# Patient Record
Sex: FEMALE | Race: BLACK OR AFRICAN AMERICAN | NOT HISPANIC OR LATINO | Employment: OTHER | URBAN - METROPOLITAN AREA
[De-identification: names, ages, dates, MRNs, and addresses within clinical notes are randomized per-mention and may not be internally consistent; named-entity substitution may affect disease eponyms.]

---

## 2017-01-17 ENCOUNTER — LAB VISIT (OUTPATIENT)
Dept: LAB | Facility: HOSPITAL | Age: 62
End: 2017-01-17
Payer: MEDICARE

## 2017-01-17 DIAGNOSIS — Z76.82 ORGAN TRANSPLANT CANDIDATE: ICD-10-CM

## 2017-01-17 PROCEDURE — 86833 HLA CLASS II HIGH DEFIN QUAL: CPT | Mod: PO

## 2017-01-23 NOTE — PROGRESS NOTES
Late entry from 1/22/17 at 0800:  Notification received from Joseluis Cintron, , that pt is ranking as recipient from [facility] ID [id number]. Chart reviewed and telephone assessment completed. There are no contraindications noted to prevent pt from proceeding to transplant. This is a [facility] increased risk kidney. Patient was informed of donor status and read the [facility] increased risk script. Pt wishes to proceed to transplant.  1700: Notification from Joseluis Wise that we are primary for kidney alone transplant. Donor OR is set for 2200. Ms. Schulte is patient number two behind our primary recipient.  Pt requested to begin NPO past midnight tonight and I will update her on case situation in am.  1/23/17 at 0800::  Patient notified that pt number 1 will receive transplant and that she can resume normal activities.

## 2017-02-03 LAB
CLASS II ANTIBODY COMMENTS - LUMINEX: NORMAL
CPRA %: 0
HPRA INTERPRETATION: NORMAL
SERUM COLLECTION DT - LUMINEX CLASS II: NORMAL
SPCL2 TESTING DATE: NORMAL

## 2017-02-07 ENCOUNTER — LAB VISIT (OUTPATIENT)
Dept: LAB | Facility: HOSPITAL | Age: 62
End: 2017-02-07
Payer: MEDICARE

## 2017-02-07 DIAGNOSIS — Z76.82 ORGAN TRANSPLANT CANDIDATE: ICD-10-CM

## 2017-02-07 PROCEDURE — 86832 HLA CLASS I HIGH DEFIN QUAL: CPT | Mod: PO

## 2017-02-17 LAB — HPRA INTERPRETATION: NORMAL

## 2017-03-03 LAB
CLASS I ANTIBODIES - LUMINEX: NORMAL
CLASS I ANTIBODY COMMENTS - LUMINEX: NORMAL
CPRA %: 82
SERUM COLLECTION DT - LUMINEX CLASS I: NORMAL
SPCL1 TESTING DATE: NORMAL

## 2017-03-06 DIAGNOSIS — Z12.11 COLON CANCER SCREENING: ICD-10-CM

## 2017-03-06 DIAGNOSIS — Z76.82 AWAITING ORGAN TRANSPLANT STATUS: Primary | ICD-10-CM

## 2017-03-06 DIAGNOSIS — Z76.82 ORGAN TRANSPLANT CANDIDATE: Primary | ICD-10-CM

## 2017-04-10 ENCOUNTER — CLINICAL SUPPORT (OUTPATIENT)
Dept: CARDIOLOGY | Facility: CLINIC | Age: 62
End: 2017-04-10
Payer: MEDICARE

## 2017-04-10 ENCOUNTER — OFFICE VISIT (OUTPATIENT)
Dept: OBSTETRICS AND GYNECOLOGY | Facility: CLINIC | Age: 62
End: 2017-04-10
Payer: MEDICARE

## 2017-04-10 ENCOUNTER — HOSPITAL ENCOUNTER (OUTPATIENT)
Dept: RADIOLOGY | Facility: HOSPITAL | Age: 62
Discharge: HOME | End: 2017-04-10
Attending: NURSE PRACTITIONER
Payer: MEDICARE

## 2017-04-10 VITALS
DIASTOLIC BLOOD PRESSURE: 78 MMHG | WEIGHT: 213.88 LBS | SYSTOLIC BLOOD PRESSURE: 140 MMHG | BODY MASS INDEX: 37.89 KG/M2 | HEIGHT: 63 IN

## 2017-04-10 DIAGNOSIS — Z76.82 ORGAN TRANSPLANT CANDIDATE: ICD-10-CM

## 2017-04-10 DIAGNOSIS — Z01.419 ENCOUNTER FOR GYNECOLOGICAL EXAMINATION: Primary | ICD-10-CM

## 2017-04-10 LAB
DIASTOLIC DYSFUNCTION: YES
ESTIMATED PA SYSTOLIC PRESSURE: 32.81
RETIRED EF AND QEF - SEE NOTES: 58 (ref 55–65)
TRICUSPID VALVE REGURGITATION: ABNORMAL

## 2017-04-10 PROCEDURE — 99999 PR PBB SHADOW E&M-EST. PATIENT-LVL III: CPT | Mod: PBBFAC,,, | Performed by: OBSTETRICS & GYNECOLOGY

## 2017-04-10 PROCEDURE — 93325 DOPPLER ECHO COLOR FLOW MAPG: CPT | Mod: PBBFAC,PO,TXP | Performed by: NUCLEAR MEDICINE

## 2017-04-10 PROCEDURE — 93351 STRESS TTE COMPLETE: CPT | Mod: 26,S$PBB,TXP, | Performed by: NUCLEAR MEDICINE

## 2017-04-10 PROCEDURE — G0101 CA SCREEN;PELVIC/BREAST EXAM: HCPCS | Mod: S$PBB,,, | Performed by: OBSTETRICS & GYNECOLOGY

## 2017-04-10 PROCEDURE — 93320 DOPPLER ECHO COMPLETE: CPT | Mod: 26,S$PBB,TXP, | Performed by: NUCLEAR MEDICINE

## 2017-04-10 PROCEDURE — 71020 XR CHEST PA AND LATERAL: CPT | Mod: 26,TXP,, | Performed by: RADIOLOGY

## 2017-04-10 NOTE — PROGRESS NOTES
Satinder Schulte is a 61 y.o.  who presents for   Chief Complaint   Patient presents with    Gynecologic Exam     Annual   - s/p ARABELLA BSO  - Renal failure still on dialysis    Currently not sexually active     Last mammogram 10/6/16  Last colonoscopy 3/6/17      Past Medical History:   Diagnosis Date    Abnormal Pap smear     repeat paps were normal    Anemia associated with chronic renal failure     Awaiting organ transplant status  - 2013    Blood type A+ 2014    CKD (chronic kidney disease) stage 5, GFR less than 15 ml/min     secondary hypertension    Hyperlipidemia     Hypertension, renal 1992    Stroke     Residual speech deficit       Past Surgical History:   Procedure Laterality Date    AV FISTULA PLACEMENT      CHOLECYSTECTOMY      HYSTERECTOMY      TAHBSO for benign reasons    OOPHORECTOMY         Current Outpatient Prescriptions   Medication Sig Dispense Refill    atorvastatin (LIPITOR) 40 MG tablet Take 40 mg by mouth once daily.       AURYXIA 210 mg iron Tab       calcitRIOL (ROCALTROL) 0.5 MCG capsule Take 0.5 mcg by mouth once daily.        clonidine (CATAPRES) 0.1 MG tablet Take 0.1 mg by mouth once.       cyclobenzaprine (FLEXERIL) 10 MG tablet       ergocalciferol (VITAMIN D2) 50,000 unit capsule Take 50,000 Units by mouth every 30 days.        ferrous sulfate 325 mg (65 mg iron) Tab tablet Take 325 mg by mouth daily with breakfast.      furosemide (LASIX) 40 MG tablet Take 40 mg by mouth once daily.        labetalol (NORMODYNE) 300 MG tablet Take 300 mg by mouth 2 (two) times daily.        lactulose (CHRONULAC) 10 gram/15 mL solution Take 10 g by mouth 2 (two) times daily as needed.       levofloxacin (LEVAQUIN) 250 MG tablet       losartan (COZAAR) 50 MG tablet Take 50 mg by mouth once daily.        nifedipine (ADALAT CC) 90 MG TbSR       RENVELA 800 mg Tab 800 mg 2 (two) times daily.       SENSIPAR 60 mg Tab        No current  "facility-administered medications for this visit.           Review of patient's allergies indicates:  No Known Allergies    .  Family History   Problem Relation Age of Onset    Stroke Mother     Kidney disease Mother      on dialysis    Hypertension Mother     Heart disease Mother     Heart disease Father     Stroke Sister     Kidney disease Brother     Breast cancer Daughter     Heart disease Sister     Ovarian cancer Cousin     Colon cancer Neg Hx     Thrombophilia Neg Hx        Social History   Substance Use Topics    Smoking status: Never Smoker    Smokeless tobacco: Never Used    Alcohol use No       BP (!) 140/78  Ht 5' 3" (1.6 m)  Wt 97 kg (213 lb 13.5 oz)  BMI 37.88 kg/m2    ROS:  GENERAL: No acute complaints.   BREASTS: No lumps, tenderness, discharge.  GI: No nausea, vomiting, melena, hematochezia.  : No c/o.  GYN: No unusual discharge, pain, bleeding    GEN:  Alert and oriented lady in no acute distress.  HEAD and NECK: Normocephalic. Normal thyroid to inspection and palpation  SKIN: Mild hirsutism   BREASTS: Bilaterally normal to inspection without discharge or suspicious mass. No tenderness or axillary adenopathy.                  ABDOMEN: Overweight, soft and non tender. No mass, hepatomegaly, RUQT or CVAT.   PELVIC:      Vulva: normal genitalia. No suspicious lesions.       Urethra: normal size and location. No lesion, prolapse, or discharge. Non tender.      Vagina: Atrophic, no unusual discharge, no significant cystocele or rectocele      Cuff: intact and well supported      Adnexa: No masses, tenderness, or CDS nodularity.      Anus: normal appearing.    IMPRESSION: renal failure, no high risk SA, nl gyn exam, obesity      PLAN: f/u prn          "

## 2017-05-16 ENCOUNTER — LAB VISIT (OUTPATIENT)
Dept: LAB | Facility: HOSPITAL | Age: 62
End: 2017-05-16
Payer: MEDICARE

## 2017-05-16 DIAGNOSIS — Z76.82 ORGAN TRANSPLANT CANDIDATE: ICD-10-CM

## 2017-05-18 ENCOUNTER — TELEPHONE (OUTPATIENT)
Dept: TRANSPLANT | Facility: CLINIC | Age: 62
End: 2017-05-18

## 2017-05-19 ENCOUNTER — LAB VISIT (OUTPATIENT)
Dept: LAB | Facility: HOSPITAL | Age: 62
End: 2017-05-19
Payer: MEDICARE

## 2017-05-19 ENCOUNTER — OFFICE VISIT (OUTPATIENT)
Dept: TRANSPLANT | Facility: CLINIC | Age: 62
End: 2017-05-19
Payer: MEDICARE

## 2017-05-19 VITALS
OXYGEN SATURATION: 98 % | DIASTOLIC BLOOD PRESSURE: 78 MMHG | HEIGHT: 63 IN | WEIGHT: 209.19 LBS | BODY MASS INDEX: 37.07 KG/M2 | HEART RATE: 61 BPM | RESPIRATION RATE: 18 BRPM | SYSTOLIC BLOOD PRESSURE: 170 MMHG | TEMPERATURE: 98 F

## 2017-05-19 DIAGNOSIS — E78.2 MIXED HYPERLIPIDEMIA: Chronic | ICD-10-CM

## 2017-05-19 DIAGNOSIS — Z76.82 ORGAN TRANSPLANT CANDIDATE: ICD-10-CM

## 2017-05-19 DIAGNOSIS — D70.2 OTHER DRUG-INDUCED NEUTROPENIA: ICD-10-CM

## 2017-05-19 DIAGNOSIS — I10 ESSENTIAL HYPERTENSION: ICD-10-CM

## 2017-05-19 DIAGNOSIS — N18.6 ESRD (END STAGE RENAL DISEASE): Primary | Chronic | ICD-10-CM

## 2017-05-19 DIAGNOSIS — I12.0 HYPERTENSION, RENAL, STAGE 5 CHRONIC KIDNEY DISEASE OR END STAGE RENAL DISEASE: Chronic | ICD-10-CM

## 2017-05-19 PROCEDURE — 99213 OFFICE O/P EST LOW 20 MIN: CPT | Mod: PBBFAC,TXP

## 2017-05-19 PROCEDURE — 99999 PR PBB SHADOW E&M-EST. PATIENT-LVL III: CPT | Mod: PBBFAC,TXP,,

## 2017-05-19 PROCEDURE — 99215 OFFICE O/P EST HI 40 MIN: CPT | Mod: S$PBB,TXP,, | Performed by: INTERNAL MEDICINE

## 2017-05-19 RX ORDER — TEMAZEPAM 15 MG/1
CAPSULE ORAL
COMMUNITY
Start: 2017-03-09 | End: 2018-06-15

## 2017-05-19 RX ORDER — LOSARTAN POTASSIUM 100 MG/1
100 TABLET ORAL DAILY
Status: ON HOLD | COMMUNITY
Start: 2017-03-14 | End: 2019-09-25 | Stop reason: HOSPADM

## 2017-05-19 RX ORDER — HYDRALAZINE HYDROCHLORIDE 25 MG/1
100 TABLET, FILM COATED ORAL 3 TIMES DAILY
Status: ON HOLD | COMMUNITY
End: 2019-09-25 | Stop reason: HOSPADM

## 2017-05-19 RX ORDER — OMEPRAZOLE 40 MG/1
CAPSULE, DELAYED RELEASE ORAL
COMMUNITY
Start: 2017-04-04 | End: 2019-08-02

## 2017-05-19 RX ORDER — CARVEDILOL 25 MG/1
12.5 TABLET ORAL 2 TIMES DAILY WITH MEALS
Status: ON HOLD | COMMUNITY
Start: 2017-03-28 | End: 2019-09-25 | Stop reason: SDUPTHER

## 2017-05-19 NOTE — PROGRESS NOTES
Spoke w/VIKY Anguiano regarding placing pt on internal hold until caregiver plan is confirmed. Pt attended RR clinic w/no caregiver.

## 2017-05-19 NOTE — MR AVS SNAPSHOT
[facility]  [address]  [facility] [address]  Phone: [phone]                  Satinder Schulte   2017 12:30 PM   Office Visit    Description:  Female : 1955   Provider:  [facility], TRANSPLANT   Department:  [facility]           Reason for Visit     Waitlist Status Update           Diagnoses this Visit        Comments    ESRD (end stage renal disease)    -  Primary     Hypertension, renal, stage 5 chronic kidney disease or end stage renal disease         Mixed hyperlipidemia         Essential hypertension                To Do List           Goals (5 Years of Data)     Continue low sodium diet.     Weight loss of 1-2 pounds per week.       [facility]     [facility] On Call Nurse Care Line -  Assistance  Unless otherwise directed by your provider, please contact [facility], our nurse care line that is available for  assistance.     Registered nurses in the [facility] provide: appointment scheduling, clinical advisement, health education, and other advisory services.  Call: [phone] (toll free)               Medications           Message regarding Medications     Verify the changes and/or additions to your medication regime listed below are the same as discussed with your clinician today.  If any of these changes or additions are incorrect, please notify your healthcare provider.        STOP taking these medications     AURYXIA 210 mg iron Tab     furosemide (LASIX) 40 MG tablet Take 40 mg by mouth once daily.      ferrous sulfate 325 mg (65 mg iron) Tab tablet Take 325 mg by mouth daily with breakfast.           Verify that the below list of medications is an accurate representation of the medications you are currently taking.  If none reported, the list may be blank. If incorrect, please contact your healthcare provider. Carry this list with you in case of emergency.           Current Medications     atorvastatin (LIPITOR) 40  "MG tablet Take 40 mg by mouth once daily.     B complex-vitamin C-folic acid (NEPHRO-KATHY) 0.8 mg Tab Take by mouth.    calcitRIOL (ROCALTROL) 0.5 MCG capsule Take 0.5 mcg by mouth once daily.      carvedilol (COREG) 25 MG tablet     clonidine (CATAPRES) 0.1 MG tablet Take 0.1 mg by mouth once.     cyclobenzaprine (FLEXERIL) 10 MG tablet     ergocalciferol (VITAMIN D2) 50,000 unit capsule Take 50,000 Units by mouth every 30 days.      hydrALAZINE (APRESOLINE) 25 MG tablet Take 25 mg by mouth 3 (three) times daily.    labetalol (NORMODYNE) 300 MG tablet Take 300 mg by mouth 2 (two) times daily.      lactulose (CHRONULAC) 10 gram/15 mL solution Take 10 g by mouth 2 (two) times daily as needed.     levofloxacin (LEVAQUIN) 250 MG tablet     losartan (COZAAR) 100 MG tablet     nifedipine (ADALAT CC) 90 MG TbSR     omeprazole (PRILOSEC) 40 MG capsule     RENVELA 800 mg Tab 800 mg 2 (two) times daily.     SENSIPAR 60 mg Tab     temazepam (RESTORIL) 15 mg Cap            Clinical Reference Information           Your Vitals Were     BP Pulse Temp Resp Height Weight    170/78 (BP Location: Right arm, Patient Position: Sitting, BP Method: Automatic) 61 98 °F (36.7 °C) (Oral) 18 5' 3" (1.6 m) 94.9 kg (209 lb 3.5 oz)    SpO2 BMI             98% 37.06 kg/m2         Blood Pressure          Most Recent Value    BP  (!)  170/78      Allergies as of 5/19/2017     No Known Allergies      Immunizations Administered on Date of Encounter - 5/19/2017     None      Maintenance Dialysis History     Start End Type Comments Center    8/18/2014  [facility]            Current Dialysis Center Information     [facility] [address] Phone #:  [phone]    Contact:  [address]  [address] Fax #:  [phone]            Transplant Information        Txp Date Organ Coordinator Care Team     Kidney Chintan Singleton RN Referring Physician:  Bruce Khan MD    "      [facility] Sign-Up     Activating your [facility] account is as easy as 1-2-3!     1) Visit [email or web address], select Sign Up Now, enter this activation code and your date of birth, then select Next.  [email or web address]  Expires: 7/3/2017  4:00 PM      2) Create a username and password to use when you visit [facility] in the future and select a security question in case you lose your password and select Next.    3) Enter your e-mail address and click Sign Up!    Additional Information  If you have questions, please e-mail [email or web address] or call [phone] to talk to our [facility] staff. Remember, [facility] is NOT to be used for urgent needs. For medical emergencies, dial 911.         Language Assistance Services     ATTENTION: Language assistance services are available, free of charge. Please call [phone].      ATENCIÓN: Si habla garcía, tiene a chaudhary disposición servicios gratuitos de asistencia lingüística. Llame al [phone].     JESSICA Ý: N?u b?n nói Ti?ng Vi?t, có các d?ch v? h? tr? ngôn ng? mi?n phí dành cho b?n. G?i s? [phone].         [facility] complies with applicable Federal civil rights laws and does not discriminate on the basis of race, color, national origin, age, disability, or sex.

## 2017-05-19 NOTE — PROGRESS NOTES
Kidney Transplant Recipient Reevalulation    Referring Physician: Bruce Khan  Current Nephrologist:   Waitlist Status: internal hold  Dialysis Start Date: 8/18/2014    Subjective:     CC:  Annual reassessment of kidney transplant candidacy.    HPI:  Ms. Schulte is a 61 y.o. year old Black or  female with ESRD secondary to HTN.  She has been on the wait list for a kidney transplant at [facility] since 2/18/2013. Patient is currently on hemodialysis started on 2014. Patient is dialyzing on TTS schedule.  Patient reports that she is tolerating dialysis well.. She has a LUE AV fistula. Patient denies any recent hospitalizations or ED visits.    Patient had a cystoscopy in April 2017 due to left hydronephrosis and hematuriua. according with the patient the study was normal. Please see below note from Dr Nisha grande (urology)    Satinder Schulte is a 61 y.o. female with a history of gross hematuria and left-sided abdominal pain with mild hydronephrosis seen on CT scan, here today for f/u after cystoscopy with bilateral retrograde pyelograms and bladder biopsy. Today, she reports that she did well following the biopsy. She is no longer having some abdominal pain. No gross hematuria. Pathology showed reactive urothelial with mild chronic cystitis only. Retrograde pyelogram showed no sign of obstruction or filling defect. (paken from care everywhere)      Patient is feeling well, no complications with dilaysis, walking trying to lose wt, NO donors for kidney transplant. No hematuria, Urinates one or twice a day    No chest pain or claudication    there is CBC with leucopenia mild anemia and thrombocytopenia    Will request records  from the HD unit (CBCc in the last 2 to three months)       She also a CT of the abdomen due to abdominal pain: see results:    Abdomen: Multiple hepatic and renal cysts are again seen. The hepatic cysts measure up to 4.6 cm. The right renal cyst measures up to 2.3 cm.  Cholecystectomy clips. Noncontrasted appearances of the liver, pancreas, and spleen are otherwise normal. The   biliary tree is normal. The adrenal glands are normal.      The kidneys are again atrophic. There is some high density material that appears to be within the right renal collecting system, which could be stones. Negative for right hydronephrosis. There is left hydronephrosis and hydroureter, without an obvious   source of the hydronephrosis, new from the comparison study.    Negative for adenopathy, ascites or inflammatory changes demonstrated.  Vascular calcifications are present without aneurysmal change.    The bowel is normal. Normal appendix. Mildly increased stool.    Pelvis:  The urinary bladder is contracted.     The female pelvic organs are absent.  Numerous pelvic phleboliths noted.    There is laxity of the linea alba.  Abdominal wall is otherwise intact.    Negative for osseous abnormalities.  Negative for significant spinal canal stenosis. Similar degree degenerative changes involve L5/S1 with minor marginal spondylosis noted. Mild degenerative facet arthropathy.    Negative for groin adenopathy.     Thyroid US: The right thyroid lobe measures 5.0 x 2.8 x 2.4 cm, with no nodules demonstrated.  The left lobe measures 4.6 x 2.2 x 1.8 cm, with no nodules demonstrated.  The isthmus measures 9 mm thickness.        IMPRESSION:   No thyroid nodules demonstrated.      Review of Systems     Skin: no skin rash  CNS; no headaches, blurred vision, seizure, or syncope  ENT: No JVD,  Adenopathies,  nasal congestion. No oral lesions  Cardiac: No chest pain, dyspnea, claudication, edema or palpitations  Respiratory: No SOB, cough, hemoptysis   Gastro-intestinal: No diarrhea, constipation, abdominal pain, nausea, vomit. No ascitis  Genitourinary: no hematuria, dysuria, frequency, frequency  Musculoskeletal: joint pain, arthritis or vasculitic changes  Psych: alert awake, oriented, No cranial nerves  "deficit.      Objective:   body mass index is 37.06 kg/(m^2).    Physical Exam     BP (!) 170/78 (BP Location: Right arm, Patient Position: Sitting, BP Method: Automatic)  Pulse 61  Temp 98 °F (36.7 °C) (Oral)   Resp 18  Ht 5' 3" (1.6 m)  Wt 94.9 kg (209 lb 3.5 oz)  SpO2 98%  BMI 37.06 kg/m2    Head: normocephalic  Neck: No JVD, cervical axillary, or femoral adenopathies  Heart: no murmurs, Normal s1 and s2, No gallops, no rubs, No murmurs  Lungs; CTA, good respiratory effort, no crackles  Abdomen: soft, non tender, no splenomegaly or hepatomegaly, no massess, no bruits  Extremities: RUE AVF No edema, skin rash, joint pain  SNC: awake, alert oriented. Cranial nerves are intact, no focalized, sensitivity and strength preserved      Labs:  Lab Results   Component Value Date    WBC 4.13 (L) 07/03/2012    HGB 11.4 (L) 07/03/2012    HCT 33.8 (L) 07/03/2012     07/03/2012    K 4.4 07/03/2012     07/03/2012    CO2 23 07/03/2012    BUN 58 (H) 07/03/2012    CREATININE 3.8 (H) 07/03/2012    EGFRNONAA 12 (L) 07/03/2012    CALCIUM 10.2 07/03/2012    ALBUMIN 4.1 07/03/2012    AST 16 07/03/2012    ALT 29 07/03/2012       No results found for: PREALBUMIN, BILIRUBINUA, GGT, AMYLASE, LIPASE, PROTEINUA, NITRITE, RBCUA, WBCUA    Lab Results   Component Value Date    HLAABCTYPE A2,A74 07/03/2012    HLAABCTYPE B37(Bw4),B42(Bw6) 07/03/2012    HLAABCTYPE Cw6,Cw17 07/03/2012       Lab Results   Component Value Date    CPRA 82 02/02/2017    NW6IAUY  02/02/2017     B13,B49,B45,B44,B50,B41,B60,B61,B47,B76,B82,A1,B51,B57,B58,B52,B75,B77,B71,B62,B53,B63,B72,B46    CIABCLM WEAK A*24:02, B78, A23, B35 02/02/2017    CIIAB Negative 12/01/2015    ABCMT DQB1*05:01 01/10/2017       Labs were reviewed with the patient.    Pre-transplant Workup:   Reviewed with the patient.    Assessment:     1. ESRD from HTN, on dialysis since  Feb 2013    2. Hypertension, renal, stage 5 chronic kidney disease or end stage renal disease    3. Mixed " "hyperlipidemia    4. Essential hypertension        Plan:     Transplant Candidacy:   Ms. Schulte is a suitable kidney transplant candidate.  She will be refered to see hematology. Patient has leucopenia documented since 2015 as low as 2.1K. Once work up for leucopenia is completed patient can be called for kidney transplantation    Will request CBC's from the HD but she needs to see hematology    Her hematuria is resolved, she saw Urology and underwent a cystoscopy and a CT of the abdomen which did not show any acute change. Please see urology note in "care everywhere"    Ct of the abdomen showed liver cysts, will ask the transplant surgery for their opinion    We spoke about living donation, wt loss    Education was provided    All questions were answered    The rest of the evaluation is unremarkable        Pavel Padilla MD       Follow-up:   In addition to the tests noted in the plan, Ms. Schulte will continue to have reevaluation as per the standing pre-kidney transplant protocol:  1. Monthly blood for PRA  2. Annual return to clinic, except HIV positive, > 65 years of age, or pancreas transplant candidates who will be scheduled to see transplant every 6 months while in pre-transplant phase  3. Annual re-testing: CXR, EKG, yearly mammograms for women over 40 and PSA for males over 40, cardiology follow-up as recommended by initial cardiology pre-transplant evaluation  4. Renal ultrasound every 2 years  5. Baseline colonoscopy after age 50 and repeated as recommended    [facility] Patient Status  Functional Status: 60% - Requires occasional assistance but is able to care for needs  Physical Capacity: No Limitations  "

## 2017-05-19 NOTE — PROGRESS NOTES
Transplant Recipient Adult Psychosocial Assessment-Update    *Pt was alone during the assessment. Kidney SW's notified and pt placed on hold until caregiver is able to come to clinic with pt.     Satinder Schulte  [address]  [facility] [address]    Telephone Information:   Mobile [phone]   Mobile [phone]   Home  [phone] (home)  Work  There is no work phone number on file.  E-mail  No e-mail address on record    Sex: female  YOB: 1955  Age: 61 y.o.    Encounter Date: 2017  U[place] Citizen: yes  Primary Language: English   Needed: no    Emergency Contact:  Name: Ruth Ann Schulte  Relationship: daughter  Address: Same As Pt  Phone Numbers:  [phone] (mobile)    Family/Social Support:   Number of dependents/: none  Marital history: Pt has never been   Other family dynamics: Pt's parents are ; Pt has five sisters and three brothers that are currently living. Pt reports that she is close with her siblings. Pt has four children; Mark Schulte (36) is from one relationship, and Ruth Ann Schulte (32), Dany Schulte (27) and Niya Schulte (24) are from a second relationship. Pt is close to her children and currently lives with her daughter Ruth Ann who is being treated for cancer.     Household Composition:  Name: Satinder Schulte  Age: 61  Relationship: patient  Does person drive? no    Name: Satinder Schulte  Age: 34  Relationship: daughter  Does person drive? yes    Do you and your caregivers have access to reliable transportation? yes  PRIMARY CAREGIVER: *Pt reports that her daughter Niya Schulte will be primary caregiver, phone number [phone]. VIKY unable to get in touch with Niya and contacted pt's daughter Ruth Ann ([phone]) to clarify what the caregiver plan would be. Ruth Ann reports that Niya works at a bank until 4pm and that she will speak with her about pt's caregiver plan. Ruth Ann reports that she was never told by the pt that a caregiver had to be  present with pt during her appointments this afternoon. Ruth Ann aware that pt has trouble reading but pt never notified Ruth Ann of the appointment reminder sent to pt in the mail. Ruth Ann will not be able to provide care to pt during and after transplant at this time as Ruth Ann is receiving chemo for cancer and will be immunosuppressed. Ruth Ann reports that she will speak with Niya this afternoon to discuss who can provide care to pt during and after transplant. Ruth Ann aware that pt will need to remain local for 2-5 weeks after transplant. Ruth Ann reports that pt has many siblings who she will contact and is aware that pt needs both a primary and a backup caregiver. VIKY notified Ruth Ann that SW will need to meet with pt and primary caregiver in transplant clinic in order to confirm caregiver plan and that until a caregiver plan is confirmed, pt will remain on hold. Ruth Ann verbalized her understanding and was very willing to work with team to schedule a caregiver appt for next week. Niya will contact SW team on Monday of next week to follow up.      provided in-depth information to patient and caregiver regarding pre- and post-transplant caregiver role.   strongly encourages patient and caregiver to have concrete plan regarding post-transplant care giving, including back-up caregiver(s) to ensure care giving needs are met as needed.    Patient states understanding all aspects of caregiver role/commitment and is able/willing/committed to being caregiver to the fullest extent necessary.    Patient verbalizes understanding of the education provided today and caregiver responsibilities.         remains available. Patient agree to contact  in a timely manner if concerns arise.      Able to take time off work without financial concerns: unclear as pt does not have a concrete caregiver plan at this time.     Additional Significant Others who will Assist with Transplant: Unknown at this  time      Living Will: no  Healthcare Power of : no  Advance Directives on file: <<no information> per medical record.  Verbally reviewed LW/HCPA information.   provided patient with copy of LW/HCPA documents and provided education on completion of forms.    Living Donors: No.    Highest Education Level: Grade School (0-8)  Reading Ability: 2nd grade  Reports difficulty with: reading, writing, seeing, comprehension and learning; Pt reports that she has always had difficulty reading and believes she has some type of learning disability. In addition, pt does not have teeth and has difficulty pronouncing things easily. Pt states that she plans on setting up a dentist appoint with [facility] and reports that she was given dentures by the [facility] but that they were too large. SW recommended that [facility] contact [facility] to notify them of this issue.   Learns Best By:  Verbal information; Pt advised to ask her daughters to assist with reading documents sent to pt or given to pt by a doctor or medical provider     Status: no  VA Benefits: no     Working for Income: No  If no, reason not working: Disability  Patient is disabled.  Pt states that she never worked. It is unclear if pt did not work due to another disability, but pt does state that she had medical issues as a child. Pt diagnosed with a stroke per medical record.     Spouse/Significant Other Employment: Pt's daughter Ruth Ann is currently on disability due to cancer diagnosis. Pt's daughter Niya currently works at a bank.      Disabled: yes: date disability began: 1/1/1968, due to: unknown.    Monthly Income:   Disability: $740.00  Able to afford all costs now and if transplanted, including medications: yes  Patient verbalizes understanding of personal responsibilities related to transplant costs and the importance of having a financial plan to ensure that patients transplant costs are fully covered.      provided fundraising  information/education.  Patient verbalizes understanding.   remains available.    Insurance:   Payor/Plan Subscr  Sex Relation Sub. Ins. ID Effective Group Num   1. MEDICARE - [place]* NICKO VALIENTE 1955 Female  [id number] 10/1/1978 NGN                                   PO    2. [place] VALIENTE 1955 Female  [id number]* 1999                                    [address] [address]     Primary Insurance (for UNOS reporting): Public Insurance - Medicare FFS (Fee For Service)  Secondary Insurance (for UNOS reporting): Public Insurance - Medicaid *Pt receives Medicaid Transportation from [employer] (ph#[phone])  Patient verbalizes clear understanding that patient may experience difficulty obtaining and/or be denied insurance coverage post-surgery. This includes and is not limited to disability insurance, life insurance, health insurance, burial insurance, long term care insurance, and other insurances.    Patient also reports understanding that future health concerns related to or unrelated to transplantation may not be covered by patient's insurance.  Resources and information provided and reviewed.      Patient provides verbal permission to release any necessary information to outside resources for patient care and discharge planning.  Resources and information provided are reviewed.      Dialysis Adherence:  Patient reports that she is adherent to her dialysis and utilizes Medicaid transportation to get to and from appts. Pt reports that her schedule is T, , Sat at 5AM to 9:30AM. It is unclear whether pt goes to [facility] or [facility] in [place], [place]. Pt claimed that she goes to [facility]. SW unable to reach anyone at either center. SW will f/u with both dialysis centers on Monday regarding dialysis adherence.  [facility]: [address] [place] (ph# [phone]); [facility]: [address] [place]  [phone] (# [phone])    Infusion Service: patient utilizing? no  Home Health: patient utilizing? no  DME: no  Pulmonary/Cardiac Rehab: none;   ADLS:  Pt reports that she is fully independent but does not drive.    Adherence: Pt reports that she goes to all doctors appointments and takes medications as prescribed. However, pt did not have a caregiver with her at this appt, likely due to the fact that she is not able to read most of what she is sent in the mail regarding [facility] appointments. In addition, pt's daughter Ruth Ann reports that pt did not say anything to her about needing to have a caregiver.  Adherence education and counseling provided.     Per History Section:  Past Medical History:   Diagnosis Date    Abnormal Pap smear     repeat paps were normal    Anemia associated with chronic renal failure     Awaiting organ transplant status  - 02/18/2013 6/6/2014    Blood type A+ 6/6/2014    CKD (chronic kidney disease) stage 5, GFR less than 15 ml/min     secondary hypertension    Essential hypertension 5/19/2017    Hyperlipidemia     Hypertension, renal 1992    Stroke 2007    Residual speech deficit     Social History   Substance Use Topics    Smoking status: Never Smoker    Smokeless tobacco: Never Used    Alcohol use No     History   Drug Use No     History   Sexual Activity    Sexual activity: No     Comment: single, Lives with daughter, on Disability, Lives in [place]       Per Today's Psychosocial:  Tobacco: none, patient denies any use.  Alcohol: none, patient denies any use.  Illicit Drugs/Non-prescribed Medications: none, patient denies any use.    Patient states clear understanding of the potential impact of substance use as it relates to transplant candidacy and is aware of possible random substance screening.  Substance abstinence/cessation counseling, education and resources provided and reviewed.     Arrests/DWI/Treatment/Rehab: patient denies    Psychiatric History:    Mental  Health: pt denies  Psychiatrist/Counselor: none  Medications:  none  Suicide/Homicide Issues: Pt denies current or past SI/HI.   Safety at home: Pt denies current or past safety issues in the home.     Knowledge: Patient states having clear understanding and realistic expectations regarding the potential risks and potential benefits of organ transplantation and organ donation, agrees to discuss with health care team members and support system members and to utilize available resources and express questions and/or concerns in order to further facilitate the pt informed decision-making.  Resources and information provided and reviewed.     Patient is aware of [facility]'s affiliation and/or partnership with agencies in home health care, LTAC, SNF, [facility], and other hospitals and clinics.    Understanding: Patient reports having a clear understanding of the many lifetime commitments involved with being a transplant recipient, including costs, compliance, medications, lab work, procedures, appointments, concrete and financial planning, preparedness, timely and appropriate communication of concerns, abstinence (ETOH, tobacco, illicit non-prescribed drugs), adherence to all health care team recommendations, support system and caregiver involvement, appropriate and timely resource utilization and follow-through, mental health counseling as needed/recommended, and patient and caregiver responsibilities.  Social Service Handbook, resources and detailed educational information provided and reviewed.  Educational information provided.    Patient also reports current and expected compliance with health care regime and states having a clear understanding of the importance of compliance.  Pt aware that she will need to return to clinic with a caregiver. Pt's daughter Ruth Ann also notified via telephone that pt will need to come back to clinic with primary caregiver before pt can be taken off of hold.     Patient reports a clear  "understanding that risks and benefits may be involved with organ transplantation and with organ donation.      Patient also reports clear understanding that psychosocial risk factors may affect patient, and include but are not limited to feelings of depression, generalized anxiety, anxiety regarding dependence on others, post traumatic stress disorder, feelings of guilt and other emotional and/or mental concerns, and/or exacerbation of existing mental health concerns.  Detailed resources provided and discussed.     Patient agrees to access appropriate resources in a timely manner as needed and/or as recommended, and to communicate concerns appropriately.  Patient also reports a clear understanding of treatment options available.      reviewed education, provided additional information, and answered questions.    Feelings or Concerns: Pt reports that she is eager to get a transplant soon as she has been waiting a long time.     Coping: Pt reports that she and her daughter Ruth Ann spend time taking care of each other and enjoy each other's company. Pt reports that she is coping adequately well at this time.     Goals: Pt states that she looks forward to "living my life."  Patient referred to Vocational Rehabilitation.    Interview Behavior: Caregiver presents as alert and oriented x 4, pleasant, good eye contact, well groomed, recall good, concentration/judgement good, calm, communicative, cooperative and asking and answering questions appropriately. Pt did appear slowed at times, and this may be related to diagnosis of stroke that occurred in the past.          Transplant Social Work - Candidacy  Assessment/Plan:     Psychosocial Suitability: Patient presents as an unacceptable candidate for kidney transplant at this time. Based on psychosocial risk factors, patient presents as high risk, due to no confirmed caregiver plan at this time and no primary caregiver present. Once pt can return to clinic with a " caregiver(s) who can confirm their committment, pt will be considered suitable at low risk. Protective factors include adequate insurance, financial stability despite limited income, and no reported mental health or substance abuse issues.     Recommendations/Additional Comments: SW discussed fundraising, local housing, and caregiver instructions. Pt's daughter Ruth Ann will help to coordinate an appointment for pt and a primary caregiver to return to the transplant clinic in the next few weeks so that pt can be taken off of hold. In addition, SW will need to follow up on Monday with pt's dialysis unit to confirm adherence.     Chen Diaz, SEBASTIANW

## 2017-05-21 LAB
HEP. B SURF AB, QUAL: POSITIVE
HEP. B SURF AB, QUANT.: NORMAL MIU/ML

## 2017-05-22 ENCOUNTER — TELEPHONE (OUTPATIENT)
Dept: TRANSPLANT | Facility: HOSPITAL | Age: 62
End: 2017-05-22

## 2017-05-22 NOTE — TELEPHONE ENCOUNTER
SW contacted pt's dialysis center, [facility] ([phone]), located at [address] [place] [address] and spoke with Mildred who states that pt has been fully compliant over the last three months with 0 no-shows and 0 AMA's. SW expressed understanding and remains available.

## 2017-05-23 NOTE — PROGRESS NOTES
LISTED PATIENT EDUCATION NOTE    Ms. Satinder Schulte was seen in listed pre-kidney transplant clinic for evaluation for kidney, kidney/pancreas or pancreas only transplant.  The patient attended a group education session that discussed/reviewed the following aspects of transplantation: evaluation and selection committee process, UNOS waitlist management/multiple listings, types of organs offered (KDPI < 85%, KDPI > 85%, PHS increased risk, DCD), financial aspects, surgical procedures, dietary instruction pre- and post-transplant, health maintenance pre- and post-transplant, post-transplant hospitalization and outpatient follow-up, potential to participate in a research protocol, and medication management and side effects.  A question and answer session was provided after the presentation.    The patient was seen by all members of the multi-disciplinary team to include: Nephrologist/PA, Surgeon, , Transplant Coordinator, , Pharmacist and Dietician (if applicable).    The patient reviewed and signed all consents for evaluation which were witnessed and sent to scanning into the EPIC chart.    The patient was given an education book and plan for further evaluation based on her individual assessment.      The patient was encouraged to call with any questions or concerns.

## 2017-05-30 ENCOUNTER — TELEPHONE (OUTPATIENT)
Dept: TRANSPLANT | Facility: CLINIC | Age: 62
End: 2017-05-30

## 2017-05-30 NOTE — TELEPHONE ENCOUNTER
VIKY spoke with pt's daughter Niya Schulte ([phone]) who reports that she is very interested in attending the education and a social work appointment with pt so that she can understand the caregiver role. VIKY asked about others who can assist and she reports that her sister Ruth Ann could help. VIKY explained that there were some concerns about Ruth Ann helping because she is experiencing her own medical condition. Niya reports that she is usually the only one who helps with her sister and her mother, but does report another sibling Rufino Schulte ([phone]) whom Niya reports could possibly assist with pt or sister Ruth Ann. Niya reports that she would like to set up an appointment for pt and all of her siblings to attend the education and meet with the .    VIKY notified pt's coordinator, BESSY Singleton, RN    VIKY remains available to pt, pt's family, and transplant team at [phone].      ----- Message from Manda Ren sent at 5/26/2017 12:19 PM CDT -----  Patient returning call . Please call

## 2017-05-31 NOTE — LETTER
June 1, 2017               Dear Dr. Joseluis Khan:     Patient: Satinder Schulte   MR Number: [id number]   YOB: 1955     Dr. Pavel Padilla with transplant nephrology has recently seen Ms. Schulte on 5/19/17 in transplant clinic to update her listing status. He would like to request the last few months of Ms. Schulte's dialysis lab work, specfiically her CBC results for review. He has some concern that if she is frankly leukopenic or thrombocytopenic she would need a consultation with hematology. We will be more than happy to assist the patient with scheduling that appointment if warranted.        If you have any questions or concerns, please don't hesitate to call.    Sincerely,   Pamela Allen RN  Listed Coordinator      [facility]  [address] [place] [id number]  [phone]

## 2017-06-01 ENCOUNTER — TELEPHONE (OUTPATIENT)
Dept: TRANSPLANT | Facility: CLINIC | Age: 62
End: 2017-06-01

## 2017-06-01 DIAGNOSIS — Z76.82 ORGAN TRANSPLANT CANDIDATE: Primary | ICD-10-CM

## 2017-06-01 NOTE — TELEPHONE ENCOUNTER
Inquiry sent to transplant surgery via in basket message.     ----- Message from Yisel Gonzalez sent at 5/22/2017 10:46 AM CDT -----      ----- Message -----  From: Pavel Padilla MD  Sent: 5/19/2017   3:57 PM  To: [facility] Pre-Kidney Transplant Clinical    Please discuss with transplant surgery liver cysts, look stable to me    Get other CBCs from the HD unit    If she is frankly leukopenic or thrombocytopenic needs consultation with hematology    Thanks    Pavel

## 2017-06-16 ENCOUNTER — LAB VISIT (OUTPATIENT)
Dept: LAB | Facility: HOSPITAL | Age: 62
End: 2017-06-16
Payer: MEDICARE

## 2017-06-16 DIAGNOSIS — Z76.82 ORGAN TRANSPLANT CANDIDATE: ICD-10-CM

## 2017-07-08 LAB — HPRA INTERPRETATION: NORMAL

## 2017-07-08 PROCEDURE — 86829 HLA CLASS I/II ANTIBODY QUAL: CPT | Mod: PO,TXP

## 2017-07-08 PROCEDURE — 86829 HLA CLASS I/II ANTIBODY QUAL: CPT | Mod: 91,PO,TXP

## 2017-07-13 ENCOUNTER — LAB VISIT (OUTPATIENT)
Dept: LAB | Facility: HOSPITAL | Age: 62
End: 2017-07-13
Payer: MEDICARE

## 2017-07-13 DIAGNOSIS — Z76.82 ORGAN TRANSPLANT CANDIDATE: ICD-10-CM

## 2017-07-31 DIAGNOSIS — Z76.82 ORGAN TRANSPLANT CANDIDATE: ICD-10-CM

## 2017-07-31 LAB — HPRA INTERPRETATION: NORMAL

## 2017-07-31 PROCEDURE — 86829 HLA CLASS I/II ANTIBODY QUAL: CPT | Mod: 91,PO,TXP

## 2017-07-31 PROCEDURE — 86829 HLA CLASS I/II ANTIBODY QUAL: CPT | Mod: PO,TXP

## 2017-08-17 PROCEDURE — 86833 HLA CLASS II HIGH DEFIN QUAL: CPT | Mod: PO,TXP

## 2017-08-17 PROCEDURE — 86832 HLA CLASS I HIGH DEFIN QUAL: CPT | Mod: PO,TXP

## 2017-08-18 ENCOUNTER — INITIAL CONSULT (OUTPATIENT)
Dept: HEMATOLOGY/ONCOLOGY | Facility: CLINIC | Age: 62
End: 2017-08-18
Payer: MEDICARE

## 2017-08-18 ENCOUNTER — LAB VISIT (OUTPATIENT)
Dept: LAB | Facility: HOSPITAL | Age: 62
End: 2017-08-18
Payer: MEDICARE

## 2017-08-18 ENCOUNTER — OFFICE VISIT (OUTPATIENT)
Dept: TRANSPLANT | Facility: CLINIC | Age: 62
End: 2017-08-18
Payer: MEDICARE

## 2017-08-18 VITALS
DIASTOLIC BLOOD PRESSURE: 83 MMHG | SYSTOLIC BLOOD PRESSURE: 203 MMHG | BODY MASS INDEX: 36.41 KG/M2 | HEART RATE: 70 BPM | WEIGHT: 205.5 LBS | HEIGHT: 63 IN | RESPIRATION RATE: 16 BRPM | OXYGEN SATURATION: 100 %

## 2017-08-18 VITALS
HEART RATE: 46 BPM | WEIGHT: 205.69 LBS | OXYGEN SATURATION: 97 % | DIASTOLIC BLOOD PRESSURE: 57 MMHG | TEMPERATURE: 98 F | HEIGHT: 63 IN | RESPIRATION RATE: 17 BRPM | BODY MASS INDEX: 36.45 KG/M2 | SYSTOLIC BLOOD PRESSURE: 130 MMHG

## 2017-08-18 DIAGNOSIS — D61.818 PANCYTOPENIA: ICD-10-CM

## 2017-08-18 DIAGNOSIS — D61.818 PANCYTOPENIA: Primary | ICD-10-CM

## 2017-08-18 DIAGNOSIS — Z76.82 PATIENT ON WAITING LIST FOR KIDNEY TRANSPLANT: ICD-10-CM

## 2017-08-18 LAB
BASOPHILS # BLD AUTO: 0.01 K/UL
BASOPHILS NFR BLD: 0.3 %
DIFFERENTIAL METHOD: ABNORMAL
EOSINOPHIL # BLD AUTO: 0.1 K/UL
EOSINOPHIL NFR BLD: 3.8 %
ERYTHROCYTE [DISTWIDTH] IN BLOOD BY AUTOMATED COUNT: 15.7 %
HCT VFR BLD AUTO: 29.6 %
HGB BLD-MCNC: 9.5 G/DL
LYMPHOCYTES # BLD AUTO: 1.4 K/UL
LYMPHOCYTES NFR BLD: 47.6 %
MCH RBC QN AUTO: 30.7 PG
MCHC RBC AUTO-ENTMCNC: 32.1 G/DL
MCV RBC AUTO: 96 FL
MONOCYTES # BLD AUTO: 0.3 K/UL
MONOCYTES NFR BLD: 11 %
NEUTROPHILS # BLD AUTO: 1.1 K/UL
NEUTROPHILS NFR BLD: 37.3 %
PLATELET # BLD AUTO: 115 K/UL
PMV BLD AUTO: 10.7 FL
RBC # BLD AUTO: 3.09 M/UL
WBC # BLD AUTO: 2.92 K/UL

## 2017-08-18 PROCEDURE — 99499 UNLISTED E&M SERVICE: CPT | Mod: S$PBB,TXP,, | Performed by: NURSE PRACTITIONER

## 2017-08-18 PROCEDURE — 85025 COMPLETE CBC W/AUTO DIFF WBC: CPT | Mod: TXP

## 2017-08-18 PROCEDURE — 36415 COLL VENOUS BLD VENIPUNCTURE: CPT | Mod: TXP

## 2017-08-18 PROCEDURE — 99205 OFFICE O/P NEW HI 60 MIN: CPT | Mod: S$PBB,,, | Performed by: INTERNAL MEDICINE

## 2017-08-18 PROCEDURE — 99214 OFFICE O/P EST MOD 30 MIN: CPT | Mod: PBBFAC,27,TXP | Performed by: NURSE PRACTITIONER

## 2017-08-18 PROCEDURE — 99999 PR PBB SHADOW E&M-EST. PATIENT-LVL IV: CPT | Mod: PBBFAC,,, | Performed by: INTERNAL MEDICINE

## 2017-08-18 PROCEDURE — 99999 PR PBB SHADOW E&M-EST. PATIENT-LVL IV: CPT | Mod: PBBFAC,TXP,, | Performed by: NURSE PRACTITIONER

## 2017-08-18 NOTE — LETTER
August 18, 2017      Emilia Amaya, DENNIS  [address] [place] [address]           [facility]  [address]  [place] [address]  Phone: [phone]          Patient: Satinder Schulte   MR Number: [id number]   YOB: 1955   Date of Visit: 8/18/2017       Dear Emilia Amaya:    Thank you for referring Satinder Schulte to me for evaluation. Attached you will find relevant portions of my assessment and plan of care.    If you have questions, please do not hesitate to call me. I look forward to following Satinder Schulte along with you.    Sincerely,    Anna Lin MD    Enclosure  CC:  No Recipients    If you would like to receive this communication electronically, please contact [email or web address] or [phone] to request more information on [employer] Link access.    For providers and/or their staff who would like to refer a patient to [facility], please contact us through our one-stop-shop provider referral line, Christy Gregory, at [phone].    If you feel you have received this communication in error or would no longer like to receive these types of communications, please e-mail [email or web address]

## 2017-08-18 NOTE — PROGRESS NOTES
Subjective:       Patient ID: Satinder Schulte is a 61 y.o. female.    Chief Complaint: transplant- clearance    Patient referred for cytopenias from renal transplant service. Only CBC available at time of consultation is 5 years old. Patient reports a history of anemia due to menorrhagia that was treated with hysterectomy. During conversation, she also mentions a history of decreased white blood cell count. She sees a hematologist in [place] routinely, Dr. Iyer Shows, and says a bone marrow biopsy is scheduled for 8/23/17. She is asymptomatic today from any cytopenias.    HPI  Review of Systems   Constitutional: Negative.    HENT: Negative.    Eyes: Negative.    Respiratory: Negative.    Cardiovascular: Negative.    Gastrointestinal: Negative.    Endocrine: Negative.    Genitourinary: Negative.    Musculoskeletal: Negative.    Skin: Negative.    Allergic/Immunologic: Negative for environmental allergies, food allergies and immunocompromised state.   Neurological: Negative.    Hematological: Negative for adenopathy. Does not bruise/bleed easily.   Psychiatric/Behavioral: Negative.        Objective:      Physical Exam   Constitutional: She is oriented to person, place, and time. She appears well-developed and well-nourished.   HENT:   Head: Normocephalic and atraumatic.   Eyes: Conjunctivae are normal. No scleral icterus.   Neck: Normal range of motion. Neck supple.   Cardiovascular: Normal rate and intact distal pulses.    Pulmonary/Chest: Effort normal. No respiratory distress.   Abdominal: Soft. She exhibits no distension. There is no tenderness.   Musculoskeletal: Normal range of motion. She exhibits no edema.   Neurological: She is alert and oriented to person, place, and time. No cranial nerve deficit.   Skin: Skin is warm and dry.   Psychiatric: She has a normal mood and affect. Her behavior is normal.   Nursing note and vitals reviewed.      Assessment:       1. Pancytopenia        Plan:       Update CBC  today  Information release completed to obtain records from Dr. Iyer Shows office and obtain bone marrow biopsy results once completed 8/23/17.

## 2017-08-18 NOTE — LETTER
August 21, 2017                     [facility] [facility]  [address]  [facility] [address]  Phone: [phone]   Patient: Satinder Schulte   MR Number: [id number]   YOB: 1955   Date of Visit: 8/18/2017       Dear      Thank you for referring Satinder Schulte to me for evaluation. Attached you will find relevant portions of my assessment and plan of care.    If you have questions, please do not hesitate to call me. I look forward to following Satinder Schulte along with you.    Sincerely,    Emilia Amaya, NP    Enclosure    If you would like to receive this communication electronically, please contact [email or web address] or [phone] to request "[employer] Link access.    "[employer] Link is a tool which provides read-only access to select patient information with whom you have a relationship. Its easy to use and provides real time access to review your patients record including encounter summaries, notes, results, and demographic information.    If you feel you have received this communication in error or would no longer like to receive these types of communications, please e-mail [email or web address]

## 2017-08-21 PROBLEM — Z76.82 PATIENT ON WAITING LIST FOR KIDNEY TRANSPLANT: Status: ACTIVE | Noted: 2017-08-21

## 2017-08-22 LAB
CLASS I ANTIBODIES - LUMINEX: NORMAL
CLASS I ANTIBODY COMMENTS - LUMINEX: NORMAL
CLASS II ANTIBODY COMMENTS - LUMINEX: NORMAL
CPRA %: 73
HPRA INTERPRETATION: NORMAL
SERUM COLLECTION DT - LUMINEX CLASS I: NORMAL
SERUM COLLECTION DT - LUMINEX CLASS II: NORMAL
SPCL1 TESTING DATE: NORMAL
SPCL2 TESTING DATE: NORMAL
SPCLU TESTING DATE: NORMAL

## 2017-10-06 ENCOUNTER — HOSPITAL ENCOUNTER (OUTPATIENT)
Dept: RADIOLOGY | Facility: HOSPITAL | Age: 62
Discharge: HOME | End: 2017-10-06
Attending: NURSE PRACTITIONER
Payer: MEDICARE

## 2017-10-06 VITALS — WEIGHT: 205 LBS | BODY MASS INDEX: 36.32 KG/M2 | HEIGHT: 63 IN

## 2017-10-06 DIAGNOSIS — Z76.82 ORGAN TRANSPLANT CANDIDATE: ICD-10-CM

## 2017-10-06 PROCEDURE — 77067 SCR MAMMO BI INCL CAD: CPT | Mod: 26,TXP,, | Performed by: RADIOLOGY

## 2017-10-06 PROCEDURE — 77067 SCR MAMMO BI INCL CAD: CPT | Mod: TC,TXP

## 2017-10-09 NOTE — PROGRESS NOTES
Patient's upcoming appts noted in care everywhere    Upcoming Encounters  Upcoming Encounters   Date Type Specialty Care Team Description   10/18/2017 Office Visit Urology Nisha Burks MD    [address] [facility]    [address]    [place] [id number] [phone] (Fax)       11/10/2017 Office Visit Family Medicine Brennon Chavez MD    [address] [facility]    [address]    [place] [id number] [phone] (Fax)       12/07/2017 Office Visit Hematology and Oncology Juvenal Diggs MD    [address] [facility]    [address]    [place] [address] [phone] (Fax)

## 2017-10-16 ENCOUNTER — LAB VISIT (OUTPATIENT)
Dept: LAB | Facility: HOSPITAL | Age: 62
End: 2017-10-16
Payer: MEDICARE

## 2017-10-16 DIAGNOSIS — Z76.82 ORGAN TRANSPLANT CANDIDATE: ICD-10-CM

## 2017-10-16 PROCEDURE — 86832 HLA CLASS I HIGH DEFIN QUAL: CPT | Mod: PO,TXP

## 2017-10-16 PROCEDURE — 86833 HLA CLASS II HIGH DEFIN QUAL: CPT | Mod: PO,TXP

## 2017-10-19 NOTE — PROGRESS NOTES
Transplant Recipient Adult Psychosocial Assessment-Update (Last Assessment completed on 17)      Satinder Schulte  [address]  [place] [address]    Telephone Information:   Mobile [phone]   Mobile [phone]   Home  [phone] (home)  Work  There is no work phone number on file.  E-mail  No e-mail address on record    Sex: female  YOB: 1955  Age: 62 y.o.    Encounter Date: 2017  [place] Citizen: yes  Primary Language: English   Needed: no    Emergency Contact:  Name: Ruth Ann Schulte  Relationship: daughter  Address: Same As Pt  Phone Numbers:  [phone] (mobile)    Family/Social Support:   Number of dependents/: Pt reports no dependents.  Marital history: Pt has never been . Pt reports no current significant other.  Other family dynamics: Pt's parents are ; Pt has five sisters and three brothers that are currently living. Pt reports that she is close with her siblings. Pt has four children; Mark Schulte (36) is from one relationship, and Ruth Ann Schulte (32), Dany Sanya Eris (27) and Niya Schulte (24) are from a second relationship. Pt is close to her children and currently lives with her daughter Ruth Ann who is being treated for cancer.     Household Composition:  Name: Satinder Schulte  Age: 61  Relationship: patient  Does person drive? no    Name: Ruth Ann Schulte  Age: 32  Relationship: daughter  Does person drive? yes    Do you and your caregivers have access to reliable transportation? yes  PRIMARY CAREGIVER: Ruth Ann Schulte (daughter) will be primary caregiver, phone number [phone].     SW expressed concern about Ruth Ann being a caregiver because per pt's report at last assessment, Ruth Ann was in treatment for cancer. Ruth Ann reports that she is not undergoing chemo treatment and that her treatments are only once a month. Ruth Ann also reports her other siblings can assist as well. This SW previously spoke with pt's other daughter: Niya on 17  "(see EMR) and confirmed that she can assist as well.     provided in-depth information to patient and caregiver regarding pre- and post-transplant caregiver role.   strongly encourages patient and caregiver to have concrete plan regarding post-transplant care giving, including back-up caregiver(s) to ensure care giving needs are met as needed.    Patient and Caregiver states understanding all aspects of caregiver role/commitment and is able/willing/committed to being caregiver to the fullest extent necessary.    Patient and Caregiver verbalizes understanding of the education provided today and caregiver responsibilities.         remains available. Patient and Caregiver agree to contact  in a timely manner if concerns arise.      Able to take time off work without financial concerns: yes.      Additional Significant Others who will Assist with Transplant:  Name: Niya Schulte  Age: 25  Phone: [phone]  City: [place] State: [place]  Relationship: daughter  Does person drive? yes    Name: Dany Schulte  Age: 28  Phone: [phone]  City: [place] State: [place]  Relationship: son  Does person drive? yes    Living Will: no Education and information provided  Healthcare Power of : no Education and information provided  Advance Directives on file: <<no information> per medical record.  Verbally reviewed LW/HCPA information.   provided patient with copy of LW/HCPA documents and provided education on completion of forms.    Living Donors: No. Education and resource information given to patient.    Highest Education Level: Grade School (0-8); pt reports completing 9th grade  Reading Ability: 2nd grade  Reports difficulty with: reading, writing, seeing, comprehension and learning; Pt reports that she has always had difficulty reading and believes she has some type of learning disability and states, "I was a slow learner". In addition, pt does not have teeth " "and has difficulty pronouncing things easily. Pt states that she plans on setting up a dentist appoint with [facility] and reports that she was given dentures by the [facility] but that they were too large. SW recommended that [facility] contact [facility] to notify [facility] of this issue.   Learns Best By:  Verbal or Hands-On information; Pt reports that her daughters read most papers.     Status: no  VA Benefits: no     Working for Income: No  If no, reason not working: Disability    Patient is disabled.  Prior to disability, patient  reports never working. Pt does state that she had medical issues as a child. Pt diagnosed with a stroke per medical record.     Spouse/Significant Other Employment: Pt's daughter Ruth Ann is currently on disability due to cancer diagnosis. Pt's daughter Niya currently works at a bank.      Disabled: yes: date disability began: 1968, due to: Pt reports that it is based on being a "slow learner in education".    Monthly Income:   Disability: $740.00  Able to afford all costs now and if transplanted, including medications: yes Pt reports son: ayad would assist if needed     Patient and Caregiver verbalizes understanding of personal responsibilities related to transplant costs and the importance of having a financial plan to ensure that patients transplant costs are fully covered.      provided fundraising information/education.  Patient and Caregiver verbalizes understanding.   remains available.    Insurance:   Payor/Plan Subscr  Sex Relation Sub. Ins. ID Effective Group Num   1. [place]ARE - [place]* NICKO VALIENTE LAINEY 1955 Female  [id number] 10/1/1978 [facility]                                   [address]    2. [place] VALIENTE LAINEY 1955 Female  [id number]* 1999                                    [address] [address]     Primary Insurance (for UNOS reporting): Public Insurance - Medicare FFS (Fee For Service)  Secondary Insurance (for UNOS reporting): Public " "Insurance - Medicaid *Pt receives Medicaid Transportation from [employer] (#[phone])  Patient and Caregiver verbalizes clear understanding that patient may experience difficulty obtaining and/or be denied insurance coverage post-surgery. This includes and is not limited to disability insurance, life insurance, health insurance, burial insurance, long term care insurance, and other insurances.    Patient and Caregiver also reports understanding that future health concerns related to or unrelated to transplantation may not be covered by patient's insurance.  Resources and information provided and reviewed.      Patient and Caregiver provides verbal permission to release any necessary information to outside resources for patient care and discharge planning.  Resources and information provided are reviewed.      Dialysis Adherence:  Patient reports being on hemodialysis in center attending all dialysis appointments and staying for the entire course of treatment. SW received a faxed form from pt's dialysis center which indicates over last three months that the patient has had no AMAs, not had any no-shows, and her Last intact PTH was 922 on 8/10/17 and "MD following iPTH Sensipar has been adjusted".    Infusion Service: patient utilizing? no  Home Health: patient utilizing? no  DME: yes BP Cuff  Pulmonary/Cardiac Rehab: none;   ADLS:  Pt reports that she is fully independent but does not drive.    Adherence: Pt reports that she goes to all doctors appointments and takes medications as prescribed. Pt reports that daughter: Ruth Ann assists with setting up pt's medications. Adherence education and counseling provided.     Per History Section:  Past Medical History:   Diagnosis Date    Abnormal Pap smear     repeat paps were normal    Anemia associated with chronic renal failure     Awaiting organ transplant status  - 02/18/2013 6/6/2014    Blood type A+ 6/6/2014    CKD (chronic kidney disease) stage 5, GFR " less than 15 ml/min     secondary hypertension    Essential hypertension 5/19/2017    Hyperlipidemia     Hypertension, renal 1992    Stroke 2007    Residual speech deficit     Social History   Substance Use Topics    Smoking status: Never Smoker    Smokeless tobacco: Never Used    Alcohol use No     History   Drug Use No     History   Sexual Activity    Sexual activity: No     Comment: single, Lives with daughter, on Disability, Lives in [place]       Per Today's Psychosocial:  Tobacco: none, patient denies any use.  Alcohol: none, patient denies any use.  Illicit Drugs/Non-prescribed Medications: none, patient denies any use.    Patient and Caregiver states clear understanding of the potential impact of substance use as it relates to transplant candidacy and is aware of possible random substance screening.  Substance abstinence/cessation counseling, education and resources provided and reviewed.     Arrests/DWI/Treatment/Rehab: patient denies    Psychiatric History:    Mental Health: Pt reports no history of or current mental health issues or concerns.   Psychiatrist/Counselor: Pt denies seeing a mental health professional and reports being open to seeing the psych department for talk therapy if necessary.  Medications: Pt denies taking medications for mental health reasons.  Suicide/Homicide Issues: Pt denies current or past SI/HI.   Safety at home: Pt reports no current or history of safety concerns in household; including mental, physical, verbal, or sexual abuse.    Knowledge: Patient and Caregiver states having clear understanding and realistic expectations regarding the potential risks and potential benefits of organ transplantation and organ donation, agrees to discuss with health care team members and support system members and to utilize available resources and express questions and/or concerns in order to further facilitate the pt informed decision-making.  Resources and information provided and  reviewed.     Patient and Caregiver is aware of [facility]'s affiliation and/or partnership with agencies in home health care, LTAC, SNF, [facility], and other hospitals and clinics.    Understanding: Patient and Caregiver reports having a clear understanding of the many lifetime commitments involved with being a transplant recipient, including costs, compliance, medications, lab work, procedures, appointments, concrete and financial planning, preparedness, timely and appropriate communication of concerns, abstinence (ETOH, tobacco, illicit non-prescribed drugs), adherence to all health care team recommendations, support system and caregiver involvement, appropriate and timely resource utilization and follow-through, mental health counseling as needed/recommended, and patient and caregiver responsibilities.  Social Service Handbook, resources and detailed educational information provided and reviewed.  Educational information provided.    Patient and Caregiver also reports current and expected compliance with health care regime and states having a clear understanding of the importance of compliance.     Patient and Caregiver reports a clear understanding that risks and benefits may be involved with organ transplantation and with organ donation.      Patient and Caregiver also reports clear understanding that psychosocial risk factors may affect patient, and include but are not limited to feelings of depression, generalized anxiety, anxiety regarding dependence on others, post traumatic stress disorder, feelings of guilt and other emotional and/or mental concerns, and/or exacerbation of existing mental health concerns.  Detailed resources provided and discussed.     Patient and Caregiver agrees to access appropriate resources in a timely manner as needed and/or as recommended, and to communicate concerns appropriately.  Patient also reports a clear understanding of treatment options available.      reviewed  "education, provided additional information, and answered questions.    Feelings or Concerns: Pt reports that she is eager to get a transplant soon as she has been waiting a long time.     Coping: Pt reports that she and her daughter Ruth Ann spend time taking care of each other and enjoy each other's company. Pt also reports watch tv: Westerns and action movies. Pt reports that she is coping adequately well at this time. Pt [facility] in [place], [place] with Kai Brooks presiding.     Goals: Pt reports living a normal life and getting off dialysis as goals for after transplant. Patient referred to Vocational Rehabilitation.    Interview Behavior: Caregiver presents as alert and oriented x 4, pleasant, good eye contact, well groomed, recall good, concentration/judgement good, calm, communicative, cooperative and asking and answering questions appropriately. Pt did appear slowed at times, and this may be related to diagnosis of stroke that occurred in the past. Pt presents with daughter: Ruth Ann Schulte at pt's request.         Transplant Social Work - Candidacy  Assessment/Plan:     Psychosocial Suitability: Patient presents as a fair candidate for kidney transplant at this time. Based on psychosocial risk factors, patient presents as low risk, due to due to confirmed primary caregiver and back-up caregivers. Pt will also need to present to appointments with caregivers due to pt's limited reading ability and pt's report of being a "slow learner". pt also has suitable dialysis adherence and financial plan in place.    Recommendations/Additional Comments: SW recommends that caregivers attend all appointments pre- and post-transplant to ensure successful transplant course. SW recommends that pt conduct fundraising to assist pt with pay for cost of medications, food, gas, and other transplant related needs. SW recommends that pt remain aware of potential mental health concerns " and contact the team if any concerns arise. SW recommends that pt remain abstinent from tobacco, ETOH, and drug use. SW supports pt's continued dialysis adherence. SW remains available to answer any questions or concerns that arise as the pt moves through the transplant process.       Nathan Lopez, SEBASTIANW

## 2017-11-08 LAB — HPRA INTERPRETATION: NORMAL

## 2017-12-05 LAB
CLASS I ANTIBODIES - LUMINEX: NORMAL
CLASS I ANTIBODY COMMENTS - LUMINEX: NORMAL
CLASS II ANTIBODY COMMENTS - LUMINEX: NORMAL
CPRA %: 83
SERUM COLLECTION DT - LUMINEX CLASS I: NORMAL
SERUM COLLECTION DT - LUMINEX CLASS II: NORMAL
SPCL1 TESTING DATE: NORMAL
SPCL2 TESTING DATE: NORMAL
SPCLU TESTING DATE: NORMAL

## 2018-01-09 ENCOUNTER — DOCUMENTATION ONLY (OUTPATIENT)
Dept: ENDOSCOPY | Facility: HOSPITAL | Age: 63
End: 2018-01-09

## 2018-01-24 ENCOUNTER — LAB VISIT (OUTPATIENT)
Dept: LAB | Facility: HOSPITAL | Age: 63
End: 2018-01-24
Payer: MEDICARE

## 2018-01-24 DIAGNOSIS — Z76.82 ORGAN TRANSPLANT CANDIDATE: ICD-10-CM

## 2018-01-24 PROCEDURE — 86833 HLA CLASS II HIGH DEFIN QUAL: CPT | Mod: PO,TXP

## 2018-01-24 PROCEDURE — 86832 HLA CLASS I HIGH DEFIN QUAL: CPT | Mod: PO,TXP

## 2018-02-01 LAB — HPRA INTERPRETATION: NORMAL

## 2018-02-02 LAB
CLASS I ANTIBODIES - LUMINEX: NORMAL
CLASS I ANTIBODY COMMENTS - LUMINEX: NORMAL
CLASS II ANTIBODIES - LUMINEX: NEGATIVE
CPRA %: 61
SERUM COLLECTION DT - LUMINEX CLASS I: NORMAL
SERUM COLLECTION DT - LUMINEX CLASS II: NORMAL
SPCL1 TESTING DATE: NORMAL
SPCL2 TESTING DATE: NORMAL
SPCLU TESTING DATE: NORMAL

## 2018-03-06 DIAGNOSIS — Z76.82 ORGAN TRANSPLANT CANDIDATE: Primary | ICD-10-CM

## 2018-04-05 ENCOUNTER — LAB VISIT (OUTPATIENT)
Dept: LAB | Facility: HOSPITAL | Age: 63
End: 2018-04-05
Payer: MEDICARE

## 2018-04-05 DIAGNOSIS — Z76.82 ORGAN TRANSPLANT CANDIDATE: ICD-10-CM

## 2018-04-05 PROCEDURE — 86833 HLA CLASS II HIGH DEFIN QUAL: CPT | Mod: PO,TXP

## 2018-04-05 PROCEDURE — 86832 HLA CLASS I HIGH DEFIN QUAL: CPT | Mod: PO,TXP

## 2018-04-10 LAB — HPRA INTERPRETATION: NORMAL

## 2018-04-27 LAB
CLASS I ANTIBODIES - LUMINEX: NORMAL
CLASS I ANTIBODY COMMENTS - LUMINEX: NORMAL
CLASS II ANTIBODY COMMENTS - LUMINEX: NORMAL
CPRA %: 46
SERUM COLLECTION DT - LUMINEX CLASS I: NORMAL
SERUM COLLECTION DT - LUMINEX CLASS II: NORMAL
SPCL1 TESTING DATE: NORMAL
SPCL2 TESTING DATE: NORMAL
SPCLU TESTING DATE: NORMAL

## 2018-06-15 ENCOUNTER — HOSPITAL ENCOUNTER (OUTPATIENT)
Dept: RADIOLOGY | Facility: HOSPITAL | Age: 63
Discharge: HOME | End: 2018-06-15
Attending: NURSE PRACTITIONER
Payer: MEDICARE

## 2018-06-15 ENCOUNTER — OFFICE VISIT (OUTPATIENT)
Dept: TRANSPLANT | Facility: CLINIC | Age: 63
End: 2018-06-15
Payer: MEDICARE

## 2018-06-15 ENCOUNTER — HOSPITAL ENCOUNTER (OUTPATIENT)
Dept: CARDIOLOGY | Facility: CLINIC | Age: 63
Discharge: HOME | End: 2018-06-15
Attending: NURSE PRACTITIONER
Payer: MEDICARE

## 2018-06-15 VITALS
TEMPERATURE: 98 F | BODY MASS INDEX: 37.29 KG/M2 | DIASTOLIC BLOOD PRESSURE: 73 MMHG | WEIGHT: 202.63 LBS | SYSTOLIC BLOOD PRESSURE: 158 MMHG | HEART RATE: 55 BPM | HEIGHT: 62 IN | RESPIRATION RATE: 18 BRPM | OXYGEN SATURATION: 97 %

## 2018-06-15 DIAGNOSIS — Z76.82 ORGAN TRANSPLANT CANDIDATE: ICD-10-CM

## 2018-06-15 DIAGNOSIS — N18.6 ESRD (END STAGE RENAL DISEASE): Primary | Chronic | ICD-10-CM

## 2018-06-15 DIAGNOSIS — I10 ESSENTIAL HYPERTENSION: ICD-10-CM

## 2018-06-15 DIAGNOSIS — Z67.10 BLOOD TYPE A+: ICD-10-CM

## 2018-06-15 DIAGNOSIS — N18.9 ANEMIA ASSOCIATED WITH CHRONIC RENAL FAILURE: Chronic | ICD-10-CM

## 2018-06-15 DIAGNOSIS — I12.9 HYPERTENSION, RENAL: Chronic | ICD-10-CM

## 2018-06-15 DIAGNOSIS — Z76.82 AWAITING ORGAN TRANSPLANT STATUS: ICD-10-CM

## 2018-06-15 DIAGNOSIS — D63.1 ANEMIA ASSOCIATED WITH CHRONIC RENAL FAILURE: Chronic | ICD-10-CM

## 2018-06-15 DIAGNOSIS — Z99.2 DEPENDENCE ON RENAL DIALYSIS: ICD-10-CM

## 2018-06-15 DIAGNOSIS — Z76.82 PATIENT ON WAITING LIST FOR KIDNEY TRANSPLANT: ICD-10-CM

## 2018-06-15 DIAGNOSIS — E78.5 HYPERLIPIDEMIA, UNSPECIFIED HYPERLIPIDEMIA TYPE: Chronic | ICD-10-CM

## 2018-06-15 LAB
ESTIMATED PA SYSTOLIC PRESSURE: 20.81
MITRAL VALVE REGURGITATION: NORMAL
RETIRED EF AND QEF - SEE NOTES: 70 (ref 55–65)

## 2018-06-15 PROCEDURE — 93306 TTE W/DOPPLER COMPLETE: CPT | Mod: PBBFAC,TXP | Performed by: INTERNAL MEDICINE

## 2018-06-15 PROCEDURE — 71046 X-RAY EXAM CHEST 2 VIEWS: CPT | Mod: TC,TXP

## 2018-06-15 PROCEDURE — 76770 US EXAM ABDO BACK WALL COMP: CPT | Mod: TC,TXP

## 2018-06-15 PROCEDURE — 99215 OFFICE O/P EST HI 40 MIN: CPT | Mod: S$PBB,TXP,, | Performed by: INTERNAL MEDICINE

## 2018-06-15 PROCEDURE — 76770 US EXAM ABDO BACK WALL COMP: CPT | Mod: 26,TXP,, | Performed by: RADIOLOGY

## 2018-06-15 PROCEDURE — 71046 X-RAY EXAM CHEST 2 VIEWS: CPT | Mod: 26,TXP,, | Performed by: RADIOLOGY

## 2018-06-15 PROCEDURE — 99213 OFFICE O/P EST LOW 20 MIN: CPT | Mod: PBBFAC,25,TXP | Performed by: INTERNAL MEDICINE

## 2018-06-15 PROCEDURE — 99999 PR PBB SHADOW E&M-EST. PATIENT-LVL III: CPT | Mod: PBBFAC,TXP,, | Performed by: INTERNAL MEDICINE

## 2018-06-15 RX ORDER — LORATADINE 10 MG
10 TABLET,DISINTEGRATING ORAL DAILY
Status: ON HOLD | COMMUNITY
End: 2019-09-25 | Stop reason: HOSPADM

## 2018-06-15 NOTE — PATIENT INSTRUCTIONS
1. Stay active   2. Have any potential living donors contact the living donor coordinator   3. We will send a message to Dr. Lin (the blood specialist you saw here at [facility]) to make sure they are ok with your blood counts being low overall

## 2018-06-15 NOTE — PROGRESS NOTES
"   Kidney Transplant Recipient Reevalulation    Referring Physician: Bruce Khan  Current Nephrologist:   Waitlist Status: active  Dialysis Start Date: 8/18/2014    Subjective:     CC:  Annual reassessment of kidney transplant candidacy.    HPI:  Ms. Schulte is a 62 y.o. year old Black or  female with HTN diagnosed in her 30s, ESRD secondary to HTN.  She has been on the wait list for a kidney transplant at [facility] since 2/18/2013. Patient is currently on hemodialysis started on 8/18/14. Patient is dialyzing on TTS schedule.  Patient reports that she is tolerating dialysis well.. She has a LUE AV graft. Patient was last seen in listed [facility] clinic on 5/19/17 (had SW only visit to clarify caregiver plan on 8/18/17). She denies any recent hospitalizations but states she went to the ED for kidney stones in Nov 2017 and nasal congestion in Dec 2017.    Dialysis unit labs reviewed:   5/10/18 --> WBC 2.1; Hb 11.4; platelet count 120; albumin 4.2; potassium 5.1;   6/7/18 --> WBC 2.5; Hb 11.0; platelet count 200; albumin 4.3; potassium 4.9;     She was seen by Dr. Anna Lin (Hem-onc) on 8/17/17 for pancytopenia. She does follow with local hematologist (Dr. Juvenal Diggs) who last saw her on 12/7/17 and his A/P from that visit was:   "Assessment/Plan:  1. Pancytopenia - asymptomatic. No significant change. Bone marrow negative. Cleared for transplant. Follow up 6 months with CBC."      She had bone marrow biopsy in [facility] on 8/23/17:   Left posterior iliac crest bone marrow aspirate smears and core biopsies:                                                               Limited bone marrow study.                                                Comment; The peripheral blood is remarkable for a pancytopenia. The bone marrow aspirate smears are aparticulate and hemodilute limiting assessment for myelodysplasia. The bone marrow core biopsy is also limited with minimal hematopoietic elements " present for evaluation.  Within the areas of hematopoiesis, the bone marrow is variably cellular without an obvious infiltrate identified. The following stains are performed on the core biopsy: CD3, CD20, CD34 and . CD20 and CD3 highlight an admixture of B- and T-cells with no evidence of involvement by a lymphoproliferative disorder.  stains scattered plasma cells. CD34 highlights scattered blasts comprising  approximately 1% of cells. While there is no overt evidence of malignancy, lack of a bone marrow aspirate and limited biopsy limits assessment. Re-biopsy is recommended to further define this process.      MICROSCOPIC DESCRIPTION                                                 CBC Data:                                                               WBC 2,300/ L, RBC 2.99 mill/ L, hemoglobin 9.6 g/dL, hematocrit 28.1%, MCV 94 fL, MCHC 33.9 g/dL, RDW 15.5%, platelets 108,000/ L, MPV 8.5 fL.                                                                     neutrophils 48%, bands 1%, lymphocytes 40%, monocytes 8%, eosinophils 2%, basophils 1%                                                          The white blood cells are moderately decreased with no blasts seen.     The red blood cells are moderately decreased, normochromic normocytic with minimal anisopoikilocytosis and polychromasia, including occasional teardrop cells and ovalocytes.                               The platelets are mildly decreased but normal in appearance.              Bone Marrow Aspirate Smears:                                            Bone marrow aspirate smears are aparticulate and hemodilute precluding further assessment.                                                       Bone Marrow Core Biopsy:                                                The bone marrow core biopsy is limited consisting primarily of blood  and skeletal muscle. A 3 mm segment of bone and marrow elements are available for review. The bone marrow is  variably cellular (10-30%). Megakarocytes are present and normal in morphology. The bony trabeculae are unremarkable. An infiltrate is not identified.     She lives with son and his girlfriend on first floor apartment after her daughter passed away in Dec 2017 from metastatic breast cancer. She used to live with her daughter. She does household chores - cooking, cleaning, laundry, grocery shopping. With the activity that she does she denies any chest pain or BARBA. Denies any falls. Does not use cane/walker.     She is accompanied to visit by her younger daughter.     Review of Systems   Constitutional: +cold easily; +fatigue; Denies fever/chills, night sweats  EENT: Denies vision problems, hearing problems, trouble swallowing.   Respiratory: Denies shortness of breath, dyspnea on exertion, orthopnea, wheezing, hemoptysis, denies known TB exposure or history of positive TB skin test  Cardiovascular: +history of stroke in 2007; Denies chest pain, palpitations, history of MI, history of DVT  Gastrointestinal: +occasional abdominal pain - krissy after dialysis - relieved with BM; Denies nausea/vomiting/diarrhea/constipation. Denies history of GI bleeding or ulcers.   Genitourinary: +estimates residual UOP <1 cup/day; +history of kidney stones; +history of 3 lifetime UTIs; Denies history of urinary obstruction, hematuria, dysuria, urinary frequency, incontinence  Musculoskeletal: +knee pain; Denies trouble moving extremities  Skin: Denies history of skin cancer, denies rash, ulcerations  Heme/onc: +bruises easily; +pancytopenia; Denies any history of cancer  Endocrine: Denies thyroid disease, unintentional weight loss/weight gain  Neurological: +occasional headaches which are relieved with Tylenol; Denies tremors, seizures, dizziness, peripheral neuropathy  Psychiatric: Denies anxiety, depression. Denies hallucinations or delusions.    Potential Donors: no    Medications:  Current Outpatient Prescriptions   Medication Sig  "Dispense Refill    atorvastatin (LIPITOR) 40 MG tablet Take 40 mg by mouth once daily.       B complex-vitamin C-folic acid (NEPHRO-KATHY) 0.8 mg Tab Take by mouth.      carvedilol (COREG) 25 MG tablet       ergocalciferol (VITAMIN D2) 50,000 unit capsule Take 50,000 Units by mouth every 30 days.        loratadine (CLARITIN REDITABS) 10 mg dissolvable tablet Take 10 mg by mouth once daily.      nifedipine (ADALAT CC) 90 MG TbSR       omeprazole (PRILOSEC) 40 MG capsule       RENVELA 800 mg Tab 800 mg 2 (two) times daily.       calcitRIOL (ROCALTROL) 0.5 MCG capsule Take 0.5 mcg by mouth once daily.        clonidine (CATAPRES) 0.1 MG tablet Take 0.1 mg by mouth once.       cyclobenzaprine (FLEXERIL) 10 MG tablet       hydrALAZINE (APRESOLINE) 25 MG tablet Take 25 mg by mouth 3 (three) times daily.      labetalol (NORMODYNE) 300 MG tablet Take 300 mg by mouth 2 (two) times daily.        lactulose (CHRONULAC) 10 gram/15 mL solution Take 10 g by mouth 2 (two) times daily as needed.       levofloxacin (LEVAQUIN) 250 MG tablet       losartan (COZAAR) 100 MG tablet       SENSIPAR 60 mg Tab       temazepam (RESTORIL) 15 mg Cap        No current facility-administered medications for this visit.      *not taking levofloxacin, restoril   *takes sensipar 90 mg daily     Objective:   body mass index is 36.91 kg/m².   BP (!) 158/73 (BP Location: Right arm, Patient Position: Sitting, BP Method: Medium (Automatic))   Pulse (!) 55   Temp 98.3 °F (36.8 °C) (Oral)   Resp 18   Ht 5' 2.13" (1.578 m)   Wt 91.9 kg (202 lb 9.6 oz)   SpO2 97%   BMI 36.91 kg/m²       Physical Exam  General: No acute distress, well groomed, alert and oriented x 3  HEENT: Normocephalic, atraumatic, PERRLA, sclera anicteric, conjunctiva/corneas clear, EOM's intact bilaterally, external inspection of ears and nose normal, moist mucous membranes, no oral ulcerations/lesions, edentuolus  Neck: Supple, symmetrical, trachea midline, no " masses, no carotid bruits, no JVD, thyroid is normal without nodules or enlargement   Respiratory: Clear to auscultation bilaterally, respirations unlabored, no rales/rhonchi/wheezing   Cardiovacular: Regular rate and rhythm, S1, S2 normal, no murmurs, no rubs or gallops   Gastrointestinal: Soft, non-tender, bowel sounds normal, no masses, no palpable organomegaly  Extremities: No clubbing or cyanosis of upper extremities bilaterally, no pedal edema bilaterally; +2 bilateral radial pulses  Skin: warm and dry; no rash on exposed skin  Lymph nodes: Cervical and supraclavicular nodes normal   Neurologic: No focal neurologic deficits.   Musculoskeletal: moves all extremities without difficulty, FROM, 4+/5 strength on RLE but 4/5 strength on LLE, ambulates without an assistive device  Psychiatric: Normal mood and affect. Responds appropriately to questions.  Access: LUE AVG +thrill/bruit      Labs:  Lab Results   Component Value Date    WBC 2.92 (L) 08/18/2017    HGB 9.5 (L) 08/18/2017    HCT 29.6 (L) 08/18/2017     07/03/2012    K 4.4 07/03/2012     07/03/2012    CO2 23 07/03/2012    BUN 58 (H) 07/03/2012    CREATININE 3.8 (H) 07/03/2012    EGFRNONAA 12 (L) 07/03/2012    CALCIUM 10.2 07/03/2012    ALBUMIN 4.1 07/03/2012    AST 16 07/03/2012    ALT 29 07/03/2012       No results found for: PREALBUMIN, BILIRUBINUA, GGT, AMYLASE, LIPASE, PROTEINUA, NITRITE, RBCUA, WBCUA    Lab Results   Component Value Date    HLAABCTYPE A2,A74 07/03/2012    HLAABCTYPE B37(Bw4),B42(Bw6) 07/03/2012    HLAABCTYPE Cw6,Cw17 07/03/2012       Lab Results   Component Value Date    CPRA 46 04/03/2018    UG8HLHU B13,B49,B45,B44,B41,B50,B60,B61,B47,B76,B82 04/03/2018    CIABCLM WEAK A1, B51, B57 04/03/2018    CIIAB Negative 01/20/2018    ABCMT DQB1*05:01 04/03/2018       Labs were reviewed with the patient.    Pre-transplant Workup:   Reviewed with the patient.    Assessment:     1. ESRD from HTN, on dialysis since  Feb 2013    2.  Patient on waiting list for kidney transplant    3. Dependence on renal dialysis    4. Anemia associated with chronic renal failure    5. Awaiting organ transplant status  - 02/18/2013    6. Blood type A+    7. Essential hypertension    8. Hyperlipidemia, unspecified hyperlipidemia type    9. Hypertension, renal        Plan:     Transplant Candidacy:   Ms. Schulte is a suitable but higher risk kidney transplant candidate secondary to pancytopenia, history of CVA, obesity.  She remains in overall stable health, and will remain active on the transplant list.    Sent a message to Dr. Lin requesting her to review [facility] labs and bone marrow biopsy.     Exercise: reminded Satinder of the importance of regular exercise for weight management, blood sugar and blood pressure management.  I also explained exercise has been shown to improve cardiovascular health, energy level, and sleep hygiene.  Lastly, I advised her that cardiovascular complications are leading cause of death and regular exercise can help lower this risk.    Encouraged her to have any potential living donors contact the living donor coordinator.     Ashley Veronica MD       Follow-up:   In addition to the tests noted in the plan, Ms. Schulte will continue to have reevaluation as per the standing pre-kidney transplant protocol:  1. Monthly blood for PRA  2. Annual return to clinic, except HIV positive, > 65 years of age, or pancreas transplant candidates who will be scheduled to see transplant every 6 months while in pre-transplant phase  3. Annual re-testing: CXR, EKG, yearly mammograms for women over 40 and PSA for males over 40, cardiology follow-up as recommended by initial cardiology pre-transplant evaluation  4. Renal ultrasound every 2 years  5. Baseline colonoscopy after age 50 and repeated as recommended    UNOS Patient Status  Functional Status: 60% - Requires occasional assistance but is able to care for needs  Physical Capacity: No Limitations

## 2018-06-15 NOTE — PROGRESS NOTES
Transplant Recipient Adult Psychosocial Assessment-Update (Last Assessment completed on 17)      Satinder Schulte  [address]  [place] [address]    Telephone Information:   Mobile [phone]   Mobile Not on file.   Home  [phone] (home)  Work  There is no work phone number on file.  E-mail  No e-mail address on record    Sex: female  YOB: 1955  Age: 62 y.o.    Encounter Date: 6/15/2018  [place] Citizen: yes  Primary Language: English   Needed: no    Emergency Contact:  Name: Niya Schulte   Relationship: daughter  Address: [address]  Phone Numbers:  [phone] (mobile)    Family/Social Support:   Number of dependents/: Pt reports no dependents.  Marital history: Pt has never been . Pt reports no current significant other.  Other family dynamics: Pt's parents are ; Pt has 4 sisters and 3 brothers that are currently living. Pt reports that she is close with her siblings. Pt has 3 surviving children; Mark Schulte (38) is from one relationship, Dany Sanya Eris (28) and Niya Schulte (25) are from a second relationship. Pt's daughter Ruth Ann  Dec 2017 due to complication with cancer. Pt is close to her children and resides with son Dany and his fiance Carmita.     Household Composition:  Name: Satinder Schulte  Age: 61  Relationship: patient  Does person drive? no    Name: Dany Schulte  Age: 28  Relationship: son  Does person drive? yes     Name: Carmita Duque  Age: 31  Relationship: dtr  Does person drive? yes     Do you and your caregivers have access to reliable transportation? yes  PRIMARY CAREGIVER: Niya (daughter) will be primary caregiver, phone [phone] .      provided in-depth information to patient and caregiver regarding pre- and post-transplant caregiver role.   strongly encourages patient and caregiver to have concrete plan regarding post-transplant care giving, including back-up caregiver(s) to ensure  "care giving needs are met as needed.    Patient and Caregiver states understanding all aspects of caregiver role/commitment and is able/willing/committed to being caregiver to the fullest extent necessary.    Patient and Caregiver verbalizes understanding of the education provided today and caregiver responsibilities.         remains available. Patient and Caregiver agree to contact  in a timely manner if concerns arise.      Able to take time off work without financial concerns: yes.  Pt's dtr reports having FMLA    Additional Significant Others who will Assist with Transplant:  Name: Niya Schulte  Age: 25  Phone: [phone]  City: [place] State: [place]  Relationship: daughter  Does person drive? yes    Name: Dany Schulte  Age: 28  Phone: [phone]  City: [place] State: [place]  Relationship: son  Does person drive? yes    Living Will: no Education and information provided  Healthcare Power of : no Education and information provided  Advance Directives on file: <<no information> per medical record.  Verbally reviewed LW/HCPA information.   provided patient with copy of LW/HCPA documents and provided education on completion of forms.    Living Donors: No. Education and resource information given to patient.    Highest Education Level: Grade School (0-8); pt reports completing 9th grade  Reading Ability: 2nd grade  Reports difficulty with: reading, writing, seeing, comprehension and learning; Pt reports all children read information and help pt precess information.     Previous Assessment:  Pt reports that she has always had difficulty reading and believes she has some type of learning disability and states, "I was a slow learner". In addition, pt does not have teeth and has difficulty pronouncing things easily.    Learns Best By:  Verbal or Hands-On information; Pt reports that her daughters read most papers.     Status: no  VA Benefits: no     Working for Income: " "No  If no, reason not working: Disability    Patient is disabled.  Prior to disability, patient  reports never working. Pt previously reported medical issues as a child. Pt diagnosed with a stroke per medical record.     Spouse/Significant Other Employment: Pt's daughter Niya currently works at a bank.      Disabled: yes: date disability began: 1968, due to: Pt reports that it is based on being a "slow learner in education".    Monthly Income:  SS Disability: $740.00  Able to afford all costs now and if transplanted, including medications: yes Pt reports son: ayad would assist if needed     Patient and Caregiver verbalizes understanding of personal responsibilities related to transplant costs and the importance of having a financial plan to ensure that patients transplant costs are fully covered.      provided fundraising information/education.  Patient and Caregiver verbalizes understanding.   remains available.    Insurance:   Payor/Plan Subscr  Sex Relation Sub. Ins. ID Effective Group Num   1. MEDICARE - [place]* NICKO VALIENTE 1955 Female  [id number] 10/1/1978 [facility]                                   [address]   2. [place] VALIENTE 1955 Female  [id number]* 1999                                    [address] [address]     Primary Insurance (for UNOS reporting): Public Insurance - Medicare FFS (Fee For Service)  Secondary Insurance (for UNOS reporting): Public Insurance - Medicaid *Pt receives Medicaid Transportation from joiz (#[phone])  Patient and Caregiver verbalizes clear understanding that patient may experience difficulty obtaining and/or be denied insurance coverage post-surgery. This includes and is not limited to disability insurance, life insurance, health insurance, burial insurance, long term care insurance, and other insurances.    Patient and Caregiver also reports understanding that future health concerns related to or " unrelated to transplantation may not be covered by patient's insurance.  Resources and information provided and reviewed.      Patient and Caregiver provides verbal permission to release any necessary information to outside resources for patient care and discharge planning.  Resources and information provided are reviewed.      Dialysis Adherence:  Patient reports being on hemodialysis in center attending all dialysis appointments and staying for the entire course of treatment. Pt reports she walks to dialysis.     Infusion Service: patient utilizing? no  Home Health: patient utilizing? no  DME: yes BP Cuff  Pulmonary/Cardiac Rehab: none;   ADLS:  Pt reports that she is fully independent but does not drive.    Adherence: Pt reports that she goes to all doctors appointments and takes medications as prescribed. Pt reports that daughter in law Carmita assists with setting up pt's medications. Adherence education and counseling provided.     Per History Section:  Past Medical History:   Diagnosis Date    Abnormal Pap smear     repeat paps were normal    Anemia associated with chronic renal failure     Awaiting organ transplant status  - 02/18/2013 6/6/2014    Blood type A+ 6/6/2014    CKD (chronic kidney disease) stage 5, GFR less than 15 ml/min     secondary hypertension    Essential hypertension 5/19/2017    Hyperlipidemia     Hypertension, renal 1992    Stroke 2007    Residual speech deficit     Social History   Substance Use Topics    Smoking status: Never Smoker    Smokeless tobacco: Never Used    Alcohol use No     History   Drug Use No     History   Sexual Activity    Sexual activity: No     Comment: single, Lives with daughter, on Disability, Lives in [place]       Per Today's Psychosocial:  Tobacco: none, patient denies any use.  Alcohol: none, patient denies any use.  Illicit Drugs/Non-prescribed Medications: none, patient denies any use.    Patient and Caregiver states clear understanding of the  "potential impact of substance use as it relates to transplant candidacy and is aware of possible random substance screening.  Substance abstinence/cessation counseling, education and resources provided and reviewed.     Arrests/DWI/Treatment/Rehab: patient denies    Psychiatric History:    Mental Health: Pt reports sadness associated with the recent death of her daughter Ruth Ann. Pt reports "taking is day by day". Pt reports talks to dtr's pictures and prays. Pt reports strong naz and highly supportive family.   Psychiatrist/Counselor: Pt denies seeing a mental health professional and reports being open to seeing the psych department for talk therapy if necessary.  Medications: Pt denies taking medications for mental health reasons.  Suicide/Homicide Issues: Pt denies current or past SI/HI.   Safety at home: Pt reports no current or history of safety concerns in household; including mental, physical, verbal, or sexual abuse.    Knowledge: Patient and Caregiver states having clear understanding and realistic expectations regarding the potential risks and potential benefits of organ transplantation and organ donation, agrees to discuss with health care team members and support system members and to utilize available resources and express questions and/or concerns in order to further facilitate the pt informed decision-making.  Resources and information provided and reviewed.     Patient and Caregiver is aware of [facility]'s affiliation and/or partnership with agencies in home health care, LTAC, SNF, [facility], and other hospitals and clinics.    Understanding: Patient and Caregiver reports having a clear understanding of the many lifetime commitments involved with being a transplant recipient, including costs, compliance, medications, lab work, procedures, appointments, concrete and financial planning, preparedness, timely and appropriate communication of concerns, abstinence (ETOH, tobacco, illicit non-prescribed drugs), " adherence to all health care team recommendations, support system and caregiver involvement, appropriate and timely resource utilization and follow-through, mental health counseling as needed/recommended, and patient and caregiver responsibilities.  Social Service Handbook, resources and detailed educational information provided and reviewed.  Educational information provided.    Patient and Caregiver also reports current and expected compliance with health care regime and states having a clear understanding of the importance of compliance.     Patient and Caregiver reports a clear understanding that risks and benefits may be involved with organ transplantation and with organ donation.      Patient and Caregiver also reports clear understanding that psychosocial risk factors may affect patient, and include but are not limited to feelings of depression, generalized anxiety, anxiety regarding dependence on others, post traumatic stress disorder, feelings of guilt and other emotional and/or mental concerns, and/or exacerbation of existing mental health concerns.  Detailed resources provided and discussed.     Patient and Caregiver agrees to access appropriate resources in a timely manner as needed and/or as recommended, and to communicate concerns appropriately.  Patient also reports a clear understanding of treatment options available.      reviewed education, provided additional information, and answered questions.    Feelings or Concerns: Pt reports that she is eager to get a transplant soon as she has been waiting a long time.     Coping: Pt reports that she and her daughter Ruth Ann spend time taking care of each other and enjoy each other's company. Pt also reports watch tv: Westerns and action movies. Pt reports that she is coping adequately well at this time. Pt reports [facility].     Goals: Pt reports living a normal life and getting off dialysis as goals for after  "transplant. Patient referred to Vocational Rehabilitation.    Interview Behavior: Patient and Caregiver presents as alert and oriented x 4, pleasant, good eye contact, well groomed, recall good, concentration/judgement good, calm, communicative, cooperative and asking and answering questions appropriately. Pt did appear slowed at times, and this may be related to diagnosis of stroke that occurred in the past. Pt presents with daughter: Niya Schulte at pt's request.         Transplant Social Work - Candidacy  Assessment/Plan:     Psychosocial Suitability: Patient presents as a fair candidate for kidney transplant at this time. Based on psychosocial risk factors, patient presents as low risk, due to due to confirmed primary caregiver and back-up caregivers. Pt will also need to present to appointments with caregivers due to pt's limited reading ability and pt's report of being a "slow learner". pt also has suitable dialysis adherence and financial plan in place.    Recommendations/Additional Comments: SW recommends that caregivers attend all appointments pre- and post-transplant to ensure successful transplant course. SW recommends that pt conduct fundraising to assist pt with pay for cost of medications, food, gas, and other transplant related needs. SW recommends that pt remain aware of potential mental health concerns and contact the team if any concerns arise. SW recommends that pt remain abstinent from tobacco, ETOH, and drug use. SW supports pt's continued dialysis adherence. SW remains available to answer any questions or concerns that arise as the pt moves through the transplant process.       Kaylee Gonzalez, TK, LMSW       "

## 2018-06-15 NOTE — PROGRESS NOTES
PRE-TRANSPLANT NOTE:    This patient's medication therapy was evaluated as part of her pre-transplant evaluation.    The following pharmacologic concerns were noted: Levofloxacin (antibiotic),     Current Outpatient Prescriptions   Medication Sig Dispense Refill    atorvastatin (LIPITOR) 40 MG tablet Take 40 mg by mouth once daily.       B complex-vitamin C-folic acid (NEPHRO-KATHY) 0.8 mg Tab Take by mouth.      calcitRIOL (ROCALTROL) 0.5 MCG capsule Take 0.5 mcg by mouth once daily.        carvedilol (COREG) 25 MG tablet       clonidine (CATAPRES) 0.1 MG tablet Take 0.1 mg by mouth once.       cyclobenzaprine (FLEXERIL) 10 MG tablet       ergocalciferol (VITAMIN D2) 50,000 unit capsule Take 50,000 Units by mouth every 30 days.        hydrALAZINE (APRESOLINE) 25 MG tablet Take 25 mg by mouth 3 (three) times daily.      labetalol (NORMODYNE) 300 MG tablet Take 300 mg by mouth 2 (two) times daily.        lactulose (CHRONULAC) 10 gram/15 mL solution Take 10 g by mouth 2 (two) times daily as needed.       levofloxacin (LEVAQUIN) 250 MG tablet       losartan (COZAAR) 100 MG tablet       nifedipine (ADALAT CC) 90 MG TbSR       omeprazole (PRILOSEC) 40 MG capsule       RENVELA 800 mg Tab 800 mg 2 (two) times daily.       SENSIPAR 60 mg Tab       temazepam (RESTORIL) 15 mg Cap        No current facility-administered medications for this visit.        I am available for consultation and can be contacted, as needed by the other members of the Kidney Transplant team.

## 2018-06-15 NOTE — LETTER
Carine 15, 2018                     [address]  [address]  [facility] [address]  Phone: [phone]   Patient: Satinder Schulte   MR Number: [id number]   YOB: 1955   Date of Visit: 6/15/2018       Dear      Thank you for referring Satinder Schulte to me for evaluation. Attached you will find relevant portions of my assessment and plan of care.    If you have questions, please do not hesitate to call me. I look forward to following Satinder Schulte along with you.    Sincerely,    Ashley Veronica MD    Enclosure    If you would like to receive this communication electronically, please contact [email or web address] or [phone] to request EDITD Link access.    EDITD Link is a tool which provides read-only access to select patient information with whom you have a relationship. Its easy to use and provides real time access to review your patients record including encounter summaries, notes, results, and demographic information.    If you feel you have received this communication in error or would no longer like to receive these types of communications, please e-mail [email or web address]

## 2018-06-19 DIAGNOSIS — Z76.82 AWAITING ORGAN TRANSPLANT STATUS: Primary | ICD-10-CM

## 2018-06-19 NOTE — PROGRESS NOTES
YEARLY LIST MANAGEMENT NOTE    Satinder Schulte's kidney transplant listing status reviewed.  Patient is due for follow-up appointments on 6/2019.  Appointments will be scheduled per protocol.

## 2018-06-22 DIAGNOSIS — D61.818 PANCYTOPENIA: Primary | ICD-10-CM

## 2018-06-25 ENCOUNTER — TELEPHONE (OUTPATIENT)
Dept: HEMATOLOGY/ONCOLOGY | Facility: CLINIC | Age: 63
End: 2018-06-25

## 2018-06-25 DIAGNOSIS — N18.9 ANEMIA ASSOCIATED WITH CHRONIC RENAL FAILURE: Primary | ICD-10-CM

## 2018-06-25 DIAGNOSIS — D63.1 ANEMIA ASSOCIATED WITH CHRONIC RENAL FAILURE: Primary | ICD-10-CM

## 2018-06-25 NOTE — TELEPHONE ENCOUNTER
Appointments scheduled informed Wilman.          ----- Message from Manda Colby sent at 6/25/2018  8:44 AM CDT -----  Contact: Wilman with kidney transplant  Wilman calling stating that pt needs to be scheduled for a bone marrow biopsy      Wilman call back number [phone]

## 2018-07-23 PROBLEM — D61.818 PANCYTOPENIA: Status: ACTIVE | Noted: 2018-07-23

## 2018-07-24 ENCOUNTER — OFFICE VISIT (OUTPATIENT)
Dept: HEMATOLOGY/ONCOLOGY | Facility: CLINIC | Age: 63
End: 2018-07-24
Payer: MEDICARE

## 2018-07-24 VITALS
HEART RATE: 52 BPM | WEIGHT: 202.38 LBS | DIASTOLIC BLOOD PRESSURE: 82 MMHG | TEMPERATURE: 98 F | SYSTOLIC BLOOD PRESSURE: 190 MMHG | HEIGHT: 63 IN | BODY MASS INDEX: 35.86 KG/M2

## 2018-07-24 DIAGNOSIS — D61.818 PANCYTOPENIA: ICD-10-CM

## 2018-07-24 PROCEDURE — 88365 INSITU HYBRIDIZATION (FISH): CPT | Mod: 26,,, | Performed by: PATHOLOGY

## 2018-07-24 PROCEDURE — 88341 IMHCHEM/IMCYTCHM EA ADD ANTB: CPT | Mod: 26,59,, | Performed by: PATHOLOGY

## 2018-07-24 PROCEDURE — 85097 BONE MARROW INTERPRETATION: CPT | Mod: ,,, | Performed by: PATHOLOGY

## 2018-07-24 PROCEDURE — 88275 CYTOGENETICS 100-300: CPT | Mod: 59

## 2018-07-24 PROCEDURE — 88305 TISSUE EXAM BY PATHOLOGIST: CPT | Mod: 26,,, | Performed by: PATHOLOGY

## 2018-07-24 PROCEDURE — 88275 CYTOGENETICS 100-300: CPT

## 2018-07-24 PROCEDURE — 88313 SPECIAL STAINS GROUP 2: CPT | Mod: 26,,, | Performed by: PATHOLOGY

## 2018-07-24 PROCEDURE — 99999 PR PBB SHADOW E&M-EST. PATIENT-LVL IV: CPT | Mod: PBBFAC,,, | Performed by: NURSE PRACTITIONER

## 2018-07-24 PROCEDURE — 88271 CYTOGENETICS DNA PROBE: CPT | Mod: 59

## 2018-07-24 PROCEDURE — 38220 DX BONE MARROW ASPIRATIONS: CPT | Mod: PBBFAC | Performed by: NURSE PRACTITIONER

## 2018-07-24 PROCEDURE — 88364 INSITU HYBRIDIZATION (FISH): CPT | Mod: 26,,, | Performed by: PATHOLOGY

## 2018-07-24 PROCEDURE — 88313 SPECIAL STAINS GROUP 2: CPT

## 2018-07-24 PROCEDURE — 88313 SPECIAL STAINS GROUP 2: CPT | Performed by: PATHOLOGY

## 2018-07-24 PROCEDURE — 99499 UNLISTED E&M SERVICE: CPT | Mod: S$PBB,,, | Performed by: NURSE PRACTITIONER

## 2018-07-24 PROCEDURE — 88189 FLOWCYTOMETRY/READ 16 & >: CPT | Mod: ,,, | Performed by: PATHOLOGY

## 2018-07-24 PROCEDURE — 88185 FLOWCYTOMETRY/TC ADD-ON: CPT | Performed by: PATHOLOGY

## 2018-07-24 PROCEDURE — 38222 DX BONE MARROW BX & ASPIR: CPT | Mod: PBBFAC | Performed by: NURSE PRACTITIONER

## 2018-07-24 PROCEDURE — 88264 CHROMOSOME ANALYSIS 20-25: CPT

## 2018-07-24 PROCEDURE — 88342 IMHCHEM/IMCYTCHM 1ST ANTB: CPT | Mod: 26,59,, | Performed by: PATHOLOGY

## 2018-07-24 PROCEDURE — 38220 DX BONE MARROW ASPIRATIONS: CPT | Mod: S$PBB,LT,, | Performed by: NURSE PRACTITIONER

## 2018-07-24 PROCEDURE — 99214 OFFICE O/P EST MOD 30 MIN: CPT | Mod: PBBFAC | Performed by: NURSE PRACTITIONER

## 2018-07-24 PROCEDURE — 88184 FLOWCYTOMETRY/ TC 1 MARKER: CPT | Performed by: PATHOLOGY

## 2018-07-24 NOTE — PROGRESS NOTES
PROCEDURE NOTE:  Bone Marrow Biopsy  Date: 7/23/18   Indication: Pancytopenia  Consent: Informed consent was obtained from patient.  Timeout: Done and documented.  Site: Left posterior illiac crest.  Position: Prone  Prep: Betadine.  Needle used: 11 gauge long Jamshidi needle.  Anesthetic: 2% lidocaine 20 cc.  Biopsy: The long biopsy needle was introduced into the marrow cavity and an aspirate was obtained after several attempts and sent for flow cytometry, MDS FISH, DNA/RNA hold, and cytogenetics. Core biopsy was unable to be obtained after numerous attempts by myself, Dr. Blood, and Dr. Lin. We will reschedule the patient in OR for repeat BMBX.   Complications: None.  Disposition: The patient was discharged home after lying flat on back x20 minutes. RN to assess BMBX site prior to leaving clinic.  Minimal blood loss    Procedure performed by Dr. Blood, myself, and Dr. Lin.   F/U next week with Dr. Lin  Reschedule BMBX in OR. Message sent to Morenita Egan. Will call fluoro to see if we can get spot marked prior to procedure.     Juani Garcia, ANTWAN, FNP  Hematology/Bone Marrow Transplant

## 2018-07-25 ENCOUNTER — TELEPHONE (OUTPATIENT)
Dept: HEMATOLOGY/ONCOLOGY | Facility: CLINIC | Age: 63
End: 2018-07-25

## 2018-07-25 DIAGNOSIS — D61.818 PANCYTOPENIA: Primary | ICD-10-CM

## 2018-07-25 LAB
BODY SITE - BONE MARROW: NORMAL
BONE MARROW IRON STAIN COMMENT: NORMAL
BONE MARROW WRIGHT STAIN COMMENT: NORMAL
CLINICAL DIAGNOSIS - BONE MARROW: NORMAL
FLOW CYTOMETRY ANTIBODIES ANALYZED - BONE MARROW: NORMAL
FLOW CYTOMETRY COMMENT - BONE MARROW: NORMAL
FLOW CYTOMETRY INTERPRETATION - BONE MARROW: NORMAL

## 2018-07-30 LAB
DNA/RNA EXTRACT AND HOLD RESULT: NORMAL
DNA/RNA EXTRACTION: NORMAL
EXHR SPECIMEN TYPE: NORMAL

## 2018-07-31 LAB
CLINICAL CYTOGENETICIST REVIEW: NORMAL
FMDS SPECIMEN: NORMAL
MDS FISH ADDITIONAL INFORMATION: NORMAL
MDS FISH DISCLAIMER: NORMAL
MDS FISH REASON FOR REFERRAL (BM): NORMAL
MDS FISH RELEASED BY: NORMAL
MDS FISH RESULT (BM): NORMAL
MDS FISH RESULT SUMMARY: NORMAL
MDS FISH RESULT TABLE: NORMAL
REF LAB TEST METHOD: NORMAL
SPECIMEN SOURCE: NORMAL

## 2018-08-01 ENCOUNTER — TELEPHONE (OUTPATIENT)
Dept: HEMATOLOGY/ONCOLOGY | Facility: CLINIC | Age: 63
End: 2018-08-01

## 2018-08-01 LAB
CHROM BANDING METHOD: NORMAL
CHROMOSOME ANALYSIS BM ADDITIONAL INFORMATION: NORMAL
CHROMOSOME ANALYSIS BM RELEASED BY: NORMAL
CHROMOSOME ANALYSIS BM RESULT SUMMARY: NORMAL
CLINICAL CYTOGENETICIST REVIEW: NORMAL
KARYOTYP MAR: NORMAL
REASON FOR REFERRAL (NARRATIVE): NORMAL
REF LAB TEST METHOD: NORMAL
SPECIMEN SOURCE: NORMAL
SPECIMEN: NORMAL

## 2018-08-22 ENCOUNTER — TELEPHONE (OUTPATIENT)
Dept: HEMATOLOGY/ONCOLOGY | Facility: CLINIC | Age: 63
End: 2018-08-22

## 2018-08-23 ENCOUNTER — HOSPITAL ENCOUNTER (OUTPATIENT)
Facility: HOSPITAL | Age: 63
Discharge: HOME | End: 2018-08-23
Attending: INTERNAL MEDICINE | Admitting: INTERNAL MEDICINE
Payer: MEDICARE

## 2018-08-23 ENCOUNTER — ANESTHESIA (OUTPATIENT)
Dept: SURGERY | Facility: HOSPITAL | Age: 63
End: 2018-08-23
Payer: MEDICARE

## 2018-08-23 ENCOUNTER — ANESTHESIA EVENT (OUTPATIENT)
Dept: SURGERY | Facility: HOSPITAL | Age: 63
End: 2018-08-23
Payer: MEDICARE

## 2018-08-23 VITALS
SYSTOLIC BLOOD PRESSURE: 129 MMHG | WEIGHT: 202.38 LBS | DIASTOLIC BLOOD PRESSURE: 60 MMHG | RESPIRATION RATE: 18 BRPM | HEART RATE: 49 BPM | BODY MASS INDEX: 35.86 KG/M2 | TEMPERATURE: 98 F | HEIGHT: 63 IN | OXYGEN SATURATION: 100 %

## 2018-08-23 DIAGNOSIS — D61.818 PANCYTOPENIA: Primary | ICD-10-CM

## 2018-08-23 DIAGNOSIS — D75.9 CYTOPENIA: ICD-10-CM

## 2018-08-23 DIAGNOSIS — Z76.82 PATIENT ON WAITING LIST FOR KIDNEY TRANSPLANT: ICD-10-CM

## 2018-08-23 LAB — BONE MARROW WRIGHT STAIN COMMENT: NORMAL

## 2018-08-23 PROCEDURE — 88313 SPECIAL STAINS GROUP 2: CPT | Mod: 26,TXP,, | Performed by: PATHOLOGY

## 2018-08-23 PROCEDURE — 88305 TISSUE EXAM BY PATHOLOGIST: CPT | Mod: 26,TXP,, | Performed by: PATHOLOGY

## 2018-08-23 PROCEDURE — 36000704 HC OR TIME LEV I 1ST 15 MIN: Mod: TXP | Performed by: INTERNAL MEDICINE

## 2018-08-23 PROCEDURE — 38222 DX BONE MARROW BX & ASPIR: CPT | Mod: TXP,,, | Performed by: NURSE PRACTITIONER

## 2018-08-23 PROCEDURE — 37000008 HC ANESTHESIA 1ST 15 MINUTES: Mod: TXP | Performed by: INTERNAL MEDICINE

## 2018-08-23 PROCEDURE — 71000044 HC DOSC ROUTINE RECOVERY FIRST HOUR: Mod: TXP | Performed by: INTERNAL MEDICINE

## 2018-08-23 PROCEDURE — 88311 DECALCIFY TISSUE: CPT | Mod: 26,TXP,, | Performed by: PATHOLOGY

## 2018-08-23 PROCEDURE — 25000003 PHARM REV CODE 250: Mod: TXP | Performed by: INTERNAL MEDICINE

## 2018-08-23 PROCEDURE — 88311 DECALCIFY TISSUE: CPT | Mod: TXP | Performed by: PATHOLOGY

## 2018-08-23 PROCEDURE — D9220A PRA ANESTHESIA: Mod: TXP,,, | Performed by: ANESTHESIOLOGY

## 2018-08-23 PROCEDURE — 88313 SPECIAL STAINS GROUP 2: CPT | Mod: TXP

## 2018-08-23 PROCEDURE — 88342 IMHCHEM/IMCYTCHM 1ST ANTB: CPT | Mod: TXP | Performed by: PATHOLOGY

## 2018-08-23 PROCEDURE — 88305 TISSUE EXAM BY PATHOLOGIST: CPT | Mod: TXP | Performed by: PATHOLOGY

## 2018-08-23 PROCEDURE — 88341 IMHCHEM/IMCYTCHM EA ADD ANTB: CPT | Mod: 26,TXP,, | Performed by: PATHOLOGY

## 2018-08-23 PROCEDURE — 63600175 PHARM REV CODE 636 W HCPCS: Mod: TXP | Performed by: NURSE ANESTHETIST, CERTIFIED REGISTERED

## 2018-08-23 PROCEDURE — 88342 IMHCHEM/IMCYTCHM 1ST ANTB: CPT | Mod: 26,TXP,, | Performed by: PATHOLOGY

## 2018-08-23 PROCEDURE — 88341 IMHCHEM/IMCYTCHM EA ADD ANTB: CPT | Mod: TXP | Performed by: PATHOLOGY

## 2018-08-23 PROCEDURE — 85097 BONE MARROW INTERPRETATION: CPT | Mod: 26,TXP,, | Performed by: PATHOLOGY

## 2018-08-23 PROCEDURE — 71000015 HC POSTOP RECOV 1ST HR: Mod: TXP | Performed by: INTERNAL MEDICINE

## 2018-08-23 RX ORDER — LIDOCAINE HYDROCHLORIDE 10 MG/ML
INJECTION, SOLUTION EPIDURAL; INFILTRATION; INTRACAUDAL; PERINEURAL
Status: DISCONTINUED | OUTPATIENT
Start: 2018-08-23 | End: 2018-08-23 | Stop reason: HOSPADM

## 2018-08-23 RX ORDER — PROPOFOL 10 MG/ML
VIAL (ML) INTRAVENOUS CONTINUOUS PRN
Status: DISCONTINUED | OUTPATIENT
Start: 2018-08-23 | End: 2018-08-23

## 2018-08-23 RX ORDER — LIDOCAINE HCL/PF 100 MG/5ML
SYRINGE (ML) INTRAVENOUS
Status: DISCONTINUED | OUTPATIENT
Start: 2018-08-23 | End: 2018-08-23

## 2018-08-23 RX ORDER — PROPOFOL 10 MG/ML
VIAL (ML) INTRAVENOUS
Status: DISCONTINUED | OUTPATIENT
Start: 2018-08-23 | End: 2018-08-23

## 2018-08-23 RX ORDER — LIDOCAINE HYDROCHLORIDE 10 MG/ML
1 INJECTION, SOLUTION EPIDURAL; INFILTRATION; INTRACAUDAL; PERINEURAL ONCE
Status: COMPLETED | OUTPATIENT
Start: 2018-08-23 | End: 2018-08-23

## 2018-08-23 RX ORDER — SODIUM CHLORIDE 9 MG/ML
INJECTION, SOLUTION INTRAVENOUS CONTINUOUS
Status: DISCONTINUED | OUTPATIENT
Start: 2018-08-23 | End: 2018-08-23 | Stop reason: HOSPADM

## 2018-08-23 RX ADMIN — PROPOFOL 50 MG: 10 INJECTION, EMULSION INTRAVENOUS at 12:08

## 2018-08-23 RX ADMIN — PROPOFOL 150 MCG/KG/MIN: 10 INJECTION, EMULSION INTRAVENOUS at 12:08

## 2018-08-23 RX ADMIN — SODIUM CHLORIDE: 0.9 INJECTION, SOLUTION INTRAVENOUS at 11:08

## 2018-08-23 RX ADMIN — LIDOCAINE HYDROCHLORIDE 40 MG: 20 INJECTION, SOLUTION INTRAVENOUS at 12:08

## 2018-08-23 RX ADMIN — LIDOCAINE HYDROCHLORIDE 0.1 MG: 10 INJECTION, SOLUTION EPIDURAL; INFILTRATION; INTRACAUDAL; PERINEURAL at 11:08

## 2018-08-23 NOTE — H&P
[facility]  Hematology/Oncology  H&P    Patient Name: Satinder Schulte  MRN: [id number]  Admission Date: 8/23/2018  Code Status: No Order   Attending Provider: Anna Lin MD  Primary Care Physician: Quirino Burnett MD  Principal Problem:<principal problem not specified>    Subjective:     HPI: 62 y.o. female with cytopenias and on waitlist being worked up for kidney transplant. Here for bone marrow bx and aspiration.      Medications:  Continuous Infusions:   sodium chloride 0.9%       Scheduled Meds:   lidocaine (PF) 10 mg/ml (1%)  1 mL Intradermal Once     PRN Meds:     Review of patient's allergies indicates:  No Known Allergies     Past Medical History:   Diagnosis Date    Abnormal Pap smear     repeat paps were normal    Anemia associated with chronic renal failure     Awaiting organ transplant status  - 02/18/2013 6/6/2014    Blood type A+ 6/6/2014    CKD (chronic kidney disease) stage 5, GFR less than 15 ml/min     secondary hypertension    Essential hypertension 5/19/2017    Hyperlipidemia     Hypertension, renal 1992    Stroke 2007    Residual speech deficit     Past Surgical History:   Procedure Laterality Date    AV FISTULA PLACEMENT  2014    CHOLECYSTECTOMY  2008    HYSTERECTOMY  2011    TAHBSO for benign reasons    OOPHORECTOMY       Family History     Problem Relation (Age of Onset)    Breast cancer Daughter    Heart disease Mother, Father, Sister    Hypertension Mother    Kidney disease Mother, Brother    Ovarian cancer Cousin    Stroke Mother, Sister        Tobacco Use    Smoking status: Never Smoker    Smokeless tobacco: Never Used   Substance and Sexual Activity    Alcohol use: No    Drug use: No    Sexual activity: No     Comment: single, Lives with daughter, on Disability, Lives in [place]       Review of Systems   Constitutional: +fatigue  EENT: Denies vision problems, hearing problems, trouble swallowing.   Respiratory: Denies shortness of breath,  dyspnea on exertion  Cardiovascular: Denies chest pain, palpitations  Gastrointestinal: Denies nausea/vomiting/diarrhea/constipation.  Genitourinary: nothing out of pt's normal, on dialysis  Musculoskeletal: Denies trouble moving extremities  Skin: Denies rash, ulcerations  Neurological: Denies tremors, seizures, dizziness, peripheral neuropathy  Psychiatric: Denies anxiety, depression.       Objective:     Vital Signs (Most Recent):  Pulse: (!) 54 (08/23/18 1128)  Resp: 18 (08/23/18 1128)  BP: (!) 157/72 (08/23/18 1128)  SpO2: 100 % (08/23/18 1128) Vital Signs (24h Range):  Pulse:  [54] 54  Resp:  [18] 18  SpO2:  [100 %] 100 %  BP: (157)/(72) 157/72     Weight: 91.8 kg (202 lb 6.1 oz)  Body mass index is 35.85 kg/m².  Body surface area is 2.02 meters squared.    No intake or output data in the 24 hours ending 08/23/18 1145    Physical Exam  General: No acute distress, alert and oriented x 3  HEENT: Normocephalic, atraumatic, no oral ulcerations/lesions  Neck: Supple, trachea midline  Respiratory: Clear to auscultation bilaterally, respirations unlabored   Cardiovacular: Regular rate and rhythm  Gastrointestinal: Soft, non-tender  Extremities: No edema  Skin: warm and dry; no rashes  Neurologic: A+Ox3.   Musculoskeletal: moves all extremities without difficulty  Psychiatric: Normal mood and affect. Responds appropriately to questions.    Assessment/Plan:     Active Diagnoses:    Diagnosis Date Noted POA    Cytopenia [D75.9] 08/23/2018 Yes      Problems Resolved During this Admission:     Cytopenias/pre kidney transplant  Okay to proceed with bm bx and aspiration    Nkechi Arriola NP  Hematology/Oncology  [facility]

## 2018-08-23 NOTE — INTERVAL H&P NOTE
The patient has been examined and the H&P has been reviewed:    I concur with the findings and no changes have occurred since H&P was written. ok to proceed with bm bx and aspiration    Anesthesia/Surgery risks, benefits and alternative options discussed and understood by patient/family.          There are no hospital problems to display for this patient.

## 2018-08-23 NOTE — DISCHARGE SUMMARY
[facility]  Hematology/Oncology  Discharge Summary      Patient Name: Satinder Schulte  MRN: [id number]  Admission Date: 8/23/2018  Hospital Length of Stay: 0 days  Discharge Date and Time: 8/23/2018  1:12 PM  Attending Physician: No att. providers found   Discharging Provider: Nkechi Arriola NP  Primary Care Provider: Quirino Burnett MD        Procedure(s) (LRB):  Biopsy-bone marrow (Right)     Hospital Course: Patient admitted to pre op today for a bone marrow aspiration and biopsy. Pt was consented for a bone marrow biopsy. Patient was sedated per anesthesia and a bone marrow biopsy and aspiration was performed in the OR (see procedure note). Patient was then transferred to post op and discharged home when appropriate per anesthesia.       Pending Diagnostic Studies:     Procedure Component Value Units Date/Time    Bone Marrow Prep and Stain [[id number]] Collected:  08/23/18 1136    Order Status:  Sent Lab Status:  In process Updated:  08/23/18 1254    Specimen:  Bone Marrow     Iron Stain, Bone Marrow [[id number]] Collected:  08/23/18 1136    Order Status:  Sent Lab Status:  In process Updated:  08/23/18 1254    Specimen:  Bone Marrow     Tissue Specimen to Pathology, Bone Marrow Aspiration/Biopsy Procedure [[id number]] Collected:  08/23/18 1137    Order Status:  Sent Lab Status:  No result     Specimen:  Bone Marrow Aspirate, Left Iliac Crest         Final Active Diagnoses:    Diagnosis Date Noted POA    Cytopenia [D75.9] 08/23/2018 Yes      Problems Resolved During this Admission:      Discharged Condition: stable    Disposition: Home or Self Care    Follow Up:   Future Appointments   Date Time Provider Department Center   9/12/2018  1:00 PM LAB, HEMONC CANCER [facility] LAB [facility] [place]   9/12/2018  2:00 PM Anna Lin MD [facility] [place]       Patient Instructions:      Notify your health care provider if you experience any of the following:  temperature >100.4     Notify your  health care provider if you experience any of the following:  severe uncontrolled pain     Notify your health care provider if you experience any of the following:  redness, tenderness, or signs of infection (pain, swelling, redness, odor or green/yellow discharge around incision site)     Remove dressing in 24 hours     Activity as tolerated     Medications:  Reconciled Home Medications:      Medication List      ASK your doctor about these medications    atorvastatin 40 MG tablet  Commonly known as:  LIPITOR  Take 40 mg by mouth once daily.     calcitRIOL 0.5 MCG Cap  Commonly known as:  ROCALTROL  Take 0.5 mcg by mouth once daily.     carvedilol 25 MG tablet  Commonly known as:  COREG     cloNIDine 0.1 MG tablet  Commonly known as:  CATAPRES  Take 0.1 mg by mouth once.     cyclobenzaprine 10 MG tablet  Commonly known as:  FLEXERIL     hydrALAZINE 25 MG tablet  Commonly known as:  APRESOLINE  Take 25 mg by mouth 3 (three) times daily.     labetalol 300 MG tablet  Commonly known as:  NORMODYNE  Take 300 mg by mouth 2 (two) times daily.     lactulose 10 gram/15 mL solution  Commonly known as:  CHRONULAC  Take 10 g by mouth 2 (two) times daily as needed.     loratadine 10 mg dissolvable tablet  Commonly known as:  CLARITIN REDITABS  Take 10 mg by mouth once daily.     losartan 100 MG tablet  Commonly known as:  COZAAR     NEPHRO-KATHY 0.8 mg Tab  Generic drug:  B complex-vitamin C-folic acid  Take by mouth.     NIFEdipine 90 MG Tbsr  Commonly known as:  ADALAT CC     omeprazole 40 MG capsule  Commonly known as:  PRILOSEC     RENVELA 800 mg Tab  Generic drug:  sevelamer carbonate  800 mg 2 (two) times daily.     SENSIPAR 60 MG Tab  Generic drug:  cinacalcet  Take 90 mg by mouth once daily.     VITAMIN D2 50,000 unit Cap  Generic drug:  ergocalciferol  Take 50,000 Units by mouth every 30 days.            Nkechi Arriola NP  Hematology/Oncology  [facility]

## 2018-08-23 NOTE — ANESTHESIA POSTPROCEDURE EVALUATION
"Anesthesia Post Evaluation    Patient: Satinder Schulte    Procedure(s) Performed: Procedure(s) (LRB):  Biopsy-bone marrow (Right)    Final Anesthesia Type: general  Patient location during evaluation: [facility]  Patient participation: Yes- Able to Participate  Level of consciousness: awake and alert  Post-procedure vital signs: reviewed and stable  Pain management: adequate  Airway patency: patent  PONV status at discharge: No PONV  Anesthetic complications: no      Cardiovascular status: blood pressure returned to baseline and hemodynamically stable  Respiratory status: unassisted, spontaneous ventilation and room air  Hydration status: euvolemic  Follow-up not needed.        Visit Vitals  /65   Pulse (!) 46   Temp 36.8 °C (98.3 °F) (Oral)   Resp 18   Ht 5' 3" (1.6 m)   Wt 91.8 kg (202 lb 6.1 oz)   SpO2 98%   Breastfeeding? No   BMI 35.85 kg/m²       Pain/Benjamín Score: Pain Assessment Performed: Yes (8/23/2018 12:16 PM)  Presence of Pain: denies (8/23/2018 12:16 PM)  Benjamín Score: 10 (8/23/2018 12:16 PM)        "

## 2018-08-23 NOTE — ANESTHESIA RELEASE NOTE
"Anesthesia Release from PACU Note    Patient: Satinder Schulte    Procedure(s) Performed: Procedure(s) (LRB):  Biopsy-bone marrow (Right)    Anesthesia type: general    Post pain: Adequate analgesia    Post assessment: no apparent anesthetic complications, tolerated procedure well and no evidence of recall    Last Vitals:   Visit Vitals  /65   Pulse (!) 46   Temp 36.8 °C (98.3 °F) (Oral)   Resp 18   Ht 5' 3" (1.6 m)   Wt 91.8 kg (202 lb 6.1 oz)   SpO2 98%   Breastfeeding? No   BMI 35.85 kg/m²       Post vital signs: stable    Level of consciousness: awake, alert  and oriented    Nausea/Vomiting: no nausea/no vomiting    Complications: none    Airway Patency: patent    Respiratory: unassisted    Cardiovascular: stable and blood pressure at baseline    Hydration: euvolemic  "

## 2018-08-23 NOTE — DISCHARGE INSTRUCTIONS
Discharge instructions for having a Bone Marrow Aspiration / Biopsy:    Keep Bandage in place for 24 hours.  - Do not shower or take a tube bath during this time (you may sponge bathe).  - Call the nurse or physician for excessive bleeding or pain at biopsy site.  - You may take Tylenol as needed for pain.    You have received medication to sedate you.  - Do not drive a car or operate heavy machinery for the rest of the day.  - You may resume other activities as tolerated.    You can call [phone] for any problems during the hours of 8:00 AM-5:00PM.    For an emergency after 5:00 PM you can call [phone] and have the  page the Hematologist / Oncologist on call.

## 2018-08-23 NOTE — PROCEDURES
PROCEDURE NOTE:  Date of Procedure: 08/23/2018  Bone Marrow Biopsy and Aspiration  Indication: cytopenias, pre kidney transplant (previously bm bx in July was only successful for aspiration, no core was able to be obtained)  Consent: Informed consent was obtained from patient.  Timeout: Done and documented.  Position: Left lateral  Site: Right posterior illiac crest.  Prep: Betadine.  Needle used: 11 gauge Jamshidi needle, longer needle used  Anesthetic: 1% lidocaine 5 cc.  Biopsy: The biopsy needle was introduced into the marrow cavity and an aspirate was obtained without complications and sent for flow cytometry and cytogenetics in case additional sample was needed in addition to July aspiration. Long core biopsy obtained without difficulty and sent for routine histologic examination.  Complications: None.  Disposition: The patient was discharged home per anesthesia protocol.  Blood loss: Minimal.     Nkechi Arriola DNP, NP  Hematology/Oncology

## 2018-08-23 NOTE — ANESTHESIA PREPROCEDURE EVALUATION
08/23/2018  Satinder Schulte is a 62 y.o., female.    Anesthesia Evaluation         Review of Systems      Physical Exam  General:  Obesity    Airway/Jaw/Neck:  Airway Findings: Mouth Opening: Normal Tongue: Normal  General Airway Assessment: Adult  Mallampati: II  Improves to II with phonation.  TM Distance: Normal, at least 6 cm      Dental:  Dental Findings: Edentulous   Chest/Lungs:  Chest/Lungs Findings: Clear to auscultation     Heart/Vascular:  Heart Findings: Rate: Normal  Rhythm: Regular Rhythm        Mental Status:  Mental Status Findings:  Flat Affect         Anesthesia Plan  Type of Anesthesia, risks & benefits discussed:  Anesthesia Type:  general  Patient's Preference:   Intra-op Monitoring Plan: standard ASA monitors  Intra-op Monitoring Plan Comments:   Post Op Pain Control Plan: multimodal analgesia  Post Op Pain Control Plan Comments:   Induction:   IV  Beta Blocker:  Patient is not currently on a Beta-Blocker (No further documentation required).       Informed Consent: Patient understands risks and agrees with Anesthesia plan.  Questions answered. Anesthesia consent signed with patient.  ASA Score: 3     Day of Surgery Review of History & Physical:    H&P update referred to the surgeon.         Ready For Surgery From Anesthesia Perspective.

## 2018-08-23 NOTE — PLAN OF CARE
Problem: Patient Care Overview  Goal: Plan of Care Review  Outcome: Outcome(s) achieved Date Met: 08/23/18    Discharge instructions  given to patient and verbalized understanding. Patient stable, tolerating fluids. No complaints at this time. Patient adequate for discharge.

## 2018-08-23 NOTE — TRANSFER OF CARE
"Anesthesia Transfer of Care Note    Patient: Satinder Schulte    Procedure(s) Performed: Procedure(s) (LRB):  Biopsy-bone marrow (Right)    Patient location: PACU    Anesthesia Type: general    Transport from OR: Transported from OR on 6-10 L/min O2 by face mask with adequate spontaneous ventilation    Post pain: adequate analgesia    Post assessment: no apparent anesthetic complications and tolerated procedure well    Post vital signs: stable    Level of consciousness: awake and oriented    Nausea/Vomiting: no vomiting    Complications: none    Transfer of care protocol was followed      Last vitals:   Visit Vitals  BP (!) 157/72 (BP Location: Right arm)   Pulse (!) 54   Resp 18   Ht 5' 3" (1.6 m)   Wt 91.8 kg (202 lb 6.1 oz)   SpO2 100%   Breastfeeding? No   BMI 35.85 kg/m²     "

## 2018-08-24 LAB — BONE MARROW IRON STAIN COMMENT: NORMAL

## 2018-09-04 NOTE — PROGRESS NOTES
Hi,    The marrow has no evidence of malignancy.  The rare dysplastic changes may be related to chronic illness.  The mild leukoepenia may also be cultural.    Let me know if we can do anything else to help.    Thank you  Anna

## 2018-09-12 ENCOUNTER — LAB VISIT (OUTPATIENT)
Dept: LAB | Facility: HOSPITAL | Age: 63
End: 2018-09-12
Payer: MEDICARE

## 2018-09-12 ENCOUNTER — OFFICE VISIT (OUTPATIENT)
Dept: HEMATOLOGY/ONCOLOGY | Facility: CLINIC | Age: 63
End: 2018-09-12
Payer: MEDICARE

## 2018-09-12 VITALS
BODY MASS INDEX: 36.45 KG/M2 | HEIGHT: 63 IN | HEART RATE: 59 BPM | TEMPERATURE: 99 F | WEIGHT: 205.69 LBS | OXYGEN SATURATION: 99 % | RESPIRATION RATE: 18 BRPM | SYSTOLIC BLOOD PRESSURE: 225 MMHG | DIASTOLIC BLOOD PRESSURE: 89 MMHG

## 2018-09-12 DIAGNOSIS — D63.1 ANEMIA ASSOCIATED WITH CHRONIC RENAL FAILURE: ICD-10-CM

## 2018-09-12 DIAGNOSIS — D61.818 PANCYTOPENIA: Primary | ICD-10-CM

## 2018-09-12 DIAGNOSIS — N18.9 ANEMIA ASSOCIATED WITH CHRONIC RENAL FAILURE: ICD-10-CM

## 2018-09-12 LAB
ALBUMIN SERPL BCP-MCNC: 3.9 G/DL
ALP SERPL-CCNC: 135 U/L
ALT SERPL W/O P-5'-P-CCNC: 9 U/L
ANION GAP SERPL CALC-SCNC: 10 MMOL/L
AST SERPL-CCNC: 11 U/L
BASOPHILS # BLD AUTO: 0.01 K/UL
BASOPHILS NFR BLD: 0.4 %
BILIRUB SERPL-MCNC: 0.5 MG/DL
BUN SERPL-MCNC: 33 MG/DL
CALCIUM SERPL-MCNC: 9.7 MG/DL
CHLORIDE SERPL-SCNC: 105 MMOL/L
CO2 SERPL-SCNC: 25 MMOL/L
CREAT SERPL-MCNC: 8.9 MG/DL
DIFFERENTIAL METHOD: ABNORMAL
EOSINOPHIL # BLD AUTO: 0.1 K/UL
EOSINOPHIL NFR BLD: 5.3 %
ERYTHROCYTE [DISTWIDTH] IN BLOOD BY AUTOMATED COUNT: 14.5 %
EST. GFR  (AFRICAN AMERICAN): 5 ML/MIN/1.73 M^2
EST. GFR  (NON AFRICAN AMERICAN): 4.4 ML/MIN/1.73 M^2
GLUCOSE SERPL-MCNC: 59 MG/DL
HCT VFR BLD AUTO: 31.8 %
HGB BLD-MCNC: 9.5 G/DL
IMM GRANULOCYTES # BLD AUTO: 0.01 K/UL
IMM GRANULOCYTES NFR BLD AUTO: 0.4 %
LYMPHOCYTES # BLD AUTO: 1 K/UL
LYMPHOCYTES NFR BLD: 38 %
MCH RBC QN AUTO: 30.7 PG
MCHC RBC AUTO-ENTMCNC: 29.9 G/DL
MCV RBC AUTO: 103 FL
MONOCYTES # BLD AUTO: 0.2 K/UL
MONOCYTES NFR BLD: 9.1 %
NEUTROPHILS # BLD AUTO: 1.2 K/UL
NEUTROPHILS NFR BLD: 46.8 %
NRBC BLD-RTO: 0 /100 WBC
PLATELET # BLD AUTO: 116 K/UL
PMV BLD AUTO: 11.8 FL
POTASSIUM SERPL-SCNC: 5.8 MMOL/L
PROT SERPL-MCNC: 7.8 G/DL
RBC # BLD AUTO: 3.09 M/UL
SODIUM SERPL-SCNC: 140 MMOL/L
WBC # BLD AUTO: 2.63 K/UL

## 2018-09-12 PROCEDURE — 99999 PR PBB SHADOW E&M-EST. PATIENT-LVL III: CPT | Mod: PBBFAC,,, | Performed by: INTERNAL MEDICINE

## 2018-09-12 PROCEDURE — 99215 OFFICE O/P EST HI 40 MIN: CPT | Mod: S$PBB,,, | Performed by: INTERNAL MEDICINE

## 2018-09-12 PROCEDURE — 36415 COLL VENOUS BLD VENIPUNCTURE: CPT | Mod: TXP

## 2018-09-12 PROCEDURE — 99213 OFFICE O/P EST LOW 20 MIN: CPT | Mod: PBBFAC | Performed by: INTERNAL MEDICINE

## 2018-09-12 PROCEDURE — 80053 COMPREHEN METABOLIC PANEL: CPT | Mod: TXP

## 2018-09-12 PROCEDURE — 85025 COMPLETE CBC W/AUTO DIFF WBC: CPT | Mod: TXP

## 2018-09-12 NOTE — PROGRESS NOTES
Subjective:       Patient ID: Satinder Schulte is a 62 y.o. female.    Chief Complaint: Pancytopenia and Results (BMBX and labs)    Patient referred for cytopenias from renal transplant service. Only CBC available at time of consultation is 5 years old. Patient reports a history of anemia due to menorrhagia that was treated with hysterectomy. During conversation, she also mentions a history of decreased white blood cell count. She sees a hematologist in [place] routinely, Dr. Jaylon Diggs, and says a bone marrow biopsy is scheduled for 8/23/17. She is asymptomatic today from any cytopenias.    Follow-up 9/12/18  Return visit 1 year after initial consultation. Patient remains under consideration for renal transplant and transplant services have sent patient back for pancytopenia. She had a normal marrow biopsy with Jaylon Diggs MD in [place] last year. Repeat marrow year later with us remains normal. CBC has not changed at all in 1 year. Patient continues dialysis T/R/Sat and brings labs from her last 2 dialysis sessions that remain stable. ROS is negative. She has no complaints today. She is very hopeful that she will have a transplant soon.    HPI  Review of Systems   Constitutional: Negative.    HENT: Negative.    Eyes: Negative.    Respiratory: Negative.    Cardiovascular: Negative.    Gastrointestinal: Negative.    Endocrine: Negative.    Genitourinary: Negative.    Musculoskeletal: Negative.    Skin: Negative.    Allergic/Immunologic: Negative for environmental allergies, food allergies and immunocompromised state.   Neurological: Negative.    Hematological: Negative for adenopathy. Does not bruise/bleed easily.   Psychiatric/Behavioral: Negative.        Objective:      Physical Exam   Constitutional: She is oriented to person, place, and time. She appears well-developed and well-nourished.   HENT:   Head: Normocephalic and atraumatic.   Eyes: Conjunctivae are normal. No scleral icterus.   Neck: Normal range  of motion. Neck supple.   Cardiovascular: Normal rate and intact distal pulses.   Pulmonary/Chest: Effort normal. No respiratory distress.   Abdominal: Soft. She exhibits no distension. There is no tenderness.   Musculoskeletal: Normal range of motion. She exhibits no edema.   Neurological: She is alert and oriented to person, place, and time. No cranial nerve deficit.   Skin: Skin is warm and dry.   Psychiatric: She has a normal mood and affect. Her behavior is normal.   Nursing note and vitals reviewed.      Assessment:       1. Pancytopenia        Plan:       The marrow has no evidence of malignancy.  The rare dysplastic changes may be related to chronic illness.  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    CBC is stable for more than 1 year.  Continue to follow as needed with Jaylon Diggs MD as needed    Patient is currently clear for organ transplantation per hematology evaluation.

## 2018-10-09 DIAGNOSIS — Z76.82 ORGAN TRANSPLANT CANDIDATE: Primary | ICD-10-CM

## 2018-12-07 ENCOUNTER — OFFICE VISIT (OUTPATIENT)
Dept: OBSTETRICS AND GYNECOLOGY | Facility: CLINIC | Age: 63
End: 2018-12-07
Payer: MEDICARE

## 2018-12-07 ENCOUNTER — HOSPITAL ENCOUNTER (OUTPATIENT)
Dept: RADIOLOGY | Facility: HOSPITAL | Age: 63
Discharge: HOME | End: 2018-12-07
Attending: NURSE PRACTITIONER
Payer: MEDICARE

## 2018-12-07 VITALS
BODY MASS INDEX: 36.6 KG/M2 | SYSTOLIC BLOOD PRESSURE: 164 MMHG | WEIGHT: 206.56 LBS | HEIGHT: 63 IN | DIASTOLIC BLOOD PRESSURE: 72 MMHG

## 2018-12-07 DIAGNOSIS — B37.31 YEAST VAGINITIS: ICD-10-CM

## 2018-12-07 DIAGNOSIS — Z91.89 GYN EXAM FOR HIGH-RISK MEDICARE PATIENT: Primary | ICD-10-CM

## 2018-12-07 DIAGNOSIS — Z12.39 BREAST CANCER SCREENING: ICD-10-CM

## 2018-12-07 DIAGNOSIS — Z76.82 ORGAN TRANSPLANT CANDIDATE: ICD-10-CM

## 2018-12-07 PROCEDURE — G0101 CA SCREEN;PELVIC/BREAST EXAM: HCPCS | Mod: PBBFAC,PO | Performed by: NURSE PRACTITIONER

## 2018-12-07 PROCEDURE — 99214 OFFICE O/P EST MOD 30 MIN: CPT | Mod: PBBFAC,PO | Performed by: NURSE PRACTITIONER

## 2018-12-07 PROCEDURE — 77063 BREAST TOMOSYNTHESIS BI: CPT | Mod: TC,PO,TXP

## 2018-12-07 PROCEDURE — G0101 CA SCREEN;PELVIC/BREAST EXAM: HCPCS | Mod: S$PBB,,, | Performed by: NURSE PRACTITIONER

## 2018-12-07 PROCEDURE — 99999 PR PBB SHADOW E&M-EST. PATIENT-LVL IV: CPT | Mod: PBBFAC,,, | Performed by: NURSE PRACTITIONER

## 2018-12-07 PROCEDURE — 77063 BREAST TOMOSYNTHESIS BI: CPT | Mod: 26,TXP,, | Performed by: RADIOLOGY

## 2018-12-07 PROCEDURE — 77067 SCR MAMMO BI INCL CAD: CPT | Mod: 26,TXP,, | Performed by: RADIOLOGY

## 2018-12-07 RX ORDER — FLUCONAZOLE 150 MG/1
TABLET ORAL
Qty: 2 TABLET | Refills: 1 | Status: SHIPPED | OUTPATIENT
Start: 2018-12-07 | End: 2019-08-02

## 2018-12-07 RX ORDER — NYSTATIN 100000 U/G
OINTMENT TOPICAL 2 TIMES DAILY
Qty: 15 G | Refills: 1 | Status: ON HOLD | OUTPATIENT
Start: 2018-12-07 | End: 2019-09-25 | Stop reason: HOSPADM

## 2018-12-07 NOTE — PROGRESS NOTES
"CC: Well woman exam    Satinder Schulte is a 63 y.o. female  presents for a well woman exam.  No issues, problems, or complaints.    MMG today     Past Medical History:   Diagnosis Date    Abnormal Pap smear     repeat paps were normal    Anemia associated with chronic renal failure     Awaiting organ transplant status  - 2013    Blood type A+ 2014    CKD (chronic kidney disease) stage 5, GFR less than 15 ml/min     secondary hypertension    Essential hypertension 2017    Hyperlipidemia     Hypertension, renal 1992    Stroke     Residual speech deficit     Past Surgical History:   Procedure Laterality Date    AV FISTULA PLACEMENT      Biopsy-bone marrow Right 2018    Performed by Anna Lin MD at [facility] OR [address] [place] [address]    BONE MARROW BIOPSY Right 2018    Procedure: Biopsy-bone marrow;  Surgeon: Anna Lin MD;  Location: [facility] OR [address] [place] [address];  Service: Oncology;  Laterality: Right;    CHOLECYSTECTOMY      HYSTERECTOMY      TAHBSO for benign reasons    OOPHORECTOMY       Family History   Problem Relation Age of Onset    Stroke Mother     Kidney disease Mother         on dialysis    Hypertension Mother     Heart disease Mother     Heart disease Father     Stroke Sister     Kidney disease Brother     Breast cancer Daughter     Heart disease Sister     Ovarian cancer Cousin     Colon cancer Neg Hx     Thrombophilia Neg Hx      Social History     Tobacco Use    Smoking status: Never Smoker    Smokeless tobacco: Never Used   Substance Use Topics    Alcohol use: No    Drug use: No     OB History      Para Term  AB Living    5 5 4     4    SAB TAB Ectopic Multiple Live Births            4          BP (!) 164/72   Ht 5' 3" (1.6 m)   Wt 93.7 kg (206 lb 9.1 oz)   BMI 36.59 kg/m²     ROS:  GENERAL: Denies weight gain or weight loss. Feeling well overall.   SKIN: Denies rash or lesions.   HEAD: Denies head injury or headache.   NODES: " Denies enlarged lymph nodes.   CHEST: Denies chest pain or shortness of breath.   CARDIOVASCULAR: Denies palpitations or left sided chest pain.   ABDOMEN: No abdominal pain, constipation, diarrhea, nausea, vomiting or rectal bleeding.   URINARY: No frequency, dysuria, hematuria, or burning on urination.  REPRODUCTIVE: See HPI.   BREASTS: The patient performs breast self-examination and denies pain, lumps, or nipple discharge.   HEMATOLOGIC: No easy bruisability or excessive bleeding.   MUSCULOSKELETAL: Denies joint pain or swelling.   NEUROLOGIC: Denies syncope or weakness.   PSYCHIATRIC: Denies depression, anxiety or mood swings.    PE:   APPEARANCE: Well nourished, well developed, in no acute distress.  AFFECT: WNL, alert and oriented x 3.  SKIN: No acne or hirsutism.  NECK: Neck symmetric without masses or thyromegaly.  NODES: No inguinal, cervical, axillary or femoral lymph node enlargement.  CHEST: Good respiratory effort.   ABDOMEN: Soft. No tenderness or masses. No hepatosplenomegaly. No hernias.  BREASTS: pt declined just came from mmg   PELVIC: Normal external female genitalia without lesions - red and irritated appear tinea.  Normal hair distribution. Adequate perineal body, normal urethral meatus. Vagina atrophic without lesions or discharge. No significant cystocele or rectocele. Bimanual exam shows uterus and cervix to be surgically absent. Adnexa absent    1. GYN exam for high-risk Medicare patient     2. Yeast vaginitis  fluconazole (DIFLUCAN) 150 MG Tab    nystatin (MYCOSTATIN) ointment    and PLAN:    Patient was counseled today on A.C.S. Pap guidelines and recommendations for yearly pelvic exams, mammograms and monthly self breast exams; to see her PCP for other health maintenance.

## 2018-12-07 NOTE — LETTER
December 7, 2018      Quirino Burnett MD  [address]  [place] [address]           [facility] - OB/ GYN  [address] [facility] [place] [address]  Phone: [phone]  Fax: [phone]          Patient: Satinder Schulte   MR Number: [id number]   YOB: 1955   Date of Visit: 12/7/2018       Dear Dr. Quirino Burnett:    Thank you for referring Satinder Schulte to me for evaluation. Attached you will find relevant portions of my assessment and plan of care.    If you have questions, please do not hesitate to call me. I look forward to following Satinder Schulte along with you.    Sincerely,    Kristin Ballard, NP    Enclosure  CC:  No Recipients    If you would like to receive this communication electronically, please contact [email or web address] or [phone] to request more information on [employer] access.    For providers and/or their staff who would like to refer a patient to [facility], please contact us through our one-stop-shop provider referral line, [facility], at [phone].    If you feel you have received this communication in error or would no longer like to receive these types of communications, please e-mail [email or web address]

## 2019-02-15 NOTE — PROGRESS NOTES
ED Provider Notes - Alexia Tan PA - 01/07/2019 10:21 PM CST  Formatting of this note might be different from the original.  History  Chief Complaint   Patient presents with    Ankle Pain   to left ankle after tripping and falling today. pedal pulse intact     63 y.o female to the ER with c/o tripping and falling. States that she injured her left ankle. States that she tried to walk but it hurts to bad.     History provided by: Patient   used: No   Ankle Pain   Location: Ankle  Injury: yes   Mechanism of injury: fall   Fall:   Fall occurred: Standing  Impact surface: Hard floor  Point of impact: left leg.  Entrapped after fall: no   Ankle location: L ankle  Pain details:   Quality: Throbbing  Radiates to: Does not radiate  Severity: Moderate  Onset quality: Sudden  Timing: Constant  Progression: Unchanged  Chronicity: New  Prior injury to area: No  Relieved by: None tried  Worsened by: Bearing weight  Ineffective treatments: None tried  Associated symptoms: no fever     Past Medical History:   Diagnosis Date    Arthritis    CVA (cerebral vascular accident) (HCC)    ESRD (end stage renal disease) (HCC)    Hyperlipidemia    Hypertension    Thrombocytopenia (HCC) 7/13/2017     Past Surgical History:   Procedure Laterality Date    CHOLECYSTECTOMY    CYSTOSCOPY RETROGRADE PYELOGRAM; (C-Arm) , bladder biopsy and fulguration Bilateral 4/3/2017   Performed by Nisha Burks MD at  [facility] OR    HYSTERECTOMY    INSERT / REPLACE / VENOUS ACCESS CATHETER     Social History     Tobacco Use    Smoking status: Never Smoker    Smokeless tobacco: Never Used   Substance Use Topics    Alcohol use: No    Drug use: No     No Known Allergies    Review of Systems   Constitutional: Negative. Negative for fever.   HENT: Negative. Negative for congestion and sore throat.   Eyes: Negative.   Respiratory: Negative. Negative for cough, chest tightness and shortness of breath.   Cardiovascular:  "Negative. Negative for chest pain.   Gastrointestinal: Negative. Negative for abdominal pain, diarrhea, nausea and vomiting.   Genitourinary: Negative. Negative for difficulty urinating, dysuria, frequency, urgency, vaginal bleeding and vaginal discharge.   Musculoskeletal: Negative.   Skin: Negative.   Neurological: Negative. Negative for headaches.   Psychiatric/Behavioral: Negative.   All other systems reviewed and are negative.    Physical Exam  ED Triage Vitals [01/07/19 2011]   Temp Pulse Resp BP SpO2   98.5 °F (36.9 °C) 66 16 (!) 186/99 99 %   Physical Exam   Constitutional: She is oriented to person, place, and time. She appears well-developed and well-nourished.   HENT:   Head: Normocephalic and atraumatic.   Nose: Nose normal.   Mouth/Throat: Oropharynx is clear and moist.   Eyes: Conjunctivae and EOM are normal. Pupils are equal, round, and reactive to light.   Neck: Normal range of motion. Neck supple.   Cardiovascular: Normal rate and intact distal pulses.   Pulmonary/Chest: Effort normal.   Musculoskeletal:   Left ankle: She exhibits decreased range of motion and swelling. Tenderness.   Feet:    Neurological: She is alert and oriented to person, place, and time.   Skin: Skin is warm and dry.   Psychiatric: She has a normal mood and affect. Her behavior is normal. Judgment and thought content normal.   Nursing note and vitals reviewed.    ED Course and Medical Decision MakingProvider First Evaluation Time: 01/07/19 2156    Vitals:   01/07/19 2011 01/07/19 2157   BP: (!) 186/99 (!) 194/84   Pulse: 66 68   Resp: 16 16   Temp: 98.5 °F (36.9 °C)   TempSrc: Oral   SpO2: 99% 100%   Weight: 92.5 kg (204 lb)   Height: 160 cm (63")     Procedures    Orders Placed This Encounter   Procedures    Elevate extremity   Standing Status: Standing   Number of Occurrences: 1    Apply ice to affected area   Standing Status: Standing   Number of Occurrences: 1    Apply short leg splint   Apply posterior short leg splint " to left leg. Ankle frature   Standing Status: Standing   Number of Occurrences: 1     Labs Reviewed - No data to display    Medications   HYDROcodone-acetaminophen (NORCO) 7.5-325 mg per tablet 1 tablet (1 tablet Oral Given 1/7/19 2202)     XR Ankle 3+ View Left   Final Result   There is oblique fracture of the distal neck of the left fibula.       Xr Ankle 3+ View Left    Result Date: 1/7/2019  There is oblique fracture of the distal neck of the left fibula.    ED Course as of Jan 07 2229 Mon Jan 07, 2019 2228 Discussed all test results and discharge plans with patient and family. Verbalized understanding. [BG]     ED Course User Index  [BG] Alexia Tan PA     [facility]        Diagnosis  1. Closed fracture of distal end of left fibula, unspecified fracture morphology, initial encounter Active     Disposition and Plan  Discharge    New Prescriptions   HYDROCODONE-ACETAMINOPHEN (NORCO) 7.5-325 MG PER TABLET Take 1 tablet by mouth every 8 (eight) hours as needed for up to 5 days.     Things you need to do   Follow up with Mychal Valadez MD   no weight bearing   Phone: [phone]   [facility]   Monday Feb 04, 2019   Established Patient with Brennon Chavez MD at 11:10 AM   Where: [facility] [place]  [facility] ([facility], [facility])   [facility]  [address] [facility]  [address]  [place] [address]  [phone]         Alexia Tan PA  01/07/19 2229      Electronically Signed by Alexia Tan PA on 01/07/2019 10:21 PM CST

## 2019-04-24 NOTE — PROGRESS NOTES
Mychal Peace MD - 03/26/2019 12:32 PM CDT  Formatting of this note might be different from the original.  Admit Date 3/20/2019   Discharge Date 3/26/2019   Location [facility]-3237/1   Treatment Team Primary Care Physician: Brennon Chavez MD  Discharging Physician: Mychal Peace MD  Treatment Team:   Attending Provider: Rufino Calvert MD  Consulting Physician: [facility]  Hospitalist: [id number] [facility]  Hospitalist: [facility]  Consulting Physician: Alber Renal  Consulting Physician: Shalonda Dunlap MD  Admitting Provider: Rufino Calvert MD       Reason for Hospitalization   Liver abscess status post CT-guided drainage. Culture negative. Discharged with ZULEIKA drain in place with plan for outpatient follow-up with general surgery for further evaluation and drain removal.    * Liver abscess  Patient is a pleasant 63-year-old female with ESRD on HD T/T/S, history of stroke with residual dysarthria who came to outside emergency department with acute pain to the right lower chest and right upper quadrant. Imaging of the abdomen revealed large fluid collection over 8 cm in the peripheral right hepatic lobe per radiology had characteristics suspicious for hepatic abscess. Patient was not initially septic and therefore antibiotics were held until aspiration procedure with CT-guided drainage could be placed. Drain was placed in 220 cc of purulent appearing pus like fluid was removed and sent for culture and Gram stain. Gram stain only showed moderate white blood cell count with many red blood cells and cultures were negative during this admission which would indicate that this may simply represent hematoma. Nonetheless, on conversation with radiology, still suspicion for infectious etiology based on apparent pus removed. She was started on empiric antibiotics with ceftriaxone and Flagyl. She had no fever throughout this admission. She has chronic pancytopenia which was  essentially stable during this admission. She was evaluated by surgery this admission who recommended CT-guided drainage, but otherwise no overt surgical needs. Upon discussion with general surgery, we will leave drain in place for now as it is still draining about 30 cc per day of brown/red colored fluid. Plan for outpatient follow-up with general surgery in about 1 week for likely repeat hepatic imaging to determine if drain can be removed at that time. Infectious disease has evaluated the patient given her lack of helpful culture data and apparent liver abscess. We will opt to treat with antibiotics for 10 more days after discharge for a total of 2 weeks. We will give renally dosed levofloxacin and Flagyl. Abdominal pain is improved at the time of discharge, mostly helped by drain placement as well as treatment of constipation. She is hemodynamically stable and will be discharged home at this time.    Pancytopenia (HCC)  Chronic leukopenia and thrombus cytopenia that appear to be stable since 2017. Follow-up as outpatient. Discharge white blood cell count 3.1.    Constipation  Responded well with 2 large bowel movements that improved her overall abdominal discomfort when given lactulose. Will discharge with Colace and as needed lactulose.    End stage renal disease (HCC)  Dialysis schedule was maintained by nephrology which is much appreciated. Continue T/T/S dialysis as an outpatient [facility] in [place].    Essential hypertension  Blood pressure meds were initially held. She remained hypertensive with a fairly wide pulse pressure during this admission. Therefore we will return back to her higher dose of carvedilol, lisinopril and 100 mg 3 times daily of hydralazine for discharge. Outpatient follow-up with nephrology for dialysis and PCP.    History of CVA (cerebrovascular accident)  Baseline dysarthria that was overall unchanged this admission and at baseline.    Anemia due to chronic kidney disease, on chronic  dialysis (HCC)  Hemoglobin remained stable with no evidence of overt bleeding. Outpatient follow-up.      Final Medication List     ACTIVE   atorvastatin 40 mg tablet  Commonly known as: LIPITOR  40 mg, Daily    b complex-vitamin c-folic acid 0.8 mg Tab  Generic drug: B complex-vitamin C-folic acid  1 tablet, Oral, Daily    carvedilol 25 mg tablet  Commonly known as: COREG  12.5 mg, Oral, 2 times daily with meals    cholecalciferol (vitamin D3) 50,000 unit Tab  Every 30 days    docusate sodium 100 mg capsule  Commonly known as: COLACE  100 mg, Oral, 2 times daily, For constipation. Stop taking if having diarrhea    hydrALAZINE 100 mg tablet  Commonly known as: APRESOLINE  100 mg, Oral, Daily    lactulose 10 gram/15 mL solution  Commonly known as: CHRONULAC  10 g, Oral, 3 times daily PRN    levoFLOXacin 750 mg tablet  Commonly known as: LEVAQUIN  750 mg, Oral, Every 48 hours    lisinopril 20 mg tablet  Commonly known as: PRINIVIL,ZESTRIL  20 mg, Oral, Daily, Not taking    loratadine 10 mg tablet  Commonly known as: CLARITIN  10 mg, Oral, Daily    metroNIDAZOLE 500 mg tablet  Commonly known as: FLAGYL  500 mg, Oral, 3 times daily    omeprazole 40 MG capsule  Commonly known as: PRILOSEC  40 mg, Oral, Daily    ondansetron ODT 4 mg disintegrating tablet  Commonly known as: ZOFRAN-ODT  4 mg, Oral, Every 8 hours PRN    SENSIPAR 90 MG tablet  Generic drug: cinacalcet  90 mg, Oral, Daily    sevelamer 800 mg tablet  Commonly known as: RENVELA  2,400 mg, Oral, Daily with dinner    temazepam 7.5 mg capsule  Commonly known as: RESTORIL  15 mg, Oral, Nightly, Not taking        Significant Medication Changes:   Metronidazole and renally dosed levofloxacin added. Lactulose as needed increased. Colace added.      Condition: Stable, per my exam    Disposition: Home    Time Spent on Discharge: Greater than 30 minutes.    Quality: Influenza vaccine is up to date.         Scheduled Appts   Follow up with Brennon Chavez MD   Phone:  "[phone]   Where: [address], [address], [place] [address]   Discharge Follow-Up   Follow up with PCP in 1 week for post-discharge followup. Follow up with General surgery (Felisa Hanna MD) in 1 week for drain evaluation and removal. Follow up with dialysis as previously scheduled (T,T,S; [facility] in [place]).     Diet   Discharge Diet   Patient Type: Adult - Diet   Diet-Adult Home Diet Type: Return to previous diet       Activity   Discharge Activity   Instructions: As tolerated     Other   Call MD   Call MD for: Worsening symptoms   Discharge Condition   Condition: Stable   Other restrictions (specify):   Keep right flank ZULEIKA drain tube in place and teach patient how to empty and manage drain to continue drainage at home.     Mychal Peace MD   3/26/2019 12:32 PM  Portions of this note may have been created with voice recognition software. Occasional "wrong-word" or "sound-a-like" substitutions may have occurred due to the inherent limitations of voice recognition software. Please, read the note carefully and recognize, using context, where substitutions have occurred.     Electronically signed by Mychal Peace MD at 03/26/2019 12:34 PM CDT        Constipation 03/25/2019   Last Assessment & Plan:     History & Physical     Discharge Summary Responded well with 2 large bowel movements that improved her overall abdominal discomfort when given lactulose. Will discharge with Colace and as needed lactulose.    Follow-up Contributing to abdominal discomfort. Abdominal exam is benign. Improved bowel sounds today. I will give lactulose in addition to her bowel regimen.     Bacterial liver abscess 03/21/2019   Last Assessment & Plan:     History & Physical Numerous hepatic cysts have been documented since 2017. A cyst to lateral right hepatic lobe has grown from 6 cm to 9 cm since 2017 and is showing decreased fluid attenuation suspicious for abscess. Patient underwent percutaneous drainage of this " fluid collection producing 220 mL of [place] pus. Antibiotics were withheld until after fluid sampling but cultures are no growth to date. Patient's infectious parameters are within normal limits. A ZULEIKA drain remains in place producing thick brown liquid. Follow-up culture results tomorrow. Continue empiric Rocephin plus Flagyl. Amoeba serology is pending although pretest probability is low.    Discharge Summary Patient is a pleasant 63-year-old female with ESRD on HD T/T/S, history of stroke with residual dysarthria who came to outside emergency department with acute pain to the right lower chest and right upper quadrant. Imaging of the abdomen revealed large fluid collection over 8 cm in the peripheral right hepatic lobe per radiology had characteristics suspicious for hepatic abscess. Patient was not initially septic and therefore antibiotics were held until aspiration procedure with CT-guided drainage could be placed. Drain was placed in 220 cc of purulent appearing pus like fluid was removed and sent for culture and Gram stain. Gram stain only showed moderate white blood cell count with many red blood cells and cultures were negative during this admission which would indicate that this may simply represent hematoma. Nonetheless, on conversation with radiology, still suspicion for infectious etiology based on apparent pus removed. She was started on empiric antibiotics with ceftriaxone and Flagyl. She had no fever throughout this admission. She has chronic pancytopenia which was essentially stable during this admission. She was evaluated by surgery this admission who recommended CT-guided drainage, but otherwise no overt surgical needs. Upon discussion with general surgery, we will leave drain in place for now as it is still draining about 30 cc per day of brown/red colored fluid. Plan for outpatient follow-up with general surgery in about 1 week for likely repeat hepatic imaging to determine if drain can be  removed at that time. Infectious disease has evaluated the patient given her lack of helpful culture data and apparent liver abscess. We will opt to treat with antibiotics for 10 more days after discharge for a total of 2 weeks. We will give renally dosed levofloxacin and Flagyl. Abdominal pain is improved at the time of discharge, mostly helped by drain placement as well as treatment of constipation. She is hemodynamically stable and will be discharged home at this time.    Follow-up Discussed case with infectious disease. Infectious disease consult much appreciated. Continue antibiotics for now and likely give for quinolone and Flagyl upon discharge for 10-day course as her culture data has been unrevealing. Infectious disease has contacted interventional radiology who did confirm that fluid appeared purulent upon drain placement. Continue ZULEIKA drain and hopefully remove within the next 1 or 2 days if output remains low.     Pancytopenia 08/03/2017   Overview:     Normal B12, folate, LDH, SPEP, SIEP       Last Assessment & Plan:     History & Physical Chronic abnormality noted since 2017 of doubtful clinical significance. Counts are overall stable.    Discharge Summary Chronic leukopenia and thrombus cytopenia that appear to be stable since 2017. Follow-up as outpatient. Discharge white blood cell count 3.1.    Follow-up Chronic thrombocytopenia and leukopenia has been present since 2017. Stable. Continue outpatient follow-up. Monitor CBC.     Thrombocytopenia 07/13/2017   End stage renal disease 03/24/2017   Last Assessment & Plan:     History & Physical Consulting nephrology for inpatient HD which is due today. Patient has mild hyperkalemia without EKG changes. She appears euvolemic.    Discharge Summary Dialysis schedule was maintained by nephrology which is much appreciated. Continue T/T/S dialysis as an outpatient [facility].    Follow-up Appreciate nephrology input for renal placement therapy.      Anemia due to chronic kidney disease, on chronic dialysis 07/11/2016   Last Assessment & Plan:     History & Physical Monitor. Currently stable.    Discharge Summary Hemoglobin remained stable with no evidence of overt bleeding. Outpatient follow-up.    Follow-up Hemoglobin remaining stable. No evidence of bleeding.     History of CVA (cerebrovascular accident) 07/11/2016   Overview:     Overview:   Residual speech deficit       Last Assessment & Plan:     History & Physical She has baseline dysarthria. No new neurologic symptoms.    Discharge Summary Baseline dysarthria that was overall unchanged this admission and at baseline.    Follow-up Baseline dysarthria that is overall unchanged.      Hypertensive chronic kidney disease with stage 5 chronic kidney disease or end stage renal disease 07/11/2016   Dialysis patient 07/11/2016   Essential hypertension 07/11/2016   Last Assessment & Plan:     History & Physical Blood pressure is currently elevated. I will continue majority of her home antihypertensive medication with exception of hydralazine.    Discharge Summary Blood pressure meds were initially held. She remained hypertensive with a fairly wide pulse pressure during this admission. Therefore we will return back to her higher dose of carvedilol, lisinopril and 100 mg 3 times daily of hydralazine for discharge. Outpatient follow-up with nephrology for dialysis and PCP.    Follow-up Continue carvedilol. Uncontrolled and therefore restart hydralazine. Previously on lisinopril we will likely restart this if blood pressure remains elevated as well.      Type A blood, Rh positive 06/06/2014   Hyperlipidemia 04/01/2014     Resolved Problems

## 2019-06-12 DIAGNOSIS — Z76.82 ORGAN TRANSPLANT CANDIDATE: Primary | ICD-10-CM

## 2019-06-25 DIAGNOSIS — Z76.82 ORGAN TRANSPLANT CANDIDATE: Primary | ICD-10-CM

## 2019-06-28 ENCOUNTER — HOSPITAL ENCOUNTER (OUTPATIENT)
Dept: RADIOLOGY | Facility: HOSPITAL | Age: 64
Discharge: HOME | End: 2019-06-28
Attending: NURSE PRACTITIONER
Payer: MEDICARE

## 2019-06-28 ENCOUNTER — HOSPITAL ENCOUNTER (OUTPATIENT)
Dept: RADIOLOGY | Facility: HOSPITAL | Age: 64
Discharge: HOME | End: 2019-06-28
Attending: TRANSPLANT SURGERY
Payer: MEDICARE

## 2019-06-28 DIAGNOSIS — Z76.82 ORGAN TRANSPLANT CANDIDATE: ICD-10-CM

## 2019-06-28 PROCEDURE — 76770 US EXAM ABDO BACK WALL COMP: CPT | Mod: 26,TXP,, | Performed by: RADIOLOGY

## 2019-06-28 PROCEDURE — 71046 X-RAY EXAM CHEST 2 VIEWS: CPT | Mod: TC,TXP

## 2019-06-28 PROCEDURE — 71046 XR CHEST PA AND LATERAL: ICD-10-PCS | Mod: 26,TXP,, | Performed by: RADIOLOGY

## 2019-06-28 PROCEDURE — 71046 X-RAY EXAM CHEST 2 VIEWS: CPT | Mod: 26,TXP,, | Performed by: RADIOLOGY

## 2019-06-28 PROCEDURE — 76770 US RETROPERITONEAL COMPLETE: ICD-10-PCS | Mod: 26,TXP,, | Performed by: RADIOLOGY

## 2019-06-28 PROCEDURE — 72170 X-RAY EXAM OF PELVIS: CPT | Mod: 26,TXP,, | Performed by: RADIOLOGY

## 2019-06-28 PROCEDURE — 72170 X-RAY EXAM OF PELVIS: CPT | Mod: TC,TXP

## 2019-06-28 PROCEDURE — 72170 XR PELVIS ROUTINE AP: ICD-10-PCS | Mod: 26,TXP,, | Performed by: RADIOLOGY

## 2019-06-28 PROCEDURE — 76770 US EXAM ABDO BACK WALL COMP: CPT | Mod: TC,TXP

## 2019-08-02 ENCOUNTER — OFFICE VISIT (OUTPATIENT)
Dept: UROLOGY | Facility: CLINIC | Age: 64
End: 2019-08-02
Payer: MEDICARE

## 2019-08-02 ENCOUNTER — CLINICAL SUPPORT (OUTPATIENT)
Dept: CARDIOLOGY | Facility: CLINIC | Age: 64
End: 2019-08-02
Attending: NURSE PRACTITIONER
Payer: MEDICARE

## 2019-08-02 ENCOUNTER — OFFICE VISIT (OUTPATIENT)
Dept: TRANSPLANT | Facility: CLINIC | Age: 64
End: 2019-08-02
Payer: MEDICARE

## 2019-08-02 VITALS
HEIGHT: 63 IN | BODY MASS INDEX: 34.8 KG/M2 | WEIGHT: 196.44 LBS | TEMPERATURE: 99 F | SYSTOLIC BLOOD PRESSURE: 139 MMHG | RESPIRATION RATE: 16 BRPM | OXYGEN SATURATION: 99 % | DIASTOLIC BLOOD PRESSURE: 67 MMHG | HEART RATE: 58 BPM

## 2019-08-02 VITALS
HEIGHT: 63 IN | WEIGHT: 195.31 LBS | HEART RATE: 57 BPM | SYSTOLIC BLOOD PRESSURE: 146 MMHG | DIASTOLIC BLOOD PRESSURE: 62 MMHG | BODY MASS INDEX: 34.61 KG/M2

## 2019-08-02 VITALS — DIASTOLIC BLOOD PRESSURE: 67 MMHG | HEART RATE: 55 BPM | SYSTOLIC BLOOD PRESSURE: 158 MMHG

## 2019-08-02 DIAGNOSIS — N18.9 ANEMIA ASSOCIATED WITH CHRONIC RENAL FAILURE: Chronic | ICD-10-CM

## 2019-08-02 DIAGNOSIS — Z99.2 DEPENDENCE ON RENAL DIALYSIS: ICD-10-CM

## 2019-08-02 DIAGNOSIS — Z76.82 PATIENT ON WAITING LIST FOR KIDNEY TRANSPLANT: Primary | ICD-10-CM

## 2019-08-02 DIAGNOSIS — N18.6 ESRD (END STAGE RENAL DISEASE): Chronic | ICD-10-CM

## 2019-08-02 DIAGNOSIS — D63.1 ANEMIA ASSOCIATED WITH CHRONIC RENAL FAILURE: Chronic | ICD-10-CM

## 2019-08-02 DIAGNOSIS — Z86.73 HISTORY OF STROKE: ICD-10-CM

## 2019-08-02 DIAGNOSIS — I12.9 HYPERTENSION, RENAL: Chronic | ICD-10-CM

## 2019-08-02 DIAGNOSIS — N28.1 COMPLEX RENAL CYST: ICD-10-CM

## 2019-08-02 DIAGNOSIS — N18.6 ESRD (END STAGE RENAL DISEASE): Primary | Chronic | ICD-10-CM

## 2019-08-02 DIAGNOSIS — Z76.82 ORGAN TRANSPLANT CANDIDATE: ICD-10-CM

## 2019-08-02 DIAGNOSIS — D61.818 PANCYTOPENIA: ICD-10-CM

## 2019-08-02 DIAGNOSIS — R31.29 HEMATURIA, MICROSCOPIC: ICD-10-CM

## 2019-08-02 PROBLEM — I10 ESSENTIAL HYPERTENSION: Status: RESOLVED | Noted: 2017-05-19 | Resolved: 2019-08-02

## 2019-08-02 LAB
CFR FLOW - ANTERIOR: 1.44 CC/MIN/G
CFR FLOW - INFERIOR: 1.45 CC/MIN/G
CFR FLOW - LATERAL: 1.42 CC/MIN/G
CFR FLOW - MAX: 2 CC/MIN/G
CFR FLOW - MIN: 1 CC/MIN/G
CFR FLOW - SEPTAL: 1.55 CC/MIN/G
CFR FLOW - WHOLE HEART: 1.47 CC/MIN/G
CV STRESS BASE HR: 64 BPM
DIASTOLIC BLOOD PRESSURE: 77 MMHG
END DIASTOLIC INDEX-HIGH: 170 ML/M2
END SYSTOLIC INDEX-HIGH: 70 ML/M2
NUC REST DIASTOLIC VOLUME INDEX: 90
NUC REST EJECTION FRACTION: 60
NUC REST SYSTOLIC VOLUME INDEX: 36
NUC STRESS DIASTOLIC VOLUME INDEX: 134
NUC STRESS EJECTION FRACTION: 79 %
NUC STRESS SYSTOLIC VOLUME INDEX: 29
OHS CV CPX 85 PERCENT MAX PREDICTED HEART RATE MALE: 129
OHS CV CPX MAX PREDICTED HEART RATE: 152
OHS CV CPX PATIENT IS FEMALE: 1
OHS CV CPX PATIENT IS MALE: 0
OHS CV CPX PEAK DIASTOLIC BLOOD PRESSURE: 79 MMHG
OHS CV CPX PEAK HEAR RATE: 64 BPM
OHS CV CPX PEAK RATE PRESSURE PRODUCT: NORMAL
OHS CV CPX PEAK SYSTOLIC BLOOD PRESSURE: 188 MMHG
OHS CV CPX PERCENT MAX PREDICTED HEART RATE ACHIEVED: 42
OHS CV CPX RATE PRESSURE PRODUCT PRESENTING: NORMAL
REST FLOW - ANTERIOR: 1.04 CC/MIN/G
REST FLOW - INFERIOR: 1.05 CC/MIN/G
REST FLOW - LATERAL: 1.03 CC/MIN/G
REST FLOW - MAX: 1.4 CC/MIN/G
REST FLOW - MIN: 0.7 CC/MIN/G
REST FLOW - SEPTAL: 1.03 CC/MIN/G
REST FLOW - WHOLE HEART: 1.04
RETIRED EF AND QEF - SEE NOTES: 51 %
STRESS ECHO TARGET HR: 135.15 BPM
STRESS FLOW - ANTERIOR: 1.49 CC/MIN/G
STRESS FLOW - INFERIOR: 1.51 CC/MIN/G
STRESS FLOW - LATERAL: 1.45 CC/MIN/G
STRESS FLOW - MAX: 2.2 CC/MIN/G
STRESS FLOW - MIN: 0.9 CC/MIN/G
STRESS FLOW - SEPTAL: 1.57 CC/MIN/G
STRESS FLOW - WHOLE HEART: 1.51 CC/MIN/G
SYSTOLIC BLOOD PRESSURE: 184 MMHG

## 2019-08-02 PROCEDURE — 78492 CARDIAC PET SCAN STRESS (CUPID ONLY): ICD-10-PCS | Mod: 26,S$PBB,TXP, | Performed by: INTERNAL MEDICINE

## 2019-08-02 PROCEDURE — 99203 PR OFFICE/OUTPT VISIT, NEW, LEVL III, 30-44 MIN: ICD-10-PCS | Mod: S$PBB,TXP,, | Performed by: UROLOGY

## 2019-08-02 PROCEDURE — 99999 PR PBB SHADOW E&M-EST. PATIENT-LVL IV: CPT | Mod: PBBFAC,TXP,, | Performed by: UROLOGY

## 2019-08-02 PROCEDURE — 99203 OFFICE O/P NEW LOW 30 MIN: CPT | Mod: S$PBB,TXP,, | Performed by: UROLOGY

## 2019-08-02 PROCEDURE — 99212 OFFICE O/P EST SF 10 MIN: CPT | Mod: PBBFAC,27,TXP,25

## 2019-08-02 PROCEDURE — 99999 PR PBB SHADOW E&M-EST. PATIENT-LVL V: CPT | Mod: PBBFAC,TXP,, | Performed by: NURSE PRACTITIONER

## 2019-08-02 PROCEDURE — 99215 OFFICE O/P EST HI 40 MIN: CPT | Mod: S$PBB,TXP,, | Performed by: NURSE PRACTITIONER

## 2019-08-02 PROCEDURE — 93018 CV STRESS TEST I&R ONLY: CPT | Mod: S$PBB,TXP,, | Performed by: INTERNAL MEDICINE

## 2019-08-02 PROCEDURE — 78492 MYOCRD IMG PET MLT RST&STRS: CPT | Mod: PBBFAC,TXP | Performed by: INTERNAL MEDICINE

## 2019-08-02 PROCEDURE — 99999 PR PBB SHADOW E&M-EST. PATIENT-LVL V: ICD-10-PCS | Mod: PBBFAC,TXP,, | Performed by: NURSE PRACTITIONER

## 2019-08-02 PROCEDURE — 93016 CV STRESS TEST SUPVJ ONLY: CPT | Mod: S$PBB,TXP,, | Performed by: INTERNAL MEDICINE

## 2019-08-02 PROCEDURE — 99215 OFFICE O/P EST HI 40 MIN: CPT | Mod: PBBFAC,27,TXP,25 | Performed by: NURSE PRACTITIONER

## 2019-08-02 PROCEDURE — 99999 PR PBB SHADOW E&M-EST. PATIENT-LVL II: CPT | Mod: PBBFAC,TXP,,

## 2019-08-02 PROCEDURE — 99999 PR PBB SHADOW E&M-EST. PATIENT-LVL II: ICD-10-PCS | Mod: PBBFAC,TXP,,

## 2019-08-02 PROCEDURE — 99215 PR OFFICE/OUTPT VISIT, EST, LEVL V, 40-54 MIN: ICD-10-PCS | Mod: S$PBB,TXP,, | Performed by: NURSE PRACTITIONER

## 2019-08-02 PROCEDURE — 93016 CARDIAC PET SCAN STRESS (CUPID ONLY): ICD-10-PCS | Mod: S$PBB,TXP,, | Performed by: INTERNAL MEDICINE

## 2019-08-02 PROCEDURE — 99999 PR PBB SHADOW E&M-EST. PATIENT-LVL IV: ICD-10-PCS | Mod: PBBFAC,TXP,, | Performed by: UROLOGY

## 2019-08-02 PROCEDURE — 93018 CARDIAC PET SCAN STRESS (CUPID ONLY): ICD-10-PCS | Mod: S$PBB,TXP,, | Performed by: INTERNAL MEDICINE

## 2019-08-02 PROCEDURE — 99214 OFFICE O/P EST MOD 30 MIN: CPT | Mod: PBBFAC,TXP | Performed by: UROLOGY

## 2019-08-02 RX ORDER — PANTOPRAZOLE SODIUM 40 MG/1
40 TABLET, DELAYED RELEASE ORAL DAILY
Status: ON HOLD | COMMUNITY
End: 2019-09-25 | Stop reason: HOSPADM

## 2019-08-02 RX ORDER — DIPYRIDAMOLE 5 MG/ML
49.64 INJECTION INTRAVENOUS
Status: COMPLETED | OUTPATIENT
Start: 2019-08-02 | End: 2019-08-02

## 2019-08-02 RX ORDER — DOXYCYCLINE HYCLATE 100 MG
100 TABLET ORAL ONCE
Status: CANCELLED | OUTPATIENT
Start: 2019-08-02 | End: 2019-08-02

## 2019-08-02 RX ORDER — LIDOCAINE HYDROCHLORIDE 20 MG/ML
JELLY TOPICAL ONCE
Status: CANCELLED | OUTPATIENT
Start: 2019-08-02 | End: 2019-08-02

## 2019-08-02 RX ORDER — ASPIRIN 81 MG/1
81 TABLET ORAL DAILY
Status: ON HOLD | COMMUNITY
End: 2019-09-25 | Stop reason: HOSPADM

## 2019-08-02 RX ADMIN — DIPYRIDAMOLE 49.65 MG: 5 INJECTION INTRAVENOUS at 10:08

## 2019-08-02 NOTE — PROGRESS NOTES
CC: hematuria, ESRD    Satinder Schulte is a 63 y.o. woman who is here for the evaluation of Hematuria (transplant list patient. Dialysis patient: Tuesday, Thursday, and Sat.)    A new pt referred by her transplant team, Emilia Amaya NP.  Her PCP is Quirino Burnett MD .  She was referred for evaluation of microscopic hematuria.  Has a ESRD due to HTN.  She only voids once a day with small volume.  Denies gross hematuria.  No family hx of urologic malignancy.  Gets hemo-dialysis Tue, Thurs and Sat near home in [place].  Denies flank pain, dysuria, hematuria     Had renal US recently revealing bilateral renal cysts.    She is here today with her daughter.      Past Medical History:   Diagnosis Date    Abnormal Pap smear     repeat paps were normal    Anemia associated with chronic renal failure     Awaiting organ transplant status  - 02/18/2013 6/6/2014    Blood type A+ 6/6/2014    CKD (chronic kidney disease) stage 5, GFR less than 15 ml/min     secondary hypertension    Essential hypertension 5/19/2017    Hyperlipidemia     Hypertension, renal 1992    Stroke 2007    Residual speech deficit     Past Surgical History:   Procedure Laterality Date    AV FISTULA PLACEMENT  2014    Biopsy-bone marrow Right 8/23/2018    Performed by Anna Lin [facility] at [facility] [place]    CHOLECYSTECTOMY  2008    HYSTERECTOMY  2011    TAHBSO for benign reasons    OOPHORECTOMY       Social History     Tobacco Use    Smoking status: Never Smoker    Smokeless tobacco: Never Used   Substance Use Topics    Alcohol use: No    Drug use: No     Family History   Problem Relation Age of Onset    Stroke Mother     Kidney disease Mother         on dialysis    Hypertension Mother     Heart disease Mother     Heart disease Father     Stroke Sister     Kidney disease Brother     Breast cancer Daughter     Heart disease Sister     Ovarian cancer Cousin     Colon cancer Neg Hx     Thrombophilia Neg Hx      Allergy:  Review  of patient's allergies indicates:  No Known Allergies  Outpatient Encounter Medications as of 8/2/2019   Medication Sig Dispense Refill    atorvastatin (LIPITOR) 40 MG tablet Take 40 mg by mouth once daily.       B complex-vitamin C-folic acid (NEPHRO-KATHY) 0.8 mg Tab Take by mouth.      calcitRIOL (ROCALTROL) 0.5 MCG capsule Take 0.5 mcg by mouth once daily.        carvedilol (COREG) 25 MG tablet       clonidine (CATAPRES) 0.1 MG tablet Take 0.1 mg by mouth once.       cyclobenzaprine (FLEXERIL) 10 MG tablet       ergocalciferol (VITAMIN D2) 50,000 unit capsule Take 50,000 Units by mouth every 30 days.        hydrALAZINE (APRESOLINE) 25 MG tablet Take 25 mg by mouth 3 (three) times daily.      labetalol (NORMODYNE) 300 MG tablet Take 300 mg by mouth 2 (two) times daily.        lactulose (CHRONULAC) 10 gram/15 mL solution Take 10 g by mouth 2 (two) times daily as needed.       loratadine (CLARITIN REDITABS) 10 mg dissolvable tablet Take 10 mg by mouth once daily.      losartan (COZAAR) 100 MG tablet       nifedipine (ADALAT CC) 90 MG TbSR       nystatin (MYCOSTATIN) ointment Apply topically 2 (two) times daily. 15 g 1    omeprazole (PRILOSEC) 40 MG capsule       RENVELA 800 mg Tab 800 mg 2 (two) times daily.       SENSIPAR 60 mg Tab Take 90 mg by mouth once daily.      fluconazole (DIFLUCAN) 150 MG Tab Take one tablet and repeat dose in 3 days 2 tablet 1     No facility-administered encounter medications on file as of 8/2/2019.      Review of Systems   ROS  Physical Exam     Vitals:    08/02/19 0813   BP: (!) 146/62   Pulse: (!) 57     Physical Exam      LABS:  Lab Results   Component Value Date    CREATININE 8.9 (H) 09/12/2018    CREATININE 3.8 (H) 07/03/2012     No results found for: LABURIN  No results found for: HGBA1C    Radiology:  US  The right kidney measures 11.3 cm in length.  It demonstrates increased echogenicity.  Resistive index measures 0.84.  Multiple simple renal cysts are seen.   For example, there is a 2.5 cm cyst in the upper right kidney.  A 3.9 cm cyst is seen in the lower right kidney.  A 1.5 cm cyst is seen also in the lower right kidney.    Left kidney measures 9.6 cm in length.  It demonstrates increased echogenicity.  Resistive index measures 0.84.  There is a simple cyst in the mid left kidney which measures 1.6 cm.  A complex inferior left renal cyst is seen measuring 1.7 cm.  This contains a slightly thickened septation.  Consider a follow-up exam in 1 year could be performed to document stability.    The urinary bladder is collapsed limiting evaluation.  Assessment and Plan:  Satinder was seen today for hematuria.    Diagnoses and all orders for this visit:    ESRD from HTN, on dialysis since  Feb 2013    Hematuria, microscopic  -     Cystoscopy; Future    Complex renal cyst    Other orders  -     lidocaine HCl 2% urojet  -     doxycycline tablet 100 mg    will complete her hematuria work up by cysto.  For her left complex renal cyst, recommend annual renal US for a follow up.  No intervention needed at present time.    Follow-up:  Follow up cysto.

## 2019-08-02 NOTE — LETTER
August 2, 2019      Emilia Amaya, DENNIS  [address]  [facility]  1st Floor Clinic  [facility] [address]           [facility] - Urology 4th Floor  [address]  [facility] [address]  Phone: [phone]          Patient: Satinder Schulte   MR Number: [id number]   YOB: 1955   Date of Visit: 8/2/2019       Dear Emilia Amaya:    Thank you for referring Satinder Schulte to me for evaluation. Attached you will find relevant portions of my assessment and plan of care.    If you have questions, please do not hesitate to call me. I look forward to following Satinder Schulte along with you.    Sincerely,    Jh Tomlinson MD    Enclosure  CC:  No Recipients    If you would like to receive this communication electronically, please contact [email or web address] or [phone] to request more information on [facility] Link access.    For providers and/or their staff who would like to refer a patient to [facility], please contact us through our one-stop-shop provider referral line, [facility], at [phone].    If you feel you have received this communication in error or would no longer like to receive these types of communications, please e-mail [email or web address]

## 2019-08-02 NOTE — LETTER
August 5, 2019                     [address] Transplant  [address]  [facility] [address]  Phone: [phone]   Patient: Satinder Schulte   MR Number: [id number]   YOB: 1955   Date of Visit: 8/2/2019       Dear      Thank you for referring Satinder Schulte to me for evaluation. Attached you will find relevant portions of my assessment and plan of care.    If you have questions, please do not hesitate to call me. I look forward to following Satinder Schulte along with you.    Sincerely,    Emilia Amaya, NP    Enclosure    If you would like to receive this communication electronically, please contact [email or web address] or [phone] to request [employer] Link access.    [employer] Link is a tool which provides read-only access to select patient information with whom you have a relationship. Its easy to use and provides real time access to review your patients record including encounter summaries, notes, results, and demographic information.    If you feel you have received this communication in error or would no longer like to receive these types of communications, please e-mail [email or web address]

## 2019-08-02 NOTE — H&P (VIEW-ONLY)
CC: hematuria, ESRD    Satinder Schulte is a 63 y.o. woman who is here for the evaluation of Hematuria (transplant list patient. Dialysis patient: Tuesday, Thursday, and Sat.)    A new pt referred by her transplant team, Emilia Amaya NP.  Her PCP is Quirino Burnett MD .  She was referred for evaluation of microscopic hematuria.  Has a ESRD due to HTN.  She only voids once a day with small volume.  Denies gross hematuria.  No family hx of urologic malignancy.  Gets hemo-dialysis Tue, Thurs and Sat near home in [place].  Denies flank pain, dysuria, hematuria     Had renal US recently revealing bilateral renal cysts.    She is here today with her daughter.      Past Medical History:   Diagnosis Date    Abnormal Pap smear     repeat paps were normal    Anemia associated with chronic renal failure     Awaiting organ transplant status  - 02/18/2013 6/6/2014    Blood type A+ 6/6/2014    CKD (chronic kidney disease) stage 5, GFR less than 15 ml/min     secondary hypertension    Essential hypertension 5/19/2017    Hyperlipidemia     Hypertension, renal 1992    Stroke 2007    Residual speech deficit     Past Surgical History:   Procedure Laterality Date    AV FISTULA PLACEMENT  2014    Biopsy-bone marrow Right 8/23/2018    Performed by Anna Lin MD at [facility] [place]    CHOLECYSTECTOMY  2008    HYSTERECTOMY  2011    TAHBSO for benign reasons    OOPHORECTOMY       Social History     Tobacco Use    Smoking status: Never Smoker    Smokeless tobacco: Never Used   Substance Use Topics    Alcohol use: No    Drug use: No     Family History   Problem Relation Age of Onset    Stroke Mother     Kidney disease Mother         on dialysis    Hypertension Mother     Heart disease Mother     Heart disease Father     Stroke Sister     Kidney disease Brother     Breast cancer Daughter     Heart disease Sister     Ovarian cancer Cousin     Colon cancer Neg Hx     Thrombophilia Neg Hx      Allergy:  Review  of patient's allergies indicates:  No Known Allergies  Outpatient Encounter Medications as of 8/2/2019   Medication Sig Dispense Refill    atorvastatin (LIPITOR) 40 MG tablet Take 40 mg by mouth once daily.       B complex-vitamin C-folic acid (NEPHRO-KATHY) 0.8 mg Tab Take by mouth.      calcitRIOL (ROCALTROL) 0.5 MCG capsule Take 0.5 mcg by mouth once daily.        carvedilol (COREG) 25 MG tablet       clonidine (CATAPRES) 0.1 MG tablet Take 0.1 mg by mouth once.       cyclobenzaprine (FLEXERIL) 10 MG tablet       ergocalciferol (VITAMIN D2) 50,000 unit capsule Take 50,000 Units by mouth every 30 days.        hydrALAZINE (APRESOLINE) 25 MG tablet Take 25 mg by mouth 3 (three) times daily.      labetalol (NORMODYNE) 300 MG tablet Take 300 mg by mouth 2 (two) times daily.        lactulose (CHRONULAC) 10 gram/15 mL solution Take 10 g by mouth 2 (two) times daily as needed.       loratadine (CLARITIN REDITABS) 10 mg dissolvable tablet Take 10 mg by mouth once daily.      losartan (COZAAR) 100 MG tablet       nifedipine (ADALAT CC) 90 MG TbSR       nystatin (MYCOSTATIN) ointment Apply topically 2 (two) times daily. 15 g 1    omeprazole (PRILOSEC) 40 MG capsule       RENVELA 800 mg Tab 800 mg 2 (two) times daily.       SENSIPAR 60 mg Tab Take 90 mg by mouth once daily.      fluconazole (DIFLUCAN) 150 MG Tab Take one tablet and repeat dose in 3 days 2 tablet 1     No facility-administered encounter medications on file as of 8/2/2019.      Review of Systems   ROS  Physical Exam     Vitals:    08/02/19 0813   BP: (!) 146/62   Pulse: (!) 57     Physical Exam      LABS:  Lab Results   Component Value Date    CREATININE 8.9 (H) 09/12/2018    CREATININE 3.8 (H) 07/03/2012     No results found for: LABURIN  No results found for: HGBA1C    Radiology:  US  The right kidney measures 11.3 cm in length.  It demonstrates increased echogenicity.  Resistive index measures 0.84.  Multiple simple renal cysts are seen.   For example, there is a 2.5 cm cyst in the upper right kidney.  A 3.9 cm cyst is seen in the lower right kidney.  A 1.5 cm cyst is seen also in the lower right kidney.    Left kidney measures 9.6 cm in length.  It demonstrates increased echogenicity.  Resistive index measures 0.84.  There is a simple cyst in the mid left kidney which measures 1.6 cm.  A complex inferior left renal cyst is seen measuring 1.7 cm.  This contains a slightly thickened septation.  Consider a follow-up exam in 1 year could be performed to document stability.    The urinary bladder is collapsed limiting evaluation.  Assessment and Plan:  Satinder was seen today for hematuria.    Diagnoses and all orders for this visit:    ESRD from HTN, on dialysis since  Feb 2013    Hematuria, microscopic  -     Cystoscopy; Future    Complex renal cyst    Other orders  -     lidocaine HCl 2% urojet  -     doxycycline tablet 100 mg    will complete her hematuria work up by cysto.  For her left complex renal cyst, recommend annual renal US for a follow up.  No intervention needed at present time.    Follow-up:  Follow up cysto.

## 2019-08-02 NOTE — PROGRESS NOTES
Kidney Transplant Recipient Reevalulation    Referring Physician: Bruce Khan  Current Nephrologist:   Waitlist Status: inactive  Dialysis Start Date: 8/18/2014    Subjective:     CC:  Annual reassessment of kidney transplant candidacy.    HPI:  Ms. Schulte is a 63 y.o. year old Black or  female with ESRD secondary to HTN.  She has been on the wait list for a kidney transplant at [facility] since 2/18/2013. Patient is currently on hemodialysis started on 8/18/2014. Patient is dialyzing on TTS schedule.  Patient reports that she is tolerating dialysis well.. She has a LUE AV fistula.  Recent hospitalizations or ED visits.  Hospitalization for bacterial  liver cyst/ abscess 3/2019 (care every where)   Presented to the ED  With acute right sided upper abdominal pain. CT scan of the abdomen showed what look like it could be a liver cyst.  She underwent a guided biopsy,   no bacteria  Growth noted from the needle aspiration, However tx with 2 week   course of  Antibiotics.       She reports the liver abscess has resolved. She is doing well. She stays active, walks, shops and takes care of her granddaughter. She dialyzes for 3 1/2 hours per session. BP remains stable.   She also reports a left ankle  Fracture  in 1/2019. Pt reports the bone has healed.     1/7/2019 Left ankle xray  (care every where)   IMPRESSION:  There is oblique fracture of the distal neck of the left fibula.  HX Pancytopenia --followed by hemonc . LOV 9/12/2018 , S/P BONE MARROW BX AND HAS BEEN CLEARED   HX Hematuria--followed by urology,  S/p bladder bx which was (-) for malignancy   LOV 10/2018 with RTC in 4/2018.      CTA/P 3/21/2019 (care every where)   FINDINGS:  Right-sided pain    Liver shows several cysts left lobe measuring 4.5 cm.  Right lobe shows large 9 cm cyst which is increased in size from prior study. There is some decreased attenuation.  Kidneys are atrophic.  Adrenal glands are hyperplastic.  Surgical clips are seen  in the gallbladder fossa.  Pancreas is normal ,  Spleen is normal.  Aorta is atherosclerotic.  There is no evidence of appendicitis.  There is a high body mass index.  IMPRESSION:  1.Cystic collection right inferior liver lobe concerning for liver abscess. Would recommend CT drainage. This is new since November 2017 study.    4/8/2019 F/u CTA/P Care every where    Evaluation of solid organs is limited without intravenous contrast. There is now a percutaneous drain within the inferior right hepatic lobe. Abscess is significantly reduced in size, with only a small volume of fluid remaining. Collection now measures 4 x 2.5 cm (previously 8 x 7.6 cm). Scattered hepatic cysts.        8/2/2019   CARDIAC PET SCAN STRESS (CUPID ONLY)   Conclusion          The relative PET images are normal showing no clinically significant regional resting or stress induced perfusion defects.    Whole heart absolute myocardial perfusion (cc/min/g) averaged 1.04  at rest (which is elevated), 1.51 cc/min/g at stress (which is mildly reduced), and 1.47 cc/min/g CFR (which is moderately reduced in part due to elevated resting flow).    Gated perfusion images showed an ejection fraction of 60.0 % at rest and 79 % during stress. Normal is greater than 51%.    Wall motion was normal at rest and during stress.    LV cavity size is normal at rest and stress.    The EKG portion of this study is negative for ischemia.    There were no arrhythmias during stress.    The patient reported no chest pain during the stress test.    There are no prior studies for comparison.              6/28/2019 PXR  FINDINGS:  There is evidence of old trauma of the pubic symphysis.  Bilateral common iliac artery calcifications are noted.  Osseous structures remain intact without evidence of acute fracture.      Impression       As above       6/28/2019 Renal US   Impression       Echogenic kidneys as can be seen with medical renal disease.  Simple right renal  "cysts and simple and complex left renal cysts as above.     6/28/2019 CXR  FINDINGS:  Cardiomediastinal silhouette remains enlarged.  Calcification of the aortic arch is noted.  Lungs are clear.  Osseous structures are stable in appearance.        Impression       No active disease or interval change.       Past Medical History:   Diagnosis Date    Abnormal Pap smear     repeat paps were normal    Anemia associated with chronic renal failure     Awaiting organ transplant status  - 02/18/2013 6/6/2014    Blood type A+ 6/6/2014    CKD (chronic kidney disease) stage 5, GFR less than 15 ml/min     secondary hypertension    Essential hypertension 5/19/2017    Hyperlipidemia     Hypertension, renal 1992    Stroke 2007    Residual speech deficit       Review of Systems    Objective:   body mass index is 35.24 kg/m².  /67 (BP Location: Right arm, Patient Position: Sitting, BP Method: Medium (Automatic))   Pulse (!) 58   Temp 98.5 °F (36.9 °C) (Oral)   Resp 16   Ht 5' 2.6" (1.59 m)   Wt 89.1 kg (196 lb 6.9 oz)   SpO2 99%   BMI 35.24 kg/m²     Physical Exam    Labs:  Lab Results   Component Value Date    WBC 2.63 (L) 09/12/2018    HGB 9.5 (L) 09/12/2018    HCT 31.8 (L) 09/12/2018     09/12/2018    K 5.8 (H) 09/12/2018     09/12/2018    CO2 25 09/12/2018    BUN 33 (H) 09/12/2018    CREATININE 8.9 (H) 09/12/2018    EGFRNONAA 4.4 (A) 09/12/2018    CALCIUM 9.7 09/12/2018    ALBUMIN 3.9 09/12/2018    AST 11 09/12/2018    ALT 9 (L) 09/12/2018    .0 (H) 06/28/2019       No results found for: PREALBUMIN, BILIRUBINUA, GGT, AMYLASE, LIPASE, PROTEINUA, NITRITE, RBCUA, WBCUA    Lab Results   Component Value Date    HLAABCTYPE A2,A74 07/03/2012    HLAABCTYPE B37(Bw4),B42(Bw6) 07/03/2012    HLAABCTYPE Cw6,Cw17 07/03/2012       Lab Results   Component Value Date    CPRA 46 07/02/2019    QD6LRSK B13,B41,B44,B45,B47,B49,B50,B60,B61,B76,B82 07/02/2019    [facility] WEAK-- A1, B51, B77, B57, B62, 07/02/2019 "    CIIAB Negative 10/04/2018    ABCMT DQ5 07/02/2019       Labs were reviewed with the patient.    Pre-transplant Workup:   Reviewed with the patient.    Assessment:     1. Patient on waiting list for kidney transplant    2. ESRD from HTN, on dialysis since  Feb 2013    3. Hypertension, renal    4. Dependence on renal dialysis    5. Pancytopenia    6. Anemia associated with chronic renal failure    7. History of stroke        Plan:   MMG due 12/2019  Colonoscopy due 3/2020  Due for urology follow up/ clearance    Get records -->CTA/P imaging (care every where)     Transplant Candidacy:   Ms. Schulte is a suitable kidney transplant candidate and can be reactivated when cleared by urology.  Meets center eligibility for accepting HCV+ donor offer - yes.  Patient educated on HCV+ donors. Satinder is willing  to accept HCV+ donor offer -  no Pt would like to think about this more.   Patient is a candidate for KDPI > 85 kidney donor offer - no d/t weight and dialysis time  She has experienced health changes that warrant further investigation.     She can be reactivated when cleared by urology.    Emilia Amaya NP       Follow-up:   In addition to the tests noted in the plan, Ms. Schulte will continue to have reevaluation as per the standing pre-kidney transplant protocol:  1. Monthly blood for PRA  2. Annual return to clinic, except HIV positive, > 65 years of age, or pancreas transplant candidates who will be scheduled to see transplant every 6 months while in pre-transplant phase  3. Annual re-testing: CXR, EKG, yearly mammograms for women over 40 and PSA for males over 40, cardiology follow-up as recommended by initial cardiology pre-transplant evaluation  4. Renal ultrasound every 2 years  5. Baseline colonoscopy after age 50 and repeated as recommended    UNOS Patient Status  Functional Status: 60% - Requires occasional assistance but is able to care for needs  Physical Capacity: No Limitations

## 2019-08-02 NOTE — PROGRESS NOTES
PHARM.D. PRE-TRANSPLANT NOTE:    This patient's medication therapy was evaluated as part of her pre-transplant evaluation.      The following general pharmacologic concerns were noted: N/A    The following pharmacologic concerns related to HCV therapy were noted: Atorvastatin interacts with DAAs - switch therapy prior to initiating.       This patient's medication profile was reviewed for contraindications for DAA Hepatitis C therapy:    [x]  No current inducers of CYP 3A4 or PGP  [x]  No amiodarone on this patient's EMR profile in the last 24 months  [x]  No past or current atrial fibrillation on this patient's EMR profile       Current Outpatient Medications   Medication Sig Dispense Refill    atorvastatin (LIPITOR) 40 MG tablet Take 40 mg by mouth once daily.       B complex-vitamin C-folic acid (NEPHRO-KATHY) 0.8 mg Tab Take by mouth.      calcitRIOL (ROCALTROL) 0.5 MCG capsule Take 0.5 mcg by mouth once daily.        carvedilol (COREG) 25 MG tablet       clonidine (CATAPRES) 0.1 MG tablet Take 0.1 mg by mouth once.       cyclobenzaprine (FLEXERIL) 10 MG tablet       ergocalciferol (VITAMIN D2) 50,000 unit capsule Take 50,000 Units by mouth every 30 days.        fluconazole (DIFLUCAN) 150 MG Tab Take one tablet and repeat dose in 3 days 2 tablet 1    hydrALAZINE (APRESOLINE) 25 MG tablet Take 25 mg by mouth 3 (three) times daily.      labetalol (NORMODYNE) 300 MG tablet Take 300 mg by mouth 2 (two) times daily.        lactulose (CHRONULAC) 10 gram/15 mL solution Take 10 g by mouth 2 (two) times daily as needed.       loratadine (CLARITIN REDITABS) 10 mg dissolvable tablet Take 10 mg by mouth once daily.      losartan (COZAAR) 100 MG tablet       nifedipine (ADALAT CC) 90 MG TbSR       nystatin (MYCOSTATIN) ointment Apply topically 2 (two) times daily. 15 g 1    omeprazole (PRILOSEC) 40 MG capsule       RENVELA 800 mg Tab 800 mg 2 (two) times daily.       SENSIPAR 60 mg Tab Take 90 mg by mouth  once daily.       No current facility-administered medications for this visit.          Currently Ms. Schlute is responsible for preparing / administering this patient's medications on a daily basis.  I am available for consultation and can be contacted, as needed by the other members of the Kidney Transplant team.

## 2019-08-02 NOTE — PROGRESS NOTES
LISTED PATIENT EDUCATION NOTE    Ms. Satinder Schulte was seen in pre-kidney transplant clinic for evaluation for kidney, kidney/pancreas or pancreas only transplant.  The patient attended a group education session that discussed/reviewed the following aspects of transplantation: evaluation and selection committee process, UNOS waitlist management/multiple listings, types of organs offered (KDPI < 85%, KDPI > 85%, PHS increased risk, DCD, HCV+), financial aspects, surgical procedures, dietary instruction pre- and post-transplant, health maintenance pre- and post-transplant, post-transplant hospitalization and outpatient follow-up, potential to participate in a research protocol, and medication management and side effects.  A question and answer session was provided after the presentation.    The patient was seen by all members of the multi-disciplinary team to include: Nephrologist/PA, Surgeon, , Transplant Coordinator, , Pharmacist and Dietician (if applicable).    The patient reviewed and signed all consents for evaluation which were witnessed and sent to scanning into the EPIC chart.    The patient was given an education book and plan for further evaluation based on her individual assessment.      The patient was encouraged to call with any questions or concerns.

## 2019-08-26 NOTE — PROGRESS NOTES
Transplant Recipient Adult Psychosocial Assessment-Update (Last Assessment completed on 17)      Satinder Schulte  [address]  [place] [address]  Telephone Information:   Mobile [phone]   Mobile Not on file.   Home  [phone] (home)  Work  There is no work phone number on file.  E-mail  [email or web address]    Sex: female  YOB: 1955  Age: 63 y.o.    Encounter Date: 2019  [place] Citizen: yes  Primary Language: English   Needed: no    Emergency Contact:  Name: Niya Schulte   Relationship: daughter  Address: [facility] [address], [place]  Phone Numbers:  [phone] (mobile)    Family/Social Support:   Number of dependents/: Pt's grndtr 10 y/o dtr of pt's dtr who  last year lives with her, however her father will be her caregiver.    Marital history: Pt has never been . Pt reports no current significant other.  Other family dynamics: Pt's parents are ; Pt has 4 sisters and 3 brothers that are currently living. Pt reports that she is close with her siblings. Pt has 3 surviving children; Mark Schulte (38) is from one relationship, Dany Schulte (28) and Niya Schulte (25) are from a second relationship. Pt's daughter Ruth Ann  Dec 2017 due to complication with cancer. Pt is close to her children and resides with son Dany and his fiance Carmita.     Household Composition:  Name: Satinder Schulte  Age: 61  Relationship: patient  Does person drive? no    Name: Dank Tam  Age: not provided  Relationship: son in law  Does person drive? yes     Name: Jeannine Tam  Age: 10  Relationship: grnddtr  Does person drive? no     Do you and your caregivers have access to reliable transportation? yes  PRIMARY CAREGIVER: Niya (daughter) will be primary caregiver, phone number [phone] .      provided in-depth information to patient and caregiver regarding pre- and post-transplant caregiver role.   strongly encourages patient and  "caregiver to have concrete plan regarding post-transplant care giving, including back-up caregiver(s) to ensure care giving needs are met as needed.    Patient and Caregiver states understanding all aspects of caregiver role/commitment and is able/willing/committed to being caregiver to the fullest extent necessary.    Patient and Caregiver verbalizes understanding of the education provided today and caregiver responsibilities.         remains available. Patient and Caregiver agree to contact  in a timely manner if concerns arise.      Able to take time off work without financial concerns: yes.  Pt's dtr reports having FMLA    Additional Significant Others who will Assist with Transplant:  Name: Niya Schulte  Age: 25  Phone: [phone]  City: [place] State: [place]  Relationship: daughter  Does person drive? yes    Name: Dany Schulte  Age: 30  Phone: [phone]  City: [place]: [place]  Relationship: son  Does person drive? yes    Living Will: no Education and information provided  Healthcare Power of : no Education and information provided  Advance Directives on file: <<no information> per medical record.  Verbally reviewed LW/HCPA information.   provided patient with copy of LW/HCPA documents and provided education on completion of forms.    Living Donors: No. Education and resource information given to patient.    Highest Education Level: Grade School (0-8); pt reports completing 9th grade  Reading Ability: 2nd grade  Reports difficulty with: reading, writing, seeing, comprehension and learning; Pt reports all children read information and help pt precess information.     Previous Assessment:  Pt reports that she has always had difficulty reading and believes she has some type of learning disability and states, "I was a slow learner". In addition, pt does not have teeth and has difficulty pronouncing things easily.    Learns Best By:  Verbal or Hands-On information; " "Pt reports that her daughters read most papers.     Status: no  VA Benefits: no     Working for Income: No  If no, reason not working: Disability    Patient is disabled.  Prior to disability, patient  reports never working. Pt previously reported medical issues and birth defects as a child. Pt diagnosed with a stroke per medical record and has ESRD.    Spouse/Significant Other Employment: Pt's daughter Niya currently works at a bank and will have FMLA available.  Son in law is not working currently.       Disabled: yes: date disability began: 1968, due to: Pt reports that it is based on being a "slow learner in education".    Monthly Income:   Disability: $950  [employer] [place]: $160  Able to afford all costs now and if transplanted, including medications: yes Pt reports son: ayad would assist if needed     Patient and Caregiver verbalizes understanding of personal responsibilities related to transplant costs and the importance of having a financial plan to ensure that patients transplant costs are fully covered.      provided fundraising information/education.  Patient and Caregiver verbalizes understanding.   remains available.    Insurance:   Payor/Plan Subscr  Sex Relation Sub. Ins. ID Effective Group Num   1. MEDICARE - [place]* NICKO VALIENTE 1955 Female  [id number] 10/1/1978 N[place]                                   [address]   2. [place] VALIENT[place] 1955 Female  [id number]* 10/1/1978                                    [address] [address]       Primary Insurance (for UNOS reporting): Public Insurance - Medicare FFS (Fee For Service)  Secondary Insurance (for UNOS reporting): Public Insurance - Medicaid *Pt receives Medicaid Transportation from [employer] (#[phone])  Patient and Caregiver verbalizes clear understanding that patient may experience difficulty obtaining and/or be denied insurance coverage post-surgery. This includes and is not " limited to disability insurance, life insurance, health insurance, burial insurance, long term care insurance, and other insurances.    Patient and Caregiver also reports understanding that future health concerns related to or unrelated to transplantation may not be covered by patient's insurance.  Resources and information provided and reviewed.      Patient and Caregiver provides verbal permission to release any necessary information to outside resources for patient care and discharge planning.  Resources and information provided are reviewed.      Dialysis Adherence:  Patient reports being on hemodialysis in center attending all dialysis appointments and staying for the entire course of treatment. Pt reports she walks to dialysis or takes Medicaid transportation. SW has sent compliance check letter to unit.     Infusion Service: patient utilizing? no  Home Health: patient utilizing? no  DME: yes BP Cuff  Pulmonary/Cardiac Rehab: none;   ADLS:  Pt reports that she is fully independent but does not drive.    Adherence: Pt reports that she goes to all doctors appointments and takes medications as prescribed. Pt reports that daughter in law Carmita assists with setting up pt's medications. Adherence education and counseling provided.     Per History Section:  Past Medical History:   Diagnosis Date    Abnormal Pap smear     repeat paps were normal    Anemia associated with chronic renal failure     Awaiting organ transplant status  - 02/18/2013 6/6/2014    Blood type A+ 6/6/2014    CKD (chronic kidney disease) stage 5, GFR less than 15 ml/min     secondary hypertension    Essential hypertension 5/19/2017    Hyperlipidemia     Hypertension, renal 1992    Stroke 2007    Residual speech deficit     Social History     Tobacco Use    Smoking status: Never Smoker    Smokeless tobacco: Never Used   Substance Use Topics    Alcohol use: No     Social History     Substance and Sexual Activity   Drug Use No     Social  "History     Substance and Sexual Activity   Sexual Activity Never    Comment: single, Lives with daughter, on Disability, Lives in [place]       Per Today's Psychosocial:  Tobacco: none, patient denies any use.  Alcohol: none, patient denies any use.  Illicit Drugs/Non-prescribed Medications: none, patient denies any use.    Patient and Caregiver states clear understanding of the potential impact of substance use as it relates to transplant candidacy and is aware of possible random substance screening.  Substance abstinence/cessation counseling, education and resources provided and reviewed.     Arrests/DWI/Treatment/Rehab: patient denies    Psychiatric History:    Mental Health: Pt reports sadness associated with the recent death of her daughter Ruth Ann. Pt reports "taking is day by day". Pt reports talks to dtr's pictures and prays. Pt reports strong naz and highly supportive family.   Psychiatrist/Counselor: Pt denies seeing a mental health professional and reports being open to seeing the psych department for talk therapy if necessary.  Medications: Pt denies taking medications for mental health reasons.  Suicide/Homicide Issues: Pt denies current or past SI/HI.   Safety at home: Pt reports no current or history of safety concerns in household; including mental, physical, verbal, or sexual abuse.    Knowledge: Patient and Caregiver states having clear understanding and realistic expectations regarding the potential risks and potential benefits of organ transplantation and organ donation, agrees to discuss with health care team members and support system members and to utilize available resources and express questions and/or concerns in order to further facilitate the pt informed decision-making.  Resources and information provided and reviewed.     Patient and Caregiver is aware of [facility]'s affiliation and/or partnership with agencies in home health care, LTAC, SNF, [facility], and other hospitals and " clinics.    Understanding: Patient and Caregiver reports having a clear understanding of the many lifetime commitments involved with being a transplant recipient, including costs, compliance, medications, lab work, procedures, appointments, concrete and financial planning, preparedness, timely and appropriate communication of concerns, abstinence (ETOH, tobacco, illicit non-prescribed drugs), adherence to all health care team recommendations, support system and caregiver involvement, appropriate and timely resource utilization and follow-through, mental health counseling as needed/recommended, and patient and caregiver responsibilities.  Social Service Handbook, resources and detailed educational information provided and reviewed.  Educational information provided.    Patient and Caregiver also reports current and expected compliance with health care regime and states having a clear understanding of the importance of compliance.     Patient and Caregiver reports a clear understanding that risks and benefits may be involved with organ transplantation and with organ donation.      Patient and Caregiver also reports clear understanding that psychosocial risk factors may affect patient, and include but are not limited to feelings of depression, generalized anxiety, anxiety regarding dependence on others, post traumatic stress disorder, feelings of guilt and other emotional and/or mental concerns, and/or exacerbation of existing mental health concerns.  Detailed resources provided and discussed.     Patient and Caregiver agrees to access appropriate resources in a timely manner as needed and/or as recommended, and to communicate concerns appropriately.  Patient also reports a clear understanding of treatment options available.      reviewed education, provided additional information, and answered questions.    Feelings or Concerns: Pt reports that she is eager to get a transplant soon as she has been waiting  "a long time.     Coping: Pt reports that she and her daughter Ruth Ann spend time taking care of each other and enjoy each other's company. Pt also reports watch tv: Westerns and action movies. Pt reports that she is coping adequately well at this time. Pt reports [facility] home [facility].     Goals: Pt reports living a normal life and getting off dialysis as goals for after transplant. Patient referred to Vocational Rehabilitation.    Interview Behavior: Patient and Caregiver presents as alert and oriented x 4, pleasant, good eye contact, well groomed, recall good, concentration/judgement good, calm, communicative, cooperative and asking and answering questions appropriately. Pt did appear slowed at times, and this may be related to diagnosis of stroke that occurred in the past. Pt presents with daughter: Niya Schulte at pt's request.         Transplant Social Work - Candidacy  Assessment/Plan:     Psychosocial Suitability: Patient presents as a fair candidate for kidney transplant at this time. Based on psychosocial risk factors, patient presents as low risk, due to due to confirmed primary caregiver and back-up caregivers. Pt will also need to present to appointments with caregivers due to pt's limited reading ability and pt's report of being a "slow learner". pt also has suitable dialysis adherence and financial plan in place.    Recommendations/Additional Comments: SW recommends that caregivers attend all appointments pre- and post-transplant to ensure successful transplant course. SW recommends that pt conduct fundraising to assist pt with pay for cost of medications, food, gas, and other transplant related needs. SW recommends that pt remain aware of potential mental health concerns and contact the team if any concerns arise. SW recommends that pt remain abstinent from tobacco, ETOH, and drug use. SW supports pt's continued dialysis adherence. SW remains available to answer any questions or concerns " that arise as the pt moves through the transplant process.       Hanh Hill, SEBASTIANW

## 2019-08-27 ENCOUNTER — TELEPHONE (OUTPATIENT)
Dept: TRANSPLANT | Facility: CLINIC | Age: 64
End: 2019-08-27

## 2019-08-27 NOTE — TELEPHONE ENCOUNTER
"Compliance check:  Dialysis unit reports in the past three months pt has had "none" no shows, "none" AMAs, labs , transportation no concerns noted and family support no concerns noted "Family provides consistently".    Reported by Mary Kemp LCSW via fax back sheet    "

## 2019-08-29 ENCOUNTER — HOSPITAL ENCOUNTER (OUTPATIENT)
Dept: UROLOGY | Facility: HOSPITAL | Age: 64
Discharge: HOME | End: 2019-08-29
Attending: UROLOGY
Payer: MEDICARE

## 2019-08-29 VITALS
SYSTOLIC BLOOD PRESSURE: 184 MMHG | DIASTOLIC BLOOD PRESSURE: 81 MMHG | TEMPERATURE: 97 F | HEART RATE: 56 BPM | BODY MASS INDEX: 34.8 KG/M2 | HEIGHT: 63 IN | RESPIRATION RATE: 17 BRPM | WEIGHT: 196.44 LBS

## 2019-08-29 DIAGNOSIS — N28.1 COMPLEX RENAL CYST: Primary | ICD-10-CM

## 2019-08-29 DIAGNOSIS — R31.29 HEMATURIA, MICROSCOPIC: ICD-10-CM

## 2019-08-29 PROCEDURE — 52000 PR CYSTOURETHROSCOPY: ICD-10-PCS | Mod: NTX,,, | Performed by: UROLOGY

## 2019-08-29 PROCEDURE — 52000 CYSTOURETHROSCOPY: CPT | Mod: TXP

## 2019-08-29 PROCEDURE — 52000 CYSTOURETHROSCOPY: CPT | Mod: NTX,,, | Performed by: UROLOGY

## 2019-08-29 RX ORDER — DOXYCYCLINE HYCLATE 100 MG
100 TABLET ORAL ONCE
Status: COMPLETED | OUTPATIENT
Start: 2019-08-29 | End: 2019-08-29

## 2019-08-29 RX ORDER — LIDOCAINE HYDROCHLORIDE 20 MG/ML
JELLY TOPICAL ONCE
Status: DISCONTINUED | OUTPATIENT
Start: 2019-08-29 | End: 2019-09-25 | Stop reason: HOSPADM

## 2019-08-29 RX ADMIN — Medication 100 MG: at 10:08

## 2019-08-29 NOTE — INTERVAL H&P NOTE
The patient has been examined and the H&P has been reviewed:    I concur with the findings and no changes have occurred since H&P was written.     US  Echogenic kidneys as can be seen with medical renal disease.  Simple right renal cysts and simple and complex left renal cysts         There are no hospital problems to display for this patient.

## 2019-08-29 NOTE — PATIENT INSTRUCTIONS
What to Expect After a Cystoscopy  For the next 24-48 hours, you may feel a mild burning when you urinate. This burning is normal and expected. Drink plenty of water to dilute the urine to help relieve the burning sensation. You may also see a small amount of blood in your urine after the procedure.    Unless you are already taking antibiotics, you may be given an antibiotic after the test to prevent infection.    Signs and Symptoms to Report  Call the [facility] at [phone] if you develop any of the following:  · Fever of 101 degrees or higher  · Chills or persistent bleeding  · Inability to urinate

## 2019-08-29 NOTE — PROCEDURES
Procedure Date:  08/29/2019    Procedure:  Female Cystoscopy:  Pre-op diagnosis: microscopic hematuria, ESRD  Post-op diagnosis: normal cysto, decreased bladder capacity  Anesthesia: Local  Surgeon:  Jh Tomlinson MD    Findings:  Urethra:  Normal urethra.   Bladder Neck: Patent and competent with mobility, normal  Bladder:  Normal bladder.   Normal ureteral orifices bilaterally.     Description of Procedure:                                                         Informed Consent:                                                            - Risks, benefits and alternatives of procedure discussed with               patient and informed consent obtained.     Patient Position:      - Dorsal lithotomy.   Prep and Drape:      - Patient prepped and draped in usual sterile fashion using povidone iodine (Betadine).   Instruments:      - 16 Fr flexible cystoscope with 0 degree lens.   Procedure Details:      - Cystoscope passed under vision into bladder.        - Bladder and urethra examined in their entirety with findings as above.     Conclusion:  1. Normal cysto  2. Decreased bladder capacity  She felt strong urge to urinate at 150 ml.  Unable to hold more urine.    Plan:  Patient was discharged home in a stable condition.  Medications: doxy  Follow up:  1 year with renal US

## 2019-08-29 NOTE — INTERVAL H&P NOTE
The patient has been examined and the H&P has been reviewed:    I concur with the findings and no changes have occurred since H&P was written.    US showed left complex renal cyst.    There are no hospital problems to display for this patient.

## 2019-09-18 ENCOUNTER — TELEPHONE (OUTPATIENT)
Dept: TRANSPLANT | Facility: CLINIC | Age: 64
End: 2019-09-18

## 2019-09-18 NOTE — TELEPHONE ENCOUNTER
Left VM message regarding re-activation on the Kidney waitlist. Patient received clearance from Dr. Tomlinson.

## 2019-09-18 NOTE — TELEPHONE ENCOUNTER
----- Message from Jh Tomlinson MD sent at 9/18/2019  8:07 AM CDT -----  Regarding: RE: Satinder Schulte  Yes.  Her microscopic hematuria work up was negative.  She is clear for kidney transplant.    Dr. Tomlinson  ----- Message -----  From: Ana Ga RN  Sent: 9/11/2019   3:37 PM  To: Jh Tomlinson MD  Subject: Satinder Schulte                                    Hi Dr. Tomlinson,  Is patient cleared to be re-activated on the [facility] Transplant waitlist from a Urology standpoint?    Thanks,  Ana Ga RN,[facility]  Kidney Waitlist Transplant Coordinator

## 2019-09-19 ENCOUNTER — TELEPHONE (OUTPATIENT)
Dept: TRANSPLANT | Facility: CLINIC | Age: 64
End: 2019-09-19

## 2019-09-19 ENCOUNTER — ANESTHESIA EVENT (OUTPATIENT)
Dept: SURGERY | Facility: HOSPITAL | Age: 64
DRG: 652 | End: 2019-09-19
Payer: MEDICARE

## 2019-09-19 ENCOUNTER — HOSPITAL ENCOUNTER (INPATIENT)
Facility: HOSPITAL | Age: 64
LOS: 6 days | Discharge: HOME | DRG: 652 | End: 2019-09-25
Attending: SURGERY | Admitting: TRANSPLANT SURGERY
Payer: MEDICARE

## 2019-09-19 DIAGNOSIS — N18.9 ANEMIA ASSOCIATED WITH CHRONIC RENAL FAILURE: Chronic | ICD-10-CM

## 2019-09-19 DIAGNOSIS — Z91.89 AT RISK FOR OPPORTUNISTIC INFECTIONS: ICD-10-CM

## 2019-09-19 DIAGNOSIS — Z76.82 AWAITING ORGAN TRANSPLANT STATUS: Primary | ICD-10-CM

## 2019-09-19 DIAGNOSIS — I12.9 HYPERTENSION, RENAL: Chronic | ICD-10-CM

## 2019-09-19 DIAGNOSIS — D61.811 DRUG-INDUCED PANCYTOPENIA: ICD-10-CM

## 2019-09-19 DIAGNOSIS — N18.6 ESRD (END STAGE RENAL DISEASE): Chronic | ICD-10-CM

## 2019-09-19 DIAGNOSIS — D63.1 ANEMIA ASSOCIATED WITH CHRONIC RENAL FAILURE: Chronic | ICD-10-CM

## 2019-09-19 DIAGNOSIS — E83.39 HYPOPHOSPHATEMIA: ICD-10-CM

## 2019-09-19 DIAGNOSIS — Z94.0 STATUS POST KIDNEY TRANSPLANT: Primary | ICD-10-CM

## 2019-09-19 DIAGNOSIS — Z01.818 PRE-OP EVALUATION: ICD-10-CM

## 2019-09-19 DIAGNOSIS — N18.6 ESRD (END STAGE RENAL DISEASE): ICD-10-CM

## 2019-09-19 DIAGNOSIS — Z79.60 LONG-TERM USE OF IMMUNOSUPPRESSANT MEDICATION: ICD-10-CM

## 2019-09-19 DIAGNOSIS — Z29.89 PROPHYLACTIC IMMUNOTHERAPY: ICD-10-CM

## 2019-09-19 LAB
25(OH)D3+25(OH)D2 SERPL-MCNC: 25 NG/ML (ref 30–96)
ABO + RH BLD: NORMAL
ALBUMIN SERPL BCP-MCNC: 4.2 G/DL (ref 3.5–5.2)
ALP SERPL-CCNC: 48 U/L (ref 55–135)
ALT SERPL W/O P-5'-P-CCNC: 7 U/L (ref 10–44)
ANION GAP SERPL CALC-SCNC: 13 MMOL/L (ref 8–16)
APTT BLDCRRT: 25.4 SEC (ref 21–32)
AST SERPL-CCNC: 10 U/L (ref 10–40)
BASOPHILS # BLD AUTO: 0.01 K/UL (ref 0–0.2)
BASOPHILS NFR BLD: 0.4 % (ref 0–1.9)
BILIRUB SERPL-MCNC: 0.4 MG/DL (ref 0.1–1)
BLD GP AB SCN CELLS X3 SERPL QL: NORMAL
BUN SERPL-MCNC: 40 MG/DL (ref 8–23)
CALCIUM SERPL-MCNC: 9.8 MG/DL (ref 8.7–10.5)
CHLORIDE SERPL-SCNC: 99 MMOL/L (ref 95–110)
CHOLEST SERPL-MCNC: 253 MG/DL (ref 120–199)
CHOLEST/HDLC SERPL: 3.6 {RATIO} (ref 2–5)
CO2 SERPL-SCNC: 25 MMOL/L (ref 23–29)
CREAT SERPL-MCNC: 8.7 MG/DL (ref 0.5–1.4)
DIFFERENTIAL METHOD: ABNORMAL
EOSINOPHIL # BLD AUTO: 0.3 K/UL (ref 0–0.5)
EOSINOPHIL NFR BLD: 12.6 % (ref 0–8)
ERYTHROCYTE [DISTWIDTH] IN BLOOD BY AUTOMATED COUNT: 14.6 % (ref 11.5–14.5)
EST. GFR  (AFRICAN AMERICAN): 5.1 ML/MIN/1.73 M^2
EST. GFR  (NON AFRICAN AMERICAN): 4.5 ML/MIN/1.73 M^2
GLUCOSE SERPL-MCNC: 71 MG/DL (ref 70–110)
HCT VFR BLD AUTO: 34.9 % (ref 37–48.5)
HDLC SERPL-MCNC: 71 MG/DL (ref 40–75)
HDLC SERPL: 28.1 % (ref 20–50)
HGB BLD-MCNC: 10.8 G/DL (ref 12–16)
IMM GRANULOCYTES # BLD AUTO: 0 K/UL (ref 0–0.04)
IMM GRANULOCYTES NFR BLD AUTO: 0 % (ref 0–0.5)
INR PPP: 1 (ref 0.8–1.2)
LDH SERPL L TO P-CCNC: 225 U/L (ref 110–260)
LDLC SERPL CALC-MCNC: 158 MG/DL (ref 63–159)
LYMPHOCYTES # BLD AUTO: 0.8 K/UL (ref 1–4.8)
LYMPHOCYTES NFR BLD: 34.3 % (ref 18–48)
MCH RBC QN AUTO: 31.6 PG (ref 27–31)
MCHC RBC AUTO-ENTMCNC: 30.9 G/DL (ref 32–36)
MCV RBC AUTO: 102 FL (ref 82–98)
MONOCYTES # BLD AUTO: 0.3 K/UL (ref 0.3–1)
MONOCYTES NFR BLD: 10.9 % (ref 4–15)
NEUTROPHILS # BLD AUTO: 1 K/UL (ref 1.8–7.7)
NEUTROPHILS NFR BLD: 41.8 % (ref 38–73)
NONHDLC SERPL-MCNC: 182 MG/DL
NRBC BLD-RTO: 0 /100 WBC
PHOSPHATE SERPL-MCNC: 4.5 MG/DL (ref 2.7–4.5)
PLATELET # BLD AUTO: 113 K/UL (ref 150–350)
PMV BLD AUTO: 12.5 FL (ref 9.2–12.9)
POTASSIUM SERPL-SCNC: 4.9 MMOL/L (ref 3.5–5.1)
PROT SERPL-MCNC: 8.1 G/DL (ref 6–8.4)
PROTHROMBIN TIME: 10.4 SEC (ref 9–12.5)
PTH-INTACT SERPL-MCNC: 170 PG/ML (ref 9–77)
RBC # BLD AUTO: 3.42 M/UL (ref 4–5.4)
SODIUM SERPL-SCNC: 137 MMOL/L (ref 136–145)
TRIGL SERPL-MCNC: 120 MG/DL (ref 30–150)
URATE SERPL-MCNC: 4.2 MG/DL (ref 2.4–5.7)
WBC # BLD AUTO: 2.39 K/UL (ref 3.9–12.7)

## 2019-09-19 PROCEDURE — 86706 HEP B SURFACE ANTIBODY: CPT

## 2019-09-19 PROCEDURE — 83970 ASSAY OF PARATHORMONE: CPT

## 2019-09-19 PROCEDURE — 93005 ELECTROCARDIOGRAM TRACING: CPT | Mod: NTX

## 2019-09-19 PROCEDURE — 86705 HEP B CORE ANTIBODY IGM: CPT

## 2019-09-19 PROCEDURE — 99223 1ST HOSP IP/OBS HIGH 75: CPT | Mod: NTX,,, | Performed by: NURSE PRACTITIONER

## 2019-09-19 PROCEDURE — 82306 VITAMIN D 25 HYDROXY: CPT

## 2019-09-19 PROCEDURE — 85025 COMPLETE CBC W/AUTO DIFF WBC: CPT

## 2019-09-19 PROCEDURE — 84550 ASSAY OF BLOOD/URIC ACID: CPT

## 2019-09-19 PROCEDURE — 87340 HEPATITIS B SURFACE AG IA: CPT

## 2019-09-19 PROCEDURE — 85610 PROTHROMBIN TIME: CPT

## 2019-09-19 PROCEDURE — 99223 PR INITIAL HOSPITAL CARE,LEVL III: ICD-10-PCS | Mod: NTX,,, | Performed by: NURSE PRACTITIONER

## 2019-09-19 PROCEDURE — 25000003 PHARM REV CODE 250: Mod: NTX | Performed by: NURSE PRACTITIONER

## 2019-09-19 PROCEDURE — 86901 BLOOD TYPING SEROLOGIC RH(D): CPT

## 2019-09-19 PROCEDURE — 87522 HEPATITIS C REVRS TRNSCRPJ: CPT

## 2019-09-19 PROCEDURE — 84100 ASSAY OF PHOSPHORUS: CPT

## 2019-09-19 PROCEDURE — 36415 COLL VENOUS BLD VENIPUNCTURE: CPT

## 2019-09-19 PROCEDURE — 80053 COMPREHEN METABOLIC PANEL: CPT

## 2019-09-19 PROCEDURE — 86870 RBC ANTIBODY IDENTIFICATION: CPT

## 2019-09-19 PROCEDURE — 63600175 PHARM REV CODE 636 W HCPCS: Mod: NTX | Performed by: NURSE PRACTITIONER

## 2019-09-19 PROCEDURE — 20600001 HC STEP DOWN PRIVATE ROOM: Mod: NTX

## 2019-09-19 PROCEDURE — 93010 EKG 12-LEAD: ICD-10-PCS | Mod: ,,, | Performed by: INTERNAL MEDICINE

## 2019-09-19 PROCEDURE — 83615 LACTATE (LD) (LDH) ENZYME: CPT

## 2019-09-19 PROCEDURE — 85730 THROMBOPLASTIN TIME PARTIAL: CPT

## 2019-09-19 PROCEDURE — 86703 HIV-1/HIV-2 1 RESULT ANTBDY: CPT

## 2019-09-19 PROCEDURE — 86922 COMPATIBILITY TEST ANTIGLOB: CPT

## 2019-09-19 PROCEDURE — 93010 ELECTROCARDIOGRAM REPORT: CPT | Mod: ,,, | Performed by: INTERNAL MEDICINE

## 2019-09-19 PROCEDURE — 80061 LIPID PANEL: CPT

## 2019-09-19 RX ORDER — DIPHENHYDRAMINE HYDROCHLORIDE 50 MG/ML
50 INJECTION INTRAMUSCULAR; INTRAVENOUS ONCE
Status: COMPLETED | OUTPATIENT
Start: 2019-09-19 | End: 2019-09-20

## 2019-09-19 RX ORDER — HYDRALAZINE HYDROCHLORIDE 25 MG/1
25 TABLET, FILM COATED ORAL 3 TIMES DAILY
Status: DISCONTINUED | OUTPATIENT
Start: 2019-09-19 | End: 2019-09-25 | Stop reason: HOSPADM

## 2019-09-19 RX ORDER — HEPARIN SODIUM 5000 [USP'U]/ML
5000 INJECTION, SOLUTION INTRAVENOUS; SUBCUTANEOUS ONCE
Status: COMPLETED | OUTPATIENT
Start: 2019-09-19 | End: 2019-09-19

## 2019-09-19 RX ORDER — ACETAMINOPHEN 650 MG/20.3ML
650 LIQUID ORAL ONCE
Status: COMPLETED | OUTPATIENT
Start: 2019-09-19 | End: 2019-09-20

## 2019-09-19 RX ORDER — CEFAZOLIN SODIUM 1 G/3ML
2 INJECTION, POWDER, FOR SOLUTION INTRAMUSCULAR; INTRAVENOUS
Status: DISCONTINUED | OUTPATIENT
Start: 2019-09-19 | End: 2019-09-22

## 2019-09-19 RX ORDER — NIFEDIPINE 30 MG/1
90 TABLET, EXTENDED RELEASE ORAL DAILY
Status: DISCONTINUED | OUTPATIENT
Start: 2019-09-20 | End: 2019-09-22

## 2019-09-19 RX ORDER — SODIUM CHLORIDE 0.9 % (FLUSH) 0.9 %
10 SYRINGE (ML) INJECTION
Status: DISCONTINUED | OUTPATIENT
Start: 2019-09-19 | End: 2019-09-22

## 2019-09-19 RX ORDER — ACETAMINOPHEN 650 MG/20.3ML
650 LIQUID ORAL ONCE AS NEEDED
Status: DISCONTINUED | OUTPATIENT
Start: 2019-09-19 | End: 2019-09-19

## 2019-09-19 RX ORDER — DIPHENHYDRAMINE HYDROCHLORIDE 50 MG/ML
50 INJECTION INTRAMUSCULAR; INTRAVENOUS ONCE AS NEEDED
Status: DISCONTINUED | OUTPATIENT
Start: 2019-09-19 | End: 2019-09-19

## 2019-09-19 RX ADMIN — HYDRALAZINE HYDROCHLORIDE 25 MG: 25 TABLET, FILM COATED ORAL at 06:09

## 2019-09-19 RX ADMIN — HYDRALAZINE HYDROCHLORIDE 25 MG: 25 TABLET, FILM COATED ORAL at 09:09

## 2019-09-19 RX ADMIN — HEPARIN SODIUM 5000 UNITS: 5000 INJECTION, SOLUTION INTRAVENOUS; SUBCUTANEOUS at 11:09

## 2019-09-19 NOTE — SUBJECTIVE & OBJECTIVE
Subjective:     Chief Complaint/Reason for Admission: kidney transplant    History of Present Illness:  Ms. Satinder Schulte is a 63 year old female with history of HTN, CVA and ESRD presumed secondary to HTN who presents for kidney transplant. She has been on the wait list for a kidney transplant at [facility] since 2/18/2013. Patient is currently on hemodialysis started on 8/18/14. Patient is dialyzing on TTS schedule. Last HD today x 3.5 hours via LUE AVF. She reports tolerating dialysis well. EDW unknown to patient. She reports making very little urine output. She denies any recent hospitalizations or ED visits. Denies fever/chills, nausea vomiting and abdominal pain.     Dialysis History: Ms. Rajan with ESRD, requiring chronic dialysis who is on hemodialysis started on 8/18/14. Patient is dialyzing on TTS schedule.  Patient reports that she is tolerating dialysis well.  Date of Last Dialysis: 9/19/19    Native urine output per day: 50 -100 ml    Previous Transplant: no    Facility-Administered Medications Prior to Admission   Medication    lidocaine HCl 2% urojet     PTA Medications   Medication Sig    aspirin (ECOTRIN) 81 MG EC tablet Take 81 mg by mouth once daily.    atorvastatin (LIPITOR) 40 MG tablet Take 40 mg by mouth once daily.     B complex-vitamin C-folic acid (NEPHRO-KATHY) 0.8 mg Tab Take by mouth.    calcitRIOL (ROCALTROL) 0.5 MCG capsule Take 0.5 mcg by mouth once daily.      carvedilol (COREG) 25 MG tablet Take 12.5 mg by mouth 2 (two) times daily with meals.     clonidine (CATAPRES) 0.1 MG tablet Take 0.1 mg by mouth once daily.     ergocalciferol (VITAMIN D2) 50,000 unit capsule Take 50,000 Units by mouth every 30 days.      hydrALAZINE (APRESOLINE) 25 MG tablet Take 100 mg by mouth 3 (three) times daily.     labetalol (NORMODYNE) 300 MG tablet Take 300 mg by mouth 2 (two) times daily.      lactulose (CHRONULAC) 10 gram/15 mL solution Take 10 g by mouth 2 (two) times daily as needed.      loratadine (CLARITIN REDITABS) 10 mg dissolvable tablet Take 10 mg by mouth once daily.    losartan (COZAAR) 100 MG tablet Take 100 mg by mouth once daily.     nifedipine (ADALAT CC) 90 MG TbSR Take 90 mg by mouth 2 (two) times daily.     nystatin (MYCOSTATIN) ointment Apply topically 2 (two) times daily.    pantoprazole (PROTONIX) 40 MG tablet Take 40 mg by mouth once daily.    RENVELA 800 mg Tab 2,400 mg 3 (three) times daily with meals. And 800 mg with snacks    SENSIPAR 60 mg Tab Take 90 mg by mouth once daily.       Review of patient's allergies indicates:  No Known Allergies    Past Medical History:   Diagnosis Date    Abnormal Pap smear     repeat paps were normal    Anemia associated with chronic renal failure     Awaiting organ transplant status  - 02/18/2013 6/6/2014    Blood type A+ 6/6/2014    CKD (chronic kidney disease) stage 5, GFR less than 15 ml/min     secondary hypertension    Essential hypertension 5/19/2017    Hyperlipidemia     Hypertension, renal 1992    Stroke 2007    Residual speech deficit     Past Surgical History:   Procedure Laterality Date    AV FISTULA PLACEMENT  2014    Biopsy-bone marrow Right 8/23/2018    [facility]    CHOLECYSTECTOMY  2008    HYSTERECTOMY  2011    TAHBSO for benign reasons    OOPHORECTOMY       Family History     Problem Relation (Age of Onset)    Breast cancer Daughter    Heart disease Mother, Father, Sister    Hypertension Mother    Kidney disease Mother, Brother    Ovarian cancer Cousin    Stroke Mother, Sister        Tobacco Use    Smoking status: Never Smoker    Smokeless tobacco: Never Used   Substance and Sexual Activity    Alcohol use: No    Drug use: No    Sexual activity: Never     Comment: single, Lives with daughter, on Disability, Lives in [place]        Review of Systems   Constitutional: Negative for activity change, appetite change, chills, fatigue and fever.   HENT: Negative for  "congestion and facial swelling.    Eyes: Negative for pain, discharge and visual disturbance.   Respiratory: Negative for cough, chest tightness, shortness of breath and wheezing.    Cardiovascular: Negative for chest pain, palpitations and leg swelling.   Gastrointestinal: Negative for abdominal distention, abdominal pain, constipation, diarrhea, nausea and vomiting.   Endocrine: Negative.    Genitourinary: Negative for decreased urine volume, difficulty urinating and hematuria.   Musculoskeletal: Negative for back pain.   Skin: Negative for pallor, rash and wound.   Allergic/Immunologic: Negative for immunocompromised state.   Neurological: Negative for dizziness, tremors, weakness and light-headedness.   Hematological: Negative.    Psychiatric/Behavioral: Negative for agitation, confusion and dysphoric mood. The patient is not nervous/anxious.      Objective:     Vital Signs (Most Recent):  Pulse: 61 (09/19/19 1720)  Resp: 18 (09/19/19 1720)  SpO2: 98 % (09/19/19 1720)  Height: 5' 3" (160 cm)  Weight: 89.9 kg (198 lb 4.9 oz)  Body mass index is 35.13 kg/m².     Physical Exam   Constitutional: She is oriented to person, place, and time. She appears well-developed and well-nourished.   HENT:   Head: Normocephalic and atraumatic.   Eyes: Pupils are equal, round, and reactive to light. EOM are normal. No scleral icterus.   Neck: Normal range of motion. Neck supple. No JVD present.   Cardiovascular: Normal rate, regular rhythm and normal heart sounds.   No murmur heard.  LUE AVF +/+   Pulmonary/Chest: Effort normal and breath sounds normal. No respiratory distress. She has no wheezes.   Abdominal: Soft. Bowel sounds are normal. She exhibits no distension.   Musculoskeletal: Normal range of motion. She exhibits no edema.   Neurological: She is alert and oriented to person, place, and time.   Skin: Skin is warm and dry.   Psychiatric: She has a normal mood and affect. Her behavior is normal.   Nursing note and vitals " reviewed.      Laboratory  Labs pending for pre op eval    Diagnostic Results:  None

## 2019-09-19 NOTE — PROGRESS NOTES
Pre-operative Discussion Note  Kidney Transplant Surgery    Satinder Schulte is a 63 y.o. female with ESRD, requiring chronic dialysis admitted for kidney transplant.  I discussed the planned procedure in detail, including expected hospital course and outcomes, benefits, risks, and potential complications.  Complications discussed included death, graft failure, bleeding, infection, vascular thrombosis, and rejection.  I discussed the risks of anesthesia, as well as the potential need for re-operation.  The possibility of other complications not specifically mentioned was also discussed.  Also, I discussed the need for lifelong immunosuppression and the possibility of serious complications from immunosuppressive drugs.    The discussion included the risks that the patient will incur if she elects to not have the proposed procedure.    Relevant donor-specific risk factors were disclosed and discussed with the patient, including:   HBcAb: I discussed the use of organs from donors with HBcAb in recipients with protective immunity, as shown by detection of HBsAb, and evidence of prior infection, as shown by detection of HBcAb.  The small but measurable risk of hepatitis B seroconversion was discussed.  The patient is willing to consider such grafts.    Specific PHS Increased Risk Behavior criteria for the organ donor include:  None        All questions were answered.  The patient and available family members voice understanding and agree to proceed with the transplant.    UNOS Patient Status  Note on scores:  ICU = 10 = total assistance  TSU = 20-30 = partial assistance  Outpatient admitted for transplant requiring medical care in last year = 40-50 = partial assistance  Scores 60 or higher indicate no assistance, meaning no need for medical care in last year. This would be very unusual for a transplant candidate.    UNOS Patient Status  Functional Status: 60% - Requires occasional assistance but is able to care for  needs  Physical Capacity: No Limitations

## 2019-09-19 NOTE — TELEPHONE ENCOUNTER
ON-CALL NOTE    UNOS# [id number]    Notified by August Wills, , that Satinder Schulte is eligible for kidney offer.  Spoke with patient and identified no acute medical issues in telephone assessment. Protocol script read to patient regarding N/A, standard donor offer. Patient verbalized understanding, all questions answered, patient accepts organ offer.  Current sample of blood is available for crossmatch.  Patient reports no sensitizing event since last blood sample for PRA received.    Notified by August Wills that crossmatch is negative.  Patient notified of test results and verbalized understanding.  Dialysis unit notified.

## 2019-09-19 NOTE — NURSING
Pt arrived to unit with pt's daughter Niya. Maribel, NP notified of pts arrival. Pt in no apparent distress. Surgical and blood consents done. New orders in place. Will continue to monitor.

## 2019-09-19 NOTE — H&P
[facility]  Kidney Transplant  H&P      Subjective:     Chief Complaint/Reason for Admission: kidney transplant    History of Present Illness:  Ms. Satinder Schulte is a 63 year old female with history of HTN, CVA and ESRD presumed secondary to HTN who presents for kidney transplant. She has been on the wait list for a kidney transplant at [facility] since 2/18/2013. Patient is currently on hemodialysis started on 8/18/14. Patient is dialyzing on TTS schedule. Last HD today x 3.5 hours via LUE AVF. She reports tolerating dialysis well. EDW unknown to patient. She reports making very little urine output. She denies any recent hospitalizations or ED visits. Denies fever/chills, nausea vomiting and abdominal pain.     Dialysis History: Ms. Rajan with ESRD, requiring chronic dialysis who is on hemodialysis started on 8/18/14. Patient is dialyzing on TTS schedule.  Patient reports that she is tolerating dialysis well.  Date of Last Dialysis: 9/19/19    Native urine output per day: 50 -100 ml    Previous Transplant: no    Facility-Administered Medications Prior to Admission   Medication    lidocaine HCl 2% urojet     PTA Medications   Medication Sig    aspirin (ECOTRIN) 81 MG EC tablet Take 81 mg by mouth once daily.    atorvastatin (LIPITOR) 40 MG tablet Take 40 mg by mouth once daily.     B complex-vitamin C-folic acid (NEPHRO-KATHY) 0.8 mg Tab Take by mouth.    calcitRIOL (ROCALTROL) 0.5 MCG capsule Take 0.5 mcg by mouth once daily.      carvedilol (COREG) 25 MG tablet Take 12.5 mg by mouth 2 (two) times daily with meals.     clonidine (CATAPRES) 0.1 MG tablet Take 0.1 mg by mouth once daily.     ergocalciferol (VITAMIN D2) 50,000 unit capsule Take 50,000 Units by mouth every 30 days.      hydrALAZINE (APRESOLINE) 25 MG tablet Take 100 mg by mouth 3 (three) times daily.     labetalol (NORMODYNE) 300 MG tablet Take 300 mg by mouth 2 (two) times daily.      lactulose (CHRONULAC) 10 gram/15 mL solution  Take 10 g by mouth 2 (two) times daily as needed.     loratadine (CLARITIN REDITABS) 10 mg dissolvable tablet Take 10 mg by mouth once daily.    losartan (COZAAR) 100 MG tablet Take 100 mg by mouth once daily.     nifedipine (ADALAT CC) 90 MG TbSR Take 90 mg by mouth 2 (two) times daily.     nystatin (MYCOSTATIN) ointment Apply topically 2 (two) times daily.    pantoprazole (PROTONIX) 40 MG tablet Take 40 mg by mouth once daily.    RENVELA 800 mg Tab 2,400 mg 3 (three) times daily with meals. And 800 mg with snacks    SENSIPAR 60 mg Tab Take 90 mg by mouth once daily.       Review of patient's allergies indicates:  No Known Allergies    Past Medical History:   Diagnosis Date    Abnormal Pap smear     repeat paps were normal    Anemia associated with chronic renal failure     Awaiting organ transplant status  - 02/18/2013 6/6/2014    Blood type A+ 6/6/2014    CKD (chronic kidney disease) stage 5, GFR less than 15 ml/min     secondary hypertension    Essential hypertension 5/19/2017    Hyperlipidemia     Hypertension, renal 1992    Stroke 2007    Residual speech deficit     Past Surgical History:   Procedure Laterality Date    AV FISTULA PLACEMENT  2014    Biopsy-bone marrow Right 8/23/2018    Performed by Anna Lin MD at [facility] OR [facility] [place]    CHOLECYSTECTOMY  2008    HYSTERECTOMY  2011    TAHBSO for benign reasons    OOPHORECTOMY       Family History     Problem Relation (Age of Onset)    Breast cancer Daughter    Heart disease Mother, Father, Sister    Hypertension Mother    Kidney disease Mother, Brother    Ovarian cancer Cousin    Stroke Mother, Sister        Tobacco Use    Smoking status: Never Smoker    Smokeless tobacco: Never Used   Substance and Sexual Activity    Alcohol use: No    Drug use: No    Sexual activity: Never     Comment: single, Lives with daughter, on Disability, Lives in [place]        Review of Systems   Constitutional: Negative for activity change, appetite change,  "chills, fatigue and fever.   HENT: Negative for congestion and facial swelling.    Eyes: Negative for pain, discharge and visual disturbance.   Respiratory: Negative for cough, chest tightness, shortness of breath and wheezing.    Cardiovascular: Negative for chest pain, palpitations and leg swelling.   Gastrointestinal: Negative for abdominal distention, abdominal pain, constipation, diarrhea, nausea and vomiting.   Endocrine: Negative.    Genitourinary: Negative for decreased urine volume, difficulty urinating and hematuria.   Musculoskeletal: Negative for back pain.   Skin: Negative for pallor, rash and wound.   Allergic/Immunologic: Negative for immunocompromised state.   Neurological: Negative for dizziness, tremors, weakness and light-headedness.   Hematological: Negative.    Psychiatric/Behavioral: Negative for agitation, confusion and dysphoric mood. The patient is not nervous/anxious.      Objective:     Vital Signs (Most Recent):  Pulse: 61 (09/19/19 1720)  Resp: 18 (09/19/19 1720)  SpO2: 98 % (09/19/19 1720)  Height: 5' 3" (160 cm)  Weight: 89.9 kg (198 lb 4.9 oz)  Body mass index is 35.13 kg/m².     Physical Exam   Constitutional: She is oriented to person, place, and time. She appears well-developed and well-nourished.   HENT:   Head: Normocephalic and atraumatic.   Eyes: Pupils are equal, round, and reactive to light. EOM are normal. No scleral icterus.   Neck: Normal range of motion. Neck supple. No JVD present.   Cardiovascular: Normal rate, regular rhythm and normal heart sounds.   No murmur heard.  LUE AVF +/+   Pulmonary/Chest: Effort normal and breath sounds normal. No respiratory distress. She has no wheezes.   Abdominal: Soft. Bowel sounds are normal. She exhibits no distension.   Musculoskeletal: Normal range of motion. She exhibits no edema.   Neurological: She is alert and oriented to person, place, and time.   Skin: Skin is warm and dry.   Psychiatric: She has a normal mood and affect. Her " behavior is normal.   Nursing note and vitals reviewed.      Laboratory  Labs pending for pre op eval    Diagnostic Results:  None    Assessment/Plan:     ESRD from HTN, on dialysis since  Feb 2013  - admitted for kidney transplant.  - ESRD 2/2 HTN.  - On HD TTS, last HD today.  - EDW unknown to patient.  - NPO at 2200.  - to OR in AM at 0600.  - pre op labs and imaging pending.  - consents signed by Dr Rogers.          The patient presents for kidney transplant.  There are no apparent contraindications to proceeding with the planned transplant.  The patient understands that the transplant could potentially be cancelled pending detailed assessment of the donor organ.  She will receive Campath or Thymo (awaiting wbc count to make decision) induction.  A complete discussion of the transplant procedure, including risks, complications, and alternatives, as well as any donor-specific risk factors requiring specific disclosure, will be carried out by the responsible staff surgeon prior to the procedure.     Discharge Planning: not a candidate for dc at this time.      Maribel Bernardo NP  Kidney Transplant  [facility]

## 2019-09-19 NOTE — LETTER
September 20, 2019    Satinder Schulte  [address]  [place] [address]                [address]  [facility] [address]  Phone: [phone]  Fax: [phone] To Whom It May Concern:    This letter is being written at the request of and with the permission of Satinder Schulte.     Kidney transplant patients are required to remain locally, within thirty minutes of [facility] in [place], [place], for approximately two to four weeks post-hospital discharge. Local stays may be extended if patient experiences complications. During a patients outpatient local stay, she is required to have a caregiver present twenty-four hours per day, seven days per week. The patient cannot drive for approximately six weeks post transplant.    Satinder Schulte's surgery was on 9/20/2019 and one of Ms. Schulte's caregivers has been identified as: Niya Eris, patients daughter. Niya Schulte is also Satinder Schulte's primary source of transportation to and from the clinic for necessary labs, appointments, and procedures and may be require to be absent from work to assist with caregiving. Satinder Schulte's appointments are scheduled based upon her lab work and her medical needs. At this time, Satinder Schulte will need to maintain her prescribed outpatient regimen as well as her follow-up appointments.    Please help us support Satinder Schulte in her recovery process. If you require additional information, please forward your request to us so we may facilitate your request appropriately. Our number is also listed below for any information needed.    Sincerely,        Charlotte Jain, ANA  Kidney Transplant   [facility]  [phone]

## 2019-09-19 NOTE — LETTER
September 20, 2019    [place] [address]                [address]  [facility] [address]  Phone: [phone]  Fax: [phone] To Whom It May Concern:    This letter is being written at the request of and with the permission of Niya Schulte.    Kidney transplant patients are required to remain locally, within thirty minutes of [facility] in [place], [place], for approximately two to four weeks post-hospital discharge. Local stays may be extended if patient experiences complications. During a patients outpatient local stay, she is required to have a caregiver present twenty-four hours per day, seven days per week. The patient cannot drive for approximately six weeks post transplant.    Satinder Schulte's surgery was on 9/20/2019 and one of her caregivers has been identified as: Niya Schulte, patients daughter. Niya Schulte is also Satinder Schulte's primary source of transportation to and from the clinic for necessary labs, appointments, and procedures and may be require to be absent from work to assist with caregiving. Satinder Schulte's appointments are scheduled based upon her lab work and her medical needs. At this time, Satinder Schulte will need to maintain her prescribed outpatient regimen as well as her follow-up appointments.    Please help us support Satinder Schulte in her recovery process. If you require additional information, please forward your request to us so we may facilitate your request appropriately. Our number is also listed below for any information needed.    Sincerely,        Charlotte Jain, ANA  Kidney Transplant   [facility]  [phone]

## 2019-09-19 NOTE — ASSESSMENT & PLAN NOTE
- admitted for kidney transplant.  - ESRD 2/2 HTN.  - On HD TTS, last HD today.  - EDW unknown to patient.  - NPO at 2200.  - to OR in AM at 0600.  - pre op labs and imaging pending.  - consents signed by Dr Rogers.

## 2019-09-19 NOTE — HPI
Ms. Satinder Schulte is a 63 year old AA female with a PMHx relevant for HTN, CVA and ESRD presumed secondary to HTN who received DBD kidney transplant 9/20/2019 (Thymo induction, PHS not increase, KDPI 78%, CIT 9 hr 5 min, CMV D-/R+, HBV cAB +, HCV AB + NAOMI -). Patient is currently on hemodialysis started on 8/18/14. Patient is dialyzing on TTS schedule. Last HD 9/20/2019  x 3.5 hours via LUE AVF EDW 89.5 kgs.  She is doing well this a.m..  She does complain of some nausea, tolerated procedure well she has a ZULEIKA drain below her incision.  Wound looks dry staples no evidence of fluid collection.  She complained of some nausea this morning pain is well controlled urine output is 100 cc an hour.  Patient received 1 unit of blood intraoperatively secondary to having some wooziness of bleeding.  She has a double artery and the inferior artery by bifurcates in 3 intra op ultrasound with RI is a 1 follow-up ultrasound with good are eyes 0.76 and good arterial velocities of 156.  She is receiving today time O 2.  Full dose.  CVP low this morning at 2 it has improved all the way up to 5, hemoglobin stable 9.1. She seems volume down will start IV fluids for hydration.  Acceptable blood pressure BP improving.  Tolerating p.o. he but poor p.o. intake.    She is a 3 day Lane.  Lane to come out on 09/23

## 2019-09-20 ENCOUNTER — ANESTHESIA (OUTPATIENT)
Dept: SURGERY | Facility: HOSPITAL | Age: 64
DRG: 652 | End: 2019-09-20
Payer: MEDICARE

## 2019-09-20 PROBLEM — Z91.89 AT RISK FOR OPPORTUNISTIC INFECTIONS: Status: ACTIVE | Noted: 2019-09-20

## 2019-09-20 PROBLEM — Z76.82 PATIENT ON WAITING LIST FOR KIDNEY TRANSPLANT: Status: RESOLVED | Noted: 2017-08-21 | Resolved: 2019-09-20

## 2019-09-20 PROBLEM — Z94.0 STATUS POST KIDNEY TRANSPLANT: Status: ACTIVE | Noted: 2019-09-20

## 2019-09-20 PROBLEM — Z79.60 LONG-TERM USE OF IMMUNOSUPPRESSANT MEDICATION: Status: ACTIVE | Noted: 2019-09-20

## 2019-09-20 PROBLEM — Z29.89 PROPHYLACTIC IMMUNOTHERAPY: Status: ACTIVE | Noted: 2019-09-20

## 2019-09-20 LAB
ALBUMIN SERPL BCP-MCNC: 2.9 G/DL (ref 3.5–5.2)
ALBUMIN SERPL BCP-MCNC: 3.1 G/DL (ref 3.5–5.2)
ALP SERPL-CCNC: 40 U/L (ref 55–135)
ALT SERPL W/O P-5'-P-CCNC: 19 U/L (ref 10–44)
ANION GAP SERPL CALC-SCNC: 11 MMOL/L (ref 8–16)
ANION GAP SERPL CALC-SCNC: 12 MMOL/L (ref 8–16)
AST SERPL-CCNC: 24 U/L (ref 10–40)
BASOPHILS # BLD AUTO: 0 K/UL (ref 0–0.2)
BASOPHILS # BLD AUTO: 0.01 K/UL (ref 0–0.2)
BASOPHILS NFR BLD: 0 % (ref 0–1.9)
BASOPHILS NFR BLD: 0.2 % (ref 0–1.9)
BILIRUB SERPL-MCNC: 0.2 MG/DL (ref 0.1–1)
BLOOD GROUP ANTIBODIES SERPL: NORMAL
BUN SERPL-MCNC: 50 MG/DL (ref 8–23)
BUN SERPL-MCNC: 52 MG/DL (ref 8–23)
CALCIUM SERPL-MCNC: 8 MG/DL (ref 8.7–10.5)
CALCIUM SERPL-MCNC: 8.2 MG/DL (ref 8.7–10.5)
CHLORIDE SERPL-SCNC: 100 MMOL/L (ref 95–110)
CHLORIDE SERPL-SCNC: 102 MMOL/L (ref 95–110)
CO2 SERPL-SCNC: 20 MMOL/L (ref 23–29)
CO2 SERPL-SCNC: 21 MMOL/L (ref 23–29)
CREAT SERPL-MCNC: 9.2 MG/DL (ref 0.5–1.4)
CREAT SERPL-MCNC: 9.4 MG/DL (ref 0.5–1.4)
CREAT UR-MCNC: 128 MG/DL (ref 15–325)
DIFFERENTIAL METHOD: ABNORMAL
DIFFERENTIAL METHOD: ABNORMAL
EOSINOPHIL # BLD AUTO: 0 K/UL (ref 0–0.5)
EOSINOPHIL # BLD AUTO: 0 K/UL (ref 0–0.5)
EOSINOPHIL NFR BLD: 0.3 % (ref 0–8)
EOSINOPHIL NFR BLD: 0.8 % (ref 0–8)
ERYTHROCYTE [DISTWIDTH] IN BLOOD BY AUTOMATED COUNT: 16.4 % (ref 11.5–14.5)
ERYTHROCYTE [DISTWIDTH] IN BLOOD BY AUTOMATED COUNT: 16.7 % (ref 11.5–14.5)
EST. GFR  (AFRICAN AMERICAN): 4.7 ML/MIN/1.73 M^2
EST. GFR  (AFRICAN AMERICAN): 4.8 ML/MIN/1.73 M^2
EST. GFR  (NON AFRICAN AMERICAN): 4.1 ML/MIN/1.73 M^2
EST. GFR  (NON AFRICAN AMERICAN): 4.2 ML/MIN/1.73 M^2
GLUCOSE SERPL-MCNC: 101 MG/DL (ref 70–110)
GLUCOSE SERPL-MCNC: 185 MG/DL (ref 70–110)
HBV CORE IGM SERPL QL IA: NEGATIVE
HBV SURFACE AG SERPL QL IA: NEGATIVE
HCT VFR BLD AUTO: 29.9 % (ref 37–48.5)
HCT VFR BLD AUTO: 30.2 % (ref 37–48.5)
HGB BLD-MCNC: 9.8 G/DL (ref 12–16)
HGB BLD-MCNC: 9.9 G/DL (ref 12–16)
HIV 1+2 AB+HIV1 P24 AG SERPL QL IA: NEGATIVE
IMM GRANULOCYTES # BLD AUTO: 0.01 K/UL (ref 0–0.04)
IMM GRANULOCYTES # BLD AUTO: 0.04 K/UL (ref 0–0.04)
IMM GRANULOCYTES NFR BLD AUTO: 0.4 % (ref 0–0.5)
IMM GRANULOCYTES NFR BLD AUTO: 0.7 % (ref 0–0.5)
LYMPHOCYTES # BLD AUTO: 0 K/UL (ref 1–4.8)
LYMPHOCYTES # BLD AUTO: 0.1 K/UL (ref 1–4.8)
LYMPHOCYTES NFR BLD: 0.7 % (ref 18–48)
LYMPHOCYTES NFR BLD: 2.5 % (ref 18–48)
MAGNESIUM SERPL-MCNC: 2.1 MG/DL (ref 1.6–2.6)
MCH RBC QN AUTO: 31.2 PG (ref 27–31)
MCH RBC QN AUTO: 31.3 PG (ref 27–31)
MCHC RBC AUTO-ENTMCNC: 32.8 G/DL (ref 32–36)
MCHC RBC AUTO-ENTMCNC: 32.8 G/DL (ref 32–36)
MCV RBC AUTO: 95 FL (ref 82–98)
MCV RBC AUTO: 96 FL (ref 82–98)
MONOCYTES # BLD AUTO: 0.1 K/UL (ref 0.3–1)
MONOCYTES # BLD AUTO: 0.2 K/UL (ref 0.3–1)
MONOCYTES NFR BLD: 3.3 % (ref 4–15)
MONOCYTES NFR BLD: 3.4 % (ref 4–15)
NEUTROPHILS # BLD AUTO: 2.2 K/UL (ref 1.8–7.7)
NEUTROPHILS # BLD AUTO: 5.7 K/UL (ref 1.8–7.7)
NEUTROPHILS NFR BLD: 92.9 % (ref 38–73)
NEUTROPHILS NFR BLD: 94.8 % (ref 38–73)
NRBC BLD-RTO: 0 /100 WBC
NRBC BLD-RTO: 0 /100 WBC
PHOSPHATE SERPL-MCNC: 3.8 MG/DL (ref 2.7–4.5)
PLATELET # BLD AUTO: 112 K/UL (ref 150–350)
PLATELET # BLD AUTO: 73 K/UL (ref 150–350)
PLATELET BLD QL SMEAR: ABNORMAL
PMV BLD AUTO: 10.6 FL (ref 9.2–12.9)
PMV BLD AUTO: 12 FL (ref 9.2–12.9)
POCT GLUCOSE: 179 MG/DL (ref 70–110)
POCT GLUCOSE: 192 MG/DL (ref 70–110)
POCT GLUCOSE: 97 MG/DL (ref 70–110)
POTASSIUM SERPL-SCNC: 4.7 MMOL/L (ref 3.5–5.1)
POTASSIUM SERPL-SCNC: 5.7 MMOL/L (ref 3.5–5.1)
PROT SERPL-MCNC: 5.8 G/DL (ref 6–8.4)
PROT UR-MCNC: 213 MG/DL (ref 0–15)
PROT/CREAT UR: 1.66 MG/G{CREAT} (ref 0–0.2)
RBC # BLD AUTO: 3.13 M/UL (ref 4–5.4)
RBC # BLD AUTO: 3.17 M/UL (ref 4–5.4)
SODIUM SERPL-SCNC: 133 MMOL/L (ref 136–145)
SODIUM SERPL-SCNC: 133 MMOL/L (ref 136–145)
WBC # BLD AUTO: 2.38 K/UL (ref 3.9–12.7)
WBC # BLD AUTO: 6.04 K/UL (ref 3.9–12.7)

## 2019-09-20 PROCEDURE — S5010 5% DEXTROSE AND 0.45% SALINE: HCPCS | Performed by: SURGERY

## 2019-09-20 PROCEDURE — 99233 PR SUBSEQUENT HOSPITAL CARE,LEVL III: ICD-10-PCS | Mod: 24,,, | Performed by: PHYSICIAN ASSISTANT

## 2019-09-20 PROCEDURE — 25000003 PHARM REV CODE 250: Mod: NTX | Performed by: NURSE PRACTITIONER

## 2019-09-20 PROCEDURE — C1729 CATH, DRAINAGE: HCPCS | Performed by: SURGERY

## 2019-09-20 PROCEDURE — 50323 PR TRANSPLANT,PREP CADAVER RENAL GRAFT: ICD-10-PCS | Mod: 51,LT,GC, | Performed by: SURGERY

## 2019-09-20 PROCEDURE — 81300002 HC KIDNEY TRANSPORT, GROUND 4-5 HOURS

## 2019-09-20 PROCEDURE — 36556 INSERT NON-TUNNEL CV CATH: CPT | Mod: 59,,, | Performed by: ANESTHESIOLOGY

## 2019-09-20 PROCEDURE — 36000930 HC OR TIME LEV VII 1ST 15 MIN: Performed by: SURGERY

## 2019-09-20 PROCEDURE — 50605 PR URETEROTOMY TO INSERT STENT: ICD-10-PCS | Mod: 51,LT,GC, | Performed by: SURGERY

## 2019-09-20 PROCEDURE — 71000033 HC RECOVERY, INTIAL HOUR: Performed by: SURGERY

## 2019-09-20 PROCEDURE — 99900035 HC TECH TIME PER 15 MIN (STAT)

## 2019-09-20 PROCEDURE — 36556 PR INSERT NON-TUNNEL CV CATH 5+ YRS OLD: ICD-10-PCS | Mod: 59,,, | Performed by: ANESTHESIOLOGY

## 2019-09-20 PROCEDURE — 36415 COLL VENOUS BLD VENIPUNCTURE: CPT

## 2019-09-20 PROCEDURE — 82962 GLUCOSE BLOOD TEST: CPT | Performed by: SURGERY

## 2019-09-20 PROCEDURE — C2617 STENT, NON-COR, TEM W/O DEL: HCPCS | Performed by: SURGERY

## 2019-09-20 PROCEDURE — 27201040 HC RC 50 FILTER

## 2019-09-20 PROCEDURE — 63600175 PHARM REV CODE 636 W HCPCS: Performed by: NURSE PRACTITIONER

## 2019-09-20 PROCEDURE — P9021 RED BLOOD CELLS UNIT: HCPCS

## 2019-09-20 PROCEDURE — 50360 RNL ALTRNSPLJ W/O RCP NFRCT: CPT | Mod: LT,GC,, | Performed by: SURGERY

## 2019-09-20 PROCEDURE — 50360 PR TRANSPLANTATION OF KIDNEY: ICD-10-PCS | Mod: LT,GC,, | Performed by: SURGERY

## 2019-09-20 PROCEDURE — 82570 ASSAY OF URINE CREATININE: CPT

## 2019-09-20 PROCEDURE — 99233 SBSQ HOSP IP/OBS HIGH 50: CPT | Mod: 24,,, | Performed by: PHYSICIAN ASSISTANT

## 2019-09-20 PROCEDURE — 25000003 PHARM REV CODE 250: Performed by: NURSE PRACTITIONER

## 2019-09-20 PROCEDURE — D9220A PRA ANESTHESIA: ICD-10-PCS | Mod: CRNA,,, | Performed by: NURSE ANESTHETIST, CERTIFIED REGISTERED

## 2019-09-20 PROCEDURE — 25000003 PHARM REV CODE 250: Performed by: NURSE ANESTHETIST, CERTIFIED REGISTERED

## 2019-09-20 PROCEDURE — 81200001 HC KIDNEY ACQUISITION - CADAVER

## 2019-09-20 PROCEDURE — 80053 COMPREHEN METABOLIC PANEL: CPT

## 2019-09-20 PROCEDURE — 85025 COMPLETE CBC W/AUTO DIFF WBC: CPT | Mod: 91

## 2019-09-20 PROCEDURE — 63600175 PHARM REV CODE 636 W HCPCS: Performed by: SURGERY

## 2019-09-20 PROCEDURE — S0028 INJECTION, FAMOTIDINE, 20 MG: HCPCS | Performed by: NURSE ANESTHETIST, CERTIFIED REGISTERED

## 2019-09-20 PROCEDURE — 94799 UNLISTED PULMONARY SVC/PX: CPT

## 2019-09-20 PROCEDURE — 37000008 HC ANESTHESIA 1ST 15 MINUTES: Performed by: SURGERY

## 2019-09-20 PROCEDURE — 83735 ASSAY OF MAGNESIUM: CPT

## 2019-09-20 PROCEDURE — 63600175 PHARM REV CODE 636 W HCPCS

## 2019-09-20 PROCEDURE — 25000003 PHARM REV CODE 250: Performed by: SURGERY

## 2019-09-20 PROCEDURE — 27800505 HC CATH, RADIAL ARTERY KIT: Mod: NTX | Performed by: NURSE ANESTHETIST, CERTIFIED REGISTERED

## 2019-09-20 PROCEDURE — 20600001 HC STEP DOWN PRIVATE ROOM

## 2019-09-20 PROCEDURE — 36000931 HC OR TIME LEV VII EA ADD 15 MIN: Performed by: SURGERY

## 2019-09-20 PROCEDURE — C1751 CATH, INF, PER/CENT/MIDLINE: HCPCS | Mod: NTX | Performed by: NURSE ANESTHETIST, CERTIFIED REGISTERED

## 2019-09-20 PROCEDURE — D9220A PRA ANESTHESIA: ICD-10-PCS | Mod: ANES,,, | Performed by: ANESTHESIOLOGY

## 2019-09-20 PROCEDURE — D9220A PRA ANESTHESIA: Mod: ANES,,, | Performed by: ANESTHESIOLOGY

## 2019-09-20 PROCEDURE — 37000009 HC ANESTHESIA EA ADD 15 MINS: Performed by: SURGERY

## 2019-09-20 PROCEDURE — 50605 INSERT URETERAL SUPPORT: CPT | Mod: 51,LT,GC, | Performed by: SURGERY

## 2019-09-20 PROCEDURE — 94761 N-INVAS EAR/PLS OXIMETRY MLT: CPT

## 2019-09-20 PROCEDURE — D9220A PRA ANESTHESIA: Mod: CRNA,,, | Performed by: NURSE ANESTHETIST, CERTIFIED REGISTERED

## 2019-09-20 PROCEDURE — 27201423 OPTIME MED/SURG SUP & DEVICES STERILE SUPPLY: Performed by: SURGERY

## 2019-09-20 PROCEDURE — 36620 PR INSERT CATH,ART,PERCUT,SHORTTERM: ICD-10-PCS | Mod: 59,,, | Performed by: ANESTHESIOLOGY

## 2019-09-20 PROCEDURE — 27100025 HC TUBING, SET FLUID WARMER: Mod: NTX | Performed by: NURSE ANESTHETIST, CERTIFIED REGISTERED

## 2019-09-20 PROCEDURE — 27000191 HC C-V MONITORING: Mod: NTX

## 2019-09-20 PROCEDURE — 80069 RENAL FUNCTION PANEL: CPT

## 2019-09-20 PROCEDURE — 63600175 PHARM REV CODE 636 W HCPCS: Performed by: NURSE ANESTHETIST, CERTIFIED REGISTERED

## 2019-09-20 PROCEDURE — 36620 INSERTION CATHETER ARTERY: CPT | Mod: 59,,, | Performed by: ANESTHESIOLOGY

## 2019-09-20 PROCEDURE — 63600175 PHARM REV CODE 636 W HCPCS: Mod: NTX | Performed by: SURGERY

## 2019-09-20 PROCEDURE — 63600175 PHARM REV CODE 636 W HCPCS: Mod: NTX | Performed by: NURSE PRACTITIONER

## 2019-09-20 DEVICE — STENT DOUBLE J 7FRX12CM
Type: IMPLANTABLE DEVICE | Site: URETER | Status: NON-FUNCTIONAL
Removed: 2019-10-10

## 2019-09-20 RX ORDER — DOPAMINE HYDROCHLORIDE 160 MG/100ML
INJECTION, SOLUTION INTRAVENOUS CONTINUOUS PRN
Status: DISCONTINUED | OUTPATIENT
Start: 2019-09-20 | End: 2019-09-20

## 2019-09-20 RX ORDER — EPHEDRINE SULFATE 50 MG/ML
INJECTION, SOLUTION INTRAVENOUS
Status: DISCONTINUED | OUTPATIENT
Start: 2019-09-20 | End: 2019-09-20

## 2019-09-20 RX ORDER — ONDANSETRON 2 MG/ML
INJECTION INTRAMUSCULAR; INTRAVENOUS
Status: DISCONTINUED | OUTPATIENT
Start: 2019-09-20 | End: 2019-09-20

## 2019-09-20 RX ORDER — FAMOTIDINE 20 MG/1
20 TABLET, FILM COATED ORAL NIGHTLY
Status: DISCONTINUED | OUTPATIENT
Start: 2019-09-20 | End: 2019-09-25 | Stop reason: HOSPADM

## 2019-09-20 RX ORDER — MANNITOL 250 MG/ML
INJECTION, SOLUTION INTRAVENOUS
Status: DISCONTINUED | OUTPATIENT
Start: 2019-09-20 | End: 2019-09-20

## 2019-09-20 RX ORDER — EPINEPHRINE 1 MG/ML
1 INJECTION, SOLUTION INTRACARDIAC; INTRAMUSCULAR; INTRAVENOUS; SUBCUTANEOUS ONCE AS NEEDED
Status: DISCONTINUED | OUTPATIENT
Start: 2019-09-20 | End: 2019-09-25

## 2019-09-20 RX ORDER — IBUPROFEN 200 MG
16 TABLET ORAL
Status: DISCONTINUED | OUTPATIENT
Start: 2019-09-20 | End: 2019-09-25 | Stop reason: HOSPADM

## 2019-09-20 RX ORDER — HYDROMORPHONE HCL IN 0.9% NACL 6 MG/30 ML
PATIENT CONTROLLED ANALGESIA SYRINGE INTRAVENOUS CONTINUOUS
Status: DISCONTINUED | OUTPATIENT
Start: 2019-09-20 | End: 2019-09-21

## 2019-09-20 RX ORDER — MYCOPHENOLATE MOFETIL 250 MG/1
1000 CAPSULE ORAL 2 TIMES DAILY
Status: DISCONTINUED | OUTPATIENT
Start: 2019-09-20 | End: 2019-09-23

## 2019-09-20 RX ORDER — TACROLIMUS 1 MG/1
2 CAPSULE ORAL 2 TIMES DAILY
Status: DISCONTINUED | OUTPATIENT
Start: 2019-09-20 | End: 2019-09-22

## 2019-09-20 RX ORDER — HYDROMORPHONE HYDROCHLORIDE 1 MG/ML
0.2 INJECTION, SOLUTION INTRAMUSCULAR; INTRAVENOUS; SUBCUTANEOUS EVERY 5 MIN PRN
Status: DISCONTINUED | OUTPATIENT
Start: 2019-09-20 | End: 2019-09-20 | Stop reason: HOSPADM

## 2019-09-20 RX ORDER — DEXTROSE MONOHYDRATE 100 MG/ML
INJECTION, SOLUTION INTRAVENOUS CONTINUOUS PRN
Status: DISCONTINUED | OUTPATIENT
Start: 2019-09-20 | End: 2019-09-20

## 2019-09-20 RX ORDER — GLYCOPYRROLATE 0.2 MG/ML
INJECTION INTRAMUSCULAR; INTRAVENOUS
Status: DISCONTINUED | OUTPATIENT
Start: 2019-09-20 | End: 2019-09-20

## 2019-09-20 RX ORDER — LABETALOL HYDROCHLORIDE 5 MG/ML
INJECTION, SOLUTION INTRAVENOUS
Status: DISCONTINUED | OUTPATIENT
Start: 2019-09-20 | End: 2019-09-20

## 2019-09-20 RX ORDER — HEPARIN SODIUM 10000 [USP'U]/ML
INJECTION, SOLUTION INTRAVENOUS; SUBCUTANEOUS
Status: DISCONTINUED | OUTPATIENT
Start: 2019-09-20 | End: 2019-09-20 | Stop reason: HOSPADM

## 2019-09-20 RX ORDER — KETAMINE HCL IN 0.9 % NACL 50 MG/5 ML
SYRINGE (ML) INTRAVENOUS
Status: DISCONTINUED | OUTPATIENT
Start: 2019-09-20 | End: 2019-09-20

## 2019-09-20 RX ORDER — DIPHENHYDRAMINE HCL 25 MG
25 CAPSULE ORAL
Status: DISPENSED | OUTPATIENT
Start: 2019-09-21 | End: 2019-09-23

## 2019-09-20 RX ORDER — FUROSEMIDE 10 MG/ML
INJECTION INTRAMUSCULAR; INTRAVENOUS
Status: DISCONTINUED | OUTPATIENT
Start: 2019-09-20 | End: 2019-09-20

## 2019-09-20 RX ORDER — CEFAZOLIN SODIUM 1 G/3ML
2 INJECTION, POWDER, FOR SOLUTION INTRAMUSCULAR; INTRAVENOUS
Status: COMPLETED | OUTPATIENT
Start: 2019-09-20 | End: 2019-09-21

## 2019-09-20 RX ORDER — SODIUM CHLORIDE 0.9 % (FLUSH) 0.9 %
3 SYRINGE (ML) INJECTION
Status: DISCONTINUED | OUTPATIENT
Start: 2019-09-20 | End: 2019-09-22

## 2019-09-20 RX ORDER — SODIUM CHLORIDE 9 MG/ML
INJECTION, SOLUTION INTRAVENOUS CONTINUOUS
Status: DISCONTINUED | OUTPATIENT
Start: 2019-09-20 | End: 2019-09-21

## 2019-09-20 RX ORDER — GLUCAGON 1 MG
1 KIT INJECTION CONTINUOUS PRN
Status: DISCONTINUED | OUTPATIENT
Start: 2019-09-20 | End: 2019-09-25 | Stop reason: HOSPADM

## 2019-09-20 RX ORDER — INSULIN ASPART 100 [IU]/ML
0-5 INJECTION, SOLUTION INTRAVENOUS; SUBCUTANEOUS
Status: DISCONTINUED | OUTPATIENT
Start: 2019-09-20 | End: 2019-09-25 | Stop reason: HOSPADM

## 2019-09-20 RX ORDER — FAMOTIDINE 10 MG/ML
INJECTION INTRAVENOUS
Status: DISCONTINUED | OUTPATIENT
Start: 2019-09-20 | End: 2019-09-20

## 2019-09-20 RX ORDER — SULFAMETHOXAZOLE AND TRIMETHOPRIM 400; 80 MG/1; MG/1
1 TABLET ORAL EVERY MORNING
Status: DISCONTINUED | OUTPATIENT
Start: 2019-09-20 | End: 2019-09-23

## 2019-09-20 RX ORDER — PHENYLEPHRINE HYDROCHLORIDE 10 MG/ML
INJECTION INTRAVENOUS
Status: DISCONTINUED | OUTPATIENT
Start: 2019-09-20 | End: 2019-09-20

## 2019-09-20 RX ORDER — HYDROMORPHONE HCL IN 0.9% NACL 6 MG/30 ML
PATIENT CONTROLLED ANALGESIA SYRINGE INTRAVENOUS
Status: COMPLETED
Start: 2019-09-20 | End: 2019-09-20

## 2019-09-20 RX ORDER — PREDNISONE 10 MG/1
20 TABLET ORAL DAILY
Status: DISCONTINUED | OUTPATIENT
Start: 2019-09-23 | End: 2019-09-25 | Stop reason: HOSPADM

## 2019-09-20 RX ORDER — VALGANCICLOVIR 450 MG/1
450 TABLET, FILM COATED ORAL EVERY MORNING
Status: DISCONTINUED | OUTPATIENT
Start: 2019-09-30 | End: 2019-09-25 | Stop reason: HOSPADM

## 2019-09-20 RX ORDER — HEPARIN SODIUM 5000 [USP'U]/ML
5000 INJECTION, SOLUTION INTRAVENOUS; SUBCUTANEOUS EVERY 8 HOURS
Status: DISCONTINUED | OUTPATIENT
Start: 2019-09-20 | End: 2019-09-25 | Stop reason: HOSPADM

## 2019-09-20 RX ORDER — PROPOFOL 10 MG/ML
VIAL (ML) INTRAVENOUS
Status: DISCONTINUED | OUTPATIENT
Start: 2019-09-20 | End: 2019-09-20

## 2019-09-20 RX ORDER — DOCUSATE SODIUM 100 MG/1
100 CAPSULE, LIQUID FILLED ORAL 3 TIMES DAILY
Status: DISCONTINUED | OUTPATIENT
Start: 2019-09-20 | End: 2019-09-25 | Stop reason: HOSPADM

## 2019-09-20 RX ORDER — NYSTATIN 100000 [USP'U]/ML
500000 SUSPENSION ORAL
Status: DISCONTINUED | OUTPATIENT
Start: 2019-09-20 | End: 2019-09-25 | Stop reason: HOSPADM

## 2019-09-20 RX ORDER — MEPERIDINE HYDROCHLORIDE 50 MG/ML
12.5 INJECTION INTRAMUSCULAR; INTRAVENOUS; SUBCUTANEOUS EVERY 10 MIN PRN
Status: DISCONTINUED | OUTPATIENT
Start: 2019-09-20 | End: 2019-09-20 | Stop reason: HOSPADM

## 2019-09-20 RX ORDER — LIDOCAINE HCL/PF 100 MG/5ML
SYRINGE (ML) INTRAVENOUS
Status: DISCONTINUED | OUTPATIENT
Start: 2019-09-20 | End: 2019-09-20

## 2019-09-20 RX ORDER — METHYLPREDNISOLONE SOD SUCC 125 MG
125 VIAL (EA) INJECTION ONCE
Status: COMPLETED | OUTPATIENT
Start: 2019-09-22 | End: 2019-09-22

## 2019-09-20 RX ORDER — SODIUM CHLORIDE 9 MG/ML
INJECTION, SOLUTION INTRAVENOUS CONTINUOUS
Status: DISCONTINUED | OUTPATIENT
Start: 2019-09-20 | End: 2019-09-20

## 2019-09-20 RX ORDER — MIDAZOLAM HYDROCHLORIDE 1 MG/ML
INJECTION, SOLUTION INTRAMUSCULAR; INTRAVENOUS
Status: DISCONTINUED | OUTPATIENT
Start: 2019-09-20 | End: 2019-09-20

## 2019-09-20 RX ORDER — CISATRACURIUM BESYLATE 10 MG/ML
INJECTION, SOLUTION INTRAVENOUS
Status: DISCONTINUED | OUTPATIENT
Start: 2019-09-20 | End: 2019-09-20

## 2019-09-20 RX ORDER — FENTANYL CITRATE 50 UG/ML
INJECTION, SOLUTION INTRAMUSCULAR; INTRAVENOUS
Status: DISCONTINUED | OUTPATIENT
Start: 2019-09-20 | End: 2019-09-20

## 2019-09-20 RX ORDER — ACETAMINOPHEN 325 MG/1
650 TABLET ORAL
Status: DISPENSED | OUTPATIENT
Start: 2019-09-21 | End: 2019-09-23

## 2019-09-20 RX ORDER — NALOXONE HCL 0.4 MG/ML
0.02 VIAL (ML) INJECTION
Status: DISCONTINUED | OUTPATIENT
Start: 2019-09-20 | End: 2019-09-25 | Stop reason: HOSPADM

## 2019-09-20 RX ORDER — BISACODYL 5 MG
10 TABLET, DELAYED RELEASE (ENTERIC COATED) ORAL NIGHTLY
Status: DISCONTINUED | OUTPATIENT
Start: 2019-09-20 | End: 2019-09-25 | Stop reason: HOSPADM

## 2019-09-20 RX ORDER — DIPHENHYDRAMINE HCL 50 MG
50 CAPSULE ORAL ONCE AS NEEDED
Status: DISCONTINUED | OUTPATIENT
Start: 2019-09-20 | End: 2019-09-25

## 2019-09-20 RX ORDER — CEFAZOLIN SODIUM 1 G/3ML
INJECTION, POWDER, FOR SOLUTION INTRAMUSCULAR; INTRAVENOUS
Status: DISCONTINUED | OUTPATIENT
Start: 2019-09-20 | End: 2019-09-20 | Stop reason: HOSPADM

## 2019-09-20 RX ORDER — DEXTROSE MONOHYDRATE AND SODIUM CHLORIDE 5; .45 G/100ML; G/100ML
INJECTION, SOLUTION INTRAVENOUS CONTINUOUS
Status: DISCONTINUED | OUTPATIENT
Start: 2019-09-20 | End: 2019-09-21

## 2019-09-20 RX ORDER — IBUPROFEN 200 MG
24 TABLET ORAL
Status: DISCONTINUED | OUTPATIENT
Start: 2019-09-20 | End: 2019-09-25 | Stop reason: HOSPADM

## 2019-09-20 RX ORDER — ACETAMINOPHEN 325 MG/1
650 TABLET ORAL ONCE AS NEEDED
Status: DISCONTINUED | OUTPATIENT
Start: 2019-09-20 | End: 2019-09-25

## 2019-09-20 RX ADMIN — DIPHENHYDRAMINE HYDROCHLORIDE 50 MG: 50 INJECTION, SOLUTION INTRAMUSCULAR; INTRAVENOUS at 07:09

## 2019-09-20 RX ADMIN — PHENYLEPHRINE HYDROCHLORIDE 150 MCG: 10 INJECTION INTRAVENOUS at 10:09

## 2019-09-20 RX ADMIN — DOCUSATE SODIUM 100 MG: 100 CAPSULE, LIQUID FILLED ORAL at 08:09

## 2019-09-20 RX ADMIN — DEXTROSE AND SODIUM CHLORIDE: 5; .45 INJECTION, SOLUTION INTRAVENOUS at 11:09

## 2019-09-20 RX ADMIN — THERA TABS 1 TABLET: TAB at 01:09

## 2019-09-20 RX ADMIN — CALCIUM CHLORIDE 500 MG: 100 INJECTION, SOLUTION INTRAVENOUS at 08:09

## 2019-09-20 RX ADMIN — ACETAMINOPHEN 650 MG: 160 SOLUTION ORAL at 07:09

## 2019-09-20 RX ADMIN — INSULIN HUMAN 7 UNITS: 100 INJECTION, SOLUTION PARENTERAL at 09:09

## 2019-09-20 RX ADMIN — Medication 20 MG: at 08:09

## 2019-09-20 RX ADMIN — Medication: at 11:09

## 2019-09-20 RX ADMIN — ANTI-THYMOCYTE GLOBULIN (RABBIT) 125 MG: 5 INJECTION, POWDER, LYOPHILIZED, FOR SOLUTION INTRAVENOUS at 08:09

## 2019-09-20 RX ADMIN — NYSTATIN 500000 UNITS: 100000 SUSPENSION ORAL at 08:09

## 2019-09-20 RX ADMIN — DOPAMINE HYDROCHLORIDE 5 MCG/KG/MIN: 160 INJECTION, SOLUTION INTRAVENOUS at 08:09

## 2019-09-20 RX ADMIN — HYDRALAZINE HYDROCHLORIDE 25 MG: 25 TABLET, FILM COATED ORAL at 02:09

## 2019-09-20 RX ADMIN — FENTANYL CITRATE 50 MCG: 50 INJECTION, SOLUTION INTRAMUSCULAR; INTRAVENOUS at 08:09

## 2019-09-20 RX ADMIN — SODIUM CHLORIDE 50 ML/HR: 0.9 INJECTION, SOLUTION INTRAVENOUS at 12:09

## 2019-09-20 RX ADMIN — TACROLIMUS 2 MG: 1 CAPSULE ORAL at 06:09

## 2019-09-20 RX ADMIN — SUGAMMADEX 360 MG: 100 INJECTION, SOLUTION INTRAVENOUS at 10:09

## 2019-09-20 RX ADMIN — HEPARIN SODIUM 5000 UNITS: 5000 INJECTION, SOLUTION INTRAVENOUS; SUBCUTANEOUS at 01:09

## 2019-09-20 RX ADMIN — DOCUSATE SODIUM 100 MG: 100 CAPSULE, LIQUID FILLED ORAL at 03:09

## 2019-09-20 RX ADMIN — BISACODYL 10 MG: 5 TABLET, COATED ORAL at 08:09

## 2019-09-20 RX ADMIN — CISATRACURIUM BESYLATE 20 MG: 10 INJECTION INTRAVENOUS at 07:09

## 2019-09-20 RX ADMIN — SODIUM CHLORIDE: 0.9 INJECTION, SOLUTION INTRAVENOUS at 06:09

## 2019-09-20 RX ADMIN — PHENYLEPHRINE HYDROCHLORIDE 150 MCG: 10 INJECTION INTRAVENOUS at 09:09

## 2019-09-20 RX ADMIN — CISATRACURIUM BESYLATE 4 MG: 10 INJECTION INTRAVENOUS at 10:09

## 2019-09-20 RX ADMIN — HEPARIN SODIUM 5000 UNITS: 5000 INJECTION, SOLUTION INTRAVENOUS; SUBCUTANEOUS at 10:09

## 2019-09-20 RX ADMIN — HYDRALAZINE HYDROCHLORIDE 25 MG: 25 TABLET, FILM COATED ORAL at 08:09

## 2019-09-20 RX ADMIN — DEXTROSE MONOHYDRATE: 10 INJECTION, SOLUTION INTRAVENOUS at 09:09

## 2019-09-20 RX ADMIN — CISATRACURIUM BESYLATE 20 MG: 10 INJECTION INTRAVENOUS at 08:09

## 2019-09-20 RX ADMIN — CEFAZOLIN 2 G: 330 INJECTION, POWDER, FOR SOLUTION INTRAMUSCULAR; INTRAVENOUS at 08:09

## 2019-09-20 RX ADMIN — SODIUM CHLORIDE 500 ML: 0.9 INJECTION, SOLUTION INTRAVENOUS at 10:09

## 2019-09-20 RX ADMIN — FAMOTIDINE 20 MG: 20 TABLET ORAL at 08:09

## 2019-09-20 RX ADMIN — SULFAMETHOXAZOLE AND TRIMETHOPRIM 1 TABLET: 400; 80 TABLET ORAL at 01:09

## 2019-09-20 RX ADMIN — LIDOCAINE HYDROCHLORIDE 100 MG: 20 INJECTION, SOLUTION INTRAVENOUS at 07:09

## 2019-09-20 RX ADMIN — EPHEDRINE SULFATE 15 MG: 50 INJECTION, SOLUTION INTRAMUSCULAR; INTRAVENOUS; SUBCUTANEOUS at 08:09

## 2019-09-20 RX ADMIN — LABETALOL HYDROCHLORIDE 15 MG: 5 INJECTION INTRAVENOUS at 11:09

## 2019-09-20 RX ADMIN — MANNITOL 100 ML: 250 INJECTION, SOLUTION INTRAVENOUS at 09:09

## 2019-09-20 RX ADMIN — PROPOFOL 140 MG: 10 INJECTION, EMULSION INTRAVENOUS at 07:09

## 2019-09-20 RX ADMIN — CEFAZOLIN 2 G: 1 INJECTION, POWDER, FOR SOLUTION INTRAMUSCULAR; INTRAVENOUS at 04:09

## 2019-09-20 RX ADMIN — FAMOTIDINE 20 MG: 10 INJECTION, SOLUTION INTRAVENOUS at 08:09

## 2019-09-20 RX ADMIN — MIDAZOLAM HYDROCHLORIDE 2 MG: 1 INJECTION, SOLUTION INTRAMUSCULAR; INTRAVENOUS at 07:09

## 2019-09-20 RX ADMIN — TACROLIMUS 2 MG: 1 CAPSULE ORAL at 01:09

## 2019-09-20 RX ADMIN — SODIUM CHLORIDE, SODIUM GLUCONATE, SODIUM ACETATE, POTASSIUM CHLORIDE, MAGNESIUM CHLORIDE, SODIUM PHOSPHATE, DIBASIC, AND POTASSIUM PHOSPHATE: .53; .5; .37; .037; .03; .012; .00082 INJECTION, SOLUTION INTRAVENOUS at 08:09

## 2019-09-20 RX ADMIN — GLYCOPYRROLATE 0.2 MG: 0.2 INJECTION, SOLUTION INTRAMUSCULAR; INTRAVENOUS at 07:09

## 2019-09-20 RX ADMIN — FUROSEMIDE 100 MG: 10 INJECTION, SOLUTION INTRAMUSCULAR; INTRAVENOUS at 09:09

## 2019-09-20 RX ADMIN — SODIUM CHLORIDE, SODIUM GLUCONATE, SODIUM ACETATE, POTASSIUM CHLORIDE, MAGNESIUM CHLORIDE, SODIUM PHOSPHATE, DIBASIC, AND POTASSIUM PHOSPHATE: .53; .5; .37; .037; .03; .012; .00082 INJECTION, SOLUTION INTRAVENOUS at 09:09

## 2019-09-20 RX ADMIN — MYCOPHENOLATE MOFETIL 1000 MG: 250 CAPSULE ORAL at 08:09

## 2019-09-20 RX ADMIN — Medication 30 MG: at 09:09

## 2019-09-20 RX ADMIN — DEXTROSE 500 ML: 50 INJECTION, SOLUTION INTRAVENOUS at 07:09

## 2019-09-20 RX ADMIN — FENTANYL CITRATE 50 MCG: 50 INJECTION, SOLUTION INTRAMUSCULAR; INTRAVENOUS at 07:09

## 2019-09-20 RX ADMIN — HYDRALAZINE HYDROCHLORIDE 25 MG: 25 TABLET, FILM COATED ORAL at 03:09

## 2019-09-20 RX ADMIN — EPHEDRINE SULFATE 10 MG: 50 INJECTION, SOLUTION INTRAMUSCULAR; INTRAVENOUS; SUBCUTANEOUS at 08:09

## 2019-09-20 RX ADMIN — ONDANSETRON 4 MG: 2 INJECTION INTRAMUSCULAR; INTRAVENOUS at 10:09

## 2019-09-20 RX ADMIN — MYCOPHENOLATE MOFETIL 1000 MG: 250 CAPSULE ORAL at 01:09

## 2019-09-20 RX ADMIN — CISATRACURIUM BESYLATE 6 MG: 10 INJECTION INTRAVENOUS at 09:09

## 2019-09-20 NOTE — PROGRESS NOTES
09/20/19 0352   Vital Signs   BP (!) 192/81   MAP (mmHg) 117   Notified Marylin ANDERSON of above vitals.  Ordered to give 9AM 25mg PO hydralazine now.  Will continue to monitor.

## 2019-09-20 NOTE — NURSING
Pt /77 30 minutes after administration of 25mg PO Hydralazine.  Marylin NP notified.  No new orders at this time.  Will continue to monitor.

## 2019-09-20 NOTE — ANESTHESIA POSTPROCEDURE EVALUATION
Anesthesia Post Evaluation    Patient: Satinder Schulte    Procedure(s) Performed: Procedure(s) (LRB):  TRANSPLANT, KIDNEY (N/A)    Final Anesthesia Type: general  Patient location during evaluation: PACU  Patient participation: Yes- Able to Participate  Level of consciousness: awake and alert and oriented  Post-procedure vital signs: reviewed and stable  Pain management: adequate (reports pain but is too sleepy for aggressive pain meds)  Airway patency: patent  PONV status at discharge: No PONV  Anesthetic complications: no      Cardiovascular status: stable  Respiratory status: unassisted, spontaneous ventilation and room air  Hydration status: euvolemic  Follow-up not needed.          Vitals Value Taken Time   /60 9/20/2019  1:03 PM   Temp 37.2 °C (98.9 °F) 9/20/2019 11:20 AM   Pulse 73 9/20/2019  1:11 PM   Resp 13 9/20/2019  1:11 PM   SpO2 94 % 9/20/2019  1:11 PM   Vitals shown include unvalidated device data.      Event Time     Out of Recovery 09/20/2019 12:00:00          Pain/Benjamín Score: Pain Rating Prior to Med Admin: 4 (9/20/2019 11:49 AM)  Benjamín Score: 9 (9/20/2019 12:30 PM)

## 2019-09-20 NOTE — ANESTHESIA PROCEDURE NOTES
Arterial    Diagnosis: esld    Patient location during procedure: done in OR  Procedure start time: 9/20/2019 7:37 AM  Procedure end time: 9/20/2019 7:45 AM    Staffing  Authorizing Provider: Gennaro Mckeon Jr., MD  Performing Provider: Gennaro Mckeon Jr., MD    Anesthesiologist was present at the time of the procedure.    Preanesthetic Checklist  Completed: patient identified, site marked, surgical consent, pre-op evaluation, timeout performed, IV checked, risks and benefits discussed, monitors and equipment checked and anesthesia consent givenArterial  Skin Prep: chlorhexidine gluconate  Local Infiltration: none  Orientation: right  Location: radial  Catheter Size: 20 G  Catheter placement by Ultrasound guidance. Heme positive aspiration all ports.  Vessel Caliber: medium, patent, compressibility normal  Needle advanced into vessel with real time Ultrasound guidance.  Guidewire confirmed in vessel.  Sterile sheath used.Insertion Attempts: 1  Assessment  Dressing: secured with tape and tegaderm  Patient: Tolerated well

## 2019-09-20 NOTE — PROGRESS NOTES
Pt s/p DBD KTx 9/20 2/2 HTN (Thymo induction 2/2 leukopenia, CIT ~10h, CMV D-/R+, donor EBV IgG+ & HBcAB+, recipient HBsAB+ HBcAB-). Intra-op course notable for double artery anatomy of donor with 2 anastomosis. 1u PRBC transfused intra-op for low CVP and oozing. The kidney was unclamped and reperfused at 9/20/2019 9:23 AM. Intra-op urine production observed. 3 day rodriguez, per op note. US ordered due to double artery. US results reviewed by transplant fellow - satisfactory doppler. Pt transferred to TSU ~1600. She has 1 R ZULEIKA drain with a moderate amount of sanguineous output. Incisional dressing with minimal ss drainage. Pt lethargic, but follows commands. UOP fluctuating with the past 3 hours 110 ml (1500), 45 ml (1600) , and 150 ml (1700). SBP 160s, HR 70s, RR 14, 97% on RA. Continue to monitor.

## 2019-09-20 NOTE — TRANSFER OF CARE
"Anesthesia Transfer of Care Note    Patient: Satinder Schulte    Procedure(s) Performed: Procedure(s) (LRB):  TRANSPLANT, KIDNEY (N/A)    Patient location: PACU    Anesthesia Type: general    Transport from OR: Transported from OR on 6-10 L/min O2 by face mask with adequate spontaneous ventilation    Post pain: adequate analgesia    Post assessment: no apparent anesthetic complications    Post vital signs: stable    Level of consciousness: sedated    Nausea/Vomiting: no nausea/vomiting    Complications: none    Transfer of care protocol was followed      Last vitals:   Visit Vitals  BP (!) 161/70 (BP Location: Right arm, Patient Position: Lying)   Pulse (!) 59   Temp 37.2 °C (99 °F) (Oral)   Resp 16   Ht 5' 3" (1.6 m)   Wt 89.9 kg (198 lb 4.9 oz)   SpO2 100%   Breastfeeding? No   BMI 35.13 kg/m²     " per parents "he woke with fever and difficultly breathing". + barking cough and stridor.

## 2019-09-20 NOTE — PLAN OF CARE
Problem: Adult Inpatient Plan of Care  Goal: Plan of Care Review  Outcome: Ongoing (interventions implemented as appropriate)  Pt. AAO x 4, tmax 99.1, daughter at the bedside- interacting with the patient and participating in care.  D51/2 NS @ 50, NS @ 50  RLQ ZULEIKA with sanguineous drainage  RLQ incision with island dressing and minimal drainage marked  Pain controlled with Dilaudid PCA pump 0.2mg/12min/1mg--PCA button within reach  Lane with pink tinged urine   Safety precautions maintained throughout the shift, call light within reach, and nonslip socks when out of bed.

## 2019-09-20 NOTE — ASSESSMENT & PLAN NOTE
- Continue Bactrim for PCP prophylaxis.  - Continue Nystatin for thrush prophylaxis.  - Start Valcyte for CMV prophylaxis on 9/30.

## 2019-09-20 NOTE — ASSESSMENT & PLAN NOTE
- Thymo induction due to leukopenia.  Full dose 2.  - Continue Prograf. Monitor trough daily and adjust dose as needed to achieve therapeutic level.  - Continue Cellcept  - Continue steroids, per protocol.

## 2019-09-20 NOTE — HOSPITAL COURSE
"Now s/p DBD KTx 9/20 2/2 HTN (Thymo induction 2/2 leukopenia, CIT ~10h, CMV D-/R+, donor EBV IgG+ & HBcAB+, recipient HBsAB+ HBcAB-). Intra-op course notable for double artery anatomy of donor with 2 anastomosis. 1u PRBC transfused intra-op for low CVP and oozing. The kidney was unclamped and reperfused at 9/20/2019 9:23 AM. Intra-op urine production observed. 3 day rodriguez, per op note. Rodriguez removed 9/23 & pt voided. ZULEIKA drain removed 9/23.     Interval History: No acute events overnight. Cr continues to trend down & UOP adequate. Leukopenia persists, but slightly better than yesterday. Will give remaining (half) dose of Thymo. Pt may need Neupogen tomorrow, pending ANC. Continue to hold Bactrim, Valcyte, & Cellcept. Will start Atovaquone for PCP prophylaxis. Biggest issue at this time is incontinence. Pt had a pure-wick catheter in place this AM (rodriguez removed yesterday) & c/o not being able to eat or drink due to urinary urgency and frequency. She had a cytoscopy 8/29/19 showing decreased bladder capacity ("strong urge to urinate with 150 cc urine"). Pt also c/o burning and discomfort with urination, likely related to bladder spasms. Hematuria persists, but unclear if blood is from ss drainage at inferior portion of incision seeping into purewick or blood in urinary tract. Started ditropan, replaced rodriguez catheter, and sent UA/cx. UA with >100 RBCs. Due to unlikely resolution of incontinence any time soon, plan to dc pt with rodriguez catheter and schedule urology follow up outpatient. Pt hypertensive this morning. She took her home supply of Nifedipine 90 mg and Coreg 12.5 mg this AM due to seeing her BP reading (/92) and HA stating "I don't want to have another stroke." Educated patient on danger of taking home meds while inpatient, especially while prescribed additional antihypertensives. Pt expressed understanding. Added Coreg 12.5 mg BID to current regimen of Nifedipine 60 mg BID and Hydralazine 25 mg TID. " Checked orthostatic BP (pt not orthostatic). Of note, donor cultures positive with Staphlococcus lugdunensis. Per ID, will treat with IV Ancef while inpatient and transition to oral cefadroxil at discharge to complete 2 week course. Blood cx drawn 9/24 to r/o transmission to recipient. Continue to monitor.

## 2019-09-20 NOTE — PROGRESS NOTES
Admit Note     Met with daughter to assess patients needs due to pt being in surgery.  Patient is a 63 y.o. single female, admitted for kidney transplant.     Patient admitted from home on 2019 .  At this time, patient presents as in surgery.  At this time, patients caregiver presents as alert and oriented x 4, pleasant, good eye contact, well groomed, recall good, concentration/judgement good, average intelligence, calm, communicative, cooperative and asking and answering questions appropriately.      Household/Family Systems (as reported by patients caregiver)     Patient resides with patient's son-in-law ( of recently  daughter) and granddaughter., at [address]  [place] [facility] [place] [address].  Support system includes pt's family.  Patient does not have dependents that are need of being cared for.     Patients primary caregiver is Niya Schulte, patients daughter, phone number [phone]. Pt's daughter reports alternating care with her siblings so she can care for her daughter when he  has to work. Pt's daughter listed additional caregiver:    Name: Dany Schulte  Age: 30  Phone: [phone] (cell), [phone] (home)  City: [place] State: [place]  Relationship: son  Does person drive? Yes    Pt's eldest son  can also assist as necessary.     Confirmed patients contact information is [phone] (home); Pt's cell phone is [phone].  Telephone Information:   Mobile [phone]   Mobile Not on file.   .    During admission, patient's caregiver plans to stay in patient's room.  Confirmed patient and patients caregivers do have access to reliable transportation.    Cognitive Status/Learning     Patients caregiver reports patients reading ability as 9th grade and states patient does have difficulty with reading, seeing, comprehension and learning.  Daughter reports pt has difficulty learning things and reading due to highest education completed as 9th grade. Daughter  "reports pt isn't comfortable expressing confusion. Daughter reports feeling comfortable explaining information to pt, looking for signs to show pt is confused and feeling comfortable reaching out to team to ask them to explain information better to pt. Daughter also reports pt needs glasses. Patients caregiver reports patient learns best by verbal instruction and repitition.   Needed: No.   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    Adherence     Patients caregiver reports patient has a medium level of adherence to patients health care regimen. Pt's daughter reports this is due to pt being uncomfortable speaking up. Pt's daughter gave an example that pt stopped taking her BP medicine because it made pt "feel bad". SW discussed importance of pt's adherence post transplant.  Adherence counseling and education provided.  Patient's caregiver verbalizes understanding.    Substance Use    Patients caregiver reports patients substance usage as the following:    Tobacco: none, patient denies any use.  Alcohol: none, patient denies any use.  Illicit Drugs/Non-prescribed Medications: none, patient denies any use.  Patients caregiver states clear understanding of the potential impact of substance use.  Substance abstinence/cessation counseling, education and resources provided and reviewed.     Services Utilizing/ADLS (as reported by patients caregiver)    Infusion Service: Prior to admission, patient utilizing? no  Home Health: Prior to admission, patient utilizing? no  DME: Prior to admission, no  Pulmonary/Cardiac Rehab: Prior to admission, no  Dialysis:  Prior to admission, yes Pt last dialyzed Thursday  Transplant Specialty Pharmacy:  Prior to admission, no.    Prior to admission, patients " caregiver reports patient was independent with ADLS and was not driving.  Patients caregiver reports patient is not able to care for self at this time due to compromised medical condition (as documented in medical record) and physical weakness..  Patients caregiver reports patient indicates a willingness to care for self once medically cleared to do so.    Insurance/Medications    Insured by   Payor/Plan Subscr  Sex Relation Sub. Ins. ID Effective Group Num   1. [place]ARE - [place]* NICKO VALIENTE 1955 Female  [id number] 10/1/1978 NGN                                   [address]   2. [place] NICK[place] VALIENTE 1955 Female  [id number]* 10/1/1976                                    [address] [address]      Primary Insurance (for UNOS reporting): Public Insurance - Medicare FFS (Fee For Service)  Secondary Insurance (for UNOS reporting): Public Insurance - Medicaid    Patients caregiver reports patient is able to obtain and afford medications at this time and at time of discharge.    Living Will/Healthcare Power of     Patients caregiver reports patient does not have a LW and/or HCPA.   provided education regarding LW and HCPA and the completion of forms. Pt's daughter reports discussing advance directives with pt.     Coping/Mental Health (as reported by patients caregiver)    Patient is coping adequately with the aid of  son-in-law and granddaughter living with pt now. Pt's daughter reports pt has been feeling better now that she helps care for granddaughter. Patients caregiver is coping adequately with the aid of  family members.      Discharge Planning (as reported by patients caregiver)    At time of discharge, patient plans to return to pt's home or LR, depending on what team decides, under the care of daughter and son.  Patients son and daughter will transport patient.  Per rounds today, expected discharge date has not been medically determined at this time. Patients  caretaker verbalizes understanding and is involved in treatment planning and discharge process.    Additional Concerns    Patient's caretaker denies additional needs and/or concerns at this time.  providing ongoing psychosocial support, education, resources and d/c planning as needed.  SW remains available.  provided resource list, patient choice, psychosocial and supportive counseling, resources, education, assistance and discharge planning with patient and caregiver involvement, ongoing SW availability and services as appropriate.  remains available. Patient's caregiver verbalizes understanding and agreement with information reviewed,  availability and how to access available resources as needed.

## 2019-09-20 NOTE — ANESTHESIA PROCEDURE NOTES
Central Line    Diagnosis: esld  Patient location during procedure: done in OR  Procedure start time: 9/20/2019 7:46 AM  Procedure end time: 9/20/2019 7:57 AM    Staffing  Authorizing Provider: Gennaro Mckeon Jr., MD  Performing Provider: Gennaro Mckeon Jr., MD    Staffing  Anesthesiologist: Gennaro Mckeon Jr., MD  Performed: anesthesiologist   Anesthesiologist was present at the time of the procedure.  Preanesthetic Checklist  Completed: patient identified, site marked, surgical consent, pre-op evaluation, timeout performed, IV checked, risks and benefits discussed, monitors and equipment checked and anesthesia consent given  Indication   Indication: hemodynamic monitoring, vascular access, med administration     Anesthesia   general anesthesia    Central Line   Skin Prep: skin prepped with ChloraPrep, skin prep agent completely dried prior to procedure  maximum sterile barriers used during central venous catheter insertion  hand hygiene performed prior to central venous catheter insertion  Location: right, internal jugular.   Catheter type: triple lumen  Catheter Size: 7 Fr  Inserted Catheter Length: 13 cm  Ultrasound: vascular probe with ultrasound  Vessel Caliber: medium, patent, compressibility normal  Needle advanced into vessel with real time Ultrasound guidance.  Guidewire confirmed in vessel.   Manometry: Venous cannualation confirmed by visual estimation of blood vessel pressure using manometry.  Insertion Attempts: 1   Securement:line sutured, chlorhexidine patch, sterile dressing applied and blood return through all ports    Post-Procedure   Adverse Events:none    Guidewire Guidewire removed intact.

## 2019-09-20 NOTE — NURSING TRANSFER
Nursing Transfer Note      9/20/2019     Transfer To: [id number]    Transfer via stretcher    Transfer with IV fluids and PCA    Transported by PCT    Medicines sent: PCA, IV fluids    Chart send with patient: Yes    Notified: family

## 2019-09-20 NOTE — OP NOTE
Operative Report    Date of Procedure: 9/20/2019    Surgeon: Guero Rogers MD  First Assistant:     Pre-operative Diagnosis: Allograft kidney for transplantation  Post-operative Diagnosis: Same    Procedure(s) Performed: Back Table Preparation of Kidney, Simple    Anesthesia: Not applicable  Estimated Blood Loss: Not applicable  Fluids Administered: Not applicable    Findings: as described below   Drains: not applicable    Preamble  Indications: This report describes only the backbench preparation of the kidney prior to transplantation.  The transplant operation itself is described in a separate report.    ABO Confirmation: Immediately following arrival of the donor organ and prior to implantation, a formal ABO confirmation was done according to hospital and UNOS policies.  I confirmed the UNOS ID number of the donor organ and the donor and recipient ABO types, directly verifying these data by comparison with the UNOS Match Run report.  This confirmation was personally done by an attending surgeon and circulating nurse, and is officially documented elsewhere.    Time-Out: A complete time out was carried out prior to the procedure, with confirmation of patient identity, correct procedure, correct operative site, appropriate antibiotic prophylaxis, review of any known allergies, and presence of all needed equipment.    Procedure in Detail  Prior to starting the operation, the left kidney  was prepared on the back table. Arterial anatomy was double. Venous anatomy was single. Ureteral anatomy was single. Back table vascular reconstruction was not required .  Unneeded fat was removed from the kidney, the vessels were cleaned of adherent tissue and tested for leaks, and the kidney was maintained at ice temperature in organ preservation solution until it was brought to the operative field.

## 2019-09-20 NOTE — ANESTHESIA RELEASE NOTE
"Anesthesia Release from PACU Note    Patient: Satinder Schulte    Procedure(s) Performed: Procedure(s) (LRB):  TRANSPLANT, KIDNEY (N/A)    Anesthesia type: GEN    Post pain: reports pain but is too sleepy for aggressive pain meds    Post assessment: no apparent anesthetic complications, tolerated procedure well and no evidence of recall    Post vital signs: BP (!) 149/60   Pulse 72   Temp 37.2 °C (98.9 °F) (Temporal)   Resp 14   Ht 5' 3" (1.6 m)   Wt 89.9 kg (198 lb 4.9 oz)   SpO2 95%   Breastfeeding? No   BMI 35.13 kg/m²     Level of consciousness:sleepy    Nausea/Vomiting: no nausea/no vomiting    Complications: none    Airway Patency: patent    Respiratory: unassisted, spontaneous ventilation, room air    Cardiovascular: stable and blood pressure at baseline    Hydration: euvolemic    "

## 2019-09-20 NOTE — SUBJECTIVE & OBJECTIVE
Subjective:     Chief Complaint/Reason for Admission: kidney transplant    History of Present Illness:  Ms. Satinder Schulte is a 63 year old AA female with a PMHx relevant for HTN, CVA and ESRD presumed secondary to HTN who received DBD kidney transplant 9/20/2019 (Thymo induction, PHS not increase, KDPI 78%, CIT 9 hr 5 min, CMV D-/R+, HBV cAB +, HCV AB + NAOMI -). Patient is currently on hemodialysis started on 8/18/14. Patient is dialyzing on TTS schedule. Last HD 9/20/2019  x 3.5 hours via LUE AVF EDW 89.5 kgs.  She is doing well this a.m..  She does complain of some nausea, tolerated procedure well she has a ZULEIKA drain below her incision.  Wound looks dry staples no evidence of fluid collection.  She complained of some nausea this morning pain is well controlled urine output is 100 cc an hour.  Patient received 1 unit of blood intraoperatively secondary to having some wooziness of bleeding.  She has a double artery and the inferior artery by bifurcates in 3 intra op ultrasound with RI is a 1 follow-up ultrasound with good are eyes 0.76 and good arterial velocities of 156.  She is receiving today time O 2.  Full dose.  CVP low this morning at 2 it has improved all the way up to 5, hemoglobin stable 9.1. She seems volume down will start IV fluids for hydration.  Acceptable blood pressure BP improving.  Tolerating p.o. he but poor p.o. intake.    She is a 3 day Lane.  Lane to come out on 09/23        Facility-Administered Medications Prior to Admission   Medication    lidocaine HCl 2% urojet     PTA Medications   Medication Sig    aspirin (ECOTRIN) 81 MG EC tablet Take 81 mg by mouth once daily.    atorvastatin (LIPITOR) 40 MG tablet Take 40 mg by mouth once daily.     B complex-vitamin C-folic acid (NEPHRO-KATHY) 0.8 mg Tab Take by mouth.    calcitRIOL (ROCALTROL) 0.5 MCG capsule Take 0.5 mcg by mouth once daily.      ergocalciferol (VITAMIN D2) 50,000 unit capsule Take 50,000 Units by mouth every 30 days.       hydrALAZINE (APRESOLINE) 25 MG tablet Take 100 mg by mouth 3 (three) times daily.     lactulose (CHRONULAC) 10 gram/15 mL solution Take 10 g by mouth 2 (two) times daily as needed.     loratadine (CLARITIN REDITABS) 10 mg dissolvable tablet Take 10 mg by mouth once daily.    nystatin (MYCOSTATIN) ointment Apply topically 2 (two) times daily.    RENVELA 800 mg Tab 2,400 mg 3 (three) times daily with meals. And 800 mg with snacks    SENSIPAR 60 mg Tab Take 90 mg by mouth once daily.    carvedilol (COREG) 25 MG tablet Take 12.5 mg by mouth 2 (two) times daily with meals.     clonidine (CATAPRES) 0.1 MG tablet Take 0.1 mg by mouth once daily.     labetalol (NORMODYNE) 300 MG tablet Take 300 mg by mouth 2 (two) times daily.      losartan (COZAAR) 100 MG tablet Take 100 mg by mouth once daily.     nifedipine (ADALAT CC) 90 MG TbSR Take 90 mg by mouth 2 (two) times daily.     pantoprazole (PROTONIX) 40 MG tablet Take 40 mg by mouth once daily.       Review of patient's allergies indicates:  No Known Allergies    Past Medical History:   Diagnosis Date    Abnormal Pap smear     repeat paps were normal    Anemia associated with chronic renal failure     Awaiting organ transplant status  - 02/18/2013 6/6/2014    Blood type A+ 6/6/2014    CKD (chronic kidney disease) stage 5, GFR less than 15 ml/min     secondary hypertension    Essential hypertension 5/19/2017    Hyperlipidemia     Hypertension, renal 1992    Stroke 2007    Residual speech deficit     Past Surgical History:   Procedure Laterality Date    AV FISTULA PLACEMENT  2014    Biopsy-bone marrow Right 8/23/2018    [facility]    CHOLECYSTECTOMY  2008    HYSTERECTOMY  2011    TAHBSO for benign reasons    OOPHORECTOMY       Family History     Problem Relation (Age of Onset)    Breast cancer Daughter    Heart disease Mother, Father, Sister    Hypertension Mother    Kidney disease Mother, Brother    Ovarian cancer  "Cousin    Stroke Mother, Sister        Tobacco Use    Smoking status: Never Smoker    Smokeless tobacco: Never Used   Substance and Sexual Activity    Alcohol use: No    Drug use: No    Sexual activity: Never     Comment: single, Lives with daughter, on Disability, Lives in [place]        Review of Systems   Constitutional: Positive for activity change. Negative for appetite change, chills, fatigue and fever.   HENT: Negative for congestion and facial swelling.    Eyes: Negative for pain, discharge and visual disturbance.   Respiratory: Negative for cough, chest tightness, shortness of breath and wheezing.    Cardiovascular: Negative for chest pain, palpitations and leg swelling.   Gastrointestinal: Positive for abdominal pain (incisional). Negative for abdominal distention, nausea and vomiting.   Endocrine: Negative.    Genitourinary: Negative for dysuria and flank pain.   Musculoskeletal: Negative for back pain.   Skin: Positive for wound (RLQ incision c/d/i with surgical dressing). Negative for pallor and rash.   Allergic/Immunologic: Positive for immunocompromised state.   Neurological: Negative for dizziness, tremors, weakness and light-headedness.   Hematological: Negative.    Psychiatric/Behavioral: Negative for agitation, confusion and dysphoric mood. The patient is not nervous/anxious.      Objective:     Vital Signs (Most Recent):  Temp: 98.5 °F (36.9 °C) (09/21/19 1628)  Pulse: 85 (09/21/19 1628)  Resp: 20 (09/21/19 1628)  BP: (!) 131/59 (09/21/19 1628)  SpO2: 97 % (09/21/19 1628)  Height: 5' 3" (160 cm)  Weight: 93 kg (205 lb 0.4 oz)  Body mass index is 36.32 kg/m².     Physical Exam   Constitutional: She is oriented to person, place, and time. She appears well-developed and well-nourished.   HENT:   Head: Normocephalic and atraumatic.   Eyes: Pupils are equal, round, and reactive to light. EOM are normal. No scleral icterus.   Neck: Normal range of motion. Neck supple. No JVD present. "   Cardiovascular: Normal rate, regular rhythm and normal heart sounds.   No murmur heard.  LUE AVF +/+   Pulmonary/Chest: Effort normal and breath sounds normal. No respiratory distress. She has no wheezes.   Abdominal: Soft. Bowel sounds are normal. She exhibits no distension. There is tenderness (As suspected ports transplant).       Musculoskeletal: Normal range of motion. She exhibits no edema.   Neurological: She is alert and oriented to person, place, and time.   Skin: Skin is warm and dry.   Psychiatric: She has a normal mood and affect. Her behavior is normal.   Nursing note and vitals reviewed.      Laboratory  Labs pending for pre op eval    Diagnostic Results:  None

## 2019-09-20 NOTE — OP NOTE
Operative Report    Date of Procedure: 2019    Surgeons:  Surgeon(s) and Role:     * Guero Rogers MD - Primary     * Nikky Sierra MD - Fellow    First Assistant Attestation:  The indicated resident served as first assistant for this procedure.    Pre-operative Diagnosis: ESRD, requiring chronic dialysis secondary to Hypertensive Nephrosclerosis  Post-operative Diagnosis: Same    Procedure(s) Performed:   1. Back Table Preparation of Left Kidney    2.  - Brain Death Kidney transplant    Anesthesia: General endotracheal    Preamble  Indications and Patient Counseling: The patient is a 63 y.o. year-old female with end-stage kidney disease secondary to Hypertensive Nephrosclerosis who has been evaluated for a kidney transplant.  The procedure was thoroughly discussed with the patient, including potential risks, complications, and alternatives.  Specific complications mentioned included death, graft non-function, bleeding, infection, and rejection, as well as the possibility of other complications not specifically mentioned.    Donor Risk Factors: Prior to the operation, the patient was advised of any donor-specific risk factors requiring specific disclosure.  Factors in this case included donor HBcAb+.      Specific PHS Increased Risk Behavior criteria for the organ donor include:  None    All questions were answered, the patient voiced appropriate understanding, and she agreed to proceed with the planned procedure.    ABO Confirmation: Immediately following arrival of the donor organ and prior to implantation, a formal ABO confirmation was done according to hospital and UNOS policies.  I confirmed the UNOS ID number ([id number]) of the donor organ and the donor and recipient ABO types, directly verifying these data by comparison with the UNOS Match Run report ([id number]).  This confirmation was personally done by an attending surgeon and circulating nurse, and is officially documented  elsewhere.    Time-Out: A complete time out was carried out prior to incision, with confirmation of patient identity, correct procedure, correct operative site, appropriate antibiotic prophylaxis, review of any known allergies, and presence of all needed equipment.    Procedure in Detail  Prior to starting the operation, the left kidney  was prepared on the back table. Arterial anatomy was double. Venous anatomy was single. Ureteral anatomy was single. Back table vascular reconstruction was not required .  Unneeded fat was removed from the kidney, the vessels were cleaned of adherent tissue and tested for leaks, and the kidney was maintained at ice temperature in organ preservation solution until it was brought to the operative field.     The patient was brought into the operating room and placed in a supine position on the OR table.  After the induction of general endotracheal anesthesia, lines were placed by the anesthesiologist.  The urinary bladder was catheterized and irrigated with antibiotic solution.  There was no tension on the axillae and all pressure points were padded.  Sequential compression boots were used as were Ty Huggers.  The abdomen was prepped and draped in the usual sterile fashion.  Skin was incised over the right with a knife and deepened with electrocautery.  The peritoneum and its contents were swept medially, exposing the right external iliac artery and the right external iliac vein.  The Bookwalter retractor was used to provide exposure.  Overlying lymphatics were ligated or cauterized and the vessels were dissected free for a length compatible with anastomosis.  The kidney was brought to the OR table at 9/20/2019  9:00 AM.  Venous control was obtained with a vascular clamp.  A venotomy was made, the vein irrigated, and an end renal to right external iliac vein anastomosis was created with 5-0 polypropylene.  Arterial control was obtained with a vascular clamp.  Arteriotomy was made,  the artery irrigated, and an end renal to right external iliac artery anastomosis was created with 6-0 polypropylene.  The kidney was unclamped and reperfused at 2019  9:23 AM.  Reperfusion quality was good. The second artery was impanted into the iliac artery using 6-0 prolene.  Intraoperative urine production was observed.  After hemostasis was obtained, a Lich uretero-neocystostomy was created.  The bladder was filled and identified, opened, and the anastomosis created using 6-0 PDS.  The bladder muscle was closed over the distal ureter to create an antireflux tunnel.  A ureteral stent was used.  With the kidney well perfused and sitting appropriately without tension on the anastomoses, viscera were replaced in their usual position.  The wound was closed after a final check for hemostasis.  Overall, the graft quality was assessed to be good. At the end of the case the needle, sponge and instrument counts were all correct.  Sterile dressings were applied and the patient was brought to the recovery room/ICU in good condition.    Estimated Blood Loss: 300 mL  Fluids Administered:    Drains: 15fBlake drains x 1  Specimens: none    Findings:    Organ Transplanted: Left Kidney    Arterial Anatomy: double  Number of Arteries: 2  Configuration of Multiple Arteries: each artery has an anastomosis  Venous Anatomy: single  Number of Veins: 1  Ureteral Anatomy: single  Number of Ureters: 1  Reperfusion Quality: good  Overall Graft Quality: good  Intraoperative Urine Production: yes  Lane: not to be removed before 3 days.  Ureteral Stent: Yes    Ischemic Times:   Anastomosis (warm ischemia) time: 23 minutes   Cold ischemia time: 545 minutes  Total ischemia time: 568 minutes    Donor Data:  UNOS ID: [id number]   UNOS Match Run: [id number]   Donor Type:  - Brain Death   Donor CMV Status: Negative   Donor HBcAB: Positive   Donor HCV Status: Negative

## 2019-09-20 NOTE — PROGRESS NOTES
TRANSPLANT NOTE:      ORGAN: LEFT KIDNEY    Disease Etiology: Hypertensive Nephrosclerosis  Donor CMV Status: -  Donor HCV Status: -  Donor HBcAb: +  Donor HBV NAOMI: -  Donor HCV NAOMI: -    Satinder Schulte is a 63 y.o. female s/p   - Brain Death   kidney transplant on 2019 (Kidney) for Hypertensive Nephrosclerosis.   This patient will receive 3 doses of Thymoglobulin for induction.  This patients maintenance immunosuppression will include a steroid taper per protocol to 5mg daily, Prograf, and Cellcept maintenance.  Opportunistic infection prophylaxis will include Valcyte for 3 months (CMV D - , R + ), Bactrim for 1 year, and nystatin for 4 weeks.  Patient is to begin self medications upon transfer to the [facility], and I plan to meet with this patient and his/her support person prior to discharge to review the medication section of the Kidney Transplant Education Manual.  I have reviewed the pre-op medications and have restarted those, as appropriate.

## 2019-09-21 LAB
ALBUMIN SERPL BCP-MCNC: 2.8 G/DL (ref 3.5–5.2)
ALBUMIN SERPL BCP-MCNC: 3 G/DL (ref 3.5–5.2)
ALP SERPL-CCNC: 38 U/L (ref 55–135)
ALT SERPL W/O P-5'-P-CCNC: 8 U/L (ref 10–44)
ANION GAP SERPL CALC-SCNC: 11 MMOL/L (ref 8–16)
ANION GAP SERPL CALC-SCNC: 13 MMOL/L (ref 8–16)
AST SERPL-CCNC: 17 U/L (ref 10–40)
BASOPHILS # BLD AUTO: 0 K/UL (ref 0–0.2)
BASOPHILS # BLD AUTO: 0.01 K/UL (ref 0–0.2)
BASOPHILS NFR BLD: 0 % (ref 0–1.9)
BASOPHILS NFR BLD: 0.3 % (ref 0–1.9)
BILIRUB SERPL-MCNC: 0.2 MG/DL (ref 0.1–1)
BUN SERPL-MCNC: 48 MG/DL (ref 8–23)
BUN SERPL-MCNC: 49 MG/DL (ref 8–23)
CALCIUM SERPL-MCNC: 7.5 MG/DL (ref 8.7–10.5)
CALCIUM SERPL-MCNC: 7.7 MG/DL (ref 8.7–10.5)
CHLORIDE SERPL-SCNC: 104 MMOL/L (ref 95–110)
CHLORIDE SERPL-SCNC: 105 MMOL/L (ref 95–110)
CO2 SERPL-SCNC: 17 MMOL/L (ref 23–29)
CO2 SERPL-SCNC: 20 MMOL/L (ref 23–29)
CREAT SERPL-MCNC: 6.8 MG/DL (ref 0.5–1.4)
CREAT SERPL-MCNC: 8 MG/DL (ref 0.5–1.4)
DIFFERENTIAL METHOD: ABNORMAL
DIFFERENTIAL METHOD: ABNORMAL
EOSINOPHIL # BLD AUTO: 0 K/UL (ref 0–0.5)
EOSINOPHIL # BLD AUTO: 0 K/UL (ref 0–0.5)
EOSINOPHIL NFR BLD: 0.3 % (ref 0–8)
EOSINOPHIL NFR BLD: 0.5 % (ref 0–8)
ERYTHROCYTE [DISTWIDTH] IN BLOOD BY AUTOMATED COUNT: 16.7 % (ref 11.5–14.5)
ERYTHROCYTE [DISTWIDTH] IN BLOOD BY AUTOMATED COUNT: 16.8 % (ref 11.5–14.5)
EST. GFR  (AFRICAN AMERICAN): 5.7 ML/MIN/1.73 M^2
EST. GFR  (AFRICAN AMERICAN): 6.9 ML/MIN/1.73 M^2
EST. GFR  (NON AFRICAN AMERICAN): 4.9 ML/MIN/1.73 M^2
EST. GFR  (NON AFRICAN AMERICAN): 6 ML/MIN/1.73 M^2
FERRITIN SERPL-MCNC: 1255 NG/ML (ref 20–300)
GLUCOSE SERPL-MCNC: 109 MG/DL (ref 70–110)
GLUCOSE SERPL-MCNC: 251 MG/DL (ref 70–110)
GLUCOSE SERPL-MCNC: 271 MG/DL (ref 70–110)
GLUCOSE SERPL-MCNC: 401 MG/DL (ref 70–110)
GLUCOSE SERPL-MCNC: 65 MG/DL (ref 70–110)
HBV SURFACE AB SER QL IA: POSITIVE
HBV SURFACE AB SERPL IA-ACNC: NORMAL MIU/ML
HCO3 UR-SCNC: 19.7 MMOL/L (ref 24–28)
HCO3 UR-SCNC: 19.9 MMOL/L (ref 24–28)
HCO3 UR-SCNC: 22.7 MMOL/L (ref 24–28)
HCT VFR BLD AUTO: 28.4 % (ref 37–48.5)
HCT VFR BLD AUTO: 29.3 % (ref 37–48.5)
HCT VFR BLD CALC: 23 %PCV (ref 36–54)
HCT VFR BLD CALC: 24 %PCV (ref 36–54)
HCT VFR BLD CALC: 25 %PCV (ref 36–54)
HGB BLD-MCNC: 8.8 G/DL (ref 12–16)
HGB BLD-MCNC: 9.1 G/DL (ref 12–16)
IMM GRANULOCYTES # BLD AUTO: 0.01 K/UL (ref 0–0.04)
IMM GRANULOCYTES # BLD AUTO: 0.01 K/UL (ref 0–0.04)
IMM GRANULOCYTES NFR BLD AUTO: 0.3 % (ref 0–0.5)
IMM GRANULOCYTES NFR BLD AUTO: 0.3 % (ref 0–0.5)
IRON SERPL-MCNC: 29 UG/DL (ref 30–160)
LDH SERPL L TO P-CCNC: 207 U/L (ref 110–260)
LYMPHOCYTES # BLD AUTO: 0 K/UL (ref 1–4.8)
LYMPHOCYTES # BLD AUTO: 0 K/UL (ref 1–4.8)
LYMPHOCYTES NFR BLD: 0.7 % (ref 18–48)
LYMPHOCYTES NFR BLD: 0.8 % (ref 18–48)
MAGNESIUM SERPL-MCNC: 2 MG/DL (ref 1.6–2.6)
MCH RBC QN AUTO: 31.1 PG (ref 27–31)
MCH RBC QN AUTO: 31.4 PG (ref 27–31)
MCHC RBC AUTO-ENTMCNC: 31 G/DL (ref 32–36)
MCHC RBC AUTO-ENTMCNC: 31.1 G/DL (ref 32–36)
MCV RBC AUTO: 100 FL (ref 82–98)
MCV RBC AUTO: 101 FL (ref 82–98)
MONOCYTES # BLD AUTO: 0 K/UL (ref 0.3–1)
MONOCYTES # BLD AUTO: 0.2 K/UL (ref 0.3–1)
MONOCYTES NFR BLD: 1.3 % (ref 4–15)
MONOCYTES NFR BLD: 4.6 % (ref 4–15)
NEUTROPHILS # BLD AUTO: 2.9 K/UL (ref 1.8–7.7)
NEUTROPHILS # BLD AUTO: 3.7 K/UL (ref 1.8–7.7)
NEUTROPHILS NFR BLD: 93.5 % (ref 38–73)
NEUTROPHILS NFR BLD: 97.4 % (ref 38–73)
NRBC BLD-RTO: 0 /100 WBC
NRBC BLD-RTO: 0 /100 WBC
PCO2 BLDA: 30.2 MMHG (ref 35–45)
PCO2 BLDA: 34.6 MMHG (ref 35–45)
PCO2 BLDA: 35.7 MMHG (ref 35–45)
PH SMN: 7.35 [PH] (ref 7.35–7.45)
PH SMN: 7.42 [PH] (ref 7.35–7.45)
PH SMN: 7.43 [PH] (ref 7.35–7.45)
PHOSPHATE SERPL-MCNC: 5.3 MG/DL (ref 2.7–4.5)
PHOSPHATE SERPL-MCNC: 5.6 MG/DL (ref 2.7–4.5)
PLATELET # BLD AUTO: 104 K/UL (ref 150–350)
PLATELET # BLD AUTO: 99 K/UL (ref 150–350)
PMV BLD AUTO: 11.7 FL (ref 9.2–12.9)
PMV BLD AUTO: 12.1 FL (ref 9.2–12.9)
PO2 BLDA: 127 MMHG (ref 80–100)
PO2 BLDA: 141 MMHG (ref 80–100)
PO2 BLDA: 347 MMHG (ref 80–100)
POC BE: -2 MMOL/L
POC BE: -5 MMOL/L
POC BE: -6 MMOL/L
POC IONIZED CALCIUM: 0.98 MMOL/L (ref 1.06–1.42)
POC IONIZED CALCIUM: 1.06 MMOL/L (ref 1.06–1.42)
POC IONIZED CALCIUM: 1.07 MMOL/L (ref 1.06–1.42)
POC SATURATED O2: 100 % (ref 95–100)
POC SATURATED O2: 99 % (ref 95–100)
POC SATURATED O2: 99 % (ref 95–100)
POC TCO2: 21 MMOL/L (ref 23–27)
POC TCO2: 21 MMOL/L (ref 23–27)
POC TCO2: 24 MMOL/L (ref 23–27)
POCT GLUCOSE: 106 MG/DL (ref 70–110)
POCT GLUCOSE: 134 MG/DL (ref 70–110)
POCT GLUCOSE: 215 MG/DL (ref 70–110)
POCT GLUCOSE: 239 MG/DL (ref 70–110)
POTASSIUM BLD-SCNC: 4 MMOL/L (ref 3.5–5.1)
POTASSIUM BLD-SCNC: 4.1 MMOL/L (ref 3.5–5.1)
POTASSIUM BLD-SCNC: 4.6 MMOL/L (ref 3.5–5.1)
POTASSIUM SERPL-SCNC: 5 MMOL/L (ref 3.5–5.1)
POTASSIUM SERPL-SCNC: 5.2 MMOL/L (ref 3.5–5.1)
PROT SERPL-MCNC: 5.4 G/DL (ref 6–8.4)
RBC # BLD AUTO: 2.83 M/UL (ref 4–5.4)
RBC # BLD AUTO: 2.9 M/UL (ref 4–5.4)
SAMPLE: ABNORMAL
SATURATED IRON: 14 % (ref 20–50)
SODIUM BLD-SCNC: 128 MMOL/L (ref 136–145)
SODIUM BLD-SCNC: 131 MMOL/L (ref 136–145)
SODIUM BLD-SCNC: 138 MMOL/L (ref 136–145)
SODIUM SERPL-SCNC: 135 MMOL/L (ref 136–145)
SODIUM SERPL-SCNC: 135 MMOL/L (ref 136–145)
TACROLIMUS BLD-MCNC: 3.1 NG/ML (ref 5–15)
TOTAL IRON BINDING CAPACITY: 210 UG/DL (ref 250–450)
TRANSFERRIN SERPL-MCNC: 142 MG/DL (ref 200–375)
WBC # BLD AUTO: 3.01 K/UL (ref 3.9–12.7)
WBC # BLD AUTO: 3.93 K/UL (ref 3.9–12.7)

## 2019-09-21 PROCEDURE — 25000003 PHARM REV CODE 250: Performed by: NURSE PRACTITIONER

## 2019-09-21 PROCEDURE — 80053 COMPREHEN METABOLIC PANEL: CPT

## 2019-09-21 PROCEDURE — 97802 MEDICAL NUTRITION INDIV IN: CPT

## 2019-09-21 PROCEDURE — 83735 ASSAY OF MAGNESIUM: CPT

## 2019-09-21 PROCEDURE — 63600175 PHARM REV CODE 636 W HCPCS: Performed by: PHYSICIAN ASSISTANT

## 2019-09-21 PROCEDURE — 83615 LACTATE (LD) (LDH) ENZYME: CPT

## 2019-09-21 PROCEDURE — 25000003 PHARM REV CODE 250: Performed by: SURGERY

## 2019-09-21 PROCEDURE — 25000003 PHARM REV CODE 250: Performed by: HOSPITALIST

## 2019-09-21 PROCEDURE — 82728 ASSAY OF FERRITIN: CPT

## 2019-09-21 PROCEDURE — 99233 PR SUBSEQUENT HOSPITAL CARE,LEVL III: ICD-10-PCS | Mod: GC,,, | Performed by: INTERNAL MEDICINE

## 2019-09-21 PROCEDURE — 80197 ASSAY OF TACROLIMUS: CPT

## 2019-09-21 PROCEDURE — 99233 SBSQ HOSP IP/OBS HIGH 50: CPT | Mod: GC,,, | Performed by: INTERNAL MEDICINE

## 2019-09-21 PROCEDURE — 20600001 HC STEP DOWN PRIVATE ROOM

## 2019-09-21 PROCEDURE — 83540 ASSAY OF IRON: CPT

## 2019-09-21 PROCEDURE — 94761 N-INVAS EAR/PLS OXIMETRY MLT: CPT

## 2019-09-21 PROCEDURE — 84100 ASSAY OF PHOSPHORUS: CPT

## 2019-09-21 PROCEDURE — 80069 RENAL FUNCTION PANEL: CPT

## 2019-09-21 PROCEDURE — 63600175 PHARM REV CODE 636 W HCPCS: Performed by: NURSE PRACTITIONER

## 2019-09-21 PROCEDURE — 85025 COMPLETE CBC W/AUTO DIFF WBC: CPT | Mod: 91

## 2019-09-21 PROCEDURE — 63600175 PHARM REV CODE 636 W HCPCS: Performed by: HOSPITALIST

## 2019-09-21 PROCEDURE — 25000003 PHARM REV CODE 250: Performed by: PHYSICIAN ASSISTANT

## 2019-09-21 PROCEDURE — 63600175 PHARM REV CODE 636 W HCPCS: Performed by: SURGERY

## 2019-09-21 RX ORDER — SODIUM BICARBONATE 650 MG/1
650 TABLET ORAL 2 TIMES DAILY
Status: DISCONTINUED | OUTPATIENT
Start: 2019-09-21 | End: 2019-09-21

## 2019-09-21 RX ORDER — ONDANSETRON 2 MG/ML
4 INJECTION INTRAMUSCULAR; INTRAVENOUS EVERY 6 HOURS PRN
Status: DISCONTINUED | OUTPATIENT
Start: 2019-09-21 | End: 2019-09-25 | Stop reason: HOSPADM

## 2019-09-21 RX ORDER — SODIUM CHLORIDE 9 MG/ML
INJECTION, SOLUTION INTRAVENOUS CONTINUOUS
Status: DISCONTINUED | OUTPATIENT
Start: 2019-09-21 | End: 2019-09-21

## 2019-09-21 RX ORDER — ERGOCALCIFEROL 1.25 MG/1
50000 CAPSULE ORAL
Status: DISCONTINUED | OUTPATIENT
Start: 2019-09-22 | End: 2019-09-25 | Stop reason: HOSPADM

## 2019-09-21 RX ORDER — CALCITRIOL 0.5 UG/1
0.5 CAPSULE ORAL DAILY
Status: DISCONTINUED | OUTPATIENT
Start: 2019-09-21 | End: 2019-09-25 | Stop reason: HOSPADM

## 2019-09-21 RX ORDER — OXYCODONE AND ACETAMINOPHEN 10; 325 MG/1; MG/1
1 TABLET ORAL EVERY 4 HOURS PRN
Status: DISCONTINUED | OUTPATIENT
Start: 2019-09-21 | End: 2019-09-25 | Stop reason: HOSPADM

## 2019-09-21 RX ORDER — OXYCODONE AND ACETAMINOPHEN 5; 325 MG/1; MG/1
1 TABLET ORAL EVERY 4 HOURS PRN
Status: DISCONTINUED | OUTPATIENT
Start: 2019-09-21 | End: 2019-09-25 | Stop reason: HOSPADM

## 2019-09-21 RX ADMIN — ONDANSETRON 4 MG: 2 INJECTION INTRAMUSCULAR; INTRAVENOUS at 12:09

## 2019-09-21 RX ADMIN — HEPARIN SODIUM 5000 UNITS: 5000 INJECTION, SOLUTION INTRAVENOUS; SUBCUTANEOUS at 06:09

## 2019-09-21 RX ADMIN — THERA TABS 1 TABLET: TAB at 09:09

## 2019-09-21 RX ADMIN — HYDRALAZINE HYDROCHLORIDE 25 MG: 25 TABLET, FILM COATED ORAL at 02:09

## 2019-09-21 RX ADMIN — ANTI-THYMOCYTE GLOBULIN (RABBIT) 125 MG: 5 INJECTION, POWDER, LYOPHILIZED, FOR SOLUTION INTRAVENOUS at 10:09

## 2019-09-21 RX ADMIN — NYSTATIN 500000 UNITS: 100000 SUSPENSION ORAL at 06:09

## 2019-09-21 RX ADMIN — SODIUM BICARBONATE: 84 INJECTION, SOLUTION INTRAVENOUS at 06:09

## 2019-09-21 RX ADMIN — OXYCODONE HYDROCHLORIDE AND ACETAMINOPHEN 1 TABLET: 5; 325 TABLET ORAL at 09:09

## 2019-09-21 RX ADMIN — TACROLIMUS 2 MG: 1 CAPSULE ORAL at 06:09

## 2019-09-21 RX ADMIN — SODIUM CHLORIDE: 0.9 INJECTION, SOLUTION INTRAVENOUS at 02:09

## 2019-09-21 RX ADMIN — BISACODYL 10 MG: 5 TABLET, COATED ORAL at 09:09

## 2019-09-21 RX ADMIN — HYDRALAZINE HYDROCHLORIDE 25 MG: 25 TABLET, FILM COATED ORAL at 09:09

## 2019-09-21 RX ADMIN — SULFAMETHOXAZOLE AND TRIMETHOPRIM 1 TABLET: 400; 80 TABLET ORAL at 09:09

## 2019-09-21 RX ADMIN — NYSTATIN 500000 UNITS: 100000 SUSPENSION ORAL at 09:09

## 2019-09-21 RX ADMIN — SODIUM BICARBONATE 650 MG TABLET 650 MG: at 12:09

## 2019-09-21 RX ADMIN — HEPARIN SODIUM 5000 UNITS: 5000 INJECTION, SOLUTION INTRAVENOUS; SUBCUTANEOUS at 09:09

## 2019-09-21 RX ADMIN — HEPARIN SODIUM 5000 UNITS: 5000 INJECTION, SOLUTION INTRAVENOUS; SUBCUTANEOUS at 02:09

## 2019-09-21 RX ADMIN — NYSTATIN 500000 UNITS: 100000 SUSPENSION ORAL at 02:09

## 2019-09-21 RX ADMIN — CEFAZOLIN 2 G: 1 INJECTION, POWDER, FOR SOLUTION INTRAMUSCULAR; INTRAVENOUS at 12:09

## 2019-09-21 RX ADMIN — DOCUSATE SODIUM 100 MG: 100 CAPSULE, LIQUID FILLED ORAL at 02:09

## 2019-09-21 RX ADMIN — SODIUM CHLORIDE 500 ML: 0.9 INJECTION, SOLUTION INTRAVENOUS at 03:09

## 2019-09-21 RX ADMIN — ACETAMINOPHEN 650 MG: 325 TABLET ORAL at 10:09

## 2019-09-21 RX ADMIN — DOCUSATE SODIUM 100 MG: 100 CAPSULE, LIQUID FILLED ORAL at 09:09

## 2019-09-21 RX ADMIN — DIPHENHYDRAMINE HYDROCHLORIDE 25 MG: 25 CAPSULE ORAL at 10:09

## 2019-09-21 RX ADMIN — FAMOTIDINE 20 MG: 20 TABLET ORAL at 09:09

## 2019-09-21 RX ADMIN — DEXTROSE 250 MG: 50 INJECTION, SOLUTION INTRAVENOUS at 09:09

## 2019-09-21 RX ADMIN — MYCOPHENOLATE MOFETIL 1000 MG: 250 CAPSULE ORAL at 09:09

## 2019-09-21 RX ADMIN — NIFEDIPINE 90 MG: 30 TABLET, FILM COATED, EXTENDED RELEASE ORAL at 09:09

## 2019-09-21 RX ADMIN — TACROLIMUS 2 MG: 1 CAPSULE ORAL at 09:09

## 2019-09-21 RX ADMIN — INSULIN ASPART 2 UNITS: 100 INJECTION, SOLUTION INTRAVENOUS; SUBCUTANEOUS at 05:09

## 2019-09-21 RX ADMIN — INSULIN ASPART 1 UNITS: 100 INJECTION, SOLUTION INTRAVENOUS; SUBCUTANEOUS at 09:09

## 2019-09-21 RX ADMIN — CALCITRIOL 0.5 MCG: 0.5 CAPSULE, LIQUID FILLED ORAL at 12:09

## 2019-09-21 NOTE — CONSULTS
[facility]  Kidney Transplant  Consult Note    Inpatient consult to Transplant Nephrology (KTM)  Consult performed by: Wes Nixon MD  Consult ordered by: Nikky Sierra MD  Reason for consult: Status post kidney transplant          Subjective:     Chief Complaint/Reason for Admission: kidney transplant    History of Present Illness:  Ms. Satinder Schulte is a 63 year old AA female with a PMHx relevant for HTN, CVA and ESRD presumed secondary to HTN who received DBD kidney transplant 9/20/2019 (Thymo induction, PHS not increase, KDPI 78%, CIT 9 hr 5 min, CMV D-/R+, HBV cAB +, HCV AB + NAOMI -). Patient is currently on hemodialysis started on 8/18/14. Patient is dialyzing on TTS schedule. Last HD 9/20/2019  x 3.5 hours via LUE AVF EDW 89.5 kgs.  She is doing well this a.m..  She does complain of some nausea, tolerated procedure well she has a ZULEIKA drain below her incision.  Wound looks dry staples no evidence of fluid collection.  She complained of some nausea this morning pain is well controlled urine output is 100 cc an hour.  Patient received 1 unit of blood intraoperatively secondary to having some wooziness of bleeding.  She has a double artery and the inferior artery by bifurcates in 3 intra op ultrasound with RI is a 1 follow-up ultrasound with good are eyes 0.76 and good arterial velocities of 156.  She is receiving today time O 2.  Full dose.  CVP low this morning at 2 it has improved all the way up to 5, hemoglobin stable 9.1. She seems volume down will start IV fluids for hydration.  Acceptable blood pressure BP improving.  Tolerating p.o. he but poor p.o. intake.    She is a 3 day Lane.  Lane to come out on 09/23        Facility-Administered Medications Prior to Admission   Medication    lidocaine HCl 2% urojet     PTA Medications   Medication Sig    aspirin (ECOTRIN) 81 MG EC tablet Take 81 mg by mouth once daily.    atorvastatin (LIPITOR) 40 MG tablet Take 40 mg by mouth  once daily.     B complex-vitamin C-folic acid (NEPHRO-KATHY) 0.8 mg Tab Take by mouth.    calcitRIOL (ROCALTROL) 0.5 MCG capsule Take 0.5 mcg by mouth once daily.      ergocalciferol (VITAMIN D2) 50,000 unit capsule Take 50,000 Units by mouth every 30 days.      hydrALAZINE (APRESOLINE) 25 MG tablet Take 100 mg by mouth 3 (three) times daily.     lactulose (CHRONULAC) 10 gram/15 mL solution Take 10 g by mouth 2 (two) times daily as needed.     loratadine (CLARITIN REDITABS) 10 mg dissolvable tablet Take 10 mg by mouth once daily.    nystatin (MYCOSTATIN) ointment Apply topically 2 (two) times daily.    RENVELA 800 mg Tab 2,400 mg 3 (three) times daily with meals. And 800 mg with snacks    SENSIPAR 60 mg Tab Take 90 mg by mouth once daily.    carvedilol (COREG) 25 MG tablet Take 12.5 mg by mouth 2 (two) times daily with meals.     clonidine (CATAPRES) 0.1 MG tablet Take 0.1 mg by mouth once daily.     labetalol (NORMODYNE) 300 MG tablet Take 300 mg by mouth 2 (two) times daily.      losartan (COZAAR) 100 MG tablet Take 100 mg by mouth once daily.     nifedipine (ADALAT CC) 90 MG TbSR Take 90 mg by mouth 2 (two) times daily.     pantoprazole (PROTONIX) 40 MG tablet Take 40 mg by mouth once daily.       Review of patient's allergies indicates:  No Known Allergies    Past Medical History:   Diagnosis Date    Abnormal Pap smear     repeat paps were normal    Anemia associated with chronic renal failure     Awaiting organ transplant status  - 02/18/2013 6/6/2014    Blood type A+ 6/6/2014    CKD (chronic kidney disease) stage 5, GFR less than 15 ml/min     secondary hypertension    Essential hypertension 5/19/2017    Hyperlipidemia     Hypertension, renal 1992    Stroke 2007    Residual speech deficit     Past Surgical History:   Procedure Laterality Date    AV FISTULA PLACEMENT  2014    Biopsy-bone marrow Right 8/23/2018    Performed [facility]    CHOLECYSTECTOMY  2008  "   HYSTERECTOMY  2011    TAHBSO for benign reasons    OOPHORECTOMY       Family History     Problem Relation (Age of Onset)    Breast cancer Daughter    Heart disease Mother, Father, Sister    Hypertension Mother    Kidney disease Mother, Brother    Ovarian cancer Cousin    Stroke Mother, Sister        Tobacco Use    Smoking status: Never Smoker    Smokeless tobacco: Never Used   Substance and Sexual Activity    Alcohol use: No    Drug use: No    Sexual activity: Never     Comment: single, Lives with daughter, on Disability, Lives in [place]        Review of Systems   Constitutional: Positive for activity change. Negative for appetite change, chills, fatigue and fever.   HENT: Negative for congestion and facial swelling.    Eyes: Negative for pain, discharge and visual disturbance.   Respiratory: Negative for cough, chest tightness, shortness of breath and wheezing.    Cardiovascular: Negative for chest pain, palpitations and leg swelling.   Gastrointestinal: Positive for abdominal pain (incisional). Negative for abdominal distention, nausea and vomiting.   Endocrine: Negative.    Genitourinary: Negative for dysuria and flank pain.   Musculoskeletal: Negative for back pain.   Skin: Positive for wound (RLQ incision c/d/i with surgical dressing). Negative for pallor and rash.   Allergic/Immunologic: Positive for immunocompromised state.   Neurological: Negative for dizziness, tremors, weakness and light-headedness.   Hematological: Negative.    Psychiatric/Behavioral: Negative for agitation, confusion and dysphoric mood. The patient is not nervous/anxious.      Objective:     Vital Signs (Most Recent):  Temp: 98.5 °F (36.9 °C) (09/21/19 1628)  Pulse: 85 (09/21/19 1628)  Resp: 20 (09/21/19 1628)  BP: (!) 131/59 (09/21/19 1628)  SpO2: 97 % (09/21/19 1628)  Height: 5' 3" (160 cm)  Weight: 93 kg (205 lb 0.4 oz)  Body mass index is 36.32 kg/m².     Physical Exam   Constitutional: She is oriented to person, place, " "and time. She appears well-developed and well-nourished.   HENT:   Head: Normocephalic and atraumatic.   Eyes: Pupils are equal, round, and reactive to light. EOM are normal. No scleral icterus.   Neck: Normal range of motion. Neck supple. No JVD present.   Cardiovascular: Normal rate, regular rhythm and normal heart sounds.   No murmur heard.  LUE AVF +/+   Pulmonary/Chest: Effort normal and breath sounds normal. No respiratory distress. She has no wheezes.   Abdominal: Soft. Bowel sounds are normal. She exhibits no distension. There is tenderness (As suspected ports transplant).       Musculoskeletal: Normal range of motion. She exhibits no edema.   Neurological: She is alert and oriented to person, place, and time.   Skin: Skin is warm and dry.   Psychiatric: She has a normal mood and affect. Her behavior is normal.   Nursing note and vitals reviewed.      Laboratory  Labs pending for pre op eval    Diagnostic Results:  None    Assessment/Plan:     * Status post kidney transplant  · Very good urine output with improving clearance  · Serum creatinine trending down  · Will pathway today  · IV fluids lactated Ringer at 75 mL an hour  · Metabolic acidosis noted, attempted p.o. bicarb this morning but labs this afternoon reflect a worsening acidosis will start sodium bicarb drip.  · Continue IV fluids for hydration  · Trend urine output      Long-term use of immunosuppressant medication  - see "prophylactic immunotherapy."      Prophylactic immunotherapy  - Thymo induction due to leukopenia.  Full dose 2.  - Continue Prograf. Monitor trough daily and adjust dose as needed to achieve therapeutic level.  - Continue Cellcept  - Continue steroids, per protocol.        Anemia associated with chronic renal failure  - Hg  9.1 Target status post 1 unit of PRBCs transfused intraop, hemoglobin not on target will continue to monitor  - Will hold SHILO  at this time  -Will request iron studies to assess further needs of " supplementation       At risk for opportunistic infections  - Continue Bactrim for PCP prophylaxis.  - Continue Nystatin for thrush prophylaxis.  - Start Valcyte for CMV prophylaxis on 9/30.          Discharge Planning:  Not a candidate for discharge      Wes Nixon MD  Kidney Transplant  [facility]

## 2019-09-21 NOTE — ASSESSMENT & PLAN NOTE
· Very good urine output with improving clearance  · Serum creatinine trending down  · Will pathway today  · IV fluids lactated Ringer at 75 mL an hour  · Metabolic acidosis noted, attempted p.o. bicarb this morning but labs this afternoon reflect a worsening acidosis will start sodium bicarb drip.  · Continue IV fluids for hydration  · Trend urine output

## 2019-09-21 NOTE — ASSESSMENT & PLAN NOTE
- Hg  9.1 Target status post 1 unit of PRBCs transfused intraop, hemoglobin not on target will continue to monitor  - Will hold SHILO  at this time  -Will request iron studies to assess further needs of supplementation

## 2019-09-21 NOTE — PLAN OF CARE
Problem: Adult Inpatient Plan of Care  Goal: Plan of Care Review  Outcome: Ongoing (interventions implemented as appropriate)  Pt AAOx4.  Pain well controlled with PCA pump.  Urine output 60-175cc/hr.  CVP 1-4 overnight.  500cc NS bolus administered x2 overnight.  Fall risk precautions initiated.  Pt in lowest bed position setting, lighting adjusted, pt to wear nonskid socks when ambulating, side rails up x2.  Pt remain free from falls during shift.  Pt verbalize understanding to call when needed assistance. Call light within reach.  Will continue to monitor.

## 2019-09-21 NOTE — PLAN OF CARE
Problem: Adult Inpatient Plan of Care  Goal: Plan of Care Review  Outcome: Ongoing (interventions implemented as appropriate)  Recommendations    Recommendation:   1. Continue regular diet  2. If renal fxn does not improve in 48 hours suggest renal diet  3. If PO <50% of meals, suggest boost plus to increase intake  4. Daily weights  RD to monitor and follow up     Goals: 1. pt to consume/tolerate >85% of EEN/EPN while admitted  Nutrition Goal Status: new  Communication of RD Recs: other (comment)(POC)

## 2019-09-21 NOTE — CONSULTS
"  [facility]  Adult Nutrition  Consult Note    SUMMARY     Recommendations    Recommendation:   1. Continue regular diet  2. If renal fxn does not improve in 48 hours suggest renal diet  3. If PO <50% of meals, suggest boost plus to increase intake  4. Daily weights  RD to monitor and follow up     Goals: 1. pt to consume/tolerate >85% of EEN/EPN while admitted  Nutrition Goal Status: new  Communication of RD Recs: other (comment)(POC)    Reason for Assessment    Reason For Assessment: consult  Diagnosis: (kidney tx)  Relevant Medical History: s/p kidney tx; HTN; CVA; ESRD  Interdisciplinary Rounds: did not attend  General Information Comments: Pt endorses good appetite, seen eating breakfast. States intake good PTA following regular diet at home. Discussed with pt and family s/p kidney tx diet, nutrition precautions and low Na diet. Provided handouts to family. No futher questions. Pt with no c/o N/V/C/D reported. No recent wt loss reported, states UBW ~196 lbs. Wt gain on admisison. NFPE complete, no s/s of malnutrition at this time.   Nutrition Discharge Planning: d/c on low Na diet     Nutrition Risk Screen    Nutrition Risk Screen: no indicators present    Nutrition/Diet History    Spiritual, Cultural Beliefs, Zoroastrian Practices, Values that Affect Care: no  Food Allergies: NKFA  Factors Affecting Nutritional Intake: None identified at this time    Anthropometrics    Temp: 98.5 °F (36.9 °C)  Height Method: Stated  Height: 5' 3" (160 cm)  Height (inches): 63 in  Weight Method: Bed Scale  Weight: 93 kg (205 lb 0.4 oz)  Weight (lb): 205.03 lb  Ideal Body Weight (IBW), Female: 115 lb  % Ideal Body Weight, Female (lb): 172.44 lb  BMI (Calculated): 35.2  BMI Grade: 35 - 39.9 - obesity - grade II  Usual Body Weight (UBW), k.09 kg  % Usual Body Weight: 104.61    Lab/Procedures/Meds    Pertinent Labs Reviewed: reviewed  Pertinent Labs Comments: Na 135; K 5.3; BUN 48; Cr 8.0; Ca 7.5; Phos " 5.6  Pertinent Medications Reviewed: reviewed  Pertinent Medications Comments: docusate sodium; ergocalciferol; heparin; methylprednisolone; MVI; prednisone; statin; tacrolimus     Estimated/Assessed Needs    Weight Used For Calorie Calculations: 93 kg (205 lb 0.4 oz)  Energy Calorie Requirements (kcal): 1830 kcal/d  Energy Need Method: [place]-St Jeor(x1.25)  Protein Requirements: 74-93 g/day (0.8-1.0 g/kg)   Weight Used For Protein Calculations: 93 kg (205 lb 0.4 oz)  Fluid Requirements (mL): 1 mL/kcal or per MD  RDA Method (mL): 1830    Nutrition Prescription Ordered    Current Diet Order: Regular diet     Evaluation of Received Nutrient/Fluid Intake    Energy Calories Required: meeting needs  Protein Required: meeting needs  Fluid Required: meeting needs  Comments: LBM 9/19   Tolerance: tolerating  % Intake of Estimated Energy Needs: 75 - 100 %  % Meal Intake: 75 - 100 %    Nutrition Risk    Level of Risk/Frequency of Follow-up: high     Assessment and Plan  Nutrition Problem  Increased nutrient needs    Related to (etiology):   physicological causes related to healing     Signs and Symptoms (as evidenced by):   S/p kidney tx 9/20/19     Interventions (treatment strategy):  Collaboration of care with other providers    Nutrition Diagnosis Status:   New    Monitor and Evaluation    Food and Nutrient Intake: energy intake, food and beverage intake  Food and Nutrient Adminstration: diet order  Knowledge/Beliefs/Attitudes: food and nutrition knowledge/skill  Physical Activity and Function: nutrition-related ADLs and IADLs  Anthropometric Measurements: weight, weight change  Biochemical Data, Medical Tests and Procedures: electrolyte and renal panel, inflammatory profile, gastrointestinal profile, lipid profile, glucose/endocrine profile  Nutrition-Focused Physical Findings: overall appearance     Nutrition Follow-Up    RD Follow-up?: Yes

## 2019-09-21 NOTE — PLAN OF CARE
Problem: Adult Inpatient Plan of Care  Goal: Plan of Care Review  Outcome: Ongoing (interventions implemented as appropriate)  Pt. AAO x 4, WBC 3.93 afebrile, daughter at the bedside- interacting with the patient and participating in care.  Patient transitioned to the pathway today- NS @ 75 mL/hr, D51/2NS and PCA pump dc'd--sodium bicarb gtt @ 75 mL/hr.  2nd dose of Thymo given-pt. Tolerated it well  RLQ ZULEIKA with 120 cc sanguineous drainage  RLQ incision open to air with staples intact-- cleansed with betadine x 1 this shift by nurse-family educated to clean 3 x day  Pain controlled with PCA pump and pt. Declined any PO pain medications once dc'd  Lane with pink tinged urine--- UOP 1.1L   , 134, 239--insulin given per orders  C/o nausea after meals--zofran given x 1 and provided relief  Pt. Ambulated to to the chair with assistance and was up in chair for a few hours  Safety precautions maintained throughout the shift, call light within reach, and nonslip socks when out of bed.

## 2019-09-22 LAB
ALBUMIN SERPL BCP-MCNC: 2.7 G/DL (ref 3.5–5.2)
ALBUMIN SERPL BCP-MCNC: 2.8 G/DL (ref 3.5–5.2)
ALBUMIN SERPL BCP-MCNC: 2.9 G/DL (ref 3.5–5.2)
ANION GAP SERPL CALC-SCNC: 10 MMOL/L (ref 8–16)
ANION GAP SERPL CALC-SCNC: 9 MMOL/L (ref 8–16)
ANION GAP SERPL CALC-SCNC: 9 MMOL/L (ref 8–16)
ANISOCYTOSIS BLD QL SMEAR: SLIGHT
ANISOCYTOSIS BLD QL SMEAR: SLIGHT
BASOPHILS # BLD AUTO: 0 K/UL (ref 0–0.2)
BASOPHILS # BLD AUTO: ABNORMAL K/UL (ref 0–0.2)
BASOPHILS NFR BLD: 0 % (ref 0–1.9)
BASOPHILS NFR BLD: 0 % (ref 0–1.9)
BLD PROD TYP BPU: NORMAL
BLD PROD TYP BPU: NORMAL
BLOOD UNIT EXPIRATION DATE: NORMAL
BLOOD UNIT EXPIRATION DATE: NORMAL
BLOOD UNIT TYPE CODE: 6200
BLOOD UNIT TYPE CODE: 6200
BLOOD UNIT TYPE: NORMAL
BLOOD UNIT TYPE: NORMAL
BUN SERPL-MCNC: 48 MG/DL (ref 8–23)
BUN SERPL-MCNC: 52 MG/DL (ref 8–23)
BUN SERPL-MCNC: 54 MG/DL (ref 8–23)
BURR CELLS BLD QL SMEAR: ABNORMAL
CALCIUM SERPL-MCNC: 8 MG/DL (ref 8.7–10.5)
CALCIUM SERPL-MCNC: 8.2 MG/DL (ref 8.7–10.5)
CALCIUM SERPL-MCNC: 8.4 MG/DL (ref 8.7–10.5)
CHLORIDE SERPL-SCNC: 107 MMOL/L (ref 95–110)
CO2 SERPL-SCNC: 23 MMOL/L (ref 23–29)
CO2 SERPL-SCNC: 24 MMOL/L (ref 23–29)
CO2 SERPL-SCNC: 24 MMOL/L (ref 23–29)
CODING SYSTEM: NORMAL
CODING SYSTEM: NORMAL
CREAT SERPL-MCNC: 4.5 MG/DL (ref 0.5–1.4)
CREAT SERPL-MCNC: 5.1 MG/DL (ref 0.5–1.4)
CREAT SERPL-MCNC: 5.3 MG/DL (ref 0.5–1.4)
DACRYOCYTES BLD QL SMEAR: ABNORMAL
DACRYOCYTES BLD QL SMEAR: ABNORMAL
DIFFERENTIAL METHOD: ABNORMAL
DIFFERENTIAL METHOD: ABNORMAL
DISPENSE STATUS: NORMAL
DISPENSE STATUS: NORMAL
EOSINOPHIL # BLD AUTO: 0 K/UL (ref 0–0.5)
EOSINOPHIL # BLD AUTO: ABNORMAL K/UL (ref 0–0.5)
EOSINOPHIL NFR BLD: 2 % (ref 0–8)
EOSINOPHIL NFR BLD: 2 % (ref 0–8)
ERYTHROCYTE [DISTWIDTH] IN BLOOD BY AUTOMATED COUNT: 16.8 % (ref 11.5–14.5)
ERYTHROCYTE [DISTWIDTH] IN BLOOD BY AUTOMATED COUNT: 17.3 % (ref 11.5–14.5)
EST. GFR  (AFRICAN AMERICAN): 11.4 ML/MIN/1.73 M^2
EST. GFR  (AFRICAN AMERICAN): 9.4 ML/MIN/1.73 M^2
EST. GFR  (AFRICAN AMERICAN): 9.8 ML/MIN/1.73 M^2
EST. GFR  (NON AFRICAN AMERICAN): 8.1 ML/MIN/1.73 M^2
EST. GFR  (NON AFRICAN AMERICAN): 8.5 ML/MIN/1.73 M^2
EST. GFR  (NON AFRICAN AMERICAN): 9.9 ML/MIN/1.73 M^2
GIANT PLATELETS BLD QL SMEAR: PRESENT
GLUCOSE SERPL-MCNC: 216 MG/DL (ref 70–110)
GLUCOSE SERPL-MCNC: 83 MG/DL (ref 70–110)
GLUCOSE SERPL-MCNC: 92 MG/DL (ref 70–110)
HCT VFR BLD AUTO: 23.9 % (ref 37–48.5)
HCT VFR BLD AUTO: 23.9 % (ref 37–48.5)
HCT VFR BLD AUTO: 27.7 % (ref 37–48.5)
HGB BLD-MCNC: 7.3 G/DL (ref 12–16)
HGB BLD-MCNC: 7.6 G/DL (ref 12–16)
HGB BLD-MCNC: 9.1 G/DL (ref 12–16)
HYPOCHROMIA BLD QL SMEAR: ABNORMAL
IMM GRANULOCYTES # BLD AUTO: 0.01 K/UL (ref 0–0.04)
IMM GRANULOCYTES # BLD AUTO: ABNORMAL K/UL (ref 0–0.04)
IMM GRANULOCYTES NFR BLD AUTO: 0.5 % (ref 0–0.5)
IMM GRANULOCYTES NFR BLD AUTO: ABNORMAL % (ref 0–0.5)
LYMPHOCYTES # BLD AUTO: 0.1 K/UL (ref 1–4.8)
LYMPHOCYTES # BLD AUTO: ABNORMAL K/UL (ref 1–4.8)
LYMPHOCYTES NFR BLD: 2 % (ref 18–48)
LYMPHOCYTES NFR BLD: 3.4 % (ref 18–48)
MAGNESIUM SERPL-MCNC: 2 MG/DL (ref 1.6–2.6)
MCH RBC QN AUTO: 30.6 PG (ref 27–31)
MCH RBC QN AUTO: 30.7 PG (ref 27–31)
MCHC RBC AUTO-ENTMCNC: 31.8 G/DL (ref 32–36)
MCHC RBC AUTO-ENTMCNC: 32.9 G/DL (ref 32–36)
MCV RBC AUTO: 94 FL (ref 82–98)
MCV RBC AUTO: 96 FL (ref 82–98)
MONOCYTES # BLD AUTO: 0.1 K/UL (ref 0.3–1)
MONOCYTES # BLD AUTO: ABNORMAL K/UL (ref 0.3–1)
MONOCYTES NFR BLD: 3 % (ref 4–15)
MONOCYTES NFR BLD: 6.9 % (ref 4–15)
NEUTROPHILS # BLD AUTO: 1.8 K/UL (ref 1.8–7.7)
NEUTROPHILS NFR BLD: 87.2 % (ref 38–73)
NEUTROPHILS NFR BLD: 89 % (ref 38–73)
NEUTS BAND NFR BLD MANUAL: 4 %
NRBC BLD-RTO: 0 /100 WBC
NRBC BLD-RTO: 0 /100 WBC
OVALOCYTES BLD QL SMEAR: ABNORMAL
OVALOCYTES BLD QL SMEAR: ABNORMAL
PHOSPHATE SERPL-MCNC: 3.2 MG/DL (ref 2.7–4.5)
PHOSPHATE SERPL-MCNC: 4.5 MG/DL (ref 2.7–4.5)
PHOSPHATE SERPL-MCNC: 5.2 MG/DL (ref 2.7–4.5)
PLATELET # BLD AUTO: 79 K/UL (ref 150–350)
PLATELET # BLD AUTO: 79 K/UL (ref 150–350)
PLATELET BLD QL SMEAR: ABNORMAL
PLATELET BLD QL SMEAR: ABNORMAL
PMV BLD AUTO: 12.4 FL (ref 9.2–12.9)
PMV BLD AUTO: 12.6 FL (ref 9.2–12.9)
POCT GLUCOSE: 137 MG/DL (ref 70–110)
POCT GLUCOSE: 138 MG/DL (ref 70–110)
POCT GLUCOSE: 214 MG/DL (ref 70–110)
POCT GLUCOSE: 79 MG/DL (ref 70–110)
POIKILOCYTOSIS BLD QL SMEAR: SLIGHT
POIKILOCYTOSIS BLD QL SMEAR: SLIGHT
POLYCHROMASIA BLD QL SMEAR: ABNORMAL
POLYCHROMASIA BLD QL SMEAR: ABNORMAL
POTASSIUM SERPL-SCNC: 4.2 MMOL/L (ref 3.5–5.1)
POTASSIUM SERPL-SCNC: 4.3 MMOL/L (ref 3.5–5.1)
POTASSIUM SERPL-SCNC: 4.7 MMOL/L (ref 3.5–5.1)
RBC # BLD AUTO: 2.48 M/UL (ref 4–5.4)
RBC # BLD AUTO: 2.96 M/UL (ref 4–5.4)
SCHISTOCYTES BLD QL SMEAR: ABNORMAL
SODIUM SERPL-SCNC: 139 MMOL/L (ref 136–145)
SODIUM SERPL-SCNC: 140 MMOL/L (ref 136–145)
SODIUM SERPL-SCNC: 141 MMOL/L (ref 136–145)
TACROLIMUS BLD-MCNC: <1.5 NG/ML (ref 5–15)
TRANS ERYTHROCYTES VOL PATIENT: NORMAL ML
TRANS ERYTHROCYTES VOL PATIENT: NORMAL ML
WBC # BLD AUTO: 2.04 K/UL (ref 3.9–12.7)
WBC # BLD AUTO: 2.28 K/UL (ref 3.9–12.7)

## 2019-09-22 PROCEDURE — 25000003 PHARM REV CODE 250: Performed by: SURGERY

## 2019-09-22 PROCEDURE — 85027 COMPLETE CBC AUTOMATED: CPT

## 2019-09-22 PROCEDURE — 83735 ASSAY OF MAGNESIUM: CPT

## 2019-09-22 PROCEDURE — 63600175 PHARM REV CODE 636 W HCPCS: Performed by: SURGERY

## 2019-09-22 PROCEDURE — 25000003 PHARM REV CODE 250: Performed by: NURSE PRACTITIONER

## 2019-09-22 PROCEDURE — 25000003 PHARM REV CODE 250: Performed by: PHYSICIAN ASSISTANT

## 2019-09-22 PROCEDURE — 80069 RENAL FUNCTION PANEL: CPT | Mod: 91

## 2019-09-22 PROCEDURE — 85018 HEMOGLOBIN: CPT

## 2019-09-22 PROCEDURE — 99233 SBSQ HOSP IP/OBS HIGH 50: CPT | Mod: GC,,, | Performed by: INTERNAL MEDICINE

## 2019-09-22 PROCEDURE — 63600175 PHARM REV CODE 636 W HCPCS: Performed by: PHYSICIAN ASSISTANT

## 2019-09-22 PROCEDURE — P9021 RED BLOOD CELLS UNIT: HCPCS

## 2019-09-22 PROCEDURE — 25000003 PHARM REV CODE 250: Performed by: HOSPITALIST

## 2019-09-22 PROCEDURE — 63600175 PHARM REV CODE 636 W HCPCS: Performed by: HOSPITALIST

## 2019-09-22 PROCEDURE — 99233 PR SUBSEQUENT HOSPITAL CARE,LEVL III: ICD-10-PCS | Mod: GC,,, | Performed by: INTERNAL MEDICINE

## 2019-09-22 PROCEDURE — 85007 BL SMEAR W/DIFF WBC COUNT: CPT

## 2019-09-22 PROCEDURE — 25000003 PHARM REV CODE 250: Performed by: INTERNAL MEDICINE

## 2019-09-22 PROCEDURE — 80069 RENAL FUNCTION PANEL: CPT

## 2019-09-22 PROCEDURE — 20600001 HC STEP DOWN PRIVATE ROOM

## 2019-09-22 PROCEDURE — 85014 HEMATOCRIT: CPT

## 2019-09-22 PROCEDURE — 80197 ASSAY OF TACROLIMUS: CPT

## 2019-09-22 PROCEDURE — 85025 COMPLETE CBC W/AUTO DIFF WBC: CPT

## 2019-09-22 RX ORDER — HYDROCODONE BITARTRATE AND ACETAMINOPHEN 500; 5 MG/1; MG/1
TABLET ORAL
Status: DISCONTINUED | OUTPATIENT
Start: 2019-09-22 | End: 2019-09-23

## 2019-09-22 RX ORDER — TACROLIMUS 1 MG/1
2 CAPSULE ORAL ONCE
Status: COMPLETED | OUTPATIENT
Start: 2019-09-22 | End: 2019-09-22

## 2019-09-22 RX ORDER — ACETAMINOPHEN 325 MG/1
650 TABLET ORAL ONCE
Status: COMPLETED | OUTPATIENT
Start: 2019-09-22 | End: 2019-09-22

## 2019-09-22 RX ORDER — NIFEDIPINE 30 MG/1
60 TABLET, EXTENDED RELEASE ORAL 2 TIMES DAILY
Status: DISCONTINUED | OUTPATIENT
Start: 2019-09-22 | End: 2019-09-24

## 2019-09-22 RX ORDER — TACROLIMUS 1 MG/1
4 CAPSULE ORAL 2 TIMES DAILY
Status: DISCONTINUED | OUTPATIENT
Start: 2019-09-22 | End: 2019-09-23

## 2019-09-22 RX ORDER — DIPHENHYDRAMINE HCL 25 MG
25 CAPSULE ORAL ONCE
Status: COMPLETED | OUTPATIENT
Start: 2019-09-22 | End: 2019-09-22

## 2019-09-22 RX ADMIN — HEPARIN SODIUM 5000 UNITS: 5000 INJECTION, SOLUTION INTRAVENOUS; SUBCUTANEOUS at 02:09

## 2019-09-22 RX ADMIN — HEPARIN SODIUM 5000 UNITS: 5000 INJECTION, SOLUTION INTRAVENOUS; SUBCUTANEOUS at 05:09

## 2019-09-22 RX ADMIN — MYCOPHENOLATE MOFETIL 1000 MG: 250 CAPSULE ORAL at 08:09

## 2019-09-22 RX ADMIN — DOCUSATE SODIUM 100 MG: 100 CAPSULE, LIQUID FILLED ORAL at 10:09

## 2019-09-22 RX ADMIN — SODIUM BICARBONATE: 84 INJECTION, SOLUTION INTRAVENOUS at 05:09

## 2019-09-22 RX ADMIN — OXYCODONE HYDROCHLORIDE AND ACETAMINOPHEN 1 TABLET: 5; 325 TABLET ORAL at 08:09

## 2019-09-22 RX ADMIN — HYDRALAZINE HYDROCHLORIDE 25 MG: 25 TABLET, FILM COATED ORAL at 08:09

## 2019-09-22 RX ADMIN — NYSTATIN 500000 UNITS: 100000 SUSPENSION ORAL at 05:09

## 2019-09-22 RX ADMIN — NYSTATIN 500000 UNITS: 100000 SUSPENSION ORAL at 10:09

## 2019-09-22 RX ADMIN — TACROLIMUS 2 MG: 1 CAPSULE ORAL at 08:09

## 2019-09-22 RX ADMIN — INSULIN ASPART 1 UNITS: 100 INJECTION, SOLUTION INTRAVENOUS; SUBCUTANEOUS at 08:09

## 2019-09-22 RX ADMIN — NYSTATIN 500000 UNITS: 100000 SUSPENSION ORAL at 08:09

## 2019-09-22 RX ADMIN — TACROLIMUS 4 MG: 1 CAPSULE ORAL at 05:09

## 2019-09-22 RX ADMIN — ERGOCALCIFEROL 50000 UNITS: 1.25 CAPSULE ORAL at 10:09

## 2019-09-22 RX ADMIN — NIFEDIPINE 90 MG: 30 TABLET, FILM COATED, EXTENDED RELEASE ORAL at 08:09

## 2019-09-22 RX ADMIN — ANTI-THYMOCYTE GLOBULIN (RABBIT) 75 MG: 5 INJECTION, POWDER, LYOPHILIZED, FOR SOLUTION INTRAVENOUS at 03:09

## 2019-09-22 RX ADMIN — THERA TABS 1 TABLET: TAB at 10:09

## 2019-09-22 RX ADMIN — CALCITRIOL 0.5 MCG: 0.5 CAPSULE, LIQUID FILLED ORAL at 10:09

## 2019-09-22 RX ADMIN — NYSTATIN 500000 UNITS: 100000 SUSPENSION ORAL at 02:09

## 2019-09-22 RX ADMIN — TACROLIMUS 2 MG: 1 CAPSULE ORAL at 12:09

## 2019-09-22 RX ADMIN — DIPHENHYDRAMINE HYDROCHLORIDE 25 MG: 25 CAPSULE ORAL at 03:09

## 2019-09-22 RX ADMIN — NIFEDIPINE 60 MG: 30 TABLET, FILM COATED, EXTENDED RELEASE ORAL at 11:09

## 2019-09-22 RX ADMIN — HYDRALAZINE HYDROCHLORIDE 25 MG: 25 TABLET, FILM COATED ORAL at 02:09

## 2019-09-22 RX ADMIN — DOCUSATE SODIUM 100 MG: 100 CAPSULE, LIQUID FILLED ORAL at 02:09

## 2019-09-22 RX ADMIN — SULFAMETHOXAZOLE AND TRIMETHOPRIM 1 TABLET: 400; 80 TABLET ORAL at 10:09

## 2019-09-22 RX ADMIN — OXYCODONE HYDROCHLORIDE AND ACETAMINOPHEN 1 TABLET: 5; 325 TABLET ORAL at 10:09

## 2019-09-22 RX ADMIN — HEPARIN SODIUM 5000 UNITS: 5000 INJECTION, SOLUTION INTRAVENOUS; SUBCUTANEOUS at 08:09

## 2019-09-22 RX ADMIN — ACETAMINOPHEN 650 MG: 325 TABLET ORAL at 03:09

## 2019-09-22 RX ADMIN — METHYLPREDNISOLONE SODIUM SUCCINATE 125 MG: 125 INJECTION, POWDER, FOR SOLUTION INTRAMUSCULAR; INTRAVENOUS at 02:09

## 2019-09-22 RX ADMIN — MYCOPHENOLATE MOFETIL 1000 MG: 250 CAPSULE ORAL at 10:09

## 2019-09-22 RX ADMIN — FAMOTIDINE 20 MG: 20 TABLET ORAL at 08:09

## 2019-09-22 NOTE — PROGRESS NOTES
Pt. Transported off the unit via stretcher by transporter for [facility]. VSS with hypertension (),  PA notified and morning PO medications given prior to leaving. Sodium bicarb gtt @ 75 infusing.

## 2019-09-22 NOTE — PLAN OF CARE
Pt AAOx4. Passing flatus 0BM.  Lane output 1050cc pink urine.  ZULEIKA output 80cc sanguinous fluid.  Self meds reinforced with daughter.  Fall risk precautions initiated.  Pt in lowest bed position setting, lighting adjusted, pt to wear nonskid socks when ambulating, side rails up x2.  Pt remain free from falls during shift.  Pt verbalize understanding to call when needed assistance. Call light within reach.  Will continue to monitor.

## 2019-09-22 NOTE — PROGRESS NOTES
Pt. Returned from [facility] via stretcher-pt. Ambulated to bed.   Sodium bicarb gtt @ 75, c/o RLQ incisional pain--Percocet 5 mg given VSS with hypertension and Urine appears more pink than previously noted-- Notified BESSY Nixon MD. Will continue to monitor the patient

## 2019-09-22 NOTE — PLAN OF CARE
Pt. AAO x 4, WBC 2.04 afebrile, daughter and son at the bedside- interacting with the patient and participating in care.  Sodium bicarb gtt @ 75 mL/hr.  H/H 7.3/ 23.9 on AM labs- 1 U PRBC given and pt. Tolerated it well-- PM H/H 9.1/27.7  3rd dose of Thymo given-pt. Tolerated it well  RLQ ZULEIKA with 60 cc sanguineous drainage  RLQ incision open to air with staples intact-- cleansed with betadine x 2 this shift by nurse-family educated to clean 3 x day  C/o RLQ incisional pain-- PRN Percocet 5 mg given and provided relief  Lane with pink tinged urine--- UOP 1.1L   CBG 79, 138, 137  Passing gas and 1 BM today  Pt. Ambulated to to the chair with assistance and was up in chair for a few hours  Safety precautions maintained throughout the shift, call light within reach, and nonslip socks when out of bed.

## 2019-09-23 PROBLEM — D61.811 DRUG-INDUCED PANCYTOPENIA: Status: ACTIVE | Noted: 2018-07-23

## 2019-09-23 LAB
ALBUMIN SERPL BCP-MCNC: 2.7 G/DL (ref 3.5–5.2)
ANION GAP SERPL CALC-SCNC: 8 MMOL/L (ref 8–16)
BASOPHILS # BLD AUTO: ABNORMAL K/UL (ref 0–0.2)
BASOPHILS NFR BLD: 0 % (ref 0–1.9)
BUN SERPL-MCNC: 41 MG/DL (ref 8–23)
CALCIUM SERPL-MCNC: 8.1 MG/DL (ref 8.7–10.5)
CHLORIDE SERPL-SCNC: 108 MMOL/L (ref 95–110)
CO2 SERPL-SCNC: 27 MMOL/L (ref 23–29)
CREAT SERPL-MCNC: 3.5 MG/DL (ref 0.5–1.4)
DACRYOCYTES BLD QL SMEAR: ABNORMAL
DIFFERENTIAL METHOD: ABNORMAL
EOSINOPHIL # BLD AUTO: ABNORMAL K/UL (ref 0–0.5)
EOSINOPHIL NFR BLD: 2 % (ref 0–8)
ERYTHROCYTE [DISTWIDTH] IN BLOOD BY AUTOMATED COUNT: 17.3 % (ref 11.5–14.5)
EST. GFR  (AFRICAN AMERICAN): 15.5 ML/MIN/1.73 M^2
EST. GFR  (NON AFRICAN AMERICAN): 13.4 ML/MIN/1.73 M^2
GLUCOSE SERPL-MCNC: 68 MG/DL (ref 70–110)
HCT VFR BLD AUTO: 25.8 % (ref 37–48.5)
HCV RNA SERPL NAA+PROBE-LOG IU: <1.08 LOG (10) IU/ML
HCV RNA SERPL QL NAA+PROBE: NOT DETECTED IU/ML
HCV RNA SPEC NAA+PROBE-ACNC: <12 IU/ML
HGB BLD-MCNC: 8 G/DL (ref 12–16)
HYPOCHROMIA BLD QL SMEAR: ABNORMAL
IMM GRANULOCYTES # BLD AUTO: ABNORMAL K/UL (ref 0–0.04)
IMM GRANULOCYTES NFR BLD AUTO: ABNORMAL % (ref 0–0.5)
LYMPHOCYTES # BLD AUTO: ABNORMAL K/UL (ref 1–4.8)
LYMPHOCYTES NFR BLD: 3 % (ref 18–48)
MAGNESIUM SERPL-MCNC: 1.8 MG/DL (ref 1.6–2.6)
MCH RBC QN AUTO: 30.3 PG (ref 27–31)
MCHC RBC AUTO-ENTMCNC: 31 G/DL (ref 32–36)
MCV RBC AUTO: 98 FL (ref 82–98)
METAMYELOCYTES NFR BLD MANUAL: 1 %
MONOCYTES # BLD AUTO: ABNORMAL K/UL (ref 0.3–1)
MONOCYTES NFR BLD: 8 % (ref 4–15)
NEUTROPHILS NFR BLD: 86 % (ref 38–73)
NRBC BLD-RTO: 0 /100 WBC
PHOSPHATE SERPL-MCNC: 3.1 MG/DL (ref 2.7–4.5)
PLATELET # BLD AUTO: 75 K/UL (ref 150–350)
PLATELET BLD QL SMEAR: ABNORMAL
PMV BLD AUTO: 11.9 FL (ref 9.2–12.9)
POCT GLUCOSE: 145 MG/DL (ref 70–110)
POCT GLUCOSE: 72 MG/DL (ref 70–110)
POCT GLUCOSE: 98 MG/DL (ref 70–110)
POLYCHROMASIA BLD QL SMEAR: ABNORMAL
POTASSIUM SERPL-SCNC: 4.1 MMOL/L (ref 3.5–5.1)
RBC # BLD AUTO: 2.64 M/UL (ref 4–5.4)
SCHISTOCYTES BLD QL SMEAR: PRESENT
SODIUM SERPL-SCNC: 143 MMOL/L (ref 136–145)
TACROLIMUS BLD-MCNC: <1.5 NG/ML (ref 5–15)
WBC # BLD AUTO: 1.6 K/UL (ref 3.9–12.7)

## 2019-09-23 PROCEDURE — 83735 ASSAY OF MAGNESIUM: CPT

## 2019-09-23 PROCEDURE — 85007 BL SMEAR W/DIFF WBC COUNT: CPT

## 2019-09-23 PROCEDURE — 25000003 PHARM REV CODE 250: Performed by: INTERNAL MEDICINE

## 2019-09-23 PROCEDURE — 80069 RENAL FUNCTION PANEL: CPT

## 2019-09-23 PROCEDURE — 25000003 PHARM REV CODE 250: Performed by: SURGERY

## 2019-09-23 PROCEDURE — 63600175 PHARM REV CODE 636 W HCPCS: Performed by: NURSE PRACTITIONER

## 2019-09-23 PROCEDURE — 97803 MED NUTRITION INDIV SUBSEQ: CPT

## 2019-09-23 PROCEDURE — 25000003 PHARM REV CODE 250: Performed by: PHYSICIAN ASSISTANT

## 2019-09-23 PROCEDURE — 25000003 PHARM REV CODE 250: Performed by: HOSPITALIST

## 2019-09-23 PROCEDURE — 20600001 HC STEP DOWN PRIVATE ROOM

## 2019-09-23 PROCEDURE — 63600175 PHARM REV CODE 636 W HCPCS: Performed by: HOSPITALIST

## 2019-09-23 PROCEDURE — 25000003 PHARM REV CODE 250: Performed by: NURSE PRACTITIONER

## 2019-09-23 PROCEDURE — 85027 COMPLETE CBC AUTOMATED: CPT

## 2019-09-23 PROCEDURE — 99233 SBSQ HOSP IP/OBS HIGH 50: CPT | Mod: ,,, | Performed by: NURSE PRACTITIONER

## 2019-09-23 PROCEDURE — 99233 PR SUBSEQUENT HOSPITAL CARE,LEVL III: ICD-10-PCS | Mod: ,,, | Performed by: NURSE PRACTITIONER

## 2019-09-23 PROCEDURE — 63600175 PHARM REV CODE 636 W HCPCS: Performed by: SURGERY

## 2019-09-23 PROCEDURE — 80197 ASSAY OF TACROLIMUS: CPT

## 2019-09-23 RX ORDER — TACROLIMUS 1 MG/1
6 CAPSULE ORAL EVERY 12 HOURS
Qty: 360 CAPSULE | Refills: 11 | Status: SHIPPED | OUTPATIENT
Start: 2019-09-23 | End: 2019-09-25

## 2019-09-23 RX ORDER — SODIUM BICARBONATE 650 MG/1
650 TABLET ORAL 2 TIMES DAILY
Status: DISCONTINUED | OUTPATIENT
Start: 2019-09-23 | End: 2019-09-25 | Stop reason: HOSPADM

## 2019-09-23 RX ORDER — FAMOTIDINE 20 MG/1
20 TABLET, FILM COATED ORAL NIGHTLY
Qty: 30 TABLET | Refills: 0 | Status: ON HOLD | OUTPATIENT
Start: 2019-09-23 | End: 2021-04-09 | Stop reason: HOSPADM

## 2019-09-23 RX ORDER — DOCUSATE SODIUM 100 MG/1
100 CAPSULE, LIQUID FILLED ORAL 3 TIMES DAILY PRN
Refills: 0 | Status: ON HOLD | COMMUNITY
Start: 2019-09-23 | End: 2019-12-19 | Stop reason: HOSPADM

## 2019-09-23 RX ORDER — BISACODYL 5 MG
10 TABLET, DELAYED RELEASE (ENTERIC COATED) ORAL DAILY PRN
Refills: 0 | Status: ON HOLD | COMMUNITY
Start: 2019-09-23 | End: 2019-12-19 | Stop reason: HOSPADM

## 2019-09-23 RX ORDER — ERGOCALCIFEROL 1.25 MG/1
50000 CAPSULE ORAL
Qty: 4 CAPSULE | Refills: 4 | Status: SHIPPED | OUTPATIENT
Start: 2019-09-29 | End: 2019-10-02 | Stop reason: ALTCHOICE

## 2019-09-23 RX ORDER — LIDOCAINE HYDROCHLORIDE 10 MG/ML
1 INJECTION INFILTRATION; PERINEURAL ONCE
Status: DISCONTINUED | OUTPATIENT
Start: 2019-09-23 | End: 2019-09-24

## 2019-09-23 RX ORDER — PREDNISONE 5 MG/1
TABLET ORAL
Qty: 120 TABLET | Refills: 11 | Status: SHIPPED | OUTPATIENT
Start: 2019-09-23 | End: 2020-11-11

## 2019-09-23 RX ORDER — SULFAMETHOXAZOLE AND TRIMETHOPRIM 400; 80 MG/1; MG/1
1 TABLET ORAL DAILY
Qty: 30 TABLET | Refills: 11 | Status: SHIPPED | OUTPATIENT
Start: 2019-09-20 | End: 2019-09-25 | Stop reason: HOSPADM

## 2019-09-23 RX ORDER — CALCITRIOL 0.5 UG/1
0.5 CAPSULE ORAL DAILY
Qty: 30 CAPSULE | Refills: 2 | Status: SHIPPED | OUTPATIENT
Start: 2019-09-24 | End: 2019-10-02

## 2019-09-23 RX ORDER — TACROLIMUS 1 MG/1
6 CAPSULE ORAL 2 TIMES DAILY
Status: DISCONTINUED | OUTPATIENT
Start: 2019-09-23 | End: 2019-09-25 | Stop reason: HOSPADM

## 2019-09-23 RX ORDER — TACROLIMUS 1 MG/1
3 CAPSULE ORAL ONCE
Status: COMPLETED | OUTPATIENT
Start: 2019-09-23 | End: 2019-09-23

## 2019-09-23 RX ORDER — VALGANCICLOVIR 450 MG/1
900 TABLET, FILM COATED ORAL DAILY
Qty: 60 TABLET | Refills: 2 | Status: SHIPPED | OUTPATIENT
Start: 2019-09-20 | End: 2019-09-25 | Stop reason: HOSPADM

## 2019-09-23 RX ADMIN — HEPARIN SODIUM 5000 UNITS: 5000 INJECTION, SOLUTION INTRAVENOUS; SUBCUTANEOUS at 01:09

## 2019-09-23 RX ADMIN — NYSTATIN 500000 UNITS: 100000 SUSPENSION ORAL at 01:09

## 2019-09-23 RX ADMIN — SULFAMETHOXAZOLE AND TRIMETHOPRIM 1 TABLET: 400; 80 TABLET ORAL at 08:09

## 2019-09-23 RX ADMIN — HYDRALAZINE HYDROCHLORIDE 25 MG: 25 TABLET, FILM COATED ORAL at 01:09

## 2019-09-23 RX ADMIN — OXYCODONE HYDROCHLORIDE AND ACETAMINOPHEN 1 TABLET: 5; 325 TABLET ORAL at 11:09

## 2019-09-23 RX ADMIN — HEPARIN SODIUM 5000 UNITS: 5000 INJECTION, SOLUTION INTRAVENOUS; SUBCUTANEOUS at 05:09

## 2019-09-23 RX ADMIN — NIFEDIPINE 60 MG: 30 TABLET, FILM COATED, EXTENDED RELEASE ORAL at 08:09

## 2019-09-23 RX ADMIN — DOCUSATE SODIUM 100 MG: 100 CAPSULE, LIQUID FILLED ORAL at 08:09

## 2019-09-23 RX ADMIN — PREDNISONE 20 MG: 10 TABLET ORAL at 08:09

## 2019-09-23 RX ADMIN — CALCITRIOL 0.5 MCG: 0.5 CAPSULE, LIQUID FILLED ORAL at 08:09

## 2019-09-23 RX ADMIN — MYCOPHENOLATE MOFETIL 1000 MG: 250 CAPSULE ORAL at 08:09

## 2019-09-23 RX ADMIN — HYDRALAZINE HYDROCHLORIDE 25 MG: 25 TABLET, FILM COATED ORAL at 08:09

## 2019-09-23 RX ADMIN — HEPARIN SODIUM 5000 UNITS: 5000 INJECTION, SOLUTION INTRAVENOUS; SUBCUTANEOUS at 08:09

## 2019-09-23 RX ADMIN — TACROLIMUS 3 MG: 1 CAPSULE ORAL at 11:09

## 2019-09-23 RX ADMIN — TACROLIMUS 4 MG: 1 CAPSULE ORAL at 08:09

## 2019-09-23 RX ADMIN — SODIUM BICARBONATE 650 MG TABLET 650 MG: at 11:09

## 2019-09-23 RX ADMIN — OXYCODONE HYDROCHLORIDE AND ACETAMINOPHEN 1 TABLET: 5; 325 TABLET ORAL at 10:09

## 2019-09-23 RX ADMIN — TACROLIMUS 6 MG: 1 CAPSULE ORAL at 05:09

## 2019-09-23 RX ADMIN — NYSTATIN 500000 UNITS: 100000 SUSPENSION ORAL at 08:09

## 2019-09-23 RX ADMIN — SODIUM BICARBONATE 650 MG TABLET 650 MG: at 08:09

## 2019-09-23 RX ADMIN — FAMOTIDINE 20 MG: 20 TABLET ORAL at 08:09

## 2019-09-23 RX ADMIN — SODIUM BICARBONATE: 84 INJECTION, SOLUTION INTRAVENOUS at 04:09

## 2019-09-23 RX ADMIN — THERA TABS 1 TABLET: TAB at 08:09

## 2019-09-23 RX ADMIN — NYSTATIN 500000 UNITS: 100000 SUSPENSION ORAL at 05:09

## 2019-09-23 RX ADMIN — OXYCODONE HYDROCHLORIDE AND ACETAMINOPHEN 1 TABLET: 5; 325 TABLET ORAL at 04:09

## 2019-09-23 NOTE — PROGRESS NOTES
Patient admitted for Kidney transplant.Transplant coordinator met with the patient on rounds to introduce self and explain the coordinator role. The post-transplant teaching book was given. Transplant Coordinator explained that she will follow the patient while in the hospital and assist with discharge.     ESRD 2/2 HTN  Thymo induction  CMV D-,R+  DBD, SCD  Donor HCVAb+/NAOMI-

## 2019-09-23 NOTE — SUBJECTIVE & OBJECTIVE
Subjective:   History of Present Illness:  Ms. Satinder Schulte is a 63 year old AA female with a PMHx relevant for HTN, CVA and ESRD presumed secondary to HTN who received DBD kidney transplant 9/20/2019 (Thymo induction, PHS not increase, KDPI 78%, CIT 9 hr 5 min, CMV D-/R+, HBV cAB +, HCV AB + NAOMI -). Patient is currently on hemodialysis started on 8/18/14. Patient is dialyzing on TTS schedule. Last HD 9/20/2019  x 3.5 hours via LUE AVF EDW 89.5 kgs.  She is doing well this a.m..  She does complain of some nausea, tolerated procedure well she has a ZULEIKA drain below her incision.  Wound looks dry staples no evidence of fluid collection.  She complained of some nausea this morning pain is well controlled urine output is 100 cc an hour.  Patient received 1 unit of blood intraoperatively secondary to having some wooziness of bleeding.  She has a double artery and the inferior artery by bifurcates in 3 intra op ultrasound with RI is a 1 follow-up ultrasound with good are eyes 0.76 and good arterial velocities of 156.  She is receiving today time O 2.  Full dose.  CVP low this morning at 2 it has improved all the way up to 5, hemoglobin stable 9.1. She seems volume down will start IV fluids for hydration.  Acceptable blood pressure BP improving.  Tolerating p.o. he but poor p.o. intake.    She is a 3 day Lane.  Lane to come out on 09/23    Ms. Schulte is a 63 y.o. year old female who is status post Kidney Transplant - 9/20/2019  (#1).  Her maintenance immunosuppression consists of:   Immunosuppressants (From admission, onward)    Start     Stop Route Frequency Ordered    09/23/19 1800  tacrolimus capsule 6 mg  (IP TXP KIDNEY POST-OP THYMO)      -- Oral 2 times daily 09/23/19 1045          Her post transplant course has been uncomplicated to date.    Hospital Course:  Interval History: Now s/p DBD KTx 9/20 2/2 HTN (Thymo induction 2/2 leukopenia, CIT ~10h, CMV D-/R+, donor EBV IgG+ & HBcAB+, recipient HBsAB+ HBcAB-).  Intra-op course notable for double artery anatomy of donor with 2 anastomosis. 1u PRBC transfused intra-op for low CVP and oozing. The kidney was unclamped and reperfused at 9/20/2019 9:23 AM. Intra-op urine production observed. 3 day rodriguez, per op note. Rodriguez removed 9/23 and voiding.  Plan to remove surgical drain today.  Otherwise progressing well post op.  PT/OT ordered.        Past Medical, Surgical, Family, and Social History:   Unchanged from H&P.    Scheduled Meds:   bisacodyl  10 mg Oral QHS    calcitRIOL  0.5 mcg Oral Daily    docusate sodium  100 mg Oral TID    ergocalciferol  50,000 Units Oral Q7 Days    famotidine  20 mg Oral QHS    heparin (porcine)  5,000 Units Subcutaneous Q8H    hydrALAZINE  25 mg Oral TID    lidocaine HCL 10 mg/ml (1%)  1 mL Other Once    multivitamin  1 tablet Oral Daily    NIFEdipine  60 mg Oral BID    nystatin  500,000 Units Mouth/Throat QID (PC + HS)    predniSONE  20 mg Oral Daily    sodium bicarbonate  650 mg Oral BID    tacrolimus  6 mg Oral BID    [START ON 9/30/2019] valGANciclovir  450 mg Oral Daily AM     Continuous Infusions:   glucagon (human recombinant)       PRN Meds:acetaminophen, Dextrose 10% Bolus, diphenhydrAMINE, EPINEPHrine, glucagon (human recombinant), glucose, glucose, glucose, hydrocortisone sodium succinate, insulin aspart U-100, naloxone, ondansetron, oxyCODONE-acetaminophen, oxyCODONE-acetaminophen    Intake/Output - Last 3 Shifts       09/21 0700 - 09/22 0659 09/22 0700 - 09/23 0659 09/23 0700 - 09/24 0659    P.O. 950 1204     I.V. (mL/kg) 1087.3 (11.6) 2325 (24.7) 375 (4)    Blood  262.5     IV Piggyback 350 150     Total Intake(mL/kg) 2387.3 (25.4) 3941.5 (41.9) 375 (4)    Urine (mL/kg/hr) 2180 (1) 3150 (1.4) 100 (0.2)    Drains 160 90     Stool 0 0     Blood       Total Output 2340 3240 100    Net +47.3 +701.5 +275           Urine Occurrence   4 x    Stool Occurrence 0 x 3 x            Review of Systems   Constitutional: Positive for  "activity change and appetite change.   Respiratory: Negative for shortness of breath.    Cardiovascular: Positive for leg swelling.   Gastrointestinal: Positive for abdominal pain. Negative for abdominal distention.   Genitourinary: Positive for frequency.   Allergic/Immunologic: Positive for immunocompromised state.      Objective:     Vital Signs (Most Recent):  Temp: 98.7 °F (37.1 °C) (09/23/19 1116)  Pulse: 79 (09/23/19 1116)  Resp: 18 (09/23/19 1116)  BP: 131/61 (09/23/19 1116)  SpO2: (!) 94 % (09/23/19 1116) Vital Signs (24h Range):  Temp:  [98.2 °F (36.8 °C)-99 °F (37.2 °C)] 98.7 °F (37.1 °C)  Pulse:  [66-97] 79  Resp:  [13-20] 18  SpO2:  [94 %-97 %] 94 %  BP: (131-162)/(60-73) 131/61     Weight: 94 kg (207 lb 3.7 oz)  Height: 5' 3" (160 cm)  Body mass index is 36.71 kg/m².    Physical Exam   Constitutional: She is oriented to person, place, and time.   HENT:   Head: Normocephalic.   Eyes: Pupils are equal, round, and reactive to light.   Cardiovascular: Normal rate, regular rhythm, normal heart sounds and intact distal pulses.   Pulmonary/Chest: Effort normal.   Abdominal: Soft. Bowel sounds are normal.       Musculoskeletal: Normal range of motion. She exhibits no edema.   Neurological: She is alert and oriented to person, place, and time.   Skin: Skin is warm and dry. Capillary refill takes less than 2 seconds.   Psychiatric: She has a normal mood and affect. Her behavior is normal.   Nursing note and vitals reviewed.      Laboratory:  CBC:   Recent Labs   Lab 09/22/19  0600 09/22/19  0633 09/22/19  1759 09/23/19  0600   WBC 2.04*  --  2.28* 1.60*   RBC 2.48*  --  2.96* 2.64*   HGB 7.6* 7.3* 9.1* 8.0*   HCT 23.9* 23.9* 27.7* 25.8*   PLT 79*  --  79* 75*   MCV 96  --  94 98   MCH 30.6  --  30.7 30.3   MCHC 31.8*  --  32.9 31.0*     BMP:   Recent Labs   Lab 09/22/19  0950 09/22/19  1759 09/23/19  0600   GLU 83 216* 68*    140 143   K 4.2 4.3 4.1    107 108   CO2 24 24 27   BUN 52* 48* 41* "   CREATININE 5.1* 4.5* 3.5*   CALCIUM 8.0* 8.4* 8.1*     Coagulation:   Recent Labs   Lab 09/19/19  1806   APTT 25.4       Diagnostic Results:  US - Kidney:   Results for orders placed during the hospital encounter of 09/19/19   US Transplant Kidney With Doppler    Narrative EXAMINATION:  US TRANSPLANT KIDNEY WITH DOPPLER    CLINICAL HISTORY:  s/p kidney transplant, POD#2 with drop in H/H, please assess;    TECHNIQUE:  Real time gray scale and doppler ultrasound was performed over the patient's renal allograft.    COMPARISON:  Renal transplant ultrasound with Doppler 09/20/2019    FINDINGS:  Renal allograft in the right lower quadrant.  The allograft measures 13.2 cm. Normal perfusion. No hydronephrosis.  A ureteral stent is present.    Small collection at the inferior pole measures 2.8 x 1.2 x 2.7 cm.    Vasculature:    Resistive indices are as follows: Interlobar artery 0.79 upper segmental 0.83, mid segmental 0.83, lower segmental 0.79 (previously 0.76, 0.76, 0.78, and 0.76 respectively).    There are 2 main renal arteries.  Main renal artery peak systolic velocities: 191 cm/sec and 229 cm/sec with normal waveform (previously 156 cm/sec and 156 cm/sec).    Renal artery/iliac ratios: 0.89, 1.07.    The main renal vein is patent.    The urinary bladder is collapsed around a Lane catheter.      Impression Right renal allograft exhibits slight interval increase in arterial resistive indices and main renal artery velocities with normal waveforms.  Findings suspected related to postoperative edema.    Single small peritransplant fluid collection as above.    Continued follow-up advised.    Electronically signed by resident: Donya Almazan  Date:    09/22/2019  Time:    09:18    Electronically signed by: Felipe Galeano MD  Date:    09/22/2019  Time:    09:34

## 2019-09-23 NOTE — ASSESSMENT & PLAN NOTE
· Very good urine output with improving clearance  · Serum creatinine trending down  · Ultrasound today with no evidence of hematoma  · IV fluids changed to sodium bicarbonate drip secondary to worsening acidosis  · Metabolic acidosis noted, attempted p.o. bicarb this morning but labs reflect a worsening acidosis will start sodium bicarb drip.  · Continue IV fluids for hydration  · Trend urine output and electrolytes and serum creatinine

## 2019-09-23 NOTE — PLAN OF CARE
Pt aao x3. Call bell within reach. Lane d/andrea this am. Pt has been incontinent at times due to urgency. Pt has hematuria. BESSY Ortega APRN aware. Rene drain d/andrea today. Son at bedside. Daughter is pt's primary caregiver and has been taught self meds. Pt needs frequently reinforcement with learning needs. She has been up to chair throughout shift and has ambulated in hallway x2 today. Possible discharge tomorrow. Will continue to monitor.

## 2019-09-23 NOTE — SUBJECTIVE & OBJECTIVE
Subjective:   History of Present Illness:  Ms. Satinder Schulte is a 63 year old AA female with a PMHx relevant for HTN, CVA and ESRD presumed secondary to HTN who received DBD kidney transplant 9/20/2019 (Thymo induction, PHS not increase, KDPI 78%, CIT 9 hr 5 min, CMV D-/R+, HBV cAB +, HCV AB + NAOMI -). Patient is currently on hemodialysis started on 8/18/14. Patient is dialyzing on TTS schedule. Last HD 9/20/2019  x 3.5 hours via LUE AVF EDW 89.5 kgs.  She is doing well this a.m..  She does complain of some nausea, tolerated procedure well she has a ZULEIKA drain below her incision.  Wound looks dry staples no evidence of fluid collection.  She complained of some nausea this morning pain is well controlled urine output is 100 cc an hour.  Patient received 1 unit of blood intraoperatively secondary to having some wooziness of bleeding.  She has a double artery and the inferior artery by bifurcates in 3 intra op ultrasound with RI is a 1 follow-up ultrasound with good are eyes 0.76 and good arterial velocities of 156.  She is receiving today time O 2.  Full dose.  CVP low this morning at 2 it has improved all the way up to 5, hemoglobin stable 9.1. She seems volume down will start IV fluids for hydration.  Acceptable blood pressure BP improving.  Tolerating p.o. he but poor p.o. intake.    She is a 3 day Lane.  Lane to come out on 09/23    Ms. Schulte is a 63 y.o. year old female who is status post Kidney Transplant - 9/20/2019  (#1).    Her maintenance immunosuppression consists of:   Immunosuppressants (From admission, onward)    Start     Stop Route Frequency Ordered    09/22/19 1800  tacrolimus capsule 4 mg  (IP TXP KIDNEY POST-OP THYMO)      -- Oral 2 times daily 09/22/19 1142    09/21/19 0900  antithymocyte globulin (rabbit) 125 mg in sodium chloride 0.9% 250 mL IVPB  (IP TXP KIDNEY POST-OP THYMO)      09/23 0859 IV Daily 09/20/19 1111    09/20/19 1215  mycophenolate capsule 1,000 mg  (IP TXP KIDNEY POST-OP THYMO)       -- Oral 2 times daily 09/20/19 1111          Hospital Course:  Interval History: Pt now s/p DBD KTx 9/20 2/2 HTN (Thymo induction 2/2 leukopenia, CIT ~10h, CMV D-/R+, donor EBV IgG+ & HBcAB+, recipient HBsAB+ HBcAB-). Intra-op course notable for double artery anatomy of donor with 2 anastomosis. 1u PRBC transfused intra-op for low CVP and oozing. The kidney was unclamped and reperfused at 9/20/2019 9:23 AM. Intra-op urine production observed. 3 day rodriguez, per op note. She has 1 ZULEIKA drain. US ordered due to double artery. US results reviewed by surgeon - satisfactory doppler. Pt transferred to TSU ~1600.     Interval History:  Hemoglobin dropped today stat ultrasound was ordered with no evidence of bleeding, stable perfusion and flow to the kidney.  Transfuse 1 unit of PRBCs monitoring H&H.  Urine output overnight 2.1 L.  Serum creatinine trending down good clearance electrolytes replaced.  Stable bicarb.  Blood pressure improved will continue to monitor patient tolerating p.o. and is more awake today she side on the chair yesterday not passing gas    Past Medical, Surgical, Family, and Social History:   Unchanged from H&P.    Scheduled Meds:   acetaminophen  650 mg Oral Q24H    anti-thymo immune glob (THYMOGLOBULIN -rabbit) IVPB  1.5 mg/kg/day Intravenous Daily    bisacodyl  10 mg Oral QHS    calcitRIOL  0.5 mcg Oral Daily    diphenhydrAMINE  25 mg Oral Q24H    docusate sodium  100 mg Oral TID    ergocalciferol  50,000 Units Oral Q7 Days    famotidine  20 mg Oral QHS    heparin (porcine)  5,000 Units Subcutaneous Q8H    hydrALAZINE  25 mg Oral TID    multivitamin  1 tablet Oral Daily    mycophenolate  1,000 mg Oral BID    NIFEdipine  90 mg Oral Daily    nystatin  500,000 Units Mouth/Throat QID (PC + HS)    [START ON 9/23/2019] predniSONE  20 mg Oral Daily    sulfamethoxazole-trimethoprim 400-80mg  1 tablet Oral Daily AM    tacrolimus  4 mg Oral BID    [START ON 9/30/2019] valGANciclovir  450 mg  Oral Daily AM     Continuous Infusions:   glucagon (human recombinant)      sodium bicarbonate drip 75 mL/hr at 09/22/19 1755     PRN Meds:sodium chloride, sodium chloride, acetaminophen, Dextrose 10% Bolus, diphenhydrAMINE, EPINEPHrine, glucagon (human recombinant), glucose, glucose, glucose, hydrocortisone sodium succinate, insulin aspart U-100, naloxone, ondansetron, oxyCODONE-acetaminophen, oxyCODONE-acetaminophen    Intake/Output - Last 3 Shifts       09/20 0700 - 09/21 0659 09/21 0700 - 09/22 0659 09/22 0700 - 09/23 0659    P.O.     I.V. (mL/kg) 6167.3 (66.3) 1087.3 (11.6) 1575 (16.4)    Blood 267  262.5    IV Piggyback 1250 350 150    Total Intake(mL/kg) 8064.3 (86.7) 2387.3 (25.4) 3191.5 (33.3)    Urine (mL/kg/hr) 2025 (0.9) 2180 (1) 1450 (1)    Emesis/NG output       Drains 240 160 60    Other       Stool 0 0 0    Blood 275      Total Output 2540 2340 1510    Net +5524.3 +47.3 +1681.5           Stool Occurrence 0 x 0 x 3 x           Review of Systems   Constitutional: Positive for activity change. Negative for appetite change, chills, fatigue and fever.   HENT: Negative for congestion and facial swelling.    Eyes: Negative for pain, discharge and visual disturbance.   Respiratory: Negative for cough, chest tightness, shortness of breath and wheezing.    Cardiovascular: Negative for chest pain, palpitations and leg swelling.   Gastrointestinal: Positive for abdominal pain (incisional). Negative for abdominal distention, nausea and vomiting.   Endocrine: Negative.    Genitourinary: Negative for dysuria and flank pain.   Musculoskeletal: Negative for back pain.   Skin: Positive for wound (RLQ incision c/d/i with surgical dressing). Negative for pallor and rash.   Allergic/Immunologic: Positive for immunocompromised state.   Neurological: Negative for dizziness, tremors, weakness and light-headedness.   Hematological: Negative.    Psychiatric/Behavioral: Negative for agitation, confusion and  "dysphoric mood. The patient is not nervous/anxious.       Objective:     Vital Signs (Most Recent):  Temp: 98.4 °F (36.9 °C) (09/22/19 1830)  Pulse: 96 (09/22/19 1830)  Resp: 20 (09/22/19 1830)  BP: (!) 150/64 (09/22/19 1830)  SpO2: 97 % (09/22/19 1830) Vital Signs (24h Range):  Temp:  [98.4 °F (36.9 °C)-99 °F (37.2 °C)] 98.4 °F (36.9 °C)  Pulse:  [66-97] 96  Resp:  [13-20] 20  SpO2:  [95 %-99 %] 97 %  BP: (128-170)/(58-72) 150/64     Weight: 95.9 kg (211 lb 8.5 oz)  Height: 5' 3" (160 cm)  Body mass index is 37.47 kg/m².    Physical Exam   Constitutional: She is oriented to person, place, and time. She appears well-developed and well-nourished.   HENT:   Head: Normocephalic and atraumatic.   Eyes: Pupils are equal, round, and reactive to light. EOM are normal. No scleral icterus.   Neck: Normal range of motion. Neck supple. No JVD present.   Cardiovascular: Normal rate, regular rhythm and normal heart sounds.   No murmur heard.  LUE AVF +/+   Pulmonary/Chest: Effort normal and breath sounds normal. No respiratory distress. She has no wheezes.   Abdominal: Soft. Bowel sounds are normal. She exhibits no distension. There is tenderness (As suspected ports transplant).       Musculoskeletal: Normal range of motion. She exhibits no edema.   Neurological: She is alert and oriented to person, place, and time.   Skin: Skin is warm and dry.   Psychiatric: She has a normal mood and affect. Her behavior is normal.   Nursing note and vitals reviewed.      Laboratory:  CBC:   Recent Labs   Lab 09/21/19  1510 09/22/19  0600 09/22/19  0633 09/22/19  1759   WBC 3.01* 2.04*  --  2.28*   RBC 2.90* 2.48*  --  2.96*   HGB 9.1* 7.6* 7.3* 9.1*   HCT 29.3* 23.9* 23.9* 27.7*   PLT 99* 79*  --  79*   * 96  --  94   MCH 31.4* 30.6  --  30.7   MCHC 31.1* 31.8*  --  32.9     BMP:   Recent Labs   Lab 09/22/19  0600 09/22/19  0950 09/22/19  1759   GLU 92 83 216*    141 140   K 4.7 4.2 4.3    107 107   CO2 23 24 24   BUN 54* " 52* 48*   CREATININE 5.3* 5.1* 4.5*   CALCIUM 8.2* 8.0* 8.4*     CMP:   Recent Labs   Lab 09/19/19  1806  09/20/19  1914 09/21/19  0545  09/22/19  0600 09/22/19  0950 09/22/19  1759   GLU 71   < > 185* 109   < > 92 83 216*   CALCIUM 9.8   < > 8.0* 7.5*   < > 8.2* 8.0* 8.4*   ALBUMIN 4.2   < > 3.1* 2.8*   < > 2.7* 2.8* 2.9*   PROT 8.1  --  5.8* 5.4*  --   --   --   --       < > 133* 135*   < > 139 141 140   K 4.9   < > 5.7* 5.2*   < > 4.7 4.2 4.3   CO2 25   < > 20* 20*   < > 23 24 24   CL 99   < > 102 104   < > 107 107 107   BUN 40*   < > 52* 48*   < > 54* 52* 48*   CREATININE 8.7*   < > 9.2* 8.0*   < > 5.3* 5.1* 4.5*   ALKPHOS 48*  --  40* 38*  --   --   --   --    ALT 7*  --  19 8*  --   --   --   --    AST 10  --  24 17  --   --   --   --     < > = values in this interval not displayed.     Coagulation:   Recent Labs   Lab 09/19/19  1806   APTT 25.4     Labs within the past 24 hours have been reviewed.    Diagnostic Results:  US - Kidney:   Results for orders placed during the hospital encounter of 09/19/19   US Transplant Kidney With Doppler    Narrative EXAMINATION:  US TRANSPLANT KIDNEY WITH DOPPLER    CLINICAL HISTORY:  s/p kidney transplant, POD#2 with drop in H/H, please assess;    TECHNIQUE:  Real time gray scale and doppler ultrasound was performed over the patient's renal allograft.    COMPARISON:  Renal transplant ultrasound with Doppler 09/20/2019    FINDINGS:  Renal allograft in the right lower quadrant.  The allograft measures 13.2 cm. Normal perfusion. No hydronephrosis.  A ureteral stent is present.    Small collection at the inferior pole measures 2.8 x 1.2 x 2.7 cm.    Vasculature:    Resistive indices are as follows: Interlobar artery 0.79 upper segmental 0.83, mid segmental 0.83, lower segmental 0.79 (previously 0.76, 0.76, 0.78, and 0.76 respectively).    There are 2 main renal arteries.  Main renal artery peak systolic velocities: 191 cm/sec and 229 cm/sec with normal waveform  (previously 156 cm/sec and 156 cm/sec).    Renal artery/iliac ratios: 0.89, 1.07.    The main renal vein is patent.    The urinary bladder is collapsed around a Lane catheter.      Impression Right renal allograft exhibits slight interval increase in arterial resistive indices and main renal artery velocities with normal waveforms.  Findings suspected related to postoperative edema.    Single small peritransplant fluid collection as above.    Continued follow-up advised.    Electronically signed by resident: Donya Almazan  Date:    09/22/2019  Time:    09:18    Electronically signed by: Felipe Galeano MD  Date:    09/22/2019  Time:    09:34

## 2019-09-23 NOTE — PROGRESS NOTES
Update    SW met with pt to assess for coping, continuity of care and any needs since SW only met with daughter on Friday. Pt presented siting up in bed, alert and oriented x4 and communicative. Pt's son, Dany, was present at beginning but left room. Dr. Padilla was also in the room updating pt on care plan. SW clarified with pt that she understood care plan since pt's daughter disclosed to SW that pt's highest education level completed is 9th grade. Pt's daughter also disclosed pt has comprehension and memory difficulties as a result of 9th grade education level. Pt denied any psychosocial concerns or needs. Per rounds, pt likely to discharge home to [place] tomorrow. SW spoke with Niya Schulte, pt's daughter, and updated her on care plan. Niya reports being back tomorrow by 11 am to receive education. Pt and caregivers denied any questions.    VIKY remains available at [phone].

## 2019-09-23 NOTE — PLAN OF CARE
Problem: Adult Inpatient Plan of Care  Goal: Plan of Care Review  Outcome: Ongoing, Progressing   Pt AAOx4. Lane output 1600cc pink/red urine.  ZULEIKA output 30cc sanguinous fluid.  Torrey morales at pt bedside.  Lane to be removed @0600.  Fall risk precautions initiated.  Pt in lowest bed position setting, lighting adjusted, pt to wear nonskid socks when ambulating, side rails up x2.  Pt remain free from falls during shift.  Pt verbalize understanding to call when needed assistance. Call light within reach.  Will continue to monitor.

## 2019-09-23 NOTE — ASSESSMENT & PLAN NOTE
- Hg he will go mean drop today stat ultrasound with no evidence of bleeding 1 unit of PRBC transfused will monitor H&H  - hemoglobin with adequate response post transfusion  - Will hold SHILO  at this time  -iron depleted stores T sat down to 14 but ferritin greater than 1200

## 2019-09-23 NOTE — NURSING
Rodriguez removed.  Balloon deflated of 10cc.  Tip intact.  Pt tolerated well.  Instructed pt having 4 hours time frame to urinate or may have to replace rodriguez.  Verbalized understanding to call for assistance to bathroom.  Will continue to monitor.

## 2019-09-23 NOTE — ASSESSMENT & PLAN NOTE
- Continue Nystatin for thrush prophylaxis.  - Hold bactrim and Valcyte prophylaxis due to neutropenia.

## 2019-09-23 NOTE — ASSESSMENT & PLAN NOTE
- hold thymo induction dose today, need 1/2 dose   - hold bactrim and valcyte  - monitor labs daily

## 2019-09-23 NOTE — PLAN OF CARE
Recommendations     Recommendation/Intervention: 1.) Continue regular diet. 2.) If PO intakes <50% of meals x 48hr, suggest Boost Plus BID. 3.) If renal function does not improve within 48hr, suggest renal diet restrictions. 4.) Daily weights.   Goals: 1.) Pt to consume/tolerate >75% EEN and EPN by follow up.   Nutrition Goal Status: progressing towards goal  Communication of RD Recs: (POC)

## 2019-09-23 NOTE — ASSESSMENT & PLAN NOTE
Improving BP, will continue to monitor. Goal for BP is <130 mmHg SBP and BDP <80 mmHg.   Continue hydralazine 25 mg t.i.d.  Continue nifedipine 90 mg daily

## 2019-09-23 NOTE — CLINICAL REVIEW
Staff Transplant Nephrology addendum:  Patient was seen and examined with Chantel Ortega NP         I agree with assessment and plan. I spoke with the patient for 15 minutes and explained  current plan. Patient and family voiced understanding. All questions answered      S/p kidney transplant 9/20/2019    Leukopenia 1.6      Prograf adjusted during rounds    thymoglobulin will hold it until tomorrow     Will hold MMF now and bactrim     Will encourage ambulation, PT and OT    Will use acyclovir and weekly CMV pcr's due to chronic leukopenia.    Discuss with SW regarding need for more education and frequent blood work

## 2019-09-23 NOTE — ASSESSMENT & PLAN NOTE
- Thymo induction due to leukopenia.    - continue prograf, Monitor trough daily and adjust dose as needed to achieve therapeutic level.  - Continue Cellcept  - Continue steroids, per protocol.

## 2019-09-23 NOTE — ASSESSMENT & PLAN NOTE
- Thymo induction due to leukopenia.  Full dose 3.  - increase Prograf 4 mg b.i.d. give to extra dose now.   - Monitor trough daily and adjust dose as needed to achieve therapeutic level.  - Continue Cellcept  - Continue steroids, per protocol.

## 2019-09-23 NOTE — PROGRESS NOTES
[facility]  Kidney Transplant  Progress Note      Reason for Follow-up: Reassessment of Kidney Transplant - 9/20/2019  (#1) recipient and management of immunosuppression.    ORGAN: LEFT KIDNEY    Donor Type: Donation after Brain Death    Donor CMV Status: Negative  Donor HBcAB:Positive  Donor HCV Status:Negative  Donor HBV NAOMI: Negative  Donor HCV NAOMI: Negative      Subjective:   History of Present Illness:  Ms. Satinder Schulte is a 63 year old AA female with a PMHx relevant for HTN, CVA and ESRD presumed secondary to HTN who received DBD kidney transplant 9/20/2019 (Thymo induction, PHS not increase, KDPI 78%, CIT 9 hr 5 min, CMV D-/R+, HBV cAB +, HCV AB + NAOMI -). Patient is currently on hemodialysis started on 8/18/14. Patient is dialyzing on TTS schedule. Last HD 9/20/2019  x 3.5 hours via LUE AVF EDW 89.5 kgs.  She is doing well this a.m..  She does complain of some nausea, tolerated procedure well she has a ZULEIKA drain below her incision.  Wound looks dry staples no evidence of fluid collection.  She complained of some nausea this morning pain is well controlled urine output is 100 cc an hour.  Patient received 1 unit of blood intraoperatively secondary to having some wooziness of bleeding.  She has a double artery and the inferior artery by bifurcates in 3 intra op ultrasound with RI is a 1 follow-up ultrasound with good are eyes 0.76 and good arterial velocities of 156.  She is receiving today time O 2.  Full dose.  CVP low this morning at 2 it has improved all the way up to 5, hemoglobin stable 9.1. She seems volume down will start IV fluids for hydration.  Acceptable blood pressure BP improving.  Tolerating p.o. he but poor p.o. intake.    She is a 3 day Lane.  Lane to come out on 09/23    Ms. Schulte is a 63 y.o. year old female who is status post Kidney Transplant - 9/20/2019  (#1).  Her maintenance immunosuppression consists of:   Immunosuppressants (From admission, onward)    Start      Stop Route Frequency Ordered    09/23/19 1800  tacrolimus capsule 6 mg  (IP TXP KIDNEY POST-OP THYMO)      -- Oral 2 times daily 09/23/19 1045          Her post transplant course has been uncomplicated to date.    Hospital Course:  Interval History: Now s/p DBD KTx 9/20 2/2 HTN (Thymo induction 2/2 leukopenia, CIT ~10h, CMV D-/R+, donor EBV IgG+ & HBcAB+, recipient HBsAB+ HBcAB-). Intra-op course notable for double artery anatomy of donor with 2 anastomosis. 1u PRBC transfused intra-op for low CVP and oozing. The kidney was unclamped and reperfused at 9/20/2019 9:23 AM. Intra-op urine production observed. 3 day rodriguez, per op note. Rodriguez removed 9/23 and voiding.  Plan to remove surgical drain today.  Otherwise progressing well post op.  PT/OT ordered.        Past Medical, Surgical, Family, and Social History:   Unchanged from H&P.    Scheduled Meds:   bisacodyl  10 mg Oral QHS    calcitRIOL  0.5 mcg Oral Daily    docusate sodium  100 mg Oral TID    ergocalciferol  50,000 Units Oral Q7 Days    famotidine  20 mg Oral QHS    heparin (porcine)  5,000 Units Subcutaneous Q8H    hydrALAZINE  25 mg Oral TID    lidocaine HCL 10 mg/ml (1%)  1 mL Other Once    multivitamin  1 tablet Oral Daily    NIFEdipine  60 mg Oral BID    nystatin  500,000 Units Mouth/Throat QID (PC + HS)    predniSONE  20 mg Oral Daily    sodium bicarbonate  650 mg Oral BID    tacrolimus  6 mg Oral BID    [START ON 9/30/2019] valGANciclovir  450 mg Oral Daily AM     Continuous Infusions:   glucagon (human recombinant)       PRN Meds:acetaminophen, Dextrose 10% Bolus, diphenhydrAMINE, EPINEPHrine, glucagon (human recombinant), glucose, glucose, glucose, hydrocortisone sodium succinate, insulin aspart U-100, naloxone, ondansetron, oxyCODONE-acetaminophen, oxyCODONE-acetaminophen    Intake/Output - Last 3 Shifts       09/21 0700 - 09/22 0659 09/22 0700 - 09/23 0659 09/23 0700 - 09/24 0659    P.O. 950 1204     I.V. (mL/kg) 1087.3 (11.6) 1958  "(24.7) 375 (4)    Blood  262.5     IV Piggyback 350 150     Total Intake(mL/kg) 2387.3 (25.4) 3941.5 (41.9) 375 (4)    Urine (mL/kg/hr) 2180 (1) 3150 (1.4) 100 (0.2)    Drains 160 90     Stool 0 0     Blood       Total Output 2340 3240 100    Net +47.3 +701.5 +275           Urine Occurrence   4 x    Stool Occurrence 0 x 3 x            Review of Systems   Constitutional: Positive for activity change and appetite change.   Respiratory: Negative for shortness of breath.    Cardiovascular: Positive for leg swelling.   Gastrointestinal: Positive for abdominal pain. Negative for abdominal distention.   Genitourinary: Positive for frequency.   Allergic/Immunologic: Positive for immunocompromised state.      Objective:     Vital Signs (Most Recent):  Temp: 98.7 °F (37.1 °C) (09/23/19 1116)  Pulse: 79 (09/23/19 1116)  Resp: 18 (09/23/19 1116)  BP: 131/61 (09/23/19 1116)  SpO2: (!) 94 % (09/23/19 1116) Vital Signs (24h Range):  Temp:  [98.2 °F (36.8 °C)-99 °F (37.2 °C)] 98.7 °F (37.1 °C)  Pulse:  [66-97] 79  Resp:  [13-20] 18  SpO2:  [94 %-97 %] 94 %  BP: (131-162)/(60-73) 131/61     Weight: 94 kg (207 lb 3.7 oz)  Height: 5' 3" (160 cm)  Body mass index is 36.71 kg/m².    Physical Exam   Constitutional: She is oriented to person, place, and time.   HENT:   Head: Normocephalic.   Eyes: Pupils are equal, round, and reactive to light.   Cardiovascular: Normal rate, regular rhythm, normal heart sounds and intact distal pulses.   Pulmonary/Chest: Effort normal.   Abdominal: Soft. Bowel sounds are normal.       Musculoskeletal: Normal range of motion. She exhibits no edema.   Neurological: She is alert and oriented to person, place, and time.   Skin: Skin is warm and dry. Capillary refill takes less than 2 seconds.   Psychiatric: She has a normal mood and affect. Her behavior is normal.   Nursing note and vitals reviewed.      Laboratory:  CBC:   Recent Labs   Lab 09/22/19  0600 09/22/19  0633 09/22/19  1759 09/23/19  0600   WBC " 2.04*  --  2.28* 1.60*   RBC 2.48*  --  2.96* 2.64*   HGB 7.6* 7.3* 9.1* 8.0*   HCT 23.9* 23.9* 27.7* 25.8*   PLT 79*  --  79* 75*   MCV 96  --  94 98   MCH 30.6  --  30.7 30.3   MCHC 31.8*  --  32.9 31.0*     BMP:   Recent Labs   Lab 09/22/19  0950 09/22/19  1759 09/23/19  0600   GLU 83 216* 68*    140 143   K 4.2 4.3 4.1    107 108   CO2 24 24 27   BUN 52* 48* 41*   CREATININE 5.1* 4.5* 3.5*   CALCIUM 8.0* 8.4* 8.1*     Coagulation:   Recent Labs   Lab 09/19/19  1806   APTT 25.4       Diagnostic Results:  US - Kidney:   Results for orders placed during the hospital encounter of 09/19/19   US Transplant Kidney With Doppler    Narrative EXAMINATION:  US TRANSPLANT KIDNEY WITH DOPPLER    CLINICAL HISTORY:  s/p kidney transplant, POD#2 with drop in H/H, please assess;    TECHNIQUE:  Real time gray scale and doppler ultrasound was performed over the patient's renal allograft.    COMPARISON:  Renal transplant ultrasound with Doppler 09/20/2019    FINDINGS:  Renal allograft in the right lower quadrant.  The allograft measures 13.2 cm. Normal perfusion. No hydronephrosis.  A ureteral stent is present.    Small collection at the inferior pole measures 2.8 x 1.2 x 2.7 cm.    Vasculature:    Resistive indices are as follows: Interlobar artery 0.79 upper segmental 0.83, mid segmental 0.83, lower segmental 0.79 (previously 0.76, 0.76, 0.78, and 0.76 respectively).    There are 2 main renal arteries.  Main renal artery peak systolic velocities: 191 cm/sec and 229 cm/sec with normal waveform (previously 156 cm/sec and 156 cm/sec).    Renal artery/iliac ratios: 0.89, 1.07.    The main renal vein is patent.    The urinary bladder is collapsed around a Lane catheter.      Impression Right renal allograft exhibits slight interval increase in arterial resistive indices and main renal artery velocities with normal waveforms.  Findings suspected related to postoperative edema.    Single small peritransplant fluid  "collection as above.    Continued follow-up advised.    Electronically signed by resident: Donya Almazan  Date:    09/22/2019  Time:    09:18    Electronically signed by: Felipe Galeano MD  Date:    09/22/2019  Time:    09:34     Assessment/Plan:     * Status post kidney transplant  · Very good urine output with improving clearance  · Serum creatinine trending down  · Ultrasound 9/22 with no evidence of hematoma  · Trend urine output and electrolytes and serum creatinine  · Lane removed - voiding  · Surgical drain removed       Long-term use of immunosuppressant medication  - see "prophylactic immunotherapy."      Prophylactic immunotherapy  - Thymo induction due to leukopenia.    - continue prograf, Monitor trough daily and adjust dose as needed to achieve therapeutic level.  - Continue Cellcept  - Continue steroids, per protocol.        At risk for opportunistic infections  - Continue Nystatin for thrush prophylaxis.  - Hold bactrim and Valcyte prophylaxis due to neutropenia.      Drug-induced pancytopenia  - hold thymo induction dose today, need 1/2 dose   - hold bactrim and valcyte  - monitor labs daily      Anemia associated with chronic renal failure  - h/h stable  - monitor labs daily      Hypertension, renal  Improving BP, will continue to monitor. Goal for BP is <130 mmHg SBP and BDP <80 mmHg.   Continue hydralazine 25 mg t.i.d.  Continue nifedipine 90 mg daily            Discharge Planning:  Not candidate at this time      Chantel Ortega NP  Kidney Transplant  [facility]  "

## 2019-09-23 NOTE — PROGRESS NOTES
"[facility]  Adult Nutrition  Progress Note    SUMMARY       Recommendations    Recommendation/Intervention: 1.) Continue regular diet. 2.) If PO intakes <50% of meals x 48hr, suggest Boost Plus BID. 3.) If renal function does not improve within 48hr, suggest renal diet restrictions. 4.) Daily weights.   Goals: 1.) Pt to consume/tolerate >75% EEN and EPN by follow up.   Nutrition Goal Status: progressing towards goal  Communication of RD Recs: (POC)    Reason for Assessment    Reason For Assessment: RD follow-up  Diagnosis: (kidney tx)  Relevant Medical History: s/p kidney tx; HTnl CVA; ESRD  Interdisciplinary Rounds: did not attend  General Information Comments: Pt sitting up in bed with bkfst tray, observed bites consumed. Pt reports fair appetite of meals at this time. Encouraged appropriate intake. Pt had no further questions regarding post-transplant diet education. Pt reports no GI distress at this time. Last HD 9/20 with 3.5L removed. NFPE completed 9/21 and continues to appear well nourished. Hand  strength normal for age/gender. Wt stable since last RD note. Pt continues to not meet malnutrition criteria.  Nutrition Discharge Planning: Post transplant nutrition education provided 9/21. Food safety/drug interactions emphasized. General healthy/low salt diet recommended. RD name/contact information, education material left.  No other needs identified. Caregiver present.     Nutrition Risk Screen    Nutrition Risk Screen: no indicators present    Nutrition/Diet History    Spiritual, Cultural Beliefs, Faith Practices, Values that Affect Care: no  Food Allergies: NKFA  Factors Affecting Nutritional Intake: None identified at this time    Anthropometrics    Temp: 99 °F (37.2 °C)  Height Method: Stated  Height: 5' 3" (160 cm)  Height (inches): 63 in  Weight Method: Bed Scale  Weight: 94 kg (207 lb 3.7 oz)  Weight (lb): 207.23 lb  Ideal Body Weight (IBW), Female: 115 lb  % Ideal Body " Weight, Female (lb): 172.44 lb  BMI (Calculated): 35.2  BMI Grade: 35 - 39.9 - obesity - grade II  Usual Body Weight (UBW), k.09 kg  % Usual Body Weight: 104.61       Lab/Procedures/Meds    Pertinent Labs Reviewed: reviewed  Pertinent Labs Comments: H/H 8.0/25.8, BUN 41, Cr 3.5, GFR 15.5, Glu 68, Ca 8.1  Pertinent Medications Reviewed: reviewed  Pertinent Medications Comments: bisacodyl, calcitriol, docusate, ergocalciferol, famotidine, methlyprednisolone, MVI, mycophenolate, statin, prednisone, zosyn, tacrolimus      Estimated/Assessed Needs    Weight Used For Calorie Calculations: 93 kg (205 lb 0.4 oz)  Energy Calorie Requirements (kcal): 1830 kcal/d  Energy Need Method: [place]-St Jeor(x1.25)  Protein Requirements: 74-93 g/day (0.8-1.0 g/kg)   Weight Used For Protein Calculations: 93 kg (205 lb 0.4 oz)  Fluid Requirements (mL): 1 mL/kcal or per MD     RDA Method (mL): 1830         Nutrition Prescription Ordered    Current Diet Order: regular    Evaluation of Received Nutrient/Fluid Intake    I/O: +6.4L since admit  Energy Calories Required: meeting needs  Protein Required: meeting needs  Fluid Required: meeting needs  Comments: LBM   Tolerance: tolerating  % Intake of Estimated Energy Needs: 25 - 50 %  % Meal Intake: 25 - 50 %    Nutrition Risk    Level of Risk/Frequency of Follow-up: low     Assessment and Plan  Nutrition Problem  Increased nutrient needs     Related to (etiology):   physicological causes related to healing      Signs and Symptoms (as evidenced by):   S/p kidney tx 19      Interventions (treatment strategy):  Collaboration of care with other providers; supplemental beverage/food; general healthful diet     Nutrition Diagnosis Status:   Continues       Monitor and Evaluation    Food and Nutrient Intake: energy intake, food and beverage intake  Food and Nutrient Adminstration: diet order  Knowledge/Beliefs/Attitudes: food and nutrition knowledge/skill  Physical Activity and Function:  nutrition-related ADLs and IADLs  Anthropometric Measurements: weight, weight change, body mass index  Biochemical Data, Medical Tests and Procedures: electrolyte and renal panel, gastrointestinal profile, glucose/endocrine profile, inflammatory profile  Nutrition-Focused Physical Findings: overall appearance     Malnutrition Assessment             Hand  Strength, Right (Malnutrition): 23.1(normal range for age/gender)   Orbital Region (Subcutaneous Fat Loss): well nourished  Upper Arm Region (Subcutaneous Fat Loss): well nourished   [place] Region (Muscle Loss): well nourished  Clavicle Bone Region (Muscle Loss): well nourished  Clavicle and Acromion Bone Region (Muscle Loss): well nourished  Dorsal Hand (Muscle Loss): well nourished  Anterior Thigh Region (Muscle Loss): well nourished  Posterior Calf Region (Muscle Loss): well nourished       Subcutaneous Fat Loss (Final Summary): well nourished  Muscle Loss Evaluation (Final Summary): well nourished         Nutrition Follow-Up    RD Follow-up?: Yes

## 2019-09-23 NOTE — PROGRESS NOTES
[facility]  Kidney Transplant  Progress Note      Reason for Follow-up: Reassessment of Kidney Transplant - 9/20/2019  (#1) recipient and management of immunosuppression.    ORGAN: LEFT KIDNEY    Donor Type: Donation after Brain Death    PHS Increased Risk: no   Cold Ischemia:   Induction Medications: [place]ath      Subjective:   History of Present Illness:  Ms. Satinder Schulte is a 63 year old AA female with a PMHx relevant for HTN, CVA and ESRD presumed secondary to HTN who received DBD kidney transplant 9/20/2019 (Thymo induction, PHS not increase, KDPI 78%, CIT 9 hr 5 min, CMV D-/R+, HBV cAB +, HCV AB + NAOMI -). Patient is currently on hemodialysis started on 8/18/14. Patient is dialyzing on TTS schedule. Last HD 9/20/2019  x 3.5 hours via LUE AVF EDW 89.5 kgs.  She is doing well this a.m..  She does complain of some nausea, tolerated procedure well she has a ZULEIKA drain below her incision.  Wound looks dry staples no evidence of fluid collection.  She complained of some nausea this morning pain is well controlled urine output is 100 cc an hour.  Patient received 1 unit of blood intraoperatively secondary to having some wooziness of bleeding.  She has a double artery and the inferior artery by bifurcates in 3 intra op ultrasound with RI is a 1 follow-up ultrasound with good are eyes 0.76 and good arterial velocities of 156.  She is receiving today time O 2.  Full dose.  CVP low this morning at 2 it has improved all the way up to 5, hemoglobin stable 9.1. She seems volume down will start IV fluids for hydration.  Acceptable blood pressure BP improving.  Tolerating p.o. he but poor p.o. intake.    She is a 3 day Lane.  Lane to come out on 09/23    Ms. Schulte is a 63 y.o. year old female who is status post Kidney Transplant - 9/20/2019  (#1).    Her maintenance immunosuppression consists of:   Immunosuppressants (From admission, onward)    Start     Stop Route Frequency Ordered    09/22/19 1800   tacrolimus capsule 4 mg  (IP TXP KIDNEY POST-OP THYMO)      -- Oral 2 times daily 09/22/19 1142    09/21/19 0900  antithymocyte globulin (rabbit) 125 mg in sodium chloride 0.9% 250 mL IVPB  (IP TXP KIDNEY POST-OP THYMO)      09/23 0859 IV Daily 09/20/19 1111    09/20/19 1215  mycophenolate capsule 1,000 mg  (IP TXP KIDNEY POST-OP THYMO)      -- Oral 2 times daily 09/20/19 1111          Hospital Course:  Interval History: Pt now s/p DBD KTx 9/20 2/2 HTN (Thymo induction 2/2 leukopenia, CIT ~10h, CMV D-/R+, donor EBV IgG+ & HBcAB+, recipient HBsAB+ HBcAB-). Intra-op course notable for double artery anatomy of donor with 2 anastomosis. 1u PRBC transfused intra-op for low CVP and oozing. The kidney was unclamped and reperfused at 9/20/2019 9:23 AM. Intra-op urine production observed. 3 day rodriguez, per op note. She has 1 ZULEIKA drain. US ordered due to double artery. US results reviewed by surgeon - satisfactory doppler. Pt transferred to TSU ~1600.     Interval History:  Hemoglobin dropped today stat ultrasound was ordered with no evidence of bleeding, stable perfusion and flow to the kidney.  Transfuse 1 unit of PRBCs monitoring H&H.  Urine output overnight 2.1 L.  Serum creatinine trending down good clearance electrolytes replaced.  Stable bicarb.  Blood pressure improved will continue to monitor patient tolerating p.o. and is more awake today she side on the chair yesterday not passing gas    Past Medical, Surgical, Family, and Social History:   Unchanged from H&P.    Scheduled Meds:   acetaminophen  650 mg Oral Q24H    anti-thymo immune glob (THYMOGLOBULIN -rabbit) IVPB  1.5 mg/kg/day Intravenous Daily    bisacodyl  10 mg Oral QHS    calcitRIOL  0.5 mcg Oral Daily    diphenhydrAMINE  25 mg Oral Q24H    docusate sodium  100 mg Oral TID    ergocalciferol  50,000 Units Oral Q7 Days    famotidine  20 mg Oral QHS    heparin (porcine)  5,000 Units Subcutaneous Q8H    hydrALAZINE  25 mg Oral TID    multivitamin  1  tablet Oral Daily    mycophenolate  1,000 mg Oral BID    NIFEdipine  90 mg Oral Daily    nystatin  500,000 Units Mouth/Throat QID (PC + HS)    [START ON 9/23/2019] predniSONE  20 mg Oral Daily    sulfamethoxazole-trimethoprim 400-80mg  1 tablet Oral Daily AM    tacrolimus  4 mg Oral BID    [START ON 9/30/2019] valGANciclovir  450 mg Oral Daily AM     Continuous Infusions:   glucagon (human recombinant)      sodium bicarbonate drip 75 mL/hr at 09/22/19 1755     PRN Meds:sodium chloride, sodium chloride, acetaminophen, Dextrose 10% Bolus, diphenhydrAMINE, EPINEPHrine, glucagon (human recombinant), glucose, glucose, glucose, hydrocortisone sodium succinate, insulin aspart U-100, naloxone, ondansetron, oxyCODONE-acetaminophen, oxyCODONE-acetaminophen    Intake/Output - Last 3 Shifts       09/20 0700 - 09/21 0659 09/21 0700 - 09/22 0659 09/22 0700 - 09/23 0659    P.O.     I.V. (mL/kg) 6167.3 (66.3) 1087.3 (11.6) 1575 (16.4)    Blood 267  262.5    IV Piggyback 1250 350 150    Total Intake(mL/kg) 8064.3 (86.7) 2387.3 (25.4) 3191.5 (33.3)    Urine (mL/kg/hr) 2025 (0.9) 2180 (1) 1450 (1)    Emesis/NG output       Drains 240 160 60    Other       Stool 0 0 0    Blood 275      Total Output 2540 2340 1510    Net +5524.3 +47.3 +1681.5           Stool Occurrence 0 x 0 x 3 x           Review of Systems   Constitutional: Positive for activity change. Negative for appetite change, chills, fatigue and fever.   HENT: Negative for congestion and facial swelling.    Eyes: Negative for pain, discharge and visual disturbance.   Respiratory: Negative for cough, chest tightness, shortness of breath and wheezing.    Cardiovascular: Negative for chest pain, palpitations and leg swelling.   Gastrointestinal: Positive for abdominal pain (incisional). Negative for abdominal distention, nausea and vomiting.   Endocrine: Negative.    Genitourinary: Negative for dysuria and flank pain.   Musculoskeletal: Negative for back pain.  "  Skin: Positive for wound (RLQ incision c/d/i with surgical dressing). Negative for pallor and rash.   Allergic/Immunologic: Positive for immunocompromised state.   Neurological: Negative for dizziness, tremors, weakness and light-headedness.   Hematological: Negative.    Psychiatric/Behavioral: Negative for agitation, confusion and dysphoric mood. The patient is not nervous/anxious.       Objective:     Vital Signs (Most Recent):  Temp: 98.4 °F (36.9 °C) (09/22/19 1830)  Pulse: 96 (09/22/19 1830)  Resp: 20 (09/22/19 1830)  BP: (!) 150/64 (09/22/19 1830)  SpO2: 97 % (09/22/19 1830) Vital Signs (24h Range):  Temp:  [98.4 °F (36.9 °C)-99 °F (37.2 °C)] 98.4 °F (36.9 °C)  Pulse:  [66-97] 96  Resp:  [13-20] 20  SpO2:  [95 %-99 %] 97 %  BP: (128-170)/(58-72) 150/64     Weight: 95.9 kg (211 lb 8.5 oz)  Height: 5' 3" (160 cm)  Body mass index is 37.47 kg/m².    Physical Exam   Constitutional: She is oriented to person, place, and time. She appears well-developed and well-nourished.   HENT:   Head: Normocephalic and atraumatic.   Eyes: Pupils are equal, round, and reactive to light. EOM are normal. No scleral icterus.   Neck: Normal range of motion. Neck supple. No JVD present.   Cardiovascular: Normal rate, regular rhythm and normal heart sounds.   No murmur heard.  LUE AVF +/+   Pulmonary/Chest: Effort normal and breath sounds normal. No respiratory distress. She has no wheezes.   Abdominal: Soft. Bowel sounds are normal. She exhibits no distension. There is tenderness (As suspected ports transplant).       Musculoskeletal: Normal range of motion. She exhibits no edema.   Neurological: She is alert and oriented to person, place, and time.   Skin: Skin is warm and dry.   Psychiatric: She has a normal mood and affect. Her behavior is normal.   Nursing note and vitals reviewed.      Laboratory:  CBC:   Recent Labs   Lab 09/21/19  1510 09/22/19  0600 09/22/19  0633 09/22/19  1759   WBC 3.01* 2.04*  --  2.28*   RBC 2.90* 2.48* "  --  2.96*   HGB 9.1* 7.6* 7.3* 9.1*   HCT 29.3* 23.9* 23.9* 27.7*   PLT 99* 79*  --  79*   * 96  --  94   MCH 31.4* 30.6  --  30.7   MCHC 31.1* 31.8*  --  32.9     BMP:   Recent Labs   Lab 09/22/19  0600 09/22/19  0950 09/22/19  1759   GLU 92 83 216*    141 140   K 4.7 4.2 4.3    107 107   CO2 23 24 24   BUN 54* 52* 48*   CREATININE 5.3* 5.1* 4.5*   CALCIUM 8.2* 8.0* 8.4*     CMP:   Recent Labs   Lab 09/19/19  1806  09/20/19  1914 09/21/19  0545  09/22/19  0600 09/22/19  0950 09/22/19  1759   GLU 71   < > 185* 109   < > 92 83 216*   CALCIUM 9.8   < > 8.0* 7.5*   < > 8.2* 8.0* 8.4*   ALBUMIN 4.2   < > 3.1* 2.8*   < > 2.7* 2.8* 2.9*   PROT 8.1  --  5.8* 5.4*  --   --   --   --       < > 133* 135*   < > 139 141 140   K 4.9   < > 5.7* 5.2*   < > 4.7 4.2 4.3   CO2 25   < > 20* 20*   < > 23 24 24   CL 99   < > 102 104   < > 107 107 107   BUN 40*   < > 52* 48*   < > 54* 52* 48*   CREATININE 8.7*   < > 9.2* 8.0*   < > 5.3* 5.1* 4.5*   ALKPHOS 48*  --  40* 38*  --   --   --   --    ALT 7*  --  19 8*  --   --   --   --    AST 10  --  24 17  --   --   --   --     < > = values in this interval not displayed.     Coagulation:   Recent Labs   Lab 09/19/19 1806   APTT 25.4     Labs within the past 24 hours have been reviewed.    Diagnostic Results:  US - Kidney:   Results for orders placed during the hospital encounter of 09/19/19   US Transplant Kidney With Doppler    Narrative EXAMINATION:  US TRANSPLANT KIDNEY WITH DOPPLER    CLINICAL HISTORY:  s/p kidney transplant, POD#2 with drop in H/H, please assess;    TECHNIQUE:  Real time gray scale and doppler ultrasound was performed over the patient's renal allograft.    COMPARISON:  Renal transplant ultrasound with Doppler 09/20/2019    FINDINGS:  Renal allograft in the right lower quadrant.  The allograft measures 13.2 cm. Normal perfusion. No hydronephrosis.  A ureteral stent is present.    Small collection at the inferior pole measures 2.8 x 1.2 x 2.7  "cm.    Vasculature:    Resistive indices are as follows: Interlobar artery 0.79 upper segmental 0.83, mid segmental 0.83, lower segmental 0.79 (previously 0.76, 0.76, 0.78, and 0.76 respectively).    There are 2 main renal arteries.  Main renal artery peak systolic velocities: 191 cm/sec and 229 cm/sec with normal waveform (previously 156 cm/sec and 156 cm/sec).    Renal artery/iliac ratios: 0.89, 1.07.    The main renal vein is patent.    The urinary bladder is collapsed around a Lane catheter.      Impression Right renal allograft exhibits slight interval increase in arterial resistive indices and main renal artery velocities with normal waveforms.  Findings suspected related to postoperative edema.    Single small peritransplant fluid collection as above.    Continued follow-up advised.    Electronically signed by resident: Donya Almazan  Date:    09/22/2019  Time:    09:18    Electronically signed by: Felipe Galeano MD  Date:    09/22/2019  Time:    09:34     Assessment/Plan:     * Status post kidney transplant  · Very good urine output with improving clearance  · Serum creatinine trending down  · Ultrasound today with no evidence of hematoma  · IV fluids changed to sodium bicarbonate drip secondary to worsening acidosis  · Metabolic acidosis noted, attempted p.o. bicarb this morning but labs reflect a worsening acidosis will start sodium bicarb drip.  · Continue IV fluids for hydration  · Trend urine output and electrolytes and serum creatinine      Long-term use of immunosuppressant medication  - see "prophylactic immunotherapy."      Prophylactic immunotherapy  - Thymo induction due to leukopenia.  Full dose 3.  - increase Prograf 4 mg b.i.d. give to extra dose now.   - Monitor trough daily and adjust dose as needed to achieve therapeutic level.  - Continue Cellcept  - Continue steroids, per protocol.        Anemia associated with chronic renal failure  - Hg he will go mean drop today stat ultrasound " with no evidence of bleeding 1 unit of PRBC transfused will monitor H&H  - hemoglobin with adequate response post transfusion  - Will hold SHILO  at this time  -iron depleted stores T sat down to 14 but ferritin greater than 1200      At risk for opportunistic infections  - Continue Bactrim for PCP prophylaxis.  - Continue Nystatin for thrush prophylaxis.  - Start Valcyte for CMV prophylaxis on 9/30.      Hypertension, renal    · Improving BP, will continue to monitor. Goal for BP is <130 mmHg SBP and BDP <80 mmHg.   · Continue hydralazine 25 mg t.i.d.  · Continue nifedipine 90 mg daily  · Patient is not on a beta-blocker next step would be to add a beta-blocker she was on labetalol out prior to transplant          Discharge Planning:  Not a candidate for discharge    Wes Nixon MD  Kidney Transplant  [facility]

## 2019-09-23 NOTE — ASSESSMENT & PLAN NOTE
· Improving BP, will continue to monitor. Goal for BP is <130 mmHg SBP and BDP <80 mmHg.   · Continue hydralazine 25 mg t.i.d.  · Continue nifedipine 90 mg daily  · Patient is not on a beta-blocker next step would be to add a beta-blocker she was on labetalol out prior to transplant

## 2019-09-24 PROBLEM — E83.39 HYPOPHOSPHATEMIA: Status: ACTIVE | Noted: 2019-09-24

## 2019-09-24 LAB
ALBUMIN SERPL BCP-MCNC: 3.2 G/DL (ref 3.5–5.2)
ANION GAP SERPL CALC-SCNC: 10 MMOL/L (ref 8–16)
ANISOCYTOSIS BLD QL SMEAR: SLIGHT
BACTERIA #/AREA URNS AUTO: ABNORMAL /HPF
BASOPHILS # BLD AUTO: 0.01 K/UL (ref 0–0.2)
BASOPHILS NFR BLD: 0.6 % (ref 0–1.9)
BILIRUB UR QL STRIP: NEGATIVE
BUN SERPL-MCNC: 25 MG/DL (ref 8–23)
CALCIUM SERPL-MCNC: 9 MG/DL (ref 8.7–10.5)
CHLORIDE SERPL-SCNC: 108 MMOL/L (ref 95–110)
CLARITY UR REFRACT.AUTO: ABNORMAL
CO2 SERPL-SCNC: 25 MMOL/L (ref 23–29)
COLOR UR AUTO: YELLOW
CREAT SERPL-MCNC: 2.2 MG/DL (ref 0.5–1.4)
DIFFERENTIAL METHOD: ABNORMAL
EOSINOPHIL # BLD AUTO: 0.1 K/UL (ref 0–0.5)
EOSINOPHIL NFR BLD: 5.7 % (ref 0–8)
ERYTHROCYTE [DISTWIDTH] IN BLOOD BY AUTOMATED COUNT: 17.3 % (ref 11.5–14.5)
EST. GFR  (AFRICAN AMERICAN): 27.1 ML/MIN/1.73 M^2
EST. GFR  (NON AFRICAN AMERICAN): 23.5 ML/MIN/1.73 M^2
GLUCOSE SERPL-MCNC: 59 MG/DL (ref 70–110)
GLUCOSE UR QL STRIP: NEGATIVE
HCT VFR BLD AUTO: 29.2 % (ref 37–48.5)
HGB BLD-MCNC: 9.2 G/DL (ref 12–16)
HGB UR QL STRIP: ABNORMAL
HYALINE CASTS UR QL AUTO: 0 /LPF
HYPOCHROMIA BLD QL SMEAR: ABNORMAL
IMM GRANULOCYTES # BLD AUTO: 0.01 K/UL (ref 0–0.04)
IMM GRANULOCYTES NFR BLD AUTO: 0.6 % (ref 0–0.5)
KETONES UR QL STRIP: NEGATIVE
LEUKOCYTE ESTERASE UR QL STRIP: NEGATIVE
LYMPHOCYTES # BLD AUTO: 0.1 K/UL (ref 1–4.8)
LYMPHOCYTES NFR BLD: 7.4 % (ref 18–48)
MAGNESIUM SERPL-MCNC: 1.6 MG/DL (ref 1.6–2.6)
MCH RBC QN AUTO: 30.4 PG (ref 27–31)
MCHC RBC AUTO-ENTMCNC: 31.5 G/DL (ref 32–36)
MCV RBC AUTO: 96 FL (ref 82–98)
MICROSCOPIC COMMENT: ABNORMAL
MONOCYTES # BLD AUTO: 0.2 K/UL (ref 0.3–1)
MONOCYTES NFR BLD: 9.7 % (ref 4–15)
NEUTROPHILS # BLD AUTO: 1.3 K/UL (ref 1.8–7.7)
NEUTROPHILS NFR BLD: 76 % (ref 38–73)
NITRITE UR QL STRIP: NEGATIVE
NRBC BLD-RTO: 0 /100 WBC
OVALOCYTES BLD QL SMEAR: ABNORMAL
PH UR STRIP: >8 [PH] (ref 5–8)
PHOSPHATE SERPL-MCNC: 2.2 MG/DL (ref 2.7–4.5)
PLATELET # BLD AUTO: 76 K/UL (ref 150–350)
PMV BLD AUTO: 10.9 FL (ref 9.2–12.9)
POCT GLUCOSE: 115 MG/DL (ref 70–110)
POCT GLUCOSE: 166 MG/DL (ref 70–110)
POCT GLUCOSE: 185 MG/DL (ref 70–110)
POCT GLUCOSE: 72 MG/DL (ref 70–110)
POIKILOCYTOSIS BLD QL SMEAR: SLIGHT
POLYCHROMASIA BLD QL SMEAR: ABNORMAL
POTASSIUM SERPL-SCNC: 3.9 MMOL/L (ref 3.5–5.1)
PROT UR QL STRIP: ABNORMAL
RBC # BLD AUTO: 3.03 M/UL (ref 4–5.4)
RBC #/AREA URNS AUTO: >100 /HPF (ref 0–4)
SODIUM SERPL-SCNC: 143 MMOL/L (ref 136–145)
SP GR UR STRIP: 1.01 (ref 1–1.03)
SQUAMOUS #/AREA URNS AUTO: 0 /HPF
TACROLIMUS BLD-MCNC: 2.2 NG/ML (ref 5–15)
URN SPEC COLLECT METH UR: ABNORMAL
WBC # BLD AUTO: 1.76 K/UL (ref 3.9–12.7)
WBC #/AREA URNS AUTO: 3 /HPF (ref 0–5)

## 2019-09-24 PROCEDURE — 25000003 PHARM REV CODE 250: Performed by: NURSE PRACTITIONER

## 2019-09-24 PROCEDURE — 87086 URINE CULTURE/COLONY COUNT: CPT

## 2019-09-24 PROCEDURE — 81001 URINALYSIS AUTO W/SCOPE: CPT

## 2019-09-24 PROCEDURE — 25000003 PHARM REV CODE 250: Performed by: PHYSICIAN ASSISTANT

## 2019-09-24 PROCEDURE — 63600175 PHARM REV CODE 636 W HCPCS: Performed by: PHYSICIAN ASSISTANT

## 2019-09-24 PROCEDURE — 99233 SBSQ HOSP IP/OBS HIGH 50: CPT | Mod: ,,, | Performed by: PHYSICIAN ASSISTANT

## 2019-09-24 PROCEDURE — 36415 COLL VENOUS BLD VENIPUNCTURE: CPT

## 2019-09-24 PROCEDURE — 63600175 PHARM REV CODE 636 W HCPCS: Performed by: SURGERY

## 2019-09-24 PROCEDURE — 63600175 PHARM REV CODE 636 W HCPCS: Performed by: NURSE PRACTITIONER

## 2019-09-24 PROCEDURE — 20600001 HC STEP DOWN PRIVATE ROOM

## 2019-09-24 PROCEDURE — 99233 PR SUBSEQUENT HOSPITAL CARE,LEVL III: ICD-10-PCS | Mod: ,,, | Performed by: PHYSICIAN ASSISTANT

## 2019-09-24 PROCEDURE — 87040 BLOOD CULTURE FOR BACTERIA: CPT

## 2019-09-24 PROCEDURE — 85025 COMPLETE CBC W/AUTO DIFF WBC: CPT

## 2019-09-24 PROCEDURE — 83735 ASSAY OF MAGNESIUM: CPT

## 2019-09-24 PROCEDURE — 80069 RENAL FUNCTION PANEL: CPT

## 2019-09-24 PROCEDURE — 25000003 PHARM REV CODE 250: Performed by: SURGERY

## 2019-09-24 PROCEDURE — 80197 ASSAY OF TACROLIMUS: CPT

## 2019-09-24 RX ORDER — ATOVAQUONE 750 MG/5ML
1500 SUSPENSION ORAL DAILY
Status: DISCONTINUED | OUTPATIENT
Start: 2019-09-24 | End: 2019-09-25 | Stop reason: HOSPADM

## 2019-09-24 RX ORDER — CARVEDILOL 12.5 MG/1
12.5 TABLET ORAL 2 TIMES DAILY
Status: DISCONTINUED | OUTPATIENT
Start: 2019-09-24 | End: 2019-09-24

## 2019-09-24 RX ORDER — ACETAMINOPHEN 325 MG/1
650 TABLET ORAL ONCE AS NEEDED
Status: DISCONTINUED | OUTPATIENT
Start: 2019-09-24 | End: 2019-09-25

## 2019-09-24 RX ORDER — CARVEDILOL 12.5 MG/1
12.5 TABLET ORAL 2 TIMES DAILY
Status: DISCONTINUED | OUTPATIENT
Start: 2019-09-24 | End: 2019-09-25 | Stop reason: HOSPADM

## 2019-09-24 RX ORDER — ACETAMINOPHEN 325 MG/1
650 TABLET ORAL ONCE
Status: COMPLETED | OUTPATIENT
Start: 2019-09-24 | End: 2019-09-24

## 2019-09-24 RX ORDER — OXYBUTYNIN CHLORIDE 5 MG/1
5 TABLET ORAL 3 TIMES DAILY PRN
Status: DISCONTINUED | OUTPATIENT
Start: 2019-09-24 | End: 2019-09-25 | Stop reason: HOSPADM

## 2019-09-24 RX ORDER — EPINEPHRINE 1 MG/ML
1 INJECTION, SOLUTION INTRACARDIAC; INTRAMUSCULAR; INTRAVENOUS; SUBCUTANEOUS ONCE AS NEEDED
Status: DISCONTINUED | OUTPATIENT
Start: 2019-09-24 | End: 2019-09-25

## 2019-09-24 RX ORDER — NIFEDIPINE 30 MG/1
60 TABLET, EXTENDED RELEASE ORAL 2 TIMES DAILY
Status: DISCONTINUED | OUTPATIENT
Start: 2019-09-24 | End: 2019-09-25 | Stop reason: HOSPADM

## 2019-09-24 RX ORDER — CEFAZOLIN SODIUM 1 G/3ML
2 INJECTION, POWDER, FOR SOLUTION INTRAMUSCULAR; INTRAVENOUS
Status: DISCONTINUED | OUTPATIENT
Start: 2019-09-24 | End: 2019-09-25 | Stop reason: HOSPADM

## 2019-09-24 RX ORDER — DIPHENHYDRAMINE HCL 50 MG
50 CAPSULE ORAL ONCE AS NEEDED
Status: DISCONTINUED | OUTPATIENT
Start: 2019-09-24 | End: 2019-09-25

## 2019-09-24 RX ORDER — DIPHENHYDRAMINE HCL 25 MG
25 CAPSULE ORAL ONCE
Status: COMPLETED | OUTPATIENT
Start: 2019-09-24 | End: 2019-09-24

## 2019-09-24 RX ADMIN — CEFAZOLIN 2 G: 1 INJECTION, POWDER, FOR SOLUTION INTRAMUSCULAR; INTRAVENOUS at 08:09

## 2019-09-24 RX ADMIN — TACROLIMUS 6 MG: 1 CAPSULE ORAL at 08:09

## 2019-09-24 RX ADMIN — NYSTATIN 500000 UNITS: 100000 SUSPENSION ORAL at 05:09

## 2019-09-24 RX ADMIN — NYSTATIN 500000 UNITS: 100000 SUSPENSION ORAL at 08:09

## 2019-09-24 RX ADMIN — NIFEDIPINE 60 MG: 30 TABLET, FILM COATED, EXTENDED RELEASE ORAL at 08:09

## 2019-09-24 RX ADMIN — FAMOTIDINE 20 MG: 20 TABLET ORAL at 08:09

## 2019-09-24 RX ADMIN — HYDRALAZINE HYDROCHLORIDE 25 MG: 25 TABLET, FILM COATED ORAL at 01:09

## 2019-09-24 RX ADMIN — ACETAMINOPHEN 650 MG: 325 TABLET ORAL at 03:09

## 2019-09-24 RX ADMIN — HYDRALAZINE HYDROCHLORIDE 25 MG: 25 TABLET, FILM COATED ORAL at 08:09

## 2019-09-24 RX ADMIN — ANTI-THYMOCYTE GLOBULIN (RABBIT) 75 MG: 5 INJECTION, POWDER, LYOPHILIZED, FOR SOLUTION INTRAVENOUS at 03:09

## 2019-09-24 RX ADMIN — HEPARIN SODIUM 5000 UNITS: 5000 INJECTION, SOLUTION INTRAVENOUS; SUBCUTANEOUS at 01:09

## 2019-09-24 RX ADMIN — DIBASIC SODIUM PHOSPHATE, MONOBASIC POTASSIUM PHOSPHATE AND MONOBASIC SODIUM PHOSPHATE 2 TABLET: 852; 155; 130 TABLET ORAL at 10:09

## 2019-09-24 RX ADMIN — CEFAZOLIN 2 G: 1 INJECTION, POWDER, FOR SOLUTION INTRAMUSCULAR; INTRAVENOUS at 10:09

## 2019-09-24 RX ADMIN — TACROLIMUS 6 MG: 1 CAPSULE ORAL at 05:09

## 2019-09-24 RX ADMIN — SODIUM BICARBONATE 650 MG TABLET 650 MG: at 08:09

## 2019-09-24 RX ADMIN — CALCITRIOL 0.5 MCG: 0.5 CAPSULE, LIQUID FILLED ORAL at 08:09

## 2019-09-24 RX ADMIN — BISACODYL 10 MG: 5 TABLET, COATED ORAL at 08:09

## 2019-09-24 RX ADMIN — NYSTATIN 500000 UNITS: 100000 SUSPENSION ORAL at 01:09

## 2019-09-24 RX ADMIN — ATOVAQUONE 1500 MG: 750 SUSPENSION ORAL at 01:09

## 2019-09-24 RX ADMIN — PREDNISONE 20 MG: 10 TABLET ORAL at 08:09

## 2019-09-24 RX ADMIN — OXYBUTYNIN CHLORIDE 5 MG: 5 TABLET ORAL at 03:09

## 2019-09-24 RX ADMIN — DOCUSATE SODIUM 100 MG: 100 CAPSULE, LIQUID FILLED ORAL at 08:09

## 2019-09-24 RX ADMIN — THERA TABS 1 TABLET: TAB at 08:09

## 2019-09-24 RX ADMIN — DIBASIC SODIUM PHOSPHATE, MONOBASIC POTASSIUM PHOSPHATE AND MONOBASIC SODIUM PHOSPHATE 2 TABLET: 852; 155; 130 TABLET ORAL at 08:09

## 2019-09-24 RX ADMIN — DIPHENHYDRAMINE HYDROCHLORIDE 25 MG: 25 CAPSULE ORAL at 03:09

## 2019-09-24 RX ADMIN — HEPARIN SODIUM 5000 UNITS: 5000 INJECTION, SOLUTION INTRAVENOUS; SUBCUTANEOUS at 05:09

## 2019-09-24 RX ADMIN — HEPARIN SODIUM 5000 UNITS: 5000 INJECTION, SOLUTION INTRAVENOUS; SUBCUTANEOUS at 08:09

## 2019-09-24 RX ADMIN — CARVEDILOL 12.5 MG: 12.5 TABLET, FILM COATED ORAL at 08:09

## 2019-09-24 NOTE — SUBJECTIVE & OBJECTIVE
Subjective:   History of Present Illness:  Ms. Satinder Schulte is a 63 year old AA female with a PMHx relevant for HTN, CVA and ESRD presumed secondary to HTN who received DBD kidney transplant 9/20/2019 (Thymo induction, PHS not increase, KDPI 78%, CIT 9 hr 5 min, CMV D-/R+, HBV cAB +, HCV AB + NAOMI -). Patient is currently on hemodialysis started on 8/18/14. Patient is dialyzing on TTS schedule. Last HD 9/20/2019  x 3.5 hours via LUE AVF EDW 89.5 kgs.  She is doing well this a.m..  She does complain of some nausea, tolerated procedure well she has a ZULEIKA drain below her incision.  Wound looks dry staples no evidence of fluid collection.  She complained of some nausea this morning pain is well controlled urine output is 100 cc an hour.  Patient received 1 unit of blood intraoperatively secondary to having some wooziness of bleeding.  She has a double artery and the inferior artery by bifurcates in 3 intra op ultrasound with RI is a 1 follow-up ultrasound with good are eyes 0.76 and good arterial velocities of 156.  She is receiving today time O 2.  Full dose.  CVP low this morning at 2 it has improved all the way up to 5, hemoglobin stable 9.1. She seems volume down will start IV fluids for hydration.  Acceptable blood pressure BP improving.  Tolerating p.o. he but poor p.o. intake.    She is a 3 day Lane.  Lane to come out on 09/23    Ms. Schulte is a 63 y.o. year old female who is status post Kidney Transplant - 9/20/2019  (#1).  Her maintenance immunosuppression consists of:   Immunosuppressants (From admission, onward)    Start     Stop Route Frequency Ordered    09/24/19 1500  antithymocyte globulin (rabbit) 75 mg in sodium chloride 0.9% 150 mL IVPB      -- IV Once 09/24/19 1303    09/23/19 1800  tacrolimus capsule 6 mg  (IP TXP KIDNEY POST-OP THYMO)      -- Oral 2 times daily 09/23/19 1045          Her post transplant course has been uncomplicated to date.    Hospital Course:  Now s/p DBD KTx 9/20 2/2 HTN (Thymo  "induction 2/2 leukopenia, CIT ~10h, CMV D-/R+, donor EBV IgG+ & HBcAB+, recipient HBsAB+ HBcAB-). Intra-op course notable for double artery anatomy of donor with 2 anastomosis. 1u PRBC transfused intra-op for low CVP and oozing. The kidney was unclamped and reperfused at 9/20/2019 9:23 AM. Intra-op urine production observed. 3 day rodriguez, per op note. Rodriguez removed 9/23 & pt voided. ZULEIKA drain removed 9/23.     Interval History: No acute events overnight. Cr continues to trend down & UOP adequate. Leukopenia persists, but slightly better than yesterday. Will give remaining (half) dose of Thymo. Pt may need Neupogen tomorrow, pending ANC. Continue to hold Bactrim and Cellcept. Will start Atovaquone for PCP prophylaxis. Biggest issue at this time is incontinence. Pt had a pure-wick catheter in place this AM (rodriguez removed yesterday) & c/o not being able to eat or drink due to urinary urgency and frequency. She had a cytoscopy 8/29/19 showing decreased bladder capacity ("strong urge to urinate with 150 cc urine"). Pt also c/o burning and discomfort with urination, likely related to bladder spasms. Hematuria persists, but unclear if blood is from ss drainage at inferior portion of incision seeping into purewick or blood in urinary tract. Started ditropan, replaced rodriguez catheter, and sent UA/cx. UA with >100 RBCs. Due to unlikely resolution of incontinence any time soon, plan to dc pt with rodriguez catheter and schedule urology follow up outpatient. Pt hypertensive this morning. She took her home supply of Nifedipine 90 mg and Coreg 12.5 mg this AM due to seeing her BP reading (/92) and HA stating "I don't want to have another stroke." Educated patient on danger of taking home meds while inpatient, especially while prescribed additional antihypertensives. Pt expressed understanding. Added Coreg 12.5 mg BID to current regimen of Nifedipine 60 mg BID and Hydralazine 25 mg TID. Checked orthostatic BP (pt not orthostatic). " Of note, donor cultures positive with Staphlococcus lugdunensis. Per ID, will treat with IV Ancef while inpatient and transition to oral cefadroxil at discharge to complete 2 week course. Blood cx drawn 9/24 to r/o transmission to recipient. Continue to monitor.        Past Medical, Surgical, Family, and Social History:   Unchanged from H&P.    Scheduled Meds:   acetaminophen  650 mg Oral Once    anti-thymo immune glob (THYMOGLOBULIN -rabbit) IVPB  75 mg Intravenous Once    atovaquone  1,500 mg Oral Daily    bisacodyl  10 mg Oral QHS    calcitRIOL  0.5 mcg Oral Daily    carvedilol  12.5 mg Oral BID    ceFAZolin (ANCEF) IVPB  2 g Intravenous Q12H    diphenhydrAMINE  25 mg Oral Once    docusate sodium  100 mg Oral TID    ergocalciferol  50,000 Units Oral Q7 Days    famotidine  20 mg Oral QHS    heparin (porcine)  5,000 Units Subcutaneous Q8H    hydrALAZINE  25 mg Oral TID    k phos di & mono-sod phos mono  500 mg Oral BID    multivitamin  1 tablet Oral Daily    NIFEdipine  60 mg Oral BID    nystatin  500,000 Units Mouth/Throat QID (PC + HS)    predniSONE  20 mg Oral Daily    sodium bicarbonate  650 mg Oral BID    tacrolimus  6 mg Oral BID    [START ON 9/30/2019] valGANciclovir  450 mg Oral Daily AM     Continuous Infusions:   glucagon (human recombinant)       PRN Meds:acetaminophen, acetaminophen, Dextrose 10% Bolus, diphenhydrAMINE, diphenhydrAMINE, EPINEPHrine, EPINEPHrine, glucagon (human recombinant), glucose, glucose, glucose, hydrocortisone sodium succinate, hydrocortisone sodium succinate, insulin aspart U-100, naloxone, ondansetron, oxybutynin, oxyCODONE-acetaminophen, oxyCODONE-acetaminophen    Intake/Output - Last 3 Shifts       09/22 0700 - 09/23 0659 09/23 0700 - 09/24 0659 09/24 0700 - 09/25 0659    P.O. 1204 1140 777    I.V. (mL/kg) 2325 (24.7) 375 (4.1)     Blood 262.5      IV Piggyback 150      Total Intake(mL/kg) 3941.5 (41.9) 1515 (16.6) 777 (8.5)    Urine (mL/kg/hr) 3150 (1.4)  "2300 (1.1) 700 (1)    Drains 90      Stool 0 0 0    Total Output 3240 2300 700    Net +701.5 -785 +77           Urine Occurrence  7 x 1 x    Stool Occurrence 3 x 0 x 0 x           Review of Systems   Constitutional: Positive for activity change and appetite change. Negative for fever.   HENT: Negative for facial swelling and trouble swallowing.    Eyes: Negative for photophobia.   Respiratory: Negative for cough, shortness of breath, wheezing and stridor.    Cardiovascular: Negative for chest pain, palpitations and leg swelling.   Gastrointestinal: Positive for abdominal pain and constipation. Negative for abdominal distention, diarrhea and vomiting.   Genitourinary: Positive for frequency, hematuria and urgency. Negative for difficulty urinating and flank pain.   Musculoskeletal: Negative for neck pain and neck stiffness.   Skin: Positive for wound.   Allergic/Immunologic: Positive for immunocompromised state.   Neurological: Positive for weakness and headaches. Negative for dizziness, tremors and light-headedness.   Psychiatric/Behavioral: Negative for agitation, behavioral problems, confusion and hallucinations.      Objective:     Vital Signs (Most Recent):  Temp: 98.7 °F (37.1 °C) (09/24/19 1113)  Pulse: 101 (09/24/19 1307)  Resp: 17 (09/24/19 1151)  BP: (!) 164/71 (09/24/19 1307)  SpO2: 97 % (09/24/19 1151) Vital Signs (24h Range):  Temp:  [98.2 °F (36.8 °C)-98.7 °F (37.1 °C)] 98.7 °F (37.1 °C)  Pulse:  [] 101  Resp:  [17-20] 17  SpO2:  [96 %-98 %] 97 %  BP: (143-196)/() 164/71     Weight: 91.1 kg (200 lb 13.4 oz)  Height: 5' 3" (160 cm)  Body mass index is 35.58 kg/m².    Physical Exam   Constitutional: She is oriented to person, place, and time. She appears well-developed. No distress.   HENT:   Head: Normocephalic.   Eyes: Pupils are equal, round, and reactive to light. No scleral icterus.   Neck: Normal range of motion. Neck supple. No JVD present.   Cardiovascular: Normal rate, regular rhythm " and intact distal pulses.   No murmur heard.  Pulmonary/Chest: Effort normal. No stridor. No respiratory distress. She has no wheezes. She has no rales.   Abdominal: Soft. Bowel sounds are normal. She exhibits distension. She exhibits no mass. There is tenderness (mild incisional). There is no guarding.       Musculoskeletal: Normal range of motion. She exhibits no edema.   Neurological: She is alert and oriented to person, place, and time.   Skin: Skin is warm and dry. Capillary refill takes less than 2 seconds. She is not diaphoretic.   Psychiatric: She has a normal mood and affect. Her behavior is normal.   Nursing note and vitals reviewed.      Laboratory:  CBC:   Recent Labs   Lab 09/22/19 1759 09/23/19  0600 09/24/19  0600   WBC 2.28* 1.60* 1.76*   RBC 2.96* 2.64* 3.03*   HGB 9.1* 8.0* 9.2*   HCT 27.7* 25.8* 29.2*   PLT 79* 75* 76*   MCV 94 98 96   MCH 30.7 30.3 30.4   MCHC 32.9 31.0* 31.5*     BMP:   Recent Labs   Lab 09/22/19 1759 09/23/19  0600 09/24/19  0600   * 68* 59*    143 143   K 4.3 4.1 3.9    108 108   CO2 24 27 25   BUN 48* 41* 25*   CREATININE 4.5* 3.5* 2.2*   CALCIUM 8.4* 8.1* 9.0     Coagulation:   Recent Labs   Lab 09/19/19  1806   APTT 25.4       Diagnostic Results:  US - Kidney:   Results for orders placed during the hospital encounter of 09/19/19   US Transplant Kidney With Doppler    Narrative EXAMINATION:  US TRANSPLANT KIDNEY WITH DOPPLER    CLINICAL HISTORY:  s/p kidney transplant, POD#2 with drop in H/H, please assess;    TECHNIQUE:  Real time gray scale and doppler ultrasound was performed over the patient's renal allograft.    COMPARISON:  Renal transplant ultrasound with Doppler 09/20/2019    FINDINGS:  Renal allograft in the right lower quadrant.  The allograft measures 13.2 cm. Normal perfusion. No hydronephrosis.  A ureteral stent is present.    Small collection at the inferior pole measures 2.8 x 1.2 x 2.7 cm.    Vasculature:    Resistive indices are as  follows: Interlobar artery 0.79 upper segmental 0.83, mid segmental 0.83, lower segmental 0.79 (previously 0.76, 0.76, 0.78, and 0.76 respectively).    There are 2 main renal arteries.  Main renal artery peak systolic velocities: 191 cm/sec and 229 cm/sec with normal waveform (previously 156 cm/sec and 156 cm/sec).    Renal artery/iliac ratios: 0.89, 1.07.    The main renal vein is patent.    The urinary bladder is collapsed around a Lane catheter.      Impression Right renal allograft exhibits slight interval increase in arterial resistive indices and main renal artery velocities with normal waveforms.  Findings suspected related to postoperative edema.    Single small peritransplant fluid collection as above.    Continued follow-up advised.    Electronically signed by resident: Donya Almazan  Date:    09/22/2019  Time:    09:18    Electronically signed by: Felipe Galeano MD  Date:    09/22/2019  Time:    09:34

## 2019-09-24 NOTE — PROGRESS NOTES
Transplant Teaching Book given to patient, Satinder Schulte, on 9/23/2019.  During the course of the hospital stay the patient received information regarding kidney transplant. Teaching and instruction were completed.  Areas that were discussed included: how to contact the Transplant Team, the importance of measuring intake of fluids and urine output, and monitoring vital signs such as blood pressure, temperature, and daily weights.  Parameters for which to report abnormal findings were given.  Appointment were provided along with the rational for the importance of lab work and clinic visits.  A written medication list was provided.  The importance of immunosuppressive medications, their common side effects, and treatment to prevent or minimize side effects has been reviewed.  Signs and symptoms of rejection and infection along with various treatments were reviewed.  The need to avoid infection was discussed.  Wound care and special consideration regarding activities of daily living were explained.  Written and verbal teaching of the above information was given.

## 2019-09-24 NOTE — ASSESSMENT & PLAN NOTE
- s/p DDRT 9/20  - cr trending down daily, good UOP  - POD#0 US satisfactory  - rodriguez removed 9/23, but replaced 9/24 due ton incontinence inhibiting progress  - drain removed 9/23  - plan to dc pt with rodriguez and outpt urology follow up  - donor cultures positive with Staphlococcus lugdunensis. Per ID, will treat with IV Ancef (started 9/24) while inpatient and transition to oral cefadroxil at discharge to complete 2 week course.   - continue to monitor

## 2019-09-24 NOTE — PROGRESS NOTES
Update    VIKY met with pt, daughters and sons to assess for coping, continuity of care and any needs. [place] will likely [place] at a rate of $0 per night. Pt and pt's caregivers denied any concerns or needs.     VIKY remains available at [phone].

## 2019-09-24 NOTE — ASSESSMENT & PLAN NOTE
- last 1/2 dose of Thymo to be given 9/24  - wbc slightly increasd from yesterday, ANC 1300  - hold bactrim, valcyte, & cellcept  - monitor labs daily  - pt may need neupogen tomorrow

## 2019-09-24 NOTE — NURSING
Pt stated she took her home dose of procardia 90mg ER and coreg 12.5mg this am because she had a headache and her pressure was high. Educated pt on not taking any meds not given to her by nurse while she is in hospital and calling nurse for any pain issues. She voiced understanding. Maya ANDERSON notified. Will continue to monitor.

## 2019-09-24 NOTE — PLAN OF CARE
Pt aao x3. Call bell within reach. She is up with standby assist. Family at bedside. Pt had shower this am. Educated pt on incision care. She voiced understanding but will need reinforcement. Pt with urgency and frequency. Lane cath placed. Urine noted to continue to be pink tinged. Plan for possible discharge tomorrow. Will continue to monitor.

## 2019-09-24 NOTE — PROGRESS NOTES
[facility]  Kidney Transplant  Progress Note      Reason for Follow-up: Reassessment of Kidney Transplant - 9/20/2019  (#1) recipient and management of immunosuppression.    ORGAN: LEFT KIDNEY    Donor Type: Donation after Brain Death    Donor CMV Status: Negative  Donor HBcAB:Positive  Donor HCV Status:Negative  Donor HBV NAOMI: Negative  Donor HCV NAOMI: Negative      Subjective:   History of Present Illness:  Ms. Satinder Schulte is a 63 year old AA female with a PMHx relevant for HTN, CVA and ESRD presumed secondary to HTN who received DBD kidney transplant 9/20/2019 (Thymo induction, PHS not increase, KDPI 78%, CIT 9 hr 5 min, CMV D-/R+, HBV cAB +, HCV AB + NAOMI -). Patient is currently on hemodialysis started on 8/18/14. Patient is dialyzing on TTS schedule. Last HD 9/20/2019  x 3.5 hours via LUE AVF EDW 89.5 kgs.  She is doing well this a.m..  She does complain of some nausea, tolerated procedure well she has a ZULEIKA drain below her incision.  Wound looks dry staples no evidence of fluid collection.  She complained of some nausea this morning pain is well controlled urine output is 100 cc an hour.  Patient received 1 unit of blood intraoperatively secondary to having some wooziness of bleeding.  She has a double artery and the inferior artery by bifurcates in 3 intra op ultrasound with RI is a 1 follow-up ultrasound with good are eyes 0.76 and good arterial velocities of 156.  She is receiving today time O 2.  Full dose.  CVP low this morning at 2 it has improved all the way up to 5, hemoglobin stable 9.1. She seems volume down will start IV fluids for hydration.  Acceptable blood pressure BP improving.  Tolerating p.o. he but poor p.o. intake.    She is a 3 day Lane.  Lane to come out on 09/23    Ms. Schulte is a 63 y.o. year old female who is status post Kidney Transplant - 9/20/2019  (#1).  Her maintenance immunosuppression consists of:   Immunosuppressants (From admission, onward)    Start      "Stop Route Frequency Ordered    09/24/19 1500  antithymocyte globulin (rabbit) 75 mg in sodium chloride 0.9% 150 mL IVPB      -- IV Once 09/24/19 1303    09/23/19 1800  tacrolimus capsule 6 mg  (IP TXP KIDNEY POST-OP THYMO)      -- Oral 2 times daily 09/23/19 1045          Her post transplant course has been uncomplicated to date.    Hospital Course:  Now s/p DBD KTx 9/20 2/2 HTN (Thymo induction 2/2 leukopenia, CIT ~10h, CMV D-/R+, donor EBV IgG+ & HBcAB+, recipient HBsAB+ HBcAB-). Intra-op course notable for double artery anatomy of donor with 2 anastomosis. 1u PRBC transfused intra-op for low CVP and oozing. The kidney was unclamped and reperfused at 9/20/2019 9:23 AM. Intra-op urine production observed. 3 day rodriguez, per op note. Rodriguez removed 9/23 & pt voided. ZULEIKA drain removed 9/23.     Interval History: No acute events overnight. Cr continues to trend down & UOP adequate. Leukopenia persists, but slightly better than yesterday. Will give remaining (half) dose of Thymo. Pt may need Neupogen tomorrow, pending ANC. Continue to hold Bactrim and Cellcept. Will start Atovaquone for PCP prophylaxis. Biggest issue at this time is incontinence. Pt had a pure-wick catheter in place this AM (rodriguez removed yesterday) & c/o not being able to eat or drink due to urinary urgency and frequency. She had a cytoscopy 8/29/19 showing decreased bladder capacity ("strong urge to urinate with 150 cc urine"). Pt also c/o burning and discomfort with urination, likely related to bladder spasms. Hematuria persists, but unclear if blood is from ss drainage at inferior portion of incision seeping into purewick or blood in urinary tract. Started ditropan, replaced rodriguez catheter, and sent UA/cx. UA with >100 RBCs. Due to unlikely resolution of incontinence any time soon, plan to dc pt with rodriguez catheter and schedule urology follow up outpatient. Pt hypertensive this morning. She took her home supply of Nifedipine 90 mg and Coreg 12.5 mg " "this AM due to seeing her BP reading (/92) and HA stating "I don't want to have another stroke." Educated patient on danger of taking home meds while inpatient, especially while prescribed additional antihypertensives. Pt expressed understanding. Added Coreg 12.5 mg BID to current regimen of Nifedipine 60 mg BID and Hydralazine 25 mg TID. Checked orthostatic BP (pt not orthostatic). Of note, donor cultures positive with Staphlococcus lugdunensis. Per ID, will treat with IV Ancef while inpatient and transition to oral cefadroxil at discharge to complete 2 week course. Blood cx drawn 9/24 to r/o transmission to recipient. Continue to monitor.        Past Medical, Surgical, Family, and Social History:   Unchanged from H&P.    Scheduled Meds:   acetaminophen  650 mg Oral Once    anti-thymo immune glob (THYMOGLOBULIN -rabbit) IVPB  75 mg Intravenous Once    atovaquone  1,500 mg Oral Daily    bisacodyl  10 mg Oral QHS    calcitRIOL  0.5 mcg Oral Daily    carvedilol  12.5 mg Oral BID    ceFAZolin (ANCEF) IVPB  2 g Intravenous Q12H    diphenhydrAMINE  25 mg Oral Once    docusate sodium  100 mg Oral TID    ergocalciferol  50,000 Units Oral Q7 Days    famotidine  20 mg Oral QHS    heparin (porcine)  5,000 Units Subcutaneous Q8H    hydrALAZINE  25 mg Oral TID    k phos di & mono-sod phos mono  500 mg Oral BID    multivitamin  1 tablet Oral Daily    NIFEdipine  60 mg Oral BID    nystatin  500,000 Units Mouth/Throat QID (PC + HS)    predniSONE  20 mg Oral Daily    sodium bicarbonate  650 mg Oral BID    tacrolimus  6 mg Oral BID    [START ON 9/30/2019] valGANciclovir  450 mg Oral Daily AM     Continuous Infusions:   glucagon (human recombinant)       PRN Meds:acetaminophen, acetaminophen, Dextrose 10% Bolus, diphenhydrAMINE, diphenhydrAMINE, EPINEPHrine, EPINEPHrine, glucagon (human recombinant), glucose, glucose, glucose, hydrocortisone sodium succinate, hydrocortisone sodium succinate, insulin " aspart U-100, naloxone, ondansetron, oxybutynin, oxyCODONE-acetaminophen, oxyCODONE-acetaminophen    Intake/Output - Last 3 Shifts       09/22 0700 - 09/23 0659 09/23 0700 - 09/24 0659 09/24 0700 - 09/25 0659    P.O. 1204 1140 777    I.V. (mL/kg) 2325 (24.7) 375 (4.1)     Blood 262.5      IV Piggyback 150      Total Intake(mL/kg) 3941.5 (41.9) 1515 (16.6) 777 (8.5)    Urine (mL/kg/hr) 3150 (1.4) 2300 (1.1) 700 (1)    Drains 90      Stool 0 0 0    Total Output 3240 2300 700    Net +701.5 -785 +77           Urine Occurrence  7 x 1 x    Stool Occurrence 3 x 0 x 0 x           Review of Systems   Constitutional: Positive for activity change and appetite change. Negative for fever.   HENT: Negative for facial swelling and trouble swallowing.    Eyes: Negative for photophobia.   Respiratory: Negative for cough, shortness of breath, wheezing and stridor.    Cardiovascular: Negative for chest pain, palpitations and leg swelling.   Gastrointestinal: Positive for abdominal pain and constipation. Negative for abdominal distention, diarrhea and vomiting.   Genitourinary: Positive for frequency, hematuria and urgency. Negative for difficulty urinating and flank pain.   Musculoskeletal: Negative for neck pain and neck stiffness.   Skin: Positive for wound.   Allergic/Immunologic: Positive for immunocompromised state.   Neurological: Positive for weakness and headaches. Negative for dizziness, tremors and light-headedness.   Psychiatric/Behavioral: Negative for agitation, behavioral problems, confusion and hallucinations.      Objective:     Vital Signs (Most Recent):  Temp: 98.7 °F (37.1 °C) (09/24/19 1113)  Pulse: 101 (09/24/19 1307)  Resp: 17 (09/24/19 1151)  BP: (!) 164/71 (09/24/19 1307)  SpO2: 97 % (09/24/19 1151) Vital Signs (24h Range):  Temp:  [98.2 °F (36.8 °C)-98.7 °F (37.1 °C)] 98.7 °F (37.1 °C)  Pulse:  [] 101  Resp:  [17-20] 17  SpO2:  [96 %-98 %] 97 %  BP: (143-196)/() 164/71     Weight: 91.1 kg (200 lb  "13.4 oz)  Height: 5' 3" (160 cm)  Body mass index is 35.58 kg/m².    Physical Exam   Constitutional: She is oriented to person, place, and time. She appears well-developed. No distress.   HENT:   Head: Normocephalic.   Eyes: Pupils are equal, round, and reactive to light. No scleral icterus.   Neck: Normal range of motion. Neck supple. No JVD present.   Cardiovascular: Normal rate, regular rhythm and intact distal pulses.   No murmur heard.  Pulmonary/Chest: Effort normal. No stridor. No respiratory distress. She has no wheezes. She has no rales.   Abdominal: Soft. Bowel sounds are normal. She exhibits distension. She exhibits no mass. There is tenderness (mild incisional). There is no guarding.       Musculoskeletal: Normal range of motion. She exhibits no edema.   Neurological: She is alert and oriented to person, place, and time.   Skin: Skin is warm and dry. Capillary refill takes less than 2 seconds. She is not diaphoretic.   Psychiatric: She has a normal mood and affect. Her behavior is normal.   Nursing note and vitals reviewed.      Laboratory:  CBC:   Recent Labs   Lab 09/22/19 1759 09/23/19 0600 09/24/19  0600   WBC 2.28* 1.60* 1.76*   RBC 2.96* 2.64* 3.03*   HGB 9.1* 8.0* 9.2*   HCT 27.7* 25.8* 29.2*   PLT 79* 75* 76*   MCV 94 98 96   MCH 30.7 30.3 30.4   MCHC 32.9 31.0* 31.5*     BMP:   Recent Labs   Lab 09/22/19 1759 09/23/19  0600 09/24/19  0600   * 68* 59*    143 143   K 4.3 4.1 3.9    108 108   CO2 24 27 25   BUN 48* 41* 25*   CREATININE 4.5* 3.5* 2.2*   CALCIUM 8.4* 8.1* 9.0     Coagulation:   Recent Labs   Lab 09/19/19  1806   APTT 25.4       Diagnostic Results:  US - Kidney:   Results for orders placed during the hospital encounter of 09/19/19   US Transplant Kidney With Doppler    Narrative EXAMINATION:  US TRANSPLANT KIDNEY WITH DOPPLER    CLINICAL HISTORY:  s/p kidney transplant, POD#2 with drop in H/H, please assess;    TECHNIQUE:  Real time gray scale and doppler " ultrasound was performed over the patient's renal allograft.    COMPARISON:  Renal transplant ultrasound with Doppler 09/20/2019    FINDINGS:  Renal allograft in the right lower quadrant.  The allograft measures 13.2 cm. Normal perfusion. No hydronephrosis.  A ureteral stent is present.    Small collection at the inferior pole measures 2.8 x 1.2 x 2.7 cm.    Vasculature:    Resistive indices are as follows: Interlobar artery 0.79 upper segmental 0.83, mid segmental 0.83, lower segmental 0.79 (previously 0.76, 0.76, 0.78, and 0.76 respectively).    There are 2 main renal arteries.  Main renal artery peak systolic velocities: 191 cm/sec and 229 cm/sec with normal waveform (previously 156 cm/sec and 156 cm/sec).    Renal artery/iliac ratios: 0.89, 1.07.    The main renal vein is patent.    The urinary bladder is collapsed around a Rodriguez catheter.      Impression Right renal allograft exhibits slight interval increase in arterial resistive indices and main renal artery velocities with normal waveforms.  Findings suspected related to postoperative edema.    Single small peritransplant fluid collection as above.    Continued follow-up advised.    Electronically signed by resident: Donya Almazan  Date:    09/22/2019  Time:    09:18    Electronically signed by: Felipe Galeano MD  Date:    09/22/2019  Time:    09:34     Assessment/Plan:     * Status post kidney transplant  - s/p DDRT 9/20  - cr trending down daily, good UOP  - POD#0 US satisfactory  - rodriguez removed 9/23, but replaced 9/24 due ton incontinence inhibiting progress  - drain removed 9/23  - plan to dc pt with rodriguez and outpt urology follow up  - donor cultures positive with Staphlococcus lugdunensis. Per ID, will treat with IV Ancef (started 9/24) while inpatient and transition to oral cefadroxil at discharge to complete 2 week course.   - continue to monitor      Prophylactic immunotherapy  - Thymo induction due to leukopenia.    - Continue Prograf, Monitor  "trough daily and adjust dose as needed to achieve therapeutic level.  - Cellcept held for leukopenia.  - Continue steroids, per protocol.        At risk for opportunistic infections  - Continue Nystatin for thrush prophylaxis.  - Hold Bactrim and Valcyte prophylaxis due to neutropenia.  - Started Atovaquone for PCP prophylaxis on 9/24    Hypophosphatemia  - started PO replacement 9/24  - monitor      Long-term use of immunosuppressant medication  - see "prophylactic immunotherapy."      Drug-induced pancytopenia  - last 1/2 dose of Thymo to be given 9/24  - wbc slightly increasd from yesterday, ANC 1300  - hold bactrim, valcyte, & cellcept  - monitor labs daily  - pt may need neupogen tomorrow       Anemia associated with chronic renal failure  - h/h stable  - monitor labs daily      Hypertension, renal  - Goal for BP is <130 mmHg SBP and BDP <80 mmHg.   - continue hydralazine 25 mg t.i.d.  - continue nifedipine 60 mg BID  - add coreg 12.5 mg BID (home med)          Discharge Planning:  Not stable for dc today.    Maya Mora PA-C  Kidney Transplant  [facility]  "

## 2019-09-24 NOTE — ASSESSMENT & PLAN NOTE
- Goal for BP is <130 mmHg SBP and BDP <80 mmHg.   - continue hydralazine 25 mg t.i.d.  - continue nifedipine 60 mg BID  - add coreg 12.5 mg BID (home med)

## 2019-09-24 NOTE — CLINICAL REVIEW
Staff Transplant Nephrology addendum:  Patient was seen and examined with Maya ANDERSON         I agree with assessment and plan. I spoke with the patient for 15 minutes and explained  current plan. Patient and family voiced understanding. All questions answered    urine incontinence     uncontrolled hypertension: will start coreg 12.5 bid    Nifedipine 60 bid    Will check for orthostatic hypotension    Prograf level pending    Thymo today    MMF and bactrim on hold     Will start atovaquone    Lane catheter in placed, follow up with urology.    Ditropan    Will stay local for a few weeks

## 2019-09-24 NOTE — ASSESSMENT & PLAN NOTE
- Thymo induction due to leukopenia.    - Continue Prograf, Monitor trough daily and adjust dose as needed to achieve therapeutic level.  - Cellcept held for leukopenia.  - Continue steroids, per protocol.

## 2019-09-24 NOTE — ASSESSMENT & PLAN NOTE
- Continue Nystatin for thrush prophylaxis.  - Hold Bactrim and Valcyte prophylaxis due to neutropenia.  - Started Atovaquone for PCP prophylaxis on 9/24

## 2019-09-24 NOTE — PLAN OF CARE
Pt AAOx4, VSS, afebrile. BG WNL, no SSI required. Pain controlled with PRN percocet 5mg. Purewic used overnight, draining pink-tinged urine. Fall risk precautions initiated.  Pt in lowest bed position setting, lighting adjusted, pt to wear nonskid socks when ambulating, side rails up x2.  Pt remain free from falls during shift. Pt is independent. Pt son at bedside; attempted to teach self-meds but pt son refused til AM. Call light within reach. Will continue to monitor.

## 2019-09-25 VITALS
DIASTOLIC BLOOD PRESSURE: 68 MMHG | OXYGEN SATURATION: 96 % | BODY MASS INDEX: 35.58 KG/M2 | WEIGHT: 200.81 LBS | HEART RATE: 67 BPM | TEMPERATURE: 99 F | RESPIRATION RATE: 16 BRPM | SYSTOLIC BLOOD PRESSURE: 155 MMHG | HEIGHT: 63 IN

## 2019-09-25 DIAGNOSIS — Z94.0 KIDNEY REPLACED BY TRANSPLANT: Primary | ICD-10-CM

## 2019-09-25 LAB
ALBUMIN SERPL BCP-MCNC: 3.3 G/DL (ref 3.5–5.2)
ANION GAP SERPL CALC-SCNC: 7 MMOL/L (ref 8–16)
ANISOCYTOSIS BLD QL SMEAR: SLIGHT
BACTERIA UR CULT: NO GROWTH
BASOPHILS # BLD AUTO: 0.01 K/UL (ref 0–0.2)
BASOPHILS NFR BLD: 0.5 % (ref 0–1.9)
BUN SERPL-MCNC: 26 MG/DL (ref 8–23)
CALCIUM SERPL-MCNC: 9.7 MG/DL (ref 8.7–10.5)
CHLORIDE SERPL-SCNC: 108 MMOL/L (ref 95–110)
CO2 SERPL-SCNC: 25 MMOL/L (ref 23–29)
CREAT SERPL-MCNC: 1.9 MG/DL (ref 0.5–1.4)
DACRYOCYTES BLD QL SMEAR: ABNORMAL
DIFFERENTIAL METHOD: ABNORMAL
EOSINOPHIL # BLD AUTO: 0.2 K/UL (ref 0–0.5)
EOSINOPHIL NFR BLD: 7.7 % (ref 0–8)
ERYTHROCYTE [DISTWIDTH] IN BLOOD BY AUTOMATED COUNT: 16.9 % (ref 11.5–14.5)
EST. GFR  (AFRICAN AMERICAN): 32.3 ML/MIN/1.73 M^2
EST. GFR  (NON AFRICAN AMERICAN): 28.1 ML/MIN/1.73 M^2
GLUCOSE SERPL-MCNC: 89 MG/DL (ref 70–110)
HCT VFR BLD AUTO: 30.1 % (ref 37–48.5)
HGB BLD-MCNC: 9.2 G/DL (ref 12–16)
HYPOCHROMIA BLD QL SMEAR: ABNORMAL
IMM GRANULOCYTES # BLD AUTO: 0.01 K/UL (ref 0–0.04)
IMM GRANULOCYTES NFR BLD AUTO: 0.5 % (ref 0–0.5)
LYMPHOCYTES # BLD AUTO: 0.1 K/UL (ref 1–4.8)
LYMPHOCYTES NFR BLD: 5.1 % (ref 18–48)
MAGNESIUM SERPL-MCNC: 1.5 MG/DL (ref 1.6–2.6)
MCH RBC QN AUTO: 30.2 PG (ref 27–31)
MCHC RBC AUTO-ENTMCNC: 30.6 G/DL (ref 32–36)
MCV RBC AUTO: 99 FL (ref 82–98)
MONOCYTES # BLD AUTO: 0.2 K/UL (ref 0.3–1)
MONOCYTES NFR BLD: 10.8 % (ref 4–15)
NEUTROPHILS # BLD AUTO: 1.5 K/UL (ref 1.8–7.7)
NEUTROPHILS NFR BLD: 75.4 % (ref 38–73)
NRBC BLD-RTO: 0 /100 WBC
PHOSPHATE SERPL-MCNC: 2.7 MG/DL (ref 2.7–4.5)
PLATELET # BLD AUTO: 107 K/UL (ref 150–350)
PLATELET BLD QL SMEAR: ABNORMAL
PMV BLD AUTO: 12.2 FL (ref 9.2–12.9)
POCT GLUCOSE: 96 MG/DL (ref 70–110)
POIKILOCYTOSIS BLD QL SMEAR: SLIGHT
POLYCHROMASIA BLD QL SMEAR: ABNORMAL
POTASSIUM SERPL-SCNC: 4.2 MMOL/L (ref 3.5–5.1)
RBC # BLD AUTO: 3.05 M/UL (ref 4–5.4)
SODIUM SERPL-SCNC: 140 MMOL/L (ref 136–145)
TACROLIMUS BLD-MCNC: 2.2 NG/ML (ref 5–15)
WBC # BLD AUTO: 1.95 K/UL (ref 3.9–12.7)

## 2019-09-25 PROCEDURE — 80197 ASSAY OF TACROLIMUS: CPT

## 2019-09-25 PROCEDURE — 25000003 PHARM REV CODE 250: Performed by: SURGERY

## 2019-09-25 PROCEDURE — 25000003 PHARM REV CODE 250: Performed by: NURSE PRACTITIONER

## 2019-09-25 PROCEDURE — 99239 HOSP IP/OBS DSCHRG MGMT >30: CPT | Mod: 24,,, | Performed by: NURSE PRACTITIONER

## 2019-09-25 PROCEDURE — 63600175 PHARM REV CODE 636 W HCPCS: Performed by: NURSE PRACTITIONER

## 2019-09-25 PROCEDURE — 25000003 PHARM REV CODE 250: Performed by: PHYSICIAN ASSISTANT

## 2019-09-25 PROCEDURE — 63600175 PHARM REV CODE 636 W HCPCS: Performed by: SURGERY

## 2019-09-25 PROCEDURE — 80069 RENAL FUNCTION PANEL: CPT

## 2019-09-25 PROCEDURE — 85025 COMPLETE CBC W/AUTO DIFF WBC: CPT

## 2019-09-25 PROCEDURE — 83735 ASSAY OF MAGNESIUM: CPT

## 2019-09-25 PROCEDURE — 99239 PR HOSPITAL DISCHARGE DAY,>30 MIN: ICD-10-PCS | Mod: 24,,, | Performed by: NURSE PRACTITIONER

## 2019-09-25 PROCEDURE — 63600175 PHARM REV CODE 636 W HCPCS: Performed by: PHYSICIAN ASSISTANT

## 2019-09-25 RX ORDER — TACROLIMUS 1 MG/1
8 CAPSULE ORAL 2 TIMES DAILY
Status: CANCELLED | OUTPATIENT
Start: 2019-09-25

## 2019-09-25 RX ORDER — TACROLIMUS 1 MG/1
3 CAPSULE ORAL ONCE
Status: CANCELLED | OUTPATIENT
Start: 2019-09-25

## 2019-09-25 RX ORDER — CARVEDILOL 25 MG/1
12.5 TABLET ORAL 2 TIMES DAILY WITH MEALS
Qty: 30 TABLET | Refills: 1 | Status: SHIPPED | OUTPATIENT
Start: 2019-09-25 | End: 2019-12-02 | Stop reason: SDUPTHER

## 2019-09-25 RX ORDER — OXYCODONE AND ACETAMINOPHEN 5; 325 MG/1; MG/1
1 TABLET ORAL EVERY 4 HOURS PRN
Qty: 30 TABLET | Refills: 0 | Status: ON HOLD | OUTPATIENT
Start: 2019-09-25 | End: 2019-12-19 | Stop reason: HOSPADM

## 2019-09-25 RX ORDER — ATOVAQUONE 750 MG/5ML
1500 SUSPENSION ORAL DAILY
Qty: 300 ML | Refills: 11 | Status: SHIPPED | OUTPATIENT
Start: 2019-09-26 | End: 2020-11-11 | Stop reason: ALTCHOICE

## 2019-09-25 RX ORDER — CEFADROXIL 500 MG/1
500 CAPSULE ORAL EVERY 12 HOURS
Qty: 26 CAPSULE | Refills: 0 | Status: SHIPPED | OUTPATIENT
Start: 2019-09-25 | End: 2019-10-09

## 2019-09-25 RX ORDER — LORAZEPAM/0.9% SODIUM CHLORIDE 100MG/0.1L
2 PLASTIC BAG, INJECTION (ML) INTRAVENOUS ONCE
Status: COMPLETED | OUTPATIENT
Start: 2019-09-25 | End: 2019-09-25

## 2019-09-25 RX ORDER — NIFEDIPINE 60 MG/1
60 TABLET, EXTENDED RELEASE ORAL 2 TIMES DAILY
Qty: 60 TABLET | Refills: 1 | Status: SHIPPED | OUTPATIENT
Start: 2019-09-25 | End: 2019-12-02 | Stop reason: SDUPTHER

## 2019-09-25 RX ORDER — MYCOPHENOLATE MOFETIL 250 MG/1
250 CAPSULE ORAL 2 TIMES DAILY
Status: CANCELLED | OUTPATIENT
Start: 2019-09-25

## 2019-09-25 RX ORDER — SODIUM BICARBONATE 650 MG/1
650 TABLET ORAL 2 TIMES DAILY
Qty: 60 TABLET | Refills: 11 | Status: SHIPPED | OUTPATIENT
Start: 2019-09-25 | End: 2019-10-02

## 2019-09-25 RX ORDER — ACYCLOVIR 400 MG/1
400 TABLET ORAL 2 TIMES DAILY
Qty: 60 TABLET | Refills: 2 | Status: SHIPPED | OUTPATIENT
Start: 2019-09-25 | End: 2019-12-17 | Stop reason: SINTOL

## 2019-09-25 RX ORDER — HYDRALAZINE HYDROCHLORIDE 25 MG/1
25 TABLET, FILM COATED ORAL 3 TIMES DAILY
Qty: 90 TABLET | Refills: 11 | Status: ON HOLD | OUTPATIENT
Start: 2019-09-25 | End: 2020-11-26 | Stop reason: HOSPADM

## 2019-09-25 RX ORDER — NYSTATIN 100000 [USP'U]/ML
5 SUSPENSION ORAL
Qty: 473 ML | Refills: 0 | Status: SHIPPED | OUTPATIENT
Start: 2019-09-25 | End: 2019-10-02

## 2019-09-25 RX ORDER — MYCOPHENOLATE MOFETIL 250 MG/1
250 CAPSULE ORAL 2 TIMES DAILY
Qty: 60 CAPSULE | Refills: 11 | Status: SHIPPED | OUTPATIENT
Start: 2019-09-25 | End: 2019-10-01 | Stop reason: SDUPTHER

## 2019-09-25 RX ORDER — TACROLIMUS 1 MG/1
8 CAPSULE ORAL EVERY 12 HOURS
Qty: 480 CAPSULE | Refills: 11 | Status: SHIPPED | OUTPATIENT
Start: 2019-09-25 | End: 2019-09-26 | Stop reason: SDUPTHER

## 2019-09-25 RX ADMIN — CALCITRIOL 0.5 MCG: 0.5 CAPSULE, LIQUID FILLED ORAL at 09:09

## 2019-09-25 RX ADMIN — CARVEDILOL 12.5 MG: 12.5 TABLET, FILM COATED ORAL at 09:09

## 2019-09-25 RX ADMIN — ATOVAQUONE 1500 MG: 750 SUSPENSION ORAL at 09:09

## 2019-09-25 RX ADMIN — SODIUM BICARBONATE 650 MG TABLET 650 MG: at 09:09

## 2019-09-25 RX ADMIN — TACROLIMUS 6 MG: 1 CAPSULE ORAL at 09:09

## 2019-09-25 RX ADMIN — THERA TABS 1 TABLET: TAB at 09:09

## 2019-09-25 RX ADMIN — NYSTATIN 500000 UNITS: 100000 SUSPENSION ORAL at 09:09

## 2019-09-25 RX ADMIN — NIFEDIPINE 60 MG: 30 TABLET, FILM COATED, EXTENDED RELEASE ORAL at 09:09

## 2019-09-25 RX ADMIN — CEFAZOLIN 2 G: 1 INJECTION, POWDER, FOR SOLUTION INTRAMUSCULAR; INTRAVENOUS at 11:09

## 2019-09-25 RX ADMIN — DIBASIC SODIUM PHOSPHATE, MONOBASIC POTASSIUM PHOSPHATE AND MONOBASIC SODIUM PHOSPHATE 2 TABLET: 852; 155; 130 TABLET ORAL at 09:09

## 2019-09-25 RX ADMIN — NYSTATIN 500000 UNITS: 100000 SUSPENSION ORAL at 02:09

## 2019-09-25 RX ADMIN — HEPARIN SODIUM 5000 UNITS: 5000 INJECTION, SOLUTION INTRAVENOUS; SUBCUTANEOUS at 05:09

## 2019-09-25 RX ADMIN — MAGNESIUM SULFATE IN WATER 2 G: 40 INJECTION, SOLUTION INTRAVENOUS at 10:09

## 2019-09-25 RX ADMIN — HYDRALAZINE HYDROCHLORIDE 25 MG: 25 TABLET, FILM COATED ORAL at 09:09

## 2019-09-25 RX ADMIN — HYDRALAZINE HYDROCHLORIDE 25 MG: 25 TABLET, FILM COATED ORAL at 02:09

## 2019-09-25 RX ADMIN — PREDNISONE 20 MG: 10 TABLET ORAL at 09:09

## 2019-09-25 RX ADMIN — OXYCODONE HYDROCHLORIDE AND ACETAMINOPHEN 1 TABLET: 5; 325 TABLET ORAL at 03:09

## 2019-09-25 NOTE — PROGRESS NOTES
Discharge Note:    Pt was alert and oriented x4, sitting up in chair at bedside and communicative. Pt presented alone. Pt reports her daughter, Niya Schulte, would return this afternoon and was in [place] at the moment. SW met with pt to assess discharge plan and any concerns or needs.    Pt reports agreeing with the discharge plan and has no psychosocial concerns. Pt will discharge today to [facility] with no needs. Pt's daughter, Niya Schulte, will transport patient. Pt's son, Dany (ph# [phone]) will also assist. SW spoke with Niya on the phone to update her of discharge plan. Niya verbalized understanding. Patients verbalizes understanding and is involved in treatment planning and discharge process. Pt nor denied concerns or questions.    VIKY remains available at [phone].

## 2019-09-25 NOTE — PROGRESS NOTES
DISCHARGE NOTE:    Satinder Schulte is a 63 y.o. female s/p LEFT KIDNEY   Donation after Brain Death   transplant on 9/20/2019 (Kidney) for ESRD secondary to Hypertensive Nephrosclerosis.      Past Medical History:   Diagnosis Date    Abnormal Pap smear     repeat paps were normal    Anemia associated with chronic renal failure     Awaiting organ transplant status  - 02/18/2013 6/6/2014    Blood type A+ 6/6/2014    CKD (chronic kidney disease) stage 5, GFR less than 15 ml/min     secondary hypertension    Essential hypertension 5/19/2017    Hyperlipidemia     Hypertension, renal 1992    Stroke 2007    Residual speech deficit       Hospital Course: Thymoglobulin induction- third dose given as half-doses on 9/22 and 9/24, held 9/23 for leukopenia (ANC ~1300, WBC < 2). PCP prophylaxis changed to atovaquone, MMF held and restarted at 250 mg BID upon discharge, CMV ppx changed to acyclovir + weekly PCRs. Had positive donor cultures- treated with IV Ancef then PO cefadroxil for 2 weeks (end 10/8/19) per ID.      Allergies: Review of patient's allergies indicates:  No Known Allergies    Patient Pharmacy: [facility]    Discharge Medications:   Satinder Schulte   Home Medication Instructions PONCHO:[id number]    Printed on:09/25/19 1604   Medication Information                      acyclovir (ZOVIRAX) 400 MG tablet  Take 1 tablet (400 mg total) by mouth 2 (two) times daily. Stop 12/19/19             atorvastatin (LIPITOR) 40 MG tablet  Take 40 mg by mouth once daily.              atovaquone (MEPRON) 750 mg/5 mL Susp  Take 10 mLs (1,500 mg total) by mouth once daily. Stop 9/19/2020             bisacodyl (DULCOLAX) 5 mg EC tablet  Take 2 tablets (10 mg total) by mouth daily as needed for Constipation.             calcitRIOL (ROCALTROL) 0.5 MCG Cap  Take 1 capsule (0.5 mcg total) by mouth once daily.             carvedilol (COREG) 25 MG tablet  Take 0.5 tablets (12.5 mg total) by mouth 2 (two) times daily with meals. Hold for  SBP<120, HR<60             cefadroxil (DURICEF) 500 MG Cap  Take 1 capsule (500 mg total) by mouth every 12 (twelve) hours. Stop 10/8/19 for 13 days             docusate sodium (COLACE) 100 MG capsule  Take 1 capsule (100 mg total) by mouth 3 (three) times daily as needed for Constipation.             ergocalciferol (ERGOCALCIFEROL) 50,000 unit Cap  Take 1 capsule (50,000 Units total) by mouth every 7 days.             famotidine (PEPCID) 20 MG tablet  Take 1 tablet (20 mg total) by mouth every evening.             hydrALAZINE (APRESOLINE) 25 MG tablet  Take 1 tablet (25 mg total) by mouth 3 (three) times daily.             k phos di & mono-sod phos mono (K-PHOS-NEUTRAL) 250 mg Tab  Take 2 tablets by mouth 2 (two) times daily.             mycophenolate (CELLCEPT) 250 mg Cap  Take 1 capsule (250 mg total) by mouth 2 (two) times daily.             NIFEdipine (ADALAT CC) 60 MG TbSR  Take 1 tablet (60 mg total) by mouth 2 (two) times daily.             nystatin (MYCOSTATIN) 100,000 unit/mL suspension  Take 5 mLs (500,000 Units total) by mouth 4 (four) times daily with meals and nightly.             oxyCODONE-acetaminophen (PERCOCET) 5-325 mg per tablet  Take 1 tablet by mouth every 4 (four) hours as needed for Pain.             predniSONE (DELTASONE) 5 MG tablet  Take by mouth daily: 20mg (4 tablets) 9/23-10/22; 15 mg (3 tablets) 10/23-11/22; 10 mg (2 tablets) 11/23-12/22; then 5 mg (1 tablet) daily thereafter 12/23/19             sodium bicarbonate 650 MG tablet  Take 1 tablet (650 mg total) by mouth 2 (two) times daily.             tacrolimus (PROGRAF) 1 MG Cap  Take 8 capsules (8 mg total) by mouth every 12 (twelve) hours.                 Pharmacy Interventions/Recommendations:  1) Transplant Immunosuppression: tacrolimus, mycophenolate, prednisone    2) Opportunistic Infection prophylaxis: atovaquone, acyclovir + weekly PCRs, nystatin    3) Patient Counseling/Education: Demonstrated the use of the BP cuff,  thermometer.    4) Follow-Up/Discharge Needs:  Patient told me during discharge she was on aspirin before for stroke- need to clarify when to restart. Patient leukopenic at discharge so cellcept dose is reduced, bactrim changed to atovaquone, acyclovir + weekly PCRs ordered- will need to adjust meds when WBC improves.    5) Patient received prescriptions for:      E-rx's: tacrolimus, cellcept, prednisone, atovaquone, acyclovir, nystatin, percocet, coreg, nifedipine, hydralazine, calcitriol, vitamin D, sodium bicarb, kphos neutral, pepcid    6) Patient Assistance Information: n/a    7) The following medications have been placed on HOLD and should be restarted in the outpatient setting (when appropriate): Bactrim, valcytmeli    Satinder and her caregiver verbalized their understanding and had the opportunity to ask questions.

## 2019-09-25 NOTE — PROGRESS NOTES
EDUCATION NOTE:    Met with Satinder Schulte and her caregivers to provide teaching re: immunosuppressant medications.  Reviewed medication section of the Kidney Transplant Education book that was provided.  Emphasized the importance of compliance, role of the blue medication card, concerns for drug interactions, and process of obtaining refills.  Counseled regarding Prograf, Cellcept , prednisone, including directions for use, monitoring, how to handle missed doses, and side effects.  MS. Schulte and her caregivers verbalized understanding and had the opportunity to ask questions.

## 2019-09-25 NOTE — PLAN OF CARE
Plan of care reviewed on am rounds, afrebrile, vss  wbc1.95 with sl improvement from yesterday, h/h stable Cr continues w to trend down 1.9 with good urine output via rodriguez, Mg 1.5 ( replaced) patient to be dcd with rodriguez intact and to be followed by urology as outpatient. Rlq incision healing with staples intact, wound care with Betadine reenforced as well as rodriguez care. Patient reinstructed on self meds and pulled am meds with nurse supervision. Up ambulatory in room and halls, incisional pain controlled with prn oxy. Appetite good, po fluid encoraged. Glucoses stable with no ciorrection scale required. Son, daughter and friend at  this afternoon for dc teaching.Coping appropriately with post op course, looking forward to dc from hospital today.

## 2019-09-25 NOTE — NURSING
Patient ready for dc to [facility] once daughter returns, Printed AVS reviewed and patient has all needed Rx for outpatient use, blue med card updated by Pharmd and patient instructed on use of bp cuff. Patient instructed on cleaning incision 3x/day with Batadine while staples intact and rodriguez care with chlorhexadine while rodriguez in place. Instructed on how to empty rodriguez bag/ measure urine output. Patient to have followup appt with urology. Follwup appts per AVS. Aware of how to contact coordinator if concerns/ questions arise. Patient and daughter voice understanding of dc instructions.

## 2019-09-25 NOTE — PLAN OF CARE
Pt AAOx4, VSS. Standard precautions maintained although pt is with leukopenia, possible neupogen today.Cre trending down and uop 1450 this shift per rodriguez, urine punch colored, possible dc with rodriguez. 1/2 dose thymo this shift, continue ancef. Pt abd inc leaking scant ss, staples intact. Pt remains ffi, non slip socks on, call bell within reach, ind/amb. Pt and family not pulling self meds. Family was not available last night.

## 2019-09-25 NOTE — PROGRESS NOTES
Pt admitted for a cadaveric kidney transplant. ESRD 2/2 to HTN.   Thymo induction 2/2 leukopenia, KDPI 78%, CMV D-/R+, Donor HBcAb+ (will need to follow protocol labs)     Cr trending down, 1.9 at time of d/c. Good UOP (punch colored).      RLQ incision JUDY with staples  ZULEIKA drain removed.  Initial 3 day rodriguez d/c but pt experiencing incontinence. Rodriguez catheter replaced 9/23.      Pt will d/c to the [facility] with her son  Labs 9/26 and RTC to meet with coordinator/pharmD

## 2019-09-25 NOTE — CLINICAL REVIEW
Staff Transplant Nephrology addendum:  Patient was seen and examined with Maribel hernandez NP        I agree with assessment and plan. I spoke with the patient for 15 minutes and explained  current plan. Patient and family voiced understanding. All questions answered      Allograft progressing well with good urine output. cystoscopic and followed with urology    Donor positive cultures on PO cephalosporins until 10/8/2019    Atovaquone will start  bid    hypertension better controlled     Prograf level 8 mg po bid and 3 mg now    Labs daily     Serum CMV pcr weekly in substitution of po valcyte for now    Follow up with urology    Urine culture in process    Patient will stay at the apartments    will try to get appointment with Dr ROBYN Tomlinson next week

## 2019-09-25 NOTE — DISCHARGE SUMMARY
"[facility]  Kidney Transplant  Discharge Summary    Patient Name: Satinder Schulte  MRN: [id number]  Admission Date: 9/19/2019  Hospital Length of Stay: 6 days  Discharge Date and Time:  09/25/2019 11:36 AM  Attending Physician: Will Forman MD  Discharging Provider: Maribel Bernardo NP  Primary Care Provider: Quirino Burnett MD      HPI: Ms. Satinder Schulte is a 63 year old AA female with a PMHx relevant for HTN, CVA and ESRD presumed secondary to HTN who received DBD kidney transplant 9/20/2019 (Thymo induction 2/2 leukopenia, KDPI 78%, CIT 9 hr 5 min, CMV D-/R+, donor EBV IgG+, recipient HBsAB+/HBcAB -, donor HCV AB + NAOMI -). Patient is currently on hemodialysis started on 8/18/14. Patient is dialyzing on TTS schedule. Last HD 9/20/2019  x 3.5 hours via LUE AVF. EDW 89.5 kg.      Patient received 1 unit of blood intraoperatively secondary to having some oozing of blood and low CVP.  She has a double artery anatomy of donor with two anastomoses. The kidney was unclamped and reperfused at 9:23 AM on 9/20/19. Intra-op urine production was observed. Kidney US 9/22 with slight interval increase in RIs and main renal artery velocities with normal waveforms. Findings suspected related to post operative edema. 3 day rodriguez catheter removed 9/23. Pt voided well initially. ZULEIKA drain removed 9/23.    Pt with incontinence once catheter removed. Pt had pure-wick catheter in place but continued to have urinary urgency and frequency. Rodriguez catheter replaced 9/23. Hx of cystoscopy on 8/29/19 showing decreased bladder capacity ("strong urge to urinate with 150 ml urine"). She c/o burning and discomfort with urination, likely related to bladder spasms. Ditropan started. With hematuria post transplant. Urinalysis with > 100 RBCs. Urine culture pending. Please f/u results. Due to unlikely resolution of incontinence anytime soon, plan to dc with rodriguez catheter in place. Will schedule urology follow up as outpatient. " "    Leukopenia persists. Wbc count 1.95. ANC 1470. Continue to hold Valcyte and Bactrim. Atovaquone started 9/24 for PCP prophylaxis. Restart Cellcept 250 mg bid today. Monitor CMV PCR weekly (pending 9/25).     BP improved with recent changes in home regimen. Pt took home supple of Nifedipine and Coreg due to seeing her BP reading of 177/92 and c/o HA. She stated "I don't want to have another stroke." Pt educated on the dangers of taking home meds while inpatient yesterday. Will continue education on importance of taking medications as prescribed and monitoring BP closely/documenting results. Pt expressed understanding.     Of note, donor cultures positive for Staphylococcus Lugdunesis. Treated with Ancef IV inpatient. Will transition to PO Cefadroxil on discharge to complete a 2 week course, end date 10/8/19. Blood cultures collected 9/24 NGTD.     Ms. Schulte is stable for discharge today. She has no complaints. Cr improving daily, 1.9 from 2.2. Good UOP. She will f/u with lab work and clinic visit in AM 9/26/19. Pt/caregiver verbalized understanding of all discharge instructions.       Final Active Diagnoses:    Diagnosis Date Noted POA    PRINCIPAL PROBLEM:  Status post kidney transplant [Z94.0] 09/20/2019 Not Applicable    Hypophosphatemia [E83.39] 09/24/2019 No    At risk for opportunistic infections [Z91.89] 09/20/2019 No    Prophylactic immunotherapy [Z29.8] 09/20/2019 Not Applicable    Long-term use of immunosuppressant medication [Z79.899] 09/20/2019 Not Applicable    Drug-induced pancytopenia [D61.811] 07/23/2018 Yes    Anemia associated with chronic renal failure [D63.1]  Yes     Chronic    Hypertension, renal [I12.9]  Yes     Chronic      Problems Resolved During this Admission:    Diagnosis Date Noted Date Resolved POA    Pre-op evaluation [Z01.818]  09/20/2019 Not Applicable    ESRD from HTN, on dialysis since  Feb 2013 [N18.6]  09/20/2019 Yes     Chronic       Treatments: as " above    Consults (From admission, onward)        Status Ordering Provider     Inpatient consult to Registered Dietitian/Nutritionist  Once     Provider:  (Not yet assigned)    Completed ALBARO MCRAE     Inpatient consult to Registered Dietitian/Nutritionist  Once     Provider:  (Not yet assigned)    Completed VIRGIL PEDRAZA     Inpatient consult to Transplant Nephrology (KTM)  Once     Provider:  (Not yet assigned)    Completed VIRGIL PEDRAZA          Pending Diagnostic Studies:     Procedure Component Value Units Date/Time    CMV DNA, quantitative, PCR [[id number]] Collected:  09/25/19 1114    Order Status:  Sent Lab Status:  In process Updated:  09/25/19 1146    Specimen:  Blood         Significant Diagnostic Studies: Labs:   BMP:   Recent Labs   Lab 09/24/19  0600 09/25/19  0530   GLU 59* 89    140   K 3.9 4.2    108   CO2 25 25   BUN 25* 26*   CREATININE 2.2* 1.9*   CALCIUM 9.0 9.7   MG 1.6 1.5*    and CBC   Recent Labs   Lab 09/24/19  0600 09/25/19  0530   WBC 1.76* 1.95*   HGB 9.2* 9.2*   HCT 29.2* 30.1*   PLT 76* 107*       Discharged Condition: good    Disposition: home or self care    Follow Up: as above    Patient Instructions:      Diet Adult Regular     Call MD for:  temperature >100.4     Call MD for:  persistent nausea and vomiting or diarrhea     Call MD for:  severe uncontrolled pain     Call MD for:  redness, tenderness, or signs of infection (pain, swelling, redness, odor or green/yellow discharge around incision site)     Call MD for:  difficulty breathing or increased cough     Call MD for:  severe persistent headache     Call MD for:  worsening rash     Call MD for:  persistent dizziness, light-headedness, or visual disturbances     Call MD for:  increased confusion or weakness     Call MD for:   Order Comments: Any unusual problems or concerns.     Activity as tolerated     Medications:  Reconciled Home Medications:      Medication List      START taking these  medications    acyclovir 400 MG tablet  Commonly known as:  ZOVIRAX  Take 1 tablet (400 mg total) by mouth 2 (two) times daily. Stop 12/19/19     atovaquone 750 mg/5 mL Susp  Commonly known as:  MEPRON  Take 10 mLs (1,500 mg total) by mouth once daily. Stop 9/19/2020  Start taking on:  September 26, 2019     bisacodyl 5 mg EC tablet  Commonly known as:  DULCOLAX  Take 2 tablets (10 mg total) by mouth daily as needed for Constipation.     cefadroxil 500 MG Cap  Commonly known as:  DURICEF  Take 1 capsule (500 mg total) by mouth every 12 (twelve) hours. Stop 10/8/19 for 13 days     docusate sodium 100 MG capsule  Commonly known as:  COLACE  Take 1 capsule (100 mg total) by mouth 3 (three) times daily as needed for Constipation.     famotidine 20 MG tablet  Commonly known as:  PEPCID  Take 1 tablet (20 mg total) by mouth every evening.     k phos di & mono-sod phos mono 250 mg Tab  Commonly known as:  K-PHOS-NEUTRAL  Take 2 tablets by mouth 2 (two) times daily.     mycophenolate 250 mg Cap  Commonly known as:  CELLCEPT  Take 1 capsule (250 mg total) by mouth 2 (two) times daily.     nystatin 100,000 unit/mL suspension  Commonly known as:  MYCOSTATIN  Take 5 mLs (500,000 Units total) by mouth 4 (four) times daily with meals and nightly.  Replaces:  nystatin ointment     oxyCODONE-acetaminophen 5-325 mg per tablet  Commonly known as:  PERCOCET  Take 1 tablet by mouth every 4 (four) hours as needed for Pain.     predniSONE 5 MG tablet  Commonly known as:  DELTASONE  Take by mouth daily: 20mg (4 tablets) 9/23-10/22; 15 mg (3 tablets) 10/23-11/22; 10 mg (2 tablets) 11/23-12/22; then 5 mg (1 tablet) daily thereafter 12/23/19     sodium bicarbonate 650 MG tablet  Take 1 tablet (650 mg total) by mouth 2 (two) times daily.     tacrolimus 1 MG Cap  Commonly known as:  PROGRAF  Take 8 capsules (8 mg total) by mouth every 12 (twelve) hours.        CHANGE how you take these medications    carvedilol 25 MG tablet  Commonly known as:   COREG  Take 0.5 tablets (12.5 mg total) by mouth 2 (two) times daily with meals. Hold for SBP<120, HR<60  What changed:  additional instructions     ergocalciferol 50,000 unit Cap  Commonly known as:  ERGOCALCIFEROL  Take 1 capsule (50,000 Units total) by mouth every 7 days.  Start taking on:  September 29, 2019  What changed:  when to take this     hydrALAZINE 25 MG tablet  Commonly known as:  APRESOLINE  Take 1 tablet (25 mg total) by mouth 3 (three) times daily.  What changed:  how much to take     NIFEdipine 60 MG Tbsr  Commonly known as:  ADALAT CC  Take 1 tablet (60 mg total) by mouth 2 (two) times daily.  What changed:    · medication strength  · how much to take        CONTINUE taking these medications    atorvastatin 40 MG tablet  Commonly known as:  LIPITOR  Take 40 mg by mouth once daily.     calcitRIOL 0.5 MCG Cap  Commonly known as:  ROCALTROL  Take 1 capsule (0.5 mcg total) by mouth once daily.        STOP taking these medications    aspirin 81 MG EC tablet  Commonly known as:  ECOTRIN     cloNIDine 0.1 MG tablet  Commonly known as:  CATAPRES     labetalol 300 MG tablet  Commonly known as:  NORMODYNE     lactulose 10 gram/15 mL solution  Commonly known as:  CHRONULAC     loratadine 10 mg dissolvable tablet  Commonly known as:  CLARITIN REDITABS     losartan 100 MG tablet  Commonly known as:  COZAAR     NEPHRO-KATHY 0.8 mg Tab  Generic drug:  B complex-vitamin C-folic acid     nystatin ointment  Commonly known as:  MYCOSTATIN  Replaced by:  nystatin 100,000 unit/mL suspension     pantoprazole 40 MG tablet  Commonly known as:  PROTONIX     RENVELA 800 mg Tab  Generic drug:  sevelamer carbonate     SENSIPAR 60 MG Tab  Generic drug:  cinacalcet          Time spent caring for patient (Greater than 1/2 spent in direct face-to-face contact): > 30 minutes    Maribel Bernardo NP  Kidney Transplant  [facility]

## 2019-09-26 ENCOUNTER — CLINICAL SUPPORT (OUTPATIENT)
Dept: TRANSPLANT | Facility: CLINIC | Age: 64
End: 2019-09-26
Payer: MEDICARE

## 2019-09-26 ENCOUNTER — TELEPHONE (OUTPATIENT)
Dept: UROLOGY | Facility: CLINIC | Age: 64
End: 2019-09-26

## 2019-09-26 ENCOUNTER — LAB VISIT (OUTPATIENT)
Dept: LAB | Facility: HOSPITAL | Age: 64
End: 2019-09-26
Attending: INTERNAL MEDICINE
Payer: MEDICARE

## 2019-09-26 VITALS
DIASTOLIC BLOOD PRESSURE: 63 MMHG | BODY MASS INDEX: 35.32 KG/M2 | HEART RATE: 74 BPM | TEMPERATURE: 98 F | SYSTOLIC BLOOD PRESSURE: 175 MMHG | RESPIRATION RATE: 17 BRPM | HEIGHT: 63 IN | BODY MASS INDEX: 35.32 KG/M2 | HEART RATE: 74 BPM | WEIGHT: 199.31 LBS | OXYGEN SATURATION: 100 % | SYSTOLIC BLOOD PRESSURE: 175 MMHG | DIASTOLIC BLOOD PRESSURE: 63 MMHG | WEIGHT: 199.31 LBS | TEMPERATURE: 98 F | RESPIRATION RATE: 17 BRPM | OXYGEN SATURATION: 100 % | HEIGHT: 63 IN

## 2019-09-26 DIAGNOSIS — Z94.0 KIDNEY REPLACED BY TRANSPLANT: Primary | ICD-10-CM

## 2019-09-26 DIAGNOSIS — Z94.0 KIDNEY REPLACED BY TRANSPLANT: ICD-10-CM

## 2019-09-26 DIAGNOSIS — Z72.89 OTHER PROBLEMS RELATED TO LIFESTYLE: ICD-10-CM

## 2019-09-26 LAB
ALBUMIN SERPL BCP-MCNC: 3.4 G/DL (ref 3.5–5.2)
ANION GAP SERPL CALC-SCNC: 8 MMOL/L (ref 8–16)
BASOPHILS # BLD AUTO: 0.01 K/UL (ref 0–0.2)
BASOPHILS NFR BLD: 0.5 % (ref 0–1.9)
BUN SERPL-MCNC: 30 MG/DL (ref 8–23)
CALCIUM SERPL-MCNC: 10 MG/DL (ref 8.7–10.5)
CHLORIDE SERPL-SCNC: 110 MMOL/L (ref 95–110)
CMV DNA SERPL NAA+PROBE-ACNC: NORMAL IU/ML
CO2 SERPL-SCNC: 26 MMOL/L (ref 23–29)
CREAT SERPL-MCNC: 1.7 MG/DL (ref 0.5–1.4)
DIFFERENTIAL METHOD: ABNORMAL
EOSINOPHIL # BLD AUTO: 0.1 K/UL (ref 0–0.5)
EOSINOPHIL NFR BLD: 6 % (ref 0–8)
ERYTHROCYTE [DISTWIDTH] IN BLOOD BY AUTOMATED COUNT: 16.7 % (ref 11.5–14.5)
EST. GFR  (AFRICAN AMERICAN): 37 ML/MIN/1.73 M^2
EST. GFR  (NON AFRICAN AMERICAN): 32.1 ML/MIN/1.73 M^2
GLUCOSE SERPL-MCNC: 85 MG/DL (ref 70–110)
HCT VFR BLD AUTO: 30.9 % (ref 37–48.5)
HGB BLD-MCNC: 9.7 G/DL (ref 12–16)
IMM GRANULOCYTES # BLD AUTO: 0.02 K/UL (ref 0–0.04)
IMM GRANULOCYTES NFR BLD AUTO: 1 % (ref 0–0.5)
LYMPHOCYTES # BLD AUTO: 0.1 K/UL (ref 1–4.8)
LYMPHOCYTES NFR BLD: 3 % (ref 18–48)
MAGNESIUM SERPL-MCNC: 1.9 MG/DL (ref 1.6–2.6)
MCH RBC QN AUTO: 31.2 PG (ref 27–31)
MCHC RBC AUTO-ENTMCNC: 31.4 G/DL (ref 32–36)
MCV RBC AUTO: 99 FL (ref 82–98)
MONOCYTES # BLD AUTO: 0.3 K/UL (ref 0.3–1)
MONOCYTES NFR BLD: 12.5 % (ref 4–15)
NEUTROPHILS # BLD AUTO: 1.5 K/UL (ref 1.8–7.7)
NEUTROPHILS NFR BLD: 77 % (ref 38–73)
NRBC BLD-RTO: 0 /100 WBC
PHOSPHATE SERPL-MCNC: 2.6 MG/DL (ref 2.7–4.5)
PLATELET # BLD AUTO: 115 K/UL (ref 150–350)
PMV BLD AUTO: 12.5 FL (ref 9.2–12.9)
POTASSIUM SERPL-SCNC: 4.3 MMOL/L (ref 3.5–5.1)
RBC # BLD AUTO: 3.11 M/UL (ref 4–5.4)
SODIUM SERPL-SCNC: 144 MMOL/L (ref 136–145)
TACROLIMUS BLD-MCNC: 2.8 NG/ML (ref 5–15)
WBC # BLD AUTO: 2 K/UL (ref 3.9–12.7)

## 2019-09-26 PROCEDURE — 80069 RENAL FUNCTION PANEL: CPT

## 2019-09-26 PROCEDURE — 99214 OFFICE O/P EST MOD 30 MIN: CPT | Mod: PBBFAC

## 2019-09-26 PROCEDURE — 83735 ASSAY OF MAGNESIUM: CPT

## 2019-09-26 PROCEDURE — 80197 ASSAY OF TACROLIMUS: CPT

## 2019-09-26 PROCEDURE — 99999 PR PBB SHADOW E&M-EST. PATIENT-LVL III: CPT | Mod: PBBFAC,,,

## 2019-09-26 PROCEDURE — 99999 PR PBB SHADOW E&M-EST. PATIENT-LVL IV: CPT | Mod: PBBFAC,,,

## 2019-09-26 PROCEDURE — 36415 COLL VENOUS BLD VENIPUNCTURE: CPT

## 2019-09-26 PROCEDURE — 85025 COMPLETE CBC W/AUTO DIFF WBC: CPT

## 2019-09-26 PROCEDURE — 99999 PR PBB SHADOW E&M-EST. PATIENT-LVL III: ICD-10-PCS | Mod: PBBFAC,,,

## 2019-09-26 PROCEDURE — 99213 OFFICE O/P EST LOW 20 MIN: CPT | Mod: PBBFAC,27

## 2019-09-26 PROCEDURE — 99999 PR PBB SHADOW E&M-EST. PATIENT-LVL IV: ICD-10-PCS | Mod: PBBFAC,,,

## 2019-09-26 RX ORDER — TACROLIMUS 1 MG/1
9 CAPSULE ORAL EVERY 12 HOURS
Qty: 540 CAPSULE | Refills: 11 | Status: SHIPPED | OUTPATIENT
Start: 2019-09-26 | End: 2019-10-02

## 2019-09-26 NOTE — PROGRESS NOTES
"1ST NURSING VISIT POST DISCHARGE NOTE    1st RN appointment with Satinder Schulte post discharge 9/25/19 s/p kidney transplant 9/20/19.  Patient's son Dany accompanied her.  Patient reports none.  Patient says that she that she is sleeping well.  Incision intact with staples.  Patient has Lane catheter schedule to see Urology on 10/2/19.  Patient that she is able to explain daily incision care and showering instructions.  Reviewed I&O monitoring, measuring, and recording, and the need for hydration (i.e., at least 2 liters of water daily with minimal caffeine and no grapefruit products).  Medication list and rationale were reviewed.  Patient did bring blue medication card and medication bottles for review.  Patient reports that she has not stopped Dulcolax and has continued Colace.  Patient has had a bowel movement.  Patient expressed understanding of daily care including BID VS, medications, and I&O documentation.  Patient made aware of today's creatinine level: 1.7.  Patient aware that coordinator will review today's labs with a transplant physician and call the patient with any dose changes indicated.  Next lab appointment scheduled for 9/27.  First post-operative transplant team appointment with labs scheduled for 10/2.    Using the Kidney Transplant Patient Reference Manual, the patient submitted her open book "Self-assessment of Kidney Transplant Patient Knowledge" test, which was completed in the transplant clinic this morning before 1st nursing visit.  This test includes questions regarding critical dose medications commonly used after kidney transplant, medication dosing and side effects, importance of timed lab draws, important signs and symptoms to report 24/7 immediately post-transplant as well as how to contact the transplant team 24/7.    Test Score: 24/25    After completing the test, the patient was given a copy of the Self-assessment Answer Key to reinforce accurate learning of test content.  Patient " expressed her understanding of the value of the information included in the self-assessment test.

## 2019-09-26 NOTE — TELEPHONE ENCOUNTER
New transplant patient that had rodriguez placed. Transplant request uro lo't to remove  Rodriguez. lo't made . Pt will keep cystoscope lo't with

## 2019-09-26 NOTE — TELEPHONE ENCOUNTER
----- Message from Madison Sanchez LPN sent at 9/25/2019  2:40 PM CDT -----      ----- Message -----  From: Melissa Carlson  Sent: 9/25/2019  11:24 AM CDT  To: Davi HALEY Staff    Type:  Needs Medical Advice    Who Called: Ana Stern w/ transplant called, she want Anahy to give her a call about schedule pt an appt.  Would the patient rather a call back or a response via [facility]? call  Best Call Back Number: [phone]  Additional Information:

## 2019-09-26 NOTE — PROGRESS NOTES
Clinic Note: First Return to Clinic Post-  Kidney Transplant    Satinder Schulte  is a 63 y.o. female  S/p LEFT KIDNEY   transplant on 9/20/2019 (Kidney) for Hypertensive Nephrosclerosis.      Discharge Course (Issues/Concerns): Initially had trouble tracking down a caregiver, however finally found him and they are taking the quiz together.  Patient cannot read therefore initially wanted me to read her the quiz.  Caregivers have changed since discharge therefore new caregiver was not giving medications appropriately and did not understand the blue card.     Objective:   Vitals:    09/26/19 1026   BP: (!) 175/63   Pulse: 74   Resp: 17   Temp: 98.1 °F (36.7 °C)       Met with patient and her caregiver in the clinic to review current medication list.     Current Outpatient Medications   Medication Sig Dispense Refill    acyclovir (ZOVIRAX) 400 MG tablet Take 1 tablet (400 mg total) by mouth 2 (two) times daily. Stop 12/19/19 60 tablet 2    atorvastatin (LIPITOR) 40 MG tablet Take 40 mg by mouth once daily.       atovaquone (MEPRON) 750 mg/5 mL Susp Take 10 mLs (1,500 mg total) by mouth once daily. Stop 9/19/2020 300 mL 11    bisacodyl (DULCOLAX) 5 mg EC tablet Take 2 tablets (10 mg total) by mouth daily as needed for Constipation.  0    calcitRIOL (ROCALTROL) 0.5 MCG Cap Take 1 capsule (0.5 mcg total) by mouth once daily. 30 capsule 2    carvedilol (COREG) 25 MG tablet Take 0.5 tablets (12.5 mg total) by mouth 2 (two) times daily with meals. Hold for SBP<120, HR<60 30 tablet 1    cefadroxil (DURICEF) 500 MG Cap Take 1 capsule (500 mg total) by mouth every 12 (twelve) hours. Stop 10/8/19 for 13 days 26 capsule 0    docusate sodium (COLACE) 100 MG capsule Take 1 capsule (100 mg total) by mouth 3 (three) times daily as needed for Constipation.  0    [START ON 9/29/2019] ergocalciferol (ERGOCALCIFEROL) 50,000 unit Cap Take 1 capsule (50,000 Units total) by mouth every 7 days. 4 capsule 4    famotidine (PEPCID) 20 MG  tablet Take 1 tablet (20 mg total) by mouth every evening. 30 tablet 0    hydrALAZINE (APRESOLINE) 25 MG tablet Take 1 tablet (25 mg total) by mouth 3 (three) times daily. 90 tablet 11    k phos di & mono-sod phos mono (K-PHOS-NEUTRAL) 250 mg Tab Take 2 tablets by mouth 2 (two) times daily. 120 tablet 1    mycophenolate (CELLCEPT) 250 mg Cap Take 1 capsule (250 mg total) by mouth 2 (two) times daily. 60 capsule 11    NIFEdipine (ADALAT CC) 60 MG TbSR Take 1 tablet (60 mg total) by mouth 2 (two) times daily. 60 tablet 1    nystatin (MYCOSTATIN) 100,000 unit/mL suspension Take 5 mLs (500,000 Units total) by mouth 4 (four) times daily with meals and nightly. 473 mL 0    oxyCODONE-acetaminophen (PERCOCET) 5-325 mg per tablet Take 1 tablet by mouth every 4 (four) hours as needed for Pain. 30 tablet 0    predniSONE (DELTASONE) 5 MG tablet Take by mouth daily: 20mg (4 tablets) 9/23-10/22; 15 mg (3 tablets) 10/23-11/22; 10 mg (2 tablets) 11/23-12/22; then 5 mg (1 tablet) daily thereafter 12/23/19 120 tablet 11    sodium bicarbonate 650 MG tablet Take 1 tablet (650 mg total) by mouth 2 (two) times daily. 60 tablet 11    tacrolimus (PROGRAF) 1 MG Cap Take 8 capsules (8 mg total) by mouth every 12 (twelve) hours. 480 capsule 11     No current facility-administered medications for this visit.        Pharmacy Interventions/Recommendations:     1) Graft Function & Immunosuppression Issues: Scr down to 1.7 (improving).  Patient received thymoglobulin induction due to leukopenia (third dose given as half doses).  Patient on low dose MMF (250 mg PO BID) due to leukopenia - will need to increase as WBC improves.     2) Opportunistic Infection prophylaxis:   PCP ppx: Atovaquone until 9/19/20  CMV ppx: Acyclovir with CMV PCRs until 12/19/19  Fungal ppx: N/A    3) Donor Serologies & Monitoring:     Donor CMV Status: Negative  Donor HCV Status: Negative  Donor HBcAb: Positive (patient has immunity therefore does not need  treatment)  Donor HBV NAOMI: Negative  Donor HCV NAOMI: Negative    4) Pain Management & Bowel Regimen: Patient's pain is controlled by pain meds and had a BM prior to discharge. None so far.     5) Blood Pressure Management: Patient is on Coreg 12.5 mg PO BID, Hydralazine 25 mg PO q8h, and Nifedipine 60 mg PO BID for BP management.  Patient did not check BP last night or document it, encouraged patient the importance of measuring BP and documenting it.     6) Blood Sugar Management & Follow-up: WNL without medications.     7) Electrolyte Management: Phos down to 2.6 currently on supplementation.     8) OTHER medication follow-up (patient assistance, held medications, etc): Aspirin was a home medication that is currently held - may need to restart for heart health.      9) Reinforced medication education conducted in the hospital, including medication indications, dosing, administration, side effects, monitoring-- including timing of immunosuppressant levels.     Patient received their FIRST fill of medications from [facility].  Discussed the process for obtaining refills of medications, including verifying the dose and mailing address to have medications delivered.     Satinder and her caregivers verbalized understanding and had the opportunity to ask questions.

## 2019-09-27 ENCOUNTER — LAB VISIT (OUTPATIENT)
Dept: LAB | Facility: HOSPITAL | Age: 64
End: 2019-09-27
Attending: INTERNAL MEDICINE
Payer: MEDICARE

## 2019-09-27 ENCOUNTER — TELEPHONE (OUTPATIENT)
Dept: TRANSPLANT | Facility: CLINIC | Age: 64
End: 2019-09-27

## 2019-09-27 DIAGNOSIS — Z94.0 KIDNEY REPLACED BY TRANSPLANT: ICD-10-CM

## 2019-09-27 LAB
ALBUMIN SERPL BCP-MCNC: 3.5 G/DL (ref 3.5–5.2)
ANION GAP SERPL CALC-SCNC: 7 MMOL/L (ref 8–16)
BASOPHILS # BLD AUTO: 0.01 K/UL (ref 0–0.2)
BASOPHILS NFR BLD: 0.5 % (ref 0–1.9)
BUN SERPL-MCNC: 30 MG/DL (ref 8–23)
CALCIUM SERPL-MCNC: 10.3 MG/DL (ref 8.7–10.5)
CHLORIDE SERPL-SCNC: 113 MMOL/L (ref 95–110)
CO2 SERPL-SCNC: 25 MMOL/L (ref 23–29)
CREAT SERPL-MCNC: 1.5 MG/DL (ref 0.5–1.4)
DIFFERENTIAL METHOD: ABNORMAL
EOSINOPHIL # BLD AUTO: 0.1 K/UL (ref 0–0.5)
EOSINOPHIL NFR BLD: 4.4 % (ref 0–8)
ERYTHROCYTE [DISTWIDTH] IN BLOOD BY AUTOMATED COUNT: 16.8 % (ref 11.5–14.5)
EST. GFR  (AFRICAN AMERICAN): 43 ML/MIN/1.73 M^2
EST. GFR  (NON AFRICAN AMERICAN): 37.3 ML/MIN/1.73 M^2
GLUCOSE SERPL-MCNC: 81 MG/DL (ref 70–110)
HCT VFR BLD AUTO: 31.5 % (ref 37–48.5)
HGB BLD-MCNC: 9.5 G/DL (ref 12–16)
IMM GRANULOCYTES # BLD AUTO: 0.04 K/UL (ref 0–0.04)
IMM GRANULOCYTES NFR BLD AUTO: 2.2 % (ref 0–0.5)
LYMPHOCYTES # BLD AUTO: 0.1 K/UL (ref 1–4.8)
LYMPHOCYTES NFR BLD: 6 % (ref 18–48)
MAGNESIUM SERPL-MCNC: 1.8 MG/DL (ref 1.6–2.6)
MCH RBC QN AUTO: 30.2 PG (ref 27–31)
MCHC RBC AUTO-ENTMCNC: 30.2 G/DL (ref 32–36)
MCV RBC AUTO: 100 FL (ref 82–98)
MONOCYTES # BLD AUTO: 0.2 K/UL (ref 0.3–1)
MONOCYTES NFR BLD: 10.9 % (ref 4–15)
NEUTROPHILS # BLD AUTO: 1.4 K/UL (ref 1.8–7.7)
NEUTROPHILS NFR BLD: 76 % (ref 38–73)
NRBC BLD-RTO: 0 /100 WBC
PHOSPHATE SERPL-MCNC: 3 MG/DL (ref 2.7–4.5)
PLATELET # BLD AUTO: 126 K/UL (ref 150–350)
PMV BLD AUTO: 12.3 FL (ref 9.2–12.9)
POTASSIUM SERPL-SCNC: 4.2 MMOL/L (ref 3.5–5.1)
RBC # BLD AUTO: 3.15 M/UL (ref 4–5.4)
SODIUM SERPL-SCNC: 145 MMOL/L (ref 136–145)
TACROLIMUS BLD-MCNC: 4.5 NG/ML (ref 5–15)
WBC # BLD AUTO: 1.83 K/UL (ref 3.9–12.7)

## 2019-09-27 PROCEDURE — 85025 COMPLETE CBC W/AUTO DIFF WBC: CPT

## 2019-09-27 PROCEDURE — 83735 ASSAY OF MAGNESIUM: CPT

## 2019-09-27 PROCEDURE — 80197 ASSAY OF TACROLIMUS: CPT

## 2019-09-27 PROCEDURE — 80069 RENAL FUNCTION PANEL: CPT

## 2019-09-27 PROCEDURE — 36415 COLL VENOUS BLD VENIPUNCTURE: CPT

## 2019-09-27 NOTE — TELEPHONE ENCOUNTER
Patient son Dany repeated back and voice a understanding of orders and will repeat labs on 9/28/19.  Neutropenic precautions reviewed with Patient and Son Dany.  Next labs 9/28/19.

## 2019-09-27 NOTE — TELEPHONE ENCOUNTER
----- Message from Pavel Padilla MD sent at 9/27/2019  2:03 PM CDT -----  Will repeat labs in am due to recent introduction of ketoconazole

## 2019-09-28 ENCOUNTER — LAB VISIT (OUTPATIENT)
Dept: LAB | Facility: HOSPITAL | Age: 64
End: 2019-09-28
Attending: INTERNAL MEDICINE
Payer: MEDICARE

## 2019-09-28 DIAGNOSIS — Z94.0 KIDNEY REPLACED BY TRANSPLANT: ICD-10-CM

## 2019-09-28 LAB
ALBUMIN SERPL BCP-MCNC: 3.6 G/DL (ref 3.5–5.2)
ANION GAP SERPL CALC-SCNC: 9 MMOL/L (ref 8–16)
BASOPHILS # BLD AUTO: 0.02 K/UL (ref 0–0.2)
BASOPHILS NFR BLD: 0.8 % (ref 0–1.9)
BUN SERPL-MCNC: 30 MG/DL (ref 8–23)
CALCIUM SERPL-MCNC: 10.3 MG/DL (ref 8.7–10.5)
CHLORIDE SERPL-SCNC: 111 MMOL/L (ref 95–110)
CO2 SERPL-SCNC: 22 MMOL/L (ref 23–29)
CREAT SERPL-MCNC: 1.5 MG/DL (ref 0.5–1.4)
DIFFERENTIAL METHOD: ABNORMAL
EOSINOPHIL # BLD AUTO: 0.2 K/UL (ref 0–0.5)
EOSINOPHIL NFR BLD: 7.5 % (ref 0–8)
ERYTHROCYTE [DISTWIDTH] IN BLOOD BY AUTOMATED COUNT: 16.5 % (ref 11.5–14.5)
EST. GFR  (AFRICAN AMERICAN): 43 ML/MIN/1.73 M^2
EST. GFR  (NON AFRICAN AMERICAN): 37.3 ML/MIN/1.73 M^2
GLUCOSE SERPL-MCNC: 90 MG/DL (ref 70–110)
HCT VFR BLD AUTO: 30 % (ref 37–48.5)
HGB BLD-MCNC: 9.2 G/DL (ref 12–16)
IMM GRANULOCYTES # BLD AUTO: 0.08 K/UL (ref 0–0.04)
IMM GRANULOCYTES NFR BLD AUTO: 3.2 % (ref 0–0.5)
LYMPHOCYTES # BLD AUTO: 0.2 K/UL (ref 1–4.8)
LYMPHOCYTES NFR BLD: 5.9 % (ref 18–48)
MAGNESIUM SERPL-MCNC: 1.6 MG/DL (ref 1.6–2.6)
MCH RBC QN AUTO: 30.3 PG (ref 27–31)
MCHC RBC AUTO-ENTMCNC: 30.7 G/DL (ref 32–36)
MCV RBC AUTO: 99 FL (ref 82–98)
MONOCYTES # BLD AUTO: 0.3 K/UL (ref 0.3–1)
MONOCYTES NFR BLD: 11.5 % (ref 4–15)
NEUTROPHILS # BLD AUTO: 1.8 K/UL (ref 1.8–7.7)
NEUTROPHILS NFR BLD: 71.1 % (ref 38–73)
NRBC BLD-RTO: 0 /100 WBC
PHOSPHATE SERPL-MCNC: 2.9 MG/DL (ref 2.7–4.5)
PLATELET # BLD AUTO: 139 K/UL (ref 150–350)
PMV BLD AUTO: 12.2 FL (ref 9.2–12.9)
POTASSIUM SERPL-SCNC: 4.2 MMOL/L (ref 3.5–5.1)
RBC # BLD AUTO: 3.04 M/UL (ref 4–5.4)
SODIUM SERPL-SCNC: 142 MMOL/L (ref 136–145)
TACROLIMUS BLD-MCNC: 8.5 NG/ML (ref 5–15)
WBC # BLD AUTO: 2.53 K/UL (ref 3.9–12.7)

## 2019-09-28 PROCEDURE — 85025 COMPLETE CBC W/AUTO DIFF WBC: CPT

## 2019-09-28 PROCEDURE — 80069 RENAL FUNCTION PANEL: CPT

## 2019-09-28 PROCEDURE — 83735 ASSAY OF MAGNESIUM: CPT

## 2019-09-28 PROCEDURE — 36415 COLL VENOUS BLD VENIPUNCTURE: CPT

## 2019-09-28 PROCEDURE — 80197 ASSAY OF TACROLIMUS: CPT

## 2019-09-29 LAB
BACTERIA BLD CULT: NORMAL
BACTERIA BLD CULT: NORMAL

## 2019-09-30 NOTE — PROGRESS NOTES
Lets geta labs today she is on Ketoconazole. I suspect a jump on the level. Tell the patient to hold prograf today if possible until we get the level

## 2019-10-01 ENCOUNTER — LAB VISIT (OUTPATIENT)
Dept: LAB | Facility: HOSPITAL | Age: 64
End: 2019-10-01
Attending: INTERNAL MEDICINE
Payer: MEDICARE

## 2019-10-01 DIAGNOSIS — Z94.0 KIDNEY REPLACED BY TRANSPLANT: ICD-10-CM

## 2019-10-01 LAB
ALBUMIN SERPL BCP-MCNC: 3.8 G/DL (ref 3.5–5.2)
ANION GAP SERPL CALC-SCNC: 9 MMOL/L (ref 8–16)
BASOPHILS # BLD AUTO: 0.02 K/UL (ref 0–0.2)
BASOPHILS NFR BLD: 0.5 % (ref 0–1.9)
BUN SERPL-MCNC: 32 MG/DL (ref 8–23)
CALCIUM SERPL-MCNC: 10.9 MG/DL (ref 8.7–10.5)
CHLORIDE SERPL-SCNC: 110 MMOL/L (ref 95–110)
CO2 SERPL-SCNC: 27 MMOL/L (ref 23–29)
CREAT SERPL-MCNC: 1.9 MG/DL (ref 0.5–1.4)
DIFFERENTIAL METHOD: ABNORMAL
EOSINOPHIL # BLD AUTO: 0.1 K/UL (ref 0–0.5)
EOSINOPHIL NFR BLD: 3.1 % (ref 0–8)
ERYTHROCYTE [DISTWIDTH] IN BLOOD BY AUTOMATED COUNT: 16 % (ref 11.5–14.5)
EST. GFR  (AFRICAN AMERICAN): 32.3 ML/MIN/1.73 M^2
EST. GFR  (NON AFRICAN AMERICAN): 28.1 ML/MIN/1.73 M^2
GLUCOSE SERPL-MCNC: 109 MG/DL (ref 70–110)
HCT VFR BLD AUTO: 31.5 % (ref 37–48.5)
HGB BLD-MCNC: 9.4 G/DL (ref 12–16)
IMM GRANULOCYTES # BLD AUTO: 0.04 K/UL (ref 0–0.04)
IMM GRANULOCYTES NFR BLD AUTO: 1 % (ref 0–0.5)
LYMPHOCYTES # BLD AUTO: 0.2 K/UL (ref 1–4.8)
LYMPHOCYTES NFR BLD: 4.8 % (ref 18–48)
MAGNESIUM SERPL-MCNC: 1.7 MG/DL (ref 1.6–2.6)
MCH RBC QN AUTO: 30.2 PG (ref 27–31)
MCHC RBC AUTO-ENTMCNC: 29.8 G/DL (ref 32–36)
MCV RBC AUTO: 101 FL (ref 82–98)
MONOCYTES # BLD AUTO: 0.3 K/UL (ref 0.3–1)
MONOCYTES NFR BLD: 7.4 % (ref 4–15)
NEUTROPHILS # BLD AUTO: 3.5 K/UL (ref 1.8–7.7)
NEUTROPHILS NFR BLD: 83.2 % (ref 38–73)
NRBC BLD-RTO: 0 /100 WBC
PHOSPHATE SERPL-MCNC: 3.3 MG/DL (ref 2.7–4.5)
PLATELET # BLD AUTO: 186 K/UL (ref 150–350)
PMV BLD AUTO: 11.5 FL (ref 9.2–12.9)
POTASSIUM SERPL-SCNC: 4.9 MMOL/L (ref 3.5–5.1)
RBC # BLD AUTO: 3.11 M/UL (ref 4–5.4)
SODIUM SERPL-SCNC: 146 MMOL/L (ref 136–145)
TACROLIMUS BLD-MCNC: 9.9 NG/ML (ref 5–15)
WBC # BLD AUTO: 4.19 K/UL (ref 3.9–12.7)

## 2019-10-01 PROCEDURE — 80197 ASSAY OF TACROLIMUS: CPT

## 2019-10-01 PROCEDURE — 36415 COLL VENOUS BLD VENIPUNCTURE: CPT

## 2019-10-01 PROCEDURE — 85025 COMPLETE CBC W/AUTO DIFF WBC: CPT

## 2019-10-01 PROCEDURE — 83735 ASSAY OF MAGNESIUM: CPT

## 2019-10-01 PROCEDURE — 80069 RENAL FUNCTION PANEL: CPT

## 2019-10-01 RX ORDER — MYCOPHENOLATE MOFETIL 250 MG/1
500 CAPSULE ORAL 2 TIMES DAILY
Qty: 120 CAPSULE | Refills: 11 | Status: SHIPPED | OUTPATIENT
Start: 2019-10-01 | End: 2019-12-17 | Stop reason: SINTOL

## 2019-10-01 NOTE — PROGRESS NOTES
Let's d/c sodium bicarbonate  D/c KPN  Let's d/c calcitriol and ergocalciferol for now due to elevated calcium  Encourage hydration  Increase MMF to 5000 bid   Please let me know the intake and output and wt changes in the last few days   She has a Lane that needs to be addressed. She was discharged with this due to incontinence. Is she seeing in clinic tomorrow?  Labs tomorrow and Thursday

## 2019-10-01 NOTE — TELEPHONE ENCOUNTER
----- Message from Pavel Padilla MD sent at 10/1/2019 10:30 AM CDT -----  Let's d/c sodium bicarbonate  D/c KPN  Let's d/c calcitriol and ergocalciferol for now due to elevated calcium  Encourage hydration  Increase MMF to 5000 bid   Please let me know the intake and output and wt changes in the last few days   She has a Lane that needs to be addressed. She was discharged with this due to incontinence. Is she seeing in clinic tomorrow?  Labs tomorrow and Thursday

## 2019-10-01 NOTE — TELEPHONE ENCOUNTER
Patient and son Dany repeated back and voice a understanding of orders.  Intake and output reviewed.  9/29 intake 1530ml  Output 1725ml  9/30 1250ml            Output 900ml  10/1 750 so far.  Patient and son dany voice a understanding that she needs to be drinking over 2 liters of H2O daily.  Repeat labs schedule for tomorrow 10/2/19.

## 2019-10-02 ENCOUNTER — LAB VISIT (OUTPATIENT)
Dept: LAB | Facility: HOSPITAL | Age: 64
End: 2019-10-02
Attending: INTERNAL MEDICINE
Payer: MEDICARE

## 2019-10-02 ENCOUNTER — OFFICE VISIT (OUTPATIENT)
Dept: TRANSPLANT | Facility: CLINIC | Age: 64
End: 2019-10-02
Payer: MEDICARE

## 2019-10-02 VITALS
RESPIRATION RATE: 16 BRPM | HEIGHT: 63 IN | BODY MASS INDEX: 34.91 KG/M2 | HEART RATE: 58 BPM | DIASTOLIC BLOOD PRESSURE: 50 MMHG | SYSTOLIC BLOOD PRESSURE: 142 MMHG | WEIGHT: 197.06 LBS | TEMPERATURE: 99 F | OXYGEN SATURATION: 100 %

## 2019-10-02 DIAGNOSIS — Z29.89 PROPHYLACTIC IMMUNOTHERAPY: ICD-10-CM

## 2019-10-02 DIAGNOSIS — Z91.89 AT RISK FOR OPPORTUNISTIC INFECTIONS: ICD-10-CM

## 2019-10-02 DIAGNOSIS — I12.9 HYPERTENSION, RENAL: Chronic | ICD-10-CM

## 2019-10-02 DIAGNOSIS — Z94.0 KIDNEY REPLACED BY TRANSPLANT: ICD-10-CM

## 2019-10-02 DIAGNOSIS — N18.9 ANEMIA ASSOCIATED WITH CHRONIC RENAL FAILURE: Chronic | ICD-10-CM

## 2019-10-02 DIAGNOSIS — Z79.60 LONG-TERM USE OF IMMUNOSUPPRESSANT MEDICATION: ICD-10-CM

## 2019-10-02 DIAGNOSIS — E83.39 HYPOPHOSPHATEMIA: ICD-10-CM

## 2019-10-02 DIAGNOSIS — Z94.0 STATUS POST KIDNEY TRANSPLANT: Primary | ICD-10-CM

## 2019-10-02 DIAGNOSIS — D63.1 ANEMIA ASSOCIATED WITH CHRONIC RENAL FAILURE: Chronic | ICD-10-CM

## 2019-10-02 LAB
ALBUMIN SERPL BCP-MCNC: 3.6 G/DL (ref 3.5–5.2)
ANION GAP SERPL CALC-SCNC: 9 MMOL/L (ref 8–16)
ANISOCYTOSIS BLD QL SMEAR: SLIGHT
BASOPHILS # BLD AUTO: 0.02 K/UL (ref 0–0.2)
BASOPHILS NFR BLD: 0.4 % (ref 0–1.9)
BUN SERPL-MCNC: 34 MG/DL (ref 8–23)
CALCIUM SERPL-MCNC: 10.6 MG/DL (ref 8.7–10.5)
CHLORIDE SERPL-SCNC: 109 MMOL/L (ref 95–110)
CMV DNA SERPL NAA+PROBE-ACNC: NORMAL IU/ML
CO2 SERPL-SCNC: 24 MMOL/L (ref 23–29)
CREAT SERPL-MCNC: 1.8 MG/DL (ref 0.5–1.4)
DIFFERENTIAL METHOD: ABNORMAL
EOSINOPHIL # BLD AUTO: 0.2 K/UL (ref 0–0.5)
EOSINOPHIL NFR BLD: 2.9 % (ref 0–8)
ERYTHROCYTE [DISTWIDTH] IN BLOOD BY AUTOMATED COUNT: 16.1 % (ref 11.5–14.5)
EST. GFR  (AFRICAN AMERICAN): 34.5 ML/MIN/1.73 M^2
EST. GFR  (NON AFRICAN AMERICAN): 29.9 ML/MIN/1.73 M^2
GLUCOSE SERPL-MCNC: 87 MG/DL (ref 70–110)
HCT VFR BLD AUTO: 30.1 % (ref 37–48.5)
HGB BLD-MCNC: 9.2 G/DL (ref 12–16)
HYPOCHROMIA BLD QL SMEAR: ABNORMAL
IMM GRANULOCYTES # BLD AUTO: 0.05 K/UL (ref 0–0.04)
IMM GRANULOCYTES NFR BLD AUTO: 1 % (ref 0–0.5)
LYMPHOCYTES # BLD AUTO: 0.3 K/UL (ref 1–4.8)
LYMPHOCYTES NFR BLD: 4.9 % (ref 18–48)
MAGNESIUM SERPL-MCNC: 1.9 MG/DL (ref 1.6–2.6)
MCH RBC QN AUTO: 30.9 PG (ref 27–31)
MCHC RBC AUTO-ENTMCNC: 30.6 G/DL (ref 32–36)
MCV RBC AUTO: 101 FL (ref 82–98)
MONOCYTES # BLD AUTO: 0.4 K/UL (ref 0.3–1)
MONOCYTES NFR BLD: 6.8 % (ref 4–15)
NEUTROPHILS # BLD AUTO: 4.4 K/UL (ref 1.8–7.7)
NEUTROPHILS NFR BLD: 84 % (ref 38–73)
NRBC BLD-RTO: 0 /100 WBC
OVALOCYTES BLD QL SMEAR: ABNORMAL
PHOSPHATE SERPL-MCNC: 3.1 MG/DL (ref 2.7–4.5)
PLATELET # BLD AUTO: 165 K/UL (ref 150–350)
PMV BLD AUTO: 11.7 FL (ref 9.2–12.9)
POIKILOCYTOSIS BLD QL SMEAR: SLIGHT
POLYCHROMASIA BLD QL SMEAR: ABNORMAL
POTASSIUM SERPL-SCNC: 4.5 MMOL/L (ref 3.5–5.1)
RBC # BLD AUTO: 2.98 M/UL (ref 4–5.4)
SODIUM SERPL-SCNC: 142 MMOL/L (ref 136–145)
TACROLIMUS BLD-MCNC: 11 NG/ML (ref 5–15)
WBC # BLD AUTO: 5.26 K/UL (ref 3.9–12.7)

## 2019-10-02 PROCEDURE — 99215 OFFICE O/P EST HI 40 MIN: CPT | Mod: PBBFAC | Performed by: NURSE PRACTITIONER

## 2019-10-02 PROCEDURE — 83735 ASSAY OF MAGNESIUM: CPT

## 2019-10-02 PROCEDURE — 85025 COMPLETE CBC W/AUTO DIFF WBC: CPT

## 2019-10-02 PROCEDURE — 80197 ASSAY OF TACROLIMUS: CPT

## 2019-10-02 PROCEDURE — 99999 PR PBB SHADOW E&M-EST. PATIENT-LVL V: ICD-10-PCS | Mod: PBBFAC,,, | Performed by: NURSE PRACTITIONER

## 2019-10-02 PROCEDURE — 36415 COLL VENOUS BLD VENIPUNCTURE: CPT

## 2019-10-02 PROCEDURE — 99215 OFFICE O/P EST HI 40 MIN: CPT | Mod: S$PBB,,, | Performed by: NURSE PRACTITIONER

## 2019-10-02 PROCEDURE — 80069 RENAL FUNCTION PANEL: CPT

## 2019-10-02 PROCEDURE — 99999 PR PBB SHADOW E&M-EST. PATIENT-LVL V: CPT | Mod: PBBFAC,,, | Performed by: NURSE PRACTITIONER

## 2019-10-02 PROCEDURE — 99215 PR OFFICE/OUTPT VISIT, EST, LEVL V, 40-54 MIN: ICD-10-PCS | Mod: S$PBB,,, | Performed by: NURSE PRACTITIONER

## 2019-10-02 RX ORDER — TACROLIMUS 1 MG/1
8 CAPSULE ORAL EVERY 12 HOURS
Qty: 480 CAPSULE | Refills: 11 | Status: SHIPPED | OUTPATIENT
Start: 2019-10-02 | End: 2019-10-03 | Stop reason: SDUPTHER

## 2019-10-02 NOTE — PROGRESS NOTES
Kidney Post-Transplant Assessment    Referring Physician: Bruce Khan  Current Nephrologist: Bruce Khan    ORGAN: LEFT KIDNEY  Donor Type: donation after brain death  PHS Increased Risk: no  Cold Ischemia: 545 mins  Induction Medications: thymoglobulin    Subjective:     CC:  Reassessment of renal allograft function and management of chronic immunosuppression.    HPI:  Ms. Schulte is a 64 y.o. 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  Her most recent creatinine is 1.8. She takes mycophenolate mofetil and tacrolimus for maintenance immunosuppression.Of note, donor cultures positive for Staphylococcus Lugdunesis. Treated with Ancef IV inpatient. Will transition to PO Cefadroxil on discharge to complete a 2 week course, end date 10/8/19. Blood cultures collected 9/24 NGTD.    Her post transplant course has been incontinence and  Leukopenia . Currently holding  Valcyte and Bactrim. With weekly CMV PCR     Interval HX:  Urology  today   HX incontinence , FTG in place  Overall feels well. No health concerns today.   Denies chest pain, SOB, leg pain   Intake 5321-8497  ML  UOP / -1700 ml    Past Medical History:   Diagnosis Date    Abnormal Pap smear     repeat paps were normal    Anemia associated with chronic renal failure     Awaiting organ transplant status  - 02/18/2013 6/6/2014    Blood type A+ 6/6/2014    CKD (chronic kidney disease) stage 5, GFR less than 15 ml/min     secondary hypertension    Essential hypertension 5/19/2017    Hyperlipidemia     Hypertension, renal 1992    Stroke 2007    Residual speech deficit       Review of Systems   Constitutional: Negative for activity change, appetite change, chills, fatigue,  "fever and unexpected weight change.   HENT: Negative for congestion, facial swelling, postnasal drip, rhinorrhea, sinus pressure, sore throat and trouble swallowing.    Eyes: Negative for pain, redness and visual disturbance.   Respiratory: Negative for cough, chest tightness, shortness of breath and wheezing.    Cardiovascular: Negative.  Negative for chest pain, palpitations and leg swelling.   Gastrointestinal: Negative for abdominal pain, diarrhea, nausea and vomiting.   Genitourinary: Negative for dysuria, flank pain and urgency.        FTG  incontinence   Musculoskeletal: Negative for gait problem, neck pain and neck stiffness.   Skin: Negative for rash.   Allergic/Immunologic: Positive for immunocompromised state. Negative for environmental allergies and food allergies.   Neurological: Positive for weakness. Negative for dizziness, light-headedness and headaches.   Psychiatric/Behavioral: Negative for agitation and confusion. The patient is not nervous/anxious.        Objective:   Blood pressure (!) 142/50, pulse (!) 58, temperature 99.2 °F (37.3 °C), resp. rate 16, height 5' 3" (1.6 m), weight 89.4 kg (197 lb 1.5 oz), SpO2 100 %.body mass index is 34.91 kg/m².    Physical Exam   Constitutional: She is oriented to person, place, and time. She appears well-developed and well-nourished.   HENT:   Head: Normocephalic.   Mouth/Throat: Oropharynx is clear and moist. No oropharyngeal exudate.   Eyes: Pupils are equal, round, and reactive to light. Conjunctivae and EOM are normal. No scleral icterus.   Neck: Normal range of motion. Neck supple.   Cardiovascular: Normal rate, regular rhythm and normal heart sounds.   Pulmonary/Chest: Effort normal and breath sounds normal.   Abdominal: Soft. Normal appearance and bowel sounds are normal. She exhibits no distension and no mass. There is no splenomegaly or hepatomegaly. There is no tenderness. There is no rebound, no guarding, no CVA tenderness, no tenderness at " McBurney's point and negative Hidalgo's sign.       Genitourinary:   Genitourinary Comments: FTG draining clear yellow urine     Musculoskeletal: Normal range of motion. She exhibits no edema.        Arms:  Lymphadenopathy:     She has no cervical adenopathy.   Neurological: She is alert and oriented to person, place, and time. She exhibits normal muscle tone. Coordination normal.   Skin: Skin is warm and dry.   Psychiatric: She has a normal mood and affect. Her behavior is normal.   Vitals reviewed.      Labs:  Lab Results   Component Value Date    WBC 5.26 10/02/2019    HGB 9.2 (L) 10/02/2019    HCT 30.1 (L) 10/02/2019     10/02/2019    K 4.5 10/02/2019     10/02/2019    CO2 24 10/02/2019    BUN 34 (H) 10/02/2019    CREATININE 1.8 (H) 10/02/2019    EGFRNONAA 29.9 (A) 10/02/2019    CALCIUM 10.6 (H) 10/02/2019    PHOS 3.1 10/02/2019    MG 1.9 10/02/2019    ALBUMIN 3.6 10/02/2019    AST 17 09/21/2019    ALT 8 (L) 09/21/2019    UTPCR 1.66 (H) 09/20/2019    .0 (H) 09/19/2019    TACROLIMUS 11.0 10/02/2019       No results found for: EXTANC, EXTWBC, EXTSEGS, EXTPLATELETS, EXTHEMOGLOBI, EXTHEMATOCRI, EXTCREATININ, EXTSODIUM, EXTPOTASSIUM, EXTBUN, EXTCO2, EXTCALCIUM, EXTPHOSPHORU, EXTGLUCOSE, EXTALBUMIN, EXTAST, EXTALT, EXTBILITOTAL, EXTLIPASE, EXTAMYLASE    No results found for: EXTCYCLOSLVL, EXTSIROLIMUS, EXTTACROLVL, EXTPROTCRE, EXTPTHINTACT, EXTPROTEINUA, EXTWBCUA, EXTRBCUA    Labs were reviewed with the patient    Assessment:     1. Status post kidney transplant    2. Hypertension, renal    3. Anemia associated with chronic renal failure    4. At risk for opportunistic infections    5. Long-term use of immunosuppressant medication    6. Prophylactic immunotherapy    7. Hypophosphatemia        Plan:   Repeat trough ~ 1 week   Urology apt today --, FTG, incontinence eval   Will need to discuss FTG plan with group      Follow-up:   1. CKD stage: 3 stable    2. Immunosuppression:   Prograf trough 11,  which is  supar therapeutic target 8-10.  decrease Prograf 8/8,  Mg BID, and  Ketoconazole 100 mg QD, prednisone as prescribed . Atovaquone 1500 mg Qd for PCP prophylaxis , acyclovir 400 mg B ID for CMV prophylaxis . Will continue to monitor for drug toxicities    3. Allograft Function: Stable. Continue good po hydration.       Lab Results   Component Value Date    CREATININE 1.8 (H) 10/02/2019       10/2/2019  POD 12   eGFR if African American >60 mL/min/1.73 m^2 34.5 (A)       4. Hypertension management: advise low salt diet and home BP monitoring    Coreg 12.5 mg BID , Hydralazine 25 mg TID    5. Metabolic Bone Disease/Secondary Hyperparathyroidism:stable  Will monitor PTH, CA and Vit D/guidelines,      Ergocalciferol  --stop per Dr Padilla d/t previous high CA levels  Lab Results   Component Value Date    .0 (H) 09/19/2019    CALCIUM 10.6 (H) 10/02/2019    PHOS 3.1 10/02/2019        9/19/2019   Vit D, 25-Hydroxy 30 - 96 ng/mL 25 (A)  Vitamin D deficiency.........<10 ng/mL  Vitamin D insufficiency......10-29 ng/mL  Vitamin D sufficiency........> or equal to 30 ng/mL  Vitamin D toxicity............>100 ng/mL        10/2/2019  POD 12   Magnesium 1.6 - 2.6 mg/dL 1.9       6. Electrolytes:  Will monitor /guidelines  Lab Results   Component Value Date     10/02/2019    K 4.5 10/02/2019     10/02/2019    CO2 24 10/02/2019          7. Anemia: stable. No need for intervention    Will monitor /guidelines  Lab Results   Component Value Date    WBC 5.26 10/02/2019    HGB 9.2 (L) 10/02/2019    HCT 30.1 (L) 10/02/2019     (H) 10/02/2019     10/02/2019       8.  Cytopenias: no significant cytopenias will monitor as per our guidelines. Medicine list reviewed including potential causes of drug-induced cytopenias  ANC 4400    9.Proteinuria: continue p/c ratio as per guidelines      10. CMV/ BK virus infection screening:  will continue to monitor/ guidelines      9/25/2019  POD 5    Cytomegalovirus PCR, Quant Undetected IU/mL Undetected        11. Weight education: provided during the clinic visit   Body mass index is 34.91 kg/m².          12.Patient safety education regarding immunosuppression including prophylaxis posttransplant for CMV, PCP : Education provided about vaccination and prevention of infections             Follow-up:   Clinic: return to transplant clinic weekly for the first month after transplant; every 2 weeks during months 2-3; then at 6-, 9-, 12-, 18-, 24-, and 36- months post-transplant to reassess for complications from immunosuppression toxicity and monitor for rejection.  Annually thereafter.    Labs: since patient remains at high risk for rejection and drug-related complications that warrant close monitoring, labs will be ordered as follows: continue twice weekly CBC, renal panel, and drug level for first month; then same labs once weekly through 3rd month post-transplant.  Urine for UA and protein/creatinine ratio monthly.  Serum BK - PCR at 1-, 3-, 6-, 9-, 12-, 18-, 24-, 36-, 48-, and 60 months post-transplant.  Hepatic panel at 1-, 2-, 3-, 6-, 9-, 12-, 18-, 24-, and 36- months post-transplant.    Emilia Amaya NP       Education:   Material provided to the patient.  Patient reminded to call with any health changes since these can be early signs of significant complications.  Also, I advised the patient to be sure any new medications or changes of old medications are discussed with either a pharmacist or physician knowledgeable with transplant to avoid rejection/drug toxicity related to significant drug interactions.

## 2019-10-02 NOTE — PROGRESS NOTES
[facility] post clinic note:  SW met with pt for first post clinic SW visit. Pt was transplanted on 19 from a  donor. Pt reports that they are staying at  with her son Dany. He is not in the room with her but she reports he is in the waiting room. Pt reports that they are doing well and in good spirits. Pt is hard to understand due to speech. Pt reports it's due to her history of stroke. Pt reports she is happy because today is her birthday and she is glad she has a kidney for it. Pt denies any concerns with affording her medication and has transportation. Pt reports she hopes she can go home soon.

## 2019-10-02 NOTE — LETTER
October 2, 2019        Bruce Khan  [address]  [place] [address]  Phone: [phone]  Fax: [phone]             [facility]  [address]  [facility] [address]  Phone: [phone]   Patient: Satinder Schulte   MR Number: [id number]   YOB: 1955   Date of Visit: 10/2/2019       Dear Dr. Bruce Khan    Thank you for referring Satinder Schulte to me for evaluation. Attached you will find relevant portions of my assessment and plan of care.    If you have questions, please do not hesitate to call me. I look forward to following Satinder Schulte along with you.    Sincerely,    Emilia Amaya, NP    Enclosure    If you would like to receive this communication electronically, please contact [email or web address] or [phone] to request [employer] access.    [employer] is a tool which provides read-only access to select patient information with whom you have a relationship. Its easy to use and provides real time access to review your patients record including encounter summaries, notes, results, and demographic information.    If you feel you have received this communication in error or would no longer like to receive these types of communications, please e-mail [email or web address]

## 2019-10-03 ENCOUNTER — LAB VISIT (OUTPATIENT)
Dept: LAB | Facility: HOSPITAL | Age: 64
End: 2019-10-03
Attending: INTERNAL MEDICINE
Payer: MEDICARE

## 2019-10-03 ENCOUNTER — OFFICE VISIT (OUTPATIENT)
Dept: UROLOGY | Facility: CLINIC | Age: 64
End: 2019-10-03
Payer: MEDICARE

## 2019-10-03 VITALS
WEIGHT: 196 LBS | HEIGHT: 63 IN | HEART RATE: 59 BPM | SYSTOLIC BLOOD PRESSURE: 141 MMHG | BODY MASS INDEX: 34.73 KG/M2 | DIASTOLIC BLOOD PRESSURE: 70 MMHG

## 2019-10-03 DIAGNOSIS — Z79.60 LONG-TERM USE OF IMMUNOSUPPRESSANT MEDICATION: ICD-10-CM

## 2019-10-03 DIAGNOSIS — Z94.0 STATUS POST KIDNEY TRANSPLANT: ICD-10-CM

## 2019-10-03 DIAGNOSIS — Z29.89 PROPHYLACTIC IMMUNOTHERAPY: ICD-10-CM

## 2019-10-03 DIAGNOSIS — Z97.8 FOLEY CATHETER PRESENT: ICD-10-CM

## 2019-10-03 DIAGNOSIS — R35.0 URINARY FREQUENCY: Primary | ICD-10-CM

## 2019-10-03 DIAGNOSIS — Z46.6 ENCOUNTER FOR FOLEY CATHETER REMOVAL: ICD-10-CM

## 2019-10-03 DIAGNOSIS — Z94.0 KIDNEY REPLACED BY TRANSPLANT: ICD-10-CM

## 2019-10-03 DIAGNOSIS — R39.15 URINARY URGENCY: ICD-10-CM

## 2019-10-03 DIAGNOSIS — R32 URINARY INCONTINENCE, UNSPECIFIED TYPE: ICD-10-CM

## 2019-10-03 LAB
ALBUMIN SERPL BCP-MCNC: 3.5 G/DL (ref 3.5–5.2)
ANION GAP SERPL CALC-SCNC: 8 MMOL/L (ref 8–16)
BASOPHILS # BLD AUTO: 0.02 K/UL (ref 0–0.2)
BASOPHILS NFR BLD: 0.4 % (ref 0–1.9)
BUN SERPL-MCNC: 40 MG/DL (ref 8–23)
CALCIUM SERPL-MCNC: 11.2 MG/DL (ref 8.7–10.5)
CHLORIDE SERPL-SCNC: 109 MMOL/L (ref 95–110)
CO2 SERPL-SCNC: 23 MMOL/L (ref 23–29)
CREAT SERPL-MCNC: 2 MG/DL (ref 0.5–1.4)
DIFFERENTIAL METHOD: ABNORMAL
EOSINOPHIL # BLD AUTO: 0.2 K/UL (ref 0–0.5)
EOSINOPHIL NFR BLD: 3.8 % (ref 0–8)
ERYTHROCYTE [DISTWIDTH] IN BLOOD BY AUTOMATED COUNT: 16 % (ref 11.5–14.5)
EST. GFR  (AFRICAN AMERICAN): 30.2 ML/MIN/1.73 M^2
EST. GFR  (NON AFRICAN AMERICAN): 26.2 ML/MIN/1.73 M^2
GLUCOSE SERPL-MCNC: 141 MG/DL (ref 70–110)
HCT VFR BLD AUTO: 28.8 % (ref 37–48.5)
HGB BLD-MCNC: 9.1 G/DL (ref 12–16)
IMM GRANULOCYTES # BLD AUTO: 0.04 K/UL (ref 0–0.04)
IMM GRANULOCYTES NFR BLD AUTO: 0.8 % (ref 0–0.5)
LYMPHOCYTES # BLD AUTO: 0.3 K/UL (ref 1–4.8)
LYMPHOCYTES NFR BLD: 5.4 % (ref 18–48)
MAGNESIUM SERPL-MCNC: 1.9 MG/DL (ref 1.6–2.6)
MCH RBC QN AUTO: 30.6 PG (ref 27–31)
MCHC RBC AUTO-ENTMCNC: 31.6 G/DL (ref 32–36)
MCV RBC AUTO: 97 FL (ref 82–98)
MONOCYTES # BLD AUTO: 0.3 K/UL (ref 0.3–1)
MONOCYTES NFR BLD: 6.8 % (ref 4–15)
NEUTROPHILS # BLD AUTO: 4.2 K/UL (ref 1.8–7.7)
NEUTROPHILS NFR BLD: 82.8 % (ref 38–73)
NRBC BLD-RTO: 0 /100 WBC
PHOSPHATE SERPL-MCNC: 2.8 MG/DL (ref 2.7–4.5)
PLATELET # BLD AUTO: 192 K/UL (ref 150–350)
PMV BLD AUTO: 11.3 FL (ref 9.2–12.9)
POTASSIUM SERPL-SCNC: 4.4 MMOL/L (ref 3.5–5.1)
RBC # BLD AUTO: 2.97 M/UL (ref 4–5.4)
SODIUM SERPL-SCNC: 140 MMOL/L (ref 136–145)
TACROLIMUS BLD-MCNC: 12.2 NG/ML (ref 5–15)
WBC # BLD AUTO: 5.02 K/UL (ref 3.9–12.7)

## 2019-10-03 PROCEDURE — 36415 COLL VENOUS BLD VENIPUNCTURE: CPT

## 2019-10-03 PROCEDURE — 99214 PR OFFICE/OUTPT VISIT, EST, LEVL IV, 30-39 MIN: ICD-10-PCS | Mod: S$PBB,,, | Performed by: NURSE PRACTITIONER

## 2019-10-03 PROCEDURE — 85025 COMPLETE CBC W/AUTO DIFF WBC: CPT

## 2019-10-03 PROCEDURE — 99999 PR PBB SHADOW E&M-EST. PATIENT-LVL III: CPT | Mod: PBBFAC,,, | Performed by: NURSE PRACTITIONER

## 2019-10-03 PROCEDURE — 80197 ASSAY OF TACROLIMUS: CPT

## 2019-10-03 PROCEDURE — 99999 PR PBB SHADOW E&M-EST. PATIENT-LVL III: ICD-10-PCS | Mod: PBBFAC,,, | Performed by: NURSE PRACTITIONER

## 2019-10-03 PROCEDURE — 83735 ASSAY OF MAGNESIUM: CPT

## 2019-10-03 PROCEDURE — 99214 OFFICE O/P EST MOD 30 MIN: CPT | Mod: S$PBB,,, | Performed by: NURSE PRACTITIONER

## 2019-10-03 PROCEDURE — 80069 RENAL FUNCTION PANEL: CPT

## 2019-10-03 PROCEDURE — 99213 OFFICE O/P EST LOW 20 MIN: CPT | Mod: PBBFAC | Performed by: NURSE PRACTITIONER

## 2019-10-03 RX ORDER — CINACALCET 30 MG/1
30 TABLET, FILM COATED ORAL
Qty: 30 TABLET | Refills: 11 | Status: SHIPPED | OUTPATIENT
Start: 2019-10-03 | End: 2019-10-07

## 2019-10-03 RX ORDER — TACROLIMUS 1 MG/1
CAPSULE ORAL
Qty: 390 CAPSULE | Refills: 11 | Status: SHIPPED | OUTPATIENT
Start: 2019-10-03 | End: 2019-10-08 | Stop reason: SDUPTHER

## 2019-10-03 NOTE — TELEPHONE ENCOUNTER
Patient and Son Dany repeated back and voice a understanding of orders.  Orders reinforce that she needs to be drinking over 2 liters of H20 daily .  Urine C&S pending.

## 2019-10-03 NOTE — PATIENT INSTRUCTIONS
Good water intake    Avoid Bladder Irritants: Tea, coffee, caffeine, alcohol, artificial sweeteners, citrus, spicy foods, acidic foods,chocolate, tomato-based foods, smoking    Timed voiding every 2 hours regardless of urge    Avoid constipation    Will hold off on OAB medication until after stent removed to see if symptoms improve after stent removal next week.

## 2019-10-03 NOTE — H&P (VIEW-ONLY)
CHIEF COMPLAINT:    Mrs Schulte is a 64 y.o. female presenting for catheter removal.  PRESENTING ILLNESS:    Satinder Schulte is a 64 y.o. female who presents for catheter removal. Last clinic visit was 8/2/19 with Dr. Tomlinson.    PMHx relevant for HTN, CVA and ESRD presumed secondary to HTN who received DBD kidney transplant 9/20/2019   Following catheter removal post op, patient experienced urinary incontinence, frequency and urgency. Rodriguez catheter was replaced 9/23/19. She also c/o burning and discomfort with voiding and ditropan was started per hospital note. Urine culture was negative for infection 9/24/19. She was discharged with rodriguez in place and scheduled with Urology for rodriguez removal.    Prior to transplant, patient voided once in the morning a small amount since being on dialysis. She was on dialysis for 6 years per patient.    Pt was evaluated for microscopic hematuria with Dr. Tomlinson 8/2019.   6/28/19 Renal US. Monitoring complex cyst with US in 1 year.  Cysto 8/29/19   Conclusion:  1. Normal cysto  2. Decreased bladder capacity  She felt strong urge to urinate at 150 ml.  Unable to hold more urine.    Today patient presents to clinic for rodriguez removal. She states catheter has been draining without difficulty. Denies hematuria, flank pain. Denies fever or chills. She is not taking oxybutynin. She is drinking 4 bottles of water per day.   She has cysto with stent removal scheduled with Dr. Tomlinson 10/10/19.      REVIEW OF SYSTEMS:    Review of Systems    Constitutional: Negative for fever and chills.   HENT: Negative for congestion.   Eyes: Negative for eye discharge.   Respiratory: Negative for shortness of breath.   Cardiovascular: Negative for chest pain.   Gastrointestinal: Negative for nausea, vomiting, and constipation.   Genitourinary: See HPI   Neurological: Positive weakness (baseline). Negative for dizziness.   Hematological: Does not bruise/bleed easily.   Psychiatric/Behavioral: Negative for agitation.      PATIENT HISTORY:    Past Medical History:   Diagnosis Date    Abnormal Pap smear     repeat paps were normal    Anemia associated with chronic renal failure     Awaiting organ transplant status  - 02/18/2013 6/6/2014    Blood type A+ 6/6/2014    CKD (chronic kidney disease) stage 5, GFR less than 15 ml/min     secondary hypertension    Essential hypertension 5/19/2017    Hyperlipidemia     Hypertension, renal 1992    Stroke 2007    Residual speech deficit       Past Surgical History:   Procedure Laterality Date    AV FISTULA PLACEMENT  2014    BONE MARROW BIOPSY Right 8/23/2018    Procedure: Biopsy-bone marrow;  Surgeon: Anna Lin MD;  Location: [facility] OR [address] [place] [address];  Service: Oncology;  Laterality: Right;    CHOLECYSTECTOMY  2008    HYSTERECTOMY  2011    TAHBSO for benign reasons    KIDNEY TRANSPLANT N/A 9/20/2019    Procedure: TRANSPLANT, KIDNEY;  Surgeon: Guero Rogers MD;  Location: [facility] OR [address] [place] [address];  Service: Transplant;  Laterality: N/A;    OOPHORECTOMY         Family History   Problem Relation Age of Onset    Stroke Mother     Kidney disease Mother         on dialysis    Hypertension Mother     Heart disease Mother     Heart disease Father     Stroke Sister     Kidney disease Brother     Breast cancer Daughter     Heart disease Sister     Ovarian cancer Cousin     Colon cancer Neg Hx     Thrombophilia Neg Hx        Social History     Socioeconomic History    Marital status: Single     Spouse name: Not on file    Number of children: Not on file    Years of education: Not on file    Highest education level: Not on file   Occupational History    Not on file   Social Needs    Financial resource strain: Not on file    Food insecurity:     Worry: Not on file     Inability: Not on file    Transportation needs:     Medical: Not on file     Non-medical: Not on file   Tobacco Use    Smoking status: Never Smoker    Smokeless tobacco: Never Used   Substance and Sexual Activity     Alcohol use: No    Drug use: No    Sexual activity: Never     Comment: single, Lives with daughter, on Disability, Lives in [place]   Lifestyle    Physical activity:     Days per week: Not on file     Minutes per session: Not on file    Stress: Not on file   Relationships    Social connections:     Talks on phone: Not on file     Gets together: Not on file     Attends Islam service: Not on file     Active member of club or organization: Not on file     Attends meetings of clubs or organizations: Not on file     Relationship status: Not on file   Other Topics Concern    Not on file   Social History Narrative    Disabled    Single    4 children    History of blood transfusions       Allergies:  Patient has no known allergies.    Medications:    Current Outpatient Medications:     acyclovir (ZOVIRAX) 400 MG tablet, Take 1 tablet (400 mg total) by mouth 2 (two) times daily. Stop 12/19/19, Disp: 60 tablet, Rfl: 2    atorvastatin (LIPITOR) 40 MG tablet, Take 40 mg by mouth once daily. , Disp: , Rfl:     atovaquone (MEPRON) 750 mg/5 mL Susp, Take 10 mLs (1,500 mg total) by mouth once daily. Stop 9/19/2020, Disp: 300 mL, Rfl: 11    bisacodyl (DULCOLAX) 5 mg EC tablet, Take 2 tablets (10 mg total) by mouth daily as needed for Constipation., Disp: , Rfl: 0    carvedilol (COREG) 25 MG tablet, Take 0.5 tablets (12.5 mg total) by mouth 2 (two) times daily with meals. Hold for SBP<120, HR<60, Disp: 30 tablet, Rfl: 1    cefadroxil (DURICEF) 500 MG Cap, Take 1 capsule (500 mg total) by mouth every 12 (twelve) hours. Stop 10/8/19 for 13 days, Disp: 26 capsule, Rfl: 0    docusate sodium (COLACE) 100 MG capsule, Take 1 capsule (100 mg total) by mouth 3 (three) times daily as needed for Constipation., Disp: , Rfl: 0    famotidine (PEPCID) 20 MG tablet, Take 1 tablet (20 mg total) by mouth every evening., Disp: 30 tablet, Rfl: 0    hydrALAZINE (APRESOLINE) 25 MG tablet, Take 1 tablet (25 mg total) by mouth 3 (three)  times daily., Disp: 90 tablet, Rfl: 11    ketoconazole (NIZORAL) 200 mg Tab, Take 0.5 tablets (100 mg total) by mouth once daily., Disp: 15 tablet, Rfl: 11    mycophenolate (CELLCEPT) 250 mg Cap, Take 2 capsules (500 mg total) by mouth 2 (two) times daily., Disp: 120 capsule, Rfl: 11    NIFEdipine (ADALAT CC) 60 MG TbSR, Take 1 tablet (60 mg total) by mouth 2 (two) times daily., Disp: 60 tablet, Rfl: 1    oxyCODONE-acetaminophen (PERCOCET) 5-325 mg per tablet, Take 1 tablet by mouth every 4 (four) hours as needed for Pain., Disp: 30 tablet, Rfl: 0    predniSONE (DELTASONE) 5 MG tablet, Take by mouth daily: 20mg (4 tablets) 9/23-10/22; 15 mg (3 tablets) 10/23-11/22; 10 mg (2 tablets) 11/23-12/22; then 5 mg (1 tablet) daily thereafter 12/23/19, Disp: 120 tablet, Rfl: 11    tacrolimus (PROGRAF) 1 MG Cap, Take 8 capsules (8 mg total) by mouth every 12 (twelve) hours., Disp: 480 capsule, Rfl: 11    PHYSICAL EXAMINATION:    Constitutional: She is oriented to person, place, and time. She appears well-developed and well-nourished.  She is in no apparent distress.    Neck: Normal ROM.     Cardiovascular: Regular rhythm.      Pulmonary/Chest: Effort normal. No respiratory distress.     Abdominal:  She exhibits no distension.  There is no CVA tenderness.   Surgical incision - staples intact. No signs of infection.    Lymphadenopathy:          Right: No supraclavicular adenopathy present.        Left: No supraclavicular adenopathy present.     Neurological: She is alert and oriented to person, place, and time.     Skin: Skin is warm and dry. LUE fistula.    Extremities: Normal ROM    Psych: Cooperative with normal affect.    Genitourinary: Indwelling rodriguez catheter in place.    Physical Exam      LABS:      IMPRESSION:    Encounter Diagnoses   Name Primary?    Urinary frequency Yes    Urinary incontinence, unspecified type     Urinary urgency     Encounter for Rodriguez catheter removal     Rodriguez catheter present         PLAN:  -Discussed with patient that she will most likely have frequency, urgency and incontinence once the catheter is removed. With having little output prior to transplant, unsure of baseline bladder function or possible OAB. Symptoms are most likely worse with stent in place. Last cysto resulted decreased bladder capacity.   -Lane catheter removed today in clinic by Nurse Alvares without difficulty.  -Continue good water intake.   Avoid Bladder Irritants: Tea, coffee, caffeine, alcohol, artificial sweeteners, citrus, spicy foods, acidic foods,chocolate, tomato-based foods, smoking  Avoid constipation  Timed voiding every 2 hours regardless of urge  -Will hold off on OAB (oxybutynin) at this time. If symptoms persist after stent removal, will consider starting on medication if ok with transplant team.  -RTC 10/10/19 for cysto with Dr. Davi SALMERON spent 45 minutes with the patient of which more than half was spent in coordinating the patient's care as well as in direct consultation with the patient in regards to our treatment and plan.

## 2019-10-03 NOTE — PROGRESS NOTES
CHIEF COMPLAINT:    Mrs Schulte is a 64 y.o. female presenting for catheter removal.  PRESENTING ILLNESS:    Satinder Schulte is a 64 y.o. female who presents for catheter removal. Last clinic visit was 8/2/19 with Dr. Tomlinson.    PMHx relevant for HTN, CVA and ESRD presumed secondary to HTN who received DBD kidney transplant 9/20/2019   Following catheter removal post op, patient experienced urinary incontinence, frequency and urgency. Rodriguez catheter was replaced 9/23/19. She also c/o burning and discomfort with voiding and ditropan was started per hospital note. Urine culture was negative for infection 9/24/19. She was discharged with rodriguez in place and scheduled with Urology for rodriguez removal.    Prior to transplant, patient voided once in the morning a small amount since being on dialysis. She was on dialysis for 6 years per patient.    Pt was evaluated for microscopic hematuria with Dr. Tomlinson 8/2019.   6/28/19 Renal US. Monitoring complex cyst with US in 1 year.  Cysto 8/29/19   Conclusion:  1. Normal cysto  2. Decreased bladder capacity  She felt strong urge to urinate at 150 ml.  Unable to hold more urine.    Today patient presents to clinic for rodriguez removal. She states catheter has been draining without difficulty. Denies hematuria, flank pain. Denies fever or chills. She is not taking oxybutynin. She is drinking 4 bottles of water per day.   She has cysto with stent removal scheduled with Dr. Tomlinson 10/10/19.      REVIEW OF SYSTEMS:    Review of Systems    Constitutional: Negative for fever and chills.   HENT: Negative for congestion.   Eyes: Negative for eye discharge.   Respiratory: Negative for shortness of breath.   Cardiovascular: Negative for chest pain.   Gastrointestinal: Negative for nausea, vomiting, and constipation.   Genitourinary: See HPI   Neurological: Positive weakness (baseline). Negative for dizziness.   Hematological: Does not bruise/bleed easily.   Psychiatric/Behavioral: Negative for agitation.      PATIENT HISTORY:    Past Medical History:   Diagnosis Date    Abnormal Pap smear     repeat paps were normal    Anemia associated with chronic renal failure     Awaiting organ transplant status  - 02/18/2013 6/6/2014    Blood type A+ 6/6/2014    CKD (chronic kidney disease) stage 5, GFR less than 15 ml/min     secondary hypertension    Essential hypertension 5/19/2017    Hyperlipidemia     Hypertension, renal 1992    Stroke 2007    Residual speech deficit       Past Surgical History:   Procedure Laterality Date    AV FISTULA PLACEMENT  2014    BONE MARROW BIOPSY Right 8/23/2018    Procedure: Biopsy-bone marrow;  Surgeon: Anna Lin MD;  Location: [facility] OR [address] [place] [address];  Service: Oncology;  Laterality: Right;    CHOLECYSTECTOMY  2008    HYSTERECTOMY  2011    TAHBSO for benign reasons    KIDNEY TRANSPLANT N/A 9/20/2019    Procedure: TRANSPLANT, KIDNEY;  Surgeon: Guero Rogers MD;  Location: [facility] OR [address] [place] [address];  Service: Transplant;  Laterality: N/A;    OOPHORECTOMY         Family History   Problem Relation Age of Onset    Stroke Mother     Kidney disease Mother         on dialysis    Hypertension Mother     Heart disease Mother     Heart disease Father     Stroke Sister     Kidney disease Brother     Breast cancer Daughter     Heart disease Sister     Ovarian cancer Cousin     Colon cancer Neg Hx     Thrombophilia Neg Hx        Social History     Socioeconomic History    Marital status: Single     Spouse name: Not on file    Number of children: Not on file    Years of education: Not on file    Highest education level: Not on file   Occupational History    Not on file   Social Needs    Financial resource strain: Not on file    Food insecurity:     Worry: Not on file     Inability: Not on file    Transportation needs:     Medical: Not on file     Non-medical: Not on file   Tobacco Use    Smoking status: Never Smoker    Smokeless tobacco: Never Used   Substance and Sexual Activity     Alcohol use: No    Drug use: No    Sexual activity: Never     Comment: single, Lives with daughter, on Disability, Lives in [place]   Lifestyle    Physical activity:     Days per week: Not on file     Minutes per session: Not on file    Stress: Not on file   Relationships    Social connections:     Talks on phone: Not on file     Gets together: Not on file     Attends Scientologist service: Not on file     Active member of club or organization: Not on file     Attends meetings of clubs or organizations: Not on file     Relationship status: Not on file   Other Topics Concern    Not on file   Social History Narrative    Disabled    Single    4 children    History of blood transfusions       Allergies:  Patient has no known allergies.    Medications:    Current Outpatient Medications:     acyclovir (ZOVIRAX) 400 MG tablet, Take 1 tablet (400 mg total) by mouth 2 (two) times daily. Stop 12/19/19, Disp: 60 tablet, Rfl: 2    atorvastatin (LIPITOR) 40 MG tablet, Take 40 mg by mouth once daily. , Disp: , Rfl:     atovaquone (MEPRON) 750 mg/5 mL Susp, Take 10 mLs (1,500 mg total) by mouth once daily. Stop 9/19/2020, Disp: 300 mL, Rfl: 11    bisacodyl (DULCOLAX) 5 mg EC tablet, Take 2 tablets (10 mg total) by mouth daily as needed for Constipation., Disp: , Rfl: 0    carvedilol (COREG) 25 MG tablet, Take 0.5 tablets (12.5 mg total) by mouth 2 (two) times daily with meals. Hold for SBP<120, HR<60, Disp: 30 tablet, Rfl: 1    cefadroxil (DURICEF) 500 MG Cap, Take 1 capsule (500 mg total) by mouth every 12 (twelve) hours. Stop 10/8/19 for 13 days, Disp: 26 capsule, Rfl: 0    docusate sodium (COLACE) 100 MG capsule, Take 1 capsule (100 mg total) by mouth 3 (three) times daily as needed for Constipation., Disp: , Rfl: 0    famotidine (PEPCID) 20 MG tablet, Take 1 tablet (20 mg total) by mouth every evening., Disp: 30 tablet, Rfl: 0    hydrALAZINE (APRESOLINE) 25 MG tablet, Take 1 tablet (25 mg total) by mouth 3 (three)  times daily., Disp: 90 tablet, Rfl: 11    ketoconazole (NIZORAL) 200 mg Tab, Take 0.5 tablets (100 mg total) by mouth once daily., Disp: 15 tablet, Rfl: 11    mycophenolate (CELLCEPT) 250 mg Cap, Take 2 capsules (500 mg total) by mouth 2 (two) times daily., Disp: 120 capsule, Rfl: 11    NIFEdipine (ADALAT CC) 60 MG TbSR, Take 1 tablet (60 mg total) by mouth 2 (two) times daily., Disp: 60 tablet, Rfl: 1    oxyCODONE-acetaminophen (PERCOCET) 5-325 mg per tablet, Take 1 tablet by mouth every 4 (four) hours as needed for Pain., Disp: 30 tablet, Rfl: 0    predniSONE (DELTASONE) 5 MG tablet, Take by mouth daily: 20mg (4 tablets) 9/23-10/22; 15 mg (3 tablets) 10/23-11/22; 10 mg (2 tablets) 11/23-12/22; then 5 mg (1 tablet) daily thereafter 12/23/19, Disp: 120 tablet, Rfl: 11    tacrolimus (PROGRAF) 1 MG Cap, Take 8 capsules (8 mg total) by mouth every 12 (twelve) hours., Disp: 480 capsule, Rfl: 11    PHYSICAL EXAMINATION:    Constitutional: She is oriented to person, place, and time. She appears well-developed and well-nourished.  She is in no apparent distress.    Neck: Normal ROM.     Cardiovascular: Regular rhythm.      Pulmonary/Chest: Effort normal. No respiratory distress.     Abdominal:  She exhibits no distension.  There is no CVA tenderness.   Surgical incision - staples intact. No signs of infection.    Lymphadenopathy:          Right: No supraclavicular adenopathy present.        Left: No supraclavicular adenopathy present.     Neurological: She is alert and oriented to person, place, and time.     Skin: Skin is warm and dry. LUE fistula.    Extremities: Normal ROM    Psych: Cooperative with normal affect.    Genitourinary: Indwelling rodriguez catheter in place.    Physical Exam      LABS:      IMPRESSION:    Encounter Diagnoses   Name Primary?    Urinary frequency Yes    Urinary incontinence, unspecified type     Urinary urgency     Encounter for Rodriguez catheter removal     Rodriguez catheter present         PLAN:  -Discussed with patient that she will most likely have frequency, urgency and incontinence once the catheter is removed. With having little output prior to transplant, unsure of baseline bladder function or possible OAB. Symptoms are most likely worse with stent in place. Last cysto resulted decreased bladder capacity.   -Lane catheter removed today in clinic by Nurse Alvares without difficulty.  -Continue good water intake.   Avoid Bladder Irritants: Tea, coffee, caffeine, alcohol, artificial sweeteners, citrus, spicy foods, acidic foods,chocolate, tomato-based foods, smoking  Avoid constipation  Timed voiding every 2 hours regardless of urge  -Will hold off on OAB (oxybutynin) at this time. If symptoms persist after stent removal, will consider starting on medication if ok with transplant team.  -RTC 10/10/19 for cysto with Dr. Davi SALMERON spent 45 minutes with the patient of which more than half was spent in coordinating the patient's care as well as in direct consultation with the patient in regards to our treatment and plan.

## 2019-10-03 NOTE — PROGRESS NOTES
Hypercalcemia --> start sensipar 30 mg daily. Cr increased - could be related to hypercalcemia but Prograf level also pending. Encourage hydration

## 2019-10-04 ENCOUNTER — TELEPHONE (OUTPATIENT)
Dept: PEDIATRIC UROLOGY | Facility: CLINIC | Age: 64
End: 2019-10-04

## 2019-10-04 PROBLEM — D84.9 IMMUNOCOMPROMISED STATE: Status: ACTIVE | Noted: 2019-10-04

## 2019-10-04 NOTE — PROGRESS NOTES
Kidney Post-Transplant Assessment    Referring Physician: Bruce Khan  Current Nephrologist: Bruce Khan    ORGAN: LEFT KIDNEY  Donor Type: donation after brain death  PHS Increased Risk: no  Cold Ischemia: 545 mins  Induction Medications: thymoglobulin    Subjective:     CC:  Reassessment of renal allograft function and management of chronic immunosuppression.    HPI:  Ms. Schulte is a 64 y.o. 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 Her most recent creatinine is 2. She takes mycophenolate mofetil and tacrolimus for maintenance immunosuppression. Her post transplant course has been complicated by leukopenia.    She is here with his son who is 30 yr-old and the only care giver at the present time    She refers decreased appetite, she is still at the .      No nausea vomit or diarrhea. She refers some times vague abdominal pain but is resolved. No diarrhea.     No chest pain. Able to walk up to 100 meters before she get tired.         Current Outpatient Medications on File Prior to Visit   Medication Sig Dispense Refill    acyclovir (ZOVIRAX) 400 MG tablet Take 1 tablet (400 mg total) by mouth 2 (two) times daily. Stop 12/19/19 60 tablet 2    atorvastatin (LIPITOR) 40 MG tablet Take 40 mg by mouth once daily.       atovaquone (MEPRON) 750 mg/5 mL Susp Take 10 mLs (1,500 mg total) by mouth once daily. Stop 9/19/2020 300 mL 11    bisacodyl (DULCOLAX) 5 mg EC tablet Take 2 tablets (10 mg total) by mouth daily as needed for Constipation.  0    carvedilol (COREG) 25 MG tablet Take 0.5 tablets (12.5 mg total) by mouth 2 (two) times daily with meals. Hold for SBP<120, HR<60 30 tablet 1    cefadroxil (DURICEF) 500 MG Cap Take 1 capsule (500 mg total) by mouth every 12 (twelve) hours. Stop 10/8/19 for 13 days 26 capsule 0    docusate sodium (COLACE) 100 MG capsule Take 1 capsule (100 mg total) by mouth 3 (three) times daily as needed  for Constipation.  0    famotidine (PEPCID) 20 MG tablet Take 1 tablet (20 mg total) by mouth every evening. 30 tablet 0    hydrALAZINE (APRESOLINE) 25 MG tablet Take 1 tablet (25 mg total) by mouth 3 (three) times daily. 90 tablet 11    ketoconazole (NIZORAL) 200 mg Tab Take 0.5 tablets (100 mg total) by mouth once daily. 15 tablet 11    mycophenolate (CELLCEPT) 250 mg Cap Take 2 capsules (500 mg total) by mouth 2 (two) times daily. 120 capsule 11    NIFEdipine (ADALAT CC) 60 MG TbSR Take 1 tablet (60 mg total) by mouth 2 (two) times daily. 60 tablet 1    oxyCODONE-acetaminophen (PERCOCET) 5-325 mg per tablet Take 1 tablet by mouth every 4 (four) hours as needed for Pain. 30 tablet 0    predniSONE (DELTASONE) 5 MG tablet Take by mouth daily: 20mg (4 tablets) 9/23-10/22; 15 mg (3 tablets) 10/23-11/22; 10 mg (2 tablets) 11/23-12/22; then 5 mg (1 tablet) daily thereafter 12/23/19 120 tablet 11    tacrolimus (PROGRAF) 1 MG Cap Take 7 capsules (7 mg total) by mouth every morning AND 6 capsules (6 mg total) every evening. 390 capsule 11    [DISCONTINUED] cinacalcet (SENSIPAR) 30 MG Tab Take 1 tablet (30 mg total) by mouth daily with breakfast. 30 tablet 11     No current facility-administered medications on file prior to visit.         Review of Systems     Skin: no skin rash  CNS; no headaches, blurred vision, seizure, or syncope  ENT: No JVD,  Adenopathies,  nasal congestion. No oral lesions  Cardiac: No chest pain, dyspnea, claudication, edema or palpitations  Respiratory: No SOB, cough, hemoptysis   Gastro-intestinal: No diarrhea, constipation, abdominal pain, nausea, vomit. No ascitis  Genitourinary: no hematuria, dysuria, frequency, frequency  Musculoskeletal: joint pain, arthritis or vasculitic changes  Psych: alert awake, oriented, No cranial nerves deficit.      Objective:       Physical Exam     BP (!) 156/47 (BP Location: Right arm, Patient Position: Sitting, BP Method: Medium (Automatic))   Pulse  "(!) 57   Temp 97.9 °F (36.6 °C) (Oral)   Resp 16   Ht 5' 3" (1.6 m)   Wt 88.6 kg (195 lb 5.2 oz)   SpO2 100%   BMI 34.60 kg/m²       Head: normocephalic  Neck: No JVD, cervical axillary, or femoral adenopathies  Heart: no murmurs, Normal s1 and s2, No gallops, no rubs, No murmurs  Lungs; CTA, good respiratory effort, no crackles  Abdomen: soft, non tender, no splenomegaly or hepatomegaly, no massess, no bruits. Incision is healing well No secretion  Extremities: No edema, skin rash, joint pain  SNC: awake, alert oriented. Cranial nerves are intact, no focalized, sensitivity and strength preserved      Labs:  Lab Results   Component Value Date    WBC 2.75 (L) 10/07/2019    HGB 8.6 (L) 10/07/2019    HCT 27.4 (L) 10/07/2019     10/07/2019    K 4.4 10/07/2019     10/07/2019    CO2 20 (L) 10/07/2019    BUN 28 (H) 10/07/2019    CREATININE 1.9 (H) 10/07/2019    EGFRNONAA 27.9 (A) 10/07/2019    CALCIUM 11.2 (H) 10/07/2019    PHOS 2.1 (L) 10/07/2019    MG 1.6 10/07/2019    ALBUMIN 3.5 10/07/2019    AST 17 09/21/2019    ALT 8 (L) 09/21/2019    UTPCR 1.66 (H) 09/20/2019    .0 (H) 09/19/2019    TACROLIMUS 28.0 (H) 10/07/2019       No results found for: EXTANC, EXTWBC, EXTSEGS, EXTPLATELETS, EXTHEMOGLOBI, EXTHEMATOCRI, EXTCREATININ, EXTSODIUM, EXTPOTASSIUM, EXTBUN, EXTCO2, EXTCALCIUM, EXTPHOSPHORU, EXTGLUCOSE, EXTALBUMIN, EXTAST, EXTALT, EXTBILITOTAL, EXTLIPASE, EXTAMYLASE    No results found for: EXTCYCLOSLVL, EXTSIROLIMUS, EXTTACROLVL, EXTPROTCRE, EXTPTHINTACT, EXTPROTEINUA, EXTWBCUA, EXTRBCUA    Labs were reviewed with the patient    Assessment:     1. Status post kidney transplant    2. Class 2 severe obesity due to excess calories with serious comorbidity in adult, unspecified BMI    3. At risk for opportunistic infections    4. Long-term use of immunosuppressant medication    5. Hypophosphatemia    6. Drug-induced pancytopenia    7. Hypertension, renal    8. Immunocompromised state    9. History of " stroke        Plan:    labs in am  Will maintain same MMF dose she is sensitized. If worsening leukopenia will adjust MMF dose to hold or 250 bid  Patient and care giver were advised to notify of BP was less than 120 or more than 150  Will hold prograf tonight and tomorrow and check a prograf level elgin ma to adjust and decide dose  Patient spoke with SW  Lane catheter was removed last week Thursday 11/4, she says that is able to void but still experience some episodes of incontinence. Last urine culture 10/3 was No growth, will repeat UA today   Continue with weekly CMV and Acyclovir for Herpes virus infection prevention   Education provided  All questions answered      1. CKD stage 3/2 with improved creatinine : will continue follow up as per our center guidelines. patient to continue close follow up with the local General nephrologist. Education provided in appropriate fluid intake, potassium intake. Continue with oral hydration.        2. Immunosuppression: no a true trough level, patient on keto and prograf 7 bid, will hold both and repeat labs in am  Lab Results   Component Value Date    TACROLIMUS 28.0 (H) 10/07/2019    TACROLIMUS 12.2 10/03/2019    TACROLIMUS 11.0 10/02/2019     No results found for: CYCLOSPORINE  @   Will closely monitor for toxicities, education provided about adherence to medicines and need to communicate any side effect to the transplant nurse or physician.    3. Allograft Function:stable at baseline for the patient. Continue follow up as per our guidelines and with the local General nephrologist. Communication will be sent today.  Lab Results   Component Value Date    CREATININE 1.9 (H) 10/07/2019    CREATININE 2.0 (H) 10/03/2019    CREATININE 1.8 (H) 10/02/2019     No results found for: AMYLASE, LIPASE    4. Hypertension management: very variable, will continue with the same regimen for now.  Continue with home blood pressure monitoring, low salt and healthy life discussed with the  patient..    5. Metabolic Bone Disease/Secondary Hyperparathyroidism:calcium and phosphorus level discussed with the patient, patient will continue follow up with the general nephrologist for management of metabolic bone disease   calcium and phosphorus as per our center protocol. Will monitor PTH, Vit D level, calcium.      Lab Results   Component Value Date    .0 (H) 09/19/2019    CALCIUM 11.2 (H) 10/07/2019    PHOS 2.1 (L) 10/07/2019    PHOS 2.8 10/03/2019    PHOS 3.1 10/02/2019       6. Electrolytes: reviewed with the patient, essentially within the normal range no need for acute changes today, will monitor as per our center guidelines.     Lab Results   Component Value Date     10/03/2019    K 4.4 10/03/2019     10/03/2019    CO2 23 10/03/2019    CO2 24 10/02/2019    CO2 27 10/01/2019       7. Anemia: Iropn stores showed some ferropenia with high ferritin. will continue monitoring as per our center guidelines. No indication for acute intervention today.     Lab Results   Component Value Date    WBC 5.02 10/03/2019    HGB 9.1 (L) 10/03/2019    HCT 28.8 (L) 10/03/2019    MCV 97 10/03/2019     10/03/2019       8.Proteinuria: will continue with pr/cr ratio as per our center guidelines  Lab Results   Component Value Date    PROTEINURINE 213 (H) 09/20/2019    CREATRANDUR 128.0 09/20/2019    UTPCR 1.66 (H) 09/20/2019        9. BK virus infection screening: will continue with urine or blood PCR as per our guidelines to prevent BK virus viremia and allograft dysfunction  No results found for: BKVIRUSDNAUR, BKQUANTURINE, BKVIRUSLOG, BKVIRUSURINE, BKVIRUSPCRQB      10. Weight education: provided during the clinic visit.   There is no height or weight on file to calculate BMI.       11.Patient safety education regarding immunosuppression including prophylaxis posttransplant for CMV, PCP : Education provided about vaccination and prevention of infections.    12.  Cytopenias: no significant  cytopenias will monitor as per our guidelines. Medicine list reviewed including potential causes of drug-induced cytopenias     Lab Results   Component Value Date    WBC 5.02 10/03/2019    HGB 9.1 (L) 10/03/2019    HCT 28.8 (L) 10/03/2019    MCV 97 10/03/2019     10/03/2019       13. Post-transplant Prophylaxis; CMV Infection, PJP and Candida mucosistis and other indicated for this particular patient. On acyclovir and weekly CMV pcr. She is considered high risk. Last 2 serum CMV were undetectable.     I spoke with the patient for 30 minutes. More than half dedicated to counseling and education. All questions answered    Pavel Padilla MD  Transplant Nephrology            Follow-up:   Clinic: return to transplant clinic weekly for the first month after transplant; every 2 weeks during months 2-3; then at 6-, 9-, 12-, 18-, 24-, and 36- months post-transplant to reassess for complications from immunosuppression toxicity and monitor for rejection.  Annually thereafter.    Labs: since patient remains at high risk for rejection and drug-related complications that warrant close monitoring, labs will be ordered as follows: continue twice weekly CBC, renal panel, and drug level for first month; then same labs once weekly through 3rd month post-transplant.  Urine for UA and protein/creatinine ratio monthly.  Serum BK - PCR at 1-, 3-, 6-, 9-, 12-, 18-, 24-, 36-, 48-, and 60 months post-transplant.  Hepatic panel at 1-, 2-, 3-, 6-, 9-, 12-, 18-, 24-, and 36- months post-transplant.    Pavel Padilla MD       Education:   Material provided to the patient.  Patient reminded to call with any health changes since these can be early signs of significant complications.  Also, I advised the patient to be sure any new medications or changes of old medications are discussed with either a pharmacist or physician knowledgeable with transplant to avoid rejection/drug toxicity related to significant drug interactions.

## 2019-10-07 ENCOUNTER — LAB VISIT (OUTPATIENT)
Dept: LAB | Facility: HOSPITAL | Age: 64
End: 2019-10-07
Attending: INTERNAL MEDICINE
Payer: MEDICARE

## 2019-10-07 ENCOUNTER — SOCIAL WORK (OUTPATIENT)
Dept: TRANSPLANT | Facility: CLINIC | Age: 64
End: 2019-10-07

## 2019-10-07 ENCOUNTER — OFFICE VISIT (OUTPATIENT)
Dept: TRANSPLANT | Facility: CLINIC | Age: 64
End: 2019-10-07
Payer: MEDICARE

## 2019-10-07 VITALS
TEMPERATURE: 98 F | SYSTOLIC BLOOD PRESSURE: 156 MMHG | RESPIRATION RATE: 16 BRPM | HEART RATE: 57 BPM | BODY MASS INDEX: 34.61 KG/M2 | DIASTOLIC BLOOD PRESSURE: 47 MMHG | WEIGHT: 195.31 LBS | HEIGHT: 63 IN | OXYGEN SATURATION: 100 %

## 2019-10-07 DIAGNOSIS — Z94.0 KIDNEY REPLACED BY TRANSPLANT: ICD-10-CM

## 2019-10-07 DIAGNOSIS — E83.39 HYPOPHOSPHATEMIA: ICD-10-CM

## 2019-10-07 DIAGNOSIS — Z79.60 LONG-TERM USE OF IMMUNOSUPPRESSANT MEDICATION: ICD-10-CM

## 2019-10-07 DIAGNOSIS — Z86.73 HISTORY OF STROKE: ICD-10-CM

## 2019-10-07 DIAGNOSIS — E66.01 CLASS 2 SEVERE OBESITY DUE TO EXCESS CALORIES WITH SERIOUS COMORBIDITY IN ADULT, UNSPECIFIED BMI: Chronic | ICD-10-CM

## 2019-10-07 DIAGNOSIS — I12.9 HYPERTENSION, RENAL: Chronic | ICD-10-CM

## 2019-10-07 DIAGNOSIS — E83.52 HYPERCALCEMIA: ICD-10-CM

## 2019-10-07 DIAGNOSIS — N25.81 SECONDARY HYPERPARATHYROIDISM: ICD-10-CM

## 2019-10-07 DIAGNOSIS — D61.811 DRUG-INDUCED PANCYTOPENIA: ICD-10-CM

## 2019-10-07 DIAGNOSIS — Z94.0 STATUS POST KIDNEY TRANSPLANT: Primary | ICD-10-CM

## 2019-10-07 DIAGNOSIS — Z91.89 AT RISK FOR OPPORTUNISTIC INFECTIONS: ICD-10-CM

## 2019-10-07 DIAGNOSIS — D84.9 IMMUNOCOMPROMISED STATE: ICD-10-CM

## 2019-10-07 LAB
ALBUMIN SERPL BCP-MCNC: 3.5 G/DL (ref 3.5–5.2)
ANION GAP SERPL CALC-SCNC: 9 MMOL/L (ref 8–16)
BASOPHILS # BLD AUTO: 0.02 K/UL (ref 0–0.2)
BASOPHILS NFR BLD: 0.7 % (ref 0–1.9)
BUN SERPL-MCNC: 28 MG/DL (ref 8–23)
CALCIUM SERPL-MCNC: 11.2 MG/DL (ref 8.7–10.5)
CHLORIDE SERPL-SCNC: 110 MMOL/L (ref 95–110)
CO2 SERPL-SCNC: 20 MMOL/L (ref 23–29)
CREAT SERPL-MCNC: 1.9 MG/DL (ref 0.5–1.4)
DIFFERENTIAL METHOD: ABNORMAL
EOSINOPHIL # BLD AUTO: 0.1 K/UL (ref 0–0.5)
EOSINOPHIL NFR BLD: 4.4 % (ref 0–8)
ERYTHROCYTE [DISTWIDTH] IN BLOOD BY AUTOMATED COUNT: 16.2 % (ref 11.5–14.5)
EST. GFR  (AFRICAN AMERICAN): 32.1 ML/MIN/1.73 M^2
EST. GFR  (NON AFRICAN AMERICAN): 27.9 ML/MIN/1.73 M^2
GLUCOSE SERPL-MCNC: 168 MG/DL (ref 70–110)
HCT VFR BLD AUTO: 27.4 % (ref 37–48.5)
HGB BLD-MCNC: 8.6 G/DL (ref 12–16)
IMM GRANULOCYTES # BLD AUTO: 0.04 K/UL (ref 0–0.04)
IMM GRANULOCYTES NFR BLD AUTO: 1.5 % (ref 0–0.5)
LYMPHOCYTES # BLD AUTO: 0.2 K/UL (ref 1–4.8)
LYMPHOCYTES NFR BLD: 6.5 % (ref 18–48)
MAGNESIUM SERPL-MCNC: 1.6 MG/DL (ref 1.6–2.6)
MCH RBC QN AUTO: 31 PG (ref 27–31)
MCHC RBC AUTO-ENTMCNC: 31.4 G/DL (ref 32–36)
MCV RBC AUTO: 99 FL (ref 82–98)
MONOCYTES # BLD AUTO: 0.2 K/UL (ref 0.3–1)
MONOCYTES NFR BLD: 8.4 % (ref 4–15)
NEUTROPHILS # BLD AUTO: 2.2 K/UL (ref 1.8–7.7)
NEUTROPHILS NFR BLD: 78.5 % (ref 38–73)
NRBC BLD-RTO: 0 /100 WBC
PHOSPHATE SERPL-MCNC: 2.1 MG/DL (ref 2.7–4.5)
PLATELET # BLD AUTO: 173 K/UL (ref 150–350)
PMV BLD AUTO: 10.7 FL (ref 9.2–12.9)
POTASSIUM SERPL-SCNC: 4.4 MMOL/L (ref 3.5–5.1)
RBC # BLD AUTO: 2.77 M/UL (ref 4–5.4)
SODIUM SERPL-SCNC: 139 MMOL/L (ref 136–145)
TACROLIMUS BLD-MCNC: 28 NG/ML (ref 5–15)
WBC # BLD AUTO: 2.75 K/UL (ref 3.9–12.7)

## 2019-10-07 PROCEDURE — 99215 PR OFFICE/OUTPT VISIT, EST, LEVL V, 40-54 MIN: ICD-10-PCS | Mod: S$PBB,,, | Performed by: INTERNAL MEDICINE

## 2019-10-07 PROCEDURE — 36415 COLL VENOUS BLD VENIPUNCTURE: CPT

## 2019-10-07 PROCEDURE — 83735 ASSAY OF MAGNESIUM: CPT

## 2019-10-07 PROCEDURE — 85025 COMPLETE CBC W/AUTO DIFF WBC: CPT

## 2019-10-07 PROCEDURE — 80069 RENAL FUNCTION PANEL: CPT

## 2019-10-07 PROCEDURE — 99215 OFFICE O/P EST HI 40 MIN: CPT | Mod: S$PBB,,, | Performed by: INTERNAL MEDICINE

## 2019-10-07 PROCEDURE — 80197 ASSAY OF TACROLIMUS: CPT

## 2019-10-07 PROCEDURE — 99214 OFFICE O/P EST MOD 30 MIN: CPT | Mod: PBBFAC | Performed by: INTERNAL MEDICINE

## 2019-10-07 PROCEDURE — 99999 PR PBB SHADOW E&M-EST. PATIENT-LVL IV: ICD-10-PCS | Mod: PBBFAC,,, | Performed by: INTERNAL MEDICINE

## 2019-10-07 PROCEDURE — 99999 PR PBB SHADOW E&M-EST. PATIENT-LVL IV: CPT | Mod: PBBFAC,,, | Performed by: INTERNAL MEDICINE

## 2019-10-07 RX ORDER — HEPARIN 100 UNIT/ML
500 SYRINGE INTRAVENOUS
Status: CANCELLED | OUTPATIENT
Start: 2019-10-07

## 2019-10-07 RX ORDER — CINACALCET 60 MG/1
60 TABLET, FILM COATED ORAL
Qty: 30 TABLET | Refills: 11 | Status: SHIPPED | OUTPATIENT
Start: 2019-10-07 | End: 2019-12-03 | Stop reason: SDUPTHER

## 2019-10-07 RX ORDER — SODIUM CHLORIDE 0.9 % (FLUSH) 0.9 %
10 SYRINGE (ML) INJECTION
Status: CANCELLED | OUTPATIENT
Start: 2019-10-07

## 2019-10-07 NOTE — PROGRESS NOTES
SW provided pt's son with two dental clinic contacts:  [facility] [phone] and [facility] [phone].  Pt verbalized understanding of and appreciation for info provided.

## 2019-10-07 NOTE — PROGRESS NOTES
" VIKY met with pt and caregiver, pt's son in clinic.  Pt is about two weeks from transplant and is here for routine follow up.  Pt states she is staying locally at the [facility].  Son stated his siblings have not provided any substantive assistance and he is pt's primary caregiver.  He stated he struggles with text notifications for appointments, stating he is confused as to what is the order of appts.  He stated he feels he's doing well with med administration, but "it's a lot on me." He also said he has a "bad toothache".  SW provided education regarding the order of appointments and utilizing coordinator to resolve confusion.  Pt's son verbalized understanding.  Pt contributed little to the visit, stating she has no concerns.  She states she is using [facility] and is affording all of her medications.  No further needs identified at this time.  SW remains available.  "

## 2019-10-07 NOTE — LETTER
October 7, 2019        Bruce Khan  [address]  [place] [address]  Phone: [phone]  Fax: [phone]             [facility]- Transplant  [address]  [facility] [address]  Phone: [phone]   Patient: Satinder Schulte   MR Number: [id number]   YOB: 1955   Date of Visit: 10/7/2019       Dear Dr. Bruce Khan    Thank you for referring Satinder Schulte to me for evaluation. Attached you will find relevant portions of my assessment and plan of care.    If you have questions, please do not hesitate to call me. I look forward to following Satinder Schulte along with you.    Sincerely,    Pavel Padilla MD    Enclosure    If you would like to receive this communication electronically, please contact [email or web address] or [phone] to request [employer] Link access.    [employer] Link is a tool which provides read-only access to select patient information with whom you have a relationship. Its easy to use and provides real time access to review your patients record including encounter summaries, notes, results, and demographic information.    If you feel you have received this communication in error or would no longer like to receive these types of communications, please e-mail [email or web address]

## 2019-10-07 NOTE — PROGRESS NOTES
Please plan for IV fluids 2 Lt NSS due to elevated calcium'  please verify she is on no calcium or VIT D supplements  Increase sensipar to 60 mg daily

## 2019-10-08 ENCOUNTER — INFUSION (OUTPATIENT)
Dept: INFECTIOUS DISEASES | Facility: HOSPITAL | Age: 64
End: 2019-10-08
Attending: INTERNAL MEDICINE
Payer: MEDICARE

## 2019-10-08 VITALS
DIASTOLIC BLOOD PRESSURE: 90 MMHG | WEIGHT: 188.69 LBS | OXYGEN SATURATION: 100 % | RESPIRATION RATE: 18 BRPM | TEMPERATURE: 98 F | SYSTOLIC BLOOD PRESSURE: 185 MMHG | HEART RATE: 61 BPM | BODY MASS INDEX: 33.43 KG/M2

## 2019-10-08 DIAGNOSIS — Z79.60 LONG-TERM USE OF IMMUNOSUPPRESSANT MEDICATION: ICD-10-CM

## 2019-10-08 DIAGNOSIS — E83.52 HYPERCALCEMIA: Primary | ICD-10-CM

## 2019-10-08 DIAGNOSIS — Z29.89 PROPHYLACTIC IMMUNOTHERAPY: ICD-10-CM

## 2019-10-08 DIAGNOSIS — Z94.0 STATUS POST KIDNEY TRANSPLANT: ICD-10-CM

## 2019-10-08 DIAGNOSIS — E83.39 HYPOPHOSPHATEMIA: Primary | ICD-10-CM

## 2019-10-08 LAB — CMV DNA SERPL NAA+PROBE-ACNC: NORMAL IU/ML

## 2019-10-08 PROCEDURE — 96361 HYDRATE IV INFUSION ADD-ON: CPT

## 2019-10-08 PROCEDURE — 63600175 PHARM REV CODE 636 W HCPCS: Performed by: INTERNAL MEDICINE

## 2019-10-08 PROCEDURE — 96360 HYDRATION IV INFUSION INIT: CPT

## 2019-10-08 RX ORDER — HEPARIN 100 UNIT/ML
500 SYRINGE INTRAVENOUS
Status: CANCELLED | OUTPATIENT
Start: 2019-10-08

## 2019-10-08 RX ORDER — SODIUM CHLORIDE 0.9 % (FLUSH) 0.9 %
10 SYRINGE (ML) INJECTION
Status: CANCELLED | OUTPATIENT
Start: 2019-10-08

## 2019-10-08 RX ORDER — KETOCONAZOLE 200 MG/1
100 TABLET ORAL DAILY
Qty: 15 TABLET | Refills: 11 | Status: SHIPPED | OUTPATIENT
Start: 2019-10-08 | End: 2019-12-02 | Stop reason: SDUPTHER

## 2019-10-08 RX ORDER — TACROLIMUS 1 MG/1
CAPSULE ORAL
Qty: 300 CAPSULE | Refills: 11 | Status: SHIPPED | OUTPATIENT
Start: 2019-10-08 | End: 2019-10-10

## 2019-10-08 RX ADMIN — SODIUM CHLORIDE 2000 ML: 0.9 INJECTION, SOLUTION INTRAVENOUS at 11:10

## 2019-10-08 NOTE — PROGRESS NOTES
Pt arrived to Infusion unit, BP elevated,  Encouraged to take BP meds for this am, Adalat 60 mg, Coreg 25 mg, and Hydralazine 25 mg po., took per self @ 1125, will recheck BP around 12 noon.      Pt received NS x 2 liter over 4 hrs., tolerated well. Left unit in NAD via w/c.

## 2019-10-10 ENCOUNTER — TELEPHONE (OUTPATIENT)
Dept: TRANSPLANT | Facility: HOSPITAL | Age: 64
End: 2019-10-10

## 2019-10-10 ENCOUNTER — OFFICE VISIT (OUTPATIENT)
Dept: TRANSPLANT | Facility: CLINIC | Age: 64
End: 2019-10-10
Payer: MEDICARE

## 2019-10-10 ENCOUNTER — HOSPITAL ENCOUNTER (OUTPATIENT)
Dept: UROLOGY | Facility: HOSPITAL | Age: 64
Discharge: HOME | End: 2019-10-10
Attending: TRANSPLANT SURGERY
Payer: MEDICARE

## 2019-10-10 VITALS
TEMPERATURE: 98 F | RESPIRATION RATE: 17 BRPM | HEIGHT: 63 IN | DIASTOLIC BLOOD PRESSURE: 79 MMHG | SYSTOLIC BLOOD PRESSURE: 169 MMHG | BODY MASS INDEX: 33.43 KG/M2 | HEART RATE: 62 BPM | WEIGHT: 188.69 LBS

## 2019-10-10 DIAGNOSIS — Z94.0 STATUS POST KIDNEY TRANSPLANT: ICD-10-CM

## 2019-10-10 DIAGNOSIS — Z94.0 KIDNEY REPLACED BY TRANSPLANT: ICD-10-CM

## 2019-10-10 DIAGNOSIS — Z29.89 PROPHYLACTIC IMMUNOTHERAPY: ICD-10-CM

## 2019-10-10 DIAGNOSIS — T81.30XA SUPERFICIAL DEHISCENCE OF WOUND: ICD-10-CM

## 2019-10-10 DIAGNOSIS — Z94.0 S/P KIDNEY TRANSPLANT: Primary | ICD-10-CM

## 2019-10-10 DIAGNOSIS — Z79.60 LONG-TERM USE OF IMMUNOSUPPRESSANT MEDICATION: ICD-10-CM

## 2019-10-10 PROCEDURE — 99499 NO LOS: ICD-10-PCS | Mod: S$PBB,,, | Performed by: TRANSPLANT SURGERY

## 2019-10-10 PROCEDURE — 99499 UNLISTED E&M SERVICE: CPT | Mod: S$PBB,,, | Performed by: TRANSPLANT SURGERY

## 2019-10-10 PROCEDURE — 52310 PR CYSTOSCOPY,REMV CALCULUS,SIMPLE: ICD-10-PCS | Mod: ,,, | Performed by: UROLOGY

## 2019-10-10 PROCEDURE — 52310 CYSTOSCOPY AND TREATMENT: CPT

## 2019-10-10 PROCEDURE — 52310 CYSTOSCOPY AND TREATMENT: CPT | Mod: ,,, | Performed by: UROLOGY

## 2019-10-10 RX ORDER — DOXYCYCLINE HYCLATE 100 MG
100 TABLET ORAL
Status: COMPLETED | OUTPATIENT
Start: 2019-10-10 | End: 2019-10-10

## 2019-10-10 RX ORDER — TACROLIMUS 1 MG/1
CAPSULE ORAL
Qty: 330 CAPSULE | Refills: 11 | Status: SHIPPED | OUTPATIENT
Start: 2019-10-10 | End: 2019-10-14 | Stop reason: SDUPTHER

## 2019-10-10 RX ORDER — LIDOCAINE HYDROCHLORIDE 20 MG/ML
JELLY TOPICAL
Status: COMPLETED | OUTPATIENT
Start: 2019-10-10 | End: 2019-10-10

## 2019-10-10 RX ADMIN — Medication 100 MG: at 11:10

## 2019-10-10 RX ADMIN — LIDOCAINE HYDROCHLORIDE: 20 JELLY TOPICAL at 10:10

## 2019-10-10 NOTE — TELEPHONE ENCOUNTER
----- Message from Pavel Padilla MD sent at 10/10/2019 11:41 AM CDT -----  Please increase prograf to 6/5 repeat labs 10/14

## 2019-10-10 NOTE — PATIENT INSTRUCTIONS

## 2019-10-10 NOTE — PROCEDURES
Date of Procedure: 10/10/2019    Procedure:                                                                        Cystourethroscopy with Successful Removal of transplant kidney Ureteral Stent(s)                                                                                    Pre-OP Diagnosis:                                                                transplant kidney ureteral stent                                 Post-OP Diagnosis:                                                                same                              Anesthesia:                                                                       Anesthesia Administered:                                                     Intraurethral instillation of 10 mL 2% lidocaine (Xylocaine) jelly.     Findings:                                                                         Double J stent in the ureteral orifice.                                - The stent is not encrusted.                                           Description of Procedure:                                                         Informed Consent:                                                            - Risks, benefits and alternatives of procedure discussed with               patient and informed consent obtained.                                       Patient Position:                                                            - Supine. Careful padding and pressure point care performed.                 Prep and Drape:                                                              - Patient prepped and draped in usual sterile fashion using povidone         iodine (Betadine).                                                           Instruments:                                                                 - Alligator forceps.                                                         - Flexible Cystoscope: With 0 degree lens 16 Fr.                             Procedure Details:                                                            - Cystoscope passed under vision into bladder.                               - Bladder and urethra examined in their entirety with findings as            above.                                                                       - Ureteral stent visualized in bladder, grasped and removed.            Complications:                                                                    No immediate complications.                                             Post-OP Plan:                                                                    Kidney transplant team

## 2019-10-10 NOTE — TELEPHONE ENCOUNTER
VIKY spoke with pt's daughter, Niya Schulte (ph# [phone]) regarding [place]. Pt's daughter reports pt is being kicked out of [facility] right now. SW guaranteed reservation would be extended for two more weeks and pt would not be forced to leave . Pt's daughter also reports pt's friend, Sarah (ph# [phone]) is assisting as caregiver with pt right now. VIKY also spoke with daughter about pt's son's compliance with coming to appointments and helping pt as primary caregiver. Pt's daughter verbalized understanding importance of role and speaking with pt's son about it.     VIKY remains available at [phone].

## 2019-10-10 NOTE — PROGRESS NOTES
Patient with wound drainage. Clear, started this am, from lower part of wound  Normal uop, feeelign well otherwise    Wound intact.  Lower aspect with small separation between staples. Serous drainage.  Wound probed with qtip and more serous drainage returned.    Dressings placed.   Needs to come to clinic for recheck on Wednesday.     CRESCENCIO

## 2019-10-14 ENCOUNTER — LAB VISIT (OUTPATIENT)
Dept: LAB | Facility: HOSPITAL | Age: 64
End: 2019-10-14
Attending: INTERNAL MEDICINE
Payer: MEDICARE

## 2019-10-14 DIAGNOSIS — Z94.0 STATUS POST KIDNEY TRANSPLANT: ICD-10-CM

## 2019-10-14 DIAGNOSIS — Z94.0 KIDNEY REPLACED BY TRANSPLANT: ICD-10-CM

## 2019-10-14 DIAGNOSIS — Z29.89 PROPHYLACTIC IMMUNOTHERAPY: ICD-10-CM

## 2019-10-14 DIAGNOSIS — Z79.60 LONG-TERM USE OF IMMUNOSUPPRESSANT MEDICATION: ICD-10-CM

## 2019-10-14 LAB
ALBUMIN SERPL BCP-MCNC: 3.8 G/DL (ref 3.5–5.2)
ANION GAP SERPL CALC-SCNC: 8 MMOL/L (ref 8–16)
BASOPHILS # BLD AUTO: 0.01 K/UL (ref 0–0.2)
BASOPHILS NFR BLD: 0.3 % (ref 0–1.9)
BUN SERPL-MCNC: 23 MG/DL (ref 8–23)
CALCIUM SERPL-MCNC: 10.6 MG/DL (ref 8.7–10.5)
CHLORIDE SERPL-SCNC: 109 MMOL/L (ref 95–110)
CO2 SERPL-SCNC: 23 MMOL/L (ref 23–29)
CREAT SERPL-MCNC: 1.9 MG/DL (ref 0.5–1.4)
DIFFERENTIAL METHOD: ABNORMAL
EOSINOPHIL # BLD AUTO: 0.1 K/UL (ref 0–0.5)
EOSINOPHIL NFR BLD: 4.7 % (ref 0–8)
ERYTHROCYTE [DISTWIDTH] IN BLOOD BY AUTOMATED COUNT: 16.6 % (ref 11.5–14.5)
EST. GFR  (AFRICAN AMERICAN): 32.1 ML/MIN/1.73 M^2
EST. GFR  (NON AFRICAN AMERICAN): 27.9 ML/MIN/1.73 M^2
GLUCOSE SERPL-MCNC: 104 MG/DL (ref 70–110)
HCT VFR BLD AUTO: 26.1 % (ref 37–48.5)
HGB BLD-MCNC: 8.1 G/DL (ref 12–16)
IMM GRANULOCYTES # BLD AUTO: 0.03 K/UL (ref 0–0.04)
IMM GRANULOCYTES NFR BLD AUTO: 1 % (ref 0–0.5)
LYMPHOCYTES # BLD AUTO: 0.3 K/UL (ref 1–4.8)
LYMPHOCYTES NFR BLD: 11.4 % (ref 18–48)
MAGNESIUM SERPL-MCNC: 1.3 MG/DL (ref 1.6–2.6)
MCH RBC QN AUTO: 30.1 PG (ref 27–31)
MCHC RBC AUTO-ENTMCNC: 31 G/DL (ref 32–36)
MCV RBC AUTO: 97 FL (ref 82–98)
MONOCYTES # BLD AUTO: 0.3 K/UL (ref 0.3–1)
MONOCYTES NFR BLD: 9.7 % (ref 4–15)
NEUTROPHILS # BLD AUTO: 2.2 K/UL (ref 1.8–7.7)
NEUTROPHILS NFR BLD: 72.9 % (ref 38–73)
NRBC BLD-RTO: 0 /100 WBC
PHOSPHATE SERPL-MCNC: 2.1 MG/DL (ref 2.7–4.5)
PLATELET # BLD AUTO: 173 K/UL (ref 150–350)
PMV BLD AUTO: 11.5 FL (ref 9.2–12.9)
POTASSIUM SERPL-SCNC: 4.2 MMOL/L (ref 3.5–5.1)
RBC # BLD AUTO: 2.69 M/UL (ref 4–5.4)
SODIUM SERPL-SCNC: 140 MMOL/L (ref 136–145)
TACROLIMUS BLD-MCNC: 6.5 NG/ML (ref 5–15)
WBC # BLD AUTO: 2.99 K/UL (ref 3.9–12.7)

## 2019-10-14 PROCEDURE — 85025 COMPLETE CBC W/AUTO DIFF WBC: CPT

## 2019-10-14 PROCEDURE — 80197 ASSAY OF TACROLIMUS: CPT

## 2019-10-14 PROCEDURE — 83735 ASSAY OF MAGNESIUM: CPT

## 2019-10-14 PROCEDURE — 80069 RENAL FUNCTION PANEL: CPT

## 2019-10-14 RX ORDER — LANOLIN ALCOHOL/MO/W.PET/CERES
400 CREAM (GRAM) TOPICAL DAILY
Qty: 30 TABLET | Refills: 11 | Status: SHIPPED | OUTPATIENT
Start: 2019-10-14 | End: 2019-10-22 | Stop reason: SDUPTHER

## 2019-10-14 RX ORDER — TACROLIMUS 1 MG/1
CAPSULE ORAL
Qty: 390 CAPSULE | Refills: 11 | Status: SHIPPED | OUTPATIENT
Start: 2019-10-14 | End: 2019-10-17 | Stop reason: SDUPTHER

## 2019-10-14 NOTE — TELEPHONE ENCOUNTER
Patient Son Dany repeated back and voice a understanding of orders.  Patient not drinking 2 liters of H2O daily.  Denies any additional supplements .

## 2019-10-14 NOTE — TELEPHONE ENCOUNTER
----- Message from Pavel Padilla MD sent at 10/14/2019  2:21 PM CDT -----  Please increase prograf to 7/6 assuming this is a true trough level.   Please make sure she is taking sensipar correctly  Please make sure she is not taking calcium or Vit D supplements or any OTC with calcium or Vit D   Hydration   Start magnesium oxide 400 bid

## 2019-10-15 LAB — CMV DNA SERPL NAA+PROBE-ACNC: NORMAL IU/ML

## 2019-10-16 ENCOUNTER — OFFICE VISIT (OUTPATIENT)
Dept: TRANSPLANT | Facility: CLINIC | Age: 64
End: 2019-10-16
Payer: MEDICARE

## 2019-10-16 VITALS
SYSTOLIC BLOOD PRESSURE: 143 MMHG | HEIGHT: 63 IN | RESPIRATION RATE: 18 BRPM | OXYGEN SATURATION: 100 % | TEMPERATURE: 99 F | BODY MASS INDEX: 33.48 KG/M2 | HEART RATE: 60 BPM | WEIGHT: 188.94 LBS | DIASTOLIC BLOOD PRESSURE: 73 MMHG

## 2019-10-16 DIAGNOSIS — Z91.89 AT RISK FOR OPPORTUNISTIC INFECTIONS: ICD-10-CM

## 2019-10-16 DIAGNOSIS — Z29.89 PROPHYLACTIC IMMUNOTHERAPY: Primary | ICD-10-CM

## 2019-10-16 DIAGNOSIS — Z94.0 KIDNEY REPLACED BY TRANSPLANT: ICD-10-CM

## 2019-10-16 DIAGNOSIS — Z94.0 STATUS POST KIDNEY TRANSPLANT: ICD-10-CM

## 2019-10-16 DIAGNOSIS — Z79.60 LONG-TERM USE OF IMMUNOSUPPRESSANT MEDICATION: ICD-10-CM

## 2019-10-16 DIAGNOSIS — Z51.81 ENCOUNTER FOR THERAPEUTIC DRUG MONITORING: ICD-10-CM

## 2019-10-16 DIAGNOSIS — Z79.899 ENCOUNTER FOR LONG-TERM (CURRENT) USE OF OTHER MEDICATIONS: ICD-10-CM

## 2019-10-16 DIAGNOSIS — D84.9 IMMUNOCOMPROMISED STATE: ICD-10-CM

## 2019-10-16 PROBLEM — T81.30XA SUPERFICIAL DEHISCENCE OF WOUND: Status: RESOLVED | Noted: 2019-10-10 | Resolved: 2019-10-16

## 2019-10-16 PROCEDURE — 99214 PR OFFICE/OUTPT VISIT, EST, LEVL IV, 30-39 MIN: ICD-10-PCS | Mod: 24,S$PBB,, | Performed by: TRANSPLANT SURGERY

## 2019-10-16 PROCEDURE — 99999 PR PBB SHADOW E&M-EST. PATIENT-LVL IV: ICD-10-PCS | Mod: PBBFAC,,,

## 2019-10-16 PROCEDURE — 99214 OFFICE O/P EST MOD 30 MIN: CPT | Mod: 24,S$PBB,, | Performed by: TRANSPLANT SURGERY

## 2019-10-16 PROCEDURE — 99214 OFFICE O/P EST MOD 30 MIN: CPT | Mod: PBBFAC

## 2019-10-16 PROCEDURE — 99999 PR PBB SHADOW E&M-EST. PATIENT-LVL IV: CPT | Mod: PBBFAC,,,

## 2019-10-16 NOTE — PROGRESS NOTES
Transplant Surgery  Kidney Transplant Recipient Follow-up    Referring Physician: Bruce Khan  Current Nephrologist: Bruce Khan    Subjective:     Chief Complaint: Satinder Schulte is a 64 y.o. year old Black or  female who is status post Kidney transplant performed on 9/20/2019.    ORGAN: LEFT KIDNEY   Disease Etiology: Hypertensive Nephrosclerosis  Donor Type: Donation after Brain Death   Donor CMV Status: Negative   Donor HBcAB: Positive   Donor HCV Status: Negative     History of Present Illness: She reports no concerns.  From a transplant perspective, she reports normal urination.  Satinder is here for management of her immunosuppression medication.  Satinder states that her immunosuppression is being well tolerated.  Hypertension is not present.    Review of Systems   Constitutional: Negative for activity change, appetite change, chills, fatigue and fever.   HENT: Negative for sore throat and trouble swallowing.    Eyes: Negative for visual disturbance.   Respiratory: Negative for cough, chest tightness and shortness of breath.    Cardiovascular: Negative for chest pain, palpitations and leg swelling.   Gastrointestinal: Negative for abdominal distention, abdominal pain, blood in stool, constipation, diarrhea, nausea and vomiting.   Endocrine: Negative for polyuria.   Genitourinary: Negative for decreased urine volume, difficulty urinating, dysuria, flank pain, frequency and hematuria.   Musculoskeletal: Negative for gait problem, myalgias and neck stiffness.   Skin: Negative for rash and wound.   Neurological: Negative for dizziness, tremors, seizures, weakness, light-headedness and headaches.   Hematological: Negative for adenopathy.   Psychiatric/Behavioral: Negative for agitation, confusion and sleep disturbance.       Objective:     Physical Exam   Constitutional: She is oriented to person, place, and time. She appears well-developed and well-nourished. No distress.   HENT:   Head:  Normocephalic.   Mouth/Throat: Oropharynx is clear and moist.   Eyes: Pupils are equal, round, and reactive to light. Conjunctivae are normal. No scleral icterus.   Neck: Normal range of motion. Neck supple. No tracheal deviation present. No thyromegaly present.   Cardiovascular: Normal rate, regular rhythm, normal heart sounds and intact distal pulses.   No murmur heard.  Pulmonary/Chest: Effort normal and breath sounds normal. No respiratory distress.   No supraclavicular adenopathy   Abdominal: Soft. Bowel sounds are normal. She exhibits no distension and no mass. There is no tenderness. There is no rebound and no guarding.   No inguinal adenopathy   Musculoskeletal: Normal range of motion. She exhibits no edema.   No clubbing or cyanosis   Lymphadenopathy:     She has no cervical adenopathy.   Neurological: She is alert and oriented to person, place, and time.   Skin: Skin is warm and dry. No rash noted. No erythema.   Psychiatric: She has a normal mood and affect. Her behavior is normal.   Vitals reviewed.    Lab Results   Component Value Date    CREATININE 1.9 (H) 10/14/2019    BUN 23 10/14/2019     Lab Results   Component Value Date    WBC 2.99 (L) 10/14/2019    HGB 8.1 (L) 10/14/2019    HCT 26.1 (L) 10/14/2019    HCT 23 (L) 09/20/2019     10/14/2019     Lab Results   Component Value Date    TACROLIMUS 6.5 10/14/2019         Assessment and Plan:        · S/P Kidney transplant.  · Chronic immunosuppressive medications for rejection prophylaxis at target.  Plan: no adjustment needed.  · Continue monitoring symptoms, labs and drug levels for drug-related toxicity and side effects.  · Renal hypertension at target.  · Incision clean, dry. Remove staples today.    Follow-up: Patient reminded to call with any health changes, since these can be early signs of significant complications.  Also, I advised the patient to be sure any new medications or changes of old medications are discussed with either a  pharmacist, or physician knowledgeable with transplant to avoid rejection/drug toxicity related to significant drug interactions.    Roxana Levin MD       [facility] Patient Status  Functional Status: 60% - Requires occasional assistance but is able to care for needs  Physical Capacity: No Limitations

## 2019-10-17 ENCOUNTER — LAB VISIT (OUTPATIENT)
Dept: LAB | Facility: HOSPITAL | Age: 64
End: 2019-10-17
Attending: INTERNAL MEDICINE
Payer: MEDICARE

## 2019-10-17 DIAGNOSIS — Z94.0 KIDNEY REPLACED BY TRANSPLANT: ICD-10-CM

## 2019-10-17 DIAGNOSIS — Z79.60 LONG-TERM USE OF IMMUNOSUPPRESSANT MEDICATION: ICD-10-CM

## 2019-10-17 DIAGNOSIS — Z29.89 PROPHYLACTIC IMMUNOTHERAPY: ICD-10-CM

## 2019-10-17 DIAGNOSIS — Z94.0 STATUS POST KIDNEY TRANSPLANT: ICD-10-CM

## 2019-10-17 LAB
ALBUMIN SERPL BCP-MCNC: 3.6 G/DL (ref 3.5–5.2)
ANION GAP SERPL CALC-SCNC: 8 MMOL/L (ref 8–16)
BASOPHILS # BLD AUTO: 0.01 K/UL (ref 0–0.2)
BASOPHILS NFR BLD: 0.3 % (ref 0–1.9)
BUN SERPL-MCNC: 28 MG/DL (ref 8–23)
CALCIUM SERPL-MCNC: 10.2 MG/DL (ref 8.7–10.5)
CHLORIDE SERPL-SCNC: 111 MMOL/L (ref 95–110)
CO2 SERPL-SCNC: 23 MMOL/L (ref 23–29)
CREAT SERPL-MCNC: 1.7 MG/DL (ref 0.5–1.4)
DIFFERENTIAL METHOD: ABNORMAL
EOSINOPHIL # BLD AUTO: 0.2 K/UL (ref 0–0.5)
EOSINOPHIL NFR BLD: 5.1 % (ref 0–8)
ERYTHROCYTE [DISTWIDTH] IN BLOOD BY AUTOMATED COUNT: 16.9 % (ref 11.5–14.5)
EST. GFR  (AFRICAN AMERICAN): 36.7 ML/MIN/1.73 M^2
EST. GFR  (NON AFRICAN AMERICAN): 31.9 ML/MIN/1.73 M^2
GLUCOSE SERPL-MCNC: 68 MG/DL (ref 70–110)
HCT VFR BLD AUTO: 23.7 % (ref 37–48.5)
HGB BLD-MCNC: 7.7 G/DL (ref 12–16)
IMM GRANULOCYTES # BLD AUTO: 0.04 K/UL (ref 0–0.04)
IMM GRANULOCYTES NFR BLD AUTO: 1.3 % (ref 0–0.5)
LYMPHOCYTES # BLD AUTO: 0.4 K/UL (ref 1–4.8)
LYMPHOCYTES NFR BLD: 13.5 % (ref 18–48)
MAGNESIUM SERPL-MCNC: 1.3 MG/DL (ref 1.6–2.6)
MCH RBC QN AUTO: 30.2 PG (ref 27–31)
MCHC RBC AUTO-ENTMCNC: 32.5 G/DL (ref 32–36)
MCV RBC AUTO: 93 FL (ref 82–98)
MONOCYTES # BLD AUTO: 0.3 K/UL (ref 0.3–1)
MONOCYTES NFR BLD: 9.6 % (ref 4–15)
NEUTROPHILS # BLD AUTO: 2.2 K/UL (ref 1.8–7.7)
NEUTROPHILS NFR BLD: 70.2 % (ref 38–73)
NRBC BLD-RTO: 0 /100 WBC
PHOSPHATE SERPL-MCNC: 2.9 MG/DL (ref 2.7–4.5)
PLATELET # BLD AUTO: 140 K/UL (ref 150–350)
PMV BLD AUTO: 11.3 FL (ref 9.2–12.9)
POTASSIUM SERPL-SCNC: 4.4 MMOL/L (ref 3.5–5.1)
RBC # BLD AUTO: 2.55 M/UL (ref 4–5.4)
SODIUM SERPL-SCNC: 142 MMOL/L (ref 136–145)
TACROLIMUS BLD-MCNC: 3.7 NG/ML (ref 5–15)
WBC # BLD AUTO: 3.11 K/UL (ref 3.9–12.7)

## 2019-10-17 PROCEDURE — 83735 ASSAY OF MAGNESIUM: CPT

## 2019-10-17 PROCEDURE — 80069 RENAL FUNCTION PANEL: CPT

## 2019-10-17 PROCEDURE — 36415 COLL VENOUS BLD VENIPUNCTURE: CPT

## 2019-10-17 PROCEDURE — 80197 ASSAY OF TACROLIMUS: CPT

## 2019-10-17 PROCEDURE — 85025 COMPLETE CBC W/AUTO DIFF WBC: CPT

## 2019-10-17 NOTE — TELEPHONE ENCOUNTER
Patient son dany repeated back and voice a understanding of orders.  Dany states she is complaint on Tacro and Ketoconazole.

## 2019-10-17 NOTE — PROGRESS NOTES
I made a modest change to prograf recently and her level dropped out of proportion to the change. Please verify she was taking prograf and ketoconazole correctly  Increase prograf to 8 mg po bid  Repeat labs/tacro level 10/21

## 2019-10-18 ENCOUNTER — CLINICAL SUPPORT (OUTPATIENT)
Dept: TRANSPLANT | Facility: CLINIC | Age: 64
End: 2019-10-18
Payer: MEDICARE

## 2019-10-18 VITALS
OXYGEN SATURATION: 100 % | HEART RATE: 54 BPM | RESPIRATION RATE: 18 BRPM | BODY MASS INDEX: 33.6 KG/M2 | WEIGHT: 189.63 LBS | DIASTOLIC BLOOD PRESSURE: 74 MMHG | TEMPERATURE: 98 F | HEIGHT: 63 IN | SYSTOLIC BLOOD PRESSURE: 160 MMHG

## 2019-10-18 DIAGNOSIS — D63.1 ANEMIA ASSOCIATED WITH CHRONIC RENAL FAILURE: Primary | ICD-10-CM

## 2019-10-18 DIAGNOSIS — N18.9 ANEMIA ASSOCIATED WITH CHRONIC RENAL FAILURE: Primary | ICD-10-CM

## 2019-10-18 PROCEDURE — 99999 PR PBB SHADOW E&M-EST. PATIENT-LVL III: ICD-10-PCS | Mod: PBBFAC,,,

## 2019-10-18 PROCEDURE — 99999 PR PBB SHADOW E&M-EST. PATIENT-LVL III: CPT | Mod: PBBFAC,,,

## 2019-10-18 PROCEDURE — 99213 OFFICE O/P EST LOW 20 MIN: CPT | Mod: PBBFAC

## 2019-10-18 RX ORDER — TACROLIMUS 1 MG/1
CAPSULE ORAL
Qty: 480 CAPSULE | Refills: 11 | Status: SHIPPED | OUTPATIENT
Start: 2019-10-18 | End: 2019-10-24 | Stop reason: SDUPTHER

## 2019-10-18 RX ADMIN — EPOETIN ALFA-EPBX 20000 UNITS: 10000 INJECTION, SOLUTION INTRAVENOUS; SUBCUTANEOUS at 08:10

## 2019-10-18 NOTE — PROGRESS NOTES
retacrit  20,000 units administered sq to right posterior arm per MD order. Patient tolerated with no complaints. Patient informed of side effects including bone pain, muscle aches and tiredness. Verbalized acknowledgment.

## 2019-10-21 ENCOUNTER — LAB VISIT (OUTPATIENT)
Dept: LAB | Facility: HOSPITAL | Age: 64
End: 2019-10-21
Attending: INTERNAL MEDICINE
Payer: MEDICARE

## 2019-10-21 DIAGNOSIS — Z94.0 KIDNEY REPLACED BY TRANSPLANT: ICD-10-CM

## 2019-10-21 DIAGNOSIS — Z72.89 OTHER PROBLEMS RELATED TO LIFESTYLE: ICD-10-CM

## 2019-10-21 LAB
ALBUMIN SERPL BCP-MCNC: 3.9 G/DL (ref 3.5–5.2)
ALBUMIN SERPL BCP-MCNC: 3.9 G/DL (ref 3.5–5.2)
ALP SERPL-CCNC: 82 U/L (ref 55–135)
ALT SERPL W/O P-5'-P-CCNC: 8 U/L (ref 10–44)
ANION GAP SERPL CALC-SCNC: 9 MMOL/L (ref 8–16)
AST SERPL-CCNC: 11 U/L (ref 10–40)
BASOPHILS # BLD AUTO: 0.01 K/UL (ref 0–0.2)
BASOPHILS NFR BLD: 0.4 % (ref 0–1.9)
BILIRUB DIRECT SERPL-MCNC: 0.2 MG/DL (ref 0.1–0.3)
BILIRUB SERPL-MCNC: 0.4 MG/DL (ref 0.1–1)
BUN SERPL-MCNC: 19 MG/DL (ref 8–23)
CALCIUM SERPL-MCNC: 10.4 MG/DL (ref 8.7–10.5)
CHLORIDE SERPL-SCNC: 110 MMOL/L (ref 95–110)
CO2 SERPL-SCNC: 23 MMOL/L (ref 23–29)
CREAT SERPL-MCNC: 1.6 MG/DL (ref 0.5–1.4)
DIFFERENTIAL METHOD: ABNORMAL
EOSINOPHIL # BLD AUTO: 0.1 K/UL (ref 0–0.5)
EOSINOPHIL NFR BLD: 2.8 % (ref 0–8)
ERYTHROCYTE [DISTWIDTH] IN BLOOD BY AUTOMATED COUNT: 18.3 % (ref 11.5–14.5)
EST. GFR  (AFRICAN AMERICAN): 39.5 ML/MIN/1.73 M^2
EST. GFR  (NON AFRICAN AMERICAN): 34.3 ML/MIN/1.73 M^2
GLUCOSE SERPL-MCNC: 88 MG/DL (ref 70–110)
HCT VFR BLD AUTO: 24.7 % (ref 37–48.5)
HGB BLD-MCNC: 7.8 G/DL (ref 12–16)
IMM GRANULOCYTES # BLD AUTO: 0.05 K/UL (ref 0–0.04)
IMM GRANULOCYTES NFR BLD AUTO: 2 % (ref 0–0.5)
LYMPHOCYTES # BLD AUTO: 0.2 K/UL (ref 1–4.8)
LYMPHOCYTES NFR BLD: 9.7 % (ref 18–48)
MAGNESIUM SERPL-MCNC: 1.4 MG/DL (ref 1.6–2.6)
MCH RBC QN AUTO: 30.8 PG (ref 27–31)
MCHC RBC AUTO-ENTMCNC: 31.6 G/DL (ref 32–36)
MCV RBC AUTO: 98 FL (ref 82–98)
MONOCYTES # BLD AUTO: 0.2 K/UL (ref 0.3–1)
MONOCYTES NFR BLD: 9.7 % (ref 4–15)
NEUTROPHILS # BLD AUTO: 1.9 K/UL (ref 1.8–7.7)
NEUTROPHILS NFR BLD: 75.4 % (ref 38–73)
NRBC BLD-RTO: 1 /100 WBC
PHOSPHATE SERPL-MCNC: 1.9 MG/DL (ref 2.7–4.5)
PLATELET # BLD AUTO: 138 K/UL (ref 150–350)
PMV BLD AUTO: 10.9 FL (ref 9.2–12.9)
POTASSIUM SERPL-SCNC: 4 MMOL/L (ref 3.5–5.1)
PROT SERPL-MCNC: 7 G/DL (ref 6–8.4)
PTH-INTACT SERPL-MCNC: 341 PG/ML (ref 9–77)
RBC # BLD AUTO: 2.53 M/UL (ref 4–5.4)
SODIUM SERPL-SCNC: 142 MMOL/L (ref 136–145)
URATE SERPL-MCNC: 7 MG/DL (ref 2.4–5.7)
WBC # BLD AUTO: 2.48 K/UL (ref 3.9–12.7)

## 2019-10-21 PROCEDURE — 80197 ASSAY OF TACROLIMUS: CPT

## 2019-10-21 PROCEDURE — 85025 COMPLETE CBC W/AUTO DIFF WBC: CPT

## 2019-10-21 PROCEDURE — 83970 ASSAY OF PARATHORMONE: CPT

## 2019-10-21 PROCEDURE — 84075 ASSAY ALKALINE PHOSPHATASE: CPT

## 2019-10-21 PROCEDURE — 83540 ASSAY OF IRON: CPT

## 2019-10-21 PROCEDURE — 84550 ASSAY OF BLOOD/URIC ACID: CPT

## 2019-10-21 PROCEDURE — 87340 HEPATITIS B SURFACE AG IA: CPT

## 2019-10-21 PROCEDURE — 87799 DETECT AGENT NOS DNA QUANT: CPT

## 2019-10-21 PROCEDURE — 87517 HEPATITIS B DNA QUANT: CPT

## 2019-10-21 PROCEDURE — 82728 ASSAY OF FERRITIN: CPT

## 2019-10-21 PROCEDURE — 83735 ASSAY OF MAGNESIUM: CPT

## 2019-10-21 PROCEDURE — 80069 RENAL FUNCTION PANEL: CPT

## 2019-10-21 RX ORDER — FAMOTIDINE 20 MG/1
20 TABLET, FILM COATED ORAL NIGHTLY
Qty: 30 TABLET | Refills: 0 | Status: CANCELLED | OUTPATIENT
Start: 2019-10-21

## 2019-10-22 DIAGNOSIS — E83.42 HYPOMAGNESEMIA: Primary | ICD-10-CM

## 2019-10-22 LAB
FERRITIN SERPL-MCNC: 1591 NG/ML (ref 20–300)
HBV SURFACE AG SERPL QL IA: NEGATIVE
IRON SERPL-MCNC: 106 UG/DL (ref 30–160)
SATURATED IRON: 38 % (ref 20–50)
TACROLIMUS BLD-MCNC: 5.2 NG/ML (ref 5–15)
TOTAL IRON BINDING CAPACITY: 281 UG/DL (ref 250–450)
TRANSFERRIN SERPL-MCNC: 190 MG/DL (ref 200–375)

## 2019-10-22 RX ORDER — LANOLIN ALCOHOL/MO/W.PET/CERES
400 CREAM (GRAM) TOPICAL 2 TIMES DAILY
Qty: 60 TABLET | Refills: 11 | Status: SHIPPED | OUTPATIENT
Start: 2019-10-22 | End: 2019-11-05 | Stop reason: SDUPTHER

## 2019-10-22 NOTE — PROGRESS NOTES
Let's add iron stores and ferritin to the blood work from Monday  will start sensipar 30 mg daily   Encourage high phosphorus meals   Make sure she is taking KPN as ordered  Will repeat cbc 10/25  Will increase magnesium oxide to 400 mg po bid

## 2019-10-22 NOTE — TELEPHONE ENCOUNTER
Patient Daughter Niya repeated back and voice a understanding .  Patient just restarted KPN 500mg BID after missing several doses.  High mag and phos diet reviewed with Niya.  Next labs 10/24.

## 2019-10-22 NOTE — TELEPHONE ENCOUNTER
----- Message from Pavel Padilla MD sent at 10/22/2019  6:03 AM CDT -----  Let's add iron stores and ferritin to the blood work from Monday  will start sensipar 30 mg daily   Encourage high phosphorus meals   Make sure she is taking KPN as ordered  Will repeat cbc 10/25  Will increase magnesium oxide to 400 mg po bid

## 2019-10-23 ENCOUNTER — LAB VISIT (OUTPATIENT)
Dept: LAB | Facility: HOSPITAL | Age: 64
End: 2019-10-23
Attending: INTERNAL MEDICINE
Payer: MEDICARE

## 2019-10-23 DIAGNOSIS — Z94.0 KIDNEY REPLACED BY TRANSPLANT: ICD-10-CM

## 2019-10-23 LAB
ALBUMIN SERPL BCP-MCNC: 3.7 G/DL (ref 3.5–5.2)
ANION GAP SERPL CALC-SCNC: 9 MMOL/L (ref 8–16)
BASOPHILS # BLD AUTO: 0.01 K/UL (ref 0–0.2)
BASOPHILS NFR BLD: 0.3 % (ref 0–1.9)
BKV DNA SERPL NAA+PROBE-ACNC: <125 COPIES/ML
BKV DNA SERPL NAA+PROBE-LOG#: <2.1 LOG (10) COPIES/ML
BKV DNA SERPL QL NAA+PROBE: NOT DETECTED
BUN SERPL-MCNC: 29 MG/DL (ref 8–23)
CALCIUM SERPL-MCNC: 10.1 MG/DL (ref 8.7–10.5)
CHLORIDE SERPL-SCNC: 110 MMOL/L (ref 95–110)
CMV DNA SERPL NAA+PROBE-ACNC: NORMAL IU/ML
CO2 SERPL-SCNC: 22 MMOL/L (ref 23–29)
CREAT SERPL-MCNC: 1.5 MG/DL (ref 0.5–1.4)
DIFFERENTIAL METHOD: ABNORMAL
EOSINOPHIL # BLD AUTO: 0 K/UL (ref 0–0.5)
EOSINOPHIL NFR BLD: 1.2 % (ref 0–8)
ERYTHROCYTE [DISTWIDTH] IN BLOOD BY AUTOMATED COUNT: 18.9 % (ref 11.5–14.5)
EST. GFR  (AFRICAN AMERICAN): 42.7 ML/MIN/1.73 M^2
EST. GFR  (NON AFRICAN AMERICAN): 37.1 ML/MIN/1.73 M^2
GLUCOSE SERPL-MCNC: 82 MG/DL (ref 70–110)
HBV DNA SERPL NAA+PROBE-ACNC: <10 IU/ML
HBV DNA SERPL NAA+PROBE-LOG IU: <1 LOG (10) IU/ML
HBV DNA SERPL QL NAA+PROBE: NOT DETECTED
HCT VFR BLD AUTO: 24.4 % (ref 37–48.5)
HGB BLD-MCNC: 7.6 G/DL (ref 12–16)
IMM GRANULOCYTES # BLD AUTO: 0.02 K/UL (ref 0–0.04)
IMM GRANULOCYTES NFR BLD AUTO: 0.6 % (ref 0–0.5)
LYMPHOCYTES # BLD AUTO: 0.4 K/UL (ref 1–4.8)
LYMPHOCYTES NFR BLD: 11 % (ref 18–48)
MAGNESIUM SERPL-MCNC: 1.4 MG/DL (ref 1.6–2.6)
MCH RBC QN AUTO: 31.5 PG (ref 27–31)
MCHC RBC AUTO-ENTMCNC: 31.1 G/DL (ref 32–36)
MCV RBC AUTO: 101 FL (ref 82–98)
MONOCYTES # BLD AUTO: 0.3 K/UL (ref 0.3–1)
MONOCYTES NFR BLD: 9.8 % (ref 4–15)
NEUTROPHILS # BLD AUTO: 2.5 K/UL (ref 1.8–7.7)
NEUTROPHILS NFR BLD: 77.1 % (ref 38–73)
NRBC BLD-RTO: 0 /100 WBC
PHOSPHATE SERPL-MCNC: 2.8 MG/DL (ref 2.7–4.5)
PLATELET # BLD AUTO: 131 K/UL (ref 150–350)
PMV BLD AUTO: 12.5 FL (ref 9.2–12.9)
POTASSIUM SERPL-SCNC: 4.2 MMOL/L (ref 3.5–5.1)
RBC # BLD AUTO: 2.41 M/UL (ref 4–5.4)
SODIUM SERPL-SCNC: 141 MMOL/L (ref 136–145)
WBC # BLD AUTO: 3.28 K/UL (ref 3.9–12.7)

## 2019-10-23 PROCEDURE — 80069 RENAL FUNCTION PANEL: CPT

## 2019-10-23 PROCEDURE — 80197 ASSAY OF TACROLIMUS: CPT

## 2019-10-23 PROCEDURE — 83735 ASSAY OF MAGNESIUM: CPT

## 2019-10-23 PROCEDURE — 36415 COLL VENOUS BLD VENIPUNCTURE: CPT | Mod: PO

## 2019-10-23 PROCEDURE — 85025 COMPLETE CBC W/AUTO DIFF WBC: CPT

## 2019-10-24 DIAGNOSIS — Z94.0 STATUS POST KIDNEY TRANSPLANT: ICD-10-CM

## 2019-10-24 DIAGNOSIS — Z29.89 PROPHYLACTIC IMMUNOTHERAPY: ICD-10-CM

## 2019-10-24 DIAGNOSIS — Z79.60 LONG-TERM USE OF IMMUNOSUPPRESSANT MEDICATION: ICD-10-CM

## 2019-10-24 LAB — TACROLIMUS BLD-MCNC: 4.9 NG/ML (ref 5–15)

## 2019-10-24 RX ORDER — TACROLIMUS 1 MG/1
CAPSULE ORAL
Qty: 540 CAPSULE | Refills: 11 | Status: SHIPPED | OUTPATIENT
Start: 2019-10-24 | End: 2019-12-10 | Stop reason: SDUPTHER

## 2019-10-24 NOTE — TELEPHONE ENCOUNTER
Patient Daughter sharif repeated back and voice a understanding of orders and states her Mom has not been taking the Ketoconazole.  Sharif voice a understanding of the importance and will satrt giving med to patient.  Next labs 10/28/19.

## 2019-10-25 ENCOUNTER — INFUSION (OUTPATIENT)
Dept: INFUSION THERAPY | Facility: HOSPITAL | Age: 64
End: 2019-10-25
Attending: INTERNAL MEDICINE
Payer: MEDICARE

## 2019-10-25 DIAGNOSIS — N18.9 ANEMIA ASSOCIATED WITH CHRONIC RENAL FAILURE: Primary | ICD-10-CM

## 2019-10-25 DIAGNOSIS — D63.1 ANEMIA ASSOCIATED WITH CHRONIC RENAL FAILURE: Primary | ICD-10-CM

## 2019-10-25 PROCEDURE — 63600175 PHARM REV CODE 636 W HCPCS: Performed by: INTERNAL MEDICINE

## 2019-10-25 PROCEDURE — 96372 THER/PROPH/DIAG INJ SC/IM: CPT

## 2019-10-25 RX ADMIN — EPOETIN ALFA-EPBX 20000 UNITS: 10000 INJECTION, SOLUTION INTRAVENOUS; SUBCUTANEOUS at 03:10

## 2019-10-28 ENCOUNTER — LAB VISIT (OUTPATIENT)
Dept: LAB | Facility: HOSPITAL | Age: 64
End: 2019-10-28
Attending: INTERNAL MEDICINE
Payer: MEDICARE

## 2019-10-28 DIAGNOSIS — Z94.0 KIDNEY REPLACED BY TRANSPLANT: ICD-10-CM

## 2019-10-28 LAB
ALBUMIN SERPL BCP-MCNC: 3.8 G/DL (ref 3.5–5.2)
ANION GAP SERPL CALC-SCNC: 7 MMOL/L (ref 8–16)
BASOPHILS # BLD AUTO: 0.01 K/UL (ref 0–0.2)
BASOPHILS NFR BLD: 0.3 % (ref 0–1.9)
BUN SERPL-MCNC: 21 MG/DL (ref 8–23)
CALCIUM SERPL-MCNC: 10.5 MG/DL (ref 8.7–10.5)
CHLORIDE SERPL-SCNC: 111 MMOL/L (ref 95–110)
CO2 SERPL-SCNC: 25 MMOL/L (ref 23–29)
CREAT SERPL-MCNC: 1.5 MG/DL (ref 0.5–1.4)
DIFFERENTIAL METHOD: ABNORMAL
EOSINOPHIL # BLD AUTO: 0 K/UL (ref 0–0.5)
EOSINOPHIL NFR BLD: 1.4 % (ref 0–8)
ERYTHROCYTE [DISTWIDTH] IN BLOOD BY AUTOMATED COUNT: 20.1 % (ref 11.5–14.5)
EST. GFR  (AFRICAN AMERICAN): 42.7 ML/MIN/1.73 M^2
EST. GFR  (NON AFRICAN AMERICAN): 37.1 ML/MIN/1.73 M^2
GLUCOSE SERPL-MCNC: 76 MG/DL (ref 70–110)
HCT VFR BLD AUTO: 26.8 % (ref 37–48.5)
HGB BLD-MCNC: 7.9 G/DL (ref 12–16)
IMM GRANULOCYTES # BLD AUTO: 0.04 K/UL (ref 0–0.04)
IMM GRANULOCYTES NFR BLD AUTO: 1.4 % (ref 0–0.5)
LYMPHOCYTES # BLD AUTO: 0.4 K/UL (ref 1–4.8)
LYMPHOCYTES NFR BLD: 12.8 % (ref 18–48)
MAGNESIUM SERPL-MCNC: 1.4 MG/DL (ref 1.6–2.6)
MCH RBC QN AUTO: 31.1 PG (ref 27–31)
MCHC RBC AUTO-ENTMCNC: 29.5 G/DL (ref 32–36)
MCV RBC AUTO: 106 FL (ref 82–98)
MONOCYTES # BLD AUTO: 0.3 K/UL (ref 0.3–1)
MONOCYTES NFR BLD: 10.3 % (ref 4–15)
NEUTROPHILS # BLD AUTO: 2.1 K/UL (ref 1.8–7.7)
NEUTROPHILS NFR BLD: 73.8 % (ref 38–73)
NRBC BLD-RTO: 1 /100 WBC
PHOSPHATE SERPL-MCNC: 2.5 MG/DL (ref 2.7–4.5)
PLATELET # BLD AUTO: 145 K/UL (ref 150–350)
PMV BLD AUTO: 11.5 FL (ref 9.2–12.9)
POTASSIUM SERPL-SCNC: 4.1 MMOL/L (ref 3.5–5.1)
RBC # BLD AUTO: 2.54 M/UL (ref 4–5.4)
SODIUM SERPL-SCNC: 143 MMOL/L (ref 136–145)
WBC # BLD AUTO: 2.9 K/UL (ref 3.9–12.7)

## 2019-10-28 PROCEDURE — 80069 RENAL FUNCTION PANEL: CPT

## 2019-10-28 PROCEDURE — 36415 COLL VENOUS BLD VENIPUNCTURE: CPT | Mod: PO

## 2019-10-28 PROCEDURE — 80197 ASSAY OF TACROLIMUS: CPT

## 2019-10-28 PROCEDURE — 83735 ASSAY OF MAGNESIUM: CPT

## 2019-10-28 PROCEDURE — 85025 COMPLETE CBC W/AUTO DIFF WBC: CPT

## 2019-10-29 LAB — TACROLIMUS BLD-MCNC: 6.7 NG/ML (ref 5–15)

## 2019-10-30 ENCOUNTER — OFFICE VISIT (OUTPATIENT)
Dept: TRANSPLANT | Facility: CLINIC | Age: 64
End: 2019-10-30
Payer: MEDICARE

## 2019-10-30 VITALS
SYSTOLIC BLOOD PRESSURE: 170 MMHG | OXYGEN SATURATION: 98 % | BODY MASS INDEX: 34.68 KG/M2 | RESPIRATION RATE: 16 BRPM | TEMPERATURE: 98 F | HEART RATE: 70 BPM | WEIGHT: 195.75 LBS | DIASTOLIC BLOOD PRESSURE: 74 MMHG | HEIGHT: 63 IN

## 2019-10-30 DIAGNOSIS — Z91.89 AT RISK FOR OPPORTUNISTIC INFECTIONS: ICD-10-CM

## 2019-10-30 DIAGNOSIS — D84.9 IMMUNOCOMPROMISED STATE: Primary | ICD-10-CM

## 2019-10-30 DIAGNOSIS — N18.30 CKD (CHRONIC KIDNEY DISEASE) STAGE 3, GFR 30-59 ML/MIN: ICD-10-CM

## 2019-10-30 DIAGNOSIS — D63.1 ANEMIA ASSOCIATED WITH CHRONIC RENAL FAILURE: Chronic | ICD-10-CM

## 2019-10-30 DIAGNOSIS — N18.9 ANEMIA ASSOCIATED WITH CHRONIC RENAL FAILURE: Chronic | ICD-10-CM

## 2019-10-30 PROCEDURE — 99215 PR OFFICE/OUTPT VISIT, EST, LEVL V, 40-54 MIN: ICD-10-PCS | Mod: S$PBB,,, | Performed by: INTERNAL MEDICINE

## 2019-10-30 PROCEDURE — 99999 PR PBB SHADOW E&M-EST. PATIENT-LVL V: ICD-10-PCS | Mod: PBBFAC,,, | Performed by: INTERNAL MEDICINE

## 2019-10-30 PROCEDURE — 99215 OFFICE O/P EST HI 40 MIN: CPT | Mod: S$PBB,,, | Performed by: INTERNAL MEDICINE

## 2019-10-30 PROCEDURE — 99999 PR PBB SHADOW E&M-EST. PATIENT-LVL V: CPT | Mod: PBBFAC,,, | Performed by: INTERNAL MEDICINE

## 2019-10-30 PROCEDURE — 99215 OFFICE O/P EST HI 40 MIN: CPT | Mod: PBBFAC | Performed by: INTERNAL MEDICINE

## 2019-10-30 NOTE — PROGRESS NOTES
Kidney Post-Transplant Assessment    Referring Physician: Bruce Khan  Current Nephrologist: Bruce Khan    ORGAN: LEFT KIDNEY  Donor Type: donation after brain death  PHS Increased Risk: no  Cold Ischemia: 545 mins  Induction Medications: thymoglobulin    Subjective:     CC:  Reassessment of renal allograft function and management of chronic immunosuppression.    HPI:  Ms. Schulte is a 64 y.o. 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 Her most recent creatinine is 1.5. She takes mycophenolate mofetil and tacrolimus for maintenance immunosuppression. Her post transplant course has been uncomplicated to date.      She feels well.    She is already taking procrit injections weekly at [place] 20,000 units    Good appetite    She is not monitoring the BP.    She is here with her daughter (care giver). She lives in [place]. She lives with her son and granddaughter.       Current Outpatient Medications on File Prior to Visit   Medication Sig Dispense Refill    acyclovir (ZOVIRAX) 400 MG tablet Take 1 tablet (400 mg total) by mouth 2 (two) times daily. Stop 12/19/19 60 tablet 2    atorvastatin (LIPITOR) 40 MG tablet Take 40 mg by mouth once daily.       atovaquone (MEPRON) 750 mg/5 mL Susp Take 10 mLs (1,500 mg total) by mouth once daily. Stop 9/19/2020 300 mL 11    bisacodyl (DULCOLAX) 5 mg EC tablet Take 2 tablets (10 mg total) by mouth daily as needed for Constipation.  0    carvedilol (COREG) 25 MG tablet Take 0.5 tablets (12.5 mg total) by mouth 2 (two) times daily with meals. Hold for SBP<120, HR<60 30 tablet 1    cinacalcet (SENSIPAR) 60 MG Tab Take 1 tablet (60 mg total) by mouth daily with breakfast. 30 tablet 11    docusate sodium (COLACE) 100 MG capsule Take 1 capsule (100 mg total) by mouth 3 (three) times daily as needed for Constipation.  0    famotidine (PEPCID) 20 MG tablet Take 1 tablet (20 mg total) by mouth  every evening. 30 tablet 0    hydrALAZINE (APRESOLINE) 25 MG tablet Take 1 tablet (25 mg total) by mouth 3 (three) times daily. 90 tablet 11    k phos di & mono-sod phos mono (K-PHOS-NEUTRAL) 250 mg Tab Take 2 tablets by mouth 2 (two) times daily. 120 tablet 11    ketoconazole (NIZORAL) 200 mg Tab Take 0.5 tablets (100 mg total) by mouth once daily. 15 tablet 11    magnesium oxide (MAG-OX) 400 mg (241.3 mg magnesium) tablet Take 1 tablet (400 mg total) by mouth 2 (two) times daily. 60 tablet 11    mycophenolate (CELLCEPT) 250 mg Cap Take 2 capsules (500 mg total) by mouth 2 (two) times daily. 120 capsule 11    NIFEdipine (ADALAT CC) 60 MG TbSR Take 1 tablet (60 mg total) by mouth 2 (two) times daily. 60 tablet 1    predniSONE (DELTASONE) 5 MG tablet Take by mouth daily: 20mg (4 tablets) 9/23-10/22; 15 mg (3 tablets) 10/23-11/22; 10 mg (2 tablets) 11/23-12/22; then 5 mg (1 tablet) daily thereafter 12/23/19 120 tablet 11    tacrolimus (PROGRAF) 1 MG Cap Take 9 capsules (9 mg total) by mouth every morning AND 9 capsules (9 mg total) every evening. 540 capsule 11    oxyCODONE-acetaminophen (PERCOCET) 5-325 mg per tablet Take 1 tablet by mouth every 4 (four) hours as needed for Pain. (Patient not taking: Reported on 10/30/2019) 30 tablet 0     No current facility-administered medications on file prior to visit.         Review of Systems     Skin: no skin rash  CNS; no headaches, blurred vision, seizure, or syncope  ENT: No JVD,  Adenopathies,  nasal congestion. No oral lesions  Cardiac: No chest pain, dyspnea, claudication, edema or palpitations  Respiratory: No SOB, cough, hemoptysis   Gastro-intestinal: No diarrhea, constipation, abdominal pain, nausea, vomit. No ascitis  Genitourinary: no hematuria, dysuria, frequency, frequency  Musculoskeletal: joint pain, arthritis or vasculitic changes  Psych: alert awake, oriented, No cranial nerves deficit.      Objective:   Blood pressure (!) 170/74, pulse 70,  "temperature 97.6 °F (36.4 °C), temperature source Oral, resp. rate 16, height 5' 3" (1.6 m), weight 88.8 kg (195 lb 12.3 oz), SpO2 98 %.body mass index is 34.68 kg/m².    Physical Exam     Head: normocephalic  Neck: No JVD, cervical axillary, or femoral adenopathies  Heart: no murmurs, Normal s1 and s2, No gallops, no rubs, No murmurs  Lungs; CTA, good respiratory effort, no crackles  Abdomen: soft, non tender, no splenomegaly or hepatomegaly, no massess, no bruits  Extremities: No edema, skin rash, joint pain  SNC: awake, alert oriented. Cranial nerves are intact, no focalized, sensitivity and strength preserved      Labs:  Lab Results   Component Value Date    WBC 2.90 (L) 10/28/2019    HGB 7.9 (L) 10/28/2019    HCT 26.8 (L) 10/28/2019     10/28/2019    K 4.1 10/28/2019     (H) 10/28/2019    CO2 25 10/28/2019    BUN 21 10/28/2019    CREATININE 1.5 (H) 10/28/2019    EGFRNONAA 37.1 (A) 10/28/2019    CALCIUM 10.5 10/28/2019    PHOS 2.5 (L) 10/28/2019    MG 1.4 (L) 10/28/2019    ALBUMIN 3.8 10/28/2019    AST 11 10/21/2019    ALT 8 (L) 10/21/2019    UTPCR 0.14 10/21/2019    .0 (H) 10/21/2019    TACROLIMUS 6.7 10/28/2019       No results found for: EXTANC, EXTWBC, EXTSEGS, EXTPLATELETS, EXTHEMOGLOBI, EXTHEMATOCRI, EXTCREATININ, EXTSODIUM, EXTPOTASSIUM, EXTBUN, EXTCO2, EXTCALCIUM, EXTPHOSPHORU, EXTGLUCOSE, EXTALBUMIN, EXTAST, EXTALT, EXTBILITOTAL, EXTLIPASE, EXTAMYLASE    No results found for: EXTCYCLOSLVL, EXTSIROLIMUS, EXTTACROLVL, EXTPROTCRE, EXTPTHINTACT, EXTPROTEINUA, EXTWBCUA, EXTRBCUA    Labs were reviewed with the patient    Assessment:     1. Immunocompromised state    2. CKD (chronic kidney disease) stage 3, GFR 30-59 ml/min    3. At risk for opportunistic infections    4. Anemia associated with chronic renal failure        Plan:    Bp monitoring at home and report readings to us.   Weekly procrit 20,000 units  I spoke with care giver and patient  Education provided  All questions " answered    1. CKD stage 3 with improved creatinine. No change : will continue follow up as per our center guidelines. patient to continue close follow up with the local General nephrologist. Education provided in appropriate fluid intake, potassium intake. Continue with oral hydration.        2. Immunosuppression: no change with MMF. Prograf dose increased recently and is getting close to the target level.   Lab Results   Component Value Date    TACROLIMUS 6.7 10/28/2019    TACROLIMUS 4.9 (L) 10/23/2019    TACROLIMUS 5.2 10/21/2019     No results found for: CYCLOSPORINE  @   Will closely monitor for toxicities, education provided about adherence to medicines and need to communicate any side effect to the transplant nurse or physician.    3. Allograft Function: improving No action needed today stable at baseline for the patient. Continue follow up as per our guidelines and with the local General nephrologist. Communication will be sent today.  Lab Results   Component Value Date    CREATININE 1.5 (H) 10/28/2019    CREATININE 1.5 (H) 10/23/2019    CREATININE 1.6 (H) 10/21/2019     No results found for: AMYLASE, LIPASE    4. Hypertension management: I encourage patient to monitor BP at home  Continue with home blood pressure monitoring, low salt and healthy life discussed with the patient..    5. Metabolic Bone Disease/Secondary Hyperparathyroidism:calcium and phosphorus level discussed with the patient, patient will continue follow up with the general nephrologist for management of metabolic bone disease   calcium and phosphorus as per our center protocol. Will monitor PTH, Vit D level, calcium.      Lab Results   Component Value Date    .0 (H) 10/21/2019    CALCIUM 10.5 10/28/2019    PHOS 2.5 (L) 10/28/2019    PHOS 2.8 10/23/2019    PHOS 1.9 (L) 10/21/2019       6. Electrolytes: reviewed with the patient, essentially within the normal range no need for acute changes today, will monitor as per our center  guidelines.     Lab Results   Component Value Date     10/28/2019    K 4.1 10/28/2019     (H) 10/28/2019    CO2 25 10/28/2019    CO2 22 (L) 10/23/2019    CO2 23 10/21/2019       7. Anemia: procrit 20,000 units weekly will continue monitoring as per our center guidelines. No indication for acute intervention today.     Lab Results   Component Value Date    WBC 2.90 (L) 10/28/2019    HGB 7.9 (L) 10/28/2019    HCT 26.8 (L) 10/28/2019     (H) 10/28/2019     (L) 10/28/2019       8.Proteinuria: will continue with pr/cr ratio as per our center guidelines  Lab Results   Component Value Date    PROTEINURINE 42 (H) 10/21/2019    CREATRANDUR 299.0 10/21/2019    UTPCR 0.14 10/21/2019    UTPCR 1.66 (H) 09/20/2019        9. BK virus infection screening: will continue with urine or blood PCR as per our guidelines to prevent BK virus viremia and allograft dysfunction  Lab Results   Component Value Date    BKVIRUSPCRQB <125 10/21/2019         10. Weight education: provided during the clinic visit.   Body mass index is 34.68 kg/m².       11.Patient safety education regarding immunosuppression including prophylaxis posttransplant for CMV, PCP : Education provided about vaccination and prevention of infections.    12.  Cytopenias: no significant cytopenias will monitor as per our guidelines. Medicine list reviewed including potential causes of drug-induced cytopenias     Lab Results   Component Value Date    WBC 2.90 (L) 10/28/2019    HGB 7.9 (L) 10/28/2019    HCT 26.8 (L) 10/28/2019     (H) 10/28/2019     (L) 10/28/2019       13. Post-transplant Prophylaxis; CMV Infection, PJP and Candida mucosistis and other indicated for this particular patient. Atovaquone and acyclovir with weekly cmv pcr    I spoke with the patient for 30 minutes. More than half dedicated to counseling and education. All questions answered    Pavel Padilla MD  Transplant Nephrology            Follow-up:   Clinic: return  to transplant clinic weekly for the first month after transplant; every 2 weeks during months 2-3; then at 6-, 9-, 12-, 18-, 24-, and 36- months post-transplant to reassess for complications from immunosuppression toxicity and monitor for rejection.  Annually thereafter.    Labs: since patient remains at high risk for rejection and drug-related complications that warrant close monitoring, labs will be ordered as follows: continue twice weekly CBC, renal panel, and drug level for first month; then same labs once weekly through 3rd month post-transplant.  Urine for UA and protein/creatinine ratio monthly.  Serum BK - PCR at 1-, 3-, 6-, 9-, 12-, 18-, 24-, 36-, 48-, and 60 months post-transplant.  Hepatic panel at 1-, 2-, 3-, 6-, 9-, 12-, 18-, 24-, and 36- months post-transplant.    Pavel Padilla MD       Education:   Material provided to the patient.  Patient reminded to call with any health changes since these can be early signs of significant complications.  Also, I advised the patient to be sure any new medications or changes of old medications are discussed with either a pharmacist or physician knowledgeable with transplant to avoid rejection/drug toxicity related to significant drug interactions.

## 2019-10-30 NOTE — LETTER
October 30, 2019        Bruce Khan  [address]  [place] [address]  Phone: [phone]  Fax: [phone]             [facility]  [address]  [facility] [address]  Phone: [phone]   Patient: Satinder Schulte   MR Number: [id number]   YOB: 1955   Date of Visit: 10/30/2019       Dear Dr. Bruce Khan    Thank you for referring Satinder Schulte to me for evaluation. Attached you will find relevant portions of my assessment and plan of care.    If you have questions, please do not hesitate to call me. I look forward to following Satinder Schulte along with you.    Sincerely,    Pavel Padilla MD    Enclosure    If you would like to receive this communication electronically, please contact [email or web address] or [phone] to request [employer] access.    [employer] is a tool which provides read-only access to select patient information with whom you have a relationship. Its easy to use and provides real time access to review your patients record including encounter summaries, notes, results, and demographic information.    If you feel you have received this communication in error or would no longer like to receive these types of communications, please e-mail [email or web address]

## 2019-11-01 LAB — CMV DNA SERPL NAA+PROBE-ACNC: NORMAL IU/ML

## 2019-11-04 ENCOUNTER — LAB VISIT (OUTPATIENT)
Dept: LAB | Facility: HOSPITAL | Age: 64
End: 2019-11-04
Attending: INTERNAL MEDICINE
Payer: MEDICARE

## 2019-11-04 DIAGNOSIS — Z94.0 KIDNEY REPLACED BY TRANSPLANT: ICD-10-CM

## 2019-11-04 LAB
ALBUMIN SERPL BCP-MCNC: 3.8 G/DL (ref 3.5–5.2)
ANION GAP SERPL CALC-SCNC: 7 MMOL/L (ref 8–16)
BASOPHILS # BLD AUTO: 0.01 K/UL (ref 0–0.2)
BASOPHILS NFR BLD: 0.3 % (ref 0–1.9)
BUN SERPL-MCNC: 25 MG/DL (ref 8–23)
CALCIUM SERPL-MCNC: 9.5 MG/DL (ref 8.7–10.5)
CHLORIDE SERPL-SCNC: 112 MMOL/L (ref 95–110)
CO2 SERPL-SCNC: 23 MMOL/L (ref 23–29)
CREAT SERPL-MCNC: 1.3 MG/DL (ref 0.5–1.4)
DIFFERENTIAL METHOD: ABNORMAL
EOSINOPHIL # BLD AUTO: 0.1 K/UL (ref 0–0.5)
EOSINOPHIL NFR BLD: 1.5 % (ref 0–8)
ERYTHROCYTE [DISTWIDTH] IN BLOOD BY AUTOMATED COUNT: 20.5 % (ref 11.5–14.5)
EST. GFR  (AFRICAN AMERICAN): 50.8 ML/MIN/1.73 M^2
EST. GFR  (NON AFRICAN AMERICAN): 44.1 ML/MIN/1.73 M^2
GLUCOSE SERPL-MCNC: 92 MG/DL (ref 70–110)
HCT VFR BLD AUTO: 27.7 % (ref 37–48.5)
HGB BLD-MCNC: 8.3 G/DL (ref 12–16)
IMM GRANULOCYTES # BLD AUTO: 0.02 K/UL (ref 0–0.04)
IMM GRANULOCYTES NFR BLD AUTO: 0.6 % (ref 0–0.5)
LYMPHOCYTES # BLD AUTO: 0.4 K/UL (ref 1–4.8)
LYMPHOCYTES NFR BLD: 11.2 % (ref 18–48)
MAGNESIUM SERPL-MCNC: 1.3 MG/DL (ref 1.6–2.6)
MCH RBC QN AUTO: 31.9 PG (ref 27–31)
MCHC RBC AUTO-ENTMCNC: 30 G/DL (ref 32–36)
MCV RBC AUTO: 107 FL (ref 82–98)
MONOCYTES # BLD AUTO: 0.3 K/UL (ref 0.3–1)
MONOCYTES NFR BLD: 7.4 % (ref 4–15)
NEUTROPHILS # BLD AUTO: 2.7 K/UL (ref 1.8–7.7)
NEUTROPHILS NFR BLD: 79 % (ref 38–73)
NRBC BLD-RTO: 0 /100 WBC
PHOSPHATE SERPL-MCNC: 2.5 MG/DL (ref 2.7–4.5)
PLATELET # BLD AUTO: 145 K/UL (ref 150–350)
PMV BLD AUTO: 12.4 FL (ref 9.2–12.9)
POTASSIUM SERPL-SCNC: 4 MMOL/L (ref 3.5–5.1)
RBC # BLD AUTO: 2.6 M/UL (ref 4–5.4)
SODIUM SERPL-SCNC: 142 MMOL/L (ref 136–145)
WBC # BLD AUTO: 3.4 K/UL (ref 3.9–12.7)

## 2019-11-04 PROCEDURE — 83735 ASSAY OF MAGNESIUM: CPT

## 2019-11-04 PROCEDURE — 80197 ASSAY OF TACROLIMUS: CPT

## 2019-11-04 PROCEDURE — 80069 RENAL FUNCTION PANEL: CPT

## 2019-11-04 PROCEDURE — 85025 COMPLETE CBC W/AUTO DIFF WBC: CPT

## 2019-11-05 ENCOUNTER — INFUSION (OUTPATIENT)
Dept: INFUSION THERAPY | Facility: HOSPITAL | Age: 64
End: 2019-11-05
Attending: INTERNAL MEDICINE
Payer: MEDICARE

## 2019-11-05 DIAGNOSIS — D63.1 ANEMIA ASSOCIATED WITH CHRONIC RENAL FAILURE: Primary | ICD-10-CM

## 2019-11-05 DIAGNOSIS — E83.42 HYPOMAGNESEMIA: ICD-10-CM

## 2019-11-05 DIAGNOSIS — N18.9 ANEMIA ASSOCIATED WITH CHRONIC RENAL FAILURE: Primary | ICD-10-CM

## 2019-11-05 LAB — TACROLIMUS BLD-MCNC: 6.9 NG/ML (ref 5–15)

## 2019-11-05 PROCEDURE — 63600175 PHARM REV CODE 636 W HCPCS: Performed by: INTERNAL MEDICINE

## 2019-11-05 PROCEDURE — 96372 THER/PROPH/DIAG INJ SC/IM: CPT

## 2019-11-05 RX ORDER — LANOLIN ALCOHOL/MO/W.PET/CERES
800 CREAM (GRAM) TOPICAL 2 TIMES DAILY
Qty: 120 TABLET | Refills: 11 | Status: SHIPPED | OUTPATIENT
Start: 2019-11-05 | End: 2019-12-03 | Stop reason: SDUPTHER

## 2019-11-05 RX ADMIN — EPOETIN ALFA-EPBX 20000 UNITS: 10000 INJECTION, SOLUTION INTRAVENOUS; SUBCUTANEOUS at 11:11

## 2019-11-06 LAB — CMV DNA SERPL NAA+PROBE-ACNC: NORMAL IU/ML

## 2019-11-11 ENCOUNTER — LAB VISIT (OUTPATIENT)
Dept: LAB | Facility: HOSPITAL | Age: 64
End: 2019-11-11
Attending: INTERNAL MEDICINE
Payer: MEDICARE

## 2019-11-11 DIAGNOSIS — Z94.0 KIDNEY REPLACED BY TRANSPLANT: ICD-10-CM

## 2019-11-11 LAB
ALBUMIN SERPL BCP-MCNC: 4 G/DL (ref 3.5–5.2)
ANION GAP SERPL CALC-SCNC: 9 MMOL/L (ref 8–16)
BASOPHILS # BLD AUTO: 0.01 K/UL (ref 0–0.2)
BASOPHILS NFR BLD: 0.2 % (ref 0–1.9)
BUN SERPL-MCNC: 23 MG/DL (ref 8–23)
CALCIUM SERPL-MCNC: 10.4 MG/DL (ref 8.7–10.5)
CHLORIDE SERPL-SCNC: 114 MMOL/L (ref 95–110)
CO2 SERPL-SCNC: 20 MMOL/L (ref 23–29)
CREAT SERPL-MCNC: 1.3 MG/DL (ref 0.5–1.4)
DIFFERENTIAL METHOD: ABNORMAL
EOSINOPHIL # BLD AUTO: 0 K/UL (ref 0–0.5)
EOSINOPHIL NFR BLD: 0.5 % (ref 0–8)
ERYTHROCYTE [DISTWIDTH] IN BLOOD BY AUTOMATED COUNT: 19.7 % (ref 11.5–14.5)
EST. GFR  (AFRICAN AMERICAN): 50.8 ML/MIN/1.73 M^2
EST. GFR  (NON AFRICAN AMERICAN): 44.1 ML/MIN/1.73 M^2
GLUCOSE SERPL-MCNC: 103 MG/DL (ref 70–110)
HCT VFR BLD AUTO: 30.6 % (ref 37–48.5)
HGB BLD-MCNC: 9.1 G/DL (ref 12–16)
IMM GRANULOCYTES # BLD AUTO: 0.03 K/UL (ref 0–0.04)
IMM GRANULOCYTES NFR BLD AUTO: 0.7 % (ref 0–0.5)
LYMPHOCYTES # BLD AUTO: 0.4 K/UL (ref 1–4.8)
LYMPHOCYTES NFR BLD: 8 % (ref 18–48)
MAGNESIUM SERPL-MCNC: 1.6 MG/DL (ref 1.6–2.6)
MCH RBC QN AUTO: 31.7 PG (ref 27–31)
MCHC RBC AUTO-ENTMCNC: 29.7 G/DL (ref 32–36)
MCV RBC AUTO: 107 FL (ref 82–98)
MONOCYTES # BLD AUTO: 0.3 K/UL (ref 0.3–1)
MONOCYTES NFR BLD: 6.8 % (ref 4–15)
NEUTROPHILS # BLD AUTO: 3.7 K/UL (ref 1.8–7.7)
NEUTROPHILS NFR BLD: 83.8 % (ref 38–73)
NRBC BLD-RTO: 0 /100 WBC
PHOSPHATE SERPL-MCNC: 2.7 MG/DL (ref 2.7–4.5)
PLATELET # BLD AUTO: 155 K/UL (ref 150–350)
PMV BLD AUTO: 11.9 FL (ref 9.2–12.9)
POTASSIUM SERPL-SCNC: 4.7 MMOL/L (ref 3.5–5.1)
RBC # BLD AUTO: 2.87 M/UL (ref 4–5.4)
SODIUM SERPL-SCNC: 143 MMOL/L (ref 136–145)
WBC # BLD AUTO: 4.39 K/UL (ref 3.9–12.7)

## 2019-11-11 PROCEDURE — 85025 COMPLETE CBC W/AUTO DIFF WBC: CPT

## 2019-11-11 PROCEDURE — 36415 COLL VENOUS BLD VENIPUNCTURE: CPT | Mod: PO

## 2019-11-11 PROCEDURE — 80197 ASSAY OF TACROLIMUS: CPT

## 2019-11-11 PROCEDURE — 80069 RENAL FUNCTION PANEL: CPT

## 2019-11-11 PROCEDURE — 83735 ASSAY OF MAGNESIUM: CPT

## 2019-11-12 ENCOUNTER — INFUSION (OUTPATIENT)
Dept: INFUSION THERAPY | Facility: HOSPITAL | Age: 64
End: 2019-11-12
Attending: INTERNAL MEDICINE
Payer: MEDICARE

## 2019-11-12 DIAGNOSIS — N18.9 ANEMIA ASSOCIATED WITH CHRONIC RENAL FAILURE: Primary | ICD-10-CM

## 2019-11-12 DIAGNOSIS — D63.1 ANEMIA ASSOCIATED WITH CHRONIC RENAL FAILURE: Primary | ICD-10-CM

## 2019-11-12 LAB — TACROLIMUS BLD-MCNC: 7.4 NG/ML (ref 5–15)

## 2019-11-12 PROCEDURE — 96372 THER/PROPH/DIAG INJ SC/IM: CPT

## 2019-11-12 PROCEDURE — 63600175 PHARM REV CODE 636 W HCPCS: Performed by: INTERNAL MEDICINE

## 2019-11-12 RX ADMIN — EPOETIN ALFA-EPBX 20000 UNITS: 10000 INJECTION, SOLUTION INTRAVENOUS; SUBCUTANEOUS at 11:11

## 2019-11-13 LAB — CMV DNA SERPL NAA+PROBE-ACNC: NORMAL IU/ML

## 2019-11-18 ENCOUNTER — OFFICE VISIT (OUTPATIENT)
Dept: TRANSPLANT | Facility: CLINIC | Age: 64
End: 2019-11-18
Payer: MEDICARE

## 2019-11-18 ENCOUNTER — LAB VISIT (OUTPATIENT)
Dept: LAB | Facility: HOSPITAL | Age: 64
End: 2019-11-18
Attending: INTERNAL MEDICINE
Payer: MEDICARE

## 2019-11-18 VITALS
SYSTOLIC BLOOD PRESSURE: 171 MMHG | HEIGHT: 63 IN | HEART RATE: 62 BPM | TEMPERATURE: 98 F | RESPIRATION RATE: 18 BRPM | DIASTOLIC BLOOD PRESSURE: 68 MMHG | BODY MASS INDEX: 34.57 KG/M2 | OXYGEN SATURATION: 95 % | WEIGHT: 195.13 LBS

## 2019-11-18 DIAGNOSIS — E87.20 ACIDOSIS: ICD-10-CM

## 2019-11-18 DIAGNOSIS — Z91.89 AT RISK FOR OPPORTUNISTIC INFECTIONS: ICD-10-CM

## 2019-11-18 DIAGNOSIS — E83.39 HYPOPHOSPHATEMIA: ICD-10-CM

## 2019-11-18 DIAGNOSIS — N18.30 CKD (CHRONIC KIDNEY DISEASE) STAGE 3, GFR 30-59 ML/MIN: ICD-10-CM

## 2019-11-18 DIAGNOSIS — Z94.0 STATUS POST KIDNEY TRANSPLANT: Primary | ICD-10-CM

## 2019-11-18 DIAGNOSIS — I12.9 HYPERTENSION, RENAL: Chronic | ICD-10-CM

## 2019-11-18 DIAGNOSIS — Z79.60 LONG-TERM USE OF IMMUNOSUPPRESSANT MEDICATION: ICD-10-CM

## 2019-11-18 DIAGNOSIS — Z29.89 PROPHYLACTIC IMMUNOTHERAPY: ICD-10-CM

## 2019-11-18 DIAGNOSIS — Z94.0 KIDNEY REPLACED BY TRANSPLANT: ICD-10-CM

## 2019-11-18 DIAGNOSIS — D84.9 IMMUNOCOMPROMISED STATE: ICD-10-CM

## 2019-11-18 LAB
ALBUMIN SERPL BCP-MCNC: 3.9 G/DL (ref 3.5–5.2)
ALBUMIN SERPL BCP-MCNC: 3.9 G/DL (ref 3.5–5.2)
ALP SERPL-CCNC: 64 U/L (ref 55–135)
ALT SERPL W/O P-5'-P-CCNC: 15 U/L (ref 10–44)
ANION GAP SERPL CALC-SCNC: 8 MMOL/L (ref 8–16)
AST SERPL-CCNC: 10 U/L (ref 10–40)
BASOPHILS # BLD AUTO: 0 K/UL (ref 0–0.2)
BASOPHILS NFR BLD: 0 % (ref 0–1.9)
BILIRUB DIRECT SERPL-MCNC: 0.2 MG/DL (ref 0.1–0.3)
BILIRUB SERPL-MCNC: 0.4 MG/DL (ref 0.1–1)
BUN SERPL-MCNC: 26 MG/DL (ref 8–23)
CALCIUM SERPL-MCNC: 9.5 MG/DL (ref 8.7–10.5)
CHLORIDE SERPL-SCNC: 110 MMOL/L (ref 95–110)
CO2 SERPL-SCNC: 23 MMOL/L (ref 23–29)
CREAT SERPL-MCNC: 1.4 MG/DL (ref 0.5–1.4)
DIFFERENTIAL METHOD: ABNORMAL
EOSINOPHIL # BLD AUTO: 0 K/UL (ref 0–0.5)
EOSINOPHIL NFR BLD: 1.5 % (ref 0–8)
ERYTHROCYTE [DISTWIDTH] IN BLOOD BY AUTOMATED COUNT: 18.7 % (ref 11.5–14.5)
EST. GFR  (AFRICAN AMERICAN): 46.5 ML/MIN/1.73 M^2
EST. GFR  (NON AFRICAN AMERICAN): 40.3 ML/MIN/1.73 M^2
GLUCOSE SERPL-MCNC: 142 MG/DL (ref 70–110)
HCT VFR BLD AUTO: 32.4 % (ref 37–48.5)
HGB BLD-MCNC: 9.5 G/DL (ref 12–16)
IMM GRANULOCYTES # BLD AUTO: 0.02 K/UL (ref 0–0.04)
IMM GRANULOCYTES NFR BLD AUTO: 0.7 % (ref 0–0.5)
LYMPHOCYTES # BLD AUTO: 0.3 K/UL (ref 1–4.8)
LYMPHOCYTES NFR BLD: 11.6 % (ref 18–48)
MAGNESIUM SERPL-MCNC: 1.5 MG/DL (ref 1.6–2.6)
MCH RBC QN AUTO: 31.6 PG (ref 27–31)
MCHC RBC AUTO-ENTMCNC: 29.3 G/DL (ref 32–36)
MCV RBC AUTO: 108 FL (ref 82–98)
MONOCYTES # BLD AUTO: 0.3 K/UL (ref 0.3–1)
MONOCYTES NFR BLD: 11.9 % (ref 4–15)
NEUTROPHILS # BLD AUTO: 2 K/UL (ref 1.8–7.7)
NEUTROPHILS NFR BLD: 74.3 % (ref 38–73)
NRBC BLD-RTO: 0 /100 WBC
PHOSPHATE SERPL-MCNC: 2.5 MG/DL (ref 2.7–4.5)
PLATELET # BLD AUTO: 133 K/UL (ref 150–350)
PMV BLD AUTO: 11.9 FL (ref 9.2–12.9)
POTASSIUM SERPL-SCNC: 4.3 MMOL/L (ref 3.5–5.1)
PROT SERPL-MCNC: 6.3 G/DL (ref 6–8.4)
RBC # BLD AUTO: 3.01 M/UL (ref 4–5.4)
SODIUM SERPL-SCNC: 141 MMOL/L (ref 136–145)
WBC # BLD AUTO: 2.68 K/UL (ref 3.9–12.7)

## 2019-11-18 PROCEDURE — 36415 COLL VENOUS BLD VENIPUNCTURE: CPT | Mod: PO

## 2019-11-18 PROCEDURE — 85025 COMPLETE CBC W/AUTO DIFF WBC: CPT

## 2019-11-18 PROCEDURE — 83735 ASSAY OF MAGNESIUM: CPT

## 2019-11-18 PROCEDURE — 80069 RENAL FUNCTION PANEL: CPT

## 2019-11-18 PROCEDURE — 99215 PR OFFICE/OUTPT VISIT, EST, LEVL V, 40-54 MIN: ICD-10-PCS | Mod: S$PBB,,, | Performed by: NURSE PRACTITIONER

## 2019-11-18 PROCEDURE — 87799 DETECT AGENT NOS DNA QUANT: CPT

## 2019-11-18 PROCEDURE — 82247 BILIRUBIN TOTAL: CPT

## 2019-11-18 PROCEDURE — 99215 OFFICE O/P EST HI 40 MIN: CPT | Mod: S$PBB,,, | Performed by: NURSE PRACTITIONER

## 2019-11-18 PROCEDURE — 99999 PR PBB SHADOW E&M-EST. PATIENT-LVL V: ICD-10-PCS | Mod: PBBFAC,,, | Performed by: NURSE PRACTITIONER

## 2019-11-18 PROCEDURE — 80197 ASSAY OF TACROLIMUS: CPT

## 2019-11-18 PROCEDURE — 99999 PR PBB SHADOW E&M-EST. PATIENT-LVL V: CPT | Mod: PBBFAC,,, | Performed by: NURSE PRACTITIONER

## 2019-11-18 PROCEDURE — 99215 OFFICE O/P EST HI 40 MIN: CPT | Mod: PBBFAC | Performed by: NURSE PRACTITIONER

## 2019-11-18 PROCEDURE — 84075 ASSAY ALKALINE PHOSPHATASE: CPT

## 2019-11-18 RX ORDER — SODIUM BICARBONATE 650 MG/1
650 TABLET ORAL 2 TIMES DAILY
Qty: 60 TABLET | Refills: 11 | Status: ON HOLD | OUTPATIENT
Start: 2019-11-18 | End: 2021-04-09 | Stop reason: HOSPADM

## 2019-11-18 NOTE — PATIENT INSTRUCTIONS
start  sodium bicarb 650 mg  Every 12 hours   recheck BP 1 hours after taking BP meds.     It is my privilege to participate in your transplant care! Please be sure to let us know if you have any questions or concerns about your health care - we cannot help you if we do not know. Don't forget we are on call 24/7 for any emergencies.     Best Wishes,  Emilia ROSEC

## 2019-11-18 NOTE — PROGRESS NOTES
Kidney Post-Transplant Assessment    Referring Physician: Bruce Khan  Current Nephrologist: Bruce Khan    ORGAN: LEFT KIDNEY  Donor Type: donation after brain death  PHS Increased Risk: no  Cold Ischemia: 545 mins  Induction Medications: thymoglobulin    Subjective:     CC:  Reassessment of renal allograft function and management of chronic immunosuppression.    HPI:  Ms. Schulte is a 64 y.o. 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  Her most recent creatinine is 1.3. She takes mycophenolate mofetil and tacrolimus for maintenance immunosuppression.Of note, donor cultures positive for Staphylococcus Lugdunesis. Treated with Ancef IV inpatient. Will transition to PO Cefadroxil on discharge to complete a 2 week course, end date 10/8/19. Blood cultures collected 9/24 NGTD.    Her post transplant course has been incontinence and  Leukopenia . Currently holding  Valcyte and Bactrim. With weekly CMV PCR     Interval HX:  HX incontinence --Pt reports this is getting better  Overall feels well. No health concerns today.   Denies chest pain, SOB, leg pain   Has not been recording I&Os , reports Intake at least   2L   BP elevated today. Pt took BP meds in clinic at the visit.    -150/80-90s Pt is taking BP before taking Meds  Instructed to recheck BP 1 hours after taking BP meds.     Past Medical History:   Diagnosis Date    Abnormal Pap smear     repeat paps were normal    Anemia associated with chronic renal failure     Awaiting organ transplant status  - 02/18/2013 6/6/2014    Blood type A+ 6/6/2014    CKD (chronic kidney disease) stage 3, GFR 30-59 ml/min 10/30/2019    CKD (chronic kidney disease) stage 5, GFR less than 15  "ml/min     secondary hypertension    Essential hypertension 5/19/2017    Hyperlipidemia     Hypertension, renal 1992    Immunocompromised state 10/4/2019    Stroke 2007    Residual speech deficit       Review of Systems   Constitutional: Negative for activity change, appetite change, chills, fatigue, fever and unexpected weight change.   HENT: Negative for congestion, facial swelling, postnasal drip, rhinorrhea, sinus pressure, sore throat and trouble swallowing.    Eyes: Negative for pain, redness and visual disturbance.   Respiratory: Negative for cough, chest tightness, shortness of breath and wheezing.    Cardiovascular: Negative.  Negative for chest pain, palpitations and leg swelling.   Gastrointestinal: Negative for abdominal pain, diarrhea, nausea and vomiting.   Genitourinary: Negative for dysuria, flank pain and urgency.         Hx incontinence   Musculoskeletal: Negative for gait problem, neck pain and neck stiffness.   Skin: Negative for rash.   Allergic/Immunologic: Positive for immunocompromised state. Negative for environmental allergies and food allergies.   Neurological: Negative for dizziness, light-headedness and headaches.   Psychiatric/Behavioral: Negative for agitation and confusion. The patient is not nervous/anxious.        Objective:   Blood pressure (!) 171/68, pulse 62, temperature 98.2 °F (36.8 °C), temperature source Oral, resp. rate 18, height 5' 3" (1.6 m), weight 88.5 kg (195 lb 1.7 oz), SpO2 95 %.body mass index is 34.56 kg/m².    Physical Exam   Constitutional: She is oriented to person, place, and time. She appears well-developed and well-nourished.   HENT:   Head: Normocephalic.   Mouth/Throat: Oropharynx is clear and moist. No oropharyngeal exudate.   Eyes: Pupils are equal, round, and reactive to light. Conjunctivae and EOM are normal. No scleral icterus.   Neck: Normal range of motion. Neck supple.   Cardiovascular: Normal rate, regular rhythm and normal heart sounds. "   Pulmonary/Chest: Effort normal and breath sounds normal.   Abdominal: Soft. Normal appearance and bowel sounds are normal. She exhibits no distension and no mass. There is no splenomegaly or hepatomegaly. There is no tenderness. There is no rebound, no guarding, no CVA tenderness, no tenderness at McBurney's point and negative Hidalgo's sign.       Genitourinary:   Genitourinary Comments:       Musculoskeletal: Normal range of motion. She exhibits no edema.        Arms:  Lymphadenopathy:     She has no cervical adenopathy.   Neurological: She is alert and oriented to person, place, and time. She exhibits normal muscle tone. Coordination normal.   Skin: Skin is warm and dry.   Psychiatric: She has a normal mood and affect. Her behavior is normal.   Vitals reviewed.      Labs:  Lab Results   Component Value Date    WBC 4.39 11/11/2019    HGB 9.1 (L) 11/11/2019    HCT 30.6 (L) 11/11/2019     11/11/2019    K 4.7 11/11/2019     (H) 11/11/2019    CO2 20 (L) 11/11/2019    BUN 23 11/11/2019    CREATININE 1.3 11/11/2019    EGFRNONAA 44.1 (A) 11/11/2019    CALCIUM 10.4 11/11/2019    PHOS 2.7 11/11/2019    MG 1.6 11/11/2019    ALBUMIN 4.0 11/11/2019    AST 11 10/21/2019    ALT 8 (L) 10/21/2019    UTPCR 0.14 10/21/2019    .0 (H) 10/21/2019    TACROLIMUS 7.4 11/11/2019       No results found for: EXTANC, EXTWBC, EXTSEGS, EXTPLATELETS, EXTHEMOGLOBI, EXTHEMATOCRI, EXTCREATININ, EXTSODIUM, EXTPOTASSIUM, EXTBUN, EXTCO2, EXTCALCIUM, EXTPHOSPHORU, EXTGLUCOSE, EXTALBUMIN, EXTAST, EXTALT, EXTBILITOTAL, EXTLIPASE, EXTAMYLASE    No results found for: EXTCYCLOSLVL, EXTSIROLIMUS, EXTTACROLVL, EXTPROTCRE, EXTPTHINTACT, EXTPROTEINUA, EXTWBCUA, EXTRBCUA    Labs were reviewed with the patient    Assessment:     1. Status post kidney transplant    2. At risk for opportunistic infections    3. Long-term use of immunosuppressant medication    4. Prophylactic immunotherapy    5. Hypertension, renal    6. Immunocompromised state     7. CKD (chronic kidney disease) stage 3, GFR 30-59 ml/min    8. Hypophosphatemia    9. Acidosis        Plan:   start sodium bicarb 650 mg BID     Follow-up:   1. CKD stage: 3 stable    2. Immunosuppression:   Prograf trough 7.4, which is  sub therapeutic target 8-10.  Prograf 9/9,  Mg BID, and  Ketoconazole 100 mg QD, prednisone as prescribed . Atovaquone 1500 mg Qd for PCP prophylaxis , acyclovir 400 mg B ID for CMV prophylaxis . Will continue to monitor for drug toxicities    3. Allograft Function: Stable. Continue good po hydration.       Lab Results   Component Value Date    CREATININE 1.3 11/11/2019 11/11/2019  POD 52   eGFR if African American >60 mL/min/1.73 m^2 50.8 (A)       4. Hypertension management: advise low salt diet and home BP monitoring    Coreg 12.5 mg BID , Hydralazine 25 mg TID, Nifedipine 60 mg BID    -150/80-90s Pt is taking BP before taking Meds  Instructed to recheck BP 1 hours after taking BP meds.    5. Metabolic Bone Disease/Secondary Hyperparathyroidism:stable  Will monitor PTH, CA and Vit D/guidelines,     KPN  500 mg BID, Mg ox 800 mg BID, Sensipar 60 mg QD  Lab Results   Component Value Date    .0 (H) 10/21/2019    CALCIUM 10.4 11/11/2019    PHOS 2.7 11/11/2019 11/11/2019  POD 52   Magnesium 1.6 - 2.6 mg/dL 1.6         6. Electrolytes:  Will monitor /guidelines  Lab Results   Component Value Date     11/11/2019    K 4.7 11/11/2019     (H) 11/11/2019    CO2 20 (L) 11/11/2019   start  sodium bicarb 650 mg BID     7. Anemia: stable. No need for intervention    H/H improving-->Will monitor /guidelines  Lab Results   Component Value Date    WBC 4.39 11/11/2019    HGB 9.1 (L) 11/11/2019    HCT 30.6 (L) 11/11/2019     (H) 11/11/2019     11/11/2019       8.  Cytopenias: no significant cytopenias will monitor as per our guidelines. Medicine list reviewed including potential causes of drug-induced cytopenias       9.Proteinuria:  continue p/c ratio as per guidelines        10/21/2019  POD 31   Prot/Creat Ratio, Ur 0.00 - 0.20 0.14     10. CMV/ BK virus infection screening:  will continue to monitor/ guidelines        11/11/2019  POD 52   Cytomegalovirus PCR, Quant Undetected IU/mL Undetected          11. Weight education: provided during the clinic visit   Body mass index is 34.56 kg/m².          12.Patient safety education regarding immunosuppression including prophylaxis posttransplant for CMV, PCP : Education provided about vaccination and prevention of infections             Follow-up:   Clinic: return to transplant clinic weekly for the first month after transplant; every 2 weeks during months 2-3; then at 6-, 9-, 12-, 18-, 24-, and 36- months post-transplant to reassess for complications from immunosuppression toxicity and monitor for rejection.  Annually thereafter.    Labs: since patient remains at high risk for rejection and drug-related complications that warrant close monitoring, labs will be ordered as follows: continue twice weekly CBC, renal panel, and drug level for first month; then same labs once weekly through 3rd month post-transplant.  Urine for UA and protein/creatinine ratio monthly.  Serum BK - PCR at 1-, 3-, 6-, 9-, 12-, 18-, 24-, 36-, 48-, and 60 months post-transplant.  Hepatic panel at 1-, 2-, 3-, 6-, 9-, 12-, 18-, 24-, and 36- months post-transplant.    Emilia Amaya NP       Education:   Material provided to the patient.  Patient reminded to call with any health changes since these can be early signs of significant complications.  Also, I advised the patient to be sure any new medications or changes of old medications are discussed with either a pharmacist or physician knowledgeable with transplant to avoid rejection/drug toxicity related to significant drug interactions.

## 2019-11-18 NOTE — LETTER
November 18, 2019        Bruce Khan  [address]  [place] [address]  Phone: [phone]  Fax: [phone]             [facility]  [address]  [facility] [address]  Phone: [phone]   Patient: Satinder Schulte   MR Number: [id number]   YOB: 1955   Date of Visit: 11/18/2019       Dear Dr. Bruce Khan    Thank you for referring Satinder Schulte to me for evaluation. Attached you will find relevant portions of my assessment and plan of care.    If you have questions, please do not hesitate to call me. I look forward to following Satinder Schulte along with you.    Sincerely,    Emilia Amaya, NP    Enclosure    If you would like to receive this communication electronically, please contact [email or web address] or [phone] to request [employer] access.    [employer] is a tool which provides read-only access to select patient information with whom you have a relationship. Its easy to use and provides real time access to review your patients record including encounter summaries, notes, results, and demographic information.    If you feel you have received this communication in error or would no longer like to receive these types of communications, please e-mail [email or web address]

## 2019-11-19 LAB — TACROLIMUS BLD-MCNC: 8 NG/ML (ref 5–15)

## 2019-11-20 LAB
BKV DNA SERPL NAA+PROBE-ACNC: <125 COPIES/ML
BKV DNA SERPL NAA+PROBE-LOG#: <2.1 LOG (10) COPIES/ML
BKV DNA SERPL QL NAA+PROBE: NOT DETECTED
CMV DNA SERPL NAA+PROBE-ACNC: NORMAL IU/ML

## 2019-11-25 ENCOUNTER — LAB VISIT (OUTPATIENT)
Dept: LAB | Facility: HOSPITAL | Age: 64
End: 2019-11-25
Attending: INTERNAL MEDICINE
Payer: MEDICARE

## 2019-11-25 DIAGNOSIS — Z94.0 KIDNEY REPLACED BY TRANSPLANT: ICD-10-CM

## 2019-11-25 LAB
BASOPHILS # BLD AUTO: ABNORMAL K/UL (ref 0–0.2)
BASOPHILS NFR BLD: 0 % (ref 0–1.9)
DIFFERENTIAL METHOD: ABNORMAL
EOSINOPHIL # BLD AUTO: ABNORMAL K/UL (ref 0–0.5)
EOSINOPHIL NFR BLD: 0 % (ref 0–8)
ERYTHROCYTE [DISTWIDTH] IN BLOOD BY AUTOMATED COUNT: 17.1 % (ref 11.5–14.5)
HCT VFR BLD AUTO: 32.7 % (ref 37–48.5)
HGB BLD-MCNC: 9.7 G/DL (ref 12–16)
IMM GRANULOCYTES # BLD AUTO: ABNORMAL K/UL (ref 0–0.04)
IMM GRANULOCYTES NFR BLD AUTO: ABNORMAL % (ref 0–0.5)
LYMPHOCYTES # BLD AUTO: ABNORMAL K/UL (ref 1–4.8)
LYMPHOCYTES NFR BLD: 16 % (ref 18–48)
MCH RBC QN AUTO: 31.9 PG (ref 27–31)
MCHC RBC AUTO-ENTMCNC: 29.7 G/DL (ref 32–36)
MCV RBC AUTO: 108 FL (ref 82–98)
MONOCYTES # BLD AUTO: ABNORMAL K/UL (ref 0.3–1)
MONOCYTES NFR BLD: 12 % (ref 4–15)
NEUTROPHILS # BLD AUTO: ABNORMAL K/UL (ref 1.8–7.7)
NEUTROPHILS NFR BLD: 72 % (ref 38–73)
NRBC BLD-RTO: 0 /100 WBC
PLATELET # BLD AUTO: 104 K/UL (ref 150–350)
PMV BLD AUTO: 12.6 FL (ref 9.2–12.9)
RBC # BLD AUTO: 3.04 M/UL (ref 4–5.4)
WBC # BLD AUTO: 1.88 K/UL (ref 3.9–12.7)

## 2019-11-25 PROCEDURE — 83735 ASSAY OF MAGNESIUM: CPT

## 2019-11-25 PROCEDURE — 80069 RENAL FUNCTION PANEL: CPT

## 2019-11-25 PROCEDURE — 80197 ASSAY OF TACROLIMUS: CPT

## 2019-11-25 PROCEDURE — 85007 BL SMEAR W/DIFF WBC COUNT: CPT

## 2019-11-25 PROCEDURE — 85027 COMPLETE CBC AUTOMATED: CPT

## 2019-11-25 PROCEDURE — 36415 COLL VENOUS BLD VENIPUNCTURE: CPT | Mod: PO

## 2019-11-26 ENCOUNTER — TELEPHONE (OUTPATIENT)
Dept: TRANSPLANT | Facility: CLINIC | Age: 64
End: 2019-11-26

## 2019-11-26 DIAGNOSIS — Z94.0 KIDNEY REPLACED BY TRANSPLANT: Primary | ICD-10-CM

## 2019-11-26 DIAGNOSIS — Z79.01 LONG TERM (CURRENT) USE OF ANTICOAGULANTS: ICD-10-CM

## 2019-11-26 LAB
ALBUMIN SERPL BCP-MCNC: 3.8 G/DL (ref 3.5–5.2)
ANION GAP SERPL CALC-SCNC: 7 MMOL/L (ref 8–16)
BUN SERPL-MCNC: 24 MG/DL (ref 8–23)
CALCIUM SERPL-MCNC: 9.6 MG/DL (ref 8.7–10.5)
CHLORIDE SERPL-SCNC: 110 MMOL/L (ref 95–110)
CO2 SERPL-SCNC: 25 MMOL/L (ref 23–29)
CREAT SERPL-MCNC: 1.2 MG/DL (ref 0.5–1.4)
EST. GFR  (AFRICAN AMERICAN): 56 ML/MIN/1.73 M^2
EST. GFR  (NON AFRICAN AMERICAN): 48.6 ML/MIN/1.73 M^2
GLUCOSE SERPL-MCNC: 135 MG/DL (ref 70–110)
MAGNESIUM SERPL-MCNC: 1.4 MG/DL (ref 1.6–2.6)
PHOSPHATE SERPL-MCNC: 2.8 MG/DL (ref 2.7–4.5)
POTASSIUM SERPL-SCNC: 4.2 MMOL/L (ref 3.5–5.1)
SODIUM SERPL-SCNC: 142 MMOL/L (ref 136–145)
TACROLIMUS BLD-MCNC: 5.6 NG/ML (ref 5–15)

## 2019-11-26 NOTE — TELEPHONE ENCOUNTER
----- Message from Pavel Padilla MD sent at 11/26/2019 11:24 AM CST -----  Please verify she is taking prograf and ketoconazole correctly  Speak with care giver  If she is, increase prograf to 10 mg po bid  Repeat l;evel 12/1

## 2019-11-26 NOTE — PROGRESS NOTES
Please verify she is taking prograf and ketoconazole correctly  Speak with care giver  If she is, increase prograf to 10 mg po bid  Repeat l;evel 12/1

## 2019-11-26 NOTE — TELEPHONE ENCOUNTER
Patient daughter sharif repeated back and voice a understanding of orders and sates her Mom ( Patient ) has not been taking her Ketoconazole and will restart it.  Medication compliance and Neutropenic precautions reviewed . Next labs 11/29/19.

## 2019-11-27 ENCOUNTER — INFUSION (OUTPATIENT)
Dept: INFUSION THERAPY | Facility: HOSPITAL | Age: 64
End: 2019-11-27
Attending: INTERNAL MEDICINE
Payer: MEDICARE

## 2019-11-27 VITALS — WEIGHT: 194 LBS | BODY MASS INDEX: 34.37 KG/M2

## 2019-11-27 DIAGNOSIS — D63.1 ANEMIA ASSOCIATED WITH CHRONIC RENAL FAILURE: Primary | ICD-10-CM

## 2019-11-27 DIAGNOSIS — N18.9 ANEMIA ASSOCIATED WITH CHRONIC RENAL FAILURE: Primary | ICD-10-CM

## 2019-11-27 PROCEDURE — 96372 THER/PROPH/DIAG INJ SC/IM: CPT

## 2019-11-27 PROCEDURE — 63600175 PHARM REV CODE 636 W HCPCS: Performed by: INTERNAL MEDICINE

## 2019-11-27 RX ADMIN — EPOETIN ALFA-EPBX 20000 UNITS: 10000 INJECTION, SOLUTION INTRAVENOUS; SUBCUTANEOUS at 11:11

## 2019-11-29 ENCOUNTER — LAB VISIT (OUTPATIENT)
Dept: LAB | Facility: HOSPITAL | Age: 64
End: 2019-11-29
Attending: INTERNAL MEDICINE
Payer: MEDICARE

## 2019-11-29 DIAGNOSIS — Z79.01 LONG TERM (CURRENT) USE OF ANTICOAGULANTS: ICD-10-CM

## 2019-11-29 DIAGNOSIS — Z94.0 KIDNEY REPLACED BY TRANSPLANT: ICD-10-CM

## 2019-11-29 LAB
BASOPHILS # BLD AUTO: 0.01 K/UL (ref 0–0.2)
BASOPHILS NFR BLD: 0.4 % (ref 0–1.9)
CMV DNA SERPL NAA+PROBE-ACNC: <35 IU/ML
DIFFERENTIAL METHOD: ABNORMAL
EOSINOPHIL # BLD AUTO: 0 K/UL (ref 0–0.5)
EOSINOPHIL NFR BLD: 1.3 % (ref 0–8)
ERYTHROCYTE [DISTWIDTH] IN BLOOD BY AUTOMATED COUNT: 15.7 % (ref 11.5–14.5)
HCT VFR BLD AUTO: 31 % (ref 37–48.5)
HGB BLD-MCNC: 9.4 G/DL (ref 12–16)
IMM GRANULOCYTES # BLD AUTO: 0.05 K/UL (ref 0–0.04)
IMM GRANULOCYTES NFR BLD AUTO: 2.2 % (ref 0–0.5)
LYMPHOCYTES # BLD AUTO: 0.2 K/UL (ref 1–4.8)
LYMPHOCYTES NFR BLD: 10.8 % (ref 18–48)
MCH RBC QN AUTO: 31.8 PG (ref 27–31)
MCHC RBC AUTO-ENTMCNC: 30.3 G/DL (ref 32–36)
MCV RBC AUTO: 105 FL (ref 82–98)
MONOCYTES # BLD AUTO: 0.2 K/UL (ref 0.3–1)
MONOCYTES NFR BLD: 10.8 % (ref 4–15)
NEUTROPHILS # BLD AUTO: 1.7 K/UL (ref 1.8–7.7)
NEUTROPHILS NFR BLD: 74.5 % (ref 38–73)
NRBC BLD-RTO: 1 /100 WBC
PLATELET # BLD AUTO: 116 K/UL (ref 150–350)
PMV BLD AUTO: 12.4 FL (ref 9.2–12.9)
RBC # BLD AUTO: 2.96 M/UL (ref 4–5.4)
WBC # BLD AUTO: 2.23 K/UL (ref 3.9–12.7)

## 2019-11-29 PROCEDURE — 85025 COMPLETE CBC W/AUTO DIFF WBC: CPT

## 2019-11-29 PROCEDURE — 36415 COLL VENOUS BLD VENIPUNCTURE: CPT | Mod: PO

## 2019-12-02 ENCOUNTER — LAB VISIT (OUTPATIENT)
Dept: LAB | Facility: HOSPITAL | Age: 64
End: 2019-12-02
Attending: INTERNAL MEDICINE
Payer: MEDICARE

## 2019-12-02 ENCOUNTER — OFFICE VISIT (OUTPATIENT)
Dept: TRANSPLANT | Facility: CLINIC | Age: 64
End: 2019-12-02
Payer: MEDICARE

## 2019-12-02 VITALS
HEART RATE: 89 BPM | TEMPERATURE: 99 F | RESPIRATION RATE: 16 BRPM | WEIGHT: 190.69 LBS | OXYGEN SATURATION: 97 % | HEIGHT: 63 IN | BODY MASS INDEX: 33.79 KG/M2 | SYSTOLIC BLOOD PRESSURE: 172 MMHG | DIASTOLIC BLOOD PRESSURE: 80 MMHG

## 2019-12-02 DIAGNOSIS — N18.30 CKD (CHRONIC KIDNEY DISEASE) STAGE 3, GFR 30-59 ML/MIN: ICD-10-CM

## 2019-12-02 DIAGNOSIS — Z94.0 STATUS POST KIDNEY TRANSPLANT: Primary | ICD-10-CM

## 2019-12-02 DIAGNOSIS — Z94.0 KIDNEY REPLACED BY TRANSPLANT: ICD-10-CM

## 2019-12-02 DIAGNOSIS — Z91.89 AT RISK FOR OPPORTUNISTIC INFECTIONS: ICD-10-CM

## 2019-12-02 DIAGNOSIS — Z94.0 STATUS POST KIDNEY TRANSPLANT: ICD-10-CM

## 2019-12-02 DIAGNOSIS — D84.9 IMMUNOCOMPROMISED STATE: ICD-10-CM

## 2019-12-02 DIAGNOSIS — Z29.89 PROPHYLACTIC IMMUNOTHERAPY: ICD-10-CM

## 2019-12-02 LAB
ALBUMIN SERPL BCP-MCNC: 4 G/DL (ref 3.5–5.2)
ANION GAP SERPL CALC-SCNC: 10 MMOL/L (ref 8–16)
ANISOCYTOSIS BLD QL SMEAR: SLIGHT
BASOPHILS NFR BLD: 0 % (ref 0–1.9)
BUN SERPL-MCNC: 14 MG/DL (ref 8–23)
BURR CELLS BLD QL SMEAR: ABNORMAL
CALCIUM SERPL-MCNC: 8.7 MG/DL (ref 8.7–10.5)
CHLORIDE SERPL-SCNC: 105 MMOL/L (ref 95–110)
CO2 SERPL-SCNC: 26 MMOL/L (ref 23–29)
CREAT SERPL-MCNC: 1.2 MG/DL (ref 0.5–1.4)
DACRYOCYTES BLD QL SMEAR: ABNORMAL
DIFFERENTIAL METHOD: ABNORMAL
EOSINOPHIL NFR BLD: 4 % (ref 0–8)
ERYTHROCYTE [DISTWIDTH] IN BLOOD BY AUTOMATED COUNT: 16 % (ref 11.5–14.5)
EST. GFR  (AFRICAN AMERICAN): 56 ML/MIN/1.73 M^2
EST. GFR  (NON AFRICAN AMERICAN): 48.6 ML/MIN/1.73 M^2
GLUCOSE SERPL-MCNC: 162 MG/DL (ref 70–110)
HCT VFR BLD AUTO: 32.2 % (ref 37–48.5)
HGB BLD-MCNC: 10.2 G/DL (ref 12–16)
HYPOCHROMIA BLD QL SMEAR: ABNORMAL
IMM GRANULOCYTES # BLD AUTO: ABNORMAL K/UL (ref 0–0.04)
IMM GRANULOCYTES NFR BLD AUTO: ABNORMAL % (ref 0–0.5)
LYMPHOCYTES NFR BLD: 16 % (ref 18–48)
MAGNESIUM SERPL-MCNC: 1.1 MG/DL (ref 1.6–2.6)
MCH RBC QN AUTO: 33.1 PG (ref 27–31)
MCHC RBC AUTO-ENTMCNC: 31.7 G/DL (ref 32–36)
MCV RBC AUTO: 105 FL (ref 82–98)
MONOCYTES NFR BLD: 16 % (ref 4–15)
NEUTROPHILS NFR BLD: 64 % (ref 38–73)
NRBC BLD-RTO: 0 /100 WBC
PHOSPHATE SERPL-MCNC: 2.3 MG/DL (ref 2.7–4.5)
PLATELET # BLD AUTO: 131 K/UL (ref 150–350)
PLATELET BLD QL SMEAR: ABNORMAL
PMV BLD AUTO: 12.4 FL (ref 9.2–12.9)
POIKILOCYTOSIS BLD QL SMEAR: SLIGHT
POLYCHROMASIA BLD QL SMEAR: ABNORMAL
POTASSIUM SERPL-SCNC: 3.6 MMOL/L (ref 3.5–5.1)
RBC # BLD AUTO: 3.08 M/UL (ref 4–5.4)
SODIUM SERPL-SCNC: 141 MMOL/L (ref 136–145)
WBC # BLD AUTO: 1.97 K/UL (ref 3.9–12.7)

## 2019-12-02 PROCEDURE — 36415 COLL VENOUS BLD VENIPUNCTURE: CPT

## 2019-12-02 PROCEDURE — 99999 PR PBB SHADOW E&M-EST. PATIENT-LVL IV: ICD-10-PCS | Mod: PBBFAC,,, | Performed by: INTERNAL MEDICINE

## 2019-12-02 PROCEDURE — 99215 OFFICE O/P EST HI 40 MIN: CPT | Mod: S$PBB,,, | Performed by: INTERNAL MEDICINE

## 2019-12-02 PROCEDURE — 80197 ASSAY OF TACROLIMUS: CPT

## 2019-12-02 PROCEDURE — 83735 ASSAY OF MAGNESIUM: CPT

## 2019-12-02 PROCEDURE — 99215 PR OFFICE/OUTPT VISIT, EST, LEVL V, 40-54 MIN: ICD-10-PCS | Mod: S$PBB,,, | Performed by: INTERNAL MEDICINE

## 2019-12-02 PROCEDURE — 80069 RENAL FUNCTION PANEL: CPT

## 2019-12-02 PROCEDURE — 85027 COMPLETE CBC AUTOMATED: CPT

## 2019-12-02 PROCEDURE — 99214 OFFICE O/P EST MOD 30 MIN: CPT | Mod: PBBFAC | Performed by: INTERNAL MEDICINE

## 2019-12-02 PROCEDURE — 85007 BL SMEAR W/DIFF WBC COUNT: CPT

## 2019-12-02 PROCEDURE — 99999 PR PBB SHADOW E&M-EST. PATIENT-LVL IV: CPT | Mod: PBBFAC,,, | Performed by: INTERNAL MEDICINE

## 2019-12-02 RX ORDER — CARVEDILOL 25 MG/1
12.5 TABLET ORAL 2 TIMES DAILY WITH MEALS
Qty: 30 TABLET | Refills: 11 | Status: ON HOLD | OUTPATIENT
Start: 2019-12-02 | End: 2021-04-08 | Stop reason: HOSPADM

## 2019-12-02 RX ORDER — KETOCONAZOLE 200 MG/1
100 TABLET ORAL DAILY
Qty: 15 TABLET | Refills: 11 | Status: ON HOLD | OUTPATIENT
Start: 2019-12-02 | End: 2019-12-19 | Stop reason: HOSPADM

## 2019-12-02 RX ORDER — NIFEDIPINE 60 MG/1
60 TABLET, EXTENDED RELEASE ORAL 2 TIMES DAILY
Qty: 60 TABLET | Refills: 11 | Status: ON HOLD | OUTPATIENT
Start: 2019-12-02 | End: 2020-11-26 | Stop reason: HOSPADM

## 2019-12-02 NOTE — PROGRESS NOTES
Kidney Post-Transplant Assessment    Referring Physician: Bruce Khan  Current Nephrologist: Bruce Khan    ORGAN: LEFT KIDNEY  Donor Type: donation after brain death  PHS Increased Risk: no  Cold Ischemia: 545 mins  Induction Medications: thymoglobulin    Subjective:     CC:  Reassessment of renal allograft function and management of chronic immunosuppression.    HPI:  Ms. Schulte is a 64 y.o. 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 Her most recent creatinine is 1.2. She takes mycophenolic acid and tacrolimus for maintenance immunosuppression. Her post transplant course has been uncomplicated to date.    Interval Hx  HX incontinence --Pt reports this is getting better and has no issues Urology follows as needed  Overall feels well. No health concerns today.   Her BP is elevated and has been elevated for last 5-7 days according to her notebook. She ran out of BP meds and pharmacy would not refill them. She called her coordinator this am who has sent Coreg, Procardia and Ketoconazole refills.    She is asymptomatic. She was  regarding the need for her to be proactive and call coordinator if meds are not refill the same day. Her daughter and Ms Schulte verbally acknowledge. There seems to be a mild cognitive failure to understand how important her meds are. She is slow to respond. Discussed with daughter and she will be more careful about her meds. Last FK-506 was low. Today's labs are pending.  She has increased her fluid intake and sCr has been stable still to early to establish baseline.      Current Outpatient Medications on File Prior to Visit   Medication Sig Dispense Refill    acyclovir (ZOVIRAX) 400 MG tablet Take 1 tablet (400 mg total) by mouth 2 (two) times daily. Stop 12/19/19 60 tablet 2    atorvastatin (LIPITOR) 40 MG tablet Take 40 mg by mouth once daily.       atovaquone (MEPRON) 750 mg/5 mL Susp Take 10 mLs  (1,500 mg total) by mouth once daily. Stop 9/19/2020 300 mL 11    bisacodyl (DULCOLAX) 5 mg EC tablet Take 2 tablets (10 mg total) by mouth daily as needed for Constipation.  0    cinacalcet (SENSIPAR) 60 MG Tab Take 1 tablet (60 mg total) by mouth daily with breakfast. 30 tablet 11    docusate sodium (COLACE) 100 MG capsule Take 1 capsule (100 mg total) by mouth 3 (three) times daily as needed for Constipation. (Patient not taking: Reported on 11/18/2019)  0    famotidine (PEPCID) 20 MG tablet Take 1 tablet (20 mg total) by mouth every evening. 30 tablet 0    hydrALAZINE (APRESOLINE) 25 MG tablet Take 1 tablet (25 mg total) by mouth 3 (three) times daily. 90 tablet 11    k phos di & mono-sod phos mono (K-PHOS-NEUTRAL) 250 mg Tab Take 2 tablets by mouth 2 (two) times daily. 120 tablet 11    magnesium oxide (MAG-OX) 400 mg (241.3 mg magnesium) tablet Take 2 tablets (800 mg total) by mouth 2 (two) times daily. 120 tablet 11    mycophenolate (CELLCEPT) 250 mg Cap Take 2 capsules (500 mg total) by mouth 2 (two) times daily. 120 capsule 11    oxyCODONE-acetaminophen (PERCOCET) 5-325 mg per tablet Take 1 tablet by mouth every 4 (four) hours as needed for Pain. (Patient not taking: Reported on 10/30/2019) 30 tablet 0    predniSONE (DELTASONE) 5 MG tablet Take by mouth daily: 20mg (4 tablets) 9/23-10/22; 15 mg (3 tablets) 10/23-11/22; 10 mg (2 tablets) 11/23-12/22; then 5 mg (1 tablet) daily thereafter 12/23/19 120 tablet 11    sodium bicarbonate 650 MG tablet Take 1 tablet (650 mg total) by mouth 2 (two) times daily. 60 tablet 11    tacrolimus (PROGRAF) 1 MG Cap Take 9 capsules (9 mg total) by mouth every morning AND 9 capsules (9 mg total) every evening. 540 capsule 11    [DISCONTINUED] carvedilol (COREG) 25 MG tablet Take 0.5 tablets (12.5 mg total) by mouth 2 (two) times daily with meals. Hold for SBP<120, HR<60 30 tablet 1    [DISCONTINUED] ketoconazole (NIZORAL) 200 mg Tab Take 0.5 tablets (100 mg total)  by mouth once daily. (Patient not taking: Reported on 11/18/2019) 15 tablet 11    [DISCONTINUED] NIFEdipine (ADALAT CC) 60 MG TbSR Take 1 tablet (60 mg total) by mouth 2 (two) times daily. 60 tablet 1     No current facility-administered medications on file prior to visit.         Review of Systems     Skin: no skin rash  CNS; no headaches, blurred vision, seizure, or syncope  ENT: No JVD,  Adenopathies,  nasal congestion. No oral lesions  Cardiac: No chest pain, dyspnea, claudication, edema or palpitations  Respiratory: No SOB, cough, hemoptysis   Gastro-intestinal: No diarrhea, constipation, abdominal pain, nausea, vomit. No ascitis  Genitourinary: no hematuria, dysuria, frequency, frequency  Musculoskeletal: joint pain, arthritis or vasculitic changes  Psych: alert awake, oriented, No cranial nerves deficit.      Objective:       Physical Exam     Head: normocephalic  Neck: No JVD, cervical axillary, or femoral adenopathies  Heart: no murmurs, Normal s1 and s2, No gallops, no rubs, No murmurs  Lungs; CTA, good respiratory effort, no crackles  Abdomen: soft, non tender, no splenomegaly or hepatomegaly, no massess, no bruits  Extremities: No edema, skin rash, joint pain  SNC: awake, alert oriented. Cranial nerves are intact, no focalized, sensitivity and strength preserved      Labs:  Lab Results   Component Value Date    WBC 2.23 (L) 11/29/2019    HGB 9.4 (L) 11/29/2019    HCT 31.0 (L) 11/29/2019     11/25/2019    K 4.2 11/25/2019     11/25/2019    CO2 25 11/25/2019    BUN 24 (H) 11/25/2019    CREATININE 1.2 11/25/2019    EGFRNONAA 48.6 (A) 11/25/2019    CALCIUM 9.6 11/25/2019    PHOS 2.8 11/25/2019    MG 1.4 (L) 11/25/2019    ALBUMIN 3.8 11/25/2019    AST 10 11/18/2019    ALT 15 11/18/2019    UTPCR 0.18 11/18/2019    .0 (H) 10/21/2019    TACROLIMUS 5.6 11/25/2019       No results found for: EXTANC, EXTWBC, EXTSEGS, EXTPLATELETS, EXTHEMOGLOBI, EXTHEMATOCRI, EXTCREATININ, EXTSODIUM,  EXTPOTASSIUM, EXTBUN, EXTCO2, EXTCALCIUM, EXTPHOSPHORU, EXTGLUCOSE, EXTALBUMIN, EXTAST, EXTALT, EXTBILITOTAL, EXTLIPASE, EXTAMYLASE    No results found for: EXTCYCLOSLVL, EXTSIROLIMUS, EXTTACROLVL, EXTPROTCRE, EXTPTHINTACT, EXTPROTEINUA, EXTWBCUA, EXTRBCUA    Labs were reviewed with the patient    Assessment:     1. Status post kidney transplant    2. Prophylactic immunotherapy    3. Immunocompromised state    4. CKD (chronic kidney disease) stage 3, GFR 30-59 ml/min    5. At risk for opportunistic infections        Plan:        1. CKD stage 3 with stable/improved creatinine : will continue follow up as per our center guidelines. patient to continue close follow up with the local General nephrologist. Education provided in appropriate fluid intake, potassium intake. Continue with oral hydration.  - sCr is improved but FK-506 level is low today's level is pending.  - baseline 1.4-1.7 mg/dL    2. Immunosuppression: prograf level will be adjusted bese on level. Labs today pending.  bid will await today's result and hold if needed for leukopenia as she has been taking it. Shehas her pill box with her.  Lab Results   Component Value Date    TACROLIMUS 5.6 11/25/2019    TACROLIMUS 8.0 11/18/2019    TACROLIMUS 7.4 11/11/2019     No results found for: CYCLOSPORINE  - please restart Ketoconazole 100 mg daily refill sent to pharmacy today.   Will closely monitor for toxicities, education provided about adherence to medicines and need to communicate any side effect to the transplant nurse or physician.    3. Allograft Function:stable at baseline for the patient. Continue follow up as per our guidelines and with the local General nephrologist. Communication will be sent today.  Lab Results   Component Value Date    CREATININE 1.2 11/25/2019    CREATININE 1.4 11/18/2019    CREATININE 1.3 11/11/2019     No results found for: AMYLASE, LIPASE    4. Hypertension management: well controlled at home  Continue with home blood  "pressure monitoring, low salt and healthy life discussed with the patient.  - She has been off her meds secondary to "not having refills"  - Restart current regiment.    5. Metabolic Bone Disease/Secondary Hyperparathyroidism:calcium and phosphorus level discussed with the patient, patient will continue follow up with the general nephrologist for management of metabolic bone disease.   calcium and phosphorus as per our center protocol. Will monitor PTH, Vit D level, calcium.      Lab Results   Component Value Date    .0 (H) 10/21/2019    CALCIUM 9.6 11/25/2019    PHOS 2.8 11/25/2019    PHOS 2.5 (L) 11/18/2019    PHOS 2.7 11/11/2019       6. Electrolytes: reviewed with the patient, essentially within the normal range no need for acute changes today, will monitor as per our center guidelines.     Lab Results   Component Value Date     11/25/2019    K 4.2 11/25/2019     11/25/2019    CO2 25 11/25/2019    CO2 23 11/18/2019    CO2 20 (L) 11/11/2019   - Continue KPN supplements  - Continue Mag Ox  supplements    7. Anemia: will continue monitoring as per our center guidelines. No indication for acute intervention today.     Lab Results   Component Value Date    WBC 2.23 (L) 11/29/2019    HGB 9.4 (L) 11/29/2019    HCT 31.0 (L) 11/29/2019     (H) 11/29/2019     (L) 11/29/2019       8.Proteinuria: will continue with pr/cr ratio as per our center guidelines  Lab Results   Component Value Date    PROTEINURINE 25 (H) 11/18/2019    CREATRANDUR 138.0 11/18/2019    UTPCR 0.18 11/18/2019    UTPCR 0.14 10/21/2019    UTPCR 1.66 (H) 09/20/2019        9. BK virus infection screening: will continue with urine or blood PCR as per our guidelines to prevent BK virus viremia and allograft dysfunction  Lab Results   Component Value Date    BKVIRUSPCRQB <125 11/18/2019         10. Weight education: provided during the clinic visit.   There is no height or weight on file to calculate BMI.       11.Patient " safety education regarding immunosuppression including prophylaxis posttransplant for CMV, PCP : Education provided about vaccination and prevention of infections.    12.  Cytopenias: no significant cytopenias will monitor as per our guidelines. Medicine list reviewed including potential causes of drug-induced cytopenias     Lab Results   Component Value Date    WBC 2.23 (L) 11/29/2019    HGB 9.4 (L) 11/29/2019    HCT 31.0 (L) 11/29/2019     (H) 11/29/2019     (L) 11/29/2019       13. Post-transplant Prophylaxis; CMV Infection, PJP and Candida mucosistis and other indicated for this particular patient. Atovaquone. Weekly serum CMV pcr. She has been undetectable until last week with a viral load less than 35     We spoke with the patient for 30 minutes. More than half dedicated to counseling and education. All questions answered    Discussed With Dr Pavel Nixon MD  Transplant Nephrology Fellow      Follow-up:   Clinic: return to transplant clinic weekly for the first month after transplant; every 2 weeks during months 2-3; then at 6-, 9-, 12-, 18-, 24-, and 36- months post-transplant to reassess for complications from immunosuppression toxicity and monitor for rejection.  Annually thereafter.    Labs: since patient remains at high risk for rejection and drug-related complications that warrant close monitoring, labs will be ordered as follows: continue twice weekly CBC, renal panel, and drug level for first month; then same labs once weekly through 3rd month post-transplant.  Urine for UA and protein/creatinine ratio monthly.  Serum BK - PCR at 1-, 3-, 6-, 9-, 12-, 18-, 24-, 36-, 48-, and 60 months post-transplant.  Hepatic panel at 1-, 2-, 3-, 6-, 9-, 12-, 18-, 24-, and 36- months post-transplant.         Education:   Material provided to the patient.  Patient reminded to call with any health changes since these can be early signs of significant complications.  Also, I advised the  patient to be sure any new medications or changes of old medications are discussed with either a pharmacist or physician knowledgeable with transplant to avoid rejection/drug toxicity related to significant drug interactions.

## 2019-12-02 NOTE — TELEPHONE ENCOUNTER
Requesting refills for Coreg,Nifedipine and Ketoconazole sent to [facility]'s pharmacy.

## 2019-12-02 NOTE — TELEPHONE ENCOUNTER
----- Message from Petra Daly sent at 12/2/2019  8:04 AM CST -----  Contact: Dank (son-in-law) @ [phone]  Pt would like to speak with someone concerning her medications.  Pls call.

## 2019-12-02 NOTE — LETTER
December 4, 2019        Bruce Khan  [address]  [place] [address]  Phone: [phone]  Fax: [phone]             [facility]  [address]  [facility] [address]  Phone: [phone]   Patient: Satinder Schulte   MR Number: [id number]   YOB: 1955   Date of Visit: 12/2/2019       Dear Dr. Bruce Khan    Thank you for referring Satinder Schulte to me for evaluation. Attached you will find relevant portions of my assessment and plan of care.    If you have questions, please do not hesitate to call me. I look forward to following Satinder Schulte along with you.    Sincerely,    Pavel Padilla MD    Enclosure    If you would like to receive this communication electronically, please contact [email or web address] or [phone] to request [employer] Link access.    [employer] Link is a tool which provides read-only access to select patient information with whom you have a relationship. Its easy to use and provides real time access to review your patients record including encounter summaries, notes, results, and demographic information.    If you feel you have received this communication in error or would no longer like to receive these types of communications, please e-mail [email or web address]

## 2019-12-03 DIAGNOSIS — N25.81 SECONDARY HYPERPARATHYROIDISM: ICD-10-CM

## 2019-12-03 DIAGNOSIS — E83.42 HYPOMAGNESEMIA: ICD-10-CM

## 2019-12-03 LAB — TACROLIMUS BLD-MCNC: 5.7 NG/ML (ref 5–15)

## 2019-12-03 NOTE — PROGRESS NOTES
Let's plan for magnesium sulfate 3 gm IV please  Lower sensipar to 30 mg daily  Increase magnesium oxide to 800 mg three times a day  magnesium rich meals

## 2019-12-03 NOTE — PROGRESS NOTES
I understand she was not taking ketoconazole as per our conversation yesterday in clinic. Will repeat level on Friday 12/6 on Ketoconazole

## 2019-12-03 NOTE — TELEPHONE ENCOUNTER
Patient daughter sharif repeated back and voice a understanding of orders.  High mag diet reviewed.  Repeat labs schedule for 12/5 as previously schedule.  IV mag schedule for tomorrow 12/4 at [facility].

## 2019-12-03 NOTE — TELEPHONE ENCOUNTER
----- Message from Pavel Padilla MD sent at 12/3/2019 10:54 AM CST -----  I understand she was not taking ketoconazole as per our conversation yesterday in clinic. Will repeat level on Friday 12/6 on Ketoconazole

## 2019-12-04 ENCOUNTER — INFUSION (OUTPATIENT)
Dept: INFUSION THERAPY | Facility: HOSPITAL | Age: 64
End: 2019-12-04
Attending: INTERNAL MEDICINE
Payer: MEDICARE

## 2019-12-04 VITALS
SYSTOLIC BLOOD PRESSURE: 114 MMHG | RESPIRATION RATE: 18 BRPM | DIASTOLIC BLOOD PRESSURE: 48 MMHG | HEART RATE: 74 BPM | TEMPERATURE: 98 F

## 2019-12-04 DIAGNOSIS — N18.9 ANEMIA ASSOCIATED WITH CHRONIC RENAL FAILURE: Primary | ICD-10-CM

## 2019-12-04 DIAGNOSIS — D63.1 ANEMIA ASSOCIATED WITH CHRONIC RENAL FAILURE: Primary | ICD-10-CM

## 2019-12-04 LAB — CMV DNA SERPL NAA+PROBE-ACNC: 183 IU/ML

## 2019-12-04 PROCEDURE — 96366 THER/PROPH/DIAG IV INF ADDON: CPT

## 2019-12-04 PROCEDURE — 96365 THER/PROPH/DIAG IV INF INIT: CPT

## 2019-12-04 PROCEDURE — 63600175 PHARM REV CODE 636 W HCPCS: Performed by: INTERNAL MEDICINE

## 2019-12-04 RX ORDER — CINACALCET 30 MG/1
30 TABLET, FILM COATED ORAL
Qty: 30 TABLET | Refills: 11 | Status: ON HOLD | OUTPATIENT
Start: 2019-12-04 | End: 2021-04-07

## 2019-12-04 RX ORDER — LANOLIN ALCOHOL/MO/W.PET/CERES
800 CREAM (GRAM) TOPICAL 3 TIMES DAILY
Qty: 180 TABLET | Refills: 11 | Status: SHIPPED | OUTPATIENT
Start: 2019-12-04 | End: 2020-10-13

## 2019-12-04 RX ORDER — MAGNESIUM SULFATE 1 G/100ML
1 INJECTION INTRAVENOUS
Status: COMPLETED | OUTPATIENT
Start: 2019-12-04 | End: 2019-12-04

## 2019-12-04 RX ADMIN — MAGNESIUM SULFATE 1 G: 1 INJECTION INTRAVENOUS at 12:12

## 2019-12-04 RX ADMIN — MAGNESIUM SULFATE 1 G: 1 INJECTION INTRAVENOUS at 11:12

## 2019-12-04 RX ADMIN — MAGNESIUM SULFATE 1 G: 1 INJECTION INTRAVENOUS at 01:12

## 2019-12-04 NOTE — PLAN OF CARE
Pt to infusion for scheduled infusion of 3gms magnesium sulfate. Plan of care reviewed, including duration of 3-4 for treatment. Pt verbalizes understanding and son notified of probable completion time.

## 2019-12-04 NOTE — PLAN OF CARE
Patient tolerated magnesium infusion well; vss; no adverse reactions noted. Iv discontinued with unit intact; 2x2/coban dressing to site. Avs printed and reviewed with patient. Verbalizes understanding. High magnesium food printed information given to patient.

## 2019-12-05 ENCOUNTER — LAB VISIT (OUTPATIENT)
Dept: LAB | Facility: HOSPITAL | Age: 64
End: 2019-12-05
Attending: INTERNAL MEDICINE
Payer: MEDICARE

## 2019-12-05 DIAGNOSIS — Z94.0 KIDNEY REPLACED BY TRANSPLANT: ICD-10-CM

## 2019-12-05 DIAGNOSIS — B25.8 OTHER CYTOMEGALOVIRAL DISEASES: ICD-10-CM

## 2019-12-05 DIAGNOSIS — B25.8 OTHER CYTOMEGALOVIRAL DISEASES: Primary | ICD-10-CM

## 2019-12-05 LAB
ANISOCYTOSIS BLD QL SMEAR: SLIGHT
BASOPHILS # BLD AUTO: 0 K/UL (ref 0–0.2)
BASOPHILS NFR BLD: 0 % (ref 0–1.9)
BURR CELLS BLD QL SMEAR: ABNORMAL
DACRYOCYTES BLD QL SMEAR: ABNORMAL
DIFFERENTIAL METHOD: ABNORMAL
EOSINOPHIL # BLD AUTO: 0 K/UL (ref 0–0.5)
EOSINOPHIL NFR BLD: 1.1 % (ref 0–8)
ERYTHROCYTE [DISTWIDTH] IN BLOOD BY AUTOMATED COUNT: 15.2 % (ref 11.5–14.5)
HCT VFR BLD AUTO: 29.4 % (ref 37–48.5)
HGB BLD-MCNC: 8.9 G/DL (ref 12–16)
HYPOCHROMIA BLD QL SMEAR: ABNORMAL
IMM GRANULOCYTES # BLD AUTO: 0.02 K/UL (ref 0–0.04)
IMM GRANULOCYTES NFR BLD AUTO: 1.1 % (ref 0–0.5)
LYMPHOCYTES # BLD AUTO: 0.2 K/UL (ref 1–4.8)
LYMPHOCYTES NFR BLD: 11.9 % (ref 18–48)
MCH RBC QN AUTO: 31.9 PG (ref 27–31)
MCHC RBC AUTO-ENTMCNC: 30.3 G/DL (ref 32–36)
MCV RBC AUTO: 105 FL (ref 82–98)
MONOCYTES # BLD AUTO: 0.2 K/UL (ref 0.3–1)
MONOCYTES NFR BLD: 11.3 % (ref 4–15)
NEUTROPHILS # BLD AUTO: 1.3 K/UL (ref 1.8–7.7)
NEUTROPHILS NFR BLD: 74.6 % (ref 38–73)
NRBC BLD-RTO: 0 /100 WBC
PLATELET # BLD AUTO: 119 K/UL (ref 150–350)
PLATELET BLD QL SMEAR: ABNORMAL
PMV BLD AUTO: 12 FL (ref 9.2–12.9)
POIKILOCYTOSIS BLD QL SMEAR: SLIGHT
POLYCHROMASIA BLD QL SMEAR: ABNORMAL
RBC # BLD AUTO: 2.79 M/UL (ref 4–5.4)
WBC # BLD AUTO: 1.77 K/UL (ref 3.9–12.7)

## 2019-12-05 PROCEDURE — 80069 RENAL FUNCTION PANEL: CPT

## 2019-12-05 PROCEDURE — 85025 COMPLETE CBC W/AUTO DIFF WBC: CPT

## 2019-12-05 PROCEDURE — 83735 ASSAY OF MAGNESIUM: CPT

## 2019-12-05 PROCEDURE — 80197 ASSAY OF TACROLIMUS: CPT

## 2019-12-05 PROCEDURE — 36415 COLL VENOUS BLD VENIPUNCTURE: CPT | Mod: PO

## 2019-12-05 RX ORDER — VALGANCICLOVIR 450 MG/1
450 TABLET, FILM COATED ORAL 2 TIMES DAILY
Qty: 60 TABLET | Refills: 11 | Status: ON HOLD | OUTPATIENT
Start: 2019-12-05 | End: 2019-12-19 | Stop reason: SDUPTHER

## 2019-12-05 RX ORDER — VALGANCICLOVIR 450 MG/1
450 TABLET, FILM COATED ORAL 2 TIMES DAILY
Qty: 60 TABLET | Refills: 11 | Status: CANCELLED | OUTPATIENT
Start: 2019-12-05 | End: 2020-12-04

## 2019-12-05 NOTE — TELEPHONE ENCOUNTER
Results reviewed with patient daughter Niya.  All questions answered.  12/2 Allosure is pending.  Will add DSA to  next labs are 12/9/19.  12/5 labs are pending.  Valcyte dose reviewed with B Wise PharmD .

## 2019-12-05 NOTE — PROGRESS NOTES
Please make sure she is on treatment dose Valcyte  Weekly serum CMV pcr  D/c MMF  DSA and allosure now and monthly please

## 2019-12-06 ENCOUNTER — TELEPHONE (OUTPATIENT)
Dept: TRANSPLANT | Facility: CLINIC | Age: 64
End: 2019-12-06

## 2019-12-06 LAB
ALBUMIN SERPL BCP-MCNC: 3.7 G/DL (ref 3.5–5.2)
ANION GAP SERPL CALC-SCNC: 15 MMOL/L (ref 8–16)
BUN SERPL-MCNC: 24 MG/DL (ref 8–23)
CALCIUM SERPL-MCNC: 8.6 MG/DL (ref 8.7–10.5)
CHLORIDE SERPL-SCNC: 108 MMOL/L (ref 95–110)
CO2 SERPL-SCNC: 22 MMOL/L (ref 23–29)
CREAT SERPL-MCNC: 1.5 MG/DL (ref 0.5–1.4)
EST. GFR  (AFRICAN AMERICAN): 42.7 ML/MIN/1.73 M^2
EST. GFR  (NON AFRICAN AMERICAN): 37.1 ML/MIN/1.73 M^2
GLUCOSE SERPL-MCNC: 207 MG/DL (ref 70–110)
MAGNESIUM SERPL-MCNC: 1.6 MG/DL (ref 1.6–2.6)
PHOSPHATE SERPL-MCNC: 2.7 MG/DL (ref 2.7–4.5)
POTASSIUM SERPL-SCNC: 3.9 MMOL/L (ref 3.5–5.1)
SODIUM SERPL-SCNC: 145 MMOL/L (ref 136–145)
TACROLIMUS BLD-MCNC: 9.4 NG/ML (ref 5–15)

## 2019-12-06 NOTE — TELEPHONE ENCOUNTER
----- Message from Pavel Padilla MD sent at 12/6/2019 10:46 AM CST -----  Labs and diagnostic tests were reviewed. No action/changes indicated.

## 2019-12-07 ENCOUNTER — NURSE TRIAGE (OUTPATIENT)
Dept: ADMINISTRATIVE | Facility: CLINIC | Age: 64
End: 2019-12-07

## 2019-12-07 NOTE — TELEPHONE ENCOUNTER
"    Reason for Disposition   [1] MODERATE weakness (i.e., interferes with work, school, normal activities) AND [2] cause unknown  (Exceptions: weakness with acute minor illness, or weakness from poor fluid intake)    Additional Information   Negative: Severe difficulty breathing (e.g., struggling for each breath, speaks in single words)   Negative: Shock suspected (e.g., cold/pale/clammy skin, too weak to stand, low BP, rapid pulse)   Negative: Difficult to awaken or acting confused  (e.g., disoriented, slurred speech)   Negative: [1] Fainted > 15 minutes ago AND [2] still feels too weak or dizzy to stand   Negative: [1] SEVERE weakness (i.e., unable to walk or barely able to walk, requires support) AND     [2] new onset or worsening   Negative: Sounds like a life-threatening emergency to the triager   Negative: Weakness of the face, arm or leg on one side of the body   Negative: [1] Known diabetic AND [2] weakness from low blood sugar (i.e., < 60 mg/dl or 3.5 mmol/l)   Negative: Heat exhaustion suspected (i.e., dehydration from heat exposure)   Negative: Vomiting is main symptom   Negative: Diarrhea is main symptom   Negative: Difficulty breathing   Negative: Heart beating < 50 beats per minute OR > 140 beats per minute   Negative: Follows bleeding (e.g., from vomiting, rectum, vagina; EXCEPTION: small brief weakness from sight of a small amount blood)   Negative: Extra heart beats OR irregular heart beating   (i.e., "palpitations")   Negative: Black or tarry bowel movements   Negative: [1] Drinking very little AND [2] dehydration suspected (e.g., no urine > 12 hours, very dry mouth, very lightheaded)   Negative: Patient sounds very sick or weak to the triager    Protocols used: ST WEAKNESS (GENERALIZED) AND FATIGUE-A-AH    Satinder feeling very weak today. Having to hold onto furniture at time to move around. Recommended ED now.   Kidney tx 9/20/19  BPA n/a  "

## 2019-12-09 ENCOUNTER — LAB VISIT (OUTPATIENT)
Dept: LAB | Facility: HOSPITAL | Age: 64
End: 2019-12-09
Attending: INTERNAL MEDICINE
Payer: MEDICARE

## 2019-12-09 DIAGNOSIS — Z94.0 KIDNEY REPLACED BY TRANSPLANT: ICD-10-CM

## 2019-12-09 LAB
BASOPHILS # BLD AUTO: 0.01 K/UL (ref 0–0.2)
BASOPHILS NFR BLD: 0.4 % (ref 0–1.9)
DIFFERENTIAL METHOD: ABNORMAL
EOSINOPHIL # BLD AUTO: 0 K/UL (ref 0–0.5)
EOSINOPHIL NFR BLD: 0.4 % (ref 0–8)
ERYTHROCYTE [DISTWIDTH] IN BLOOD BY AUTOMATED COUNT: 14.7 % (ref 11.5–14.5)
HCT VFR BLD AUTO: 31 % (ref 37–48.5)
HGB BLD-MCNC: 9.3 G/DL (ref 12–16)
IMM GRANULOCYTES # BLD AUTO: 0.04 K/UL (ref 0–0.04)
IMM GRANULOCYTES NFR BLD AUTO: 1.7 % (ref 0–0.5)
LYMPHOCYTES # BLD AUTO: 0.3 K/UL (ref 1–4.8)
LYMPHOCYTES NFR BLD: 12.8 % (ref 18–48)
MCH RBC QN AUTO: 31.8 PG (ref 27–31)
MCHC RBC AUTO-ENTMCNC: 30 G/DL (ref 32–36)
MCV RBC AUTO: 106 FL (ref 82–98)
MONOCYTES # BLD AUTO: 0.2 K/UL (ref 0.3–1)
MONOCYTES NFR BLD: 9.8 % (ref 4–15)
NEUTROPHILS # BLD AUTO: 1.8 K/UL (ref 1.8–7.7)
NEUTROPHILS NFR BLD: 74.9 % (ref 38–73)
NRBC BLD-RTO: 0 /100 WBC
PLATELET # BLD AUTO: 121 K/UL (ref 150–350)
PMV BLD AUTO: 12 FL (ref 9.2–12.9)
RBC # BLD AUTO: 2.92 M/UL (ref 4–5.4)
WBC # BLD AUTO: 2.35 K/UL (ref 3.9–12.7)

## 2019-12-09 PROCEDURE — 36415 COLL VENOUS BLD VENIPUNCTURE: CPT | Mod: PO

## 2019-12-09 PROCEDURE — 80197 ASSAY OF TACROLIMUS: CPT

## 2019-12-09 PROCEDURE — 86977 RBC SERUM PRETX INCUBJ/INHIB: CPT | Mod: 59

## 2019-12-09 PROCEDURE — 80069 RENAL FUNCTION PANEL: CPT

## 2019-12-09 PROCEDURE — 86833 HLA CLASS II HIGH DEFIN QUAL: CPT

## 2019-12-09 PROCEDURE — 86832 HLA CLASS I HIGH DEFIN QUAL: CPT

## 2019-12-09 PROCEDURE — 83735 ASSAY OF MAGNESIUM: CPT

## 2019-12-09 PROCEDURE — 85025 COMPLETE CBC W/AUTO DIFF WBC: CPT

## 2019-12-10 DIAGNOSIS — Z29.89 PROPHYLACTIC IMMUNOTHERAPY: ICD-10-CM

## 2019-12-10 DIAGNOSIS — Z94.0 STATUS POST KIDNEY TRANSPLANT: ICD-10-CM

## 2019-12-10 DIAGNOSIS — Z79.60 LONG-TERM USE OF IMMUNOSUPPRESSANT MEDICATION: ICD-10-CM

## 2019-12-10 LAB
ALBUMIN SERPL BCP-MCNC: 3.9 G/DL (ref 3.5–5.2)
ANION GAP SERPL CALC-SCNC: 8 MMOL/L (ref 8–16)
BUN SERPL-MCNC: 16 MG/DL (ref 8–23)
CALCIUM SERPL-MCNC: 9.2 MG/DL (ref 8.7–10.5)
CHLORIDE SERPL-SCNC: 108 MMOL/L (ref 95–110)
CO2 SERPL-SCNC: 27 MMOL/L (ref 23–29)
CREAT SERPL-MCNC: 1.5 MG/DL (ref 0.5–1.4)
EST. GFR  (AFRICAN AMERICAN): 42.7 ML/MIN/1.73 M^2
EST. GFR  (NON AFRICAN AMERICAN): 37.1 ML/MIN/1.73 M^2
GLUCOSE SERPL-MCNC: 268 MG/DL (ref 70–110)
MAGNESIUM SERPL-MCNC: 1.3 MG/DL (ref 1.6–2.6)
PHOSPHATE SERPL-MCNC: 3.2 MG/DL (ref 2.7–4.5)
POTASSIUM SERPL-SCNC: 4.4 MMOL/L (ref 3.5–5.1)
SODIUM SERPL-SCNC: 143 MMOL/L (ref 136–145)
TACROLIMUS BLD-MCNC: 11.5 NG/ML (ref 5–15)

## 2019-12-10 RX ORDER — TACROLIMUS 1 MG/1
CAPSULE ORAL
Qty: 480 CAPSULE | Refills: 11 | Status: SHIPPED | OUTPATIENT
Start: 2019-12-10 | End: 2020-01-21 | Stop reason: SDUPTHER

## 2019-12-10 NOTE — PROGRESS NOTES
Pt. Arrived to the floor via stretcher from PACU, pt. Transferred to her bed in her room. Lane with pink tinged urine in place, Surgical incision intact with minimal drainage and marked. ZULEIKA draining sanguineous drainage. Pt. Resting peacefully.     PA notified of pt. Arrival, CVP of 3, and /70--PA at bedside and examined the patient.   Anesthesia Type: 1% lidocaine with epinephrine Destruction After The Procedure: No Curettage Text: The wound bed was treated with curettage after the biopsy was performed. Lab: -401 Billing Type: Third-Party Bill Render Path Notes In Note?: Yes Detail Level: Detailed Electrodesiccation Text: The wound bed was treated with electrodesiccation after the biopsy was performed. Notification Instructions: Patient will be notified of biopsy results. However, patient instructed to call the office if not contacted within 2 weeks. Lab Facility: [id number] Wound Care: Aquaphor Depth Of Biopsy: dermis Size Of Lesion In Cm: 0.3 Additional Anesthesia Volume In Cc (Will Not Render If 0): 0 Post-Care Instructions: I reviewed with the patient in detail post-care instructions. Patient is to keep the biopsy site dry overnight, and then apply bacitracin twice daily until healed. Patient may apply hydrogen peroxide soaks to remove any crusting. Cryotherapy Text: The wound bed was treated with cryotherapy after the biopsy was performed. Type Of Destruction Used: Curettage Anesthesia Volume In Cc (Will Not Render If 0): 0.5 Consent: Written consent was obtained and risks were reviewed including but not limited to scarring, infection, bleeding, scabbing, incomplete removal, nerve damage and allergy to anesthesia. Electrodesiccation And Curettage Text: The wound bed was treated with electrodesiccation and curettage after the biopsy was performed. Biopsy Type: H and E Biopsy Method: Dermablade Hemostasis: Aluminum Chloride and Electrocautery Silver Nitrate Text: The wound bed was treated with silver nitrate after the biopsy was performed. Dressing: bandage Body Location Override (Optional - Billing Will Still Be Based On Selected Body Map Location If Applicable): mid crown

## 2019-12-11 LAB
ALLOSURE: NORMAL
CLASS I ANTIBODY COMMENTS - LUMINEX: NORMAL
CLASS II ANTIBODY COMMENTS - LUMINEX: NORMAL
CMV DNA SERPL NAA+PROBE-ACNC: 314 IU/ML
DSA1 TESTING DATE: NORMAL
DSA12 TESTING DATE: NORMAL
DSA2 TESTING DATE: NORMAL
SERUM COLLECTION DT - LUMINEX CLASS I: NORMAL
SERUM COLLECTION DT - LUMINEX CLASS II: NORMAL

## 2019-12-16 DIAGNOSIS — Z94.0 KIDNEY REPLACED BY TRANSPLANT: ICD-10-CM

## 2019-12-16 DIAGNOSIS — T75.89XA ENVIRONMENTAL EXPOSURE: Primary | ICD-10-CM

## 2019-12-16 DIAGNOSIS — Z57.8 OCCUPATIONAL EXPOSURE TO OTHER RISK FACTORS: ICD-10-CM

## 2019-12-16 SDOH — SOCIAL DETERMINANTS OF HEALTH (SDOH): OCCUPATIONAL EXPOSURE TO OTHER RISK FACTORS: Z57.8

## 2019-12-17 ENCOUNTER — TELEPHONE (OUTPATIENT)
Dept: TRANSPLANT | Facility: CLINIC | Age: 64
End: 2019-12-17

## 2019-12-17 ENCOUNTER — HOSPITAL ENCOUNTER (INPATIENT)
Facility: HOSPITAL | Age: 64
LOS: 3 days | Discharge: HOME HEALTH | DRG: 637 | End: 2019-12-20
Attending: EMERGENCY MEDICINE | Admitting: SURGERY
Payer: MEDICARE

## 2019-12-17 DIAGNOSIS — E11.9 NEW ONSET TYPE 2 DIABETES MELLITUS: ICD-10-CM

## 2019-12-17 DIAGNOSIS — B25.9 CYTOMEGALOVIRUS (CMV) VIREMIA: ICD-10-CM

## 2019-12-17 DIAGNOSIS — B25.8 OTHER CYTOMEGALOVIRAL DISEASES: ICD-10-CM

## 2019-12-17 DIAGNOSIS — D61.811 DRUG-INDUCED PANCYTOPENIA: ICD-10-CM

## 2019-12-17 DIAGNOSIS — Z29.89 PROPHYLACTIC IMMUNOTHERAPY: ICD-10-CM

## 2019-12-17 DIAGNOSIS — I12.9 HYPERTENSION, RENAL: Chronic | ICD-10-CM

## 2019-12-17 DIAGNOSIS — N18.30 CKD (CHRONIC KIDNEY DISEASE) STAGE 3, GFR 30-59 ML/MIN: ICD-10-CM

## 2019-12-17 DIAGNOSIS — D63.1 ANEMIA ASSOCIATED WITH CHRONIC RENAL FAILURE: Chronic | ICD-10-CM

## 2019-12-17 DIAGNOSIS — N18.9 ANEMIA ASSOCIATED WITH CHRONIC RENAL FAILURE: Chronic | ICD-10-CM

## 2019-12-17 DIAGNOSIS — Z91.89 AT RISK FOR OPPORTUNISTIC INFECTIONS: ICD-10-CM

## 2019-12-17 DIAGNOSIS — N17.9 AKI (ACUTE KIDNEY INJURY): ICD-10-CM

## 2019-12-17 DIAGNOSIS — Z94.0 STATUS POST KIDNEY TRANSPLANT: ICD-10-CM

## 2019-12-17 DIAGNOSIS — Z79.60 LONG-TERM USE OF IMMUNOSUPPRESSANT MEDICATION: ICD-10-CM

## 2019-12-17 DIAGNOSIS — R73.9 HYPERGLYCEMIA: ICD-10-CM

## 2019-12-17 DIAGNOSIS — E11.9 DIABETES MELLITUS, NEW ONSET: Primary | ICD-10-CM

## 2019-12-17 LAB
ALBUMIN SERPL BCP-MCNC: 4.4 G/DL (ref 3.5–5.2)
ALLENS TEST: ABNORMAL
ALP SERPL-CCNC: 103 U/L (ref 55–135)
ALT SERPL W/O P-5'-P-CCNC: 45 U/L (ref 10–44)
AMMONIA PLAS-SCNC: 27 UMOL/L (ref 10–50)
ANION GAP SERPL CALC-SCNC: 11 MMOL/L (ref 8–16)
AST SERPL-CCNC: 16 U/L (ref 10–40)
B-OH-BUTYR BLD STRIP-SCNC: 1.1 MMOL/L (ref 0–0.5)
BASOPHILS # BLD AUTO: 0 K/UL (ref 0–0.2)
BASOPHILS NFR BLD: 0 % (ref 0–1.9)
BILIRUB SERPL-MCNC: 0.6 MG/DL (ref 0.1–1)
BUN SERPL-MCNC: 26 MG/DL (ref 8–23)
CALCIUM SERPL-MCNC: 10.2 MG/DL (ref 8.7–10.5)
CHLORIDE SERPL-SCNC: 102 MMOL/L (ref 95–110)
CO2 SERPL-SCNC: 25 MMOL/L (ref 23–29)
CREAT SERPL-MCNC: 2.5 MG/DL (ref 0.5–1.4)
DIFFERENTIAL METHOD: ABNORMAL
EOSINOPHIL # BLD AUTO: 0 K/UL (ref 0–0.5)
EOSINOPHIL NFR BLD: 0.3 % (ref 0–8)
ERYTHROCYTE [DISTWIDTH] IN BLOOD BY AUTOMATED COUNT: 14 % (ref 11.5–14.5)
EST. GFR  (AFRICAN AMERICAN): 23 ML/MIN/1.73 M^2
EST. GFR  (NON AFRICAN AMERICAN): 20 ML/MIN/1.73 M^2
ESTIMATED AVG GLUCOSE: 180 MG/DL (ref 68–131)
GLUCOSE SERPL-MCNC: 793 MG/DL (ref 70–110)
HBA1C MFR BLD HPLC: 7.9 % (ref 4–5.6)
HCO3 UR-SCNC: 28.6 MMOL/L (ref 24–28)
HCT VFR BLD AUTO: 30.3 % (ref 37–48.5)
HGB BLD-MCNC: 9.7 G/DL (ref 12–16)
IMM GRANULOCYTES # BLD AUTO: 0.03 K/UL (ref 0–0.04)
IMM GRANULOCYTES NFR BLD AUTO: 1 % (ref 0–0.5)
LIPASE SERPL-CCNC: 36 U/L (ref 4–60)
LYMPHOCYTES # BLD AUTO: 0.4 K/UL (ref 1–4.8)
LYMPHOCYTES NFR BLD: 13.5 % (ref 18–48)
MCH RBC QN AUTO: 31.9 PG (ref 27–31)
MCHC RBC AUTO-ENTMCNC: 32 G/DL (ref 32–36)
MCV RBC AUTO: 100 FL (ref 82–98)
MONOCYTES # BLD AUTO: 0.1 K/UL (ref 0.3–1)
MONOCYTES NFR BLD: 2.3 % (ref 4–15)
NEUTROPHILS # BLD AUTO: 2.6 K/UL (ref 1.8–7.7)
NEUTROPHILS NFR BLD: 82.9 % (ref 38–73)
NRBC BLD-RTO: 0 /100 WBC
PCO2 BLDA: 41.7 MMHG (ref 35–45)
PH SMN: 7.45 [PH] (ref 7.35–7.45)
PLATELET # BLD AUTO: 100 K/UL (ref 150–350)
PMV BLD AUTO: 12 FL (ref 9.2–12.9)
PO2 BLDA: 70 MMHG (ref 40–60)
POC BE: 5 MMOL/L
POC SATURATED O2: 94 % (ref 95–100)
POC TCO2: 30 MMOL/L (ref 24–29)
POCT GLUCOSE: 251 MG/DL (ref 70–110)
POCT GLUCOSE: 270 MG/DL (ref 70–110)
POCT GLUCOSE: 281 MG/DL (ref 70–110)
POCT GLUCOSE: 370 MG/DL (ref 70–110)
POCT GLUCOSE: 428 MG/DL (ref 70–110)
POCT GLUCOSE: 469 MG/DL (ref 70–110)
POCT GLUCOSE: 483 MG/DL (ref 70–110)
POCT GLUCOSE: >500 MG/DL (ref 70–110)
POTASSIUM SERPL-SCNC: 5.3 MMOL/L (ref 3.5–5.1)
PROT SERPL-MCNC: 7.4 G/DL (ref 6–8.4)
RBC # BLD AUTO: 3.04 M/UL (ref 4–5.4)
SAMPLE: ABNORMAL
SITE: ABNORMAL
SODIUM SERPL-SCNC: 138 MMOL/L (ref 136–145)
WBC # BLD AUTO: 3.1 K/UL (ref 3.9–12.7)

## 2019-12-17 PROCEDURE — 99223 PR INITIAL HOSPITAL CARE,LEVL III: ICD-10-PCS | Mod: 24,AI,, | Performed by: PHYSICIAN ASSISTANT

## 2019-12-17 PROCEDURE — 93010 EKG 12-LEAD: ICD-10-PCS | Mod: ,,, | Performed by: INTERNAL MEDICINE

## 2019-12-17 PROCEDURE — 99285 EMERGENCY DEPT VISIT HI MDM: CPT | Mod: 25

## 2019-12-17 PROCEDURE — 83036 HEMOGLOBIN GLYCOSYLATED A1C: CPT

## 2019-12-17 PROCEDURE — 85025 COMPLETE CBC W/AUTO DIFF WBC: CPT

## 2019-12-17 PROCEDURE — 82962 GLUCOSE BLOOD TEST: CPT

## 2019-12-17 PROCEDURE — 83690 ASSAY OF LIPASE: CPT

## 2019-12-17 PROCEDURE — 82010 KETONE BODYS QUAN: CPT

## 2019-12-17 PROCEDURE — 93010 ELECTROCARDIOGRAM REPORT: CPT | Mod: ,,, | Performed by: INTERNAL MEDICINE

## 2019-12-17 PROCEDURE — 99900035 HC TECH TIME PER 15 MIN (STAT)

## 2019-12-17 PROCEDURE — 80053 COMPREHEN METABOLIC PANEL: CPT

## 2019-12-17 PROCEDURE — 99223 1ST HOSP IP/OBS HIGH 75: CPT | Mod: ,,, | Performed by: NURSE PRACTITIONER

## 2019-12-17 PROCEDURE — 20600001 HC STEP DOWN PRIVATE ROOM

## 2019-12-17 PROCEDURE — 25000003 PHARM REV CODE 250: Performed by: PHYSICIAN ASSISTANT

## 2019-12-17 PROCEDURE — 63600175 PHARM REV CODE 636 W HCPCS: Performed by: EMERGENCY MEDICINE

## 2019-12-17 PROCEDURE — 99223 1ST HOSP IP/OBS HIGH 75: CPT | Mod: 24,AI,, | Performed by: PHYSICIAN ASSISTANT

## 2019-12-17 PROCEDURE — 99285 PR EMERGENCY DEPT VISIT,LEVEL V: ICD-10-PCS | Mod: ,,, | Performed by: EMERGENCY MEDICINE

## 2019-12-17 PROCEDURE — 82140 ASSAY OF AMMONIA: CPT

## 2019-12-17 PROCEDURE — 63600175 PHARM REV CODE 636 W HCPCS: Performed by: PHYSICIAN ASSISTANT

## 2019-12-17 PROCEDURE — 99285 EMERGENCY DEPT VISIT HI MDM: CPT | Mod: ,,, | Performed by: EMERGENCY MEDICINE

## 2019-12-17 PROCEDURE — 82803 BLOOD GASES ANY COMBINATION: CPT

## 2019-12-17 PROCEDURE — 99223 PR INITIAL HOSPITAL CARE,LEVL III: ICD-10-PCS | Mod: ,,, | Performed by: NURSE PRACTITIONER

## 2019-12-17 PROCEDURE — 93005 ELECTROCARDIOGRAM TRACING: CPT

## 2019-12-17 RX ORDER — SODIUM BICARBONATE 650 MG/1
650 TABLET ORAL 2 TIMES DAILY
Status: DISCONTINUED | OUTPATIENT
Start: 2019-12-17 | End: 2019-12-20 | Stop reason: HOSPADM

## 2019-12-17 RX ORDER — LANOLIN ALCOHOL/MO/W.PET/CERES
800 CREAM (GRAM) TOPICAL 3 TIMES DAILY
Status: DISCONTINUED | OUTPATIENT
Start: 2019-12-17 | End: 2019-12-20 | Stop reason: HOSPADM

## 2019-12-17 RX ORDER — PREDNISONE 10 MG/1
10 TABLET ORAL DAILY
Status: DISCONTINUED | OUTPATIENT
Start: 2019-12-18 | End: 2019-12-19

## 2019-12-17 RX ORDER — TACROLIMUS 1 MG/1
8 CAPSULE ORAL 2 TIMES DAILY
Status: DISCONTINUED | OUTPATIENT
Start: 2019-12-17 | End: 2019-12-20 | Stop reason: HOSPADM

## 2019-12-17 RX ORDER — FAMOTIDINE 20 MG/1
20 TABLET, FILM COATED ORAL NIGHTLY
Status: DISCONTINUED | OUTPATIENT
Start: 2019-12-17 | End: 2019-12-20 | Stop reason: HOSPADM

## 2019-12-17 RX ORDER — CINACALCET 30 MG/1
30 TABLET, FILM COATED ORAL
Status: DISCONTINUED | OUTPATIENT
Start: 2019-12-18 | End: 2019-12-20 | Stop reason: HOSPADM

## 2019-12-17 RX ORDER — NIFEDIPINE 30 MG/1
60 TABLET, EXTENDED RELEASE ORAL 2 TIMES DAILY
Status: DISCONTINUED | OUTPATIENT
Start: 2019-12-17 | End: 2019-12-20 | Stop reason: HOSPADM

## 2019-12-17 RX ORDER — ACETAMINOPHEN 325 MG/1
650 TABLET ORAL EVERY 8 HOURS PRN
Status: DISCONTINUED | OUTPATIENT
Start: 2019-12-17 | End: 2019-12-20 | Stop reason: HOSPADM

## 2019-12-17 RX ORDER — ATOVAQUONE 750 MG/5ML
1500 SUSPENSION ORAL DAILY
Status: DISCONTINUED | OUTPATIENT
Start: 2019-12-17 | End: 2019-12-20 | Stop reason: HOSPADM

## 2019-12-17 RX ORDER — ONDANSETRON 8 MG/1
8 TABLET, ORALLY DISINTEGRATING ORAL EVERY 8 HOURS PRN
Status: DISCONTINUED | OUTPATIENT
Start: 2019-12-17 | End: 2019-12-20 | Stop reason: HOSPADM

## 2019-12-17 RX ORDER — HYDRALAZINE HYDROCHLORIDE 25 MG/1
25 TABLET, FILM COATED ORAL 3 TIMES DAILY
Status: DISCONTINUED | OUTPATIENT
Start: 2019-12-17 | End: 2019-12-20 | Stop reason: HOSPADM

## 2019-12-17 RX ORDER — CARVEDILOL 12.5 MG/1
12.5 TABLET ORAL 2 TIMES DAILY WITH MEALS
Status: DISCONTINUED | OUTPATIENT
Start: 2019-12-17 | End: 2019-12-20 | Stop reason: HOSPADM

## 2019-12-17 RX ORDER — VALGANCICLOVIR 450 MG/1
450 TABLET, FILM COATED ORAL 2 TIMES DAILY
Status: DISCONTINUED | OUTPATIENT
Start: 2019-12-17 | End: 2019-12-17

## 2019-12-17 RX ORDER — ATORVASTATIN CALCIUM 20 MG/1
40 TABLET, FILM COATED ORAL DAILY
Status: DISCONTINUED | OUTPATIENT
Start: 2019-12-17 | End: 2019-12-20 | Stop reason: HOSPADM

## 2019-12-17 RX ORDER — SODIUM CHLORIDE 0.9 % (FLUSH) 0.9 %
10 SYRINGE (ML) INJECTION
Status: DISCONTINUED | OUTPATIENT
Start: 2019-12-17 | End: 2019-12-20 | Stop reason: HOSPADM

## 2019-12-17 RX ORDER — SODIUM CHLORIDE 9 MG/ML
INJECTION, SOLUTION INTRAVENOUS CONTINUOUS
Status: DISCONTINUED | OUTPATIENT
Start: 2019-12-17 | End: 2019-12-19

## 2019-12-17 RX ORDER — TALC
6 POWDER (GRAM) TOPICAL NIGHTLY PRN
Status: DISCONTINUED | OUTPATIENT
Start: 2019-12-17 | End: 2019-12-20 | Stop reason: HOSPADM

## 2019-12-17 RX ORDER — HEPARIN SODIUM 5000 [USP'U]/ML
5000 INJECTION, SOLUTION INTRAVENOUS; SUBCUTANEOUS EVERY 8 HOURS
Status: DISCONTINUED | OUTPATIENT
Start: 2019-12-17 | End: 2019-12-20 | Stop reason: HOSPADM

## 2019-12-17 RX ADMIN — SODIUM CHLORIDE 1000 ML: 0.9 INJECTION, SOLUTION INTRAVENOUS at 02:12

## 2019-12-17 RX ADMIN — ATOVAQUONE 1500 MG: 750 SUSPENSION ORAL at 05:12

## 2019-12-17 RX ADMIN — SODIUM BICARBONATE 650 MG TABLET 650 MG: at 08:12

## 2019-12-17 RX ADMIN — Medication 6 MG: at 08:12

## 2019-12-17 RX ADMIN — SODIUM CHLORIDE 1.4 UNITS/HR: 9 INJECTION, SOLUTION INTRAVENOUS at 04:12

## 2019-12-17 RX ADMIN — SODIUM CHLORIDE 1000 ML: 0.9 INJECTION, SOLUTION INTRAVENOUS at 12:12

## 2019-12-17 RX ADMIN — ATORVASTATIN CALCIUM 40 MG: 20 TABLET, FILM COATED ORAL at 02:12

## 2019-12-17 RX ADMIN — SODIUM CHLORIDE 2.4 UNITS/HR: 9 INJECTION, SOLUTION INTRAVENOUS at 06:12

## 2019-12-17 RX ADMIN — HEPARIN SODIUM 5000 UNITS: 5000 INJECTION, SOLUTION INTRAVENOUS; SUBCUTANEOUS at 08:12

## 2019-12-17 RX ADMIN — SODIUM CHLORIDE 100 ML/HR: 0.9 INJECTION, SOLUTION INTRAVENOUS at 05:12

## 2019-12-17 RX ADMIN — CARVEDILOL 12.5 MG: 12.5 TABLET, FILM COATED ORAL at 05:12

## 2019-12-17 RX ADMIN — NIFEDIPINE 60 MG: 30 TABLET, FILM COATED, EXTENDED RELEASE ORAL at 08:12

## 2019-12-17 RX ADMIN — HYDRALAZINE HYDROCHLORIDE 25 MG: 25 TABLET, FILM COATED ORAL at 02:12

## 2019-12-17 RX ADMIN — TACROLIMUS 8 MG: 5 CAPSULE ORAL at 05:12

## 2019-12-17 RX ADMIN — HEPARIN SODIUM 5000 UNITS: 5000 INJECTION, SOLUTION INTRAVENOUS; SUBCUTANEOUS at 02:12

## 2019-12-17 RX ADMIN — Medication 800 MG: at 08:12

## 2019-12-17 RX ADMIN — HYDRALAZINE HYDROCHLORIDE 25 MG: 25 TABLET, FILM COATED ORAL at 08:12

## 2019-12-17 RX ADMIN — SODIUM CHLORIDE 1 UNITS/HR: 9 INJECTION, SOLUTION INTRAVENOUS at 02:12

## 2019-12-17 RX ADMIN — Medication 800 MG: at 02:12

## 2019-12-17 RX ADMIN — DIBASIC SODIUM PHOSPHATE, MONOBASIC POTASSIUM PHOSPHATE AND MONOBASIC SODIUM PHOSPHATE 2 TABLET: 852; 155; 130 TABLET ORAL at 08:12

## 2019-12-17 RX ADMIN — FAMOTIDINE 20 MG: 20 TABLET ORAL at 08:12

## 2019-12-17 NOTE — ASSESSMENT & PLAN NOTE
- s/p DDRT on 9/20/19  - Admitted with hyperglycemia  - Cr elevated on admit  - Hydrate with IVF  - Monitor

## 2019-12-17 NOTE — HPI
Reason for Consult: Management of new onset Diabetes Type 2, Hyperglycemia     Surgical Procedure and Date:   Kidney Transplant 9/2019    Diabetes diagnosis year: 2019    Home Diabetes Medications:  None    How often checking glucose at home? N/a   BG readings on regimen: N/A  Hypoglycemia on the regimen?  No  Missed doses on regimen?  No    Diabetes Complications include:     Hyperglycemia    Complicating diabetes co morbidities:   History of CVA and ESRD, HTN.       HPI:   Patient is a 64 y.o. female with a diagnosis of ESRD s/p DKKT (9/20/19) on cellcept, tacrolimus, prednisone, HTN presents with a chief complaint of hyperglycemia.  Patient had outpatient labs drawn yesterday that revealed hyperglycemia of 526.  Patient has no known history of diabetes.  Endocrinology consulted to evaluate new onset DM/hyperglycemia.       Lab Results   Component Value Date    HGBA1C 7.9 (H) 12/17/2019

## 2019-12-17 NOTE — ASSESSMENT & PLAN NOTE
- continue prograf and prednisone taper.  - will check daily prograf levels, monitor for toxic side effects, and adjust for therapeutic dose

## 2019-12-17 NOTE — SUBJECTIVE & OBJECTIVE
Interval HPI:   Overnight events:   BG is above goal at time of consult will evaluate for DKA.   Diet Clear liquid (no sugar)/Bariatric      Eatin%  Nausea: No  Hypoglycemia and intervention: No  Fever: No  TPN and/or TF: No  If yes, type of TF/TPN and rate: none    PMH, PSH, FH, SH  reviewed     Review of Systems   Constitutional: Negative for weight changes.  Eyes: Positive for visual disturbance. Complains of blurry vision.   Respiratory: Negative for cough.   Cardiovascular: Negative for chest pain.  Gastrointestinal: Negative for nausea.  Endocrine: Positive for polyuria, polydipsia.  Musculoskeletal: Negative for back pain.  Skin: Negative for rash.  Neurological: Negative for syncope.  Psychiatric/Behavioral: Negative for depression.    Current Medications and/or Treatments Impacting Glycemic Control  Immunotherapy:    Immunosuppressants         Stop Route Frequency     tacrolimus capsule 8 mg      -- Oral 2 times daily        Steroids:   Hormones (From admission, onward)    Start     Stop Route Frequency Ordered    19 0900  predniSONE tablet 10 mg      -- Oral Daily 19 1403    19 1307  melatonin tablet 6 mg      -- Oral Nightly PRN 19 1209        Pressors:    Autonomic Drugs (From admission, onward)    None        Hyperglycemia/Diabetes Medications:   Antihyperglycemics (From admission, onward)    Start     Stop Route Frequency Ordered    19 1415  insulin regular (Humulin R) 100 Units in sodium chloride 0.9% 100 mL infusion     Question:  Insulin Rate Adjustment (DO NOT MODIFY ANSWER)  Answer:  [email or web address] [id number]    -- IV Continuous 19 1312             PHYSICAL EXAMINATION:  Vitals:    19 1501   BP: (!) 151/68   Pulse: 77   Resp: 20   Temp:      Body mass index is 33.48 kg/m².    Physical Exam   Constitutional: Well developed, well nourished, NAD.  ENT: External ears no masses with nose patent; normal  hearing.  Neck: Supple; trachea midline.  Cardiovascular: Normal heart sounds, no LE edema. DP +2 bilaterally.  Lungs: Normal effort; lungs anterior bilaterally clear to auscultation.  Abdomen: Soft, no masses, no hernias.  MS: No clubbing or cyanosis of nails noted; unable to assess gait.  Skin: No rashes, lesions, or ulcers; no nodules.   Psychiatric: Good judgement and insight; normal mood and affect.  Neurological: Cranial nerves are grossly intact. Normal sensation in the bilateral lower extremities.  Foot: Nails in good condition, no amputations noted.

## 2019-12-17 NOTE — HPI
Ms. Satinder Schulte is a 63 year old AA female with a PMHx relevant for HTN, CVA and ESRD presumed secondary to HTN who received DBD kidney transplant 9/20/2019 (Thymo induction 2/2 leukopenia, KDPI 78%, CIT 9 hr 5 min, CMV D-/R+, donor EBV IgG+, recipient HBsAB+/HBcAB -, donor HCV AB + NAOMI -). Of note, donor cultures positive for Staphylococcus Lugdunesis. Treated with Ancef.  She presents today after routine labs revealed glucose of 526 on 12/16/19. Complaining of blurry vision x 1 week and dry mouth. Otherwise, denies abdominal pain, change in urinary output, fever/chills, chest pain, shortness of breath, diarrhea, n/v, lightheadedness, and diaphoresis. No personal history of diabetes.

## 2019-12-17 NOTE — H&P
[facility]  Kidney Transplant  H&P      Subjective:     Chief Complaint/Reason for Admission: Hyperglycemia    History of Present Illness:  Ms. Satinder Schulte is a 63 year old AA female with a PMHx relevant for HTN, CVA and ESRD presumed secondary to HTN who received DBD kidney transplant 9/20/2019 (Thymo induction 2/2 leukopenia, KDPI 78%, CIT 9 hr 5 min, CMV D-/R+, donor EBV IgG+, recipient HBsAB+/HBcAB -, donor HCV AB + NAOMI -). Of note, donor cultures positive for Staphylococcus Lugdunesis. Treated with Ancef.  She presents today after routine labs revealed glucose of 526 on 12/16/19. On admit, . Complaining of blurry vision x 1 week and dry mouth. Otherwise, denies abdominal pain, change in urinary output, fever/chills, chest pain, shortness of breath, diarrhea, n/v, lightheadedness, and diaphoresis. No personal history of diabetes. Endocrine consulted for BG management. Appreciate assistance.       (Not in a hospital admission)    Review of patient's allergies indicates:  No Known Allergies    Past Medical History:   Diagnosis Date    Abnormal Pap smear     repeat paps were normal    Anemia associated with chronic renal failure     Awaiting organ transplant status  - 02/18/2013 6/6/2014    Blood type A+ 6/6/2014    CKD (chronic kidney disease) stage 3, GFR 30-59 ml/min 10/30/2019    CKD (chronic kidney disease) stage 5, GFR less than 15 ml/min     secondary hypertension    Essential hypertension 5/19/2017    Hyperlipidemia     Hypertension, renal 1992    Immunocompromised state 10/4/2019    Stroke 2007    Residual speech deficit     Past Surgical History:   Procedure Laterality Date    AV FISTULA PLACEMENT  2014    BONE MARROW BIOPSY Right 8/23/2018    Procedure: Biopsy-bone marrow;  Surgeon: Anna Lin MD;  Location: [facility] OR [address] [place] [address];  Service: Oncology;  Laterality: Right;    CHOLECYSTECTOMY  2008    HYSTERECTOMY  2011    TAHBSO for benign reasons    KIDNEY TRANSPLANT  "N/A 9/20/2019    Procedure: TRANSPLANT, KIDNEY;  Surgeon: Guero Rogers MD;  Location: [facility] OR [address] [place] [address];  Service: Transplant;  Laterality: N/A;    OOPHORECTOMY       Family History     Problem Relation (Age of Onset)    Breast cancer Daughter    Heart disease Mother, Father, Sister    Hypertension Mother    Kidney disease Mother, Brother    Ovarian cancer Cousin    Stroke Mother, Sister        Tobacco Use    Smoking status: Never Smoker    Smokeless tobacco: Never Used   Substance and Sexual Activity    Alcohol use: No    Drug use: No    Sexual activity: Never     Comment: single, Lives with daughter, on Disability, Lives in [place]        Review of Systems   Constitutional: Negative for activity change, appetite change, chills, fatigue and fever.   Eyes: Positive for visual disturbance (blurred vision).   Respiratory: Negative for cough and shortness of breath.    Cardiovascular: Negative for chest pain and leg swelling.   Gastrointestinal: Negative for abdominal distention, constipation, diarrhea, nausea and vomiting.   Genitourinary: Negative for decreased urine volume and difficulty urinating.   Allergic/Immunologic: Positive for immunocompromised state.   Neurological: Negative for dizziness and weakness.     Objective:     Vital Signs (Most Recent):  Temp: 98.2 °F (36.8 °C) (12/17/19 1123)  Pulse: 77 (12/17/19 1501)  Resp: 20 (12/17/19 1501)  BP: (!) 151/68 (12/17/19 1501)  SpO2: 99 % (12/17/19 1501)  Height: 5' 3" (160 cm)  Weight: 85.7 kg (189 lb)  Body mass index is 33.48 kg/m².     Physical Exam   Constitutional: She is oriented to person, place, and time. She appears well-developed.   Eyes: Pupils are equal, round, and reactive to light. EOM are normal.   Neck: Normal range of motion.   Cardiovascular: Normal rate, regular rhythm and normal heart sounds.   No murmur heard.  Pulmonary/Chest: Effort normal and breath sounds normal. No respiratory distress. She has no wheezes.   Abdominal: Soft. Bowel " sounds are normal. She exhibits no distension. There is no tenderness. There is no guarding.   Musculoskeletal: Normal range of motion.   Neurological: She is alert and oriented to person, place, and time.   Skin: Skin is warm and dry.   Nursing note and vitals reviewed.      Laboratory  CBC:   Recent Labs   Lab 12/16/19  1200 12/17/19  1210   WBC 1.70* 3.10*   RBC 3.06* 3.04*   HGB 9.8* 9.7*   HCT 31.2* 30.3*   * 100*   * 100*   MCH 32.0* 31.9*   MCHC 31.4* 32.0     CMP:   Recent Labs   Lab 12/16/19  1200   *   CALCIUM 10.3   ALBUMIN 4.3  4.3   PROT 7.2      K 4.9   CO2 23      BUN 22   CREATININE 2.2*   ALKPHOS 97   ALT 52*   AST 21     Labs within the past 24 hours have been reviewed.    Diagnostic Results:  None    Assessment/Plan:     * Diabetes mellitus, new onset  - a/w hyperglycemia (glucose 526 on 12/16). No hx of diabetes.  - POCT glucose > 500 on admit. A1c 7.9  - No evidence of DKA  - Given 2 L NS in ED  - Endocrine consulted, started on insulin gtt  - Clear liquid diet  - Monitor.       Long-term use of immunosuppressant medication  - see prophylactic immunotherapy      Prophylactic immunotherapy  - continue prograf and prednisone taper.  - will check daily prograf levels, monitor for toxic side effects, and adjust for therapeutic dose        At risk for opportunistic infections  - continue mepron  - valcyte on hold for neutropenia      Status post kidney transplant  - s/p DDRT on 9/20/19  - Admitted with hyperglycemia  - Cr elevated on admit  - Hydrate with IVF  - Monitor      Drug-induced pancytopenia  - off cellcept and valcyte  - WBC 3.10, ANC 2600 on admit  - Monitor with daily CBC      Anemia associated with chronic renal failure  - H&H stable on admit  - NO s/s of active bleeding  - Monitor with daily CBC      Hypertension, renal  - continue home BP medications (hydralazine, coreg, and nifedipine).   - monitor           Discharge Planning: not current  candidate    Kori Zavala PA-C  Kidney Transplant  [facility]

## 2019-12-17 NOTE — ASSESSMENT & PLAN NOTE
BG goal 140 - 180     Continue IV insulin infusion protocol  Requires intensive BG monitoring while on protocol   No anion gap noted.     No dx of DKA at this time.   C02 WNL   Postitive B-Hydroxybuterate.   Patient able to tolerate PO intake at this time.   Mental status intact.     Discharge planning: TBD

## 2019-12-17 NOTE — CONSULTS
[facility]  Endocrinology  Diabetes Consult Note    Consult Requested by: Matthieu Lyle MD   Reason for admit: Diabetes mellitus, new onset    HISTORY OF PRESENT ILLNESS:  Reason for Consult: Management of new onset Diabetes Type 2, Hyperglycemia     Surgical Procedure and Date:   Kidney Transplant 2019    Diabetes diagnosis year:     Home Diabetes Medications:  None    How often checking glucose at home? N/a   BG readings on regimen: N/A  Hypoglycemia on the regimen?  No  Missed doses on regimen?  No    Diabetes Complications include:     Hyperglycemia    Complicating diabetes co morbidities:   History of CVA and ESRD, HTN.       HPI:   Patient is a 64 y.o. female with a diagnosis of ESRD s/p DKKT (19) on cellcept, tacrolimus, prednisone, HTN presents with a chief complaint of hyperglycemia.  Patient had outpatient labs drawn yesterday that revealed hyperglycemia of 526.  Patient has no known history of diabetes.   Endocrinology consulted to evaluate new onset DM/hyperglycemia.       Lab Results   Component Value Date    HGBA1C 7.9 (H) 2019       Interval HPI:   Overnight events:   BG is above goal at time of consult will evaluate for DKA.   Diet Clear liquid (no sugar)/Bariatric      Eatin%  Nausea: No  Hypoglycemia and intervention: No  Fever: No  TPN and/or TF: No  If yes, type of TF/TPN and rate: none    PMH, PSH, FH, SH  reviewed     Review of Systems   Constitutional: Negative for weight changes.  Eyes: Positive for visual disturbance. Complains of blurry vision.   Respiratory: Negative for cough.   Cardiovascular: Negative for chest pain.  Gastrointestinal: Negative for nausea.  Endocrine: Positive for polyuria, polydipsia.  Musculoskeletal: Negative for back pain.  Skin: Negative for rash.  Neurological: Negative for syncope.  Psychiatric/Behavioral: Negative for depression.    Current Medications and/or Treatments Impacting Glycemic Control  Immunotherapy:     Immunosuppressants         Stop Route Frequency     tacrolimus capsule 8 mg      -- Oral 2 times daily        Steroids:   Hormones (From admission, onward)    Start     Stop Route Frequency Ordered    12/18/19 0900  predniSONE tablet 10 mg      -- Oral Daily 12/17/19 1403    12/17/19 1307  melatonin tablet 6 mg      -- Oral Nightly PRN 12/17/19 1209        Pressors:    Autonomic Drugs (From admission, onward)    None        Hyperglycemia/Diabetes Medications:   Antihyperglycemics (From admission, onward)    Start     Stop Route Frequency Ordered    12/17/19 1415  insulin regular (Humulin R) 100 Units in sodium chloride 0.9% 100 mL infusion     Question:  Insulin Rate Adjustment (DO NOT MODIFY ANSWER)  Answer:  [email or web address] [id number]    -- IV Continuous 12/17/19 1312             PHYSICAL EXAMINATION:  Vitals:    12/17/19 1501   BP: (!) 151/68   Pulse: 77   Resp: 20   Temp:      Body mass index is 33.48 kg/m².    Physical Exam   Constitutional: Well developed, well nourished, NAD.  ENT: External ears no masses with nose patent; normal hearing.  Neck: Supple; trachea midline.  Cardiovascular: Normal heart sounds, no LE edema. DP +2 bilaterally.  Lungs: Normal effort; lungs anterior bilaterally clear to auscultation.  Abdomen: Soft, no masses, no hernias.  MS: No clubbing or cyanosis of nails noted; unable to assess gait.  Skin: No rashes, lesions, or ulcers; no nodules.   Psychiatric: Good judgement and insight; normal mood and affect.  Neurological: Cranial nerves are grossly intact. Normal sensation in the bilateral lower extremities.  Foot: Nails in good condition, no amputations noted.         Labs Reviewed and Include   No results for input(s): GLU, CALCIUM, ALBUMIN, PROT, NA, K, CO2, CL, BUN, CREATININE, ALKPHOS, ALT, AST, BILITOT in the last 24 hours.  Lab Results   Component Value Date    WBC 3.10 (L) 12/17/2019    HGB 9.7 (L) 12/17/2019    HCT 30.3 (L)  12/17/2019     (H) 12/17/2019     (L) 12/17/2019     No results for input(s): TSH, FREET4 in the last 168 hours.  Lab Results   Component Value Date    HGBA1C 7.9 (H) 12/17/2019       Nutritional status:   Body mass index is 33.48 kg/m².  Lab Results   Component Value Date    ALBUMIN 4.3 12/16/2019    ALBUMIN 4.3 12/16/2019    ALBUMIN 3.9 12/09/2019     No results found for: PREALBUMIN    Estimated Creatinine Clearance: 27.5 mL/min (A) (based on SCr of 2.2 mg/dL (H)).    Accu-Checks  Recent Labs     12/17/19  1127 12/17/19  1438   POCTGLUCOSE >500* >500*        ASSESSMENT and PLAN    * Diabetes mellitus, new onset    New Onset type 2    New onset type 2 diabetes mellitus  BG goal 140 - 180     Continue IV insulin infusion protocol  Requires intensive BG monitoring while on protocol   No anion gap noted.     No dx of DKA at this time.   C02 WNL   Postitive B-Hydroxybuterate.   Patient able to tolerate PO intake at this time.   Mental status intact.     Discharge planning: TBD      Long-term use of immunosuppressant medication    On immunosepresive therapy per transplant team; may elevate BG readings          Plan discussed with patient and RN at bedside.     Marcus Weir NP  Endocrinology  [facility]

## 2019-12-17 NOTE — SUBJECTIVE & OBJECTIVE
Subjective:     Chief Complaint/Reason for Admission: Hyperglycemia    History of Present Illness:  Ms. Satinder Schulte is a 63 year old AA female with a PMHx relevant for HTN, CVA and ESRD presumed secondary to HTN who received DBD kidney transplant 9/20/2019 (Thymo induction 2/2 leukopenia, KDPI 78%, CIT 9 hr 5 min, CMV D-/R+, donor EBV IgG+, recipient HBsAB+/HBcAB -, donor HCV AB + NAOMI -). Of note, donor cultures positive for Staphylococcus Lugdunesis. Treated with Ancef.  She presents today after routine labs revealed glucose of 526 on 12/16/19. Complaining of blurry vision x 1 week and dry mouth. Otherwise, denies abdominal pain, change in urinary output, fever/chills, chest pain, shortness of breath, diarrhea, n/v, lightheadedness, and diaphoresis. No personal history of diabetes.       (Not in a hospital admission)    Review of patient's allergies indicates:  No Known Allergies    Past Medical History:   Diagnosis Date    Abnormal Pap smear     repeat paps were normal    Anemia associated with chronic renal failure     Awaiting organ transplant status  - 02/18/2013 6/6/2014    Blood type A+ 6/6/2014    CKD (chronic kidney disease) stage 3, GFR 30-59 ml/min 10/30/2019    CKD (chronic kidney disease) stage 5, GFR less than 15 ml/min     secondary hypertension    Essential hypertension 5/19/2017    Hyperlipidemia     Hypertension, renal 1992    Immunocompromised state 10/4/2019    Stroke 2007    Residual speech deficit     Past Surgical History:   Procedure Laterality Date    AV FISTULA PLACEMENT  2014    BONE MARROW BIOPSY Right 8/23/2018    Procedure: Biopsy-bone marrow;  Surgeon: Anna Lin MD;  Location: [facility] OR [address] [place] [address];  Service: Oncology;  Laterality: Right;    CHOLECYSTECTOMY  2008    HYSTERECTOMY  2011    TAHBSO for benign reasons    KIDNEY TRANSPLANT N/A 9/20/2019    Procedure: TRANSPLANT, KIDNEY;  Surgeon: Guero Rogers MD;  Location: [facility] OR [address] [place] [address];  Service: Transplant;  Laterality:  "N/A;    OOPHORECTOMY       Family History     Problem Relation (Age of Onset)    Breast cancer Daughter    Heart disease Mother, Father, Sister    Hypertension Mother    Kidney disease Mother, Brother    Ovarian cancer Cousin    Stroke Mother, Sister        Tobacco Use    Smoking status: Never Smoker    Smokeless tobacco: Never Used   Substance and Sexual Activity    Alcohol use: No    Drug use: No    Sexual activity: Never     Comment: single, Lives with daughter, on Disability, Lives in [place]        Review of Systems   Constitutional: Negative for activity change, appetite change, chills, fatigue and fever.   Eyes: Positive for visual disturbance (blurred vision).   Respiratory: Negative for cough and shortness of breath.    Cardiovascular: Negative for chest pain and leg swelling.   Gastrointestinal: Negative for abdominal distention, constipation, diarrhea, nausea and vomiting.   Genitourinary: Negative for decreased urine volume and difficulty urinating.   Allergic/Immunologic: Positive for immunocompromised state.   Neurological: Negative for dizziness and weakness.     Objective:     Vital Signs (Most Recent):  Temp: 98.2 °F (36.8 °C) (12/17/19 1123)  Pulse: 77 (12/17/19 1501)  Resp: 20 (12/17/19 1501)  BP: (!) 151/68 (12/17/19 1501)  SpO2: 99 % (12/17/19 1501)  Height: 5' 3" (160 cm)  Weight: 85.7 kg (189 lb)  Body mass index is 33.48 kg/m².     Physical Exam   Constitutional: She is oriented to person, place, and time. She appears well-developed.   Eyes: Pupils are equal, round, and reactive to light. EOM are normal.   Neck: Normal range of motion.   Cardiovascular: Normal rate, regular rhythm and normal heart sounds.   No murmur heard.  Pulmonary/Chest: Effort normal and breath sounds normal. No respiratory distress. She has no wheezes.   Abdominal: Soft. Bowel sounds are normal. She exhibits no distension. There is no tenderness. There is no guarding.   Musculoskeletal: Normal range of motion. "   Neurological: She is alert and oriented to person, place, and time.   Skin: Skin is warm and dry.   Nursing note and vitals reviewed.      Laboratory  CBC:   Recent Labs   Lab 12/16/19  1200 12/17/19  1210   WBC 1.70* 3.10*   RBC 3.06* 3.04*   HGB 9.8* 9.7*   HCT 31.2* 30.3*   * 100*   * 100*   MCH 32.0* 31.9*   MCHC 31.4* 32.0     CMP:   Recent Labs   Lab 12/16/19  1200   *   CALCIUM 10.3   ALBUMIN 4.3  4.3   PROT 7.2      K 4.9   CO2 23      BUN 22   CREATININE 2.2*   ALKPHOS 97   ALT 52*   AST 21     Labs within the past 24 hours have been reviewed.    Diagnostic Results:  None

## 2019-12-17 NOTE — TELEPHONE ENCOUNTER
TEJAS Present to the ER now.  12/16/19 Serum glucose 526 reviewed after receiving clinical lab.  ______________  Patient Daughter Niya repeated back and voice a understanding of orders.  All appropriate personnel notified.

## 2019-12-17 NOTE — ED NOTES
"Patient arrives per MD order for evaluation of "high sugars" - patient is kidney recipient (September 2019) and had routine labwork completed recently - was told to come in today for increased glucose (>500)     Patient complains of thirst, intermittent nausea - loss of appetite - denies pain - denies shortness of breath - states some weakness over past few days - denies ever being diabetic, though on dialysis for approximately five years prior to kidney transplant   "

## 2019-12-17 NOTE — ED PROVIDER NOTES
Encounter Date: 12/17/2019       History     Chief Complaint   Patient presents with    Abnormal Lab     elevated sugar, kidney coord juan carlos called , kidney xplant in sept, poc gl in triage >>>>>>500     65 yo W with pmhx ESRD s/p DKKT (9/20/19) on cellcept, tacrolimus, prednisone, HTN presents with a chief complaint of hyperglycemia.  Patient had outpatient labs drawn yesterday that revealed hyperglycemia of 526.  Patient has no known history of diabetes.  Remainder of labs revealed a creatinine of 2.2 which is up from baseline of 1.5 on December 9th.  BUN was elevated of 22.  Normal anion gap at 12.  Patient white blood cell count of 1.7.  Hemoglobin was at baseline.  Patient does take prednisone.  She reports 4 days of weakness.  Associated with decreased appetite and dry mouth.  No chest pain, shortness of breath, cough, fevers, chills, abdominal pain, nausea, vomiting.  Patient was recently treated for UTI.  History is assisted by the patient's daughter.  She reports that her mother does suffer from chronic confusion but is not worse at this time than usual.  Patient's daughters upon stable for giving the patient her medications.        Review of patient's allergies indicates:  No Known Allergies  Past Medical History:   Diagnosis Date    Abnormal Pap smear     repeat paps were normal    Anemia associated with chronic renal failure     Awaiting organ transplant status  - 02/18/2013 6/6/2014    Blood type A+ 6/6/2014    CKD (chronic kidney disease) stage 3, GFR 30-59 ml/min 10/30/2019    CKD (chronic kidney disease) stage 5, GFR less than 15 ml/min     secondary hypertension    Essential hypertension 5/19/2017    Hyperlipidemia     Hypertension, renal 1992    Immunocompromised state 10/4/2019    Stroke 2007    Residual speech deficit     Past Surgical History:   Procedure Laterality Date    AV FISTULA PLACEMENT  2014    BONE MARROW BIOPSY Right 8/23/2018    Procedure: Biopsy-bone marrow;  Surgeon:  Anna Lin MD;  Location: [address];  Service: Oncology;  Laterality: Right;    CHOLECYSTECTOMY  2008    HYSTERECTOMY  2011    TAHBSO for benign reasons    KIDNEY TRANSPLANT N/A 9/20/2019    Procedure: TRANSPLANT, KIDNEY;  Surgeon: Guero Rgoers MD;  Location: [address];  Service: Transplant;  Laterality: N/A;    OOPHORECTOMY       Family History   Problem Relation Age of Onset    Stroke Mother     Kidney disease Mother         on dialysis    Hypertension Mother     Heart disease Mother     Heart disease Father     Stroke Sister     Kidney disease Brother     Breast cancer Daughter     Heart disease Sister     Ovarian cancer Cousin     Colon cancer Neg Hx     Thrombophilia Neg Hx      Social History     Tobacco Use    Smoking status: Never Smoker    Smokeless tobacco: Never Used   Substance Use Topics    Alcohol use: No    Drug use: No     Review of Systems   Constitutional: Positive for appetite change. Negative for fever.   HENT: Negative for congestion.    Eyes: Negative for discharge.   Respiratory: Negative for shortness of breath.    Cardiovascular: Negative for chest pain.   Gastrointestinal: Negative for abdominal pain.   Endocrine: Positive for polydipsia. Negative for polyuria.   Genitourinary: Negative for dysuria.   Musculoskeletal: Negative for back pain.   Skin: Negative for wound.   Allergic/Immunologic: Negative for immunocompromised state.   Neurological: Positive for weakness. Negative for headaches.   Hematological: Does not bruise/bleed easily.   Psychiatric/Behavioral: Negative for confusion.       Physical Exam     Initial Vitals [12/17/19 1123]   BP Pulse Resp Temp SpO2   (!) 153/66 65 18 98.2 °F (36.8 °C) 98 %      MAP       --         Physical Exam    Nursing note and vitals reviewed.  Constitutional: She appears well-developed and well-nourished. She is not diaphoretic. No distress.   HENT:   Head: Normocephalic and atraumatic.   Dry mucous membranes   Eyes:  EOM are normal. Pupils are equal, round, and reactive to light. Right eye exhibits no discharge. Left eye exhibits no discharge. No scleral icterus.   Neck: Normal range of motion. Neck supple. No JVD present.   Cardiovascular: Normal rate, regular rhythm, normal heart sounds and intact distal pulses. Exam reveals no gallop and no friction rub.    No murmur heard.  Pulmonary/Chest: Breath sounds normal. No respiratory distress. She has no wheezes. She has no rhonchi. She has no rales. She exhibits no tenderness.   Abdominal: Soft. Bowel sounds are normal. She exhibits no distension and no mass. There is no tenderness. There is no rebound and no guarding.   Musculoskeletal: Normal range of motion. She exhibits no edema or tenderness.   Left upper extremity AV fistula present   Lymphadenopathy:     She has no cervical adenopathy.   Neurological: She is alert. She has normal strength. No sensory deficit.   Oriented x3 but somewhat confused with clinical history, requires redirection by daughter   Skin: Skin is warm and dry. Capillary refill takes less than 2 seconds.   Psychiatric: She has a normal mood and affect.         ED Course   Procedures  Labs Reviewed   POCT GLUCOSE - Abnormal; Notable for the following components:       Result Value    POCT Glucose >500 (*)     All other components within normal limits   ISTAT PROCEDURE - Abnormal; Notable for the following components:    POC PO2 70 (*)     POC HCO3 28.6 (*)     POC SATURATED O2 94 (*)     POC TCO2 30 (*)     All other components within normal limits   CBC W/ AUTO DIFFERENTIAL   COMPREHENSIVE METABOLIC PANEL   BETA - HYDROXYBUTYRATE, SERUM   AMMONIA   LIPASE   HEMOGLOBIN A1C   POCT GLUCOSE MONITORING CONTINUOUS     EKG Readings: (Independently Interpreted)   Previous EKG: Compared with most recent EKG Rhythm: Normal Sinus Rhythm. Heart Rate: 65. ST Segments: Normal ST Segments. T Waves: Normal. Axis: Normal.       Imaging Results          X-Ray Chest AP  Portable (In process)                  Medical Decision Making:   History:   I obtained history from: someone other than patient.  Old Medical Records: I decided to obtain old medical records.  Initial Assessment:   63 yo W with pmhx ESRD s/p DKKT (9/20/19) on cellcept, tacrolimus, prednisone, HTN presents with a chief complaint of hyperglycemia, acute kidney injury.  Differential Diagnosis:   Hyperglycemia, steroid induced hyperglycemia, new onset diabetes, infectious issues, hyperammonemia  Clinical Tests:   Lab Tests: Ordered and Reviewed  Radiological Study: Ordered  ED Management:  Point of care glucose at triage greater than 500.  Patient with no infectious symptoms at this time.  She is taking chronic steroids which I suspect is the likely cause of her hyperglycemia.  Labs yesterday revealed normal anion gap.  UA was negative for ketones.  This being the case, I doubt DKA.  Will obtain additional labs.  Will administer 2L IV fluids.  Will recheck glucose after 2 L and then administer insulin.  Kidney transplant surgery consulted for admission.    Reassessment:  VBG without acidosis.  Kidney transplant surgery admitting patient for continued management.                                 Clinical Impression:       ICD-10-CM ICD-9-CM   1. Diabetes mellitus, new onset E11.9 250.00   2. Hyperglycemia R73.9 790.29   3. SAUNDRA (acute kidney injury) N17.9 584.9         Disposition:   Disposition: Admitted                     Matthieu Lyle MD  12/17/19 0028

## 2019-12-17 NOTE — ASSESSMENT & PLAN NOTE
- a/w hyperglycemia (glucose 526 on 12/16). No hx of diabetes.  - POCT glucose > 500 on admit. A1c 7.9  - No evidence of DKA  - Given 2 L NS in ED  - Endocrine consulted, started on insulin gtt  - Clear liquid diet  - Monitor.

## 2019-12-18 PROBLEM — B25.9 CYTOMEGALOVIRUS (CMV) VIREMIA: Status: ACTIVE | Noted: 2019-12-18

## 2019-12-18 LAB
ALBUMIN SERPL BCP-MCNC: 3.6 G/DL (ref 3.5–5.2)
ANION GAP SERPL CALC-SCNC: 8 MMOL/L (ref 8–16)
ANISOCYTOSIS BLD QL SMEAR: SLIGHT
BASOPHILS # BLD AUTO: 0.01 K/UL (ref 0–0.2)
BASOPHILS NFR BLD: 0.6 % (ref 0–1.9)
BUN SERPL-MCNC: 18 MG/DL (ref 8–23)
CALCIUM SERPL-MCNC: 9.6 MG/DL (ref 8.7–10.5)
CHLORIDE SERPL-SCNC: 112 MMOL/L (ref 95–110)
CO2 SERPL-SCNC: 22 MMOL/L (ref 23–29)
CREAT SERPL-MCNC: 1.5 MG/DL (ref 0.5–1.4)
DACRYOCYTES BLD QL SMEAR: ABNORMAL
DIFFERENTIAL METHOD: ABNORMAL
EOSINOPHIL # BLD AUTO: 0 K/UL (ref 0–0.5)
EOSINOPHIL NFR BLD: 1.1 % (ref 0–8)
ERYTHROCYTE [DISTWIDTH] IN BLOOD BY AUTOMATED COUNT: 14.5 % (ref 11.5–14.5)
EST. GFR  (AFRICAN AMERICAN): 42.7 ML/MIN/1.73 M^2
EST. GFR  (NON AFRICAN AMERICAN): 37.1 ML/MIN/1.73 M^2
GLUCOSE SERPL-MCNC: 163 MG/DL (ref 70–110)
HCT VFR BLD AUTO: 28.5 % (ref 37–48.5)
HGB BLD-MCNC: 9.1 G/DL (ref 12–16)
HYPOCHROMIA BLD QL SMEAR: ABNORMAL
IMM GRANULOCYTES # BLD AUTO: 0.02 K/UL (ref 0–0.04)
IMM GRANULOCYTES NFR BLD AUTO: 1.1 % (ref 0–0.5)
LYMPHOCYTES # BLD AUTO: 0.4 K/UL (ref 1–4.8)
LYMPHOCYTES NFR BLD: 25.1 % (ref 18–48)
MAGNESIUM SERPL-MCNC: 1.7 MG/DL (ref 1.6–2.6)
MCH RBC QN AUTO: 32.6 PG (ref 27–31)
MCHC RBC AUTO-ENTMCNC: 31.9 G/DL (ref 32–36)
MCV RBC AUTO: 102 FL (ref 82–98)
MONOCYTES # BLD AUTO: 0.1 K/UL (ref 0.3–1)
MONOCYTES NFR BLD: 3.4 % (ref 4–15)
NEUTROPHILS # BLD AUTO: 1.2 K/UL (ref 1.8–7.7)
NEUTROPHILS NFR BLD: 68.7 % (ref 38–73)
NRBC BLD-RTO: 0 /100 WBC
OVALOCYTES BLD QL SMEAR: ABNORMAL
PHOSPHATE SERPL-MCNC: 2.9 MG/DL (ref 2.7–4.5)
PLATELET # BLD AUTO: 110 K/UL (ref 150–350)
PLATELET BLD QL SMEAR: ABNORMAL
PMV BLD AUTO: 11.9 FL (ref 9.2–12.9)
POCT GLUCOSE: 144 MG/DL (ref 70–110)
POCT GLUCOSE: 151 MG/DL (ref 70–110)
POCT GLUCOSE: 156 MG/DL (ref 70–110)
POCT GLUCOSE: 164 MG/DL (ref 70–110)
POCT GLUCOSE: 168 MG/DL (ref 70–110)
POCT GLUCOSE: 176 MG/DL (ref 70–110)
POCT GLUCOSE: 199 MG/DL (ref 70–110)
POCT GLUCOSE: 202 MG/DL (ref 70–110)
POCT GLUCOSE: 369 MG/DL (ref 70–110)
POCT GLUCOSE: 371 MG/DL (ref 70–110)
POCT GLUCOSE: 391 MG/DL (ref 70–110)
POIKILOCYTOSIS BLD QL SMEAR: SLIGHT
POLYCHROMASIA BLD QL SMEAR: ABNORMAL
POTASSIUM SERPL-SCNC: 4.6 MMOL/L (ref 3.5–5.1)
RBC # BLD AUTO: 2.79 M/UL (ref 4–5.4)
SODIUM SERPL-SCNC: 142 MMOL/L (ref 136–145)
TACROLIMUS BLD-MCNC: 7.3 NG/ML (ref 5–15)
WBC # BLD AUTO: 1.75 K/UL (ref 3.9–12.7)

## 2019-12-18 PROCEDURE — C9399 UNCLASSIFIED DRUGS OR BIOLOG: HCPCS | Performed by: NURSE PRACTITIONER

## 2019-12-18 PROCEDURE — 25000003 PHARM REV CODE 250: Performed by: PHYSICIAN ASSISTANT

## 2019-12-18 PROCEDURE — 85025 COMPLETE CBC W/AUTO DIFF WBC: CPT

## 2019-12-18 PROCEDURE — 99232 PR SUBSEQUENT HOSPITAL CARE,LEVL II: ICD-10-PCS | Mod: ,,, | Performed by: NURSE PRACTITIONER

## 2019-12-18 PROCEDURE — 83735 ASSAY OF MAGNESIUM: CPT

## 2019-12-18 PROCEDURE — 99233 SBSQ HOSP IP/OBS HIGH 50: CPT | Mod: 24,,, | Performed by: NURSE PRACTITIONER

## 2019-12-18 PROCEDURE — 97802 MEDICAL NUTRITION INDIV IN: CPT

## 2019-12-18 PROCEDURE — 63600175 PHARM REV CODE 636 W HCPCS: Performed by: PHYSICIAN ASSISTANT

## 2019-12-18 PROCEDURE — 99233 PR SUBSEQUENT HOSPITAL CARE,LEVL III: ICD-10-PCS | Mod: 24,,, | Performed by: NURSE PRACTITIONER

## 2019-12-18 PROCEDURE — 20600001 HC STEP DOWN PRIVATE ROOM

## 2019-12-18 PROCEDURE — 80197 ASSAY OF TACROLIMUS: CPT

## 2019-12-18 PROCEDURE — 99232 SBSQ HOSP IP/OBS MODERATE 35: CPT | Mod: ,,, | Performed by: NURSE PRACTITIONER

## 2019-12-18 PROCEDURE — 94761 N-INVAS EAR/PLS OXIMETRY MLT: CPT

## 2019-12-18 PROCEDURE — 25000003 PHARM REV CODE 250: Performed by: NURSE PRACTITIONER

## 2019-12-18 PROCEDURE — 36415 COLL VENOUS BLD VENIPUNCTURE: CPT

## 2019-12-18 PROCEDURE — 63600175 PHARM REV CODE 636 W HCPCS: Performed by: NURSE PRACTITIONER

## 2019-12-18 PROCEDURE — 80069 RENAL FUNCTION PANEL: CPT

## 2019-12-18 RX ORDER — IBUPROFEN 200 MG
16 TABLET ORAL
Status: DISCONTINUED | OUTPATIENT
Start: 2019-12-18 | End: 2019-12-20 | Stop reason: HOSPADM

## 2019-12-18 RX ORDER — INSULIN ASPART 100 [IU]/ML
10 INJECTION, SOLUTION INTRAVENOUS; SUBCUTANEOUS
Status: DISCONTINUED | OUTPATIENT
Start: 2019-12-19 | End: 2019-12-19

## 2019-12-18 RX ORDER — VALGANCICLOVIR 450 MG/1
900 TABLET, FILM COATED ORAL 2 TIMES DAILY
Status: DISCONTINUED | OUTPATIENT
Start: 2019-12-18 | End: 2019-12-20 | Stop reason: HOSPADM

## 2019-12-18 RX ORDER — INSULIN ASPART 100 [IU]/ML
4 INJECTION, SOLUTION INTRAVENOUS; SUBCUTANEOUS
Status: DISCONTINUED | OUTPATIENT
Start: 2019-12-18 | End: 2019-12-18

## 2019-12-18 RX ORDER — INSULIN ASPART 100 [IU]/ML
0-5 INJECTION, SOLUTION INTRAVENOUS; SUBCUTANEOUS
Status: DISCONTINUED | OUTPATIENT
Start: 2019-12-18 | End: 2019-12-20 | Stop reason: HOSPADM

## 2019-12-18 RX ORDER — GLUCAGON 1 MG
1 KIT INJECTION
Status: DISCONTINUED | OUTPATIENT
Start: 2019-12-18 | End: 2019-12-20 | Stop reason: HOSPADM

## 2019-12-18 RX ORDER — IBUPROFEN 200 MG
24 TABLET ORAL
Status: DISCONTINUED | OUTPATIENT
Start: 2019-12-18 | End: 2019-12-20 | Stop reason: HOSPADM

## 2019-12-18 RX ADMIN — HEPARIN SODIUM 5000 UNITS: 5000 INJECTION, SOLUTION INTRAVENOUS; SUBCUTANEOUS at 01:12

## 2019-12-18 RX ADMIN — CARVEDILOL 12.5 MG: 12.5 TABLET, FILM COATED ORAL at 09:12

## 2019-12-18 RX ADMIN — VALGANCICLOVIR 900 MG: 450 TABLET, FILM COATED ORAL at 08:12

## 2019-12-18 RX ADMIN — SODIUM CHLORIDE: 0.9 INJECTION, SOLUTION INTRAVENOUS at 03:12

## 2019-12-18 RX ADMIN — VALGANCICLOVIR 900 MG: 450 TABLET, FILM COATED ORAL at 12:12

## 2019-12-18 RX ADMIN — DIBASIC SODIUM PHOSPHATE, MONOBASIC POTASSIUM PHOSPHATE AND MONOBASIC SODIUM PHOSPHATE 2 TABLET: 852; 155; 130 TABLET ORAL at 09:12

## 2019-12-18 RX ADMIN — KETOCONAZOLE 100 MG: 200 TABLET ORAL at 09:12

## 2019-12-18 RX ADMIN — TACROLIMUS 8 MG: 5 CAPSULE ORAL at 09:12

## 2019-12-18 RX ADMIN — SODIUM CHLORIDE 0.8 UNITS/HR: 9 INJECTION, SOLUTION INTRAVENOUS at 06:12

## 2019-12-18 RX ADMIN — INSULIN ASPART 5 UNITS: 100 INJECTION, SOLUTION INTRAVENOUS; SUBCUTANEOUS at 12:12

## 2019-12-18 RX ADMIN — NIFEDIPINE 60 MG: 30 TABLET, FILM COATED, EXTENDED RELEASE ORAL at 09:12

## 2019-12-18 RX ADMIN — ATORVASTATIN CALCIUM 40 MG: 20 TABLET, FILM COATED ORAL at 09:12

## 2019-12-18 RX ADMIN — TACROLIMUS 8 MG: 5 CAPSULE ORAL at 05:12

## 2019-12-18 RX ADMIN — INSULIN ASPART 4 UNITS: 100 INJECTION, SOLUTION INTRAVENOUS; SUBCUTANEOUS at 12:12

## 2019-12-18 RX ADMIN — HEPARIN SODIUM 5000 UNITS: 5000 INJECTION, SOLUTION INTRAVENOUS; SUBCUTANEOUS at 08:12

## 2019-12-18 RX ADMIN — INSULIN ASPART 4 UNITS: 100 INJECTION, SOLUTION INTRAVENOUS; SUBCUTANEOUS at 05:12

## 2019-12-18 RX ADMIN — SODIUM BICARBONATE 650 MG TABLET 650 MG: at 08:12

## 2019-12-18 RX ADMIN — DIBASIC SODIUM PHOSPHATE, MONOBASIC POTASSIUM PHOSPHATE AND MONOBASIC SODIUM PHOSPHATE 2 TABLET: 852; 155; 130 TABLET ORAL at 08:12

## 2019-12-18 RX ADMIN — Medication 800 MG: at 02:12

## 2019-12-18 RX ADMIN — CARVEDILOL 12.5 MG: 12.5 TABLET, FILM COATED ORAL at 05:12

## 2019-12-18 RX ADMIN — INSULIN ASPART 3 UNITS: 100 INJECTION, SOLUTION INTRAVENOUS; SUBCUTANEOUS at 09:12

## 2019-12-18 RX ADMIN — PREDNISONE 10 MG: 10 TABLET ORAL at 09:12

## 2019-12-18 RX ADMIN — SODIUM BICARBONATE 650 MG TABLET 650 MG: at 09:12

## 2019-12-18 RX ADMIN — INSULIN DETEMIR 12 UNITS: 100 INJECTION, SOLUTION SUBCUTANEOUS at 10:12

## 2019-12-18 RX ADMIN — HYDRALAZINE HYDROCHLORIDE 25 MG: 25 TABLET, FILM COATED ORAL at 02:12

## 2019-12-18 RX ADMIN — CINACALCET HYDROCHLORIDE 30 MG: 30 TABLET, FILM COATED ORAL at 07:12

## 2019-12-18 RX ADMIN — Medication 800 MG: at 09:12

## 2019-12-18 RX ADMIN — HEPARIN SODIUM 5000 UNITS: 5000 INJECTION, SOLUTION INTRAVENOUS; SUBCUTANEOUS at 05:12

## 2019-12-18 RX ADMIN — Medication 6 MG: at 08:12

## 2019-12-18 RX ADMIN — INSULIN ASPART 5 UNITS: 100 INJECTION, SOLUTION INTRAVENOUS; SUBCUTANEOUS at 05:12

## 2019-12-18 RX ADMIN — HYDRALAZINE HYDROCHLORIDE 25 MG: 25 TABLET, FILM COATED ORAL at 09:12

## 2019-12-18 RX ADMIN — Medication 800 MG: at 08:12

## 2019-12-18 RX ADMIN — HYDRALAZINE HYDROCHLORIDE 25 MG: 25 TABLET, FILM COATED ORAL at 08:12

## 2019-12-18 RX ADMIN — SODIUM CHLORIDE: 0.9 INJECTION, SOLUTION INTRAVENOUS at 01:12

## 2019-12-18 RX ADMIN — NIFEDIPINE 60 MG: 30 TABLET, FILM COATED, EXTENDED RELEASE ORAL at 08:12

## 2019-12-18 RX ADMIN — FAMOTIDINE 20 MG: 20 TABLET ORAL at 08:12

## 2019-12-18 RX ADMIN — ATOVAQUONE 1500 MG: 750 SUSPENSION ORAL at 09:12

## 2019-12-18 NOTE — SUBJECTIVE & OBJECTIVE
Subjective:     History of Present Illness:   Ms. Satinder Schulte is a 63 year old AA female with a PMHx relevant for HTN, CVA and ESRD presumed secondary to HTN who received DBD kidney transplant 9/20/2019 (Thymo induction 2/2 leukopenia, KDPI 78%, CIT 9 hr 5 min, CMV D-/R+, donor EBV IgG+, recipient HBsAB+/HBcAB -, donor HCV AB + NAOMI -). Of note, donor cultures positive for Staphylococcus Lugdunesis. Treated with Ancef.  She presents today after routine labs revealed glucose of 526 on 12/16/19. Complaining of blurry vision x 1 week and dry mouth. Otherwise, denies abdominal pain, change in urinary output, fever/chills, chest pain, shortness of breath, diarrhea, n/v, lightheadedness, and diaphoresis. No personal history of diabetes.       Interval History: No acute events overnight. Pt admitted with new onset diabetes. BG on arrival to ED > 700. Endocrinology managing. BG decreased to 160s on insulin gtt and basal/prandial insulin. Dietician consulted for diabetic diet education. Pt with CMV viremia. PCR detected at 314 on 12/16. Continue treatment dose Valcyte. CMV PCR pending, 12/16. Wbc count decreased. ANC 1200. Monitor.     Past Medical and Surgical History: Ms. Schulte has a past medical history of Abnormal Pap smear, Anemia associated with chronic renal failure, Awaiting organ transplant status  - 02/18/2013 (6/6/2014), Blood type A+ (6/6/2014), CKD (chronic kidney disease) stage 3, GFR 30-59 ml/min (10/30/2019), CKD (chronic kidney disease) stage 5, GFR less than 15 ml/min, Essential hypertension (5/19/2017), Hyperlipidemia, Hypertension, renal (1992), Immunocompromised state (10/4/2019), and Stroke (2007).  She has a past surgical history that includes Cholecystectomy (2008); Oophorectomy; AV fistula placement (2014); Hysterectomy (2011); Bone marrow biopsy (Right, 8/23/2018); and Kidney transplant (N/A, 9/20/2019).    Past Social and Family History: Ms. Schulte reports that she has never smoked. She has  never used smokeless tobacco. She reports that she does not drink alcohol or use drugs.Her family history includes Breast cancer in her daughter; Heart disease in her father, mother, and sister; Hypertension in her mother; Kidney disease in her brother and mother; Ovarian cancer in her cousin; Stroke in her mother and sister.    Intake/Output - Last 3 Shifts       12/16 0700 - 12/17 0659 12/17 0700 - 12/18 0659 12/18 0700 - 12/19 0659    P.O.  360 810    I.V. (mL/kg)  966.3 (11.5)     IV Piggyback  2000     Total Intake(mL/kg)  3326.3 (39.6) 810 (9.6)    Urine (mL/kg/hr)  750 400 (0.7)    Stool  0 0    Total Output  750 400    Net  +2576.3 +410           Stool Occurrence  0 x 0 x           Review of Systems   Constitutional: Negative for activity change, appetite change, chills, fatigue and fever.   HENT: Negative for congestion and ear pain.    Eyes: Positive for visual disturbance (blurred vision). Negative for pain and discharge.   Respiratory: Negative for cough, chest tightness, shortness of breath and wheezing.    Cardiovascular: Negative for chest pain, palpitations and leg swelling.   Gastrointestinal: Negative for abdominal distention, abdominal pain, constipation, diarrhea, nausea and vomiting.   Endocrine: Negative.    Genitourinary: Negative for decreased urine volume and difficulty urinating.   Musculoskeletal: Negative for back pain.   Skin: Negative for wound.   Allergic/Immunologic: Positive for immunocompromised state.   Neurological: Negative for dizziness and weakness.   Hematological: Negative.    Psychiatric/Behavioral: Negative for agitation and decreased concentration.     Objective:     Vital Signs (Most Recent):  Temp: 98.1 °F (36.7 °C) (12/18/19 1153)  Pulse: 61 (12/18/19 1149)  Resp: 20 (12/18/19 0859)  BP: (!) 154/68 (12/18/19 1149)  SpO2: 97 % (12/18/19 1149) Vital Signs (24h Range):  Temp:  [97.7 °F (36.5 °C)-98.5 °F (36.9 °C)] 98.1 °F (36.7 °C)  Pulse:  [61-78] 61  Resp:  [17-25]  "20  SpO2:  [96 %-99 %] 97 %  BP: (139-172)/(64-75) 154/68     Weight: 84 kg (185 lb 3 oz)  Height: 5' 3" (160 cm)  Body mass index is 32.8 kg/m².    Physical Exam   Constitutional: She is oriented to person, place, and time. She appears well-developed.   Eyes: Pupils are equal, round, and reactive to light. EOM are normal.   Neck: Normal range of motion.   Cardiovascular: Normal rate, regular rhythm and normal heart sounds.   No murmur heard.  Pulmonary/Chest: Effort normal and breath sounds normal. No respiratory distress. She has no wheezes.   Abdominal: Soft. Bowel sounds are normal. She exhibits no distension. There is no tenderness. There is no guarding.   Musculoskeletal: Normal range of motion.   Neurological: She is alert and oriented to person, place, and time.   Skin: Skin is warm and dry.   Nursing note and vitals reviewed.      Significant Labs:  CBC:   Recent Labs   Lab 12/16/19  1200 12/17/19  1210 12/18/19  0620   WBC 1.70* 3.10* 1.75*   RBC 3.06* 3.04* 2.79*   HGB 9.8* 9.7* 9.1*   HCT 31.2* 30.3* 28.5*   * 100* 110*   * 100* 102*   MCH 32.0* 31.9* 32.6*   MCHC 31.4* 32.0 31.9*     BMP:   Recent Labs   Lab 12/16/19  1200 12/17/19  1210 12/18/19  0620   * 793* 163*    138 142   K 4.9 5.3* 4.6    102 112*   CO2 23 25 22*   BUN 22 26* 18   CREATININE 2.2* 2.5* 1.5*   CALCIUM 10.3 10.2 9.6       Diagnostics:  None  "

## 2019-12-18 NOTE — SUBJECTIVE & OBJECTIVE
"Interval HPI:   Overnight events:  BG is now within goal after overnight intensive IV insulin infusion.   Diet Clear liquid (no sugar)/Bariatric     Eatin% - 100%  Nausea: No  Hypoglycemia and intervention: No  Fever: No  TPN and/or TF: No  If yes, type of TF/TPN and rate: None    BP (!) 172/64 (BP Location: Right arm, Patient Position: Lying)   Pulse 65   Temp 98.4 °F (36.9 °C) (Oral)   Resp 20   Ht 5' 3" (1.6 m)   Wt 84 kg (185 lb 3 oz)   SpO2 97%   Breastfeeding? No   BMI 32.80 kg/m²     Labs Reviewed and Include    Recent Labs   Lab 19  1210 19  0620   * 163*   CALCIUM 10.2 9.6   ALBUMIN 4.4 3.6   PROT 7.4  --     142   K 5.3* 4.6   CO2 25 22*    112*   BUN 26* 18   CREATININE 2.5* 1.5*   ALKPHOS 103  --    ALT 45*  --    AST 16  --    BILITOT 0.6  --      Lab Results   Component Value Date    WBC 1.75 (LL) 2019    HGB 9.1 (L) 2019    HCT 28.5 (L) 2019     (H) 2019     (L) 2019     No results for input(s): TSH, FREET4 in the last 168 hours.  Lab Results   Component Value Date    HGBA1C 7.9 (H) 2019       Nutritional status:   Body mass index is 32.8 kg/m².  Lab Results   Component Value Date    ALBUMIN 3.6 2019    ALBUMIN 4.4 2019    ALBUMIN 4.3 2019    ALBUMIN 4.3 2019     No results found for: PREALBUMIN    Estimated Creatinine Clearance: 39.9 mL/min (A) (based on SCr of 1.5 mg/dL (H)).    Accu-Checks  Recent Labs     19  2234 19  2342 19  0051 19  0204 19  0305 19  0422 19  0552 19  0640 19  0746 19  0852   POCTGLUCOSE 251* 281* 202* 176* 151* 156* 144* 164* 168* 199*       Current Medications and/or Treatments Impacting Glycemic Control  Immunotherapy:    Immunosuppressants         Stop Route Frequency     tacrolimus capsule 8 mg      -- Oral 2 times daily        Steroids:   Hormones (From admission, onward)    Start     Stop Route " Frequency Ordered    12/18/19 0900  predniSONE tablet 10 mg      -- Oral Daily 12/17/19 1403    12/17/19 1307  melatonin tablet 6 mg      -- Oral Nightly PRN 12/17/19 1209        Pressors:    Autonomic Drugs (From admission, onward)    None        Hyperglycemia/Diabetes Medications:   Antihyperglycemics (From admission, onward)    Start     Stop Route Frequency Ordered    12/17/19 1415  insulin regular (Humulin R) 100 Units in sodium chloride 0.9% 100 mL infusion     Question:  Insulin Rate Adjustment (DO NOT MODIFY ANSWER)  Answer:  [email or web address]    -- IV Continuous 12/17/19 1312

## 2019-12-18 NOTE — ASSESSMENT & PLAN NOTE
- continue prograf and prednisone taper.  - will check daily prograf levels, monitor for toxic side effects, and adjust for therapeutic dose.

## 2019-12-18 NOTE — ASSESSMENT & PLAN NOTE
BG goal 140 - 180     Advance to ADA diet.   Start Levemir 12 units daily. 0.3 units/kg  Novolog 4 units tidwm. Basal/Prandial physiologic matching.    Low Dose SQ Insulin Correction Scale.  BG Monitoring AC/HS     ADDENDUM: 1720     BG is now significantly above goal on current MDI regimen.   Increase to 0.5 units/kg dosing.    - Levemir 12 units BID.   -Novolog 10 untis TIDWM. Patient also on steroid therapy.   - Low Dose SQ Insulin Correction Scale.  BG Monitoring AC/HS/0200    Bedside nurse to instruct on insulin pen use and blood sugar monitor. Have patient administer own injections after education completed supervised by nurse.    Have patient watch insulin educatoin video's via i-pad if available on unit (care plan template 349)    ** Please call Endocrine for any BG related issues **  ** Please notify Endocrine for any change and/or advance in diet**        Discharge planning:   Lab Results   Component Value Date    HGBA1C 7.9 (H) 12/17/2019       Metformin 500mg xr pills:  1 pill every morning for 1 week   THEN  1 pill with breakfast, 1 pill with dinner for week 2   THEN  1 pill with breakfast, 2 pills with dinner for week 3   THEN  2 pills with breakfast, 2 pills with dinner     -Establish care with [facility] endo [facility].   -Consider basal insulin     TBD. Please notify endocrinology prior to discharge.

## 2019-12-18 NOTE — ASSESSMENT & PLAN NOTE
- a/w hyperglycemia (glucose 526 on 12/16). No hx of diabetes.  - POCT glucose > 500 on admit. A1c 7.9.  - No evidence of DKA.  - Given 2 L NS in ED.  - continue NS at 100 ml/hr.  - Endocrine consulted, continue insulin gtt.  - Clear liquid diet.  - Monitor.

## 2019-12-18 NOTE — PROGRESS NOTES
Pt arrived to TSU room 83557 from ED.  AAOx4, VSS. On insulin gtt @ 2.4 units/hr.  NS continuous @ 100 cc/hr.   Pt denies any pain, SOB, or n/v at this time. No complaints or issues noted  Reported given bedside from ED nurse.  Pt resting comfortably in bed.  Bed lowered and locked in place.  Call bell in reach.  No acute events.   Pt instructed to call for assistance. Will monitor.

## 2019-12-18 NOTE — CONSULTS
Nutrition-Related Diabetes Education    Time Spent: 15 minutes  Learners: Pt    Current HbA1c: 7.9 (12/17/19)  Is patient aware of their A1c and their goal A1c? Yes  Home diabetes medication(s): none at this time per chart, new onset DM    Nutrition Education with handouts: Meal Planning Using the Plate Method    Comments: Reviewed food sources of carbohydrates, appropriate serving sizes of carbohydrates, and healthy plate. Pt voiced understanding. All questions and concerns answered.    Barriers to Learning: None    Follow up: Yes  Please consult as needed.  Thank you!   no

## 2019-12-18 NOTE — CONSULTS
[facility]  Kidney Transplant  Consult Note    Inpatient consult to Kidney/Pancreas Transplant Medicine  Consult performed by: Maribel Bernardo NP  Consult ordered by: Maribel Bernardo NP            Subjective:     History of Present Illness:   Ms. Satinder Schulte is a 63 year old AA female with a PMHx relevant for HTN, CVA and ESRD presumed secondary to HTN who received DBD kidney transplant 9/20/2019 (Thymo induction 2/2 leukopenia, KDPI 78%, CIT 9 hr 5 min, CMV D-/R+, donor EBV IgG+, recipient HBsAB+/HBcAB -, donor HCV AB + NAOMI -). Of note, donor cultures positive for Staphylococcus Lugdunesis. Treated with Ancef.  She presents today after routine labs revealed glucose of 526 on 12/16/19. Complaining of blurry vision x 1 week and dry mouth. Otherwise, denies abdominal pain, change in urinary output, fever/chills, chest pain, shortness of breath, diarrhea, n/v, lightheadedness, and diaphoresis. No personal history of diabetes.       Interval History: No acute events overnight. Pt admitted with new onset diabetes. BG on arrival to ED > 700. Endocrinology managing. BG decreased to 160s on insulin gtt and basal/prandial insulin. Dietician consulted for diabetic diet education. Pt with CMV viremia. PCR detected at 314 on 12/16. Continue treatment dose Valcyte. CMV PCR pending, 12/16. Wbc count decreased. ANC 1200. Monitor.     Past Medical and Surgical History: Ms. Schulte has a past medical history of Abnormal Pap smear, Anemia associated with chronic renal failure, Awaiting organ transplant status  - 02/18/2013 (6/6/2014), Blood type A+ (6/6/2014), CKD (chronic kidney disease) stage 3, GFR 30-59 ml/min (10/30/2019), CKD (chronic kidney disease) stage 5, GFR less than 15 ml/min, Essential hypertension (5/19/2017), Hyperlipidemia, Hypertension, renal (1992), Immunocompromised state (10/4/2019), and Stroke (2007).  She has a past surgical history that includes Cholecystectomy (2008); Oophorectomy; AV  fistula placement (2014); Hysterectomy (2011); Bone marrow biopsy (Right, 8/23/2018); and Kidney transplant (N/A, 9/20/2019).    Past Social and Family History: Ms. Schulte reports that she has never smoked. She has never used smokeless tobacco. She reports that she does not drink alcohol or use drugs.Her family history includes Breast cancer in her daughter; Heart disease in her father, mother, and sister; Hypertension in her mother; Kidney disease in her brother and mother; Ovarian cancer in her cousin; Stroke in her mother and sister.    Intake/Output - Last 3 Shifts       12/16 0700 - 12/17 0659 12/17 0700 - 12/18 0659 12/18 0700 - 12/19 0659    P.O.  360 810    I.V. (mL/kg)  966.3 (11.5)     IV Piggyback  2000     Total Intake(mL/kg)  3326.3 (39.6) 810 (9.6)    Urine (mL/kg/hr)  750 400 (0.7)    Stool  0 0    Total Output  750 400    Net  +2576.3 +410           Stool Occurrence  0 x 0 x           Review of Systems   Constitutional: Negative for activity change, appetite change, chills, fatigue and fever.   HENT: Negative for congestion and ear pain.    Eyes: Positive for visual disturbance (blurred vision). Negative for pain and discharge.   Respiratory: Negative for cough, chest tightness, shortness of breath and wheezing.    Cardiovascular: Negative for chest pain, palpitations and leg swelling.   Gastrointestinal: Negative for abdominal distention, abdominal pain, constipation, diarrhea, nausea and vomiting.   Endocrine: Negative.    Genitourinary: Negative for decreased urine volume and difficulty urinating.   Musculoskeletal: Negative for back pain.   Skin: Negative for wound.   Allergic/Immunologic: Positive for immunocompromised state.   Neurological: Negative for dizziness and weakness.   Hematological: Negative.    Psychiatric/Behavioral: Negative for agitation and decreased concentration.     Objective:     Vital Signs (Most Recent):  Temp: 98.1 °F (36.7 °C) (12/18/19 1153)  Pulse: 61 (12/18/19  "1149)  Resp: 20 (12/18/19 0859)  BP: (!) 154/68 (12/18/19 1149)  SpO2: 97 % (12/18/19 1149) Vital Signs (24h Range):  Temp:  [97.7 °F (36.5 °C)-98.5 °F (36.9 °C)] 98.1 °F (36.7 °C)  Pulse:  [61-78] 61  Resp:  [17-25] 20  SpO2:  [96 %-99 %] 97 %  BP: (139-172)/(64-75) 154/68     Weight: 84 kg (185 lb 3 oz)  Height: 5' 3" (160 cm)  Body mass index is 32.8 kg/m².    Physical Exam   Constitutional: She is oriented to person, place, and time. She appears well-developed.   Eyes: Pupils are equal, round, and reactive to light. EOM are normal.   Neck: Normal range of motion.   Cardiovascular: Normal rate, regular rhythm and normal heart sounds.   No murmur heard.  Pulmonary/Chest: Effort normal and breath sounds normal. No respiratory distress. She has no wheezes.   Abdominal: Soft. Bowel sounds are normal. She exhibits no distension. There is no tenderness. There is no guarding.   Musculoskeletal: Normal range of motion.   Neurological: She is alert and oriented to person, place, and time.   Skin: Skin is warm and dry.   Nursing note and vitals reviewed.      Significant Labs:  CBC:   Recent Labs   Lab 12/16/19  1200 12/17/19  1210 12/18/19  0620   WBC 1.70* 3.10* 1.75*   RBC 3.06* 3.04* 2.79*   HGB 9.8* 9.7* 9.1*   HCT 31.2* 30.3* 28.5*   * 100* 110*   * 100* 102*   MCH 32.0* 31.9* 32.6*   MCHC 31.4* 32.0 31.9*     BMP:   Recent Labs   Lab 12/16/19  1200 12/17/19  1210 12/18/19  0620   * 793* 163*    138 142   K 4.9 5.3* 4.6    102 112*   CO2 23 25 22*   BUN 22 26* 18   CREATININE 2.2* 2.5* 1.5*   CALCIUM 10.3 10.2 9.6       Diagnostics:  None    Assessment/Plan:     * Diabetes mellitus, new onset  - a/w hyperglycemia (glucose 526 on 12/16). No hx of diabetes.  - POCT glucose > 500 on admit. A1c 7.9.  - No evidence of DKA.  - Given 2 L NS in ED.  - continue NS at 100 ml/hr.  - Endocrine consulted, continue insulin gtt.  - Clear liquid diet.  - Monitor.       Cytomegalovirus (CMV) " viremia  - CMV PCR detected on 12/9 at 314.  - Continue treatment dose Valcyte.  - CMV PCR pending, 12/16.    Long-term use of immunosuppressant medication  - see prophylactic immunotherapy.      Prophylactic immunotherapy  - continue prograf and prednisone taper.  - will check daily prograf levels, monitor for toxic side effects, and adjust for therapeutic dose.        At risk for opportunistic infections  - continue mepron.  - Continue Valcyte for CMV.      Status post kidney transplant  - s/p DDRT on 9/20/19.  - Admitted with hyperglycemia.  - Cr elevated on admit.  - Hydrate with IVF.  - Monitor.      Drug-induced pancytopenia  - off cellcept.  - WBC 1.75, ANC 1200.  - Monitor with daily CBC.      Anemia associated with chronic renal failure  - H&H stable on admit.  - NO s/s of active bleeding.  - Monitor with daily CBC.      Hypertension, renal  - continue home BP medications (hydralazine, coreg, and nifedipine).   - monitor.          Discharge Planning: not a candidate for dc at this time.       Maribel Bernardo NP  Kidney Transplant  [facility]

## 2019-12-18 NOTE — PROGRESS NOTES
"[facility]  Endocrinology  Progress Note    Admit Date: 2019     Reason for Consult: Management of new onset Diabetes Type 2, Hyperglycemia     Surgical Procedure and Date:   Kidney Transplant 2019    Diabetes diagnosis year:     Home Diabetes Medications:  None    How often checking glucose at home? N/a   BG readings on regimen: N/A  Hypoglycemia on the regimen?  No  Missed doses on regimen?  No    Diabetes Complications include:     Hyperglycemia    Complicating diabetes co morbidities:   History of CVA and ESRD, HTN.       HPI:   Patient is a 64 y.o. female with a diagnosis of ESRD s/p DKKT (19) on cellcept, tacrolimus, prednisone, HTN presents with a chief complaint of hyperglycemia.  Patient had outpatient labs drawn yesterday that revealed hyperglycemia of 526.  Patient has no known history of diabetes.   Endocrinology consulted to evaluate new onset DM/hyperglycemia.       Lab Results   Component Value Date    HGBA1C 7.9 (H) 2019       Interval HPI:   Overnight events:  BG is now within goal after overnight intensive IV insulin infusion.   Diet Clear liquid (no sugar)/Bariatric     Eatin% - 100%  Nausea: No  Hypoglycemia and intervention: No  Fever: No  TPN and/or TF: No  If yes, type of TF/TPN and rate: None    BP (!) 172/64 (BP Location: Right arm, Patient Position: Lying)   Pulse 65   Temp 98.4 °F (36.9 °C) (Oral)   Resp 20   Ht 5' 3" (1.6 m)   Wt 84 kg (185 lb 3 oz)   SpO2 97%   Breastfeeding? No   BMI 32.80 kg/m²      Labs Reviewed and Include    Recent Labs   Lab 19  1210 19  0620   * 163*   CALCIUM 10.2 9.6   ALBUMIN 4.4 3.6   PROT 7.4  --     142   K 5.3* 4.6   CO2 25 22*    112*   BUN 26* 18   CREATININE 2.5* 1.5*   ALKPHOS 103  --    ALT 45*  --    AST 16  --    BILITOT 0.6  --      Lab Results   Component Value Date    WBC 1.75 (LL) 2019    HGB 9.1 (L) 2019    HCT 28.5 (L) 2019     (H) " 12/18/2019     (L) 12/18/2019     No results for input(s): TSH, FREET4 in the last 168 hours.  Lab Results   Component Value Date    HGBA1C 7.9 (H) 12/17/2019       Nutritional status:   Body mass index is 32.8 kg/m².  Lab Results   Component Value Date    ALBUMIN 3.6 12/18/2019    ALBUMIN 4.4 12/17/2019    ALBUMIN 4.3 12/16/2019    ALBUMIN 4.3 12/16/2019     No results found for: PREALBUMIN    Estimated Creatinine Clearance: 39.9 mL/min (A) (based on SCr of 1.5 mg/dL (H)).    Accu-Checks  Recent Labs     12/17/19  2234 12/17/19  2342 12/18/19  0051 12/18/19  0204 12/18/19  0305 12/18/19  0422 12/18/19  0552 12/18/19  0640 12/18/19  0746 12/18/19  0852   POCTGLUCOSE 251* 281* 202* 176* 151* 156* 144* 164* 168* 199*       Current Medications and/or Treatments Impacting Glycemic Control  Immunotherapy:    Immunosuppressants         Stop Route Frequency     tacrolimus capsule 8 mg      -- Oral 2 times daily        Steroids:   Hormones (From admission, onward)    Start     Stop Route Frequency Ordered    12/18/19 0900  predniSONE tablet 10 mg      -- Oral Daily 12/17/19 1403    12/17/19 1307  melatonin tablet 6 mg      -- Oral Nightly PRN 12/17/19 1209        Pressors:    Autonomic Drugs (From admission, onward)    None        Hyperglycemia/Diabetes Medications:   Antihyperglycemics (From admission, onward)    Start     Stop Route Frequency Ordered    12/17/19 1415  insulin regular (Humulin R) 100 Units in sodium chloride 0.9% 100 mL infusion     Question:  Insulin Rate Adjustment (DO NOT MODIFY ANSWER)  Answer:  \\[email or web address]    -- IV Continuous 12/17/19 1312          ASSESSMENT and PLAN    * Diabetes mellitus, new onset    New Onset type 2    New onset type 2 diabetes mellitus  BG goal 140 - 180     Advance to ADA diet.   Start Levemir 12 units daily. 0.3 units/kg  Novolog 4 units tidwm. Basal/Prandial physiologic matching.    Low Dose SQ Insulin  Correction Scale.  BG Monitoring AC/HS     ADDENDUM: 1720     BG is now significantly above goal on current MDI regimen.   Increase to 0.5 units/kg dosing.    - Levemir 12 units BID.   -Novolog 10 untis TIDWM. Patient also on steroid therapy.   - Low Dose SQ Insulin Correction Scale.  BG Monitoring AC/HS/0200    Bedside nurse to instruct on insulin pen use and blood sugar monitor. Have patient administer own injections after education completed supervised by nurse.    Have patient watch insulin educatoin video's via i-pad if available on unit (care plan template 349)    ** Please call Endocrine for any BG related issues **  ** Please notify Endocrine for any change and/or advance in diet**        Discharge planning:   Lab Results   Component Value Date    HGBA1C 7.9 (H) 12/17/2019       Metformin 500mg xr pills:  1 pill every morning for 1 week   THEN  1 pill with breakfast, 1 pill with dinner for week 2   THEN  1 pill with breakfast, 2 pills with dinner for week 3   THEN  2 pills with breakfast, 2 pills with dinner     -Establish care with [facility].   -Consider basal insulin     TBD. Please notify endocrinology prior to discharge.         Long-term use of immunosuppressant medication    On immunosepresive therapy per transplant team; may elevate BG readings          Marcus Weir NP  Endocrinology  [facility]

## 2019-12-18 NOTE — HOSPITAL COURSE
Interval history: No acute events overnight. Pt admitted with new onset diabetes. BG on arrival to ED > 700. Endocrinology managing. BG decreased to 160s on insulin gtt and basal/prandial insulin. Insulin gtt discontinued today. BG 160s-200s. Dietician consulted for diabetic diet education. Patient to undergo continued education for home insulin use today. Pt with CMV viremia. PCR detected at 314 on 12/9. Continue treatment dose Valcyte. Repeat CMV PCR In process. Wbc count decreased. ANC 1100. Monitor.

## 2019-12-18 NOTE — PROGRESS NOTES
Admit Note     Met with patient to assess needs. Patient is a 64 y.o. single female, admitted for post kidney transplantation on 09/20/19 and Hyperglycemia [R73.9]  Prophylactic immunotherapy [Z29.8]  Drug-induced pancytopenia [D61.811]  Anemia associated with chronic renal failure [N18.9, D63.1]  At risk for opportunistic infections [Z91.89]  Long-term use of immunosuppressant medication [Z79.899]  SAUNDRA (acute kidney injury) [N17.9]  Diabetes mellitus, new onset [E11.9]  Status post kidney transplant [Z94.0]  New onset type 2 diabetes mellitus [E11.9].      Patient admitted from home on 12/17/2019 .  At this time, patient presents as alert and oriented x 4, pleasant, good eye contact, well groomed, recall good, concentration/judgement good, average intelligence, calm, communicative, cooperative and asking and answering questions appropriately.  At this time, patients caregiver presents as out of room.    Household/Family Systems     Patient resides with patient's son-in-law and granddaughter, at [address] [place] [address] [address] [place] [address].  Support system includes pt's family.  Patient does not have dependents that are need of being cared for.     Patients primary caregiver is Niya Schulte, patients daughter, phone number [phone].  Confirmed patients contact information is [phone] (home);   Telephone Information:   Mobile [phone]   Mobile [phone]   .    During admission, patient's caregiver plans to stay at home.  Confirmed patient and patients caregivers do have access to reliable transportation.    Cognitive Status/Learning     Patient reports reading ability as 9th grade and states patient does have difficulty with reading, seeing, hearing, comprehension and learning.  Patient reports patient learns best by verbal instruction with assistance from caregivers.   Needed: No.   Highest education level: Grade School (0-8)    Vocation/Disability   .  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  Prior to disability, patient  has never worked.    Adherence     Patient reports a high level of adherence to patients health care regimen.  Adherence counseling and education provided. Patient verbalizes understanding.    Substance Use    Patient reports the following substance usage.    Tobacco: none, patient denies any use.  Alcohol: none, patient denies any use.  Illicit Drugs/Non-prescribed Medications: none, patient denies any use.  Patient states clear understanding of the potential impact of substance use.  Substance abstinence/cessation counseling, education and resources provided and reviewed.     Services Utilizing/ADLS    Infusion Service: Prior to admission, patient utilizing? no  Home Health: Prior to admission, patient utilizing? no  DME: Prior to admission, no  Pulmonary/Cardiac Rehab: Prior to admission, no  Dialysis:  Prior to admission, no  Transplant Specialty Pharmacy:  Prior to admission, yes; [facility].    Prior to admission, patient reports patient was independent with ADLS and was not driving.  Patient reports patient is not able to care for self at this time due to compromised medical condition (as documented in medical record) and physical weakness..  Patient indicates a willingness to care for self once medically cleared to do so.    Insurance/Medications    Insured by   Payor/Plan Subscr  Sex Relation Sub. Ins. ID Effective Group Num   1. [place] - [place]* NICKO VALIENTE 1955 Female  [id number] 10/1/1976 NGN                                   [address]   2. [place] NICK[place] VALIENTE 1955 Female  [id number]* 10/1/1976                                    [address] [address]      Primary Insurance (for UNOS reporting): Public Insurance - Medicare FFS (Fee For Service)  Secondary Insurance (for UNOS reporting): Public Insurance -  Medicaid    Patient reports patient is able to obtain and afford medications at this time and at time of discharge.    Living Will/Healthcare Power of     Patient states patient does not have a LW and/or HCPA.   provided education regarding LW and HCPA and the completion of forms.    Coping/Mental Health    Patient is coping adequately with the aid of  communication with family (especially daughter: Niya).  Patient indicates mental health difficulties.     Discharge Planning    At time of discharge, patient plans to return to patient's home under the care of Niya Eris.  Patients daughter will transport patient.  Per rounds today, expected discharge date has not been medically determined at this time. Patient and patients caregiver  verbalize understanding and are involved in treatment planning and discharge process.    Additional Concerns    Patient denies additional needs and/or concerns at this time. Patient is being followed for needs, education, resources, information, emotional support, supportive counseling, and for supportive and skilled discharge plan of care.  Patient verbalizes understanding and agreement with information reviewed, social work availability, and how to access available resources as needed.  remains available.

## 2019-12-18 NOTE — PLAN OF CARE
Pt is AAOX4. Up OOB in recliner. Takes self to br via bsc. Generalized weakness noted    -CMV noted, started on valcyte  -/150s. On oral BP meds, starting to trend downward  -LUE fist +/+  -insulin gtt d/c'd. Started on AC/HS/0200 accuchecks with schedueld and SSI.   -diabetes education was taught to pt. Pt watched video on iPad, pt has a good understanding of the concepts, meds, and administration. Pt verbalized understanding and performed return demonstration. Pt still has questions and will need to be observed.  -dietaicain came to bedisde to talk with pt  -VISI in place  -WBC low, NP aware.  -on 2000 ADA diet  -IVF insusing at 100cc/hr  -fall precautions in place, call bell in reach

## 2019-12-18 NOTE — PROGRESS NOTES
Pt cbg was 369. Notified BARRY Bernardo NP who stated this will be managed by endocrinology. Called Roxana COLLINS from endocrinology. Waiting for return call. Pt received scheduled + SSI with meal.

## 2019-12-18 NOTE — PLAN OF CARE
Pt aao x 4. VSS. Bed in low locked pos call light within reach. Pt calls for assistance when needed. Insulin gtt cont as ordered. NS @ 100 . Am labs ordered.

## 2019-12-18 NOTE — ASSESSMENT & PLAN NOTE
- s/p DDRT on 9/20/19.  - Admitted with hyperglycemia.  - Cr elevated on admit.  - Hydrate with IVF.  - Monitor.

## 2019-12-19 LAB
ALBUMIN SERPL BCP-MCNC: 3.2 G/DL (ref 3.5–5.2)
ANION GAP SERPL CALC-SCNC: 9 MMOL/L (ref 8–16)
BASOPHILS # BLD AUTO: 0.01 K/UL (ref 0–0.2)
BASOPHILS NFR BLD: 0.5 % (ref 0–1.9)
BUN SERPL-MCNC: 17 MG/DL (ref 8–23)
CALCIUM SERPL-MCNC: 9 MG/DL (ref 8.7–10.5)
CHLORIDE SERPL-SCNC: 115 MMOL/L (ref 95–110)
CO2 SERPL-SCNC: 19 MMOL/L (ref 23–29)
CREAT SERPL-MCNC: 1.5 MG/DL (ref 0.5–1.4)
DIFFERENTIAL METHOD: ABNORMAL
EOSINOPHIL # BLD AUTO: 0 K/UL (ref 0–0.5)
EOSINOPHIL NFR BLD: 1.6 % (ref 0–8)
ERYTHROCYTE [DISTWIDTH] IN BLOOD BY AUTOMATED COUNT: 14.5 % (ref 11.5–14.5)
EST. GFR  (AFRICAN AMERICAN): 42.7 ML/MIN/1.73 M^2
EST. GFR  (NON AFRICAN AMERICAN): 37.1 ML/MIN/1.73 M^2
GLUCOSE SERPL-MCNC: 159 MG/DL (ref 70–110)
HCT VFR BLD AUTO: 30.1 % (ref 37–48.5)
HGB BLD-MCNC: 9.6 G/DL (ref 12–16)
IMM GRANULOCYTES # BLD AUTO: 0.04 K/UL (ref 0–0.04)
IMM GRANULOCYTES NFR BLD AUTO: 2.2 % (ref 0–0.5)
LYMPHOCYTES # BLD AUTO: 0.7 K/UL (ref 1–4.8)
LYMPHOCYTES NFR BLD: 35.5 % (ref 18–48)
MAGNESIUM SERPL-MCNC: 1.4 MG/DL (ref 1.6–2.6)
MCH RBC QN AUTO: 32.3 PG (ref 27–31)
MCHC RBC AUTO-ENTMCNC: 31.9 G/DL (ref 32–36)
MCV RBC AUTO: 101 FL (ref 82–98)
MONOCYTES # BLD AUTO: 0.1 K/UL (ref 0.3–1)
MONOCYTES NFR BLD: 3.8 % (ref 4–15)
NEUTROPHILS # BLD AUTO: 1.1 K/UL (ref 1.8–7.7)
NEUTROPHILS NFR BLD: 56.4 % (ref 38–73)
NRBC BLD-RTO: 0 /100 WBC
PHOSPHATE SERPL-MCNC: 2.3 MG/DL (ref 2.7–4.5)
PLATELET # BLD AUTO: 88 K/UL (ref 150–350)
PMV BLD AUTO: 11.9 FL (ref 9.2–12.9)
POCT GLUCOSE: 124 MG/DL (ref 70–110)
POCT GLUCOSE: 162 MG/DL (ref 70–110)
POCT GLUCOSE: 213 MG/DL (ref 70–110)
POCT GLUCOSE: 214 MG/DL (ref 70–110)
POCT GLUCOSE: 253 MG/DL (ref 70–110)
POTASSIUM SERPL-SCNC: 4.2 MMOL/L (ref 3.5–5.1)
RBC # BLD AUTO: 2.97 M/UL (ref 4–5.4)
SODIUM SERPL-SCNC: 143 MMOL/L (ref 136–145)
TACROLIMUS BLD-MCNC: 11.6 NG/ML (ref 5–15)
WBC # BLD AUTO: 1.86 K/UL (ref 3.9–12.7)

## 2019-12-19 PROCEDURE — 99231 SBSQ HOSP IP/OBS SF/LOW 25: CPT | Mod: GC,,, | Performed by: INTERNAL MEDICINE

## 2019-12-19 PROCEDURE — 63600175 PHARM REV CODE 636 W HCPCS: Performed by: PHYSICIAN ASSISTANT

## 2019-12-19 PROCEDURE — 25000003 PHARM REV CODE 250: Performed by: PHYSICIAN ASSISTANT

## 2019-12-19 PROCEDURE — 99231 PR SUBSEQUENT HOSPITAL CARE,LEVL I: ICD-10-PCS | Mod: GC,,, | Performed by: INTERNAL MEDICINE

## 2019-12-19 PROCEDURE — 99233 SBSQ HOSP IP/OBS HIGH 50: CPT | Mod: ,,, | Performed by: PHYSICIAN ASSISTANT

## 2019-12-19 PROCEDURE — 80069 RENAL FUNCTION PANEL: CPT

## 2019-12-19 PROCEDURE — 83735 ASSAY OF MAGNESIUM: CPT

## 2019-12-19 PROCEDURE — 36415 COLL VENOUS BLD VENIPUNCTURE: CPT

## 2019-12-19 PROCEDURE — 25000003 PHARM REV CODE 250: Performed by: NURSE PRACTITIONER

## 2019-12-19 PROCEDURE — 99233 PR SUBSEQUENT HOSPITAL CARE,LEVL III: ICD-10-PCS | Mod: ,,, | Performed by: PHYSICIAN ASSISTANT

## 2019-12-19 PROCEDURE — 85025 COMPLETE CBC W/AUTO DIFF WBC: CPT

## 2019-12-19 PROCEDURE — 20600001 HC STEP DOWN PRIVATE ROOM

## 2019-12-19 PROCEDURE — 80197 ASSAY OF TACROLIMUS: CPT

## 2019-12-19 RX ORDER — MAGNESIUM SULFATE HEPTAHYDRATE 40 MG/ML
2 INJECTION, SOLUTION INTRAVENOUS ONCE
Status: COMPLETED | OUTPATIENT
Start: 2019-12-19 | End: 2019-12-19

## 2019-12-19 RX ORDER — INSULIN ASPART 100 [IU]/ML
10 INJECTION, SOLUTION INTRAVENOUS; SUBCUTANEOUS 3 TIMES DAILY
Qty: 15 ML | Refills: 5 | Status: SHIPPED | OUTPATIENT
Start: 2019-12-19 | End: 2019-12-20 | Stop reason: HOSPADM

## 2019-12-19 RX ORDER — INSULIN PUMP SYRINGE, 3 ML
EACH MISCELLANEOUS
Qty: 1 EACH | Refills: 0 | Status: SHIPPED | OUTPATIENT
Start: 2019-12-19

## 2019-12-19 RX ORDER — LANCETS 28 GAUGE
EACH MISCELLANEOUS 4 TIMES DAILY
Qty: 100 EACH | Refills: 5 | Status: SHIPPED | OUTPATIENT
Start: 2019-12-19

## 2019-12-19 RX ORDER — INSULIN ASPART 100 [IU]/ML
12 INJECTION, SOLUTION INTRAVENOUS; SUBCUTANEOUS
Status: DISCONTINUED | OUTPATIENT
Start: 2019-12-20 | End: 2019-12-20 | Stop reason: HOSPADM

## 2019-12-19 RX ORDER — VALGANCICLOVIR 450 MG/1
900 TABLET, FILM COATED ORAL 2 TIMES DAILY
Qty: 120 TABLET | Refills: 11 | Status: SHIPPED | OUTPATIENT
Start: 2019-12-19 | End: 2020-01-14 | Stop reason: SINTOL

## 2019-12-19 RX ORDER — PEN NEEDLE, DIABETIC 30 GX3/16"
1 NEEDLE, DISPOSABLE MISCELLANEOUS 4 TIMES DAILY
Qty: 100 EACH | Refills: 5 | Status: SHIPPED | OUTPATIENT
Start: 2019-12-19

## 2019-12-19 RX ORDER — PREDNISONE 5 MG/1
5 TABLET ORAL DAILY
Status: DISCONTINUED | OUTPATIENT
Start: 2019-12-20 | End: 2019-12-20 | Stop reason: HOSPADM

## 2019-12-19 RX ADMIN — FAMOTIDINE 20 MG: 20 TABLET ORAL at 08:12

## 2019-12-19 RX ADMIN — INSULIN ASPART 1 UNITS: 100 INJECTION, SOLUTION INTRAVENOUS; SUBCUTANEOUS at 09:12

## 2019-12-19 RX ADMIN — DIBASIC SODIUM PHOSPHATE, MONOBASIC POTASSIUM PHOSPHATE AND MONOBASIC SODIUM PHOSPHATE 2 TABLET: 852; 155; 130 TABLET ORAL at 08:12

## 2019-12-19 RX ADMIN — VALGANCICLOVIR 900 MG: 450 TABLET, FILM COATED ORAL at 08:12

## 2019-12-19 RX ADMIN — HEPARIN SODIUM 5000 UNITS: 5000 INJECTION, SOLUTION INTRAVENOUS; SUBCUTANEOUS at 03:12

## 2019-12-19 RX ADMIN — NIFEDIPINE 60 MG: 30 TABLET, FILM COATED, EXTENDED RELEASE ORAL at 08:12

## 2019-12-19 RX ADMIN — CARVEDILOL 12.5 MG: 12.5 TABLET, FILM COATED ORAL at 08:12

## 2019-12-19 RX ADMIN — ATORVASTATIN CALCIUM 40 MG: 20 TABLET, FILM COATED ORAL at 09:12

## 2019-12-19 RX ADMIN — DIBASIC SODIUM PHOSPHATE, MONOBASIC POTASSIUM PHOSPHATE AND MONOBASIC SODIUM PHOSPHATE 2 TABLET: 852; 155; 130 TABLET ORAL at 09:12

## 2019-12-19 RX ADMIN — INSULIN ASPART 10 UNITS: 100 INJECTION, SOLUTION INTRAVENOUS; SUBCUTANEOUS at 05:12

## 2019-12-19 RX ADMIN — TACROLIMUS 8 MG: 5 CAPSULE ORAL at 05:12

## 2019-12-19 RX ADMIN — KETOCONAZOLE 100 MG: 200 TABLET ORAL at 09:12

## 2019-12-19 RX ADMIN — Medication 800 MG: at 09:12

## 2019-12-19 RX ADMIN — ATOVAQUONE 1500 MG: 750 SUSPENSION ORAL at 09:12

## 2019-12-19 RX ADMIN — HYDRALAZINE HYDROCHLORIDE 25 MG: 25 TABLET, FILM COATED ORAL at 09:12

## 2019-12-19 RX ADMIN — INSULIN ASPART 10 UNITS: 100 INJECTION, SOLUTION INTRAVENOUS; SUBCUTANEOUS at 08:12

## 2019-12-19 RX ADMIN — HEPARIN SODIUM 5000 UNITS: 5000 INJECTION, SOLUTION INTRAVENOUS; SUBCUTANEOUS at 08:12

## 2019-12-19 RX ADMIN — SODIUM BICARBONATE 650 MG TABLET 650 MG: at 09:12

## 2019-12-19 RX ADMIN — Medication 800 MG: at 03:12

## 2019-12-19 RX ADMIN — SODIUM BICARBONATE 650 MG TABLET 650 MG: at 08:12

## 2019-12-19 RX ADMIN — INSULIN ASPART 1 UNITS: 100 INJECTION, SOLUTION INTRAVENOUS; SUBCUTANEOUS at 03:12

## 2019-12-19 RX ADMIN — TACROLIMUS 8 MG: 5 CAPSULE ORAL at 09:12

## 2019-12-19 RX ADMIN — INSULIN ASPART 2 UNITS: 100 INJECTION, SOLUTION INTRAVENOUS; SUBCUTANEOUS at 05:12

## 2019-12-19 RX ADMIN — VALGANCICLOVIR 900 MG: 450 TABLET, FILM COATED ORAL at 09:12

## 2019-12-19 RX ADMIN — CINACALCET HYDROCHLORIDE 30 MG: 30 TABLET, FILM COATED ORAL at 08:12

## 2019-12-19 RX ADMIN — NIFEDIPINE 60 MG: 30 TABLET, FILM COATED, EXTENDED RELEASE ORAL at 09:12

## 2019-12-19 RX ADMIN — CARVEDILOL 12.5 MG: 12.5 TABLET, FILM COATED ORAL at 05:12

## 2019-12-19 RX ADMIN — INSULIN ASPART 10 UNITS: 100 INJECTION, SOLUTION INTRAVENOUS; SUBCUTANEOUS at 12:12

## 2019-12-19 RX ADMIN — HYDRALAZINE HYDROCHLORIDE 25 MG: 25 TABLET, FILM COATED ORAL at 03:12

## 2019-12-19 RX ADMIN — PREDNISONE 10 MG: 10 TABLET ORAL at 09:12

## 2019-12-19 RX ADMIN — HYDRALAZINE HYDROCHLORIDE 25 MG: 25 TABLET, FILM COATED ORAL at 08:12

## 2019-12-19 RX ADMIN — MAGNESIUM SULFATE IN WATER 2 G: 40 INJECTION, SOLUTION INTRAVENOUS at 11:12

## 2019-12-19 RX ADMIN — Medication 6 MG: at 08:12

## 2019-12-19 RX ADMIN — Medication 800 MG: at 08:12

## 2019-12-19 NOTE — ASSESSMENT & PLAN NOTE
- continue mepron.  - Continue Valcyte for CMV.     Comments: 2.5% Tca peel for 4 minutes per Microneedling protocol.  Applied apres-cit and ace oil u dear alginsye mask for 7 minutes.  Removed and applied ace oil. Detail Level: Simple Number Of Layers: 1 Post Peel Care: Reviewed post treatment instructions with the patient. Treatment Time (Optional): 4 minutes Post-Care Instructions: I reviewed with the patient in detail post-care instructions. Patient should avoid sun exposure and wear sun protection.  Gave patient PCA post procedure kit Consent: Prior to the procedure, written consent was obtained and risks were reviewed, including but not limited to: redness, peeling, blistering, pigmentary change, scarring, infection, and pain. Chemical Peel: Environ Peel Price (Use Numbers Only, No Special Characters Or $): 0 Treatment Number: 2

## 2019-12-19 NOTE — ASSESSMENT & PLAN NOTE
- s/p DDRT on 9/20/19.  - Admitted with hyperglycemia.  - Cr elevated on admit.  - Improved with IVF.  - Monitor.

## 2019-12-19 NOTE — SUBJECTIVE & OBJECTIVE
Subjective:   HPI  Ms. Satinder Schulte is a 63 year old AA female with a PMHx relevant for HTN, CVA and ESRD presumed secondary to HTN who received DBD kidney transplant 9/20/2019 (Thymo induction 2/2 leukopenia, KDPI 78%, CIT 9 hr 5 min, CMV D-/R+, donor EBV IgG+, recipient HBsAB+/HBcAB -, donor HCV AB + NAOMI -). Of note, donor cultures positive for Staphylococcus Lugdunesis. Treated with Ancef.  She presents today after routine labs revealed glucose of 526 on 12/16/19. Complaining of blurry vision x 1 week and dry mouth. Otherwise, denies abdominal pain, change in urinary output, fever/chills, chest pain, shortness of breath, diarrhea, n/v, lightheadedness, and diaphoresis. No personal history of diabetes.     Interval history: No acute events overnight. Pt admitted with new onset diabetes. BG on arrival to ED > 700. Endocrinology managing. BG decreased to 160s on insulin gtt and basal/prandial insulin. Insulin gtt discontinued today. BG 160s-200s. Dietician consulted for diabetic diet education. Patient to undergo continued treatment for home insuline today. Pt with CMV viremia. PCR detected at 314 on 12/9. Continue treatment dose Valcyte. Repeat CMV PCR In process. Wbc count decreased. ANC 1100. Monitor.     Past Medical and Surgical History: Ms. Schulte has a past medical history of Abnormal Pap smear, Anemia associated with chronic renal failure, Awaiting organ transplant status  - 02/18/2013 (6/6/2014), Blood type A+ (6/6/2014), CKD (chronic kidney disease) stage 3, GFR 30-59 ml/min (10/30/2019), CKD (chronic kidney disease) stage 5, GFR less than 15 ml/min, Essential hypertension (5/19/2017), Hyperlipidemia, Hypertension, renal (1992), Immunocompromised state (10/4/2019), and Stroke (2007).  She has a past surgical history that includes Cholecystectomy (2008); Oophorectomy; AV fistula placement (2014); Hysterectomy (2011); Bone marrow biopsy (Right, 8/23/2018); and Kidney transplant (N/A, 9/20/2019).    Past  Social and Family History: Ms. Schulte reports that she has never smoked. She has never used smokeless tobacco. She reports that she does not drink alcohol or use drugs.Her family history includes Breast cancer in her daughter; Heart disease in her father, mother, and sister; Hypertension in her mother; Kidney disease in her brother and mother; Ovarian cancer in her cousin; Stroke in her mother and sister.    Intake/Output - Last 3 Shifts       12/17 0700 - 12/18 0659 12/18 0700 - 12/19 0659 12/19 0700 - 12/20 0659    P.O. 360 1210 640    I.V. (mL/kg) 966.3 (11.5) 2690 (32) 300 (3.6)    IV Piggyback 2000      Total Intake(mL/kg) 3326.3 (39.6) 3900 (46.4) 940 (11.2)    Urine (mL/kg/hr) 750 1550 (0.8) 1700 (2.9)    Stool 0 0 0    Total Output 750 1550 1700    Net +2576.3 +2350 -760           Urine Occurrence   1 x    Stool Occurrence 0 x 1 x 1 x           Review of Systems   Constitutional: Negative for activity change, appetite change, chills, fatigue and fever.   HENT: Negative for congestion and ear pain.    Eyes: Positive for visual disturbance (blurred vision - chronic). Negative for pain and discharge.   Respiratory: Negative for cough, chest tightness, shortness of breath and wheezing.    Cardiovascular: Negative for chest pain, palpitations and leg swelling.   Gastrointestinal: Negative for abdominal distention, abdominal pain, constipation, diarrhea, nausea and vomiting.   Endocrine: Negative.    Genitourinary: Negative for decreased urine volume and difficulty urinating.   Musculoskeletal: Negative for back pain.   Skin: Negative for wound.   Allergic/Immunologic: Positive for immunocompromised state.   Neurological: Negative for dizziness and weakness.   Hematological: Negative.    Psychiatric/Behavioral: Negative for agitation and decreased concentration.     Objective:     Vital Signs (Most Recent):  Temp: 97.6 °F (36.4 °C) (12/19/19 1155)  Pulse: (!) 55 (12/19/19 1215)  Resp: 18 (12/19/19 1215)  BP:  "135/60 (12/19/19 1215)  SpO2: 96 % (12/19/19 1215) Vital Signs (24h Range):  Temp:  [97.6 °F (36.4 °C)-98.5 °F (36.9 °C)] 97.6 °F (36.4 °C)  Pulse:  [53-64] 55  Resp:  [12-19] 18  SpO2:  [96 %-98 %] 96 %  BP: (115-160)/(60-80) 135/60     Weight: 84 kg (185 lb 3 oz)  Height: 5' 3" (160 cm)  Body mass index is 32.8 kg/m².    Physical Exam   Constitutional: She is oriented to person, place, and time. She appears well-developed.   Eyes: Pupils are equal, round, and reactive to light. EOM are normal.   Neck: Normal range of motion.   Cardiovascular: Normal rate, regular rhythm and normal heart sounds.   No murmur heard.  Pulmonary/Chest: Effort normal and breath sounds normal. No respiratory distress. She has no wheezes.   Abdominal: Soft. Bowel sounds are normal. She exhibits no distension. There is no tenderness. There is no guarding.   Musculoskeletal: Normal range of motion.   Neurological: She is alert and oriented to person, place, and time.   Skin: Skin is warm and dry.   Nursing note and vitals reviewed.      Significant Labs:  CBC:   Recent Labs   Lab 12/17/19  1210 12/18/19  0620 12/19/19  0645   WBC 3.10* 1.75* 1.86*   RBC 3.04* 2.79* 2.97*   HGB 9.7* 9.1* 9.6*   HCT 30.3* 28.5* 30.1*   * 110* 88*   * 102* 101*   MCH 31.9* 32.6* 32.3*   MCHC 32.0 31.9* 31.9*     BMP:   Recent Labs   Lab 12/17/19  1210 12/18/19  0620 12/19/19  0646   * 163* 159*    142 143   K 5.3* 4.6 4.2    112* 115*   CO2 25 22* 19*   BUN 26* 18 17   CREATININE 2.5* 1.5* 1.5*   CALCIUM 10.2 9.6 9.0       Diagnostics:  None  "

## 2019-12-19 NOTE — NURSING
Plan of care reviewed on am rounds donita vss. Wbc 1.86 ( sl improvement from yesterday)Mg 1.4 ( replaced, Cr 1.5, ivf dcd Tolerating diet without n/v, voiding OK, loose stool x 1 today with team notified. Blood glucose control improving with current insulin regimen, patient instructed on use of insulin pens and administered luch time dose ( with prompiong).Vision is blurry and patient unable to see numbers on pen, instructed to count clicks to dial up correct dose. Patient has viewed insulin teaching video, patient's daughter will also need instruction. Seen by dietician yesterday for basic diabetic diet instructions. Will also need meter teaching. Up to commode chair with standby assist, emcouraged to call staff as needed with adls, emotional support provided throughout shift

## 2019-12-19 NOTE — DISCHARGE SUMMARY
[facility]  Kidney Transplant  Discharge Summary    Patient Name: Satinder Schulte  MRN: [id number]  Admission Date: 12/17/2019  Hospital Length of Stay: 2 days  Discharge Date and Time:  12/20/2019 2:42 PM  Attending Physician: Guero Rogers MD   Discharging Provider: She Santamaria PA-C  Primary Care Provider: Quirino Burnett MD    HPI:   Ms. Satinder Schulte is a 63 year old AA female with a PMHx relevant for HTN, CVA and ESRD presumed secondary to HTN who received DBD kidney transplant 9/20/2019 (Thymo induction 2/2 leukopenia, KDPI 78%, CIT 9 hr 5 min, CMV D-/R+, donor EBV IgG+, recipient HBsAB+/HBcAB -, donor HCV AB + NAOMI -). Of note, donor cultures positive for Staphylococcus Lugdunesis. Treated with Ancef.  She presented after routine labs revealed glucose of 526 on 12/16/19. Complaiened of blurry vision (ongoing issue) and dry mouth. Otherwise, denied abdominal pain, change in urinary output, fever/chills, chest pain, shortness of breath, diarrhea, n/v, lightheadedness, and diaphoresis. No personal history of diabetes.       Hospital Course:    Pt admitted with new onset diabetes. BG on arrival to ED > 700. Endocrinology consulted and started patient on insulin gtt, IVF, and clear liquid diet. BG decreased to 160s on insulin gtt and basal/prandial insulin. Insulin gtt discontinued 12/18.  Dietician consulted for diabetic diet education. Patient underwent education for home insulin use. Sugars improved on day of discharge. She will discharge on levemir 24 units daily and Trulicity per endocrine recommendations. Patient's eye sight very poor. Received a talking glucose meter prior to discharge.  Pt also with CMV viremia. PCR detected at 314 on 12/9. Continued treatment dose Valcyte. Repeat CMV PCR 12/16: 314. Cr elevated on admit, improved to baseline with IVF.    On day of discharge, patient feeling well without complaint. She is stable for discharge. She has met with pharm D, endocrine,  RN, and VIKY and received education. Pt set up with HH for diabetes care prior to discharge. She will follow up with labs and in clinic per coordinator. Patient expressed understanding of discharge instructions and importance of follow up.    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] 12/18/2019 Yes    CKD (chronic kidney disease) stage 3, GFR 30-59 ml/min [N18.3] 10/30/2019 Yes    Status post kidney transplant [Z94.0] 09/20/2019 Not Applicable    At risk for opportunistic infections [Z91.89] 09/20/2019 Yes    Prophylactic immunotherapy [Z29.8] 09/20/2019 Not Applicable    Long-term use of immunosuppressant medication [Z79.899] 09/20/2019 Not Applicable    Drug-induced pancytopenia [D61.811] 07/23/2018 Yes    Anemia associated with chronic renal failure [N18.9, D63.1]  Yes     Chronic    Hypertension, renal [I12.9]  Yes     Chronic      Problems Resolved During this Admission:       Treatments: as above    Consults (From admission, onward)        Status Ordering Provider     Inpatient consult to Endocrinology  Once     Provider:  (Not yet assigned)    Completed MAGDI MONTAÑO     Inpatient consult to Kidney Transplant Surgery  Once     Provider:  (Not yet assigned)    ALTAGRACIA Mckeon     Inpatient consult to Kidney/Pancreas Transplant Medicine  Once     Provider:  (Not yet assigned)    Completed NEFTALI STOKES     Inpatient consult to Registered Dietitian/Nutritionist  Once     Provider:  (Not yet assigned)    Completed NEFTALI STOKES          Pending Diagnostic Studies:     None        Significant Diagnostic Studies: Labs:   CMP   Recent Labs   Lab 12/19/19  0646 12/20/19  0629    141   K 4.2 3.6   * 112*   CO2 19* 24   * 155*   BUN 17 21   CREATININE 1.5* 1.5*   CALCIUM 9.0 9.0   ALBUMIN 3.2* 3.0*   ANIONGAP 9 5*   ESTGFRAFRICA 42.7* 42.7*  "  EGFRNONAA 37.1* 37.1*   , CBC   Recent Labs   Lab 12/19/19  0645 12/20/19  0629   WBC 1.86* 1.78*   HGB 9.6* 8.6*   HCT 30.1* 27.4*   PLT 88* 103*    and All labs within the past 24 hours have been reviewed    Discharged Condition: good    Disposition:     Follow Up:    Patient Instructions:   No discharge procedures on file.  Medications:  Reconciled Home Medications:      Medication List      START taking these medications    dulaglutide 0.75 mg/0.5 mL Pnij  Commonly known as:  Trulicity  Inject 0.5 mLs (0.75 mg total) into the skin every 7 days.     insulin detemir U-100 100 unit/mL (3 mL) Inpn pen  Commonly known as:  LEVEMIR FLEXTOUCH  Inject 24 Units into the skin every evening.     pen needle, diabetic 32 gauge x 5/32" Ndle  Use to inject insulin 4 (four) times daily.     Prodigy No Coding Strp  Generic drug:  blood sugar diagnostic  Test blood glucose 4 (four) times daily.     Prodigy Twist Top Lancet 28 gauge Misc  Generic drug:  lancets  Test blood glucose 4 (four) times daily.     Prodigy Voice Glucose Meter kit  Generic drug:  blood-glucose meter  Use as instructed        CHANGE how you take these medications    valGANciclovir 450 mg Tab  Commonly known as:  Valcyte  Take 2 tablets (900 mg total) by mouth 2 (two) times daily.  What changed:  how much to take        CONTINUE taking these medications    atorvastatin 40 MG tablet  Commonly known as:  LIPITOR  Take 40 mg by mouth once daily.     atovaquone 750 mg/5 mL Susp  Commonly known as:  MEPRON  Take 10 mLs (1,500 mg total) by mouth once daily. Stop 9/19/2020     carvedilol 25 MG tablet  Commonly known as:  COREG  Take 1/2 tablet (12.5 mg total) by mouth 2 (two) times daily with meals. Hold for SBP<120, HR<60     cinacalcet 30 MG Tab  Commonly known as:  SENSIPAR  Take 1 tablet (30 mg total) by mouth daily with breakfast.     famotidine 20 MG tablet  Commonly known as:  PEPCID  Take 1 tablet (20 mg total) by mouth every evening.     hydrALAZINE 25 MG " tablet  Commonly known as:  APRESOLINE  Take 1 tablet (25 mg total) by mouth 3 (three) times daily.     k phos di & mono-sod phos mono 250 mg Tab  Commonly known as:  K-Phos-Neutral  Take 2 tablets by mouth 2 (two) times daily.     magnesium oxide 400 mg (241.3 mg magnesium) tablet  Commonly known as:  MAG-OX  Take 2 tablets (800 mg total) by mouth 3 (three) times daily.     NIFEdipine 60 MG Tbsr  Commonly known as:  ADALAT CC  Take 1 tablet (60 mg total) by mouth 2 (two) times daily.     predniSONE 5 MG tablet  Commonly known as:  DELTASONE  Take by mouth daily: 20mg (4 tablets) 9/23-10/22; 15 mg (3 tablets) 10/23-11/22; 10 mg (2 tablets) 11/23-12/22; then 5 mg (1 tablet) daily thereafter 12/23/19     sodium bicarbonate 650 MG tablet  Take 1 tablet (650 mg total) by mouth 2 (two) times daily.     tacrolimus 1 MG Cap  Commonly known as:  PROGRAF  Take 8 capsules (8 mg total) by mouth every morning AND 8 capsules (8 mg total) every evening.        STOP taking these medications    bisacodyl 5 mg EC tablet  Commonly known as:  DULCOLAX     docusate sodium 100 MG capsule  Commonly known as:  COLACE     ketoconazole 200 mg Tab  Commonly known as:  NIZORAL     oxyCODONE-acetaminophen 5-325 mg per tablet  Commonly known as:  PERCOCET          Time spent caring for patient (Greater than 1/2 spent in direct face-to-face contact): > 30 minutes    She Santamaria PA-C  Kidney Transplant  [facility]

## 2019-12-19 NOTE — PROGRESS NOTES
"[facility]  Endocrinology  Progress Note    Admit Date: 2019     Reason for Consult: Management of new onset Diabetes Type 2, Hyperglycemia     Surgical Procedure and Date:   Kidney Transplant 2019    Diabetes diagnosis year:     Home Diabetes Medications:  None    How often checking glucose at home? N/a   BG readings on regimen: N/A  Hypoglycemia on the regimen?  No  Missed doses on regimen?  No    Diabetes Complications include:     Hyperglycemia    Complicating diabetes co morbidities:   History of CVA and ESRD, HTN.       HPI:   Patient is a 64 y.o. female with a diagnosis of ESRD s/p DKKT (19) on cellcept, tacrolimus, prednisone, HTN presents with a chief complaint of hyperglycemia.  Patient had outpatient labs drawn yesterday that revealed hyperglycemia of 526.  Patient has no known history of diabetes.   Endocrinology consulted to evaluate new onset DM/hyperglycemia.       Lab Results   Component Value Date    HGBA1C 7.9 (H) 2019       Interval HPI:   Glucose range (159- 391) High glycemic variability.  Levemir dose increased to 12units BID with improvement of glycemic variability.  Fasting glucose this .  NO hypoglycemias since admission.      Overnight events: DANIEL   Eatin%  Nausea: No  Hypoglycemia and intervention: No  Fever: No  TPN and/or TF: No    BP (!) 160/73   Pulse (!) 53   Temp 98.5 °F (36.9 °C) (Oral)   Resp 19   Ht 5' 3" (1.6 m)   Wt 84 kg (185 lb 3 oz)   SpO2 97%   Breastfeeding? No   BMI 32.80 kg/m²      Labs Reviewed and Include    Recent Labs   Lab 19  0646   *   CALCIUM 9.0   ALBUMIN 3.2*      K 4.2   CO2 19*   *   BUN 17   CREATININE 1.5*     Lab Results   Component Value Date    WBC 1.86 (LL) 2019    HGB 9.6 (L) 2019    HCT 30.1 (L) 2019     (H) 2019    PLT 88 (L) 2019     No results for input(s): TSH, FREET4 in the last 168 hours.  Lab Results   Component Value " Date    HGBA1C 7.9 (H) 12/17/2019       Nutritional status:   Body mass index is 32.8 kg/m².  Lab Results   Component Value Date    ALBUMIN 3.2 (L) 12/19/2019    ALBUMIN 3.6 12/18/2019    ALBUMIN 4.4 12/17/2019     No results found for: PREALBUMIN    Estimated Creatinine Clearance: 39.9 mL/min (A) (based on SCr of 1.5 mg/dL (H)).    Accu-Checks  Recent Labs     12/18/19  0422 12/18/19  0552 12/18/19  0640 12/18/19  0746 12/18/19  0852 12/18/19  1245 12/18/19  1714 12/18/19  2126 12/19/19  0304 12/19/19  0754   POCTGLUCOSE 156* 144* 164* 168* 199* 369* 391* 371* 213* 162*       Current Medications and/or Treatments Impacting Glycemic Control  Immunotherapy:    Immunosuppressants         Stop Route Frequency     tacrolimus capsule 8 mg      -- Oral 2 times daily        Steroids:   Hormones (From admission, onward)    Start     Stop Route Frequency Ordered    12/18/19 0900  predniSONE tablet 10 mg      -- Oral Daily 12/17/19 1403    12/17/19 1307  melatonin tablet 6 mg      -- Oral Nightly PRN 12/17/19 1209        Pressors:    Autonomic Drugs (From admission, onward)    None        Hyperglycemia/Diabetes Medications:   Antihyperglycemics (From admission, onward)    Start     Stop Route Frequency Ordered    12/19/19 0715  insulin aspart U-100 pen 10 Units      -- SubQ 3 times daily with meals 12/18/19 1718    12/18/19 2100  insulin detemir U-100 pen 12 Units      -- SubQ 2 times daily 12/18/19 1718    12/18/19 1038  insulin aspart U-100 pen 0-5 Units      -- SubQ Before meals & nightly PRN 12/18/19 0938          ASSESSMENT and PLAN    New onset type 2 diabetes mellitus  -New Onset DM   -A1C 7.9   -Pt presented with glucose in the 500s   -No DKA,  AG 11   -Pt tolerating PO diet   -Glucose range (159-391) High glycemic variability   -Dose of levemir increased to 12units BID with improvement of glycemic variability   -Fasting glucose this  (At goal)   -BG goal 140 - 180     Plan:   -C/w Levemir 12 units BID   -C/w  Novolog 10 unit pre-meals   -Low dose correction insulin   -FS qAC/HS/2AM     Discharge:  TBD     Long-term use of immunosuppressant medication    On immunosepresive therapy per transplant team; may elevate BG readings      CKD (chronic kidney disease) stage 3, GFR 30-59 ml/min  -Avoid insulin stacking as it can precipitate hypoglycemias           Jonathon Medel MD  Endocrinology  [facility]

## 2019-12-19 NOTE — PLAN OF CARE
Pt AAOx4, VSS, in bed with upper siderails raised x2, bed in lowest/locked position, call light/personal belongings within reach. Pt instructed to call for assistance. Pt verbalizes understanding.  Pt afebrile at this time.  Proper hand hygiene performed before and after pt care.      Monitoring BG ACHS + 2am. Covered with SSI. Levemir administered at bedtime.  Pt eager to learn more about diabetic diet/lifestyle changes.  NS gtt continued @ 100cc/h.  WBC low and trending down. Cr trending down.  Valcyte continued for CMV.  UOP measured via hat, see flowsheet for output amount. Up to BSCC independently.   +bm during day 12/18.  PRN Melatonin administered at bedtime for trouble sleeping.     Will continue to monitor patient.

## 2019-12-19 NOTE — ASSESSMENT & PLAN NOTE
-New Onset DM   -A1C 7.9   -Pt presented with glucose in the 500s   -No DKA,  AG 11   -Pt tolerating PO diet   -Glucose range (159-391) High glycemic variability   -Dose of levemir increased to 12units BID with improvement of glycemic variability   -Fasting glucose this  (At goal)   -BG goal 140 - 180     Plan:   -C/w Levemir 12 units BID   -C/w Novolog 10 unit pre-meals   -Low dose correction insulin   -FS qAC/HS/2AM     Discharge:  TBD

## 2019-12-19 NOTE — PROGRESS NOTES
Update    SW met with pt to assess for coping, continuity of care and any needs. Pt presented alert and oriented x4, laying down in bed and communicative. Pt presented alone. Pt reports having some difficulty remembering medications and learning how to take new medications. Pt reports wanting her daughter, Niya Schulte (ph# [phone]), to receive education around new diabetic medications. SW received permission to call daughter and update her on care plan. Pt denied any other concerns or needs.     Pt's daughter reports being able to come to hospital at 9:30 am tomorrow but not being able to stay through afternoon as a result of prior commitments. VIKY relayed information to TIA Carr.     VIKY notified Mily with [facility] ([id number]) that pt would likely discharge tomorrow with HH needs for medication management. Mily reports having pt on board to be seen on Saturday. SW to follow up tomorrow.     VIKY remains available at [phone].

## 2019-12-19 NOTE — ASSESSMENT & PLAN NOTE
- a/w hyperglycemia (glucose 526 on 12/16). No hx of diabetes.  - POCT glucose > 500 on admit. A1c 7.9.  - No evidence of DKA.  - Endocrine consulted, appreciate their assistance with management  - sugars improving, now on diabetic diet, on levemir/novolog, and sliding scale  - Monitor.

## 2019-12-19 NOTE — SUBJECTIVE & OBJECTIVE
"Interval HPI:   Glucose range (159- 391) High glycemic variability.  Levemir dose increased to 12units BID with improvement of glycemic variability.  Fasting glucose this .  NO hypoglycemias since admission.      Overnight events: DANIEL   Eatin%  Nausea: No  Hypoglycemia and intervention: No  Fever: No  TPN and/or TF: No    BP (!) 160/73   Pulse (!) 53   Temp 98.5 °F (36.9 °C) (Oral)   Resp 19   Ht 5' 3" (1.6 m)   Wt 84 kg (185 lb 3 oz)   SpO2 97%   Breastfeeding? No   BMI 32.80 kg/m²     Labs Reviewed and Include    Recent Labs   Lab 19  0646   *   CALCIUM 9.0   ALBUMIN 3.2*      K 4.2   CO2 19*   *   BUN 17   CREATININE 1.5*     Lab Results   Component Value Date    WBC 1.86 (LL) 2019    HGB 9.6 (L) 2019    HCT 30.1 (L) 2019     (H) 2019    PLT 88 (L) 2019     No results for input(s): TSH, FREET4 in the last 168 hours.  Lab Results   Component Value Date    HGBA1C 7.9 (H) 2019       Nutritional status:   Body mass index is 32.8 kg/m².  Lab Results   Component Value Date    ALBUMIN 3.2 (L) 2019    ALBUMIN 3.6 2019    ALBUMIN 4.4 2019     No results found for: PREALBUMIN    Estimated Creatinine Clearance: 39.9 mL/min (A) (based on SCr of 1.5 mg/dL (H)).    Accu-Checks  Recent Labs     19  0422 19  0552 19  0640 19  0746 19  0852 19  1245 19  1714 19  2126 19  0304 19  0754   POCTGLUCOSE 156* 144* 164* 168* 199* 369* 391* 371* 213* 162*       Current Medications and/or Treatments Impacting Glycemic Control  Immunotherapy:    Immunosuppressants         Stop Route Frequency     tacrolimus capsule 8 mg      -- Oral 2 times daily        Steroids:   Hormones (From admission, onward)    Start     Stop Route Frequency Ordered    19 0900  predniSONE tablet 10 mg      -- Oral Daily 19 1403    19 1307  melatonin tablet 6 mg      -- Oral Nightly " PRN 12/17/19 1209        Pressors:    Autonomic Drugs (From admission, onward)    None        Hyperglycemia/Diabetes Medications:   Antihyperglycemics (From admission, onward)    Start     Stop Route Frequency Ordered    12/19/19 0715  insulin aspart U-100 pen 10 Units      -- SubQ 3 times daily with meals 12/18/19 1718    12/18/19 2100  insulin detemir U-100 pen 12 Units      -- SubQ 2 times daily 12/18/19 1718    12/18/19 1038  insulin aspart U-100 pen 0-5 Units      -- SubQ Before meals & nightly PRN 12/18/19 0912

## 2019-12-20 ENCOUNTER — TELEPHONE (OUTPATIENT)
Dept: ENDOCRINOLOGY | Facility: HOSPITAL | Age: 64
End: 2019-12-20

## 2019-12-20 VITALS
DIASTOLIC BLOOD PRESSURE: 64 MMHG | TEMPERATURE: 98 F | OXYGEN SATURATION: 98 % | BODY MASS INDEX: 32.81 KG/M2 | WEIGHT: 185.19 LBS | SYSTOLIC BLOOD PRESSURE: 141 MMHG | RESPIRATION RATE: 18 BRPM | HEIGHT: 63 IN | HEART RATE: 54 BPM

## 2019-12-20 LAB
ALBUMIN SERPL BCP-MCNC: 3 G/DL (ref 3.5–5.2)
ANION GAP SERPL CALC-SCNC: 5 MMOL/L (ref 8–16)
ANISOCYTOSIS BLD QL SMEAR: SLIGHT
BASOPHILS # BLD AUTO: 0 K/UL (ref 0–0.2)
BASOPHILS NFR BLD: 0 % (ref 0–1.9)
BUN SERPL-MCNC: 21 MG/DL (ref 8–23)
CALCIUM SERPL-MCNC: 9 MG/DL (ref 8.7–10.5)
CHLORIDE SERPL-SCNC: 112 MMOL/L (ref 95–110)
CO2 SERPL-SCNC: 24 MMOL/L (ref 23–29)
CREAT SERPL-MCNC: 1.5 MG/DL (ref 0.5–1.4)
DACRYOCYTES BLD QL SMEAR: ABNORMAL
DIFFERENTIAL METHOD: ABNORMAL
EOSINOPHIL # BLD AUTO: 0 K/UL (ref 0–0.5)
EOSINOPHIL NFR BLD: 1.1 % (ref 0–8)
ERYTHROCYTE [DISTWIDTH] IN BLOOD BY AUTOMATED COUNT: 14.4 % (ref 11.5–14.5)
EST. GFR  (AFRICAN AMERICAN): 42.7 ML/MIN/1.73 M^2
EST. GFR  (NON AFRICAN AMERICAN): 37.1 ML/MIN/1.73 M^2
GLUCOSE SERPL-MCNC: 155 MG/DL (ref 70–110)
HCT VFR BLD AUTO: 27.4 % (ref 37–48.5)
HGB BLD-MCNC: 8.6 G/DL (ref 12–16)
HYPOCHROMIA BLD QL SMEAR: ABNORMAL
IMM GRANULOCYTES # BLD AUTO: 0.07 K/UL (ref 0–0.04)
IMM GRANULOCYTES NFR BLD AUTO: 3.9 % (ref 0–0.5)
LYMPHOCYTES # BLD AUTO: 0.6 K/UL (ref 1–4.8)
LYMPHOCYTES NFR BLD: 31.5 % (ref 18–48)
MAGNESIUM SERPL-MCNC: 1.9 MG/DL (ref 1.6–2.6)
MCH RBC QN AUTO: 31.9 PG (ref 27–31)
MCHC RBC AUTO-ENTMCNC: 31.4 G/DL (ref 32–36)
MCV RBC AUTO: 102 FL (ref 82–98)
MONOCYTES # BLD AUTO: 0.1 K/UL (ref 0.3–1)
MONOCYTES NFR BLD: 3.9 % (ref 4–15)
NEUTROPHILS # BLD AUTO: 1.1 K/UL (ref 1.8–7.7)
NEUTROPHILS NFR BLD: 59.6 % (ref 38–73)
NRBC BLD-RTO: 0 /100 WBC
OVALOCYTES BLD QL SMEAR: ABNORMAL
PHOSPHATE SERPL-MCNC: 2.5 MG/DL (ref 2.7–4.5)
PLATELET # BLD AUTO: 103 K/UL (ref 150–350)
PLATELET BLD QL SMEAR: ABNORMAL
PMV BLD AUTO: 11.6 FL (ref 9.2–12.9)
POCT GLUCOSE: 139 MG/DL (ref 70–110)
POCT GLUCOSE: 207 MG/DL (ref 70–110)
POCT GLUCOSE: 79 MG/DL (ref 70–110)
POIKILOCYTOSIS BLD QL SMEAR: SLIGHT
POLYCHROMASIA BLD QL SMEAR: ABNORMAL
POTASSIUM SERPL-SCNC: 3.6 MMOL/L (ref 3.5–5.1)
RBC # BLD AUTO: 2.7 M/UL (ref 4–5.4)
SODIUM SERPL-SCNC: 141 MMOL/L (ref 136–145)
TACROLIMUS BLD-MCNC: 11.2 NG/ML (ref 5–15)
WBC # BLD AUTO: 1.78 K/UL (ref 3.9–12.7)

## 2019-12-20 PROCEDURE — 99239 PR HOSPITAL DISCHARGE DAY,>30 MIN: ICD-10-PCS | Mod: ,,, | Performed by: PHYSICIAN ASSISTANT

## 2019-12-20 PROCEDURE — 83735 ASSAY OF MAGNESIUM: CPT

## 2019-12-20 PROCEDURE — 25000003 PHARM REV CODE 250: Performed by: NURSE PRACTITIONER

## 2019-12-20 PROCEDURE — 36415 COLL VENOUS BLD VENIPUNCTURE: CPT

## 2019-12-20 PROCEDURE — 99239 HOSP IP/OBS DSCHRG MGMT >30: CPT | Mod: ,,, | Performed by: PHYSICIAN ASSISTANT

## 2019-12-20 PROCEDURE — 63600175 PHARM REV CODE 636 W HCPCS: Performed by: PHYSICIAN ASSISTANT

## 2019-12-20 PROCEDURE — 25000003 PHARM REV CODE 250: Performed by: PHYSICIAN ASSISTANT

## 2019-12-20 PROCEDURE — 99232 PR SUBSEQUENT HOSPITAL CARE,LEVL II: ICD-10-PCS | Mod: GC,,, | Performed by: INTERNAL MEDICINE

## 2019-12-20 PROCEDURE — 80069 RENAL FUNCTION PANEL: CPT

## 2019-12-20 PROCEDURE — 85025 COMPLETE CBC W/AUTO DIFF WBC: CPT

## 2019-12-20 PROCEDURE — 80197 ASSAY OF TACROLIMUS: CPT

## 2019-12-20 PROCEDURE — 94761 N-INVAS EAR/PLS OXIMETRY MLT: CPT

## 2019-12-20 PROCEDURE — 99232 SBSQ HOSP IP/OBS MODERATE 35: CPT | Mod: GC,,, | Performed by: INTERNAL MEDICINE

## 2019-12-20 RX ADMIN — HYDRALAZINE HYDROCHLORIDE 25 MG: 25 TABLET, FILM COATED ORAL at 08:12

## 2019-12-20 RX ADMIN — INSULIN ASPART 12 UNITS: 100 INJECTION, SOLUTION INTRAVENOUS; SUBCUTANEOUS at 11:12

## 2019-12-20 RX ADMIN — CINACALCET HYDROCHLORIDE 30 MG: 30 TABLET, FILM COATED ORAL at 08:12

## 2019-12-20 RX ADMIN — SODIUM BICARBONATE 650 MG TABLET 650 MG: at 08:12

## 2019-12-20 RX ADMIN — Medication 800 MG: at 08:12

## 2019-12-20 RX ADMIN — VALGANCICLOVIR 900 MG: 450 TABLET, FILM COATED ORAL at 08:12

## 2019-12-20 RX ADMIN — ATOVAQUONE 1500 MG: 750 SUSPENSION ORAL at 08:12

## 2019-12-20 RX ADMIN — HEPARIN SODIUM 5000 UNITS: 5000 INJECTION, SOLUTION INTRAVENOUS; SUBCUTANEOUS at 06:12

## 2019-12-20 RX ADMIN — PREDNISONE 5 MG: 5 TABLET ORAL at 08:12

## 2019-12-20 RX ADMIN — TACROLIMUS 8 MG: 5 CAPSULE ORAL at 08:12

## 2019-12-20 RX ADMIN — ATORVASTATIN CALCIUM 40 MG: 20 TABLET, FILM COATED ORAL at 08:12

## 2019-12-20 RX ADMIN — INSULIN ASPART 1 UNITS: 100 INJECTION, SOLUTION INTRAVENOUS; SUBCUTANEOUS at 02:12

## 2019-12-20 RX ADMIN — NIFEDIPINE 60 MG: 30 TABLET, FILM COATED, EXTENDED RELEASE ORAL at 08:12

## 2019-12-20 RX ADMIN — INSULIN ASPART 12 UNITS: 100 INJECTION, SOLUTION INTRAVENOUS; SUBCUTANEOUS at 08:12

## 2019-12-20 RX ADMIN — DIBASIC SODIUM PHOSPHATE, MONOBASIC POTASSIUM PHOSPHATE AND MONOBASIC SODIUM PHOSPHATE 2 TABLET: 852; 155; 130 TABLET ORAL at 08:12

## 2019-12-20 NOTE — SUBJECTIVE & OBJECTIVE
"Interval HPI:   Glucose trend (139-253) Fasting glucose of 139 is AT GOAL.  Pt with Prandial excursions in the 200s.  No hypoglycemias.  Tolerating 100% of PO diet.     Overnight events:  DANIEL   Eatin%  Nausea: No  Hypoglycemia and intervention: No  Fever: No  TPN and/or TF: NO     BP (!) 148/77 (BP Location: Right arm, Patient Position: Lying)   Pulse (!) 54   Temp 98.8 °F (37.1 °C) (Oral)   Resp 19   Ht 5' 3" (1.6 m)   Wt 84 kg (185 lb 3 oz)   SpO2 98%   Breastfeeding? No   BMI 32.80 kg/m²     Labs Reviewed and Include    Recent Labs   Lab 19  0629   *   CALCIUM 9.0   ALBUMIN 3.0*      K 3.6   CO2 24   *   BUN 21   CREATININE 1.5*     Lab Results   Component Value Date    WBC 1.78 (LL) 2019    HGB 8.6 (L) 2019    HCT 27.4 (L) 2019     (H) 2019     (L) 2019     No results for input(s): TSH, FREET4 in the last 168 hours.  Lab Results   Component Value Date    HGBA1C 7.9 (H) 2019       Nutritional status:   Body mass index is 32.8 kg/m².  Lab Results   Component Value Date    ALBUMIN 3.0 (L) 2019    ALBUMIN 3.2 (L) 2019    ALBUMIN 3.6 2019     No results found for: PREALBUMIN    Estimated Creatinine Clearance: 39.9 mL/min (A) (based on SCr of 1.5 mg/dL (H)).    Accu-Checks  Recent Labs     19  1245 19  1714 19  2126 19  0304 19  0754 19  1258 19  1709 19  2116 19  0250 19  0821   POCTGLUCOSE 369* 391* 371* 213* 162* 124* 214* 253* 207* 139*       Current Medications and/or Treatments Impacting Glycemic Control  Immunotherapy:    Immunosuppressants         Stop Route Frequency     tacrolimus capsule 8 mg      -- Oral 2 times daily        Steroids:   Hormones (From admission, onward)    Start     Stop Route Frequency Ordered    19 0900  predniSONE tablet 5 mg      -- Oral Daily 19 1024    19 1307  melatonin tablet 6 mg      -- Oral " Nightly PRN 12/17/19 1209        Pressors:    Autonomic Drugs (From admission, onward)    None        Hyperglycemia/Diabetes Medications:   Antihyperglycemics (From admission, onward)    Start     Stop Route Frequency Ordered    12/20/19 0715  insulin aspart U-100 pen 12 Units      -- SubQ 3 times daily with meals 12/19/19 2311    12/18/19 2100  insulin detemir U-100 pen 12 Units      -- SubQ 2 times daily 12/18/19 1718    12/18/19 1038  insulin aspart U-100 pen 0-5 Units      -- SubQ Before meals & nightly PRN 12/18/19 0974

## 2019-12-20 NOTE — PROGRESS NOTES
Discharge Note:    Pt was alert and oriented x4, sitting up in bed and communicative. Pt presented with her daughter, Niya Schulte (ph# [phone]). SW met with pt and daughter to assess discharge plan and any concerns or needs.    Pt reports agreeing with the discharge plan and has no psychosocial concerns. Pt will discharge today to home with home health needs for medication management. VIKY set up HH through [facility] with [facility] ([id number]). Pt's daughter will transport patient.  Patients caretakers verbalize understanding and are involved in treatment planning and discharge process. Pt nor caregiver had concerns or questions.    VIKY remains available at [phone].

## 2019-12-20 NOTE — PLAN OF CARE
Pt to be d/c off unit via wheelchair and pt transport - pt's daughter at bedside and taking pt home. Pharmacy and endocrine met with pt and discussed medications and managing BG at home - pt denies any questions at this time. AVS printed and given to pt and pt's daughter - both verbalized understanding of all contents of the AVS packet and denied all questions or concerns at this time. Peripheral IV removed per order - catheter intact. Pt in no apparent distress - awaiting transport for d/c

## 2019-12-20 NOTE — ASSESSMENT & PLAN NOTE
-New Onset DM   -A1C 7.9   -Pt presented with glucose in the 500s   -No DKA,  AG 11   -Pt tolerating PO diet   -Glucose range (139-253)  glycemic variability   -Fasting glucose of 139 is AT GOAL   -Pt with prandial excursions   -BG goal 140 - 180      Plan:   -C/w Levemir 12 units BID   -Increase Novolog 12 unit pre-meals   -Low dose correction insulin   -FS qAC/HS/2AM     Discharge:   -TBD likely Long acting insulin + GLP 1 due to limitations in Therapy from CKD

## 2019-12-20 NOTE — PHYSICIAN QUERY
PT Name: Satinder Schulte  MR #: [id number]    Physician Query Form - Emergency Medicine Diagnosis Clarification     CDS/: Charlotte Ryder               Contact information:[email or web address]  This form is a permanent document in the medical record.     Query Date: December 20, 2019    By submitting this query, we are merely seeking further clarification of documentation.  Please utilize your independent clinical judgment when addressing the question(s) below.      The Medical record contains the following:     Diagnosis Supporting Clinical Information Location in Medical Record   Acute kidney injury Initial Assessment:   63 yo W with pmhx ESRD s/p DKKT (9/20/19) on cellcept, tacrolimus, prednisone, HTN presents with a chief complaint of hyperglycemia, acute kidney injury    Bun=17 to 26  Creatinine=1.5 to 2.5    NS [facility] ED MD            Lab 12-17 to 12-19      Mar ED 1 time 12-17     Do you agree with the Emergency Medicine diagnosis of _Acute kidney injury?    [  x ] Yes   [   ] No   [   ] Other/Clarification of Findings:   [  ] Clinically Undetermined         Please document in your progress notes daily for the duration of treatment until resolved and include in your discharge summary.

## 2019-12-20 NOTE — PROGRESS NOTES
"[facility]  Endocrinology  Progress Note    Admit Date: 2019     Reason for Consult: Management of new onset Diabetes Type 2, Hyperglycemia     Surgical Procedure and Date:   Kidney Transplant 2019    Diabetes diagnosis year:     Home Diabetes Medications:  None    How often checking glucose at home? N/a   BG readings on regimen: N/A  Hypoglycemia on the regimen?  No  Missed doses on regimen?  No    Diabetes Complications include:     Hyperglycemia    Complicating diabetes co morbidities:   History of CVA and ESRD, HTN.       HPI:   Patient is a 64 y.o. female with a diagnosis of ESRD s/p DKKT (19) on cellcept, tacrolimus, prednisone, HTN presents with a chief complaint of hyperglycemia.  Patient had outpatient labs drawn yesterday that revealed hyperglycemia of 526.  Patient has no known history of diabetes.   Endocrinology consulted to evaluate new onset DM/hyperglycemia.       Lab Results   Component Value Date    HGBA1C 7.9 (H) 2019       Interval HPI:   Glucose trend (139-253) Fasting glucose of 139 is AT GOAL.  Pt with Prandial excursions in the 200s.  No hypoglycemias.  Tolerating 100% of PO diet.     Overnight events:  DANIEL   Eatin%  Nausea: No  Hypoglycemia and intervention: No  Fever: No  TPN and/or TF: NO     BP (!) 148/77 (BP Location: Right arm, Patient Position: Lying)   Pulse (!) 54   Temp 98.8 °F (37.1 °C) (Oral)   Resp 19   Ht 5' 3" (1.6 m)   Wt 84 kg (185 lb 3 oz)   SpO2 98%   Breastfeeding? No   BMI 32.80 kg/m²      Labs Reviewed and Include    Recent Labs   Lab 19  0629   *   CALCIUM 9.0   ALBUMIN 3.0*      K 3.6   CO2 24   *   BUN 21   CREATININE 1.5*     Lab Results   Component Value Date    WBC 1.78 (LL) 2019    HGB 8.6 (L) 2019    HCT 27.4 (L) 2019     (H) 2019     (L) 2019     No results for input(s): TSH, FREET4 in the last 168 hours.  Lab Results   Component Value " Date    HGBA1C 7.9 (H) 12/17/2019       Nutritional status:   Body mass index is 32.8 kg/m².  Lab Results   Component Value Date    ALBUMIN 3.0 (L) 12/20/2019    ALBUMIN 3.2 (L) 12/19/2019    ALBUMIN 3.6 12/18/2019     No results found for: PREALBUMIN    Estimated Creatinine Clearance: 39.9 mL/min (A) (based on SCr of 1.5 mg/dL (H)).    Accu-Checks  Recent Labs     12/18/19  1245 12/18/19  1714 12/18/19  2126 12/19/19  0304 12/19/19  0754 12/19/19  1258 12/19/19  1709 12/19/19  2116 12/20/19  0250 12/20/19  0821   POCTGLUCOSE 369* 391* 371* 213* 162* 124* 214* 253* 207* 139*       Current Medications and/or Treatments Impacting Glycemic Control  Immunotherapy:    Immunosuppressants         Stop Route Frequency     tacrolimus capsule 8 mg      -- Oral 2 times daily        Steroids:   Hormones (From admission, onward)    Start     Stop Route Frequency Ordered    12/20/19 0900  predniSONE tablet 5 mg      -- Oral Daily 12/19/19 1024    12/17/19 1307  melatonin tablet 6 mg      -- Oral Nightly PRN 12/17/19 1209        Pressors:    Autonomic Drugs (From admission, onward)    None        Hyperglycemia/Diabetes Medications:   Antihyperglycemics (From admission, onward)    Start     Stop Route Frequency Ordered    12/20/19 0715  insulin aspart U-100 pen 12 Units      -- SubQ 3 times daily with meals 12/19/19 2311    12/18/19 2100  insulin detemir U-100 pen 12 Units      -- SubQ 2 times daily 12/18/19 1718    12/18/19 1038  insulin aspart U-100 pen 0-5 Units      -- SubQ Before meals & nightly PRN 12/18/19 0938          ASSESSMENT and PLAN    * Diabetes mellitus, new onset  -New Onset DM   -A1C 7.9   -Pt presented with glucose in the 500s   -No DKA,  AG 11   -Pt tolerating PO diet   -Glucose range (139-253)  glycemic variability   -Fasting glucose of 139 is AT GOAL   -Pt with prandial excursions   -BG goal 140 - 180      Plan:   -C/w Levemir 12 units BID   -Increase Novolog 12 unit pre-meals   -Low dose correction insulin    -FS qAC/HS/2AM     Discharge:   -TBD likely Long acting insulin + GLP 1 due to limitations in Therapy from CKD     Long-term use of immunosuppressant medication    On immunosepresive therapy per transplant team; may elevate BG readings      CKD (chronic kidney disease) stage 3, GFR 30-59 ml/min  -Avoid insulin stacking as it can precipitate hypoglycemias           Jonathon Medel MD  Endocrinology  [facility]

## 2019-12-20 NOTE — PLAN OF CARE
Pt AAOx4, VSS, in bed with upper siderails raised x2, bed in lowest/locked position, call light/personal belongings within reach. Pt instructed to call for assistance. Pt verbalizes understanding.  Pt afebrile at this time.  Proper hand hygiene performed before and after pt care.       Monitoring BG ACHS + 2am. Covered with SSI. Levemir administered at bedtime.  Pt eager to learn more about diabetic diet/lifestyle changes. Demonstrates correct use of insulin pen, vision impaired - see note from day RN.  WBC low with slight increase on AM labs. Cr with no change.   Valcyte continued for CMV.  UOP measured via hat, see flowsheet for output amount. Up to BSCC independently.   Regular bm's.   PRN Melatonin administered at bedtime for trouble sleeping.   Possible discharge 12/20.      Will continue to monitor patient.

## 2019-12-21 PROCEDURE — G0180 MD CERTIFICATION HHA PATIENT: HCPCS | Mod: ,,, | Performed by: SURGERY

## 2019-12-21 PROCEDURE — G0180 PR HOME HEALTH MD CERTIFICATION: ICD-10-PCS | Mod: ,,, | Performed by: SURGERY

## 2019-12-23 ENCOUNTER — LAB VISIT (OUTPATIENT)
Dept: LAB | Facility: HOSPITAL | Age: 64
End: 2019-12-23
Attending: INTERNAL MEDICINE
Payer: MEDICARE

## 2019-12-23 DIAGNOSIS — Z72.89 OTHER PROBLEMS RELATED TO LIFESTYLE: ICD-10-CM

## 2019-12-23 DIAGNOSIS — Z94.0 KIDNEY REPLACED BY TRANSPLANT: ICD-10-CM

## 2019-12-23 LAB
ALBUMIN SERPL BCP-MCNC: 3.6 G/DL (ref 3.5–5.2)
ANION GAP SERPL CALC-SCNC: 8 MMOL/L (ref 8–16)
BASOPHILS # BLD AUTO: 0.01 K/UL (ref 0–0.2)
BASOPHILS NFR BLD: 0.7 % (ref 0–1.9)
BUN SERPL-MCNC: 15 MG/DL (ref 8–23)
CALCIUM SERPL-MCNC: 9.1 MG/DL (ref 8.7–10.5)
CHLORIDE SERPL-SCNC: 109 MMOL/L (ref 95–110)
CO2 SERPL-SCNC: 26 MMOL/L (ref 23–29)
CREAT SERPL-MCNC: 1.4 MG/DL (ref 0.5–1.4)
DIFFERENTIAL METHOD: ABNORMAL
EOSINOPHIL # BLD AUTO: 0 K/UL (ref 0–0.5)
EOSINOPHIL NFR BLD: 0.7 % (ref 0–8)
ERYTHROCYTE [DISTWIDTH] IN BLOOD BY AUTOMATED COUNT: 14.6 % (ref 11.5–14.5)
EST. GFR  (AFRICAN AMERICAN): 46.5 ML/MIN/1.73 M^2
EST. GFR  (NON AFRICAN AMERICAN): 40.3 ML/MIN/1.73 M^2
GLUCOSE SERPL-MCNC: 57 MG/DL (ref 70–110)
HCT VFR BLD AUTO: 29.8 % (ref 37–48.5)
HGB BLD-MCNC: 9 G/DL (ref 12–16)
IMM GRANULOCYTES # BLD AUTO: 0.02 K/UL (ref 0–0.04)
IMM GRANULOCYTES NFR BLD AUTO: 1.3 % (ref 0–0.5)
LYMPHOCYTES # BLD AUTO: 0.4 K/UL (ref 1–4.8)
LYMPHOCYTES NFR BLD: 28.1 % (ref 18–48)
MAGNESIUM SERPL-MCNC: 1.9 MG/DL (ref 1.6–2.6)
MCH RBC QN AUTO: 31.8 PG (ref 27–31)
MCHC RBC AUTO-ENTMCNC: 30.2 G/DL (ref 32–36)
MCV RBC AUTO: 105 FL (ref 82–98)
MONOCYTES # BLD AUTO: 0 K/UL (ref 0.3–1)
MONOCYTES NFR BLD: 2 % (ref 4–15)
NEUTROPHILS # BLD AUTO: 1 K/UL (ref 1.8–7.7)
NEUTROPHILS NFR BLD: 67.2 % (ref 38–73)
NRBC BLD-RTO: 0 /100 WBC
PHOSPHATE SERPL-MCNC: 3.5 MG/DL (ref 2.7–4.5)
PLATELET # BLD AUTO: 109 K/UL (ref 150–350)
PMV BLD AUTO: 12.2 FL (ref 9.2–12.9)
POTASSIUM SERPL-SCNC: 3.9 MMOL/L (ref 3.5–5.1)
RBC # BLD AUTO: 2.83 M/UL (ref 4–5.4)
SODIUM SERPL-SCNC: 143 MMOL/L (ref 136–145)
WBC # BLD AUTO: 1.53 K/UL (ref 3.9–12.7)

## 2019-12-23 PROCEDURE — 87340 HEPATITIS B SURFACE AG IA: CPT

## 2019-12-23 PROCEDURE — 87517 HEPATITIS B DNA QUANT: CPT

## 2019-12-23 PROCEDURE — 80197 ASSAY OF TACROLIMUS: CPT

## 2019-12-23 PROCEDURE — 80069 RENAL FUNCTION PANEL: CPT

## 2019-12-23 PROCEDURE — 83735 ASSAY OF MAGNESIUM: CPT

## 2019-12-23 PROCEDURE — 85025 COMPLETE CBC W/AUTO DIFF WBC: CPT

## 2019-12-24 ENCOUNTER — TELEPHONE (OUTPATIENT)
Dept: TRANSPLANT | Facility: CLINIC | Age: 64
End: 2019-12-24

## 2019-12-24 LAB
HBV SURFACE AG SERPL QL IA: NEGATIVE
TACROLIMUS BLD-MCNC: 7.3 NG/ML (ref 5–15)

## 2019-12-24 NOTE — TELEPHONE ENCOUNTER
Patient daughter sharif repeated back and voice a understanding of orders.  Neutropenic precautions reviewed.  Next CBC on 12/26/19.

## 2019-12-24 NOTE — TELEPHONE ENCOUNTER
----- Message from Pavel Padilla MD sent at 12/24/2019 11:26 AM CST -----  Will repeat cbc with diff 12/26

## 2019-12-26 LAB — CMV DNA SERPL NAA+PROBE-ACNC: <35 IU/ML

## 2019-12-26 NOTE — PHYSICIAN QUERY
PT Name: Satinder Schulte  MR #: [id number]    Physician Query Form - Transplant Condition Clarification        CDS/: Charlotte Ryder               Contact information:[email or web address]  This form is a permanent document in the medical record.     Query Date: December 26, 2019    By submitting this query, we are merely seeking further clarification of documentation to reflect the severity of illness of your patient. Please utilize your independent clinical judgment when addressing the question(s) below.    The Medical record reflects the following:     Indicators   Supporting Clinical Findings Location in Medical Record   x History of organ transplant Status post kidney transplant  - s/p DDRT on 9/20/19  - Admitted with hyperglycemia  - Cr elevated on admit  - Hydrate with IVF   H&P   x Acute/chronic condition(s) SAUNDRA   ED MD and Query form   x Lab Value(s) Bun=15 to 26  Creatinine=1.4 to 2.5   Lab 12-17 to 12-23    Radiology/ US Findings      Treatment/Medication      Other       Provider, please specify if the _______________________________  :  [   ] Affected the function of the transplanted organ, and is a complication   [ x  ] Did not affect the function of the transplanted organ, and is not a complication   [   ] Other explanation (please specify): _____________   [   ]  Clinically Undetermined       Please document in your progress notes daily for the duration of treatment until resolved, and include in your discharge summary.

## 2019-12-27 ENCOUNTER — TELEPHONE (OUTPATIENT)
Dept: TRANSPLANT | Facility: CLINIC | Age: 64
End: 2019-12-27

## 2019-12-27 LAB
HBV DNA SERPL NAA+PROBE-ACNC: <10 IU/ML
HBV DNA SERPL NAA+PROBE-LOG IU: <1 LOG (10) IU/ML
HBV DNA SERPL QL NAA+PROBE: NOT DETECTED

## 2019-12-27 NOTE — TELEPHONE ENCOUNTER
Returned call, left vm, labs stable, no changes at present time.  Next labs Monday 12/30/19.  ----- Message from Kadi Cui sent at 12/27/2019  3:45 PM CST -----  Contact: pt  Reason: Returning call from Cindy    Communication: [phone]

## 2019-12-27 NOTE — TELEPHONE ENCOUNTER
Left voicemail message for pt, labs stable, no changes needed.  ----- Message from Pavel Padilla MD sent at 12/27/2019  2:34 PM CST -----  Labs and diagnostic tests were reviewed. No action/changes indicated.

## 2019-12-30 ENCOUNTER — TELEPHONE (OUTPATIENT)
Dept: TRANSPLANT | Facility: CLINIC | Age: 64
End: 2019-12-30

## 2019-12-30 ENCOUNTER — CLINICAL SUPPORT (OUTPATIENT)
Dept: TRANSPLANT | Facility: CLINIC | Age: 64
End: 2019-12-30
Payer: MEDICARE

## 2019-12-30 ENCOUNTER — OFFICE VISIT (OUTPATIENT)
Dept: TRANSPLANT | Facility: CLINIC | Age: 64
End: 2019-12-30
Payer: MEDICARE

## 2019-12-30 ENCOUNTER — LAB VISIT (OUTPATIENT)
Dept: LAB | Facility: HOSPITAL | Age: 64
End: 2019-12-30
Attending: INTERNAL MEDICINE
Payer: MEDICARE

## 2019-12-30 VITALS
DIASTOLIC BLOOD PRESSURE: 75 MMHG | OXYGEN SATURATION: 97 % | HEART RATE: 73 BPM | RESPIRATION RATE: 18 BRPM | TEMPERATURE: 98 F | BODY MASS INDEX: 34.13 KG/M2 | SYSTOLIC BLOOD PRESSURE: 180 MMHG | WEIGHT: 192.69 LBS

## 2019-12-30 VITALS
HEART RATE: 73 BPM | TEMPERATURE: 98 F | BODY MASS INDEX: 34.14 KG/M2 | WEIGHT: 192.69 LBS | HEIGHT: 63 IN | SYSTOLIC BLOOD PRESSURE: 180 MMHG | DIASTOLIC BLOOD PRESSURE: 75 MMHG | RESPIRATION RATE: 18 BRPM | OXYGEN SATURATION: 100 %

## 2019-12-30 DIAGNOSIS — N18.9 ANEMIA ASSOCIATED WITH CHRONIC RENAL FAILURE: Primary | ICD-10-CM

## 2019-12-30 DIAGNOSIS — Z94.0 KIDNEY REPLACED BY TRANSPLANT: ICD-10-CM

## 2019-12-30 DIAGNOSIS — N18.30 CKD (CHRONIC KIDNEY DISEASE) STAGE 3, GFR 30-59 ML/MIN: ICD-10-CM

## 2019-12-30 DIAGNOSIS — D63.1 ANEMIA ASSOCIATED WITH CHRONIC RENAL FAILURE: Primary | ICD-10-CM

## 2019-12-30 DIAGNOSIS — D84.9 IMMUNOCOMPROMISED STATE: ICD-10-CM

## 2019-12-30 DIAGNOSIS — Z94.0 STATUS POST KIDNEY TRANSPLANT: Primary | ICD-10-CM

## 2019-12-30 DIAGNOSIS — Z91.89 AT RISK FOR OPPORTUNISTIC INFECTIONS: ICD-10-CM

## 2019-12-30 DIAGNOSIS — Z79.01 LONG TERM (CURRENT) USE OF ANTICOAGULANTS: ICD-10-CM

## 2019-12-30 DIAGNOSIS — Z79.60 LONG-TERM USE OF IMMUNOSUPPRESSANT MEDICATION: ICD-10-CM

## 2019-12-30 PROCEDURE — 99999 PR PBB SHADOW E&M-EST. PATIENT-LVL II: CPT | Mod: PBBFAC,,,

## 2019-12-30 PROCEDURE — 99212 OFFICE O/P EST SF 10 MIN: CPT | Mod: PBBFAC,27,25

## 2019-12-30 PROCEDURE — 99215 PR OFFICE/OUTPT VISIT, EST, LEVL V, 40-54 MIN: ICD-10-PCS | Mod: S$PBB,,, | Performed by: INTERNAL MEDICINE

## 2019-12-30 PROCEDURE — 99999 PR PBB SHADOW E&M-EST. PATIENT-LVL II: ICD-10-PCS | Mod: PBBFAC,,,

## 2019-12-30 PROCEDURE — 36415 COLL VENOUS BLD VENIPUNCTURE: CPT

## 2019-12-30 PROCEDURE — 99215 OFFICE O/P EST HI 40 MIN: CPT | Mod: S$PBB,,, | Performed by: INTERNAL MEDICINE

## 2019-12-30 PROCEDURE — 99213 OFFICE O/P EST LOW 20 MIN: CPT | Mod: PBBFAC,25 | Performed by: INTERNAL MEDICINE

## 2019-12-30 PROCEDURE — 99999 PR PBB SHADOW E&M-EST. PATIENT-LVL III: CPT | Mod: PBBFAC,,, | Performed by: INTERNAL MEDICINE

## 2019-12-30 PROCEDURE — 96372 THER/PROPH/DIAG INJ SC/IM: CPT | Mod: PBBFAC

## 2019-12-30 PROCEDURE — 99999 PR PBB SHADOW E&M-EST. PATIENT-LVL III: ICD-10-PCS | Mod: PBBFAC,,, | Performed by: INTERNAL MEDICINE

## 2019-12-30 RX ADMIN — FILGRASTIM 480 MCG: 480 INJECTION, SOLUTION INTRAVENOUS; SUBCUTANEOUS at 04:12

## 2019-12-30 NOTE — PROGRESS NOTES
.Neupogen 480 mcg given per MD order to right posterior arm. Patient tolerated with no complaints. Patient informed of side effects including bone pain, muscle aches, tiredness and temporary flu-like symptoms (fever, chills, shivering, muscle or joint pain), pain to injection site treat w/OTC pain relievers. Advise patient to sit in lobby for 10-15 min in case of any sudden side effect. Verbalized acknowledgment.

## 2019-12-30 NOTE — PROGRESS NOTES
Kidney Post-Transplant Assessment    Referring Physician: Bruce Khan  Current Nephrologist: Bruce Khan    ORGAN: LEFT KIDNEY  Donor Type: donation after brain death  PHS Increased Risk: no  Cold Ischemia: 545 mins  Induction Medications: thymoglobulin    Subjective:     CC:  Reassessment of renal allograft function and management of chronic immunosuppression.    HPI:  Ms. Schulte is a 64 y.o. 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  She has CKD stage 3 - GFR 30-59 and her baseline creatinine is between 1.2 to 1.6. She takes mycophenolate mofetil and tacrolimus for maintenance immunosuppression. She denies any recent hospitalizations or ER visits since her previous clinic visit.      Patient is feeling fine    Appetite is good. She is walking with her nice.     Again confused with the 12 hr trough level despite the fact that we have explained this to her and her care giver innumerable times.      She did not take the BP medications today and is elevated this afternoon      No other issues to report to me      Current Outpatient Medications on File Prior to Visit   Medication Sig Dispense Refill    atorvastatin (LIPITOR) 40 MG tablet Take 40 mg by mouth once daily.       atovaquone (MEPRON) 750 mg/5 mL Susp Take 10 mLs (1,500 mg total) by mouth once daily. Stop 9/19/2020 300 mL 11    blood sugar diagnostic Strp Test blood glucose 4 (four) times daily. 100 each 5    blood-glucose meter kit Use as instructed 1 each 0    carvedilol (COREG) 25 MG tablet Take 1/2 tablet (12.5 mg total) by mouth 2 (two) times daily with meals. Hold for SBP<120, HR<60 30 tablet 11    cinacalcet (SENSIPAR) 30 MG Tab Take 1 tablet (30 mg total) by mouth daily with breakfast. 30 tablet 11    dulaglutide (TRULICITY) 0.75 mg/0.5 mL PnIj Inject 0.5 mLs (0.75 mg total) into the skin every 7 days. 4 Syringe 5    famotidine (PEPCID) 20 MG tablet Take 1  "tablet (20 mg total) by mouth every evening. 30 tablet 0    hydrALAZINE (APRESOLINE) 25 MG tablet Take 1 tablet (25 mg total) by mouth 3 (three) times daily. 90 tablet 11    insulin detemir U-100 (LEVEMIR FLEXTOUCH) 100 unit/mL (3 mL) SubQ InPn pen Inject 24 Units into the skin every evening. 15 mL 5    k phos di & mono-sod phos mono (K-PHOS-NEUTRAL) 250 mg Tab Take 2 tablets by mouth 2 (two) times daily. 120 tablet 11    lancets 28 gauge Misc Test blood glucose 4 (four) times daily. 100 each 5    magnesium oxide (MAG-OX) 400 mg (241.3 mg magnesium) tablet Take 2 tablets (800 mg total) by mouth 3 (three) times daily. 180 tablet 11    NIFEdipine (ADALAT CC) 60 MG TbSR Take 1 tablet (60 mg total) by mouth 2 (two) times daily. 60 tablet 11    pen needle, diabetic 32 gauge x 5/32" Ndle Use to inject insulin 4 (four) times daily. 100 each 5    predniSONE (DELTASONE) 5 MG tablet Take by mouth daily: 20mg (4 tablets) 9/23-10/22; 15 mg (3 tablets) 10/23-11/22; 10 mg (2 tablets) 11/23-12/22; then 5 mg (1 tablet) daily thereafter 12/23/19 120 tablet 11    sodium bicarbonate 650 MG tablet Take 1 tablet (650 mg total) by mouth 2 (two) times daily. 60 tablet 11    tacrolimus (PROGRAF) 1 MG Cap Take 8 capsules (8 mg total) by mouth every morning AND 8 capsules (8 mg total) every evening. 480 capsule 11    valGANciclovir (VALCYTE) 450 mg Tab Take 2 tablets (900 mg total) by mouth 2 (two) times daily. 120 tablet 11     No current facility-administered medications on file prior to visit.         Review of Systems     Skin: no skin rash  CNS; no headaches, blurred vision, seizure, or syncope  ENT: No JVD,  Adenopathies,  nasal congestion. No oral lesions  Cardiac: No chest pain, dyspnea, claudication, edema or palpitations  Respiratory: No SOB, cough, hemoptysis   Gastro-intestinal: No diarrhea, constipation, abdominal pain, nausea, vomit. No ascitis  Genitourinary: no hematuria, dysuria, frequency, " "frequency  Musculoskeletal: joint pain, arthritis or vasculitic changes  Psych: alert awake, oriented, No cranial nerves deficit.      Objective:         Physical Exam     BP (!) 180/75 (BP Location: Right arm, Patient Position: Sitting, BP Method: Medium (Automatic))   Pulse 73   Temp 98.1 °F (36.7 °C) (Oral)   Resp 18   Ht 5' 3" (1.6 m)   Wt 87.4 kg (192 lb 10.9 oz)   SpO2 100%   BMI 34.13 kg/m²       Head: normocephalic  Neck: No JVD, cervical axillary, or femoral adenopathies  Heart: no murmurs, Normal s1 and s2, No gallops, no rubs, No murmurs  Lungs; CTA, good respiratory effort, no crackles  Abdomen: soft, non tender, no splenomegaly or hepatomegaly, no massess, no bruits  Extremities: No edema, skin rash, joint pain  SNC: awake, alert oriented. Cranial nerves are intact, no focalized, sensitivity and strength preserved      Labs:  Lab Results   Component Value Date    WBC 2.15 (L) 12/27/2019    HGB 8.9 (L) 12/27/2019    HCT 28.4 (L) 12/27/2019     12/23/2019    K 3.9 12/23/2019     12/23/2019    CO2 26 12/23/2019    BUN 15 12/23/2019    CREATININE 1.4 12/23/2019    EGFRNONAA 40.3 (A) 12/23/2019    CALCIUM 9.1 12/23/2019    PHOS 3.5 12/23/2019    MG 1.9 12/23/2019    ALBUMIN 3.6 12/23/2019    AST 16 12/17/2019    ALT 45 (H) 12/17/2019    UTPCR 0.65 (H) 12/16/2019    .0 (H) 10/21/2019    TACROLIMUS 7.3 12/23/2019       No results found for: EXTANC, EXTWBC, EXTSEGS, EXTPLATELETS, EXTHEMOGLOBI, EXTHEMATOCRI, EXTCREATININ, EXTSODIUM, EXTPOTASSIUM, EXTBUN, EXTCO2, EXTCALCIUM, EXTPHOSPHORU, EXTGLUCOSE, EXTALBUMIN, EXTAST, EXTALT, EXTBILITOTAL, EXTLIPASE, EXTAMYLASE    No results found for: EXTCYCLOSLVL, EXTSIROLIMUS, EXTTACROLVL, EXTPROTCRE, EXTPTHINTACT, EXTPROTEINUA, EXTWBCUA, EXTRBCUA    Labs were reviewed with the patient.    Assessment:     1. Status post kidney transplant    2. Long-term use of immunosuppressant medication    3. Immunocompromised state    4. CKD (chronic kidney " disease) stage 3, GFR 30-59 ml/min    5. At risk for opportunistic infections        Plan:       1. CKD stage 3 with creatinine at baseline : will continue follow up as per our center guidelines. patient to continue close follow up with the local General nephrologist. Education provided in appropriate fluid intake, potassium intake. Continue with oral hydration.        2. Immunosuppression: prograf at target.  bid.   Lab Results   Component Value Date    TACROLIMUS 7.3 12/23/2019    TACROLIMUS 11.2 12/20/2019    TACROLIMUS 11.6 12/19/2019     No results found for: CYCLOSPORINE  @   Will closely monitor for toxicities, education provided about adherence to medicines and need to communicate any side effect to the transplant nurse or physician.    3. Allograft Function: stable at baseline for the patient. Continue follow up as per our guidelines and with the local General nephrologist. Communication will be sent today.  Lab Results   Component Value Date    CREATININE 1.4 12/23/2019    CREATININE 1.5 (H) 12/20/2019    CREATININE 1.5 (H) 12/19/2019     Lab Results   Component Value Date    LIPASE 36 12/17/2019       4. Hypertension management: well controlled at [facility].  Continue with home blood pressure monitoring, low salt and healthy life discussed with the patient..    5. Metabolic Bone Disease/Secondary Hyperparathyroidism:calcium and phosphorus level discussed with the patient, patient will continue follow up with the general nephrologist for management of metabolic bone disease   calcium and phosphorus as per our center protocol. Will monitor PTH, Vit D level, calcium.      Lab Results   Component Value Date    .0 (H) 10/21/2019    CALCIUM 9.1 12/23/2019    PHOS 3.5 12/23/2019    PHOS 2.5 (L) 12/20/2019    PHOS 2.3 (L) 12/19/2019       6. Electrolytes: mild acidosis will continue with sodium bicarbonate. reviewed with the patient, essentially within the normal range no need for acute changes today,  will monitor as per our center guidelines.     Lab Results   Component Value Date     12/23/2019    K 3.9 12/23/2019     12/23/2019    CO2 26 12/23/2019    CO2 24 12/20/2019    CO2 19 (L) 12/19/2019       7. Anemia: will repeat iron stores. will continue monitoring as per our center guidelines. No indication for acute intervention today.     Lab Results   Component Value Date    WBC 2.15 (L) 12/27/2019    HGB 8.9 (L) 12/27/2019    HCT 28.4 (L) 12/27/2019     (H) 12/27/2019     (L) 12/27/2019       8.Proteinuria: will continue with pr/cr ratio as per our center guidelines  Lab Results   Component Value Date    PROTEINURINE 46 (H) 12/16/2019    CREATRANDUR 71.0 12/16/2019    UTPCR 0.65 (H) 12/16/2019    UTPCR 0.18 11/18/2019    UTPCR 0.14 10/21/2019        9. BK virus infection screening: will continue with urine or blood PCR as per our guidelines to prevent BK virus viremia and allograft dysfunction  Lab Results   Component Value Date    BKVIRUSPCRQB <125 12/16/2019         10. Weight education: provided during the clinic visit.   There is no height or weight on file to calculate BMI.       11.Patient safety education regarding immunosuppression including prophylaxis posttransplant for CMV, PCP : Education provided about vaccination and prevention of infections.    12.  Cytopenias: no significant cytopenias will monitor as per our guidelines. Medicine list reviewed including potential causes of drug-induced cytopenias     Lab Results   Component Value Date    WBC 2.15 (L) 12/27/2019    HGB 8.9 (L) 12/27/2019    HCT 28.4 (L) 12/27/2019     (H) 12/27/2019     (L) 12/27/2019       13. Post-transplant Prophylaxis; CMV Infection, PJP and Candida mucosistis and other indicated for this particular patient.     I spoke with the patient for 30 minutes. More than half dedicated to counseling and education. All questions answered    Pavel Padilla MD  Transplant  Nephrology            Follow-up:   Clinic: return to transplant clinic weekly for the first month after transplant; every 2 weeks during months 2-3; then at 6-, 9-, 12-, 18-, 24-, and 36- months post-transplant to reassess for complications from immunosuppression toxicity and monitor for rejection.  Annually thereafter.    Labs: since patient remains at high risk for rejection and drug-related complications that warrant close monitoring, labs will be ordered as follows: continue twice weekly CBC, renal panel, and drug level for first month; then same labs once weekly through 3rd month post-transplant.  Urine for UA and protein/creatinine ratio monthly.  Serum BK - PCR at 1-, 3-, 6-, 9-, 12-, 18-, 24-, 36- 48-, and 60 months post-transplant.  Hepatic panel at 1-, 2-, 3-, 6-, 9-, 12-, 18-, 24-, and 36- months post-transplant.    Pavel Padilla MD       Education:   Material provided to the patient.  Patient reminded to call with any health changes since these can be early signs of significant complications.  Also, I advised the patient to be sure any new medications or changes of old medications are discussed with either a pharmacist or physician knowledgeable with transplant to avoid rejection/drug toxicity related to significant drug interactions.    UNOS Patient Status  Functional Status: 80% - Normal activity with effort: some symptoms of disease  Physical Capacity: No Limitations

## 2019-12-30 NOTE — TELEPHONE ENCOUNTER
----- Message from Pavel Padilla MD sent at 12/30/2019  2:18 PM CST -----  Please wait for ANC and proceed with Neupogen as per protocol

## 2020-01-01 NOTE — TELEPHONE ENCOUNTER
Neupogen injection given in clinic today.   This is 39 and 2/7 week , DOL 1. Born via  on 4/15 @ 1646 to a 21yo  mother. Prenatal labs: A+, all other labs neg/NR/imm. Mom tested + COVID and was symptomatic (on O2) and is now asymptomatic. This is 39 and 2/7 week , DOL 1. Born via  on 4/15 @ 1646 to a 21yo  mother. Prenatal labs: A+, all other labs neg/NR/imm. Mom tested + COVID and was symptomatic (on O2) and is now asymptomatic.  Baby is feeding, voiding and stooling well.  Erythromycin and Vit K given in L&D.  Physical exam wnl.    Passed CCHD  Bilirubin level wnl for age   screen sent  Passed Hearing Screen  Hepatitis B vaccine declined by mother    Baby is cleared for discharge.  F/U with PMD in 1-2 days This is 39 and 2/7 week , DOL 1. Born via  on 4/15 @ 1646 to a 23yo  mother. Prenatal labs: A+, all other labs neg/NR/imm. Mom tested + COVID and was symptomatic (on O2) and is now asymptomatic.  Baby is feeding, voiding and stooling well.  Erythromycin and Vit K given in L&D.  Physical exam wnl.    Passed CCHD  Bilirubin level wnl for age   screen sent  Passed Hearing Screen  Hepatitis B vaccine declined by mother  COVID19 PCR on Baby negative    Baby is cleared for discharge.  F/U with PMD in 1-2 days.  Continue COVID19 precautions

## 2020-01-06 ENCOUNTER — TELEPHONE (OUTPATIENT)
Dept: TRANSPLANT | Facility: CLINIC | Age: 65
End: 2020-01-06

## 2020-01-06 ENCOUNTER — EXTERNAL HOME HEALTH (OUTPATIENT)
Dept: HOME HEALTH SERVICES | Facility: HOSPITAL | Age: 65
End: 2020-01-06
Payer: MEDICARE

## 2020-01-07 ENCOUNTER — CLINICAL SUPPORT (OUTPATIENT)
Dept: TRANSPLANT | Facility: CLINIC | Age: 65
End: 2020-01-07
Payer: MEDICARE

## 2020-01-07 ENCOUNTER — OFFICE VISIT (OUTPATIENT)
Dept: ENDOCRINOLOGY | Facility: CLINIC | Age: 65
End: 2020-01-07
Payer: MEDICARE

## 2020-01-07 ENCOUNTER — LAB VISIT (OUTPATIENT)
Dept: LAB | Facility: HOSPITAL | Age: 65
End: 2020-01-07
Attending: INTERNAL MEDICINE
Payer: MEDICARE

## 2020-01-07 VITALS
RESPIRATION RATE: 18 BRPM | BODY MASS INDEX: 33.32 KG/M2 | HEIGHT: 63 IN | SYSTOLIC BLOOD PRESSURE: 130 MMHG | DIASTOLIC BLOOD PRESSURE: 50 MMHG | HEART RATE: 60 BPM | WEIGHT: 188.06 LBS

## 2020-01-07 VITALS
WEIGHT: 188 LBS | OXYGEN SATURATION: 100 % | SYSTOLIC BLOOD PRESSURE: 152 MMHG | TEMPERATURE: 98 F | BODY MASS INDEX: 33.31 KG/M2 | DIASTOLIC BLOOD PRESSURE: 68 MMHG | HEART RATE: 65 BPM | HEIGHT: 63 IN | RESPIRATION RATE: 16 BRPM

## 2020-01-07 DIAGNOSIS — T75.89XA ENVIRONMENTAL EXPOSURE: ICD-10-CM

## 2020-01-07 DIAGNOSIS — Z57.8 OCCUPATIONAL EXPOSURE TO OTHER RISK FACTORS: ICD-10-CM

## 2020-01-07 DIAGNOSIS — E21.2 TERTIARY HYPERPARATHYROIDISM: ICD-10-CM

## 2020-01-07 DIAGNOSIS — Z72.89 OTHER PROBLEMS RELATED TO LIFESTYLE: ICD-10-CM

## 2020-01-07 DIAGNOSIS — E11.9 NEW ONSET TYPE 2 DIABETES MELLITUS: Primary | ICD-10-CM

## 2020-01-07 DIAGNOSIS — Z79.52 CURRENT CHRONIC USE OF SYSTEMIC STEROIDS: ICD-10-CM

## 2020-01-07 DIAGNOSIS — Z79.01 LONG TERM (CURRENT) USE OF ANTICOAGULANTS: ICD-10-CM

## 2020-01-07 DIAGNOSIS — E55.9 VITAMIN D DEFICIENCY: ICD-10-CM

## 2020-01-07 DIAGNOSIS — Z94.0 KIDNEY REPLACED BY TRANSPLANT: ICD-10-CM

## 2020-01-07 DIAGNOSIS — E66.09 OBESITY DUE TO EXCESS CALORIES, UNSPECIFIED CLASSIFICATION, UNSPECIFIED WHETHER SERIOUS COMORBIDITY PRESENT: Chronic | ICD-10-CM

## 2020-01-07 LAB
ALBUMIN SERPL BCP-MCNC: 4.1 G/DL (ref 3.5–5.2)
ALLOSURE: NORMAL
ANION GAP SERPL CALC-SCNC: 9 MMOL/L (ref 8–16)
ANISOCYTOSIS BLD QL SMEAR: SLIGHT
BASOPHILS NFR BLD: 1 % (ref 0–1.9)
BUN SERPL-MCNC: 22 MG/DL (ref 8–23)
CALCIUM SERPL-MCNC: 10.5 MG/DL (ref 8.7–10.5)
CHLORIDE SERPL-SCNC: 112 MMOL/L (ref 95–110)
CO2 SERPL-SCNC: 25 MMOL/L (ref 23–29)
CREAT SERPL-MCNC: 1.2 MG/DL (ref 0.5–1.4)
DACRYOCYTES BLD QL SMEAR: ABNORMAL
DIFFERENTIAL METHOD: ABNORMAL
EOSINOPHIL NFR BLD: 1 % (ref 0–8)
ERYTHROCYTE [DISTWIDTH] IN BLOOD BY AUTOMATED COUNT: 16 % (ref 11.5–14.5)
EST. GFR  (AFRICAN AMERICAN): 56 ML/MIN/1.73 M^2
EST. GFR  (NON AFRICAN AMERICAN): 48.6 ML/MIN/1.73 M^2
GLUCOSE SERPL-MCNC: 53 MG/DL (ref 70–110)
HBV SURFACE AG SERPL QL IA: NEGATIVE
HCT VFR BLD AUTO: 29.8 % (ref 37–48.5)
HGB BLD-MCNC: 9.5 G/DL (ref 12–16)
IMM GRANULOCYTES # BLD AUTO: ABNORMAL K/UL (ref 0–0.04)
IMM GRANULOCYTES NFR BLD AUTO: ABNORMAL % (ref 0–0.5)
LYMPHOCYTES NFR BLD: 38 % (ref 18–48)
MAGNESIUM SERPL-MCNC: 1.8 MG/DL (ref 1.6–2.6)
MCH RBC QN AUTO: 33 PG (ref 27–31)
MCHC RBC AUTO-ENTMCNC: 31.9 G/DL (ref 32–36)
MCV RBC AUTO: 104 FL (ref 82–98)
MONOCYTES NFR BLD: 1 % (ref 4–15)
NEUTROPHILS NFR BLD: 57 % (ref 38–73)
NEUTS BAND NFR BLD MANUAL: 2 %
NRBC BLD-RTO: 0 /100 WBC
OVALOCYTES BLD QL SMEAR: ABNORMAL
PHOSPHATE SERPL-MCNC: 2.5 MG/DL (ref 2.7–4.5)
PLATELET # BLD AUTO: 135 K/UL (ref 150–350)
PMV BLD AUTO: 11.5 FL (ref 9.2–12.9)
POIKILOCYTOSIS BLD QL SMEAR: SLIGHT
POLYCHROMASIA BLD QL SMEAR: ABNORMAL
POTASSIUM SERPL-SCNC: 3.8 MMOL/L (ref 3.5–5.1)
RBC # BLD AUTO: 2.88 M/UL (ref 4–5.4)
SODIUM SERPL-SCNC: 146 MMOL/L (ref 136–145)
TACROLIMUS BLD-MCNC: 6.2 NG/ML (ref 5–15)
WBC # BLD AUTO: 1.29 K/UL (ref 3.9–12.7)

## 2020-01-07 PROCEDURE — 85027 COMPLETE CBC AUTOMATED: CPT

## 2020-01-07 PROCEDURE — 80197 ASSAY OF TACROLIMUS: CPT

## 2020-01-07 PROCEDURE — 99999 PR PBB SHADOW E&M-EST. PATIENT-LVL III: ICD-10-PCS | Mod: PBBFAC,,,

## 2020-01-07 PROCEDURE — 99999 PR PBB SHADOW E&M-EST. PATIENT-LVL III: CPT | Mod: PBBFAC,,,

## 2020-01-07 PROCEDURE — 80069 RENAL FUNCTION PANEL: CPT

## 2020-01-07 PROCEDURE — 99213 OFFICE O/P EST LOW 20 MIN: CPT | Mod: PBBFAC,27,25

## 2020-01-07 PROCEDURE — 99214 OFFICE O/P EST MOD 30 MIN: CPT | Mod: S$PBB,GC,, | Performed by: INTERNAL MEDICINE

## 2020-01-07 PROCEDURE — 99999 PR PBB SHADOW E&M-EST. PATIENT-LVL V: CPT | Mod: PBBFAC,GC,, | Performed by: GENERAL ACUTE CARE HOSPITAL

## 2020-01-07 PROCEDURE — 86833 HLA CLASS II HIGH DEFIN QUAL: CPT | Mod: PO

## 2020-01-07 PROCEDURE — 86977 RBC SERUM PRETX INCUBJ/INHIB: CPT | Mod: 59,PO

## 2020-01-07 PROCEDURE — 85007 BL SMEAR W/DIFF WBC COUNT: CPT

## 2020-01-07 PROCEDURE — 99214 PR OFFICE/OUTPT VISIT, EST, LEVL IV, 30-39 MIN: ICD-10-PCS | Mod: S$PBB,GC,, | Performed by: INTERNAL MEDICINE

## 2020-01-07 PROCEDURE — 99999 PR PBB SHADOW E&M-EST. PATIENT-LVL V: ICD-10-PCS | Mod: PBBFAC,GC,, | Performed by: GENERAL ACUTE CARE HOSPITAL

## 2020-01-07 PROCEDURE — 99215 OFFICE O/P EST HI 40 MIN: CPT | Mod: PBBFAC,25 | Performed by: GENERAL ACUTE CARE HOSPITAL

## 2020-01-07 PROCEDURE — 86832 HLA CLASS I HIGH DEFIN QUAL: CPT | Mod: PO

## 2020-01-07 PROCEDURE — 87340 HEPATITIS B SURFACE AG IA: CPT

## 2020-01-07 PROCEDURE — 87517 HEPATITIS B DNA QUANT: CPT

## 2020-01-07 PROCEDURE — 83735 ASSAY OF MAGNESIUM: CPT

## 2020-01-07 PROCEDURE — 96372 THER/PROPH/DIAG INJ SC/IM: CPT | Mod: PBBFAC

## 2020-01-07 PROCEDURE — 87799 DETECT AGENT NOS DNA QUANT: CPT

## 2020-01-07 RX ORDER — INSULIN ASPART 100 [IU]/ML
5 INJECTION, SOLUTION INTRAVENOUS; SUBCUTANEOUS
Qty: 5 ML | Refills: 11 | Status: ON HOLD | OUTPATIENT
Start: 2020-01-07 | End: 2020-11-26

## 2020-01-07 RX ADMIN — FILGRASTIM 480 MCG: 480 INJECTION, SOLUTION INTRAVENOUS; SUBCUTANEOUS at 01:01

## 2020-01-07 SDOH — SOCIAL DETERMINANTS OF HEALTH (SDOH): OCCUPATIONAL EXPOSURE TO OTHER RISK FACTORS: Z57.8

## 2020-01-07 NOTE — PATIENT INSTRUCTIONS
We are decreasing the long acting insulin (Levemir) to 17 units at BED TIME only (Every night)     Start Short acting insulin (Novolog 5 units) 15 minutes before your largest meal     Continue with Trulicity 0.75mg injection (Once a week in the morning)     Check you sugars in the morning when waking up, 15 minutes before meals and bedtime     Glucose goals:   In the morning (80 - 130)   And 2 hrs after meals and bedtime (Less 180)     If feeling low blood sugar (sugar less 70)  Take 4 glucose tabs or 16grams of carbs and re-check your sugar at 15minutes, 30 minutes and 1 hour.      If having persistent low blood sugar, please call the clinic to adjust your insulin         We will order a 24hr urine calcium collection and We will do a bone density scan to check your bones     Diabetes education appointment and ophthalmology appointment     Follow up in 1 month

## 2020-01-07 NOTE — ASSESSMENT & PLAN NOTE
-Likely Post-transplant DM   -A1C 7.9 on 12/19 FROM 4.3 on 6/19   -FS log AM (40 - 300s), Lunch (100 - 250s), Bedtime (50s - 300s)    -Diet, exercise, lifestyle modifications and A1c Goals discussed   -Pt having persistent hypoglycemias due to using levemir as long acting and pre-meals   -Hypoglycemia symptoms and plan of action discussed   -Ophthalmology (Not seen > 1yr)   -Monofilament (Sensitivity intact in b/l LE)   - (C/w Lipitor 40mg Daily)   -CKD (Not on ACE or ARB)  Will defer management to Nephrology     Plan:   -Decrease Levemir to 17units qHS   -Start Novolog 5 units before largest meal and if persistent prandial hyperglycemia, can start TID pre-meals   -C/w Trulicity 0.75mg SC q Weekly   -Will give Diabetes education referral   -Will give ophthalmology referral   -Discussed how to correct hypoglycemias with 16g of carbs and re-check glucose levels in (15min/30min/1hr)

## 2020-01-07 NOTE — ASSESSMENT & PLAN NOTE
-Ca 10.3 (Corrected)   (Highest 11.3)   -   -Pt currently on Cinacalcet 30mg PO daily   -Vitamin D  25   -Asymptomatic     Plan:   -Will screen for surgical indications (Send DEXA and 24hr urine Ca)   -C/w Cinacalcet 30mg PO daily   -Renal panel q 6months   -Encourage PO hydration

## 2020-01-07 NOTE — PROGRESS NOTES
Subjective:      Patient ID: Satinder Schulte is a 64 y.o. female.    Chief Complaint:  Newly diagnosed Diabetes Type 2, (Post hospital DC follow up)       History of Present Illness  63 YO Female w/ Newly Diagnosed Post Transplant DM on 12/19, ESRD s/p Renal Transplant (9/19on cellcept, tacrolimus and prednisone), Tertiary Hyperparathyroidism, Vitamin D deficiency, HTN and HLD that presents to the clinic for post hospital discharge follow up.  Pt today reports has been using only levemir as long acting and pre-meals due to confusion. Pt states has been having persistent hypoglycemic episodes due to the same.   Pt was supposed to be discharged with Levemir 20 units, Novolog 5 units TID and Trulicity 0.75mg SC weekly but was only given Levemir and Trulicity.  Pt endorses having polyuria, polydipsia and fatigue.  Denies chest pain, SOB or palpitations.     In regards to DM:    -Likely Post-transplant DM   -A1C 7.9 on 12/19 FROM 4.3 on 6/19   -Duration:  12/19   -Complications: DENIES   -DM Medications:  Levemir 20,  Novolog 5(Not taking), Trulicity 0.75mg daily   -Hyperglycemia symptoms: Polyuria, polydipsia, Fatigue   -Hypoglycemia symptoms:  Having daily hypoglycemias in the 50s requiring correction   -Glucose monitoring: AM (40 - 300s), Lunch (100 - 250s), Bedtime (50s - 300s)    -Diet: Breakfast (Grits),  Lunch (Normaly skips), Dinner (Gurley and salad)   -Eye:  Has not seen ophthalmology recently     In regards to Tertiary hyperparathyroidism:   -Ca 10.3 (Corrected)   (Highest 11.3)   -   -Pt currently on Cinacalcet 30mg PO daily   -Vitamin D  25   -Asymptomatic   -Calcium supplements -DENIES   -HCTZ - DENIES   -GFR 47   -Kidney stones - DENIES   -DEXA - NO DEXA on EMR           Review of Systems   Constitutional: Negative for appetite change.   HENT: Negative for trouble swallowing.    Eyes: Negative for visual disturbance.   Respiratory: Negative for shortness of breath.    Cardiovascular: Negative  "for chest pain.   Gastrointestinal: Negative for constipation.   Endocrine: Positive for polydipsia and polyuria. Negative for cold intolerance.   Skin: Negative for rash.   Hematological: Negative for adenopathy.   Psychiatric/Behavioral: Negative for agitation.       Objective:     BP (!) 130/50   Pulse 60   Resp 18   Ht 5' 3" (1.6 m)   Wt 85.3 kg (188 lb 0.8 oz)   BMI 33.31 kg/m²   BP Readings from Last 3 Encounters:   01/07/20 (!) 130/50   12/30/19 (!) 180/75   12/30/19 (!) 180/75     Wt Readings from Last 1 Encounters:   01/07/20 1047 85.3 kg (188 lb 0.8 oz)     Body mass index is 33.31 kg/m².      Physical Exam   Constitutional: She appears well-developed.   HENT:   Right Ear: External ear normal.   Left Ear: External ear normal.   Nose: Nose normal.   Neck: No tracheal deviation present. No thyromegaly present.   Cardiovascular: Normal rate.   No murmur heard.  Pulmonary/Chest: Effort normal and breath sounds normal.   Abdominal: Soft. She exhibits no mass. No hernia.   Musculoskeletal: She exhibits no edema.   Neurological: She is alert. No cranial nerve deficit or sensory deficit. Coordination normal.   Skin: No rash noted.   Psychiatric: She has a normal mood and affect. Judgment normal.   Vitals reviewed.    Lab Review:   Lab Results   Component Value Date    HGBA1C 7.9 (H) 12/17/2019     Lab Results   Component Value Date    CHOL 218 (H) 12/16/2019    HDL 79 (H) 12/16/2019    LDLCALC 109.6 12/16/2019    TRIG 147 12/16/2019    CHOLHDL 36.2 12/16/2019     Lab Results   Component Value Date     (H) 01/07/2020    K 3.8 01/07/2020     (H) 01/07/2020    CO2 25 01/07/2020    GLU 53 (L) 01/07/2020    BUN 22 01/07/2020    CREATININE 1.2 01/07/2020    CALCIUM 10.5 01/07/2020    PROT 7.4 12/17/2019    ALBUMIN 4.1 01/07/2020    BILITOT 0.6 12/17/2019    ALKPHOS 103 12/17/2019    AST 16 12/17/2019    ALT 45 (H) 12/17/2019    ANIONGAP 9 01/07/2020    ESTGFRAFRICA 56.0 (A) 01/07/2020    EGFRNONAA 48.6 " (A) 01/07/2020     Vit D, 25-Hydroxy   Date Value Ref Range Status   09/19/2019 25 (L) 30 - 96 ng/mL Final     Comment:     Vitamin D deficiency.........<10 ng/mL                              Vitamin D insufficiency......10-29 ng/mL       Vitamin D sufficiency........> or equal to 30 ng/mL  Vitamin D toxicity............>100 ng/mL         Assessment and Plan     New onset type 2 diabetes mellitus  -Likely Post-transplant DM   -A1C 7.9 on 12/19 FROM 4.3 on 6/19   -FS log AM (40 - 300s), Lunch (100 - 250s), Bedtime (50s - 300s)    -Diet, exercise, lifestyle modifications and A1c Goals discussed   -Pt having persistent hypoglycemias due to using levemir as long acting and pre-meals   -Hypoglycemia symptoms and plan of action discussed   -Ophthalmology (Not seen > 1yr)   -Monofilament (Sensitivity intact in b/l LE)   - (C/w Lipitor 40mg Daily)   -CKD (Not on ACE or ARB)  Will defer management to Nephrology     Plan:   -Decrease Levemir to 17units qHS   -Start Novolog 5 units before largest meal and if persistent prandial hyperglycemia, can start TID pre-meals   -C/w Trulicity 0.75mg SC q Weekly   -Will give Diabetes education referral   -Will give ophthalmology referral   -Discussed how to correct hypoglycemias with 16g of carbs and re-check glucose levels in (15min/30min/1hr)         Obesity due to excess calories  -BMI 33  -Diet, exercise and lifestyle modifications discussed   -C/w Trulicity 0.75mg SC weekly     Tertiary hyperparathyroidism  -Ca 10.3 (Corrected)   (Highest 11.3)   -   -Pt currently on Cinacalcet 30mg PO daily   -Vitamin D  25   -Asymptomatic     Plan:   -Will screen for surgical indications (Send DEXA and 24hr urine Ca)   -C/w Cinacalcet 30mg PO daily   -Renal panel q 6months   -Encourage PO hydration     Vitamin D deficiency  -Vit D 25  -Start Vitamin D 1,000 units daily   -Monitor levels

## 2020-01-08 LAB
BKV DNA SERPL NAA+PROBE-ACNC: <125 COPIES/ML
BKV DNA SERPL NAA+PROBE-LOG#: <2.1 LOG (10) COPIES/ML
BKV DNA SERPL QL NAA+PROBE: NOT DETECTED
CLASS I ANTIBODY COMMENTS - LUMINEX: NORMAL
CLASS II ANTIBODY COMMENTS - LUMINEX: NORMAL
CMV DNA SERPL NAA+PROBE-ACNC: NORMAL IU/ML
DSA1 TESTING DATE: NORMAL
DSA12 TESTING DATE: NORMAL
DSA2 TESTING DATE: NORMAL
HBV DNA SERPL NAA+PROBE-ACNC: <10 IU/ML
HBV DNA SERPL NAA+PROBE-LOG IU: <1 LOG (10) IU/ML
HBV DNA SERPL QL NAA+PROBE: NOT DETECTED
SERUM COLLECTION DT - LUMINEX CLASS I: NORMAL
SERUM COLLECTION DT - LUMINEX CLASS II: NORMAL

## 2020-01-11 NOTE — PROGRESS NOTES
I have reviewed and concur with Dr. Hope's history, physical, assessment, and plan.  I have personally interviewed and examined the patient.    Frequent hypoglycemia since hospital discharge as was sent home with only basal insulin and no novolog prescription and using basal as both basal and prandial  Prescription for novolog to be taken with largest meal sent to pharmacy. Long discussion about appropriate use of each of these insulins and will set-up close follow-up to ensure she is using appropriately  Consider use of 70/30 in future if need to further simplify regimen    Hypoglycemia in 50's in clinic. Treated with juice, curtis crackers with return to normal prior to leaving    Lizz Gonsalez MD

## 2020-01-14 ENCOUNTER — TELEPHONE (OUTPATIENT)
Dept: TRANSPLANT | Facility: CLINIC | Age: 65
End: 2020-01-14

## 2020-01-14 NOTE — TELEPHONE ENCOUNTER
----- Message from Pavel Padilla MD sent at 1/14/2020 12:03 PM CST -----  Please increase prograf to 9 mg po bid and repeat level when she comes back at the end of the week for repeat cbc

## 2020-01-14 NOTE — TELEPHONE ENCOUNTER
----- Message from Pavel Padilla MD sent at 1/14/2020  8:52 AM CST -----  D/c Valcyte and continue with serum CMV pcr weekly    Neupogen 5 mcg/kg x 3 doses    Repeat cbc 1/16 and next week twice a week    Ask the patient for fever or flu like symptoms    Advise universal/ neutropenic precautions    Educate patient and care giver about hand washing (wear a mask)  and call/report to the ER if fever or any signs of infection

## 2020-01-14 NOTE — TELEPHONE ENCOUNTER
Patient Daughter Niya repeated back and voice a understanding of orders.  Neutropenic precautions reviewed.  Denies any issues at present.  Injections schedule for [facility].  Next CBC schedule for Thursday 1/16/20.

## 2020-01-14 NOTE — TELEPHONE ENCOUNTER
Results reviewed with patient and Daughter and Tacrolimus level not a 12 hour trough.  12 hour trough reviewed with Niya and will repeat level on 1/16/20.

## 2020-01-15 ENCOUNTER — INFUSION (OUTPATIENT)
Dept: INFUSION THERAPY | Facility: HOSPITAL | Age: 65
End: 2020-01-15
Attending: INTERNAL MEDICINE
Payer: MEDICARE

## 2020-01-15 VITALS — WEIGHT: 187.38 LBS | BODY MASS INDEX: 33.19 KG/M2

## 2020-01-15 DIAGNOSIS — N18.9 ANEMIA ASSOCIATED WITH CHRONIC RENAL FAILURE: Primary | ICD-10-CM

## 2020-01-15 DIAGNOSIS — D63.1 ANEMIA ASSOCIATED WITH CHRONIC RENAL FAILURE: Primary | ICD-10-CM

## 2020-01-15 PROCEDURE — 96372 THER/PROPH/DIAG INJ SC/IM: CPT

## 2020-01-15 PROCEDURE — 63600175 PHARM REV CODE 636 W HCPCS: Mod: JG | Performed by: INTERNAL MEDICINE

## 2020-01-15 RX ADMIN — FILGRASTIM 480 MCG: 480 INJECTION, SOLUTION INTRAVENOUS; SUBCUTANEOUS at 04:01

## 2020-01-16 ENCOUNTER — INFUSION (OUTPATIENT)
Dept: INFUSION THERAPY | Facility: HOSPITAL | Age: 65
End: 2020-01-16
Attending: INTERNAL MEDICINE
Payer: MEDICARE

## 2020-01-16 DIAGNOSIS — N18.9 ANEMIA ASSOCIATED WITH CHRONIC RENAL FAILURE: Primary | ICD-10-CM

## 2020-01-16 DIAGNOSIS — D63.1 ANEMIA ASSOCIATED WITH CHRONIC RENAL FAILURE: Primary | ICD-10-CM

## 2020-01-16 PROCEDURE — 96372 THER/PROPH/DIAG INJ SC/IM: CPT

## 2020-01-16 PROCEDURE — 63600175 PHARM REV CODE 636 W HCPCS: Mod: JG | Performed by: INTERNAL MEDICINE

## 2020-01-16 RX ADMIN — FILGRASTIM 480 MCG: 480 INJECTION, SOLUTION INTRAVENOUS; SUBCUTANEOUS at 03:01

## 2020-01-20 ENCOUNTER — LAB VISIT (OUTPATIENT)
Dept: LAB | Facility: HOSPITAL | Age: 65
End: 2020-01-20
Attending: INTERNAL MEDICINE
Payer: MEDICARE

## 2020-01-20 DIAGNOSIS — Z94.0 KIDNEY REPLACED BY TRANSPLANT: ICD-10-CM

## 2020-01-20 LAB
ALBUMIN SERPL BCP-MCNC: 3.9 G/DL (ref 3.5–5.2)
ANION GAP SERPL CALC-SCNC: 7 MMOL/L (ref 8–16)
ANISOCYTOSIS BLD QL SMEAR: SLIGHT
BASOPHILS NFR BLD: 0 % (ref 0–1.9)
BUN SERPL-MCNC: 22 MG/DL (ref 8–23)
BURR CELLS BLD QL SMEAR: ABNORMAL
CALCIUM SERPL-MCNC: 9.8 MG/DL (ref 8.7–10.5)
CHLORIDE SERPL-SCNC: 113 MMOL/L (ref 95–110)
CO2 SERPL-SCNC: 24 MMOL/L (ref 23–29)
CREAT SERPL-MCNC: 1.1 MG/DL (ref 0.5–1.4)
DACRYOCYTES BLD QL SMEAR: ABNORMAL
DIFFERENTIAL METHOD: ABNORMAL
EOSINOPHIL NFR BLD: 0 % (ref 0–8)
ERYTHROCYTE [DISTWIDTH] IN BLOOD BY AUTOMATED COUNT: 17.5 % (ref 11.5–14.5)
EST. GFR  (AFRICAN AMERICAN): >60 ML/MIN/1.73 M^2
EST. GFR  (NON AFRICAN AMERICAN): 53.9 ML/MIN/1.73 M^2
GLUCOSE SERPL-MCNC: 76 MG/DL (ref 70–110)
HCT VFR BLD AUTO: 32.1 % (ref 37–48.5)
HGB BLD-MCNC: 9.7 G/DL (ref 12–16)
IMM GRANULOCYTES # BLD AUTO: ABNORMAL K/UL (ref 0–0.04)
IMM GRANULOCYTES NFR BLD AUTO: ABNORMAL % (ref 0–0.5)
LYMPHOCYTES NFR BLD: 52 % (ref 18–48)
MAGNESIUM SERPL-MCNC: 1.8 MG/DL (ref 1.6–2.6)
MCH RBC QN AUTO: 33.8 PG (ref 27–31)
MCHC RBC AUTO-ENTMCNC: 30.2 G/DL (ref 32–36)
MCV RBC AUTO: 112 FL (ref 82–98)
MONOCYTES NFR BLD: 28 % (ref 4–15)
NEUTROPHILS NFR BLD: 20 % (ref 38–73)
NRBC BLD-RTO: 0 /100 WBC
PHOSPHATE SERPL-MCNC: 2.7 MG/DL (ref 2.7–4.5)
PLATELET # BLD AUTO: 124 K/UL (ref 150–350)
PLATELET BLD QL SMEAR: ABNORMAL
PMV BLD AUTO: 12 FL (ref 9.2–12.9)
POIKILOCYTOSIS BLD QL SMEAR: SLIGHT
POTASSIUM SERPL-SCNC: 4.5 MMOL/L (ref 3.5–5.1)
RBC # BLD AUTO: 2.87 M/UL (ref 4–5.4)
SODIUM SERPL-SCNC: 144 MMOL/L (ref 136–145)
TOXIC GRANULES BLD QL SMEAR: PRESENT
WBC # BLD AUTO: 1.51 K/UL (ref 3.9–12.7)

## 2020-01-20 PROCEDURE — 85027 COMPLETE CBC AUTOMATED: CPT

## 2020-01-20 PROCEDURE — 85007 BL SMEAR W/DIFF WBC COUNT: CPT

## 2020-01-20 PROCEDURE — 80197 ASSAY OF TACROLIMUS: CPT

## 2020-01-20 PROCEDURE — 80069 RENAL FUNCTION PANEL: CPT

## 2020-01-20 PROCEDURE — 83735 ASSAY OF MAGNESIUM: CPT

## 2020-01-20 PROCEDURE — 36415 COLL VENOUS BLD VENIPUNCTURE: CPT | Mod: PO

## 2020-01-21 ENCOUNTER — TELEPHONE (OUTPATIENT)
Dept: TRANSPLANT | Facility: CLINIC | Age: 65
End: 2020-01-21

## 2020-01-21 DIAGNOSIS — Z29.89 PROPHYLACTIC IMMUNOTHERAPY: ICD-10-CM

## 2020-01-21 DIAGNOSIS — Z79.60 LONG-TERM USE OF IMMUNOSUPPRESSANT MEDICATION: ICD-10-CM

## 2020-01-21 DIAGNOSIS — Z94.0 STATUS POST KIDNEY TRANSPLANT: ICD-10-CM

## 2020-01-21 LAB — TACROLIMUS BLD-MCNC: 4.5 NG/ML (ref 5–15)

## 2020-01-21 NOTE — PROGRESS NOTES
Please proceed with Neupogen 5 mg/kg x 3 doses  Repeat cbc 1/24  Continue with serum CMV pcr weekly  Neutropenic precautions  Encourage hydration  Please verify with patient/care giver she is not taking cellcept or MMF  Order allosure and DSA monthly

## 2020-01-21 NOTE — TELEPHONE ENCOUNTER
Patient Daughter Niya repeated back and voice a understanding of orders.  First Neupogen injection scheduled for today.

## 2020-01-21 NOTE — TELEPHONE ENCOUNTER
----- Message from Pavel Padilla MD sent at 1/21/2020  5:48 AM CST -----  Please proceed with Neupogen 5 mg/kg x 3 doses  Repeat cbc 1/24  Continue with serum CMV pcr weekly  Neutropenic precautions  Encourage hydration  Please verify with patient/care giver she is not taking cellcept or MMF  Order allosure and DSA monthly

## 2020-01-21 NOTE — TELEPHONE ENCOUNTER
Patient  Daughter sharif repeated back and voice a understanding of orders.  Injection schedule today at [facility].  Neutropenic precautions reinforce and drinking over 2 liters of H2O daily.  Repeat CBC schedule for 1/23/20.  Per Sharif patient not taking MMF but will double check and keep coordinator posted.

## 2020-01-21 NOTE — PROGRESS NOTES
Please increase prograf to 9 mg po bid   Please proceed with Neupogen 5 mg/kg x 3 doses  Repeat cbc 1/24  Continue with serum CMV pcr weekly  Neutropenic precautions  Encourage hydration  Please verify with patient/care giver she is not taking cellcept or MMF  Order allosure and DSA monthly

## 2020-01-22 ENCOUNTER — TELEPHONE (OUTPATIENT)
Dept: INFUSION THERAPY | Facility: HOSPITAL | Age: 65
End: 2020-01-22

## 2020-01-22 LAB — CMV DNA SERPL NAA+PROBE-ACNC: NORMAL IU/ML

## 2020-01-22 RX ORDER — TACROLIMUS 1 MG/1
CAPSULE ORAL
Qty: 540 CAPSULE | Refills: 11 | Status: SHIPPED | OUTPATIENT
Start: 2020-01-22 | End: 2020-01-28 | Stop reason: SDUPTHER

## 2020-01-22 NOTE — TELEPHONE ENCOUNTER
Spoke to patient regarding missed Neupogen appointment yesterday and today.  She states she was only able to get transportation for Thursday and Friday for injections.  Darci Flores, [facility] transplant coordinator notified.  Reinforced neutropenic precautions with Ms Schulte .  She verbalizes understanding.

## 2020-01-23 ENCOUNTER — INFUSION (OUTPATIENT)
Dept: INFUSION THERAPY | Facility: HOSPITAL | Age: 65
End: 2020-01-23
Attending: INTERNAL MEDICINE
Payer: MEDICARE

## 2020-01-23 DIAGNOSIS — N18.9 ANEMIA ASSOCIATED WITH CHRONIC RENAL FAILURE: Primary | ICD-10-CM

## 2020-01-23 DIAGNOSIS — D63.1 ANEMIA ASSOCIATED WITH CHRONIC RENAL FAILURE: Primary | ICD-10-CM

## 2020-01-23 PROCEDURE — 96372 THER/PROPH/DIAG INJ SC/IM: CPT

## 2020-01-23 PROCEDURE — 63600175 PHARM REV CODE 636 W HCPCS: Mod: JG | Performed by: INTERNAL MEDICINE

## 2020-01-23 RX ADMIN — FILGRASTIM 480 MCG: 480 INJECTION, SOLUTION INTRAVENOUS; SUBCUTANEOUS at 10:01

## 2020-01-23 NOTE — NURSING
1000 Spoke with Kobe Flores, with [facility]; re:pt did not come to 3 day appointment as instructed earlier; pt here today to receive neupogen injection; Per Kobe pt needs neupogen today and tomorrow 1/23, 1/24/2020 and after the injection on 1/24/2020 patient needs to go to the lab to get cbc drawn. Pt also needs cbc drawn on 1/27/2020. Patient informed of this information and verbalizes understanding.

## 2020-01-24 ENCOUNTER — INFUSION (OUTPATIENT)
Dept: INFUSION THERAPY | Facility: HOSPITAL | Age: 65
End: 2020-01-24
Attending: INTERNAL MEDICINE
Payer: MEDICARE

## 2020-01-24 DIAGNOSIS — D63.1 ANEMIA ASSOCIATED WITH CHRONIC RENAL FAILURE: Primary | ICD-10-CM

## 2020-01-24 DIAGNOSIS — Z94.0 KIDNEY REPLACED BY TRANSPLANT: Primary | ICD-10-CM

## 2020-01-24 DIAGNOSIS — N18.9 ANEMIA ASSOCIATED WITH CHRONIC RENAL FAILURE: Primary | ICD-10-CM

## 2020-01-24 PROCEDURE — 96372 THER/PROPH/DIAG INJ SC/IM: CPT

## 2020-01-24 PROCEDURE — 63600175 PHARM REV CODE 636 W HCPCS: Mod: JG | Performed by: INTERNAL MEDICINE

## 2020-01-24 RX ADMIN — FILGRASTIM 480 MCG: 480 INJECTION, SOLUTION INTRAVENOUS; SUBCUTANEOUS at 10:01

## 2020-01-27 ENCOUNTER — LAB VISIT (OUTPATIENT)
Dept: LAB | Facility: HOSPITAL | Age: 65
End: 2020-01-27
Attending: INTERNAL MEDICINE
Payer: MEDICARE

## 2020-01-27 DIAGNOSIS — Z94.0 KIDNEY REPLACED BY TRANSPLANT: ICD-10-CM

## 2020-01-27 LAB
ALBUMIN SERPL BCP-MCNC: 4 G/DL (ref 3.5–5.2)
ANION GAP SERPL CALC-SCNC: 8 MMOL/L (ref 8–16)
ANISOCYTOSIS BLD QL SMEAR: SLIGHT
BASOPHILS NFR BLD: 0 % (ref 0–1.9)
BUN SERPL-MCNC: 19 MG/DL (ref 8–23)
CALCIUM SERPL-MCNC: 9.7 MG/DL (ref 8.7–10.5)
CHLORIDE SERPL-SCNC: 111 MMOL/L (ref 95–110)
CO2 SERPL-SCNC: 24 MMOL/L (ref 23–29)
CREAT SERPL-MCNC: 1.2 MG/DL (ref 0.5–1.4)
DACRYOCYTES BLD QL SMEAR: ABNORMAL
DIFFERENTIAL METHOD: ABNORMAL
DOHLE BOD BLD QL SMEAR: PRESENT
EOSINOPHIL NFR BLD: 0 % (ref 0–8)
ERYTHROCYTE [DISTWIDTH] IN BLOOD BY AUTOMATED COUNT: 17.4 % (ref 11.5–14.5)
EST. GFR  (AFRICAN AMERICAN): 56 ML/MIN/1.73 M^2
EST. GFR  (NON AFRICAN AMERICAN): 48.6 ML/MIN/1.73 M^2
GLUCOSE SERPL-MCNC: 66 MG/DL (ref 70–110)
HAPTOGLOB SERPL-MCNC: <10 MG/DL (ref 30–250)
HCT VFR BLD AUTO: 32.7 % (ref 37–48.5)
HGB BLD-MCNC: 9.9 G/DL (ref 12–16)
HYPOCHROMIA BLD QL SMEAR: ABNORMAL
IMM GRANULOCYTES # BLD AUTO: ABNORMAL K/UL (ref 0–0.04)
IMM GRANULOCYTES NFR BLD AUTO: ABNORMAL % (ref 0–0.5)
LDH SERPL L TO P-CCNC: 416 U/L (ref 110–260)
LYMPHOCYTES NFR BLD: 13 % (ref 18–48)
MAGNESIUM SERPL-MCNC: 1.7 MG/DL (ref 1.6–2.6)
MCH RBC QN AUTO: 34.4 PG (ref 27–31)
MCHC RBC AUTO-ENTMCNC: 30.3 G/DL (ref 32–36)
MCV RBC AUTO: 114 FL (ref 82–98)
METAMYELOCYTES NFR BLD MANUAL: 2 %
MONOCYTES NFR BLD: 10 % (ref 4–15)
MYELOCYTES NFR BLD MANUAL: 3 %
NEUTROPHILS NFR BLD: 65 % (ref 38–73)
NEUTS BAND NFR BLD MANUAL: 6 %
NRBC BLD-RTO: 1 /100 WBC
PHOSPHATE SERPL-MCNC: 2.7 MG/DL (ref 2.7–4.5)
PLATELET # BLD AUTO: 88 K/UL (ref 150–350)
PLATELET BLD QL SMEAR: ABNORMAL
PMV BLD AUTO: 12.9 FL (ref 9.2–12.9)
POIKILOCYTOSIS BLD QL SMEAR: SLIGHT
POLYCHROMASIA BLD QL SMEAR: ABNORMAL
POTASSIUM SERPL-SCNC: 4 MMOL/L (ref 3.5–5.1)
PROMYELOCYTES NFR BLD MANUAL: 1 %
RBC # BLD AUTO: 2.88 M/UL (ref 4–5.4)
SCHISTOCYTES BLD QL SMEAR: ABNORMAL
SCHISTOCYTES BLD QL SMEAR: PRESENT
SODIUM SERPL-SCNC: 143 MMOL/L (ref 136–145)
TOXIC GRANULES BLD QL SMEAR: PRESENT
WBC # BLD AUTO: 5.95 K/UL (ref 3.9–12.7)

## 2020-01-27 PROCEDURE — 83615 LACTATE (LD) (LDH) ENZYME: CPT

## 2020-01-27 PROCEDURE — 85007 BL SMEAR W/DIFF WBC COUNT: CPT

## 2020-01-27 PROCEDURE — 80197 ASSAY OF TACROLIMUS: CPT

## 2020-01-27 PROCEDURE — 83735 ASSAY OF MAGNESIUM: CPT

## 2020-01-27 PROCEDURE — 83010 ASSAY OF HAPTOGLOBIN QUANT: CPT

## 2020-01-27 PROCEDURE — 85027 COMPLETE CBC AUTOMATED: CPT

## 2020-01-27 PROCEDURE — 80069 RENAL FUNCTION PANEL: CPT

## 2020-01-28 DIAGNOSIS — Z94.0 STATUS POST KIDNEY TRANSPLANT: ICD-10-CM

## 2020-01-28 DIAGNOSIS — Z79.60 LONG-TERM USE OF IMMUNOSUPPRESSANT MEDICATION: ICD-10-CM

## 2020-01-28 DIAGNOSIS — Z29.89 PROPHYLACTIC IMMUNOTHERAPY: ICD-10-CM

## 2020-01-28 LAB — TACROLIMUS BLD-MCNC: 6.3 NG/ML (ref 5–15)

## 2020-01-28 RX ORDER — TACROLIMUS 1 MG/1
7 CAPSULE ORAL EVERY 12 HOURS
Qty: 420 CAPSULE | Refills: 11 | Status: SHIPPED | OUTPATIENT
Start: 2020-01-28 | End: 2020-02-04 | Stop reason: SDUPTHER

## 2020-01-28 NOTE — PROGRESS NOTES
Please verify with Pharm D that she does not have contra-indications to start Ketoconazole. If she does not have any, start 100 ketoconazole daily, drop prograf to 7 mg po bid and plan for tacro level 1/31 and 2/4

## 2020-01-29 LAB — CMV DNA SERPL NAA+PROBE-ACNC: NORMAL IU/ML

## 2020-01-30 ENCOUNTER — TELEPHONE (OUTPATIENT)
Dept: TRANSPLANT | Facility: CLINIC | Age: 65
End: 2020-01-30

## 2020-01-30 NOTE — TELEPHONE ENCOUNTER
Results reviewed with patient daughter Niya and states patient will receive her Ketoconazole on Saturday 2/1.  Patient to repeat stat CBC tomorrow 1/31.  Routine labs schedule for 2/3/20.

## 2020-01-30 NOTE — TELEPHONE ENCOUNTER
----- Message from Pavel Padilla MD sent at 1/30/2020  6:24 AM CST -----  Labs and diagnostic tests were reviewed. No action/changes indicated.

## 2020-02-03 ENCOUNTER — LAB VISIT (OUTPATIENT)
Dept: LAB | Facility: HOSPITAL | Age: 65
End: 2020-02-03
Attending: INTERNAL MEDICINE
Payer: MEDICARE

## 2020-02-03 DIAGNOSIS — Z94.0 KIDNEY REPLACED BY TRANSPLANT: ICD-10-CM

## 2020-02-03 LAB
BASOPHILS # BLD AUTO: 0.01 K/UL (ref 0–0.2)
BASOPHILS NFR BLD: 0.3 % (ref 0–1.9)
DIFFERENTIAL METHOD: ABNORMAL
EOSINOPHIL # BLD AUTO: 0 K/UL (ref 0–0.5)
EOSINOPHIL NFR BLD: 0.3 % (ref 0–8)
ERYTHROCYTE [DISTWIDTH] IN BLOOD BY AUTOMATED COUNT: 16 % (ref 11.5–14.5)
HCT VFR BLD AUTO: 30.7 % (ref 37–48.5)
HGB BLD-MCNC: 9.4 G/DL (ref 12–16)
IMM GRANULOCYTES # BLD AUTO: 0.14 K/UL (ref 0–0.04)
IMM GRANULOCYTES NFR BLD AUTO: 4 % (ref 0–0.5)
LYMPHOCYTES # BLD AUTO: 0.5 K/UL (ref 1–4.8)
LYMPHOCYTES NFR BLD: 13.1 % (ref 18–48)
MCH RBC QN AUTO: 33.7 PG (ref 27–31)
MCHC RBC AUTO-ENTMCNC: 30.6 G/DL (ref 32–36)
MCV RBC AUTO: 110 FL (ref 82–98)
MONOCYTES # BLD AUTO: 0.3 K/UL (ref 0.3–1)
MONOCYTES NFR BLD: 7.1 % (ref 4–15)
NEUTROPHILS # BLD AUTO: 2.6 K/UL (ref 1.8–7.7)
NEUTROPHILS NFR BLD: 75.2 % (ref 38–73)
NRBC BLD-RTO: 1 /100 WBC
PLATELET # BLD AUTO: 118 K/UL (ref 150–350)
PMV BLD AUTO: 12.1 FL (ref 9.2–12.9)
RBC # BLD AUTO: 2.79 M/UL (ref 4–5.4)
WBC # BLD AUTO: 3.51 K/UL (ref 3.9–12.7)

## 2020-02-03 PROCEDURE — 85025 COMPLETE CBC W/AUTO DIFF WBC: CPT

## 2020-02-03 PROCEDURE — 80197 ASSAY OF TACROLIMUS: CPT

## 2020-02-03 PROCEDURE — 80069 RENAL FUNCTION PANEL: CPT

## 2020-02-03 PROCEDURE — 83735 ASSAY OF MAGNESIUM: CPT

## 2020-02-04 DIAGNOSIS — Z94.0 STATUS POST KIDNEY TRANSPLANT: ICD-10-CM

## 2020-02-04 DIAGNOSIS — Z79.60 LONG-TERM USE OF IMMUNOSUPPRESSANT MEDICATION: ICD-10-CM

## 2020-02-04 DIAGNOSIS — Z29.89 PROPHYLACTIC IMMUNOTHERAPY: ICD-10-CM

## 2020-02-04 LAB
ALBUMIN SERPL BCP-MCNC: 3.8 G/DL (ref 3.5–5.2)
ANION GAP SERPL CALC-SCNC: 7 MMOL/L (ref 8–16)
BUN SERPL-MCNC: 21 MG/DL (ref 8–23)
CALCIUM SERPL-MCNC: 10 MG/DL (ref 8.7–10.5)
CHLORIDE SERPL-SCNC: 114 MMOL/L (ref 95–110)
CO2 SERPL-SCNC: 22 MMOL/L (ref 23–29)
CREAT SERPL-MCNC: 1.3 MG/DL (ref 0.5–1.4)
EST. GFR  (AFRICAN AMERICAN): 50.8 ML/MIN/1.73 M^2
EST. GFR  (NON AFRICAN AMERICAN): 44.1 ML/MIN/1.73 M^2
GLUCOSE SERPL-MCNC: 82 MG/DL (ref 70–110)
MAGNESIUM SERPL-MCNC: 1.6 MG/DL (ref 1.6–2.6)
PHOSPHATE SERPL-MCNC: 2.9 MG/DL (ref 2.7–4.5)
POTASSIUM SERPL-SCNC: 4.3 MMOL/L (ref 3.5–5.1)
SODIUM SERPL-SCNC: 143 MMOL/L (ref 136–145)
TACROLIMUS BLD-MCNC: 15.3 NG/ML (ref 5–15)

## 2020-02-04 RX ORDER — TACROLIMUS 1 MG/1
4 CAPSULE ORAL EVERY 12 HOURS
Qty: 240 CAPSULE | Refills: 11 | Status: SHIPPED | OUTPATIENT
Start: 2020-02-04 | End: 2020-02-12 | Stop reason: SDUPTHER

## 2020-02-04 NOTE — TELEPHONE ENCOUNTER
Patient and Daughter Niya repeated back and voice a understanding of orders.  Next labs 2/6/2020.

## 2020-02-05 LAB — CMV DNA SERPL NAA+PROBE-ACNC: NORMAL IU/ML

## 2020-02-06 ENCOUNTER — LAB VISIT (OUTPATIENT)
Dept: LAB | Facility: HOSPITAL | Age: 65
End: 2020-02-06
Attending: INTERNAL MEDICINE
Payer: MEDICARE

## 2020-02-06 DIAGNOSIS — Z94.0 KIDNEY REPLACED BY TRANSPLANT: ICD-10-CM

## 2020-02-06 PROCEDURE — 80197 ASSAY OF TACROLIMUS: CPT

## 2020-02-06 PROCEDURE — 36415 COLL VENOUS BLD VENIPUNCTURE: CPT | Mod: PO

## 2020-02-07 ENCOUNTER — TELEPHONE (OUTPATIENT)
Dept: TRANSPLANT | Facility: CLINIC | Age: 65
End: 2020-02-07

## 2020-02-07 ENCOUNTER — INFUSION (OUTPATIENT)
Dept: INFUSION THERAPY | Facility: HOSPITAL | Age: 65
End: 2020-02-07
Attending: INTERNAL MEDICINE
Payer: MEDICARE

## 2020-02-07 VITALS — WEIGHT: 187.38 LBS | BODY MASS INDEX: 33.19 KG/M2

## 2020-02-07 DIAGNOSIS — D63.1 ANEMIA ASSOCIATED WITH CHRONIC RENAL FAILURE: Primary | ICD-10-CM

## 2020-02-07 DIAGNOSIS — N18.9 ANEMIA ASSOCIATED WITH CHRONIC RENAL FAILURE: Primary | ICD-10-CM

## 2020-02-07 LAB — TACROLIMUS BLD-MCNC: 10 NG/ML (ref 5–15)

## 2020-02-07 PROCEDURE — 96372 THER/PROPH/DIAG INJ SC/IM: CPT

## 2020-02-07 PROCEDURE — 63600175 PHARM REV CODE 636 W HCPCS: Performed by: INTERNAL MEDICINE

## 2020-02-07 RX ADMIN — EPOETIN ALFA-EPBX 20000 UNITS: 10000 INJECTION, SOLUTION INTRAVENOUS; SUBCUTANEOUS at 10:02

## 2020-02-10 ENCOUNTER — LAB VISIT (OUTPATIENT)
Dept: LAB | Facility: HOSPITAL | Age: 65
End: 2020-02-10
Attending: INTERNAL MEDICINE
Payer: MEDICARE

## 2020-02-10 DIAGNOSIS — Z94.0 KIDNEY REPLACED BY TRANSPLANT: ICD-10-CM

## 2020-02-10 DIAGNOSIS — Z72.89 OTHER PROBLEMS RELATED TO LIFESTYLE: ICD-10-CM

## 2020-02-10 DIAGNOSIS — Z57.8 OCCUPATIONAL EXPOSURE TO OTHER RISK FACTORS: ICD-10-CM

## 2020-02-10 DIAGNOSIS — T75.89XA ENVIRONMENTAL EXPOSURE: ICD-10-CM

## 2020-02-10 LAB
ALBUMIN SERPL BCP-MCNC: 3.9 G/DL (ref 3.5–5.2)
ANION GAP SERPL CALC-SCNC: 9 MMOL/L (ref 8–16)
BASOPHILS # BLD AUTO: 0.02 K/UL (ref 0–0.2)
BASOPHILS NFR BLD: 0.8 % (ref 0–1.9)
BUN SERPL-MCNC: 18 MG/DL (ref 8–23)
CALCIUM SERPL-MCNC: 9.2 MG/DL (ref 8.7–10.5)
CHLORIDE SERPL-SCNC: 112 MMOL/L (ref 95–110)
CO2 SERPL-SCNC: 24 MMOL/L (ref 23–29)
CREAT SERPL-MCNC: 1.2 MG/DL (ref 0.5–1.4)
DIFFERENTIAL METHOD: ABNORMAL
EOSINOPHIL # BLD AUTO: 0 K/UL (ref 0–0.5)
EOSINOPHIL NFR BLD: 0.8 % (ref 0–8)
ERYTHROCYTE [DISTWIDTH] IN BLOOD BY AUTOMATED COUNT: 16.5 % (ref 11.5–14.5)
EST. GFR  (AFRICAN AMERICAN): 56 ML/MIN/1.73 M^2
EST. GFR  (NON AFRICAN AMERICAN): 48.6 ML/MIN/1.73 M^2
GLUCOSE SERPL-MCNC: 68 MG/DL (ref 70–110)
HCT VFR BLD AUTO: 32 % (ref 37–48.5)
HGB BLD-MCNC: 9.5 G/DL (ref 12–16)
IMM GRANULOCYTES # BLD AUTO: 0.05 K/UL (ref 0–0.04)
IMM GRANULOCYTES NFR BLD AUTO: 2 % (ref 0–0.5)
LYMPHOCYTES # BLD AUTO: 0.5 K/UL (ref 1–4.8)
LYMPHOCYTES NFR BLD: 18.5 % (ref 18–48)
MAGNESIUM SERPL-MCNC: 1.5 MG/DL (ref 1.6–2.6)
MCH RBC QN AUTO: 33.5 PG (ref 27–31)
MCHC RBC AUTO-ENTMCNC: 29.7 G/DL (ref 32–36)
MCV RBC AUTO: 113 FL (ref 82–98)
MONOCYTES # BLD AUTO: 0.3 K/UL (ref 0.3–1)
MONOCYTES NFR BLD: 11.7 % (ref 4–15)
NEUTROPHILS # BLD AUTO: 1.6 K/UL (ref 1.8–7.7)
NEUTROPHILS NFR BLD: 66.2 % (ref 38–73)
NRBC BLD-RTO: 1 /100 WBC
PHOSPHATE SERPL-MCNC: 3.1 MG/DL (ref 2.7–4.5)
PLATELET # BLD AUTO: 117 K/UL (ref 150–350)
PMV BLD AUTO: 11.6 FL (ref 9.2–12.9)
POTASSIUM SERPL-SCNC: 4.1 MMOL/L (ref 3.5–5.1)
RBC # BLD AUTO: 2.84 M/UL (ref 4–5.4)
SODIUM SERPL-SCNC: 145 MMOL/L (ref 136–145)
WBC # BLD AUTO: 2.48 K/UL (ref 3.9–12.7)

## 2020-02-10 PROCEDURE — 83735 ASSAY OF MAGNESIUM: CPT

## 2020-02-10 PROCEDURE — 87799 DETECT AGENT NOS DNA QUANT: CPT

## 2020-02-10 PROCEDURE — 80069 RENAL FUNCTION PANEL: CPT

## 2020-02-10 PROCEDURE — 80197 ASSAY OF TACROLIMUS: CPT

## 2020-02-10 PROCEDURE — 85025 COMPLETE CBC W/AUTO DIFF WBC: CPT

## 2020-02-10 PROCEDURE — 87517 HEPATITIS B DNA QUANT: CPT

## 2020-02-10 PROCEDURE — 87340 HEPATITIS B SURFACE AG IA: CPT

## 2020-02-10 SDOH — SOCIAL DETERMINANTS OF HEALTH (SDOH): OCCUPATIONAL EXPOSURE TO OTHER RISK FACTORS: Z57.8

## 2020-02-11 LAB
HBV SURFACE AG SERPL QL IA: NEGATIVE
TACROLIMUS BLD-MCNC: 6.7 NG/ML (ref 5–15)

## 2020-02-12 DIAGNOSIS — Z79.60 LONG-TERM USE OF IMMUNOSUPPRESSANT MEDICATION: ICD-10-CM

## 2020-02-12 DIAGNOSIS — Z94.0 STATUS POST KIDNEY TRANSPLANT: ICD-10-CM

## 2020-02-12 DIAGNOSIS — Z29.89 PROPHYLACTIC IMMUNOTHERAPY: ICD-10-CM

## 2020-02-12 LAB — CMV DNA SERPL NAA+PROBE-ACNC: NORMAL IU/ML

## 2020-02-12 RX ORDER — TACROLIMUS 1 MG/1
7 CAPSULE ORAL EVERY 12 HOURS
Qty: 420 CAPSULE | Refills: 11 | Status: SHIPPED | OUTPATIENT
Start: 2020-02-12 | End: 2020-02-19 | Stop reason: SDUPTHER

## 2020-02-12 NOTE — TELEPHONE ENCOUNTER
Patient Daughter Niya repeated back and voice a understanding of orders.  Next Labs 2/17/2020.    ----- Message from Pavel Padilla MD sent at 2/11/2020  8:54 PM CST -----  Increase prograf to 7 mg PO bid  ----- Message -----  From: Darci Flores RN  Sent: 2/11/2020   4:00 PM CST  To: Pavel Padilla MD    Per daughter Niya , she has been taking Tacrolimus 6mg PO BID and has not been taking Ketoconazole after starting last week.  ----- Message -----  From: Pavel Padilla MD  Sent: 2/11/2020  12:46 PM CST  To: [facility]    How much prograf is she on?  Is she taking keto correctly?

## 2020-02-13 ENCOUNTER — OFFICE VISIT (OUTPATIENT)
Dept: ENDOCRINOLOGY | Facility: CLINIC | Age: 65
End: 2020-02-13
Payer: MEDICARE

## 2020-02-13 ENCOUNTER — OFFICE VISIT (OUTPATIENT)
Dept: OPTOMETRY | Facility: CLINIC | Age: 65
End: 2020-02-13
Payer: MEDICARE

## 2020-02-13 ENCOUNTER — HOSPITAL ENCOUNTER (OUTPATIENT)
Dept: RADIOLOGY | Facility: CLINIC | Age: 65
Discharge: HOME | End: 2020-02-13
Attending: GENERAL ACUTE CARE HOSPITAL
Payer: MEDICARE

## 2020-02-13 ENCOUNTER — CLINICAL SUPPORT (OUTPATIENT)
Dept: DIABETES | Facility: CLINIC | Age: 65
End: 2020-02-13
Payer: MEDICARE

## 2020-02-13 VITALS
HEIGHT: 63 IN | HEART RATE: 82 BPM | BODY MASS INDEX: 33.02 KG/M2 | WEIGHT: 186.38 LBS | SYSTOLIC BLOOD PRESSURE: 124 MMHG | DIASTOLIC BLOOD PRESSURE: 80 MMHG

## 2020-02-13 DIAGNOSIS — Z79.52 CURRENT CHRONIC USE OF SYSTEMIC STEROIDS: ICD-10-CM

## 2020-02-13 DIAGNOSIS — H35.721 RETINAL PIGMENT EPITHELIAL DETACHMENT OF RIGHT EYE: ICD-10-CM

## 2020-02-13 DIAGNOSIS — E21.2 TERTIARY HYPERPARATHYROIDISM: ICD-10-CM

## 2020-02-13 DIAGNOSIS — H52.13 MYOPIA OF BOTH EYES WITH ASTIGMATISM AND PRESBYOPIA: ICD-10-CM

## 2020-02-13 DIAGNOSIS — Z79.4 TYPE 2 DIABETES MELLITUS WITH DIABETIC POLYNEUROPATHY, WITH LONG-TERM CURRENT USE OF INSULIN: Primary | ICD-10-CM

## 2020-02-13 DIAGNOSIS — E11.9 NEW ONSET TYPE 2 DIABETES MELLITUS: ICD-10-CM

## 2020-02-13 DIAGNOSIS — E66.01 CLASS 2 SEVERE OBESITY DUE TO EXCESS CALORIES WITH SERIOUS COMORBIDITY IN ADULT, UNSPECIFIED BMI: Chronic | ICD-10-CM

## 2020-02-13 DIAGNOSIS — H52.4 MYOPIA OF BOTH EYES WITH ASTIGMATISM AND PRESBYOPIA: ICD-10-CM

## 2020-02-13 DIAGNOSIS — E11.649 HYPOGLYCEMIA ASSOCIATED WITH TYPE 2 DIABETES MELLITUS: ICD-10-CM

## 2020-02-13 DIAGNOSIS — H25.13 NUCLEAR SCLEROSIS OF BOTH EYES: ICD-10-CM

## 2020-02-13 DIAGNOSIS — E11.9 NEW ONSET TYPE 2 DIABETES MELLITUS: Primary | ICD-10-CM

## 2020-02-13 DIAGNOSIS — E11.3292 MILD NONPROLIFERATIVE DIABETIC RETINOPATHY OF LEFT EYE WITHOUT MACULAR EDEMA ASSOCIATED WITH TYPE 2 DIABETES MELLITUS: Primary | ICD-10-CM

## 2020-02-13 DIAGNOSIS — E11.42 TYPE 2 DIABETES MELLITUS WITH DIABETIC POLYNEUROPATHY, WITH LONG-TERM CURRENT USE OF INSULIN: Primary | ICD-10-CM

## 2020-02-13 DIAGNOSIS — E55.9 VITAMIN D DEFICIENCY: ICD-10-CM

## 2020-02-13 DIAGNOSIS — H52.203 MYOPIA OF BOTH EYES WITH ASTIGMATISM AND PRESBYOPIA: ICD-10-CM

## 2020-02-13 LAB
BKV DNA SERPL NAA+PROBE-ACNC: <125 COPIES/ML
BKV DNA SERPL NAA+PROBE-LOG#: <2.1 LOG (10) COPIES/ML
BKV DNA SERPL QL NAA+PROBE: NOT DETECTED
HBV DNA SERPL NAA+PROBE-ACNC: <10 IU/ML
HBV DNA SERPL NAA+PROBE-LOG IU: <1 LOG (10) IU/ML
HBV DNA SERPL QL NAA+PROBE: NOT DETECTED

## 2020-02-13 PROCEDURE — 99999 PR PBB SHADOW E&M-EST. PATIENT-LVL V: CPT | Mod: PBBFAC,GC,, | Performed by: GENERAL ACUTE CARE HOSPITAL

## 2020-02-13 PROCEDURE — 92004 COMPRE OPH EXAM NEW PT 1/>: CPT | Mod: S$PBB,,, | Performed by: OPTOMETRIST

## 2020-02-13 PROCEDURE — 92134 OCT, RETINA - OU - BOTH EYES: ICD-10-PCS | Mod: 26,S$PBB,, | Performed by: OPTOMETRIST

## 2020-02-13 PROCEDURE — 99213 OFFICE O/P EST LOW 20 MIN: CPT | Mod: PBBFAC,25,27 | Performed by: OPTOMETRIST

## 2020-02-13 PROCEDURE — 77080 DXA BONE DENSITY AXIAL: CPT | Mod: 26,,, | Performed by: INTERNAL MEDICINE

## 2020-02-13 PROCEDURE — 92134 CPTRZ OPH DX IMG PST SGM RTA: CPT | Mod: PBBFAC | Performed by: OPTOMETRIST

## 2020-02-13 PROCEDURE — 92004 PR EYE EXAM, NEW PATIENT,COMPREHESV: ICD-10-PCS | Mod: S$PBB,,, | Performed by: OPTOMETRIST

## 2020-02-13 PROCEDURE — 99212 OFFICE O/P EST SF 10 MIN: CPT | Mod: PBBFAC,25,27

## 2020-02-13 PROCEDURE — 99214 PR OFFICE/OUTPT VISIT, EST, LEVL IV, 30-39 MIN: ICD-10-PCS | Mod: S$PBB,GC,, | Performed by: INTERNAL MEDICINE

## 2020-02-13 PROCEDURE — 77080 DXA BONE DENSITY AXIAL: CPT | Mod: TC

## 2020-02-13 PROCEDURE — G0108 DIAB MANAGE TRN  PER INDIV: HCPCS | Mod: PBBFAC

## 2020-02-13 PROCEDURE — 99999 PR PBB SHADOW E&M-EST. PATIENT-LVL II: ICD-10-PCS | Mod: PBBFAC,,,

## 2020-02-13 PROCEDURE — 99999 PR PBB SHADOW E&M-EST. PATIENT-LVL V: ICD-10-PCS | Mod: PBBFAC,GC,, | Performed by: GENERAL ACUTE CARE HOSPITAL

## 2020-02-13 PROCEDURE — 99999 PR PBB SHADOW E&M-EST. PATIENT-LVL III: CPT | Mod: PBBFAC,,, | Performed by: OPTOMETRIST

## 2020-02-13 PROCEDURE — 99999 PR PBB SHADOW E&M-EST. PATIENT-LVL II: CPT | Mod: PBBFAC,,,

## 2020-02-13 PROCEDURE — 99215 OFFICE O/P EST HI 40 MIN: CPT | Mod: PBBFAC,25 | Performed by: GENERAL ACUTE CARE HOSPITAL

## 2020-02-13 PROCEDURE — 99214 OFFICE O/P EST MOD 30 MIN: CPT | Mod: S$PBB,GC,, | Performed by: INTERNAL MEDICINE

## 2020-02-13 PROCEDURE — 92015 PR REFRACTION: ICD-10-PCS | Mod: ,,, | Performed by: OPTOMETRIST

## 2020-02-13 PROCEDURE — 99999 PR PBB SHADOW E&M-EST. PATIENT-LVL III: ICD-10-PCS | Mod: PBBFAC,,, | Performed by: OPTOMETRIST

## 2020-02-13 PROCEDURE — 77080 DEXA BONE DENSITY SPINE HIP: ICD-10-PCS | Mod: 26,,, | Performed by: INTERNAL MEDICINE

## 2020-02-13 PROCEDURE — 92015 DETERMINE REFRACTIVE STATE: CPT | Mod: ,,, | Performed by: OPTOMETRIST

## 2020-02-13 RX ORDER — VIT C/E/ZN/COPPR/LUTEIN/ZEAXAN 250MG-90MG
1000 CAPSULE ORAL DAILY
Qty: 30 CAPSULE | Refills: 11 | Status: SHIPPED | OUTPATIENT
Start: 2020-02-13 | End: 2020-06-19 | Stop reason: ALTCHOICE

## 2020-02-13 NOTE — LETTER
February 20, 2020      Jonathon Medel MD  [address]  [facility] [address]           [facility]  [address]  [facility] [address]  Phone: [phone]  Fax: [phone]          Patient: Satinder Schulte   MR Number: [id number]   YOB: 1955   Date of Visit: 2/13/2020       Dear Dr. Jonathon Medel:    Thank you for referring Satinder Schulte to me for evaluation. Attached you will find relevant portions of my assessment and plan of care.    If you have questions, please do not hesitate to call me. I look forward to following Satinder Schulte along with you.    Sincerely,    Geoffrey Tejeda  CC:  No Recipients    If you would like to receive this communication electronically, please contact [email or web address] or [phone] to request more information on One Month Link access.    For providers and/or their staff who would like to refer a patient to [facility], please contact us through our one-stop-shop provider referral line, [facility] , at [phone].    If you feel you have received this communication in error or would no longer like to receive these types of communications, please e-mail [email or web address]

## 2020-02-13 NOTE — ASSESSMENT & PLAN NOTE
-Ca 9.2   -   -Pt currently on Cinacalcet 30mg PO daily   -Vitamin D 25   -Asymptomatic     Plan:   -C/w Cinacalcet 30mg PO daily   -Following with nephrology   -C/w Vitamin D 1,000 units daily   -Will follow DEXA results   -Renal panel q 6months

## 2020-02-13 NOTE — ASSESSMENT & PLAN NOTE
-BMI 33   -Diet, exercise and lifestyle modifications discussed   -C/w Trulicity 0.75mg SC weekly

## 2020-02-13 NOTE — PATIENT INSTRUCTIONS
STOP NOVOLOG BEFORE THE MEALS    DECREASE LEVEMIR TO 14 UNITS AT BED TIME     CONTINUE WITH TRULLICITY 0.75MG ONCE A WEEK     WE will have you do a continuous glucose monitor to evaluate your glucose   
Yes

## 2020-02-13 NOTE — PROGRESS NOTES
Diabetes Education  Author: Anne Mcgraw RN, CDE  Date: 2/13/2020    Diabetes Care Management Summary  Diabetes Education Record Assessment/Progress: Initial  Current Diabetes Risk Level: Moderate     Lab Results   Component Value Date    HGBA1C 7.9 (H) 12/17/2019     Last visit with Diabetes Educator: : 02/13/2020    Diabetes Type  Diabetes Type : Type II    Diabetes History  Diabetes Diagnosis: 0-1 year(Newly diagnosis tranplant 9/2019)  Current Treatment: Injectable, Insulin(Levemir 14 units daily; Trulicity weekly; Novolog was d/c today due to reported lows)  Reviewed Problem List with Patient: Yes    Health Maintenance was reviewed today with patient. Discussed with patient importance of routine eye exams, foot exams/foot care, blood work (i.e.: A1c, microalbumin, and lipid), dental visits, yearly flu vaccine, and pneumonia vaccine as indicated by PCP. Patient verbalized understanding.     Health Maintenance Topics with due status: Not Due       Topic Last Completion Date    TETANUS VACCINE 10/04/2012    Mammogram 12/07/2018    Lipid Panel 12/16/2019    Hemoglobin A1c 12/17/2019     Health Maintenance Due   Topic Date Due    Foot Exam  10/02/1967    Eye Exam  10/02/1967    Colonoscopy  10/02/2007    Pneumococcal Vaccine (Highest Risk) (2 of 3 - PCV13) 10/04/2013     Nutrition  Meal Planning: snacks between meal, 3 meals per day  Meal Plan 24 Hour Recall - Dinner: Salads, yogurt    Monitoring   Monitoring: Other(Uses audio meter; corrected time and date)  Self Monitoring : SMBG 2-3 x per day; patient  experienced persistant lows in am and lunch 50-60's; this am was 200; unable to get accurate data due to wrong date and time in meter  Blood Glucose Logs: Yes  Do you use a personal continuous glucose monitor?: No  In the last month, how often have you had a low blood sugar reaction?: more than once a week  What are your symptoms of low blood sugar?: shaky feeling  How do you treat low blood sugar?: eats  cereal mostly at night when lows occur  Can you tell when your blood sugar is too high?: no    Exercise   Exercise Type: none    Current Diabetes Treatment   Current Treatment: Injectable, Insulin(Levemir 14 units daily; Trulicity weekly; Novolog was d/c today due to reported lows)    Social History  Preferred Learning Method: Face to Face  Primary Support: Self, Friends  Educational Level: High School(9th grade)  Occupation: does not work  Smoking Status: Never a Smoker  Alcohol Use: Never    Barriers to Change  Barriers to Change: Cognitive(previous CVA)  Learning Challenges : Other(constant reinforcement will be needed)    Readiness to Learn   Readiness to Learn : Acceptance    Cultural Influences  Cultural Influences: None    Diabetes Education Assessment/Progress  Diabetes Disease Process (diabetes disease process and treatment options): Discussion, Comprehends Key Points, Written Materials Provided  · general disease progression discussed  · significance of current A1c  · blood glucose goals   explained the risk for cardiovascular and cerebellar vascular events with uncontrolled diabetes    Nutrition (Incorporating nutritional management into one's lifestyle): Not Covered/Deferred(time constraints)    Physical Activity (incorporating physical activity into one's lifestyle): Discussion, Comprehends Key Points, Written Materials Provided  Medications (states correct name, dose, onset, peak, duration, side effects & timing of meds): Discussion, Comprehends Key Points, Written Materials Provided  Reviewed patient's current medication regimen. MOA reviewed including common side effects.  Importance of rotating sites  Reviewed timing of Levemir 14 units and to take at same time each day.  Advised to stop taking Novolog    DIABETES EDUCATOR NOTE: TRAINING FOR TRULICITY START  Patient was given background information on Trulicity and how it works. Covered in detail how to use the Trulicity pen (timing - when to take  it, meal planning, storage, and pen replacement). Discussed what to expect: side effects, possible hypoglycemia, and blood sugar control. Reviewed causes of hypoglycemia including signs, symptoms and treatment options.    Instructed to remove gray base and then place the clear base flat and firmly against the skin, unlock the pen by turning the lock ring, press the green button and hold for 5-10 seconds. Patient advised that a click sound will be heard when the green button is pressed and will hear a second click sound when the dose is complete and the gray part of the pen inside of the pen will be visible.   Pt reminded to keep unused pens in refrigerator until ready for new weekly injection.  Patient performed successful return demonstration and give her first dose of Trulicity.     Monitoring (monitoring blood glucose/other parameters & using results): Discussion, Comprehends Key Points, Written Materials Provided  Advised frequent self-blood glucose monitoring per HCP instructions.  Patient encouraged to document glucose results and bring them to every clinic visit  Encouraged to alternate testing times to capture data.      Acute Complications (preventing, detecting, and treating acute complications): Discussion, Comprehends Key Points, Written Materials Provided  Reviewed s/s hypoglycemia and treatment of same. Advised to carry a source of fast acting carbs at all times     Chronic Complications (preventing, detecting, and treating chronic complications): Discussion, Comprehends Key Points, Written Materials Provided  Clinical (diabetes, other pertinent medical history, and relevant comorbidities reviewed during visit): Discussion, Comprehends Key Points, Written Materials Provided    Cognitive (knowledge of self-management skills, functional health literacy): Discussion, Needs Review, Written Materials Provided  Psychosocial (emotional response to diabetes): Not Covered/Deferred(time restraints)  Diabetes  Distress and Support Systems: Not Covered/Deferred(time restraints)  Behavioral (readiness for change, lifestyle practices, self-care behaviors): Not Covered/Deferred(time restraints)    Goals  Patient has selected/evaluated goals during today's session: Yes, selected  Monitoring: (will check glucose 3 x per day and record number; report to HCP if glucose <100)    Diabetes Meal Plan  Restrictions: Restricted Carbohydrate(Advised to avoid sugary foods from diet)    Today's Self-Management Care Plan was developed with the patient's input and is based on barriers identified during today's assessment.    The long and short-term goals in the care plan were written with the patient/caregiver's input. The patient has agreed to work toward these goals to improve her overall diabetes control.      The patient received a copy of today's self-management plan and verbalized understanding of the care plan, goals, and all of today's instructions.      The patient was encouraged to communicate with her physician and care team regarding her condition(s) and treatment.  I provided the patient with my contact information today and encouraged her to contact me via phone or patient portal as needed.     Follow up appt made for 03/13/2020 to address MNT.    Education Units of Time   Time Spent: 60 min

## 2020-02-13 NOTE — ASSESSMENT & PLAN NOTE
-A1c 7.9 on 12/19 from 4.3 on 6/19 (Above goal)   -Diet, exercise, lifestyle modifications and A1c Goals discussed   -Hypoglycemia symptoms and plan of action discussed   -Ophthalmology seen > 1yr ago (Pt has appointment today 2/13/20   -Pt diabetic nephropathy s/p kidney transplant following with Nephrology (No ACE or ARB)   -Monofilament  (Sensitivity intact in b/l LE)   - (C/w Atorvastatin 40mg PO daily)     Plan:   -Due to evidence of hypoglycemias in the AM and pre-meals will make the following recommendations   -Decrease Levemir to 14 units qhs   -DC Novolog pre-meals   -C/w Trulicity 0.75mg SC weekly   -Will send for Professional CGM

## 2020-02-14 ENCOUNTER — INFUSION (OUTPATIENT)
Dept: INFUSION THERAPY | Facility: HOSPITAL | Age: 65
End: 2020-02-14
Attending: INTERNAL MEDICINE
Payer: MEDICARE

## 2020-02-14 DIAGNOSIS — N18.9 ANEMIA ASSOCIATED WITH CHRONIC RENAL FAILURE: Primary | ICD-10-CM

## 2020-02-14 DIAGNOSIS — D63.1 ANEMIA ASSOCIATED WITH CHRONIC RENAL FAILURE: Primary | ICD-10-CM

## 2020-02-14 PROCEDURE — 96372 THER/PROPH/DIAG INJ SC/IM: CPT

## 2020-02-14 PROCEDURE — 63600175 PHARM REV CODE 636 W HCPCS: Performed by: INTERNAL MEDICINE

## 2020-02-14 RX ADMIN — EPOETIN ALFA-EPBX 20000 UNITS: 10000 INJECTION, SOLUTION INTRAVENOUS; SUBCUTANEOUS at 10:02

## 2020-02-19 DIAGNOSIS — Z94.0 STATUS POST KIDNEY TRANSPLANT: ICD-10-CM

## 2020-02-19 DIAGNOSIS — Z29.89 PROPHYLACTIC IMMUNOTHERAPY: ICD-10-CM

## 2020-02-19 DIAGNOSIS — Z79.60 LONG-TERM USE OF IMMUNOSUPPRESSANT MEDICATION: ICD-10-CM

## 2020-02-19 DIAGNOSIS — Z94.0 KIDNEY REPLACED BY TRANSPLANT: Primary | ICD-10-CM

## 2020-02-19 RX ORDER — TACROLIMUS 1 MG/1
CAPSULE ORAL
Qty: 270 CAPSULE | Refills: 11 | Status: SHIPPED | OUTPATIENT
Start: 2020-02-19 | End: 2020-03-03 | Stop reason: SDUPTHER

## 2020-02-19 NOTE — TELEPHONE ENCOUNTER
Patient daughter sharif repeated back and voice a understanding of orders  Next labs 2/24/2020.      ----- Message from Pavel Padilla MD sent at 2/18/2020  4:59 PM CST -----  Increase prograf to 5/4. Thanks   ----- Message -----  From: Darci Flores RN  Sent: 2/18/2020   4:22 PM CST  To: Pavel Padilla MD    Per patient Daughter Sharif she is taking her Ketoconazole but only taking Tacrolimus 4mg PO BID.  ----- Message -----  From: Pavel Padilla MD  Sent: 2/18/2020  11:53 AM CST  To: [facility]    Is she now taking Ketoconazole correctly?

## 2020-02-24 ENCOUNTER — LAB VISIT (OUTPATIENT)
Dept: LAB | Facility: HOSPITAL | Age: 65
End: 2020-02-24
Attending: INTERNAL MEDICINE
Payer: MEDICARE

## 2020-02-24 DIAGNOSIS — Z94.0 KIDNEY REPLACED BY TRANSPLANT: ICD-10-CM

## 2020-02-24 LAB
BASOPHILS # BLD AUTO: ABNORMAL K/UL (ref 0–0.2)
BASOPHILS NFR BLD: 0 % (ref 0–1.9)
DIFFERENTIAL METHOD: ABNORMAL
EOSINOPHIL # BLD AUTO: ABNORMAL K/UL (ref 0–0.5)
EOSINOPHIL NFR BLD: 1 % (ref 0–8)
ERYTHROCYTE [DISTWIDTH] IN BLOOD BY AUTOMATED COUNT: 14.5 % (ref 11.5–14.5)
HCT VFR BLD AUTO: 36.8 % (ref 37–48.5)
HGB BLD-MCNC: 11.1 G/DL (ref 12–16)
IMM GRANULOCYTES # BLD AUTO: ABNORMAL K/UL (ref 0–0.04)
IMM GRANULOCYTES NFR BLD AUTO: ABNORMAL % (ref 0–0.5)
LYMPHOCYTES # BLD AUTO: ABNORMAL K/UL (ref 1–4.8)
LYMPHOCYTES NFR BLD: 21 % (ref 18–48)
MCH RBC QN AUTO: 33.6 PG (ref 27–31)
MCHC RBC AUTO-ENTMCNC: 30.2 G/DL (ref 32–36)
MCV RBC AUTO: 112 FL (ref 82–98)
MONOCYTES # BLD AUTO: ABNORMAL K/UL (ref 0.3–1)
MONOCYTES NFR BLD: 11 % (ref 4–15)
NEUTROPHILS # BLD AUTO: ABNORMAL K/UL (ref 1.8–7.7)
NEUTROPHILS NFR BLD: 65 % (ref 38–73)
NEUTS BAND NFR BLD MANUAL: 2 %
NRBC BLD-RTO: 0 /100 WBC
PLATELET # BLD AUTO: 106 K/UL (ref 150–350)
PLATELET BLD QL SMEAR: ABNORMAL
PMV BLD AUTO: 11.6 FL (ref 9.2–12.9)
RBC # BLD AUTO: 3.3 M/UL (ref 4–5.4)
WBC # BLD AUTO: 1.97 K/UL (ref 3.9–12.7)

## 2020-02-24 PROCEDURE — 80197 ASSAY OF TACROLIMUS: CPT

## 2020-02-24 PROCEDURE — 85027 COMPLETE CBC AUTOMATED: CPT

## 2020-02-24 PROCEDURE — 80069 RENAL FUNCTION PANEL: CPT

## 2020-02-24 PROCEDURE — 83735 ASSAY OF MAGNESIUM: CPT

## 2020-02-24 PROCEDURE — 85007 BL SMEAR W/DIFF WBC COUNT: CPT

## 2020-02-25 LAB
ALBUMIN SERPL BCP-MCNC: 4 G/DL (ref 3.5–5.2)
ANION GAP SERPL CALC-SCNC: 11 MMOL/L (ref 8–16)
BUN SERPL-MCNC: 21 MG/DL (ref 8–23)
CALCIUM SERPL-MCNC: 9.7 MG/DL (ref 8.7–10.5)
CHLORIDE SERPL-SCNC: 112 MMOL/L (ref 95–110)
CO2 SERPL-SCNC: 23 MMOL/L (ref 23–29)
CREAT SERPL-MCNC: 1.2 MG/DL (ref 0.5–1.4)
EST. GFR  (AFRICAN AMERICAN): 56 ML/MIN/1.73 M^2
EST. GFR  (NON AFRICAN AMERICAN): 48.6 ML/MIN/1.73 M^2
GLUCOSE SERPL-MCNC: 62 MG/DL (ref 70–110)
MAGNESIUM SERPL-MCNC: 1.7 MG/DL (ref 1.6–2.6)
PHOSPHATE SERPL-MCNC: 2.9 MG/DL (ref 2.7–4.5)
POTASSIUM SERPL-SCNC: 3.6 MMOL/L (ref 3.5–5.1)
SODIUM SERPL-SCNC: 146 MMOL/L (ref 136–145)
TACROLIMUS BLD-MCNC: 5.4 NG/ML (ref 5–15)

## 2020-02-26 ENCOUNTER — TELEPHONE (OUTPATIENT)
Dept: TRANSPLANT | Facility: CLINIC | Age: 65
End: 2020-02-26

## 2020-02-26 NOTE — TELEPHONE ENCOUNTER
Results reviewed with patient Daughter Niya and voice  understanding that patient  needs to be drinking over 2 liters of H2O daily.    ----- Message from Kirstin Rome MD sent at 2/25/2020 11:05 AM CST -----  Na 146: needs more water intake.

## 2020-02-28 LAB — CMV DNA SERPL NAA+PROBE-ACNC: NORMAL IU/ML

## 2020-03-02 ENCOUNTER — LAB VISIT (OUTPATIENT)
Dept: LAB | Facility: HOSPITAL | Age: 65
End: 2020-03-02
Attending: INTERNAL MEDICINE
Payer: MEDICARE

## 2020-03-02 DIAGNOSIS — Z94.0 KIDNEY REPLACED BY TRANSPLANT: ICD-10-CM

## 2020-03-02 PROCEDURE — 80069 RENAL FUNCTION PANEL: CPT

## 2020-03-02 PROCEDURE — 85007 BL SMEAR W/DIFF WBC COUNT: CPT

## 2020-03-02 PROCEDURE — 80197 ASSAY OF TACROLIMUS: CPT

## 2020-03-02 PROCEDURE — 85027 COMPLETE CBC AUTOMATED: CPT

## 2020-03-02 PROCEDURE — 83735 ASSAY OF MAGNESIUM: CPT

## 2020-03-03 DIAGNOSIS — Z29.89 PROPHYLACTIC IMMUNOTHERAPY: ICD-10-CM

## 2020-03-03 DIAGNOSIS — Z94.0 STATUS POST KIDNEY TRANSPLANT: ICD-10-CM

## 2020-03-03 DIAGNOSIS — Z79.60 LONG-TERM USE OF IMMUNOSUPPRESSANT MEDICATION: ICD-10-CM

## 2020-03-03 LAB
ALBUMIN SERPL BCP-MCNC: 4 G/DL (ref 3.5–5.2)
ANION GAP SERPL CALC-SCNC: 8 MMOL/L (ref 8–16)
ANISOCYTOSIS BLD QL SMEAR: SLIGHT
BASOPHILS NFR BLD: 2 % (ref 0–1.9)
BUN SERPL-MCNC: 18 MG/DL (ref 8–23)
CALCIUM SERPL-MCNC: 10.7 MG/DL (ref 8.7–10.5)
CHLORIDE SERPL-SCNC: 113 MMOL/L (ref 95–110)
CO2 SERPL-SCNC: 24 MMOL/L (ref 23–29)
CREAT SERPL-MCNC: 1.3 MG/DL (ref 0.5–1.4)
DIFFERENTIAL METHOD: ABNORMAL
EOSINOPHIL NFR BLD: 2 % (ref 0–8)
ERYTHROCYTE [DISTWIDTH] IN BLOOD BY AUTOMATED COUNT: 13.5 % (ref 11.5–14.5)
EST. GFR  (AFRICAN AMERICAN): 50.8 ML/MIN/1.73 M^2
EST. GFR  (NON AFRICAN AMERICAN): 44.1 ML/MIN/1.73 M^2
GLUCOSE SERPL-MCNC: 56 MG/DL (ref 70–110)
HCT VFR BLD AUTO: 36.5 % (ref 37–48.5)
HGB BLD-MCNC: 11 G/DL (ref 12–16)
IMM GRANULOCYTES # BLD AUTO: ABNORMAL K/UL (ref 0–0.04)
IMM GRANULOCYTES NFR BLD AUTO: ABNORMAL % (ref 0–0.5)
LYMPHOCYTES # BLD AUTO: ABNORMAL K/UL
LYMPHOCYTES NFR BLD: 17 % (ref 18–48)
MAGNESIUM SERPL-MCNC: 1.7 MG/DL (ref 1.6–2.6)
MCH RBC QN AUTO: 32.5 PG (ref 27–31)
MCHC RBC AUTO-ENTMCNC: 30.1 G/DL (ref 32–36)
MCV RBC AUTO: 108 FL (ref 82–98)
MONOCYTES NFR BLD: 10 % (ref 4–15)
NEUTROPHILS NFR BLD: 69 % (ref 38–73)
NRBC BLD-RTO: 0 /100 WBC
PHOSPHATE SERPL-MCNC: 2.7 MG/DL (ref 2.7–4.5)
PLATELET # BLD AUTO: 92 K/UL (ref 150–350)
PLATELET BLD QL SMEAR: ABNORMAL
PMV BLD AUTO: 11.4 FL (ref 9.2–12.9)
POIKILOCYTOSIS BLD QL SMEAR: SLIGHT
POTASSIUM SERPL-SCNC: 3.8 MMOL/L (ref 3.5–5.1)
RBC # BLD AUTO: 3.38 M/UL (ref 4–5.4)
SCHISTOCYTES BLD QL SMEAR: ABNORMAL
SCHISTOCYTES BLD QL SMEAR: PRESENT
SODIUM SERPL-SCNC: 145 MMOL/L (ref 136–145)
TACROLIMUS BLD-MCNC: 5.5 NG/ML (ref 5–15)
WBC # BLD AUTO: 1.97 K/UL (ref 3.9–12.7)

## 2020-03-03 RX ORDER — TACROLIMUS 1 MG/1
5 CAPSULE ORAL EVERY 12 HOURS
Qty: 300 CAPSULE | Refills: 11 | Status: SHIPPED | OUTPATIENT
Start: 2020-03-03 | End: 2020-03-10 | Stop reason: SDUPTHER

## 2020-03-03 NOTE — TELEPHONE ENCOUNTER
Patient Daughter Niya repeated back and voice a understanding of orders.      ----- Message from Pavel Padilla MD sent at 3/3/2020  2:33 PM CST -----  Please increase prograf to 5 mg PO bid

## 2020-03-05 LAB — CMV DNA SERPL NAA+PROBE-ACNC: NORMAL IU/ML

## 2020-03-09 ENCOUNTER — LAB VISIT (OUTPATIENT)
Dept: LAB | Facility: HOSPITAL | Age: 65
End: 2020-03-09
Attending: INTERNAL MEDICINE
Payer: MEDICARE

## 2020-03-09 DIAGNOSIS — Z57.8 OCCUPATIONAL EXPOSURE TO OTHER RISK FACTORS: ICD-10-CM

## 2020-03-09 DIAGNOSIS — Z94.0 KIDNEY REPLACED BY TRANSPLANT: ICD-10-CM

## 2020-03-09 DIAGNOSIS — T75.89XA ENVIRONMENTAL EXPOSURE: ICD-10-CM

## 2020-03-09 LAB
ALBUMIN SERPL BCP-MCNC: 4.1 G/DL (ref 3.5–5.2)
ALBUMIN SERPL BCP-MCNC: 4.1 G/DL (ref 3.5–5.2)
ALP SERPL-CCNC: 113 U/L (ref 55–135)
ALT SERPL W/O P-5'-P-CCNC: 17 U/L (ref 10–44)
ANION GAP SERPL CALC-SCNC: 8 MMOL/L (ref 8–16)
AST SERPL-CCNC: 13 U/L (ref 10–40)
BILIRUB DIRECT SERPL-MCNC: 0.2 MG/DL (ref 0.1–0.3)
BILIRUB SERPL-MCNC: 0.5 MG/DL (ref 0.1–1)
BUN SERPL-MCNC: 21 MG/DL (ref 8–23)
CALCIUM SERPL-MCNC: 10.9 MG/DL (ref 8.7–10.5)
CHLORIDE SERPL-SCNC: 110 MMOL/L (ref 95–110)
CO2 SERPL-SCNC: 25 MMOL/L (ref 23–29)
CREAT SERPL-MCNC: 1.3 MG/DL (ref 0.5–1.4)
EST. GFR  (AFRICAN AMERICAN): 50.8 ML/MIN/1.73 M^2
EST. GFR  (NON AFRICAN AMERICAN): 44.1 ML/MIN/1.73 M^2
GLUCOSE SERPL-MCNC: 61 MG/DL (ref 70–110)
MAGNESIUM SERPL-MCNC: 1.7 MG/DL (ref 1.6–2.6)
PHOSPHATE SERPL-MCNC: 3 MG/DL (ref 2.7–4.5)
POTASSIUM SERPL-SCNC: 3.8 MMOL/L (ref 3.5–5.1)
PROT SERPL-MCNC: 7 G/DL (ref 6–8.4)
SODIUM SERPL-SCNC: 143 MMOL/L (ref 136–145)

## 2020-03-09 PROCEDURE — 80197 ASSAY OF TACROLIMUS: CPT

## 2020-03-09 PROCEDURE — 85025 COMPLETE CBC W/AUTO DIFF WBC: CPT

## 2020-03-09 PROCEDURE — 36415 COLL VENOUS BLD VENIPUNCTURE: CPT | Mod: PO

## 2020-03-09 PROCEDURE — 84075 ASSAY ALKALINE PHOSPHATASE: CPT

## 2020-03-09 PROCEDURE — 87799 DETECT AGENT NOS DNA QUANT: CPT

## 2020-03-09 PROCEDURE — 80069 RENAL FUNCTION PANEL: CPT

## 2020-03-09 PROCEDURE — 86833 HLA CLASS II HIGH DEFIN QUAL: CPT

## 2020-03-09 PROCEDURE — 87340 HEPATITIS B SURFACE AG IA: CPT

## 2020-03-09 PROCEDURE — 83735 ASSAY OF MAGNESIUM: CPT

## 2020-03-09 PROCEDURE — 86832 HLA CLASS I HIGH DEFIN QUAL: CPT

## 2020-03-09 PROCEDURE — 84450 TRANSFERASE (AST) (SGOT): CPT

## 2020-03-09 PROCEDURE — 86977 RBC SERUM PRETX INCUBJ/INHIB: CPT | Mod: 59

## 2020-03-09 PROCEDURE — 87517 HEPATITIS B DNA QUANT: CPT

## 2020-03-09 SDOH — SOCIAL DETERMINANTS OF HEALTH (SDOH): OCCUPATIONAL EXPOSURE TO OTHER RISK FACTORS: Z57.8

## 2020-03-10 DIAGNOSIS — Z94.0 STATUS POST KIDNEY TRANSPLANT: ICD-10-CM

## 2020-03-10 DIAGNOSIS — R88.8 ABNORMAL FINDINGS IN OTHER BODY FLUIDS AND SUBSTANCES: ICD-10-CM

## 2020-03-10 DIAGNOSIS — Z29.89 PROPHYLACTIC IMMUNOTHERAPY: ICD-10-CM

## 2020-03-10 DIAGNOSIS — Z94.0 KIDNEY REPLACED BY TRANSPLANT: Primary | ICD-10-CM

## 2020-03-10 DIAGNOSIS — E83.51 HYPOCALCEMIA: ICD-10-CM

## 2020-03-10 DIAGNOSIS — Z79.60 LONG-TERM USE OF IMMUNOSUPPRESSANT MEDICATION: ICD-10-CM

## 2020-03-10 LAB
ANISOCYTOSIS BLD QL SMEAR: SLIGHT
BASOPHILS # BLD AUTO: 0.01 K/UL (ref 0–0.2)
BASOPHILS NFR BLD: 0.5 % (ref 0–1.9)
DIFFERENTIAL METHOD: ABNORMAL
EOSINOPHIL # BLD AUTO: 0 K/UL (ref 0–0.5)
EOSINOPHIL NFR BLD: 1.8 % (ref 0–8)
ERYTHROCYTE [DISTWIDTH] IN BLOOD BY AUTOMATED COUNT: 13.4 % (ref 11.5–14.5)
HBV SURFACE AG SERPL QL IA: NEGATIVE
HCT VFR BLD AUTO: 36.6 % (ref 37–48.5)
HGB BLD-MCNC: 11.3 G/DL (ref 12–16)
IMM GRANULOCYTES # BLD AUTO: 0.01 K/UL (ref 0–0.04)
IMM GRANULOCYTES NFR BLD AUTO: 0.5 % (ref 0–0.5)
LYMPHOCYTES # BLD AUTO: 0.5 K/UL (ref 1–4.8)
LYMPHOCYTES NFR BLD: 24.8 % (ref 18–48)
MCH RBC QN AUTO: 33.1 PG (ref 27–31)
MCHC RBC AUTO-ENTMCNC: 30.9 G/DL (ref 32–36)
MCV RBC AUTO: 107 FL (ref 82–98)
MONOCYTES # BLD AUTO: 0.2 K/UL (ref 0.3–1)
MONOCYTES NFR BLD: 8.3 % (ref 4–15)
NEUTROPHILS # BLD AUTO: 1.4 K/UL (ref 1.8–7.7)
NEUTROPHILS NFR BLD: 64.1 % (ref 38–73)
NRBC BLD-RTO: 0 /100 WBC
PLATELET # BLD AUTO: 126 K/UL (ref 150–350)
PLATELET BLD QL SMEAR: ABNORMAL
PMV BLD AUTO: 10.9 FL (ref 9.2–12.9)
POIKILOCYTOSIS BLD QL SMEAR: SLIGHT
RBC # BLD AUTO: 3.41 M/UL (ref 4–5.4)
SCHISTOCYTES BLD QL SMEAR: ABNORMAL
SCHISTOCYTES BLD QL SMEAR: PRESENT
TACROLIMUS BLD-MCNC: 6.8 NG/ML (ref 5–15)
WBC # BLD AUTO: 2.18 K/UL (ref 3.9–12.7)

## 2020-03-10 RX ORDER — TACROLIMUS 1 MG/1
6 CAPSULE ORAL EVERY 12 HOURS
Qty: 360 CAPSULE | Refills: 11 | Status: SHIPPED | OUTPATIENT
Start: 2020-03-10 | End: 2020-03-17 | Stop reason: SDUPTHER

## 2020-03-10 NOTE — PROGRESS NOTES
Make sure she is taking sensipar as ordered   Lets hold Vit D supplements for now  Repeat PTH with next blood work  Add ionized calcium to next blood work  'hydration  Increase prograf to 6 mg po bid  She had a modest CMV viremia and currently OFF MMF and bactrim still remains pancytopenic. Let's get a CT of the abdomen and pelvis without contrast. Just looking for lymphadenopathies.

## 2020-03-11 LAB
BKV DNA SERPL NAA+PROBE-ACNC: <125 COPIES/ML
BKV DNA SERPL NAA+PROBE-LOG#: <2.1 LOG (10) COPIES/ML
BKV DNA SERPL QL NAA+PROBE: NOT DETECTED
CLASS I ANTIBODY COMMENTS - LUMINEX: NORMAL
CLASS II ANTIBODY COMMENTS - LUMINEX: NORMAL
CMV DNA SERPL NAA+PROBE-ACNC: NORMAL IU/ML
DSA1 TESTING DATE: NORMAL
DSA12 TESTING DATE: NORMAL
DSA2 TESTING DATE: NORMAL
SERUM COLLECTION DT - LUMINEX CLASS I: NORMAL
SERUM COLLECTION DT - LUMINEX CLASS II: NORMAL

## 2020-03-13 ENCOUNTER — LAB VISIT (OUTPATIENT)
Dept: LAB | Facility: HOSPITAL | Age: 65
End: 2020-03-13
Attending: INTERNAL MEDICINE
Payer: MEDICARE

## 2020-03-13 ENCOUNTER — OFFICE VISIT (OUTPATIENT)
Dept: TRANSPLANT | Facility: CLINIC | Age: 65
End: 2020-03-13
Payer: MEDICARE

## 2020-03-13 ENCOUNTER — CLINICAL SUPPORT (OUTPATIENT)
Dept: DIABETES | Facility: CLINIC | Age: 65
End: 2020-03-13
Payer: MEDICARE

## 2020-03-13 ENCOUNTER — OFFICE VISIT (OUTPATIENT)
Dept: OPHTHALMOLOGY | Facility: CLINIC | Age: 65
End: 2020-03-13
Payer: MEDICARE

## 2020-03-13 VITALS
TEMPERATURE: 98 F | HEIGHT: 63 IN | WEIGHT: 186.06 LBS | SYSTOLIC BLOOD PRESSURE: 125 MMHG | BODY MASS INDEX: 32.97 KG/M2 | HEART RATE: 84 BPM | DIASTOLIC BLOOD PRESSURE: 73 MMHG | OXYGEN SATURATION: 99 %

## 2020-03-13 VITALS — DIASTOLIC BLOOD PRESSURE: 64 MMHG | HEART RATE: 64 BPM | SYSTOLIC BLOOD PRESSURE: 178 MMHG

## 2020-03-13 DIAGNOSIS — D63.1 ANEMIA ASSOCIATED WITH CHRONIC RENAL FAILURE: Chronic | ICD-10-CM

## 2020-03-13 DIAGNOSIS — E11.42 TYPE 2 DIABETES MELLITUS WITH DIABETIC POLYNEUROPATHY, WITH LONG-TERM CURRENT USE OF INSULIN: ICD-10-CM

## 2020-03-13 DIAGNOSIS — E83.51 HYPOCALCEMIA: ICD-10-CM

## 2020-03-13 DIAGNOSIS — Z94.0 KIDNEY REPLACED BY TRANSPLANT: ICD-10-CM

## 2020-03-13 DIAGNOSIS — Z79.4 TYPE 2 DIABETES MELLITUS WITH DIABETIC POLYNEUROPATHY, WITH LONG-TERM CURRENT USE OF INSULIN: ICD-10-CM

## 2020-03-13 DIAGNOSIS — N18.9 ANEMIA ASSOCIATED WITH CHRONIC RENAL FAILURE: Chronic | ICD-10-CM

## 2020-03-13 DIAGNOSIS — B25.9 CYTOMEGALOVIRUS (CMV) VIREMIA: ICD-10-CM

## 2020-03-13 DIAGNOSIS — R88.8 ABNORMAL FINDINGS IN OTHER BODY FLUIDS AND SUBSTANCES: ICD-10-CM

## 2020-03-13 DIAGNOSIS — H25.13 CATARACT, NUCLEAR SCLEROTIC, BOTH EYES: ICD-10-CM

## 2020-03-13 DIAGNOSIS — Z79.60 LONG-TERM USE OF IMMUNOSUPPRESSANT MEDICATION: ICD-10-CM

## 2020-03-13 DIAGNOSIS — D75.9 CYTOPENIA: ICD-10-CM

## 2020-03-13 DIAGNOSIS — E66.01 CLASS 2 SEVERE OBESITY DUE TO EXCESS CALORIES WITH SERIOUS COMORBIDITY IN ADULT, UNSPECIFIED BMI: Chronic | ICD-10-CM

## 2020-03-13 DIAGNOSIS — N18.30 CKD (CHRONIC KIDNEY DISEASE) STAGE 3, GFR 30-59 ML/MIN: Primary | ICD-10-CM

## 2020-03-13 DIAGNOSIS — D84.9 IMMUNOCOMPROMISED STATE: ICD-10-CM

## 2020-03-13 DIAGNOSIS — I12.9 HYPERTENSION, RENAL: Chronic | ICD-10-CM

## 2020-03-13 DIAGNOSIS — H35.3132 INTERMEDIATE STAGE NONEXUDATIVE AGE-RELATED MACULAR DEGENERATION OF BOTH EYES: Primary | ICD-10-CM

## 2020-03-13 LAB
ALBUMIN SERPL BCP-MCNC: 3.7 G/DL (ref 3.5–5.2)
ANION GAP SERPL CALC-SCNC: 8 MMOL/L (ref 8–16)
BASOPHILS # BLD AUTO: 0.01 K/UL (ref 0–0.2)
BASOPHILS NFR BLD: 0.5 % (ref 0–1.9)
BUN SERPL-MCNC: 24 MG/DL (ref 8–23)
CA-I BLDV-SCNC: 1.4 MMOL/L (ref 1.06–1.42)
CALCIUM SERPL-MCNC: 10.1 MG/DL (ref 8.7–10.5)
CHLORIDE SERPL-SCNC: 112 MMOL/L (ref 95–110)
CO2 SERPL-SCNC: 24 MMOL/L (ref 23–29)
CREAT SERPL-MCNC: 1.3 MG/DL (ref 0.5–1.4)
DIFFERENTIAL METHOD: ABNORMAL
EOSINOPHIL # BLD AUTO: 0 K/UL (ref 0–0.5)
EOSINOPHIL NFR BLD: 0.9 % (ref 0–8)
ERYTHROCYTE [DISTWIDTH] IN BLOOD BY AUTOMATED COUNT: 13.2 % (ref 11.5–14.5)
EST. GFR  (AFRICAN AMERICAN): 50.8 ML/MIN/1.73 M^2
EST. GFR  (NON AFRICAN AMERICAN): 44.1 ML/MIN/1.73 M^2
GLUCOSE SERPL-MCNC: 97 MG/DL (ref 70–110)
HCT VFR BLD AUTO: 34.1 % (ref 37–48.5)
HGB BLD-MCNC: 10.6 G/DL (ref 12–16)
IMM GRANULOCYTES # BLD AUTO: 0.01 K/UL (ref 0–0.04)
IMM GRANULOCYTES NFR BLD AUTO: 0.5 % (ref 0–0.5)
LYMPHOCYTES # BLD AUTO: 0.4 K/UL (ref 1–4.8)
LYMPHOCYTES NFR BLD: 19.8 % (ref 18–48)
MAGNESIUM SERPL-MCNC: 1.9 MG/DL (ref 1.6–2.6)
MCH RBC QN AUTO: 32.7 PG (ref 27–31)
MCHC RBC AUTO-ENTMCNC: 31.1 G/DL (ref 32–36)
MCV RBC AUTO: 105 FL (ref 82–98)
MONOCYTES # BLD AUTO: 0.2 K/UL (ref 0.3–1)
MONOCYTES NFR BLD: 9 % (ref 4–15)
NEUTROPHILS # BLD AUTO: 1.5 K/UL (ref 1.8–7.7)
NEUTROPHILS NFR BLD: 69.3 % (ref 38–73)
NRBC BLD-RTO: 0 /100 WBC
PHOSPHATE SERPL-MCNC: 2.6 MG/DL (ref 2.7–4.5)
PLATELET # BLD AUTO: 109 K/UL (ref 150–350)
PMV BLD AUTO: 11.3 FL (ref 9.2–12.9)
POTASSIUM SERPL-SCNC: 4.1 MMOL/L (ref 3.5–5.1)
PTH-INTACT SERPL-MCNC: 540 PG/ML (ref 9–77)
RBC # BLD AUTO: 3.24 M/UL (ref 4–5.4)
SODIUM SERPL-SCNC: 144 MMOL/L (ref 136–145)
TACROLIMUS BLD-MCNC: 5 NG/ML (ref 5–15)
WBC # BLD AUTO: 2.22 K/UL (ref 3.9–12.7)

## 2020-03-13 PROCEDURE — 99999 PR PBB SHADOW E&M-EST. PATIENT-LVL III: ICD-10-PCS | Mod: PBBFAC,,, | Performed by: INTERNAL MEDICINE

## 2020-03-13 PROCEDURE — 99213 OFFICE O/P EST LOW 20 MIN: CPT | Mod: PBBFAC,27,25 | Performed by: OPHTHALMOLOGY

## 2020-03-13 PROCEDURE — 85025 COMPLETE CBC W/AUTO DIFF WBC: CPT

## 2020-03-13 PROCEDURE — 99999 PR PBB SHADOW E&M-EST. PATIENT-LVL III: ICD-10-PCS | Mod: PBBFAC,,, | Performed by: OPHTHALMOLOGY

## 2020-03-13 PROCEDURE — 83970 ASSAY OF PARATHORMONE: CPT

## 2020-03-13 PROCEDURE — 92202 OPSCPY EXTND ON/MAC DRAW: CPT | Mod: ,,, | Performed by: OPHTHALMOLOGY

## 2020-03-13 PROCEDURE — 99999 PR PBB SHADOW E&M-EST. PATIENT-LVL I: CPT | Mod: PBBFAC,,, | Performed by: DIETITIAN, REGISTERED

## 2020-03-13 PROCEDURE — 83735 ASSAY OF MAGNESIUM: CPT

## 2020-03-13 PROCEDURE — 99215 PR OFFICE/OUTPT VISIT, EST, LEVL V, 40-54 MIN: ICD-10-PCS | Mod: S$PBB,,, | Performed by: INTERNAL MEDICINE

## 2020-03-13 PROCEDURE — 99999 PR PBB SHADOW E&M-EST. PATIENT-LVL III: CPT | Mod: PBBFAC,,, | Performed by: OPHTHALMOLOGY

## 2020-03-13 PROCEDURE — 99999 PR PBB SHADOW E&M-EST. PATIENT-LVL III: CPT | Mod: PBBFAC,,, | Performed by: INTERNAL MEDICINE

## 2020-03-13 PROCEDURE — 80069 RENAL FUNCTION PANEL: CPT

## 2020-03-13 PROCEDURE — 92014 PR EYE EXAM, EST PATIENT,COMPREHESV: ICD-10-PCS | Mod: S$PBB,,, | Performed by: OPHTHALMOLOGY

## 2020-03-13 PROCEDURE — 80197 ASSAY OF TACROLIMUS: CPT

## 2020-03-13 PROCEDURE — 92134 CPTRZ OPH DX IMG PST SGM RTA: CPT | Mod: PBBFAC | Performed by: OPHTHALMOLOGY

## 2020-03-13 PROCEDURE — G0108 DIAB MANAGE TRN  PER INDIV: HCPCS | Mod: PBBFAC | Performed by: DIETITIAN, REGISTERED

## 2020-03-13 PROCEDURE — 92134 POSTERIOR SEGMENT OCT RETINA (OCULAR COHERENCE TOMOGRAPHY)-BOTH EYES: ICD-10-PCS | Mod: 26,S$PBB,, | Performed by: OPHTHALMOLOGY

## 2020-03-13 PROCEDURE — 92202 PR OPHTHALMOSCOPY, EXT, W/DRAW OPTIC NERVE/MACULA, I&R, UNI/BI: ICD-10-PCS | Mod: ,,, | Performed by: OPHTHALMOLOGY

## 2020-03-13 PROCEDURE — 99213 OFFICE O/P EST LOW 20 MIN: CPT | Mod: PBBFAC,27 | Performed by: INTERNAL MEDICINE

## 2020-03-13 PROCEDURE — 99215 OFFICE O/P EST HI 40 MIN: CPT | Mod: S$PBB,,, | Performed by: INTERNAL MEDICINE

## 2020-03-13 PROCEDURE — 82330 ASSAY OF CALCIUM: CPT

## 2020-03-13 PROCEDURE — 99999 PR PBB SHADOW E&M-EST. PATIENT-LVL I: ICD-10-PCS | Mod: PBBFAC,,, | Performed by: DIETITIAN, REGISTERED

## 2020-03-13 PROCEDURE — 36415 COLL VENOUS BLD VENIPUNCTURE: CPT

## 2020-03-13 PROCEDURE — 99211 OFF/OP EST MAY X REQ PHY/QHP: CPT | Mod: PBBFAC | Performed by: DIETITIAN, REGISTERED

## 2020-03-13 PROCEDURE — 92014 COMPRE OPH EXAM EST PT 1/>: CPT | Mod: S$PBB,,, | Performed by: OPHTHALMOLOGY

## 2020-03-13 RX ORDER — KETOCONAZOLE 200 MG/1
100 TABLET ORAL DAILY
Qty: 15 TABLET | Refills: 11 | Status: SHIPPED | OUTPATIENT
Start: 2020-03-13 | End: 2021-03-19 | Stop reason: SDUPTHER

## 2020-03-13 NOTE — LETTER
March 13, 2020        Bruce Khan  [address]  [place] [address]  Phone: [phone]  Fax: [phone]             [facility]  [address]  [facility] [address]  Phone: [phone]   Patient: Satinder Schulte   MR Number: [id number]   YOB: 1955   Date of Visit: 3/13/2020       Dear Dr. Bruce Khan    Thank you for referring Satinder Schulte to me for evaluation. Attached you will find relevant portions of my assessment and plan of care.    If you have questions, please do not hesitate to call me. I look forward to following Satinder Schulte along with you.    Sincerely,    Pavel Padilla MD    Enclosure    If you would like to receive this communication electronically, please contact [email or web address] or [phone] to request Tweet Category Link access.    Tweet Category Link is a tool which provides read-only access to select patient information with whom you have a relationship. Its easy to use and provides real time access to review your patients record including encounter summaries, notes, results, and demographic information.    If you feel you have received this communication in error or would no longer like to receive these types of communications, please e-mail [email or web address]

## 2020-03-13 NOTE — PROGRESS NOTES
Kidney Post-Transplant Assessment    Referring Physician: Bruce Khan  Current Nephrologist: Bruce Khan    ORGAN: LEFT KIDNEY  Donor Type: donation after brain death  PHS Increased Risk: no  Cold Ischemia: 545 mins  Induction Medications: thymoglobulin    Subjective:     CC:  Reassessment of renal allograft function and management of chronic immunosuppression.    HPI:  Ms. Schulte is a 64 y.o. 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  She has CKD stage 2 - GFR 60-89 and her baseline creatinine is between 1.3. She takes tacrolimus for maintenance immunosuppression. She denies any recent hospitalizations or ER visits since her previous clinic visit.    He saw endocrine today  WBC stable No need for neupogen  DSA negative allosure at 0.43  She refers some diarrhea with trulciti    She lives alone  Her family lives close by           Review of Systems     Skin: no skin rash  CNS; no headaches, blurred vision, seizure, or syncope  ENT: No JVD,  Adenopathies,  nasal congestion. No oral lesions  Cardiac: No chest pain, dyspnea, claudication, edema or palpitations  Respiratory: No SOB, cough, hemoptysis   Gastro-intestinal: No diarrhea, constipation, abdominal pain, nausea, vomit. No ascitis  Genitourinary: no hematuria, dysuria, frequency, frequency  Musculoskeletal: joint pain, arthritis or vasculitic changes  Psych: alert awake, oriented, No cranial nerves deficit.      Objective:     There were no vitals taken for this visit.body mass index is unknown because there is no height or weight on file.    Physical Exam     Head: normocephalic  Neck: No JVD, cervical axillary, or femoral adenopathies  Heart: no murmurs, Normal s1 and s2, No gallops, no rubs, No murmurs  Lungs; CTA, good respiratory effort, no crackles  Abdomen: soft, non tender, no splenomegaly or hepatomegaly, no massess, no bruits  Extremities: No edema, skin rash, joint  pain  SNC: awake, alert oriented. Cranial nerves are intact, no focalized, sensitivity and strength preserved      Labs:  Lab Results   Component Value Date    WBC 2.22 (L) 03/13/2020    HGB 10.6 (L) 03/13/2020    HCT 34.1 (L) 03/13/2020     03/13/2020    K 4.1 03/13/2020     (H) 03/13/2020    CO2 24 03/13/2020    BUN 24 (H) 03/13/2020    CREATININE 1.3 03/13/2020    EGFRNONAA 44.1 (A) 03/13/2020    CALCIUM 10.1 03/13/2020    PHOS 2.6 (L) 03/13/2020    MG 1.9 03/13/2020    ALBUMIN 3.7 03/13/2020    AST 13 03/09/2020    ALT 17 03/09/2020    UTPCR 0.16 03/09/2020    .0 (H) 03/13/2020    TACROLIMUS 5.0 03/13/2020       No results found for: EXTANC, EXTWBC, EXTSEGS, EXTPLATELETS, EXTHEMOGLOBI, EXTHEMATOCRI, EXTCREATININ, EXTSODIUM, EXTPOTASSIUM, EXTBUN, EXTCO2, EXTCALCIUM, EXTPHOSPHORU, EXTGLUCOSE, EXTALBUMIN, EXTAST, EXTALT, EXTBILITOTAL, EXTLIPASE, EXTAMYLASE    No results found for: EXTCYCLOSLVL, EXTSIROLIMUS, EXTTACROLVL, EXTPROTCRE, EXTPTHINTACT, EXTPROTEINUA, EXTWBCUA, EXTRBCUA    Labs were reviewed with the patient.    Assessment:     1. CKD (chronic kidney disease) stage 3, GFR 30-59 ml/min    2. Cytopenia    3. Class 2 severe obesity due to excess calories with serious comorbidity in adult, unspecified BMI    4. Hypertension, renal    5. Anemia associated with chronic renal failure    6. Long-term use of immunosuppressant medication    7. Immunocompromised state    8. Cytomegalovirus (CMV) viremia        Plan:       Will repeat labs on Monday No change with prograf dose today to avoid confusions with prograf dose and level  I sent refills to our [facility] pharmacy for ketoconazole and discussed with the patient the importance to take this correctly  No Neupogen  No Epogen  CMV prophylaxis is completed.   Education provided  All questions answered       1. CKD stage 3 stable : will continue follow up as per our center guidelines. patient to continue close follow up with the local General  nephrologist. Education provided in appropriate fluid intake, potassium intake. Continue with oral hydration.        2. Immunosuppression: prograf 6 mg po bid OFF MMF due to low WBC. Will repeat labs next week. She had multiple different doses in the blue card and the bottle , also different from the last dose we advised.   Lab Results   Component Value Date    TACROLIMUS 5.0 03/13/2020    TACROLIMUS 6.8 03/09/2020    TACROLIMUS 5.5 03/02/2020     No results found for: CYCLOSPORINE  @   Will closely monitor for toxicities, education provided about adherence to medicines and need to communicate any side effect to the transplant nurse or physician.    3. Allograft Function:stable at baseline for the patient. Continue follow up as per our guidelines and with the local General nephrologist. Communication will be sent today.  Lab Results   Component Value Date    CREATININE 1.3 03/13/2020    CREATININE 1.3 03/09/2020    CREATININE 1.3 03/02/2020     Lab Results   Component Value Date    LIPASE 36 12/17/2019       4. Hypertension management: I reviewed three different BP readings put on the wrong placed. They seem acceptable.  Continue with home blood pressure monitoring, low salt and healthy life discussed with the patient..    5. Metabolic Bone Disease/Secondary Hyperparathyroidism:calcium and phosphorus level discussed with the patient, patient will continue follow up with the general nephrologist for management of metabolic bone disease   calcium and phosphorus as per our center protocol. Will monitor PTH, Vit D level, calcium.      Lab Results   Component Value Date    .0 (H) 03/13/2020    CALCIUM 10.1 03/13/2020    CAION 1.40 03/13/2020    PHOS 2.6 (L) 03/13/2020    PHOS 3.0 03/09/2020    PHOS 2.7 03/02/2020       6. Electrolytes: reviewed with the patient, essentially within the normal range no need for acute changes today, will monitor as per our center guidelines.     Lab Results   Component Value Date      03/13/2020    K 4.1 03/13/2020     (H) 03/13/2020    CO2 24 03/13/2020    CO2 25 03/09/2020    CO2 24 03/02/2020       7. Anemia: will continue monitoring as per our center guidelines. No indication for acute intervention today.     Lab Results   Component Value Date    WBC 2.22 (L) 03/13/2020    HGB 10.6 (L) 03/13/2020    HCT 34.1 (L) 03/13/2020     (H) 03/13/2020     (L) 03/13/2020       8.Proteinuria: will continue with pr/cr ratio as per our center guidelines  Lab Results   Component Value Date    PROTEINURINE 33 (H) 03/09/2020    CREATRANDUR 210.0 03/09/2020    UTPCR 0.16 03/09/2020    UTPCR 0.65 (H) 12/16/2019    UTPCR 0.18 11/18/2019        9. BK virus infection screening: will continue with urine or blood PCR as per our guidelines to prevent BK virus viremia and allograft dysfunction  Lab Results   Component Value Date    BKVIRUSPCRQB <125 03/09/2020         10. Weight education: provided during the clinic visit.   Body mass index is 32.96 kg/m².       11.Patient safety education regarding immunosuppression including prophylaxis posttransplant for CMV, PCP : Education provided about vaccination and prevention of infections.    12.  Cytopenias: no significant cytopenias will monitor as per our guidelines. Medicine list reviewed including potential causes of drug-induced cytopenias     Lab Results   Component Value Date    WBC 2.22 (L) 03/13/2020    HGB 10.6 (L) 03/13/2020    HCT 34.1 (L) 03/13/2020     (H) 03/13/2020     (L) 03/13/2020       13. Post-transplant Prophylaxis; CMV Infection, PJP and Candida mucosistis and other indicated for this particular patient. Atovaquone.     I spoke with the patient for 30 minutes. More than half dedicated to counseling and education. All questions answered    Pavel Padilla MD  Transplant Nephrology            Follow-up:   Clinic: return to transplant clinic weekly for the first month after transplant; every 2 weeks during  months 2-3; then at 6-, 9-, 12-, 18-, 24-, and 36- months post-transplant to reassess for complications from immunosuppression toxicity and monitor for rejection.  Annually thereafter.    Labs: since patient remains at high risk for rejection and drug-related complications that warrant close monitoring, labs will be ordered as follows: continue twice weekly CBC, renal panel, and drug level for first month; then same labs once weekly through 3rd month post-transplant.  Urine for UA and protein/creatinine ratio monthly.  Serum BK - PCR at 1-, 3-, 6-, 9-, 12-, 18-, 24-, 36- 48-, and 60 months post-transplant.  Hepatic panel at 1-, 2-, 3-, 6-, 9-, 12-, 18-, 24-, and 36- months post-transplant.    Pavel Padilla MD       Education:   Material provided to the patient.  Patient reminded to call with any health changes since these can be early signs of significant complications.  Also, I advised the patient to be sure any new medications or changes of old medications are discussed with either a pharmacist or physician knowledgeable with transplant to avoid rejection/drug toxicity related to significant drug interactions.    UNOS Patient Status  Functional Status: 90% - Able to carry on normal activity: minor symptoms of disease  Physical Capacity: No Limitations

## 2020-03-13 NOTE — PROGRESS NOTES
"Diabetes Education  Author: Donya Jones RD, CDE  Date: 3/13/2020    Diabetes Care Management Summary  Diabetes Education Record Progress: Comprehensive  (1 mo f/u)    Current Diabetes Risk Level: Low       Diabetes Type : Type II  Diabetes Diagnosis: 0-1 year  (dx last year post-tx)      Current Treatment: Insulin, Injectable  Trulicity 0.75 mg weekly;   Levemir 14 units nightly   -  has been skipping the last few nights, will skip if BG "low" (<180)            Reviewed Problem List with Patient: Yes    Health Maintenance was reviewed today with patient. Discussed with patient importance of routine eye exams, foot exams/foot care, blood work (i.e.: A1c, microalbumin, and lipid), dental visits, yearly flu vaccine, and pneumonia vaccine as indicated by PCP. Patient verbalized understanding.     Health Maintenance Topics with due status: Not Due       Topic Last Completion Date    TETANUS VACCINE 10/04/2012    Mammogram 12/07/2018    Lipid Panel 12/16/2019    Hemoglobin A1c 12/17/2019     Health Maintenance Due   Topic Date Due    Foot Exam  10/02/1967    Eye Exam  10/02/1967    Colonoscopy  10/02/2007    Pneumococcal Vaccine (Highest Risk) (2 of 3 - PCV13) 10/04/2013       Nutrition  Meal Planning: 3 meals per day  Meal Plan 24 Hour Recall - Breakfast: (grits, eggs, coffee with 2 packs sugar)  Meal Plan 24 Hour Recall - Lunch: (red beans and rice)  Meal Plan 24 Hour Recall - Dinner: (yogurt)    Monitoring   Monitoring: (Prodigy meter)  Self Monitoring : (BG's range )  Blood Glucose Logs: Yes  Do you use a personal continuous glucose monitor?: No  In the last month, how often have you had a low blood sugar reaction?: once  (has two hypos on her logs (44 and 32), but reports she did not have symptoms associated with these numbers)    Social History  Preferred Learning Method: Face to Face, Demonstration, Hands On  Primary Support: Self, Family  Smoking Status: Never a Smoker  Barriers to Change: Cognitive  " (previous CVA)  Learning Challenges : Other  (constant reinforcement will be needed)  Readiness to Learn : Acceptance  Cultural Influences: No        Diabetes Education Assessment/Progress  -Nutrition (Incorporating nutritional management into one's lifestyle): Discussion, Instructed, Individual Session, Written Materials Provided, Comprehends Key Points  Emphasized importance of eliminating all SSB. Discussed SF drink options. Reviewed all carb vs non-carb containing foods and discussed the appropriate amounts of carb to have at meals vs snacks.     -Physical Activity (incorporating physical activity into one's lifestyle): Discussion, Instructed, Individual Session, Written Materials Provided, Comprehends Key Points  Discussed goals and benefits of regular PA.      -Medications (states correct name, dose, onset, peak, duration, side effects & timing of meds): Discussion, Instructed, Individual Session, Written Materials Provided, Comprehends Key Points  Logs reviewed with MD BESSY Hope; per his recommendations, instructed pt to d/c Levemir and continue/increase Trulicity to 1.5 mg weekly. Pt conveyed understanding.     -Monitoring (monitoring blood glucose/other parameters & using results): Discussion, Instructed, Individual Session, Written Materials Provided, Comprehends Key Points  Discussed goal BGs for different times of day and in relation to meals. Instructed pt to test BG twice daily at varying times: fasting and 2-hours after any meal. Reviewed need for updated BG logs for all endo, PCP, and education appts.    -Acute Complications (preventing, detecting, and treating acute complications): Discussion, Instructed, Individual Session, Written Materials Provided, Comprehends Key Points  Discussed hypoglycemia vs hyperglycemia symptoms and discussed appropriate treatments for each. Reviewed that pt is at moderate/high risk of hypo with current medication regimen. Discussed general vs severe hyperglycemia and risk  of DKA. Reviewed levemir is likely the cause of her hypos, and now that she is stopping it, she should not have more hypos - does not need to eat as many sweets to keep BG up.     -Cognitive (knowledge of self-management skills, functional health literacy): Individual Session  Little specific knowledge of diabetes management. As of this time, she is taking her medication appropriately without help from a secondary party.     -Psychosocial (emotional response to diabetes): Individual Session  No negative feelings toward diabetes dx or disease management noted.    -Diabetes Distress and Support Systems: Individual Session  No distress noted.           Goals  Patient has selected/evaluated goals during today's session: Yes, evaluated    Monitoring: % Met  (will check glucose 3 x per day and record number; report to HCP if glucose <100)  Met Percentage : 75%  (Checking glucose twice daily; brought logs today)  Start Date: 02/13/20  Target Date: 03/13/20         Diabetes Care Plan/Intervention  Education Plan/Intervention: Individual Follow-Up DSMT  (f/u with endo HCP next month; DE f/u in 3-6 months)        Diabetes Meal Plan  Restrictions: Restricted Carbohydrate  (Advised to avoid sugary foods from diet)        Today's Self-Management Care Plan was developed with the patient's input and is based on barriers identified during today's assessment.    The long and short-term goals in the care plan were written with the patient/caregiver's input. The patient has agreed to work toward these goals to improve her overall diabetes control.      The patient received a copy of today's self-management plan and verbalized understanding of the care plan, goals, and all of today's instructions.      The patient was encouraged to communicate with her physician and care team regarding her condition(s) and treatment.  I provided the patient with my contact information today and encouraged her to contact me via phone or patient portal  as needed.         Education Units of Time   Time Spent: 60 min

## 2020-03-13 NOTE — LETTER
March 15, 2020      Anna Diaz, OD  [address]  [facility] [address]           [facility] - Ophthal[facility]  [address]  [facility] [address]  Phone: [phone]  Fax: [phone]          Patient: Satinder Schulte   MR Number: [id number]   YOB: 1955   Date of Visit: 3/13/2020       Dear Dr. Anna Diaz:    Thank you for referring Satinder Schulte to me for evaluation. Attached you will find relevant portions of my assessment and plan of care.    If you have questions, please do not hesitate to call me. I look forward to following Satinder Schulte along with you.    Sincerely,    Juvenal Lazar MD    Enclosure  CC:  No Recipients    If you would like to receive this communication electronically, please contact [email or web address] or [phone] to request more information on [facility] [employer] access.    For providers and/or their staff who would like to refer a patient to [facility], please contact us through our one-stop-shop provider referral line, [facility], at [phone].    If you feel you have received this communication in error or would no longer like to receive these types of communications, please e-mail [email or web address]

## 2020-03-15 NOTE — PROGRESS NOTES
Subjective:       Patient ID: Satinder Schulte is a 64 y.o. female      Chief Complaint   Patient presents with    Diabetic Eye Exam     History of Present Illness  HPI     Diabetic retinal eval per Dr. Diaz  Dls-02/13/2020 Dr. Diaz    Patient does not wear correction. Patient reports blurry vision OU at   distance and near for months.   Type 2 diabetes following kidney transplant.        (+)Flashes rarely  (+)Floaters occasioanlly  (-)Photophobia  (-)Glare      No gtts     Last edited by Juvenal Lazar MD on 3/13/2020  2:09 PM. (History)        Imaging:    See report    Assessment/Plan:     1. Intermediate stage nonexudative age-related macular degeneration of both eyes  New dx  Discussed Dry and Wet AMD in detail  Recommend AREDS 2 Vitamins  Home Amsler Grid Testing  RTC immediately PRN any changes in vision    - Posterior Segment OCT Retina-Both eyes; Future  - Posterior Segment OCT Retina-Both eyes    2. Cataract, nuclear sclerotic, both eyes  Refraction    DM-->CV risk factor control    Follow up in about 3 months (around 6/13/2020), or if symptoms worsen or fail to improve, for Dilated examination, OCT Mac.

## 2020-03-16 ENCOUNTER — LAB VISIT (OUTPATIENT)
Dept: LAB | Facility: HOSPITAL | Age: 65
End: 2020-03-16
Attending: INTERNAL MEDICINE
Payer: MEDICARE

## 2020-03-16 ENCOUNTER — TELEPHONE (OUTPATIENT)
Dept: TRANSPLANT | Facility: CLINIC | Age: 65
End: 2020-03-16

## 2020-03-16 DIAGNOSIS — Z94.0 KIDNEY REPLACED BY TRANSPLANT: ICD-10-CM

## 2020-03-16 LAB
ALBUMIN SERPL BCP-MCNC: 4.3 G/DL (ref 3.5–5.2)
ANION GAP SERPL CALC-SCNC: 11 MMOL/L (ref 8–16)
BASOPHILS # BLD AUTO: 0 K/UL (ref 0–0.2)
BASOPHILS NFR BLD: 0 % (ref 0–1.9)
BUN SERPL-MCNC: 23 MG/DL (ref 8–23)
CALCIUM SERPL-MCNC: 10.2 MG/DL (ref 8.7–10.5)
CHLORIDE SERPL-SCNC: 111 MMOL/L (ref 95–110)
CO2 SERPL-SCNC: 22 MMOL/L (ref 23–29)
CREAT SERPL-MCNC: 1.4 MG/DL (ref 0.5–1.4)
DIFFERENTIAL METHOD: ABNORMAL
EOSINOPHIL # BLD AUTO: 0 K/UL (ref 0–0.5)
EOSINOPHIL NFR BLD: 0.9 % (ref 0–8)
ERYTHROCYTE [DISTWIDTH] IN BLOOD BY AUTOMATED COUNT: 13.2 % (ref 11.5–14.5)
EST. GFR  (AFRICAN AMERICAN): 46.5 ML/MIN/1.73 M^2
EST. GFR  (NON AFRICAN AMERICAN): 40.3 ML/MIN/1.73 M^2
GLUCOSE SERPL-MCNC: 79 MG/DL (ref 70–110)
HCT VFR BLD AUTO: 36.2 % (ref 37–48.5)
HGB BLD-MCNC: 11.2 G/DL (ref 12–16)
IMM GRANULOCYTES # BLD AUTO: 0.01 K/UL (ref 0–0.04)
IMM GRANULOCYTES NFR BLD AUTO: 0.4 % (ref 0–0.5)
LYMPHOCYTES # BLD AUTO: 0.5 K/UL (ref 1–4.8)
LYMPHOCYTES NFR BLD: 23.1 % (ref 18–48)
MAGNESIUM SERPL-MCNC: 2 MG/DL (ref 1.6–2.6)
MCH RBC QN AUTO: 32.9 PG (ref 27–31)
MCHC RBC AUTO-ENTMCNC: 30.9 G/DL (ref 32–36)
MCV RBC AUTO: 107 FL (ref 82–98)
MONOCYTES # BLD AUTO: 0.2 K/UL (ref 0.3–1)
MONOCYTES NFR BLD: 9.6 % (ref 4–15)
NEUTROPHILS # BLD AUTO: 1.5 K/UL (ref 1.8–7.7)
NEUTROPHILS NFR BLD: 66 % (ref 38–73)
NRBC BLD-RTO: 0 /100 WBC
PHOSPHATE SERPL-MCNC: 3 MG/DL (ref 2.7–4.5)
PLATELET # BLD AUTO: 119 K/UL (ref 150–350)
PMV BLD AUTO: 11.8 FL (ref 9.2–12.9)
POTASSIUM SERPL-SCNC: 3.9 MMOL/L (ref 3.5–5.1)
RBC # BLD AUTO: 3.4 M/UL (ref 4–5.4)
SODIUM SERPL-SCNC: 144 MMOL/L (ref 136–145)
WBC # BLD AUTO: 2.29 K/UL (ref 3.9–12.7)

## 2020-03-16 PROCEDURE — 80069 RENAL FUNCTION PANEL: CPT

## 2020-03-16 PROCEDURE — 85025 COMPLETE CBC W/AUTO DIFF WBC: CPT

## 2020-03-16 PROCEDURE — 80197 ASSAY OF TACROLIMUS: CPT

## 2020-03-16 PROCEDURE — 83735 ASSAY OF MAGNESIUM: CPT

## 2020-03-16 PROCEDURE — 36415 COLL VENOUS BLD VENIPUNCTURE: CPT | Mod: PO

## 2020-03-16 NOTE — TELEPHONE ENCOUNTER
----- Message from Darci Flores, RN sent at 3/13/2020  4:00 PM CDT -----  Regarding: FW: caregiver  Can someone give her Daughter Niya a call next week about this.  This is the second visit that this has happened after informing me that a care giver was going to be with her.    Thanks    Obey     ----- Message -----  From: Claudia De Jesus RN  Sent: 3/13/2020  12:32 PM CDT  To: Pavel Padilla MD, Darci Flores, RN  Subject: caregiver                                        Pt seen in clinic by Dr. Padilla despite pt being 50 minutes late for her appt. Dr. Padilla asks that when you talk to her daughter to ask her to please come with her to her appts or to send someone with her. He says that she HAS to have a caregiver with her for her appts. She is very confused with all of her medications. Thanks, Claudia

## 2020-03-16 NOTE — TELEPHONE ENCOUNTER
SW contacted pt's daughter Niya, regarding concerns below. SW expressed to Niya, strong concerned regarding pt's cognitive abilities and understanding medication regimen. SW expressed ANYTIME pt presents in clinic a caregiver needs to be with her. Pt's daughter verbalized understanding and STRONG agreement. Pt['s daughter requested information regarding pt's upcoming appointments. SW provided appointment information. Pt's daughter verbalized understanding and agreement. SW remains available.           ----- Message from Darci Flores, RN sent at 3/13/2020  4:00 PM CDT -----  Regarding: FW: caregiver  Can someone give her Daughter Niya a call next week about this.  This is the second visit that this has happened after informing me that a care giver was going to be with her.    Thanks    Obey     ----- Message -----  From: Claudia De Jesus RN  Sent: 3/13/2020  12:32 PM CDT  To: Pavel Padilla MD, Darci Flores, RN  Subject: caregiver                                        Pt seen in clinic by Dr. Padilla despite pt being 50 minutes late for her appt. Dr. Padilla asks that when you talk to her daughter to ask her to please come with her to her appts or to send someone with her. He says that she HAS to have a caregiver with her for her appts. She is very confused with all of her medications. Thanks, Claudia

## 2020-03-17 DIAGNOSIS — Z79.60 LONG-TERM USE OF IMMUNOSUPPRESSANT MEDICATION: ICD-10-CM

## 2020-03-17 DIAGNOSIS — Z29.89 PROPHYLACTIC IMMUNOTHERAPY: ICD-10-CM

## 2020-03-17 DIAGNOSIS — Z94.0 STATUS POST KIDNEY TRANSPLANT: ICD-10-CM

## 2020-03-17 LAB
ALLOSURE: NORMAL
TACROLIMUS BLD-MCNC: 5.8 NG/ML (ref 5–15)

## 2020-03-17 NOTE — TELEPHONE ENCOUNTER
Patient daughter Niya repeated back and voice a understanding of orders.  Next labs 3/23/2020    ----- Message from Pavel Padilla MD sent at 3/17/2020 12:22 PM CDT -----  Increase prograf to 7 mg po bid

## 2020-03-18 ENCOUNTER — TELEPHONE (OUTPATIENT)
Dept: RADIOLOGY | Facility: HOSPITAL | Age: 65
End: 2020-03-18

## 2020-03-18 LAB — CMV DNA SERPL NAA+PROBE-ACNC: NORMAL IU/ML

## 2020-03-18 RX ORDER — TACROLIMUS 1 MG/1
7 CAPSULE ORAL EVERY 12 HOURS
Qty: 420 CAPSULE | Refills: 11 | Status: SHIPPED | OUTPATIENT
Start: 2020-03-18 | End: 2020-05-13 | Stop reason: SDUPTHER

## 2020-03-19 ENCOUNTER — HOSPITAL ENCOUNTER (OUTPATIENT)
Dept: RADIOLOGY | Facility: HOSPITAL | Age: 65
Discharge: HOME | End: 2020-03-19
Attending: INTERNAL MEDICINE
Payer: MEDICARE

## 2020-03-19 ENCOUNTER — TELEPHONE (OUTPATIENT)
Dept: TRANSPLANT | Facility: CLINIC | Age: 65
End: 2020-03-19

## 2020-03-19 DIAGNOSIS — R93.5 ABNORMAL CT OF THE ABDOMEN: ICD-10-CM

## 2020-03-19 DIAGNOSIS — E83.51 HYPOCALCEMIA: ICD-10-CM

## 2020-03-19 DIAGNOSIS — Z94.0 KIDNEY REPLACED BY TRANSPLANT: Primary | ICD-10-CM

## 2020-03-19 DIAGNOSIS — Z94.0 KIDNEY REPLACED BY TRANSPLANT: ICD-10-CM

## 2020-03-19 DIAGNOSIS — R88.8 ABNORMAL FINDINGS IN OTHER BODY FLUIDS AND SUBSTANCES: ICD-10-CM

## 2020-03-19 PROCEDURE — 74176 CT ABDOMEN PELVIS WITHOUT CONTRAST: ICD-10-PCS | Mod: 26,,, | Performed by: RADIOLOGY

## 2020-03-19 PROCEDURE — 74176 CT ABD & PELVIS W/O CONTRAST: CPT | Mod: TC

## 2020-03-19 PROCEDURE — 74176 CT ABD & PELVIS W/O CONTRAST: CPT | Mod: 26,,, | Performed by: RADIOLOGY

## 2020-03-19 NOTE — TELEPHONE ENCOUNTER
Patient daughter Niya repeated back and voice a understanding of orders.  Plan U/S Liver Next week.     ----- Message from Pavel Padilla MD sent at 3/19/2020  2:20 PM CDT -----  Please order a liver US

## 2020-03-19 NOTE — PROGRESS NOTES
Please order a liver US to complement CT (recent) and US report from 2016, reference to liver lesion

## 2020-03-23 ENCOUNTER — TELEPHONE (OUTPATIENT)
Dept: RADIOLOGY | Facility: HOSPITAL | Age: 65
End: 2020-03-23

## 2020-03-24 ENCOUNTER — TELEPHONE (OUTPATIENT)
Dept: TRANSPLANT | Facility: CLINIC | Age: 65
End: 2020-03-24

## 2020-03-24 NOTE — TELEPHONE ENCOUNTER
Plan reviewed with daughter sharif , Jony labs schedule for 4/6/2020.       ----- Message from Pavel Padilla MD sent at 3/24/2020  9:12 AM CDT -----  Labs in 2 weeks

## 2020-04-06 ENCOUNTER — LAB VISIT (OUTPATIENT)
Dept: LAB | Facility: HOSPITAL | Age: 65
End: 2020-04-06
Attending: INTERNAL MEDICINE
Payer: MEDICARE

## 2020-04-06 DIAGNOSIS — Z94.0 KIDNEY REPLACED BY TRANSPLANT: ICD-10-CM

## 2020-04-06 LAB
ALBUMIN SERPL BCP-MCNC: 4 G/DL (ref 3.5–5.2)
ANION GAP SERPL CALC-SCNC: 12 MMOL/L (ref 8–16)
BASOPHILS # BLD AUTO: 0.01 K/UL (ref 0–0.2)
BASOPHILS NFR BLD: 0.5 % (ref 0–1.9)
BUN SERPL-MCNC: 21 MG/DL (ref 8–23)
CALCIUM SERPL-MCNC: 9.6 MG/DL (ref 8.7–10.5)
CHLORIDE SERPL-SCNC: 109 MMOL/L (ref 95–110)
CO2 SERPL-SCNC: 21 MMOL/L (ref 23–29)
CREAT SERPL-MCNC: 1.4 MG/DL (ref 0.5–1.4)
DIFFERENTIAL METHOD: ABNORMAL
EOSINOPHIL # BLD AUTO: 0 K/UL (ref 0–0.5)
EOSINOPHIL NFR BLD: 0.9 % (ref 0–8)
ERYTHROCYTE [DISTWIDTH] IN BLOOD BY AUTOMATED COUNT: 12.8 % (ref 11.5–14.5)
EST. GFR  (AFRICAN AMERICAN): 46.5 ML/MIN/1.73 M^2
EST. GFR  (NON AFRICAN AMERICAN): 40.3 ML/MIN/1.73 M^2
GLUCOSE SERPL-MCNC: 76 MG/DL (ref 70–110)
HCT VFR BLD AUTO: 33.9 % (ref 37–48.5)
HGB BLD-MCNC: 10.4 G/DL (ref 12–16)
IMM GRANULOCYTES # BLD AUTO: 0.02 K/UL (ref 0–0.04)
IMM GRANULOCYTES NFR BLD AUTO: 0.9 % (ref 0–0.5)
LYMPHOCYTES # BLD AUTO: 0.5 K/UL (ref 1–4.8)
LYMPHOCYTES NFR BLD: 23.2 % (ref 18–48)
MAGNESIUM SERPL-MCNC: 1.8 MG/DL (ref 1.6–2.6)
MCH RBC QN AUTO: 31.9 PG (ref 27–31)
MCHC RBC AUTO-ENTMCNC: 30.7 G/DL (ref 32–36)
MCV RBC AUTO: 104 FL (ref 82–98)
MONOCYTES # BLD AUTO: 0.2 K/UL (ref 0.3–1)
MONOCYTES NFR BLD: 8.6 % (ref 4–15)
NEUTROPHILS # BLD AUTO: 1.5 K/UL (ref 1.8–7.7)
NEUTROPHILS NFR BLD: 65.9 % (ref 38–73)
NRBC BLD-RTO: 0 /100 WBC
PHOSPHATE SERPL-MCNC: 3.4 MG/DL (ref 2.7–4.5)
PLATELET # BLD AUTO: 118 K/UL (ref 150–350)
PMV BLD AUTO: 10.3 FL (ref 9.2–12.9)
POTASSIUM SERPL-SCNC: 3.8 MMOL/L (ref 3.5–5.1)
RBC # BLD AUTO: 3.26 M/UL (ref 4–5.4)
SODIUM SERPL-SCNC: 142 MMOL/L (ref 136–145)
WBC # BLD AUTO: 2.2 K/UL (ref 3.9–12.7)

## 2020-04-06 PROCEDURE — 80197 ASSAY OF TACROLIMUS: CPT

## 2020-04-06 PROCEDURE — 36415 COLL VENOUS BLD VENIPUNCTURE: CPT | Mod: PO

## 2020-04-06 PROCEDURE — 83735 ASSAY OF MAGNESIUM: CPT

## 2020-04-06 PROCEDURE — 80069 RENAL FUNCTION PANEL: CPT

## 2020-04-06 PROCEDURE — 85025 COMPLETE CBC W/AUTO DIFF WBC: CPT

## 2020-04-07 ENCOUNTER — NURSE TRIAGE (OUTPATIENT)
Dept: ADMINISTRATIVE | Facility: CLINIC | Age: 65
End: 2020-04-07

## 2020-04-07 ENCOUNTER — TELEPHONE (OUTPATIENT)
Dept: TRANSPLANT | Facility: CLINIC | Age: 65
End: 2020-04-07

## 2020-04-07 LAB — TACROLIMUS BLD-MCNC: 7.7 NG/ML (ref 5–15)

## 2020-04-07 NOTE — TELEPHONE ENCOUNTER
Pt wanted to know if she should still not take her multi-vitamin, she was advised to stop a while back, advised her not to start until her coordinator tells her to start again, caller agreed     Reason for Disposition   General information question, no triage required and triager able to answer question    Protocols used: INFORMATION ONLY CALL-A-AH

## 2020-04-07 NOTE — TELEPHONE ENCOUNTER
Patient and daughter Niya repeated back and voice a understanding of orders.  Niya will double check on meds and will contact coordinator back.        ----- Message from Pavel Padilla MD sent at 4/7/2020  9:09 AM CDT -----  We can d/c weekly serum CMV pcr  Please verify that she is not taking by mistake acyclovir or valcyte  Please make sure she is actually holding MMF  Encourage hydration  Prograf level is pending

## 2020-04-07 NOTE — PROGRESS NOTES
We can d/c weekly serum CMV pcr  Please verify that she is not taking by mistake acyclovir or valcyte  Please make sure she is actually holding MMF  Encourage hydration  Prograf level is pending

## 2020-04-08 LAB — CMV DNA SERPL NAA+PROBE-ACNC: NORMAL IU/ML

## 2020-04-20 ENCOUNTER — LAB VISIT (OUTPATIENT)
Dept: LAB | Facility: HOSPITAL | Age: 65
End: 2020-04-20
Attending: INTERNAL MEDICINE
Payer: MEDICARE

## 2020-04-20 DIAGNOSIS — Z94.0 KIDNEY REPLACED BY TRANSPLANT: ICD-10-CM

## 2020-04-20 LAB
ALBUMIN SERPL BCP-MCNC: 4.1 G/DL (ref 3.5–5.2)
ANION GAP SERPL CALC-SCNC: 11 MMOL/L (ref 8–16)
BASOPHILS # BLD AUTO: 0.01 K/UL (ref 0–0.2)
BASOPHILS NFR BLD: 0.4 % (ref 0–1.9)
BUN SERPL-MCNC: 25 MG/DL (ref 8–23)
CALCIUM SERPL-MCNC: 10 MG/DL (ref 8.7–10.5)
CHLORIDE SERPL-SCNC: 110 MMOL/L (ref 95–110)
CO2 SERPL-SCNC: 24 MMOL/L (ref 23–29)
CREAT SERPL-MCNC: 1.4 MG/DL (ref 0.5–1.4)
DIFFERENTIAL METHOD: ABNORMAL
EOSINOPHIL # BLD AUTO: 0 K/UL (ref 0–0.5)
EOSINOPHIL NFR BLD: 0.4 % (ref 0–8)
ERYTHROCYTE [DISTWIDTH] IN BLOOD BY AUTOMATED COUNT: 13.3 % (ref 11.5–14.5)
EST. GFR  (AFRICAN AMERICAN): 46.5 ML/MIN/1.73 M^2
EST. GFR  (NON AFRICAN AMERICAN): 40.3 ML/MIN/1.73 M^2
GLUCOSE SERPL-MCNC: 82 MG/DL (ref 70–110)
HCT VFR BLD AUTO: 34.8 % (ref 37–48.5)
HGB BLD-MCNC: 10.9 G/DL (ref 12–16)
IMM GRANULOCYTES # BLD AUTO: 0.03 K/UL (ref 0–0.04)
IMM GRANULOCYTES NFR BLD AUTO: 1.1 % (ref 0–0.5)
LYMPHOCYTES # BLD AUTO: 0.6 K/UL (ref 1–4.8)
LYMPHOCYTES NFR BLD: 21.2 % (ref 18–48)
MAGNESIUM SERPL-MCNC: 1.7 MG/DL (ref 1.6–2.6)
MCH RBC QN AUTO: 32.4 PG (ref 27–31)
MCHC RBC AUTO-ENTMCNC: 31.3 G/DL (ref 32–36)
MCV RBC AUTO: 104 FL (ref 82–98)
MONOCYTES # BLD AUTO: 0.2 K/UL (ref 0.3–1)
MONOCYTES NFR BLD: 8.1 % (ref 4–15)
NEUTROPHILS # BLD AUTO: 1.9 K/UL (ref 1.8–7.7)
NEUTROPHILS NFR BLD: 68.8 % (ref 38–73)
NRBC BLD-RTO: 0 /100 WBC
PHOSPHATE SERPL-MCNC: 2.8 MG/DL (ref 2.7–4.5)
PLATELET # BLD AUTO: 128 K/UL (ref 150–350)
PMV BLD AUTO: 11 FL (ref 9.2–12.9)
POTASSIUM SERPL-SCNC: 4.1 MMOL/L (ref 3.5–5.1)
RBC # BLD AUTO: 3.36 M/UL (ref 4–5.4)
SODIUM SERPL-SCNC: 145 MMOL/L (ref 136–145)
WBC # BLD AUTO: 2.73 K/UL (ref 3.9–12.7)

## 2020-04-20 PROCEDURE — 80197 ASSAY OF TACROLIMUS: CPT

## 2020-04-20 PROCEDURE — 83735 ASSAY OF MAGNESIUM: CPT

## 2020-04-20 PROCEDURE — 85025 COMPLETE CBC W/AUTO DIFF WBC: CPT

## 2020-04-20 PROCEDURE — 36415 COLL VENOUS BLD VENIPUNCTURE: CPT | Mod: PO

## 2020-04-20 PROCEDURE — 80069 RENAL FUNCTION PANEL: CPT

## 2020-04-21 ENCOUNTER — HOSPITAL ENCOUNTER (OUTPATIENT)
Dept: RADIOLOGY | Facility: HOSPITAL | Age: 65
Discharge: HOME | End: 2020-04-21
Attending: INTERNAL MEDICINE
Payer: MEDICARE

## 2020-04-21 DIAGNOSIS — Z94.0 KIDNEY REPLACED BY TRANSPLANT: ICD-10-CM

## 2020-04-21 DIAGNOSIS — R93.5 ABNORMAL CT OF THE ABDOMEN: ICD-10-CM

## 2020-04-21 LAB — TACROLIMUS BLD-MCNC: 8.2 NG/ML (ref 5–15)

## 2020-04-21 PROCEDURE — 76705 ECHO EXAM OF ABDOMEN: CPT | Mod: TC

## 2020-04-21 PROCEDURE — 76705 US ABDOMEN LIMITED: ICD-10-PCS | Mod: 26,,, | Performed by: RADIOLOGY

## 2020-04-21 PROCEDURE — 76705 ECHO EXAM OF ABDOMEN: CPT | Mod: 26,,, | Performed by: RADIOLOGY

## 2020-04-29 ENCOUNTER — COMMITTEE REVIEW (OUTPATIENT)
Dept: TRANSPLANT | Facility: CLINIC | Age: 65
End: 2020-04-29

## 2020-04-29 NOTE — COMMITTEE REVIEW
4/29/2020 Committee review Note  Satinder Schulte MRN [id number]  Kidney 9/20/2019 Per Dr Padilla and Dr Blandon Please review 4/21 U/S The imaging studies were ordered due to pancytopenia even after holding toxic drugs to bone marrow a mild cmv viremia resolved. I was looking for PTLD in a patient that is very poor historian with some cognitive deficits that makes difficult to explore symptoms and elucidate history. Patient also had a CT on 3/19/2020.  ___________________  Review 3/19/2020 CT and 4/21/2020 U/S in IR conference.  I was present at the meeting and attest to the decision of the committee.    Please complete message with results of the discussion     Pavel Padilla  06/01/2020

## 2020-04-29 NOTE — TELEPHONE ENCOUNTER
.  Patient: Satinder Schulte       MRN: [id number]      : 1955     Age: 64 y.o.  [address]  [place] [address]    Provider: Dr Rogers    Urgency of review: urgent    Patient Transplant Status: Other Post Kidney patient    Reason for presentation: Indeterminate lesion    Clinical Summary:  Kidney 2019 Per Dr Padilla and Dr Blandon Please review  U/S The imaging studies were ordered due to pancytopenia even after holding toxic drugs to bone marrow a mild cmv viremia resolved. I was looking for PTLD in a patient that is very poor historian with some cognitive deficits that makes difficult to explore symptoms and elucidate history. Patient also had a CT on 3/19/2020.    Imaging to be reviewed:US ABD 20 and CT ABD/PEL  3/19/20    HCC Treatment History: N/A    ABO: A POS    Platelets:   Lab Results   Component Value Date/Time     (L) 2020 08:10 AM     Creatinine:   Lab Results   Component Value Date/Time    CREATININE 1.4 2020 08:10 AM     Bilirubin:   Lab Results   Component Value Date/Time    BILITOT 0.5 2020 08:30 AM     AFP Last 3 each if available: No results found for: AFP, EXTAFP    MELD: MELD-Na score: 21 at 2019  3:10 PM  MELD score: 20 at 2019  3:10 PM  Calculated from:  Serum Creatinine: On dialysis. Using 4 mg/dL.  Serum Sodium: 135 mmol/L at 2019  3:10 PM  Total Bilirubin: 0.2 mg/dL (Rounded to 1 mg/dL) at 2019  5:45 AM  INR(ratio): 1.0 at 2019  6:06 PM  Age: 63 years    Plan: US from  with complex liver cyst again noted. CT from 3/20 with transplant kidney and liver cysts noted.     Repeat U/S in 6 months to monitor     Note forwarded to Post Kidney coordinator for follow up care.    Follow-up Provider: Dr Rogers

## 2020-05-04 ENCOUNTER — LAB VISIT (OUTPATIENT)
Dept: LAB | Facility: HOSPITAL | Age: 65
End: 2020-05-04
Attending: GENERAL ACUTE CARE HOSPITAL
Payer: MEDICARE

## 2020-05-04 DIAGNOSIS — E11.9 NEW ONSET TYPE 2 DIABETES MELLITUS: ICD-10-CM

## 2020-05-04 LAB
ESTIMATED AVG GLUCOSE: 100 MG/DL (ref 68–131)
HBA1C MFR BLD HPLC: 5.1 % (ref 4–5.6)

## 2020-05-04 PROCEDURE — 36415 COLL VENOUS BLD VENIPUNCTURE: CPT | Mod: PO

## 2020-05-04 PROCEDURE — 83036 HEMOGLOBIN GLYCOSYLATED A1C: CPT

## 2020-05-05 ENCOUNTER — CONFERENCE (OUTPATIENT)
Dept: TRANSPLANT | Facility: CLINIC | Age: 65
End: 2020-05-05

## 2020-05-12 ENCOUNTER — LAB VISIT (OUTPATIENT)
Dept: LAB | Facility: HOSPITAL | Age: 65
End: 2020-05-12
Attending: INTERNAL MEDICINE
Payer: MEDICARE

## 2020-05-12 DIAGNOSIS — Z94.0 KIDNEY REPLACED BY TRANSPLANT: ICD-10-CM

## 2020-05-12 LAB
ALBUMIN SERPL BCP-MCNC: 4 G/DL (ref 3.5–5.2)
ALBUMIN SERPL BCP-MCNC: 4 G/DL (ref 3.5–5.2)
ALP SERPL-CCNC: 85 U/L (ref 55–135)
ALT SERPL W/O P-5'-P-CCNC: 20 U/L (ref 10–44)
ANION GAP SERPL CALC-SCNC: 12 MMOL/L (ref 8–16)
AST SERPL-CCNC: 21 U/L (ref 10–40)
BASOPHILS # BLD AUTO: 0.01 K/UL (ref 0–0.2)
BASOPHILS NFR BLD: 0.4 % (ref 0–1.9)
BILIRUB DIRECT SERPL-MCNC: 0.1 MG/DL (ref 0.1–0.3)
BILIRUB SERPL-MCNC: 0.4 MG/DL (ref 0.1–1)
BUN SERPL-MCNC: 29 MG/DL (ref 8–23)
CALCIUM SERPL-MCNC: 9.5 MG/DL (ref 8.7–10.5)
CHLORIDE SERPL-SCNC: 113 MMOL/L (ref 95–110)
CO2 SERPL-SCNC: 20 MMOL/L (ref 23–29)
CREAT SERPL-MCNC: 1.4 MG/DL (ref 0.5–1.4)
DIFFERENTIAL METHOD: ABNORMAL
EOSINOPHIL # BLD AUTO: 0 K/UL (ref 0–0.5)
EOSINOPHIL NFR BLD: 0.8 % (ref 0–8)
ERYTHROCYTE [DISTWIDTH] IN BLOOD BY AUTOMATED COUNT: 13.5 % (ref 11.5–14.5)
EST. GFR  (AFRICAN AMERICAN): 46.5 ML/MIN/1.73 M^2
EST. GFR  (NON AFRICAN AMERICAN): 40.3 ML/MIN/1.73 M^2
GLUCOSE SERPL-MCNC: 71 MG/DL (ref 70–110)
HCT VFR BLD AUTO: 32.3 % (ref 37–48.5)
HGB BLD-MCNC: 10 G/DL (ref 12–16)
IMM GRANULOCYTES # BLD AUTO: 0.02 K/UL (ref 0–0.04)
IMM GRANULOCYTES NFR BLD AUTO: 0.8 % (ref 0–0.5)
LYMPHOCYTES # BLD AUTO: 0.6 K/UL (ref 1–4.8)
LYMPHOCYTES NFR BLD: 21.8 % (ref 18–48)
MAGNESIUM SERPL-MCNC: 1.7 MG/DL (ref 1.6–2.6)
MCH RBC QN AUTO: 31.5 PG (ref 27–31)
MCHC RBC AUTO-ENTMCNC: 31 G/DL (ref 32–36)
MCV RBC AUTO: 102 FL (ref 82–98)
MONOCYTES # BLD AUTO: 0.2 K/UL (ref 0.3–1)
MONOCYTES NFR BLD: 9.5 % (ref 4–15)
NEUTROPHILS # BLD AUTO: 1.7 K/UL (ref 1.8–7.7)
NEUTROPHILS NFR BLD: 66.7 % (ref 38–73)
NRBC BLD-RTO: 0 /100 WBC
PHOSPHATE SERPL-MCNC: 3.2 MG/DL (ref 2.7–4.5)
PLATELET # BLD AUTO: 117 K/UL (ref 150–350)
PMV BLD AUTO: 11.9 FL (ref 9.2–12.9)
POTASSIUM SERPL-SCNC: 4.1 MMOL/L (ref 3.5–5.1)
PROT SERPL-MCNC: 7.2 G/DL (ref 6–8.4)
RBC # BLD AUTO: 3.17 M/UL (ref 4–5.4)
SODIUM SERPL-SCNC: 145 MMOL/L (ref 136–145)
WBC # BLD AUTO: 2.52 K/UL (ref 3.9–12.7)

## 2020-05-12 PROCEDURE — 85025 COMPLETE CBC W/AUTO DIFF WBC: CPT

## 2020-05-12 PROCEDURE — 36415 COLL VENOUS BLD VENIPUNCTURE: CPT | Mod: PO

## 2020-05-12 PROCEDURE — 80069 RENAL FUNCTION PANEL: CPT

## 2020-05-12 PROCEDURE — 83735 ASSAY OF MAGNESIUM: CPT

## 2020-05-12 PROCEDURE — 84075 ASSAY ALKALINE PHOSPHATASE: CPT

## 2020-05-12 PROCEDURE — 80197 ASSAY OF TACROLIMUS: CPT

## 2020-05-12 PROCEDURE — 82247 BILIRUBIN TOTAL: CPT

## 2020-05-13 DIAGNOSIS — Z29.89 PROPHYLACTIC IMMUNOTHERAPY: ICD-10-CM

## 2020-05-13 DIAGNOSIS — Z94.0 STATUS POST KIDNEY TRANSPLANT: ICD-10-CM

## 2020-05-13 DIAGNOSIS — Z79.60 LONG-TERM USE OF IMMUNOSUPPRESSANT MEDICATION: ICD-10-CM

## 2020-05-13 LAB — TACROLIMUS BLD-MCNC: 6.2 NG/ML (ref 5–15)

## 2020-05-13 RX ORDER — TACROLIMUS 1 MG/1
CAPSULE ORAL
Qty: 450 CAPSULE | Refills: 11 | Status: SHIPPED | OUTPATIENT
Start: 2020-05-13 | End: 2020-09-09 | Stop reason: SDUPTHER

## 2020-05-13 NOTE — TELEPHONE ENCOUNTER
Patient repeated back and voice a understanding of orders. Left voice message with Niya to call coordinator.    ----- Message from Pavel Padilla MD sent at 5/13/2020 10:32 AM CDT -----  Let's increase prograf to 8/7 please

## 2020-05-14 ENCOUNTER — OFFICE VISIT (OUTPATIENT)
Dept: ENDOCRINOLOGY | Facility: CLINIC | Age: 65
End: 2020-05-14
Payer: MEDICARE

## 2020-05-14 VITALS
HEART RATE: 82 BPM | HEIGHT: 63 IN | BODY MASS INDEX: 33.01 KG/M2 | WEIGHT: 186.31 LBS | DIASTOLIC BLOOD PRESSURE: 80 MMHG | SYSTOLIC BLOOD PRESSURE: 128 MMHG

## 2020-05-14 DIAGNOSIS — E13.9 POST-TRANSPLANT DIABETES MELLITUS: Primary | ICD-10-CM

## 2020-05-14 DIAGNOSIS — E55.9 VITAMIN D DEFICIENCY: ICD-10-CM

## 2020-05-14 DIAGNOSIS — E21.2 TERTIARY HYPERPARATHYROIDISM: ICD-10-CM

## 2020-05-14 PROCEDURE — 99215 OFFICE O/P EST HI 40 MIN: CPT | Mod: PBBFAC | Performed by: GENERAL ACUTE CARE HOSPITAL

## 2020-05-14 PROCEDURE — 99214 PR OFFICE/OUTPT VISIT, EST, LEVL IV, 30-39 MIN: ICD-10-PCS | Mod: S$PBB,GC,, | Performed by: INTERNAL MEDICINE

## 2020-05-14 PROCEDURE — 99214 OFFICE O/P EST MOD 30 MIN: CPT | Mod: S$PBB,GC,, | Performed by: INTERNAL MEDICINE

## 2020-05-14 PROCEDURE — 99999 PR PBB SHADOW E&M-EST. PATIENT-LVL V: CPT | Mod: PBBFAC,GC,, | Performed by: GENERAL ACUTE CARE HOSPITAL

## 2020-05-14 PROCEDURE — 99999 PR PBB SHADOW E&M-EST. PATIENT-LVL V: ICD-10-PCS | Mod: PBBFAC,GC,, | Performed by: GENERAL ACUTE CARE HOSPITAL

## 2020-05-14 NOTE — ASSESSMENT & PLAN NOTE
-Ca 9.5 on labs from 5/12/20   (Highest 11.3)   - >>> 540   -Pt currently on Cinacalcet 30mg PO daily   -Vitamin D  25 on 9/19     -Asymptomatic   -Calcium supplements -DENIES   -HCTZ - DENIES   -GFR 46.5   -Kidney stones - DENIES   -DEXA on 2/20:  Normal Mineral Bone Density   -Will defer management to Kidney Transplant team

## 2020-05-14 NOTE — ASSESSMENT & PLAN NOTE
Vitamin D 25 on 2019   Per patient not on Vitamin D per Nephrology recs     Plan:   -Check Vitamin D levels   -If below goal will resume supplementation

## 2020-05-14 NOTE — PROGRESS NOTES
Subjective:      Patient ID: Satinder Schulte is a 64 y.o. female.    Chief Complaint:  Post Transplant Diabetes       History of Present Illness  65 YO Female w/ Post Transplant DM on 12/19, ESRD s/p Renal Transplant (9/19on cellcept, tacrolimus and prednisone 5mg??? ), Tertiary Hyperparathyroidism, Vitamin D deficiency, HTN and HLD that presents to the clinic for follow up.       Interval history:   Pt was last seen in 2/20 and at that time plan was to DC pre-meal Novolog and decrease Levemir to 14 units.  Pt was started on Trulicity 0.75mg SC weekly.  On 3/20 Pt with Tightly controlled glucose on glucose log review, Levemir was DC      In regards to DM:    -Likely Post-transplant DM   -A1C 5.1 on 5/20 FROM 7.9 on 12/19   -Duration:  Dx on 12/19   -Complications: DENIES   -DM Medications:  Trulicity 0.75mg daily   -Previously Tried medications:  Novolog (Hypoglycemias), Levemir (Hypoglycemia)   -Hyperglycemia symptoms: Denies   -Hypoglycemia symptoms:  Denies   -Glucose monitoring: AM (90 - 130 ), Bedtime (120 - 180s)    -Diet: Breakfast (Grits),  Lunch (Normaly skips), Dinner ([place] and salad)   -Eye:  Has not seen ophthalmology recently     Diabetes Management Status    Statin: Taking (ATORVASTATIN 40MG)   ACE/ARB: Not taking    Screening or Prevention Patient's value Goal Complete/Controlled?   HgA1C Testing and Control   Lab Results   Component Value Date    HGBA1C 5.1 05/04/2020      Annually/Less than 8% Yes   Lipid profile : 12/16/2019 Annually Yes   LDL control Lab Results   Component Value Date    LDLCALC 109.6 12/16/2019    Annually/Less than 100 mg/dl  No   Nephropathy screening No results found for: LABMICR  Lab Results   Component Value Date    PROTEINUA Negative 03/09/2020    Annually Yes   Blood pressure BP Readings from Last 1 Encounters:   05/14/20 128/80    Less than 140/90 Yes   Dilated retinal exam : 03/13/2020 Annually Yes    Foot exam   Most Recent Foot Exam Date: Not Found Annually No  "      In regards to Tertiary hyperparathyroidism:   -Ca 9.5 on labs from 5/12/20   (Highest 11.3)   - >>> 540   -Pt currently on Cinacalcet 30mg PO daily   -Vitamin D  25 on 9/19     -Asymptomatic   -Calcium supplements -DENIES   -HCTZ - DENIES   -GFR 46.5   -Kidney stones - DENIES   -DEXA on 2/20:  Normal Mineral Bone Density           Review of Systems   Constitutional: Negative for appetite change.   HENT: Negative for trouble swallowing.    Eyes: Negative for visual disturbance.   Respiratory: Negative for shortness of breath.    Cardiovascular: Negative for chest pain.   Gastrointestinal: Negative for constipation.   Endocrine: Negative for cold intolerance, polydipsia and polyuria.   Skin: Negative for rash.   Hematological: Negative for adenopathy.   Psychiatric/Behavioral: Negative for agitation.       Objective:     /80   Pulse 82   Ht 5' 3" (1.6 m)   Wt 84.5 kg (186 lb 4.6 oz)   BMI 33.00 kg/m²   BP Readings from Last 3 Encounters:   05/14/20 128/80   03/13/20 (!) 178/64   03/13/20 125/73     Wt Readings from Last 1 Encounters:   05/14/20 0954 84.5 kg (186 lb 4.6 oz)     Body mass index is 33 kg/m².      Physical Exam   Constitutional: She appears well-developed.   HENT:   Right Ear: External ear normal.   Left Ear: External ear normal.   Nose: Nose normal.   Neck: No tracheal deviation present. No thyromegaly present.   Cardiovascular: Normal rate.   No murmur heard.  Pulmonary/Chest: Effort normal and breath sounds normal.   Abdominal: Soft. She exhibits no mass. No hernia.   Musculoskeletal: She exhibits no edema.   Neurological: She is alert. No cranial nerve deficit or sensory deficit. Coordination normal.   Skin: No rash noted.   Psychiatric: She has a normal mood and affect. Judgment normal.   Vitals reviewed.    Protective Sensation (w/ 10 gram monofilament):  Right: Intact  Left: Intact    Visual Inspection:  Normal -  Bilateral    Pedal Pulses:   Right: Present  Left: " Present    Posterior tibialis:   Right:Present  Left: Present    Lab Review:   Lab Results   Component Value Date    HGBA1C 5.1 05/04/2020     Lab Results   Component Value Date    CHOL 218 (H) 12/16/2019    HDL 79 (H) 12/16/2019    LDLCALC 109.6 12/16/2019    TRIG 147 12/16/2019    CHOLHDL 36.2 12/16/2019     Lab Results   Component Value Date     05/12/2020    K 4.1 05/12/2020     (H) 05/12/2020    CO2 20 (L) 05/12/2020    GLU 71 05/12/2020    BUN 29 (H) 05/12/2020    CREATININE 1.4 05/12/2020    CALCIUM 9.5 05/12/2020    PROT 7.2 05/12/2020    ALBUMIN 4.0 05/12/2020    ALBUMIN 4.0 05/12/2020    BILITOT 0.4 05/12/2020    ALKPHOS 85 05/12/2020    AST 21 05/12/2020    ALT 20 05/12/2020    ANIONGAP 12 05/12/2020    ESTGFRAFRICA 46.5 (A) 05/12/2020    EGFRNONAA 40.3 (A) 05/12/2020     Vit D, 25-Hydroxy   Date Value Ref Range Status   09/19/2019 25 (L) 30 - 96 ng/mL Final     Comment:     Vitamin D deficiency.........<10 ng/mL                              Vitamin D insufficiency......10-29 ng/mL       Vitamin D sufficiency........> or equal to 30 ng/mL  Vitamin D toxicity............>100 ng/mL         Assessment and Plan     Post-transplant diabetes mellitus  -A1c 5.1 from 7.9 (Tightly controlled, No Blood transfusions)   -FS log 80 - 180  (Mainly at goal with some prandial hyperglycemias)   -Diet, exercise, lifestyle modifications and A1c Goals discussed   -Hypoglycemia symptoms and plan of action discussed   -Ophthalmology (Seen within 1 year,  Has follow up on June 2020)   -Monofilament  (Sensitivity intact in b/l LE)   -ASCVD (LDL at goal on Atorvastatin 40mg PO daily)     Plan:   -Increase Trulicity to 1.5mg SC weekly   -A1c prior to next visit     Tertiary hyperparathyroidism  -Ca 9.5 on labs from 5/12/20   (Highest 11.3)   - >>> 540   -Pt currently on Cinacalcet 30mg PO daily   -Vitamin D  25 on 9/19     -Asymptomatic   -Calcium supplements -DENIES   -HCTZ - DENIES   -GFR 46.5   -Kidney stones  - DENIES   -DEXA on 2/20:  Normal Mineral Bone Density   -Will defer management to Kidney Transplant team       Vitamin D deficiency  Vitamin D 25 on 2019   Per patient not on Vitamin D per Nephrology recs     Plan:   -Check Vitamin D levels   -If below goal will resume supplementation

## 2020-05-14 NOTE — ASSESSMENT & PLAN NOTE
-A1c 5.1 from 7.9 (Tightly controlled, No Blood transfusions)   -FS log 80 - 180  (Mainly at goal with some prandial hyperglycemias)   -Diet, exercise, lifestyle modifications and A1c Goals discussed   -Hypoglycemia symptoms and plan of action discussed   -Ophthalmology (Seen within 1 year,  Has follow up on June 2020)   -Monofilament  (Sensitivity intact in b/l LE)   -ASCVD (LDL at goal on Atorvastatin 40mg PO daily)     Plan:   -Increase Trulicity to 1.5mg SC weekly   -A1c prior to next visit

## 2020-05-26 ENCOUNTER — LAB VISIT (OUTPATIENT)
Dept: LAB | Facility: HOSPITAL | Age: 65
End: 2020-05-26
Attending: INTERNAL MEDICINE
Payer: MEDICARE

## 2020-05-26 DIAGNOSIS — Z94.0 KIDNEY REPLACED BY TRANSPLANT: ICD-10-CM

## 2020-05-26 LAB
ALBUMIN SERPL BCP-MCNC: 4 G/DL (ref 3.5–5.2)
ANION GAP SERPL CALC-SCNC: 11 MMOL/L (ref 8–16)
BASOPHILS # BLD AUTO: 0.01 K/UL (ref 0–0.2)
BASOPHILS NFR BLD: 0.4 % (ref 0–1.9)
BUN SERPL-MCNC: 24 MG/DL (ref 8–23)
CALCIUM SERPL-MCNC: 9.3 MG/DL (ref 8.7–10.5)
CHLORIDE SERPL-SCNC: 113 MMOL/L (ref 95–110)
CO2 SERPL-SCNC: 20 MMOL/L (ref 23–29)
CREAT SERPL-MCNC: 1.4 MG/DL (ref 0.5–1.4)
DIFFERENTIAL METHOD: ABNORMAL
EOSINOPHIL # BLD AUTO: 0 K/UL (ref 0–0.5)
EOSINOPHIL NFR BLD: 0.8 % (ref 0–8)
ERYTHROCYTE [DISTWIDTH] IN BLOOD BY AUTOMATED COUNT: 13.5 % (ref 11.5–14.5)
EST. GFR  (AFRICAN AMERICAN): 46.5 ML/MIN/1.73 M^2
EST. GFR  (NON AFRICAN AMERICAN): 40.3 ML/MIN/1.73 M^2
GLUCOSE SERPL-MCNC: 81 MG/DL (ref 70–110)
HCT VFR BLD AUTO: 33.2 % (ref 37–48.5)
HGB BLD-MCNC: 10.2 G/DL (ref 12–16)
IMM GRANULOCYTES # BLD AUTO: 0.01 K/UL (ref 0–0.04)
IMM GRANULOCYTES NFR BLD AUTO: 0.4 % (ref 0–0.5)
LYMPHOCYTES # BLD AUTO: 0.5 K/UL (ref 1–4.8)
LYMPHOCYTES NFR BLD: 18.7 % (ref 18–48)
MAGNESIUM SERPL-MCNC: 1.7 MG/DL (ref 1.6–2.6)
MCH RBC QN AUTO: 32.2 PG (ref 27–31)
MCHC RBC AUTO-ENTMCNC: 30.7 G/DL (ref 32–36)
MCV RBC AUTO: 105 FL (ref 82–98)
MONOCYTES # BLD AUTO: 0.2 K/UL (ref 0.3–1)
MONOCYTES NFR BLD: 8.2 % (ref 4–15)
NEUTROPHILS # BLD AUTO: 1.8 K/UL (ref 1.8–7.7)
NEUTROPHILS NFR BLD: 71.5 % (ref 38–73)
NRBC BLD-RTO: 0 /100 WBC
PHOSPHATE SERPL-MCNC: 3.9 MG/DL (ref 2.7–4.5)
PLATELET # BLD AUTO: 118 K/UL (ref 150–350)
PMV BLD AUTO: 11.6 FL (ref 9.2–12.9)
POTASSIUM SERPL-SCNC: 4 MMOL/L (ref 3.5–5.1)
RBC # BLD AUTO: 3.17 M/UL (ref 4–5.4)
SODIUM SERPL-SCNC: 144 MMOL/L (ref 136–145)
WBC # BLD AUTO: 2.57 K/UL (ref 3.9–12.7)

## 2020-05-26 PROCEDURE — 36415 COLL VENOUS BLD VENIPUNCTURE: CPT | Mod: PO

## 2020-05-26 PROCEDURE — 80197 ASSAY OF TACROLIMUS: CPT

## 2020-05-26 PROCEDURE — 83735 ASSAY OF MAGNESIUM: CPT

## 2020-05-26 PROCEDURE — 85025 COMPLETE CBC W/AUTO DIFF WBC: CPT

## 2020-05-26 PROCEDURE — 80069 RENAL FUNCTION PANEL: CPT

## 2020-05-27 LAB — TACROLIMUS BLD-MCNC: 10.2 NG/ML (ref 5–15)

## 2020-06-09 ENCOUNTER — LAB VISIT (OUTPATIENT)
Dept: LAB | Facility: HOSPITAL | Age: 65
End: 2020-06-09
Attending: INTERNAL MEDICINE
Payer: MEDICARE

## 2020-06-09 DIAGNOSIS — Z94.0 KIDNEY REPLACED BY TRANSPLANT: ICD-10-CM

## 2020-06-09 DIAGNOSIS — Z72.89 OTHER PROBLEMS RELATED TO LIFESTYLE: ICD-10-CM

## 2020-06-09 LAB
ALBUMIN SERPL BCP-MCNC: 4.1 G/DL (ref 3.5–5.2)
ALBUMIN SERPL BCP-MCNC: 4.1 G/DL (ref 3.5–5.2)
ALP SERPL-CCNC: 78 U/L (ref 55–135)
ALT SERPL W/O P-5'-P-CCNC: 13 U/L (ref 10–44)
ANION GAP SERPL CALC-SCNC: 12 MMOL/L (ref 8–16)
AST SERPL-CCNC: 10 U/L (ref 10–40)
BASOPHILS # BLD AUTO: 0.01 K/UL (ref 0–0.2)
BASOPHILS NFR BLD: 0.4 % (ref 0–1.9)
BILIRUB DIRECT SERPL-MCNC: 0.2 MG/DL (ref 0.1–0.3)
BILIRUB SERPL-MCNC: 0.4 MG/DL (ref 0.1–1)
BUN SERPL-MCNC: 30 MG/DL (ref 8–23)
CALCIUM SERPL-MCNC: 9.5 MG/DL (ref 8.7–10.5)
CHLORIDE SERPL-SCNC: 112 MMOL/L (ref 95–110)
CO2 SERPL-SCNC: 20 MMOL/L (ref 23–29)
CREAT SERPL-MCNC: 1.6 MG/DL (ref 0.5–1.4)
DIFFERENTIAL METHOD: ABNORMAL
EOSINOPHIL # BLD AUTO: 0 K/UL (ref 0–0.5)
EOSINOPHIL NFR BLD: 0.7 % (ref 0–8)
ERYTHROCYTE [DISTWIDTH] IN BLOOD BY AUTOMATED COUNT: 13.5 % (ref 11.5–14.5)
EST. GFR  (AFRICAN AMERICAN): 39.5 ML/MIN/1.73 M^2
EST. GFR  (NON AFRICAN AMERICAN): 34.3 ML/MIN/1.73 M^2
GLUCOSE SERPL-MCNC: 85 MG/DL (ref 70–110)
HCT VFR BLD AUTO: 32.7 % (ref 37–48.5)
HGB BLD-MCNC: 10 G/DL (ref 12–16)
IMM GRANULOCYTES # BLD AUTO: 0.05 K/UL (ref 0–0.04)
IMM GRANULOCYTES NFR BLD AUTO: 1.8 % (ref 0–0.5)
LYMPHOCYTES # BLD AUTO: 0.6 K/UL (ref 1–4.8)
LYMPHOCYTES NFR BLD: 20.5 % (ref 18–48)
MAGNESIUM SERPL-MCNC: 1.8 MG/DL (ref 1.6–2.6)
MCH RBC QN AUTO: 31.6 PG (ref 27–31)
MCHC RBC AUTO-ENTMCNC: 30.6 G/DL (ref 32–36)
MCV RBC AUTO: 104 FL (ref 82–98)
MONOCYTES # BLD AUTO: 0.3 K/UL (ref 0.3–1)
MONOCYTES NFR BLD: 9.4 % (ref 4–15)
NEUTROPHILS # BLD AUTO: 1.9 K/UL (ref 1.8–7.7)
NEUTROPHILS NFR BLD: 67.2 % (ref 38–73)
NRBC BLD-RTO: 0 /100 WBC
PHOSPHATE SERPL-MCNC: 3.2 MG/DL (ref 2.7–4.5)
PLATELET # BLD AUTO: 131 K/UL (ref 150–350)
PMV BLD AUTO: 11.3 FL (ref 9.2–12.9)
POTASSIUM SERPL-SCNC: 3.7 MMOL/L (ref 3.5–5.1)
PROT SERPL-MCNC: 7.2 G/DL (ref 6–8.4)
RBC # BLD AUTO: 3.16 M/UL (ref 4–5.4)
SODIUM SERPL-SCNC: 144 MMOL/L (ref 136–145)
WBC # BLD AUTO: 2.78 K/UL (ref 3.9–12.7)

## 2020-06-09 PROCEDURE — 83735 ASSAY OF MAGNESIUM: CPT

## 2020-06-09 PROCEDURE — 80069 RENAL FUNCTION PANEL: CPT

## 2020-06-09 PROCEDURE — 87799 DETECT AGENT NOS DNA QUANT: CPT

## 2020-06-09 PROCEDURE — 85025 COMPLETE CBC W/AUTO DIFF WBC: CPT

## 2020-06-09 PROCEDURE — 87340 HEPATITIS B SURFACE AG IA: CPT

## 2020-06-09 PROCEDURE — 84075 ASSAY ALKALINE PHOSPHATASE: CPT

## 2020-06-09 PROCEDURE — 87517 HEPATITIS B DNA QUANT: CPT

## 2020-06-09 PROCEDURE — 80197 ASSAY OF TACROLIMUS: CPT

## 2020-06-09 PROCEDURE — 36415 COLL VENOUS BLD VENIPUNCTURE: CPT | Mod: PO

## 2020-06-10 LAB
HBV SURFACE AG SERPL QL IA: NEGATIVE
TACROLIMUS BLD-MCNC: 7.6 NG/ML (ref 5–15)

## 2020-06-12 DIAGNOSIS — N30.00 ACUTE CYSTITIS WITHOUT HEMATURIA: Primary | ICD-10-CM

## 2020-06-15 ENCOUNTER — LAB VISIT (OUTPATIENT)
Dept: LAB | Facility: HOSPITAL | Age: 65
End: 2020-06-15
Attending: INTERNAL MEDICINE
Payer: MEDICARE

## 2020-06-15 DIAGNOSIS — N30.00 ACUTE CYSTITIS WITHOUT HEMATURIA: ICD-10-CM

## 2020-06-15 LAB
BACTERIA #/AREA URNS AUTO: ABNORMAL /HPF
BILIRUB UR QL STRIP: NEGATIVE
CLARITY UR REFRACT.AUTO: ABNORMAL
COLOR UR AUTO: YELLOW
GLUCOSE UR QL STRIP: ABNORMAL
HGB UR QL STRIP: ABNORMAL
HYALINE CASTS UR QL AUTO: 0 /LPF
KETONES UR QL STRIP: NEGATIVE
LEUKOCYTE ESTERASE UR QL STRIP: NEGATIVE
MICROSCOPIC COMMENT: ABNORMAL
NITRITE UR QL STRIP: NEGATIVE
NON-SQ EPI CELLS #/AREA URNS AUTO: 0 /HPF
PH UR STRIP: 7 [PH] (ref 5–8)
PROT UR QL STRIP: ABNORMAL
RBC #/AREA URNS AUTO: 7 /HPF (ref 0–4)
SP GR UR STRIP: 1.01 (ref 1–1.03)
SQUAMOUS #/AREA URNS AUTO: 2 /HPF
URN SPEC COLLECT METH UR: ABNORMAL
WBC #/AREA URNS AUTO: 2 /HPF (ref 0–5)
YEAST UR QL AUTO: ABNORMAL

## 2020-06-15 PROCEDURE — 81001 URINALYSIS AUTO W/SCOPE: CPT

## 2020-06-16 NOTE — PROGRESS NOTES
Proceed with urine culture if symptomatic  Ask for vaginal discharges. I see some yeast in the UA ?contamination  Great if can get a cath UA

## 2020-06-18 NOTE — PROGRESS NOTES
Kidney Post-Transplant Assessment    Referring Physician: Bruce Khan  Current Nephrologist: Bruce Khan    ORGAN: LEFT KIDNEY  Donor Type: donation after brain death  PHS Increased Risk: no  Cold Ischemia: 545 mins  Induction Medications: thymoglobulin    Subjective:     CC:  Reassessment of renal allograft function and management of chronic immunosuppression.    HPI:  Ms. Schulte is a 64 y.o. 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  She has CKD stage 3 - GFR 30-59 and her baseline creatinine is between 1.4 to 1.7. She takes tacrolimus for maintenance immunosuppression. She denies any recent hospitalizations or ER visits since her previous clinic visit.    patient was 21 minuets late. The care giver was not here. Patient said she was upstairs with the 2 children.    Patient states that she lives by herself but her family is very close if needed.    No nausea vomit or diarrhea. No chest pain or SOB. No intolerance to medications. Cath UA done today    A staple was removed from the abdomen. No inflammatory changes.            Current Outpatient Medications on File Prior to Visit   Medication Sig Dispense Refill    atorvastatin (LIPITOR) 40 MG tablet Take 40 mg by mouth once daily.       atovaquone (MEPRON) 750 mg/5 mL Susp Take 10 mLs (1,500 mg total) by mouth once daily. Stop 9/19/2020 300 mL 11    blood sugar diagnostic Strp Test blood glucose 4 (four) times daily. 100 each 5    blood-glucose meter kit Use as instructed 1 each 0    carvedilol (COREG) 25 MG tablet Take 1/2 tablet (12.5 mg total) by mouth 2 (two) times daily with meals. Hold for SBP<120, HR<60 30 tablet 11    cholecalciferol, vitamin D3, (VITAMIN D3) 25 mcg (1,000 unit) capsule Take 1 capsule (1,000 Units total) by mouth once daily. (Patient not taking: Reported on 5/14/2020) 30 capsule 11    cinacalcet (SENSIPAR) 30 MG Tab Take 1 tablet (30 mg total) by mouth  "daily with breakfast. 30 tablet 11    dulaglutide (TRULICITY) 1.5 mg/0.5 mL PnIj Inject 1.5 mg into the skin once a week. 12 Syringe 3    famotidine (PEPCID) 20 MG tablet Take 1 tablet (20 mg total) by mouth every evening. (Patient not taking: Reported on 5/14/2020) 30 tablet 0    hydrALAZINE (APRESOLINE) 25 MG tablet Take 1 tablet (25 mg total) by mouth 3 (three) times daily. 90 tablet 11    insulin aspart U-100 (NOVOLOG FLEXPEN U-100 INSULIN) 100 unit/mL (3 mL) InPn pen Inject 5 Units into the skin 3 (three) times daily with meals. (Patient not taking: Reported on 5/14/2020) 5 mL 11    insulin detemir U-100 (LEVEMIR FLEXTOUCH) 100 unit/mL (3 mL) SubQ InPn pen Inject 24 Units into the skin every evening. 15 mL 5    k phos di & mono-sod phos mono (K-PHOS-NEUTRAL) 250 mg Tab Take 2 tablets by mouth 2 (two) times daily. 120 tablet 11    ketoconazole (NIZORAL) 200 mg Tab Take HALF tablet (100 mg total) by mouth once daily. 15 tablet 11    lancets 28 gauge Misc Test blood glucose 4 (four) times daily. 100 each 5    magnesium oxide (MAG-OX) 400 mg (241.3 mg magnesium) tablet Take 2 tablets (800 mg total) by mouth 3 (three) times daily. 180 tablet 11    NIFEdipine (ADALAT CC) 60 MG TbSR Take 1 tablet (60 mg total) by mouth 2 (two) times daily. 60 tablet 11    pen needle, diabetic 32 gauge x 5/32" Ndle Use to inject insulin 4 (four) times daily. 100 each 5    predniSONE (DELTASONE) 5 MG tablet Take by mouth daily: 20mg (4 tablets) 9/23-10/22; 15 mg (3 tablets) 10/23-11/22; 10 mg (2 tablets) 11/23-12/22; then 5 mg (1 tablet) daily thereafter 12/23/19 120 tablet 11    sodium bicarbonate 650 MG tablet Take 1 tablet (650 mg total) by mouth 2 (two) times daily. (Patient not taking: Reported on 5/14/2020) 60 tablet 11    tacrolimus (PROGRAF) 1 MG Cap Take 8 capsules (8 mg total) by mouth every morning AND 7 capsules (7 mg total) every evening. 450 capsule 11     Current Facility-Administered Medications on File " Prior to Visit   Medication Dose Route Frequency Provider Last Rate Last Dose    epoetin jj-epbx injection 20,000 Units  20,000 Units Subcutaneous 1 time in Clinic/HOD Pavel Padilla MD        filgrastim (NEUPOGEN) injection 480 mcg/1.6 ml  480 mcg Subcutaneous Twice Weekly Pavel Padilla MD   480 mcg at 01/07/20 1320      Review of Systems     Skin: no skin rash  CNS; no headaches, blurred vision, seizure, or syncope  ENT: No JVD,  Adenopathies,  nasal congestion. No oral lesions  Cardiac: No chest pain, dyspnea, claudication, edema or palpitations  Respiratory: No SOB, cough, hemoptysis   Gastro-intestinal: No diarrhea, constipation, abdominal pain, nausea, vomit. No ascitis  Genitourinary: no hematuria, dysuria, frequency, frequency  Musculoskeletal: joint pain, arthritis or vasculitic changes  Psych: alert awake, oriented, No cranial nerves deficit.      Objective:         Physical Exam     VS please see nurse documentation      Head: normocephalic  Neck: No JVD, cervical axillary, or femoral adenopathies  Heart: no murmurs, Normal s1 and s2, No gallops, no rubs, No murmurs  Lungs; CTA, good respiratory effort, no crackles  Abdomen: soft, non tender, no splenomegaly or hepatomegaly, no massess, no bruits. Staple right lower quadrant was removed.   Extremities: No edema, skin rash, joint pain  SNC: awake, alert oriented. Cranial nerves are intact, no focalized, sensitivity and strength preserved      Labs:  Lab Results   Component Value Date    WBC 2.78 (L) 06/09/2020    HGB 10.0 (L) 06/09/2020    HCT 32.7 (L) 06/09/2020     06/09/2020    K 3.7 06/09/2020     (H) 06/09/2020    CO2 20 (L) 06/09/2020    BUN 30 (H) 06/09/2020    CREATININE 1.6 (H) 06/09/2020    EGFRNONAA 34.3 (A) 06/09/2020    CALCIUM 9.5 06/09/2020    PHOS 3.2 06/09/2020    MG 1.8 06/09/2020    ALBUMIN 4.1 06/09/2020    ALBUMIN 4.1 06/09/2020    AST 10 06/09/2020    ALT 13 06/09/2020    UTPCR 0.41 (H) 06/09/2020    .0 (H)  03/13/2020    TACROLIMUS 7.6 06/09/2020       No results found for: EXTANC, EXTWBC, EXTSEGS, EXTPLATELETS, EXTHEMOGLOBI, EXTHEMATOCRI, EXTCREATININ, EXTSODIUM, EXTPOTASSIUM, EXTBUN, EXTCO2, EXTCALCIUM, EXTPHOSPHORU, EXTGLUCOSE, EXTALBUMIN, EXTAST, EXTALT, EXTBILITOTAL, EXTLIPASE, EXTAMYLASE    No results found for: EXTCYCLOSLVL, EXTSIROLIMUS, EXTTACROLVL, EXTPROTCRE, EXTPTHINTACT, EXTPROTEINUA, EXTWBCUA, EXTRBCUA    Labs were reviewed with the patient.    Assessment:     1. CKD (chronic kidney disease) stage 3, GFR 30-59 ml/min    2. Drug-induced pancytopenia    3. Class 2 severe obesity due to excess calories with serious comorbidity in adult, unspecified BMI    4. Anemia associated with chronic renal failure    5. Hypertension, renal    6. At risk for opportunistic infections    7. Immunocompromised state        Plan:     Patient was reminded to bring the care giver to the clinic visit.  Patient was also reminded to try to be on time.  No change with tacrolimus dose     1. CKD stage 3 with stable creatinine : will continue follow up as per our center guidelines. patient to continue close follow up with the local General nephrologist. Education provided in appropriate fluid intake, potassium intake. Continue with oral hydration.        2. Immunosuppression: OFF MMF due to pancytopenia. Prograf level at target.   Lab Results   Component Value Date    TACROLIMUS 7.6 06/09/2020    TACROLIMUS 10.2 05/26/2020    TACROLIMUS 6.2 05/12/2020     No results found for: CYCLOSPORINE  @   Will closely monitor for toxicities, education provided about adherence to medicines and need to communicate any side effect to the transplant nurse or physician.    3. Allograft Function:stable at baseline for the patient. Continue follow up as per our guidelines and with the local General nephrologist. Communication will be sent today.  Lab Results   Component Value Date    CREATININE 1.6 (H) 06/09/2020    CREATININE 1.4 05/26/2020     CREATININE 1.4 05/12/2020     Lab Results   Component Value Date    LIPASE 36 12/17/2019       4. Hypertension management:  Continue with home blood pressure monitoring, low salt and healthy life discussed with the patient..    5. Metabolic Bone Disease/Secondary Hyperparathyroidism:calcium and phosphorus level discussed with the patient, patient will continue follow up with the general nephrologist for management of metabolic bone disease   calcium and phosphorus as per our center protocol. Will monitor PTH, Vit D level, calcium.      Lab Results   Component Value Date    .0 (H) 03/13/2020    CALCIUM 9.5 06/09/2020    CAION 1.40 03/13/2020    PHOS 3.2 06/09/2020    PHOS 3.9 05/26/2020    PHOS 3.2 05/12/2020       6. Electrolytes: reviewed with the patient, essentially within the normal range no need for acute changes today, will monitor as per our center guidelines.     Lab Results   Component Value Date     06/09/2020    K 3.7 06/09/2020     (H) 06/09/2020    CO2 20 (L) 06/09/2020    CO2 20 (L) 05/26/2020    CO2 20 (L) 05/12/2020       7. Anemia: will continue monitoring as per our center guidelines. No indication for acute intervention today.     Lab Results   Component Value Date    WBC 2.78 (L) 06/09/2020    HGB 10.0 (L) 06/09/2020    HCT 32.7 (L) 06/09/2020     (H) 06/09/2020     (L) 06/09/2020       8.Proteinuria: will continue with pr/cr ratio as per our center guidelines  Lab Results   Component Value Date    PROTEINURINE 47 (H) 06/09/2020    CREATRANDUR 114.0 06/09/2020    UTPCR 0.41 (H) 06/09/2020    UTPCR 0.16 03/09/2020    UTPCR 0.65 (H) 12/16/2019        9. BK virus infection screening: will continue with urine or blood PCR as per our guidelines to prevent BK virus viremia and allograft dysfunction  Lab Results   Component Value Date    BKVIRUSPCRQB <125 06/09/2020         10. Weight education: provided during the clinic visit.   There is no height or weight on file to  calculate BMI.       11.Patient safety education regarding immunosuppression including prophylaxis posttransplant for CMV, PCP : Education provided about vaccination and prevention of infections.    12.  Cytopenias: no significant cytopenias will monitor as per our guidelines. Medicine list reviewed including potential causes of drug-induced cytopenias     Lab Results   Component Value Date    WBC 2.78 (L) 06/09/2020    HGB 10.0 (L) 06/09/2020    HCT 32.7 (L) 06/09/2020     (H) 06/09/2020     (L) 06/09/2020       13. Post-transplant Prophylaxis; CMV Infection, PJP and Candida mucosistis and other indicated for this particular patient. atovaquone    I spoke with the patient for 30 minutes. More than half dedicated to counseling and education. All questions answered    Pavel Padilla MD  Transplant Nephrology            Follow-up:   Clinic: return to transplant clinic weekly for the first month after transplant; every 2 weeks during months 2-3; then at 6-, 9-, 12-, 18-, 24-, and 36- months post-transplant to reassess for complications from immunosuppression toxicity and monitor for rejection.  Annually thereafter.    Labs: since patient remains at high risk for rejection and drug-related complications that warrant close monitoring, labs will be ordered as follows: continue twice weekly CBC, renal panel, and drug level for first month; then same labs once weekly through 3rd month post-transplant.  Urine for UA and protein/creatinine ratio monthly.  Serum BK - PCR at 1-, 3-, 6-, 9-, 12-, 18-, 24-, 36- 48-, and 60 months post-transplant.  Hepatic panel at 1-, 2-, 3-, 6-, 9-, 12-, 18-, 24-, and 36- months post-transplant.    Pavel Padilla MD       Education:   Material provided to the patient.  Patient reminded to call with any health changes since these can be early signs of significant complications.  Also, I advised the patient to be sure any new medications or changes of old medications are discussed  with either a pharmacist or physician knowledgeable with transplant to avoid rejection/drug toxicity related to significant drug interactions.    UNOS Patient Status  Functional Status: 90% - Able to carry on normal activity: minor symptoms of disease  Physical Capacity: No Limitations

## 2020-06-19 ENCOUNTER — OFFICE VISIT (OUTPATIENT)
Dept: OPHTHALMOLOGY | Facility: CLINIC | Age: 65
End: 2020-06-19
Payer: MEDICARE

## 2020-06-19 ENCOUNTER — OFFICE VISIT (OUTPATIENT)
Dept: TRANSPLANT | Facility: CLINIC | Age: 65
End: 2020-06-19
Payer: MEDICARE

## 2020-06-19 DIAGNOSIS — Z91.89 AT RISK FOR OPPORTUNISTIC INFECTIONS: ICD-10-CM

## 2020-06-19 DIAGNOSIS — D84.9 IMMUNOCOMPROMISED STATE: ICD-10-CM

## 2020-06-19 DIAGNOSIS — D63.1 ANEMIA ASSOCIATED WITH CHRONIC RENAL FAILURE: Chronic | ICD-10-CM

## 2020-06-19 DIAGNOSIS — H35.3132 INTERMEDIATE STAGE NONEXUDATIVE AGE-RELATED MACULAR DEGENERATION OF BOTH EYES: Primary | ICD-10-CM

## 2020-06-19 DIAGNOSIS — E66.01 CLASS 2 SEVERE OBESITY DUE TO EXCESS CALORIES WITH SERIOUS COMORBIDITY IN ADULT, UNSPECIFIED BMI: Chronic | ICD-10-CM

## 2020-06-19 DIAGNOSIS — I12.9 HYPERTENSION, RENAL: Chronic | ICD-10-CM

## 2020-06-19 DIAGNOSIS — N30.01 ACUTE CYSTITIS WITH HEMATURIA: ICD-10-CM

## 2020-06-19 DIAGNOSIS — N18.30 CKD (CHRONIC KIDNEY DISEASE) STAGE 3, GFR 30-59 ML/MIN: Primary | ICD-10-CM

## 2020-06-19 DIAGNOSIS — D61.811 DRUG-INDUCED PANCYTOPENIA: ICD-10-CM

## 2020-06-19 DIAGNOSIS — N18.9 ANEMIA ASSOCIATED WITH CHRONIC RENAL FAILURE: Chronic | ICD-10-CM

## 2020-06-19 LAB
BACTERIA #/AREA URNS AUTO: ABNORMAL /HPF
BILIRUB UR QL STRIP: NEGATIVE
CLARITY UR REFRACT.AUTO: CLEAR
COLOR UR AUTO: YELLOW
GLUCOSE UR QL STRIP: NEGATIVE
HGB UR QL STRIP: NEGATIVE
HYALINE CASTS UR QL AUTO: 5 /LPF
KETONES UR QL STRIP: NEGATIVE
LEUKOCYTE ESTERASE UR QL STRIP: NEGATIVE
MICROSCOPIC COMMENT: ABNORMAL
NITRITE UR QL STRIP: NEGATIVE
PH UR STRIP: 5 [PH] (ref 5–8)
PROT UR QL STRIP: ABNORMAL
RBC #/AREA URNS AUTO: 0 /HPF (ref 0–4)
SP GR UR STRIP: 1.01 (ref 1–1.03)
SQUAMOUS #/AREA URNS AUTO: 0 /HPF
URN SPEC COLLECT METH UR: ABNORMAL
WBC #/AREA URNS AUTO: 0 /HPF (ref 0–5)

## 2020-06-19 PROCEDURE — 92202 OPSCPY EXTND ON/MAC DRAW: CPT | Mod: ,,, | Performed by: OPHTHALMOLOGY

## 2020-06-19 PROCEDURE — 92202 PR OPHTHALMOSCOPY, EXT, W/DRAW OPTIC NERVE/MACULA, I&R, UNI/BI: ICD-10-PCS | Mod: ,,, | Performed by: OPHTHALMOLOGY

## 2020-06-19 PROCEDURE — 92012 PR EYE EXAM, EST PATIENT,INTERMED: ICD-10-PCS | Mod: S$PBB,,, | Performed by: OPHTHALMOLOGY

## 2020-06-19 PROCEDURE — 92134 CPTRZ OPH DX IMG PST SGM RTA: CPT | Mod: PBBFAC | Performed by: OPHTHALMOLOGY

## 2020-06-19 PROCEDURE — 99999 PR PBB SHADOW E&M-EST. PATIENT-LVL IV: ICD-10-PCS | Mod: PBBFAC,,, | Performed by: OPHTHALMOLOGY

## 2020-06-19 PROCEDURE — 87086 URINE CULTURE/COLONY COUNT: CPT

## 2020-06-19 PROCEDURE — 99214 OFFICE O/P EST MOD 30 MIN: CPT | Mod: S$PBB,,, | Performed by: INTERNAL MEDICINE

## 2020-06-19 PROCEDURE — 99214 OFFICE O/P EST MOD 30 MIN: CPT | Mod: PBBFAC,27 | Performed by: OPHTHALMOLOGY

## 2020-06-19 PROCEDURE — 92134 POSTERIOR SEGMENT OCT RETINA (OCULAR COHERENCE TOMOGRAPHY)-BOTH EYES: ICD-10-PCS | Mod: 26,S$PBB,, | Performed by: OPHTHALMOLOGY

## 2020-06-19 PROCEDURE — 99213 OFFICE O/P EST LOW 20 MIN: CPT | Mod: PBBFAC | Performed by: INTERNAL MEDICINE

## 2020-06-19 PROCEDURE — 99999 PR PBB SHADOW E&M-EST. PATIENT-LVL III: ICD-10-PCS | Mod: PBBFAC,,, | Performed by: INTERNAL MEDICINE

## 2020-06-19 PROCEDURE — 99999 PR PBB SHADOW E&M-EST. PATIENT-LVL III: CPT | Mod: PBBFAC,,, | Performed by: INTERNAL MEDICINE

## 2020-06-19 PROCEDURE — 99999 PR PBB SHADOW E&M-EST. PATIENT-LVL IV: CPT | Mod: PBBFAC,,, | Performed by: OPHTHALMOLOGY

## 2020-06-19 PROCEDURE — 81001 URINALYSIS AUTO W/SCOPE: CPT

## 2020-06-19 PROCEDURE — 99214 PR OFFICE/OUTPT VISIT, EST, LEVL IV, 30-39 MIN: ICD-10-PCS | Mod: S$PBB,,, | Performed by: INTERNAL MEDICINE

## 2020-06-19 PROCEDURE — 92012 INTRM OPH EXAM EST PATIENT: CPT | Mod: S$PBB,,, | Performed by: OPHTHALMOLOGY

## 2020-06-19 NOTE — PROGRESS NOTES
In and out catheterization performed via sterile technique. Pt tolerated well. Specimen collected for UA and UC&S.

## 2020-06-19 NOTE — LETTER
June 19, 2020        Bruce Khan  [address]  [place] [address]  Phone: [phone]  Fax: [phone]             [facility]  [address]  [facility] [address]  Phone: [phone]   Patient: Satinder Schulte   MR Number: [id number]   YOB: 1955   Date of Visit: 6/19/2020       Dear Dr. Bruce Khan    Thank you for referring Satinder Schulte to me for evaluation. Attached you will find relevant portions of my assessment and plan of care.    If you have questions, please do not hesitate to call me. I look forward to following Satinder Schulte along with you.    Sincerely,    Pavel Padilla MD    Enclosure    If you would like to receive this communication electronically, please contact [email or web address] or [phone] to request [employer] Link access.    [employer] Link is a tool which provides read-only access to select patient information with whom you have a relationship. Its easy to use and provides real time access to review your patients record including encounter summaries, notes, results, and demographic information.    If you feel you have received this communication in error or would no longer like to receive these types of communications, please e-mail [email or web address]

## 2020-06-19 NOTE — PROGRESS NOTES
Subjective:       Patient ID: Satinder Schulte is a 64 y.o. female      Chief Complaint   Patient presents with    3 mo NARMD OU     History of Present Illness  HPI     DLS 03/13/2020 by Dr. CODI Lazar MD    65 YO F here today for her 3 mo NARMD OU ck.    CC: PT c/o eyes constantly tearing and vision is about the same.   No new distortions.  No f//fpain.    POHx:   1.  NARMD OU     Last edited by Juvenal Lazar MD on 6/19/2020  2:07 PM. (History)        Imaging:    See report    Assessment/Plan:     1. Intermediate stage nonexudative age-related macular degeneration of both eyes  Sig PED OD.  Sl larger.  Serous.  Observe  Discussed Dry and Wet AMD in detail  Recommend AREDS 2 Vitamins- written for pt  Home [place] Grid Testing--given and instructed  RTC immediately PRN any changes in vision      Also taking PO pred.  Poss CSCR component    Refraction  - Posterior Segment OCT Retina-Both eyes; Future  - Posterior Segment OCT Retina-Both eyes    Follow up in about 3 months (around 9/19/2020) for Dilated examination, OCT Mac.

## 2020-06-20 LAB — BACTERIA UR CULT: NO GROWTH

## 2020-06-24 DIAGNOSIS — Z94.0 KIDNEY REPLACED BY TRANSPLANT: Primary | ICD-10-CM

## 2020-07-14 ENCOUNTER — LAB VISIT (OUTPATIENT)
Dept: LAB | Facility: HOSPITAL | Age: 65
End: 2020-07-14
Attending: INTERNAL MEDICINE
Payer: MEDICARE

## 2020-07-14 DIAGNOSIS — Z94.0 KIDNEY REPLACED BY TRANSPLANT: ICD-10-CM

## 2020-07-14 LAB
BASOPHILS # BLD AUTO: 0.01 K/UL (ref 0–0.2)
BASOPHILS NFR BLD: 0.4 % (ref 0–1.9)
DIFFERENTIAL METHOD: ABNORMAL
EOSINOPHIL # BLD AUTO: 0 K/UL (ref 0–0.5)
EOSINOPHIL NFR BLD: 0.8 % (ref 0–8)
ERYTHROCYTE [DISTWIDTH] IN BLOOD BY AUTOMATED COUNT: 14 % (ref 11.5–14.5)
HCT VFR BLD AUTO: 29.1 % (ref 37–48.5)
HGB BLD-MCNC: 9.2 G/DL (ref 12–16)
IMM GRANULOCYTES # BLD AUTO: 0.04 K/UL (ref 0–0.04)
IMM GRANULOCYTES NFR BLD AUTO: 1.5 % (ref 0–0.5)
LYMPHOCYTES # BLD AUTO: 0.7 K/UL (ref 1–4.8)
LYMPHOCYTES NFR BLD: 26.1 % (ref 18–48)
MCH RBC QN AUTO: 32.2 PG (ref 27–31)
MCHC RBC AUTO-ENTMCNC: 31.6 G/DL (ref 32–36)
MCV RBC AUTO: 102 FL (ref 82–98)
MONOCYTES # BLD AUTO: 0.3 K/UL (ref 0.3–1)
MONOCYTES NFR BLD: 10.2 % (ref 4–15)
NEUTROPHILS # BLD AUTO: 1.6 K/UL (ref 1.8–7.7)
NEUTROPHILS NFR BLD: 61 % (ref 38–73)
NRBC BLD-RTO: 1 /100 WBC
PLATELET # BLD AUTO: 113 K/UL (ref 150–350)
PMV BLD AUTO: 11.2 FL (ref 9.2–12.9)
RBC # BLD AUTO: 2.86 M/UL (ref 4–5.4)
WBC # BLD AUTO: 2.64 K/UL (ref 3.9–12.7)

## 2020-07-14 PROCEDURE — 87799 DETECT AGENT NOS DNA QUANT: CPT

## 2020-07-14 PROCEDURE — 36415 COLL VENOUS BLD VENIPUNCTURE: CPT | Mod: PO

## 2020-07-14 PROCEDURE — 80197 ASSAY OF TACROLIMUS: CPT

## 2020-07-14 PROCEDURE — 83735 ASSAY OF MAGNESIUM: CPT

## 2020-07-14 PROCEDURE — 85025 COMPLETE CBC W/AUTO DIFF WBC: CPT

## 2020-07-14 PROCEDURE — 80069 RENAL FUNCTION PANEL: CPT

## 2020-07-15 LAB
ALBUMIN SERPL BCP-MCNC: 3.7 G/DL (ref 3.5–5.2)
ANION GAP SERPL CALC-SCNC: 8 MMOL/L (ref 8–16)
BUN SERPL-MCNC: 18 MG/DL (ref 8–23)
CALCIUM SERPL-MCNC: 9.7 MG/DL (ref 8.7–10.5)
CHLORIDE SERPL-SCNC: 112 MMOL/L (ref 95–110)
CO2 SERPL-SCNC: 23 MMOL/L (ref 23–29)
CREAT SERPL-MCNC: 1.4 MG/DL (ref 0.5–1.4)
EST. GFR  (AFRICAN AMERICAN): 46.5 ML/MIN/1.73 M^2
EST. GFR  (NON AFRICAN AMERICAN): 40.3 ML/MIN/1.73 M^2
GLUCOSE SERPL-MCNC: 69 MG/DL (ref 70–110)
MAGNESIUM SERPL-MCNC: 1.6 MG/DL (ref 1.6–2.6)
PHOSPHATE SERPL-MCNC: 3.1 MG/DL (ref 2.7–4.5)
POTASSIUM SERPL-SCNC: 3.9 MMOL/L (ref 3.5–5.1)
SODIUM SERPL-SCNC: 143 MMOL/L (ref 136–145)
TACROLIMUS BLD-MCNC: 6.9 NG/ML (ref 5–15)

## 2020-07-21 LAB — EBV DNA BY PCR: NORMAL IU/ML

## 2020-07-22 LAB
B19V DNA # SPEC NAA+PROBE: <100 COPIES/ML
SPECIMEN SOURCE: NORMAL

## 2020-08-12 ENCOUNTER — LAB VISIT (OUTPATIENT)
Dept: LAB | Facility: HOSPITAL | Age: 65
End: 2020-08-12
Attending: INTERNAL MEDICINE
Payer: MEDICARE

## 2020-08-12 DIAGNOSIS — Z94.0 KIDNEY REPLACED BY TRANSPLANT: ICD-10-CM

## 2020-08-12 LAB
ALBUMIN SERPL BCP-MCNC: 3.8 G/DL (ref 3.5–5.2)
ANION GAP SERPL CALC-SCNC: 9 MMOL/L (ref 8–16)
BASOPHILS # BLD AUTO: 0 K/UL (ref 0–0.2)
BASOPHILS NFR BLD: 0 % (ref 0–1.9)
BUN SERPL-MCNC: 25 MG/DL (ref 8–23)
CALCIUM SERPL-MCNC: 9 MG/DL (ref 8.7–10.5)
CHLORIDE SERPL-SCNC: 114 MMOL/L (ref 95–110)
CO2 SERPL-SCNC: 21 MMOL/L (ref 23–29)
CREAT SERPL-MCNC: 1.5 MG/DL (ref 0.5–1.4)
DIFFERENTIAL METHOD: ABNORMAL
EOSINOPHIL # BLD AUTO: 0 K/UL (ref 0–0.5)
EOSINOPHIL NFR BLD: 0.4 % (ref 0–8)
ERYTHROCYTE [DISTWIDTH] IN BLOOD BY AUTOMATED COUNT: 14.6 % (ref 11.5–14.5)
EST. GFR  (AFRICAN AMERICAN): 42.7 ML/MIN/1.73 M^2
EST. GFR  (NON AFRICAN AMERICAN): 37.1 ML/MIN/1.73 M^2
GLUCOSE SERPL-MCNC: 92 MG/DL (ref 70–110)
HCT VFR BLD AUTO: 29.2 % (ref 37–48.5)
HGB BLD-MCNC: 9.4 G/DL (ref 12–16)
IMM GRANULOCYTES # BLD AUTO: 0.03 K/UL (ref 0–0.04)
IMM GRANULOCYTES NFR BLD AUTO: 1.3 % (ref 0–0.5)
LYMPHOCYTES # BLD AUTO: 0.5 K/UL (ref 1–4.8)
LYMPHOCYTES NFR BLD: 22 % (ref 18–48)
MAGNESIUM SERPL-MCNC: 1.8 MG/DL (ref 1.6–2.6)
MCH RBC QN AUTO: 32.6 PG (ref 27–31)
MCHC RBC AUTO-ENTMCNC: 32.2 G/DL (ref 32–36)
MCV RBC AUTO: 101 FL (ref 82–98)
MONOCYTES # BLD AUTO: 0.3 K/UL (ref 0.3–1)
MONOCYTES NFR BLD: 10.6 % (ref 4–15)
NEUTROPHILS # BLD AUTO: 1.6 K/UL (ref 1.8–7.7)
NEUTROPHILS NFR BLD: 65.7 % (ref 38–73)
NRBC BLD-RTO: 0 /100 WBC
PHOSPHATE SERPL-MCNC: 3.3 MG/DL (ref 2.7–4.5)
PLATELET # BLD AUTO: 114 K/UL (ref 150–350)
PMV BLD AUTO: 10.9 FL (ref 9.2–12.9)
POTASSIUM SERPL-SCNC: 3.7 MMOL/L (ref 3.5–5.1)
RBC # BLD AUTO: 2.88 M/UL (ref 4–5.4)
SODIUM SERPL-SCNC: 144 MMOL/L (ref 136–145)
WBC # BLD AUTO: 2.36 K/UL (ref 3.9–12.7)

## 2020-08-12 PROCEDURE — 80197 ASSAY OF TACROLIMUS: CPT

## 2020-08-12 PROCEDURE — 36415 COLL VENOUS BLD VENIPUNCTURE: CPT | Mod: PO

## 2020-08-12 PROCEDURE — 83735 ASSAY OF MAGNESIUM: CPT

## 2020-08-12 PROCEDURE — 80069 RENAL FUNCTION PANEL: CPT

## 2020-08-12 PROCEDURE — 85025 COMPLETE CBC W/AUTO DIFF WBC: CPT

## 2020-08-13 LAB — TACROLIMUS BLD-MCNC: 7.6 NG/ML (ref 5–15)

## 2020-09-04 ENCOUNTER — OFFICE VISIT (OUTPATIENT)
Dept: UROLOGY | Facility: CLINIC | Age: 65
End: 2020-09-04
Payer: MEDICARE

## 2020-09-04 ENCOUNTER — HOSPITAL ENCOUNTER (OUTPATIENT)
Dept: RADIOLOGY | Facility: HOSPITAL | Age: 65
Discharge: HOME | End: 2020-09-04
Attending: UROLOGY
Payer: MEDICARE

## 2020-09-04 VITALS
HEIGHT: 63 IN | SYSTOLIC BLOOD PRESSURE: 155 MMHG | DIASTOLIC BLOOD PRESSURE: 74 MMHG | WEIGHT: 186.31 LBS | BODY MASS INDEX: 33.01 KG/M2 | HEART RATE: 71 BPM

## 2020-09-04 DIAGNOSIS — N28.1 COMPLEX RENAL CYST: ICD-10-CM

## 2020-09-04 DIAGNOSIS — Z94.0 STATUS POST KIDNEY TRANSPLANT: Primary | ICD-10-CM

## 2020-09-04 DIAGNOSIS — N28.1 BILATERAL RENAL CYSTS: ICD-10-CM

## 2020-09-04 PROCEDURE — 99214 OFFICE O/P EST MOD 30 MIN: CPT | Mod: PBBFAC,25 | Performed by: UROLOGY

## 2020-09-04 PROCEDURE — 76770 US EXAM ABDO BACK WALL COMP: CPT | Mod: TC

## 2020-09-04 PROCEDURE — 99214 OFFICE O/P EST MOD 30 MIN: CPT | Mod: S$PBB,,, | Performed by: UROLOGY

## 2020-09-04 PROCEDURE — 99999 PR PBB SHADOW E&M-EST. PATIENT-LVL IV: ICD-10-PCS | Mod: PBBFAC,,, | Performed by: UROLOGY

## 2020-09-04 PROCEDURE — 76770 US EXAM ABDO BACK WALL COMP: CPT | Mod: 26,,, | Performed by: RADIOLOGY

## 2020-09-04 PROCEDURE — 99214 PR OFFICE/OUTPT VISIT, EST, LEVL IV, 30-39 MIN: ICD-10-PCS | Mod: S$PBB,,, | Performed by: UROLOGY

## 2020-09-04 PROCEDURE — 99999 PR PBB SHADOW E&M-EST. PATIENT-LVL IV: CPT | Mod: PBBFAC,,, | Performed by: UROLOGY

## 2020-09-04 PROCEDURE — 76770 US KIDNEY: ICD-10-PCS | Mod: 26,,, | Performed by: RADIOLOGY

## 2020-09-04 RX ORDER — HYDROCODONE BITARTRATE AND ACETAMINOPHEN 7.5; 325 MG/1; MG/1
.5-1 TABLET ORAL 3 TIMES DAILY PRN
COMMUNITY
Start: 2020-08-26

## 2020-09-04 RX ORDER — DICLOFENAC SODIUM 10 MG/G
2 GEL TOPICAL DAILY PRN
COMMUNITY
Start: 2020-08-24

## 2020-09-04 NOTE — PROGRESS NOTES
CHIEF COMPLAINT:    Mrs Schulte is a 64 y.o. female with s/p kidney transplant for ESRD, hx of bilateral renal cysts  PRESENTING ILLNESS:    Satinder Schulte is a 64 y.o. female who presents for a follow up for bilateral renal cysts of her native kidneys.  She is here with renal US.  Voices no problem with uriantion.    Last clinic visit was 8/2/19 with Dr. Tomlinson.    PMHx relevant for HTN, CVA and ESRD presumed secondary to HTN who received DBD kidney transplant 9/20/2019   Following catheter removal post op, patient experienced urinary incontinence, frequency and urgency. Rodriguez catheter was replaced 9/23/19. She also c/o burning and discomfort with voiding and ditropan was started per hospital note. Urine culture was negative for infection 9/24/19. She was discharged with rodriguez in place and scheduled with Urology for rodriguez removal.    Prior to transplant, patient voided once in the morning a small amount since being on dialysis. She was on dialysis for 6 years per patient.    Pt was evaluated for microscopic hematuria with Dr. Tomlinson 8/2019.   6/28/19 Renal US. Monitoring complex cyst with US in 1 year.  Cysto 8/29/19   Conclusion:  1. Normal cysto  2. Decreased bladder capacity  She felt strong urge to urinate at 150 ml.  Unable to hold more urine.    Denies hematuria, flank pain. Denies fever or chills. She is not taking oxybutynin. She is drinking 4 bottles of water per day.     REVIEW OF SYSTEMS:    Review of Systems    Constitutional: Negative for fever and chills.   HENT: Negative for congestion.   Eyes: Negative for eye discharge.   Respiratory: Negative for shortness of breath.   Cardiovascular: Negative for chest pain.   Gastrointestinal: Negative for nausea, vomiting, and constipation.   Genitourinary: See HPI   Neurological: Positive weakness (baseline). Negative for dizziness.   Hematological: Does not bruise/bleed easily.   Psychiatric/Behavioral: Negative for agitation.     PATIENT HISTORY:    Past Medical  History:   Diagnosis Date    Abnormal Pap smear     repeat paps were normal    Anemia associated with chronic renal failure     Awaiting organ transplant status  - 02/18/2013 6/6/2014    Blood type A+ 6/6/2014    CKD (chronic kidney disease) stage 3, GFR 30-59 ml/min 10/30/2019    CKD (chronic kidney disease) stage 5, GFR less than 15 ml/min     secondary hypertension    Diabetes mellitus     Essential hypertension 5/19/2017    Hyperlipidemia     Hypertension, renal 1992    Immunocompromised state 10/4/2019    Stroke 2007    Residual speech deficit       Past Surgical History:   Procedure Laterality Date    AV FISTULA PLACEMENT  2014    BONE MARROW BIOPSY Right 8/23/2018    Procedure: Biopsy-bone marrow;  Surgeon: Anna Lin MD;  Location: [facility] OR [address] [place] [address];  Service: Oncology;  Laterality: Right;    CHOLECYSTECTOMY  2008    HYSTERECTOMY  2011    TAHBSO for benign reasons    KIDNEY TRANSPLANT N/A 9/20/2019    Procedure: TRANSPLANT, KIDNEY;  Surgeon: Guero Rogers MD;  Location: [facility] OR [address] [place] [address];  Service: Transplant;  Laterality: N/A;    OOPHORECTOMY         Family History   Problem Relation Age of Onset    Stroke Mother     Kidney disease Mother         on dialysis    Hypertension Mother     Heart disease Mother     Heart disease Father     Stroke Sister     Kidney disease Brother     Breast cancer Daughter     Heart disease Sister     Ovarian cancer Cousin     Colon cancer Neg Hx     Thrombophilia Neg Hx        Social History     Socioeconomic History    Marital status: Single     Spouse name: Not on file    Number of children: Not on file    Years of education: Not on file    Highest education level: Not on file   Occupational History    Not on file   Social Needs    Financial resource strain: Not on file    Food insecurity     Worry: Not on file     Inability: Not on file    Transportation needs     Medical: Not on file     Non-medical: Not on file   Tobacco Use    Smoking  status: Never Smoker    Smokeless tobacco: Never Used   Substance and Sexual Activity    Alcohol use: No    Drug use: No    Sexual activity: Never     Comment: single, Lives with daughter, on Disability, Lives in [place]   Lifestyle    Physical activity     Days per week: Not on file     Minutes per session: Not on file    Stress: Not on file   Relationships    Social connections     Talks on phone: Not on file     Gets together: Not on file     Attends [employer] service: Not on file     Active member of club or organization: Not on file     Attends meetings of clubs or organizations: Not on file     Relationship status: Not on file   Other Topics Concern    Not on file   Social History Narrative    Disabled    Single    4 children    History of blood transfusions       Allergies:  Patient has no known allergies.    Medications:    Current Outpatient Medications:     atorvastatin (LIPITOR) 40 MG tablet, Take 40 mg by mouth once daily. , Disp: , Rfl:     atovaquone (MEPRON) 750 mg/5 mL Susp, Take 10 mLs (1,500 mg total) by mouth once daily. Stop 9/19/2020, Disp: 300 mL, Rfl: 11    blood sugar diagnostic Strp, Test blood glucose 4 (four) times daily., Disp: 100 each, Rfl: 5    blood-glucose meter kit, Use as instructed, Disp: 1 each, Rfl: 0    carvedilol (COREG) 25 MG tablet, Take 1/2 tablet (12.5 mg total) by mouth 2 (two) times daily with meals. Hold for SBP<120, HR<60, Disp: 30 tablet, Rfl: 11    cinacalcet (SENSIPAR) 30 MG Tab, Take 1 tablet (30 mg total) by mouth daily with breakfast., Disp: 30 tablet, Rfl: 11    diclofenac sodium (VOLTAREN) 1 % Gel, , Disp: , Rfl:     dulaglutide (TRULICITY) 1.5 mg/0.5 mL PnIj, Inject 1.5 mg into the skin once a week., Disp: 12 Syringe, Rfl: 3    famotidine (PEPCID) 20 MG tablet, Take 1 tablet (20 mg total) by mouth every evening., Disp: 30 tablet, Rfl: 0    hydrALAZINE (APRESOLINE) 25 MG tablet, Take 1 tablet (25 mg total) by mouth 3 (three) times daily.,  "Disp: 90 tablet, Rfl: 11    HYDROcodone-acetaminophen (NORCO) 7.5-325 mg per tablet, Take 1 tablet by mouth 3 (three) times daily as needed., Disp: , Rfl:     insulin aspart U-100 (NOVOLOG FLEXPEN U-100 INSULIN) 100 unit/mL (3 mL) InPn pen, Inject 5 Units into the skin 3 (three) times daily with meals., Disp: 5 mL, Rfl: 11    insulin detemir U-100 (LEVEMIR FLEXTOUCH) 100 unit/mL (3 mL) SubQ InPn pen, Inject 24 Units into the skin every evening., Disp: 15 mL, Rfl: 5    k phos di & mono-sod phos mono (K-PHOS-NEUTRAL) 250 mg Tab, Take 2 tablets by mouth 2 (two) times daily., Disp: 120 tablet, Rfl: 11    ketoconazole (NIZORAL) 200 mg Tab, Take HALF tablet (100 mg total) by mouth once daily., Disp: 15 tablet, Rfl: 11    lancets 28 gauge Misc, Test blood glucose 4 (four) times daily., Disp: 100 each, Rfl: 5    magnesium oxide (MAG-OX) 400 mg (241.3 mg magnesium) tablet, Take 2 tablets (800 mg total) by mouth 3 (three) times daily., Disp: 180 tablet, Rfl: 11    NIFEdipine (ADALAT CC) 60 MG TbSR, Take 1 tablet (60 mg total) by mouth 2 (two) times daily., Disp: 60 tablet, Rfl: 11    pen needle, diabetic 32 gauge x 5/32" Ndle, Use to inject insulin 4 (four) times daily., Disp: 100 each, Rfl: 5    predniSONE (DELTASONE) 5 MG tablet, Take by mouth daily: 20mg (4 tablets) 9/23-10/22; 15 mg (3 tablets) 10/23-11/22; 10 mg (2 tablets) 11/23-12/22; then 5 mg (1 tablet) daily thereafter 12/23/19, Disp: 120 tablet, Rfl: 11    sodium bicarbonate 650 MG tablet, Take 1 tablet (650 mg total) by mouth 2 (two) times daily., Disp: 60 tablet, Rfl: 11    tacrolimus (PROGRAF) 1 MG Cap, Take 8 capsules (8 mg total) by mouth every morning AND 7 capsules (7 mg total) every evening., Disp: 450 capsule, Rfl: 11    Current Facility-Administered Medications:     epoetin jj-epbx injection 20,000 Units, 20,000 Units, Subcutaneous, 1 time in Clinic/HOD, Pavel Pdailla MD    filgrastim (NEUPOGEN) injection 480 mcg/1.6 ml, 480 mcg, " Subcutaneous, Twice Weekly, Pavel Padilla MD, 480 mcg at 01/07/20 1320    PHYSICAL EXAMINATION:    Constitutional: She is oriented to person, place, and time. She appears well-developed and well-nourished.  She is in no apparent distress.    Neck: Normal ROM.     Cardiovascular: Regular rhythm.      Pulmonary/Chest: Effort normal. No respiratory distress.     Abdominal:  She exhibits no distension.  There is no CVA tenderness.   Surgical incision - staples intact. No signs of infection.    Lymphadenopathy:          Right: No supraclavicular adenopathy present.        Left: No supraclavicular adenopathy present.     Neurological: She is alert and oriented to person, place, and time.     Skin: Skin is warm and dry. LUE fistula.    Extremities: Normal ROM    Psych: Cooperative with normal affect.    Genitourinary: Indwelling rodriguez catheter in place.    Physical Exam      LABS:  US 9/4/20  1. Bilateral chronic medical renal disease.  2. Stable bilateral simple renal cysts.    IMPRESSION:  Status post kidney transplant    Bilateral renal cysts      PLAN:  Stable bilateral renal cysts.  No need for further evaluation or follow up.    Follow up if symptoms worsen or fail to improve.   Ftsg Text: The defect edges were debeveled with a Dermablade.  Given the location of the defect, shape of the defect and the proximity to free margins a full thickness skin graft was deemed most appropriate.  Using a sterile surgical marker, the primary defect shape was transferred to the donor site. The area thus outlined was incised deep to adipose tissue with a #15 scalpel blade.  The harvested graft was then trimmed of adipose tissue until only dermis and epidermis was left.  The skin margins of the secondary defect were undermined to an appropriate distance in all directions utilizing iris scissors.  The secondary defect was closed with interrupted buried subcutaneous sutures.  The skin edges were then re-apposed with running  sutures.  The skin graft was then placed in the primary defect and oriented appropriately.

## 2020-09-08 ENCOUNTER — LAB VISIT (OUTPATIENT)
Dept: LAB | Facility: HOSPITAL | Age: 65
End: 2020-09-08
Attending: INTERNAL MEDICINE
Payer: MEDICARE

## 2020-09-08 DIAGNOSIS — Z94.0 KIDNEY REPLACED BY TRANSPLANT: ICD-10-CM

## 2020-09-08 DIAGNOSIS — E13.9 POST-TRANSPLANT DIABETES MELLITUS: ICD-10-CM

## 2020-09-08 DIAGNOSIS — E55.9 VITAMIN D DEFICIENCY: ICD-10-CM

## 2020-09-08 DIAGNOSIS — Z72.89 OTHER PROBLEMS RELATED TO LIFESTYLE: ICD-10-CM

## 2020-09-08 LAB
25(OH)D3+25(OH)D2 SERPL-MCNC: 14 NG/ML (ref 30–96)
ALBUMIN SERPL BCP-MCNC: 3.9 G/DL (ref 3.5–5.2)
ALBUMIN SERPL BCP-MCNC: 3.9 G/DL (ref 3.5–5.2)
ALP SERPL-CCNC: 83 U/L (ref 55–135)
ALT SERPL W/O P-5'-P-CCNC: 18 U/L (ref 10–44)
ANION GAP SERPL CALC-SCNC: 8 MMOL/L (ref 8–16)
AST SERPL-CCNC: 14 U/L (ref 10–40)
BILIRUB DIRECT SERPL-MCNC: 0.2 MG/DL (ref 0.1–0.3)
BILIRUB SERPL-MCNC: 0.4 MG/DL (ref 0.1–1)
BUN SERPL-MCNC: 27 MG/DL (ref 8–23)
CALCIUM SERPL-MCNC: 9.4 MG/DL (ref 8.7–10.5)
CHLORIDE SERPL-SCNC: 113 MMOL/L (ref 95–110)
CO2 SERPL-SCNC: 20 MMOL/L (ref 23–29)
CREAT SERPL-MCNC: 1.8 MG/DL (ref 0.5–1.4)
EST. GFR  (AFRICAN AMERICAN): 34.3 ML/MIN/1.73 M^2
EST. GFR  (NON AFRICAN AMERICAN): 29.7 ML/MIN/1.73 M^2
GLUCOSE SERPL-MCNC: 73 MG/DL (ref 70–110)
MAGNESIUM SERPL-MCNC: 1.5 MG/DL (ref 1.6–2.6)
PHOSPHATE SERPL-MCNC: 2.9 MG/DL (ref 2.7–4.5)
POTASSIUM SERPL-SCNC: 3.8 MMOL/L (ref 3.5–5.1)
PROT SERPL-MCNC: 6.5 G/DL (ref 6–8.4)
SODIUM SERPL-SCNC: 141 MMOL/L (ref 136–145)

## 2020-09-08 PROCEDURE — 82728 ASSAY OF FERRITIN: CPT

## 2020-09-08 PROCEDURE — 36415 COLL VENOUS BLD VENIPUNCTURE: CPT | Mod: PO

## 2020-09-08 PROCEDURE — 87340 HEPATITIS B SURFACE AG IA: CPT

## 2020-09-08 PROCEDURE — 85025 COMPLETE CBC W/AUTO DIFF WBC: CPT

## 2020-09-08 PROCEDURE — 80069 RENAL FUNCTION PANEL: CPT

## 2020-09-08 PROCEDURE — 87517 HEPATITIS B DNA QUANT: CPT

## 2020-09-08 PROCEDURE — 80197 ASSAY OF TACROLIMUS: CPT

## 2020-09-08 PROCEDURE — 84075 ASSAY ALKALINE PHOSPHATASE: CPT

## 2020-09-08 PROCEDURE — 82306 VITAMIN D 25 HYDROXY: CPT

## 2020-09-08 PROCEDURE — 87799 DETECT AGENT NOS DNA QUANT: CPT

## 2020-09-08 PROCEDURE — 83036 HEMOGLOBIN GLYCOSYLATED A1C: CPT

## 2020-09-08 PROCEDURE — 83735 ASSAY OF MAGNESIUM: CPT

## 2020-09-08 PROCEDURE — 83540 ASSAY OF IRON: CPT

## 2020-09-09 DIAGNOSIS — D63.1 ANEMIA OF RENAL DISEASE: Primary | ICD-10-CM

## 2020-09-09 DIAGNOSIS — D61.818 PANCYTOPENIA: Primary | ICD-10-CM

## 2020-09-09 DIAGNOSIS — Z94.0 STATUS POST KIDNEY TRANSPLANT: ICD-10-CM

## 2020-09-09 DIAGNOSIS — Z79.60 LONG-TERM USE OF IMMUNOSUPPRESSANT MEDICATION: ICD-10-CM

## 2020-09-09 DIAGNOSIS — Z29.89 PROPHYLACTIC IMMUNOTHERAPY: ICD-10-CM

## 2020-09-09 DIAGNOSIS — N18.9 ANEMIA OF RENAL DISEASE: Primary | ICD-10-CM

## 2020-09-09 LAB
BASOPHILS # BLD AUTO: 0.01 K/UL (ref 0–0.2)
BASOPHILS NFR BLD: 0.4 % (ref 0–1.9)
DIFFERENTIAL METHOD: ABNORMAL
EOSINOPHIL # BLD AUTO: 0 K/UL (ref 0–0.5)
EOSINOPHIL NFR BLD: 1.7 % (ref 0–8)
ERYTHROCYTE [DISTWIDTH] IN BLOOD BY AUTOMATED COUNT: 14.1 % (ref 11.5–14.5)
ESTIMATED AVG GLUCOSE: 91 MG/DL (ref 68–131)
FERRITIN SERPL-MCNC: 688 NG/ML (ref 20–300)
HBA1C MFR BLD HPLC: 4.8 % (ref 4–5.6)
HBV SURFACE AG SERPL QL IA: NEGATIVE
HCT VFR BLD AUTO: 29.5 % (ref 37–48.5)
HGB BLD-MCNC: 8.9 G/DL (ref 12–16)
IMM GRANULOCYTES # BLD AUTO: 0.01 K/UL (ref 0–0.04)
IMM GRANULOCYTES NFR BLD AUTO: 0.4 % (ref 0–0.5)
IRON SERPL-MCNC: 58 UG/DL (ref 30–160)
LYMPHOCYTES # BLD AUTO: 0.6 K/UL (ref 1–4.8)
LYMPHOCYTES NFR BLD: 24.8 % (ref 18–48)
MCH RBC QN AUTO: 31.3 PG (ref 27–31)
MCHC RBC AUTO-ENTMCNC: 30.2 G/DL (ref 32–36)
MCV RBC AUTO: 104 FL (ref 82–98)
MONOCYTES # BLD AUTO: 0.3 K/UL (ref 0.3–1)
MONOCYTES NFR BLD: 10.9 % (ref 4–15)
NEUTROPHILS # BLD AUTO: 1.4 K/UL (ref 1.8–7.7)
NEUTROPHILS NFR BLD: 61.8 % (ref 38–73)
NRBC BLD-RTO: 0 /100 WBC
PLATELET # BLD AUTO: 121 K/UL (ref 150–350)
PMV BLD AUTO: 11.5 FL (ref 9.2–12.9)
RBC # BLD AUTO: 2.84 M/UL (ref 4–5.4)
SATURATED IRON: 21 % (ref 20–50)
TACROLIMUS BLD-MCNC: 3.6 NG/ML (ref 5–15)
TOTAL IRON BINDING CAPACITY: 278 UG/DL (ref 250–450)
TRANSFERRIN SERPL-MCNC: 188 MG/DL (ref 200–375)
WBC # BLD AUTO: 2.3 K/UL (ref 3.9–12.7)

## 2020-09-09 RX ORDER — TACROLIMUS 1 MG/1
8 CAPSULE ORAL EVERY 12 HOURS
Qty: 480 CAPSULE | Refills: 11 | Status: SHIPPED | OUTPATIENT
Start: 2020-09-09 | End: 2020-09-30 | Stop reason: SDUPTHER

## 2020-09-09 NOTE — PROGRESS NOTES
If this is a true trough level increase prograf to 8 bid  Please verify she is actually taking ketoconazole and doing it correctly

## 2020-09-09 NOTE — PROGRESS NOTES
Please add Iron profile and ferritin to labs  May bailey SHILO  Repeat serum CMV pcr  Hydration  Let's refer the lady to hematology due top pancytopenia please

## 2020-09-09 NOTE — TELEPHONE ENCOUNTER
Patient and daughter Niya repeated back and voice a understanding of orders.  Patient sates level is a 12 hour through.  Next labs 9/15/2020.    ----- Message from Pavel Padilla MD sent at 9/9/2020 12:13 PM CDT -----  If this is a true trough level increase prograf to 8 bid  Please verify she is actually taking ketoconazole and doing it correctly

## 2020-09-10 ENCOUNTER — TELEPHONE (OUTPATIENT)
Dept: TRANSPLANT | Facility: CLINIC | Age: 65
End: 2020-09-10

## 2020-09-10 ENCOUNTER — TELEPHONE (OUTPATIENT)
Dept: HEMATOLOGY/ONCOLOGY | Facility: CLINIC | Age: 65
End: 2020-09-10

## 2020-09-10 ENCOUNTER — TELEPHONE (OUTPATIENT)
Dept: INFUSION THERAPY | Facility: HOSPITAL | Age: 65
End: 2020-09-10

## 2020-09-10 NOTE — TELEPHONE ENCOUNTER
Spoke with Ms. Núñez.  She wants the appointment to be at the [facility].  She request 3 pm.   Appointment booked.

## 2020-09-10 NOTE — TELEPHONE ENCOUNTER
Patient daughter Mari repeated back and voice a understanding of orders.  Procrit injections schedule at the infusion center in [facility].    ----- Message from Pavel Padilla MD sent at 9/9/2020  4:05 PM CDT -----  Procrit 10,000 weekly x 4 weeks

## 2020-09-10 NOTE — TELEPHONE ENCOUNTER
Tried to contact patient's daughter Niya to schedule patient procrit shots on 9/1,9/25 and 10/02 per Transplant Nurse Mark at [phone]    Left message on voice mail.

## 2020-09-11 ENCOUNTER — INFUSION (OUTPATIENT)
Dept: INFUSION THERAPY | Facility: HOSPITAL | Age: 65
End: 2020-09-11
Attending: INTERNAL MEDICINE
Payer: MEDICARE

## 2020-09-11 VITALS
WEIGHT: 186.31 LBS | OXYGEN SATURATION: 97 % | HEART RATE: 76 BPM | DIASTOLIC BLOOD PRESSURE: 74 MMHG | BODY MASS INDEX: 33.01 KG/M2 | SYSTOLIC BLOOD PRESSURE: 154 MMHG | RESPIRATION RATE: 16 BRPM | TEMPERATURE: 98 F | HEIGHT: 63 IN

## 2020-09-11 DIAGNOSIS — Z94.0 STATUS POST KIDNEY TRANSPLANT: Primary | ICD-10-CM

## 2020-09-11 DIAGNOSIS — N18.9 ANEMIA ASSOCIATED WITH CHRONIC RENAL FAILURE: ICD-10-CM

## 2020-09-11 DIAGNOSIS — D63.1 ANEMIA ASSOCIATED WITH CHRONIC RENAL FAILURE: ICD-10-CM

## 2020-09-11 LAB
BKV DNA SERPL NAA+PROBE-ACNC: <125 COPIES/ML
BKV DNA SERPL NAA+PROBE-LOG#: <2.1 LOG (10) COPIES/ML
BKV DNA SERPL QL NAA+PROBE: NOT DETECTED

## 2020-09-11 PROCEDURE — 63600175 PHARM REV CODE 636 W HCPCS: Performed by: INTERNAL MEDICINE

## 2020-09-11 PROCEDURE — 96372 THER/PROPH/DIAG INJ SC/IM: CPT

## 2020-09-11 RX ADMIN — EPOETIN ALFA-EPBX 10000 UNITS: 10000 INJECTION, SOLUTION INTRAVENOUS; SUBCUTANEOUS at 03:09

## 2020-09-11 NOTE — DISCHARGE INSTRUCTIONS
[facility]  [address] [address]  [address]  [phone] phone     [phone] fax  Hours of Operation: Monday- Friday 8:00am- 5:00pm  After hours phone  [phone]  Hematology / Oncology Physicians on call      DENNIS [address] Dr., Dr., Dr., Dr., NP Sydney Prescott, NP Tyesha Taylor, NP    Please call with any concerns regarding your appointment today.

## 2020-09-13 NOTE — PROGRESS NOTES
Subjective:      Patient ID: Satinder Schulte is a 64 y.o. female.    Chief Complaint:  Post Transplant Diabetes       History of Present Illness  65 YO Female w/ Post Transplant DM on 12/19, ESRD s/p Renal Transplant (9/19on cellcept, tacrolimus and prednisone 5mg??? ), Tertiary Hyperparathyroidism, Vitamin D deficiency, HTN and HLD that presents to the clinic for follow up.    Pt today reports feels well and denies any complaints. Denies hypo or hyperglycemia episodes. Denies polyuria, polydipsia, fatigue or weight loss.        Interval history:   Pt was last seen in 5/20 and at that time plan was to continue to hold Insulin and oral meds and increase Trulicity to 1.5mg SC weekly.    Continue with Cinacalcet 30mg daily     In regards to DM:    -Likely Post-transplant DM   -A1C 4.8 on 9/20    -Duration:  Dx on 12/19   -Complications: DENIES   -DM Medications:  Trulicity 1.5mg daily   -Previously Tried medications:  Novolog (Hypoglycemias), Levemir (Hypoglycemia)   -Hyperglycemia symptoms: Denies   -Hypoglycemia symptoms:  Denies   -Glucose monitoring: AM (90 - 130 ), Bedtime (120 - 160s)    -Diet: Breakfast (Grits),  Lunch (Normaly skips), Dinner (Lennon and salad)     Diabetes Management Status    Statin: Taking (ATORVASTATIN 40MG)   ACE/ARB: Not taking  (S/p Kidney transplant on 9/19) Following w/ KTM     Screening or Prevention Patient's value Goal Complete/Controlled?   HgA1C Testing and Control   Lab Results   Component Value Date    HGBA1C 4.8 09/08/2020      Annually/Less than 8% Yes   Lipid profile : 08/26/2020 Annually Yes   LDL control Lab Results   Component Value Date    LDLCALC 109.6 12/16/2019    Annually/Less than 100 mg/dl  No   Nephropathy screening No results found for: LABMICR  Lab Results   Component Value Date    PROTEINUA 1+ (A) 09/08/2020    Annually Yes   Blood pressure BP Readings from Last 1 Encounters:   09/11/20 (!) 154/74    Less than 140/90 Yes   Dilated retinal exam : 06/19/2020  Annually Yes    Foot exam   : 05/14/2020 Annually No       In regards to Tertiary hyperparathyroidism:   -Ca 9.4 on labs from 9/20   10.9 ON 3/2020    - >>> 540 on 3/20   (Likely w/ TERTIARY HPT and superimposed secondary hyperparathyroidism from vitamin D deficiency component)   -Pt currently on Cinacalcet 30mg PO daily   -Vitamin D  14 (LOW)  on 9/20   -Asymptomatic   -Calcium supplements -DENIES   -HCTZ - DENIES   -GFR 34   -Kidney stones - DENIES   -DXA on 2/20:  Normal Mineral Bone Density           Review of Systems   Constitutional: Negative for appetite change.   HENT: Negative for trouble swallowing.    Eyes: Negative for visual disturbance.   Respiratory: Negative for shortness of breath.    Cardiovascular: Negative for chest pain.   Gastrointestinal: Negative for constipation.   Endocrine: Negative for cold intolerance, polydipsia and polyuria.   Skin: Negative for rash.   Hematological: Negative for adenopathy.   Psychiatric/Behavioral: Negative for agitation.       Objective:     There were no vitals taken for this visit.  BP Readings from Last 3 Encounters:   09/11/20 (!) 154/74   09/04/20 (!) 155/74   05/14/20 128/80     Wt Readings from Last 1 Encounters:   09/11/20 1505 84.5 kg (186 lb 4.6 oz)     There is no height or weight on file to calculate BMI.        PHYSICAL EXAM: NOT PERFORMED, VIRTUAL VISIT     Lab Review:   Lab Results   Component Value Date    HGBA1C 4.8 09/08/2020     Lab Results   Component Value Date    CHOL 218 (H) 12/16/2019    HDL 79 (H) 12/16/2019    LDLCALC 109.6 12/16/2019    TRIG 147 12/16/2019    CHOLHDL 36.2 12/16/2019     Lab Results   Component Value Date     09/08/2020    K 3.8 09/08/2020     (H) 09/08/2020    CO2 20 (L) 09/08/2020    GLU 73 09/08/2020    BUN 27 (H) 09/08/2020    CREATININE 1.8 (H) 09/08/2020    CALCIUM 9.4 09/08/2020    PROT 6.5 09/08/2020    ALBUMIN 3.9 09/08/2020    ALBUMIN 3.9 09/08/2020    BILITOT 0.4 09/08/2020    ALKPHOS 83  09/08/2020    AST 14 09/08/2020    ALT 18 09/08/2020    ANIONGAP 8 09/08/2020    ESTGFRAFRICA 34.3 (A) 09/08/2020    EGFRNONAA 29.7 (A) 09/08/2020     Vit D, 25-Hydroxy   Date Value Ref Range Status   09/08/2020 14 (L) 30 - 96 ng/mL Final     Comment:     Vitamin D deficiency.........<10 ng/mL                              Vitamin D insufficiency......10-29 ng/mL       Vitamin D sufficiency........> or equal to 30 ng/mL  Vitamin D toxicity............>100 ng/mL         Assessment and Plan     Post-transplant diabetes mellitus  -A1c 4.8 from 5.1 (At goal) (Could be falsely low 2/2 Anemia)     -FS log  AT GOAL    -Diet, exercise, lifestyle modifications and A1c Goals discussed   -Hypoglycemia symptoms and plan of action discussed   -Ophthalmology (Seen on June 2020)   -ASCVD (LDL at goal on Atorvastatin 40mg PO daily)     Plan:   -C/w Trulicity to 1.5mg SC weekly   -Send Fructosamine levels prior to next visit (Rule out falsely low A1C)   -Send A1c prior to next visit (For correlation)       Tertiary hyperparathyroidism  -Ca 9.4 on labs from 9/20   10.9 ON 3/2020    - >>> 540 on 3/20   (Likely w/ TERTIARY HPT and superimposed secondary hyperparathyroidism from vitamin D deficiency component)   -Pt currently on Cinacalcet 30mg PO daily   -Vitamin D  14 (LOW)  on 9/20   -Asymptomatic   -Calcium supplements -DENIES   -HCTZ - DENIES   -GFR 34   -Kidney stones - DENIES   -DXA on 2/20:  Normal Mineral Bone Density     Plan:   -Likely w/ TERTIARY HPT and superimposed secondary hyperparathyroidism from vitamin D deficiency    -Being managed by Kidney transplant medicine     -Start Vitamin D 50,000 units weekly x 4 weeks     -Start Vitamin D 1,000 units daily     -C/w Cinacalcet 30mg daily     -Due to patient being 1 year post Kidney transplant can consider stopping cinacalcet 30mg daily and repeating PTH and Renal panel to evaluate for remission of hyperparathyroidism once vitamin D is repleted.          Obesity due to  excess calories  -BMI 33   -Diet, exercise and lifestyle modifications discussed   -C/w Trulicity 1.5mg SC weekly     Vitamin D deficiency  -Vitamin D 14 (Low) on 9/20   -Not on Vitamin D supplements     Plan:   -Start Ergocalciferol 50,000 units weekly x 4 weeks   -Start Vitamin D 1,000 units daily   -Monitor levels     Hyperlipidemia  - (Slightly above goal)   -Diet, exercise and lifestyle modifications discussed   -C/W Atorvastatin 40mg PO daily           The patient location is: HOME   The chief complaint leading to consultation is: Post Transplant DM     Visit type: audiovisual    Face to Face time with patient: 30 minutes  45 minutes of total time spent on the encounter, which includes face to face time and non-face to face time preparing to see the patient (eg, review of tests), Obtaining and/or reviewing separately obtained history, Documenting clinical information in the electronic or other health record, Independently interpreting results (not separately reported) and communicating results to the patient/family/caregiver, or Care coordination (not separately reported).         Each patient to whom he or she provides medical services by telemedicine is:  (1) informed of the relationship between the physician and patient and the respective role of any other health care provider with respect to management of the patient; and (2) notified that he or she may decline to receive medical services by telemedicine and may withdraw from such care at any time.    Meseret SALMERON MD,  have personally taken the history and examined the patient and agree with the resident's note as stated above.

## 2020-09-14 ENCOUNTER — OFFICE VISIT (OUTPATIENT)
Dept: ENDOCRINOLOGY | Facility: CLINIC | Age: 65
End: 2020-09-14
Payer: MEDICARE

## 2020-09-14 DIAGNOSIS — E13.9 POST-TRANSPLANT DIABETES MELLITUS: Primary | ICD-10-CM

## 2020-09-14 DIAGNOSIS — E55.9 VITAMIN D DEFICIENCY: ICD-10-CM

## 2020-09-14 DIAGNOSIS — E66.01 CLASS 3 SEVERE OBESITY DUE TO EXCESS CALORIES WITHOUT SERIOUS COMORBIDITY IN ADULT, UNSPECIFIED BMI: Chronic | ICD-10-CM

## 2020-09-14 DIAGNOSIS — E21.2 TERTIARY HYPERPARATHYROIDISM: ICD-10-CM

## 2020-09-14 DIAGNOSIS — E78.00 PURE HYPERCHOLESTEROLEMIA: Chronic | ICD-10-CM

## 2020-09-14 PROCEDURE — 99214 PR OFFICE/OUTPT VISIT, EST, LEVL IV, 30-39 MIN: ICD-10-PCS | Mod: 95,GC,, | Performed by: INTERNAL MEDICINE

## 2020-09-14 PROCEDURE — 99214 OFFICE O/P EST MOD 30 MIN: CPT | Mod: 95,GC,, | Performed by: INTERNAL MEDICINE

## 2020-09-14 RX ORDER — VIT C/E/ZN/COPPR/LUTEIN/ZEAXAN 250MG-90MG
1000 CAPSULE ORAL DAILY
Qty: 30 CAPSULE | Refills: 11 | Status: ON HOLD | OUTPATIENT
Start: 2020-09-14 | End: 2021-04-09 | Stop reason: SDUPTHER

## 2020-09-14 RX ORDER — ERGOCALCIFEROL 1.25 MG/1
50000 CAPSULE ORAL
Qty: 4 CAPSULE | Refills: 0 | Status: ON HOLD | OUTPATIENT
Start: 2020-09-14 | End: 2020-11-26

## 2020-09-14 NOTE — PATIENT INSTRUCTIONS
Continue taking Trulicity 1.5mg weekly     Continue with Cinacalcet 30mg daily for your calcium levels     We will start Vitamin D 50,000 units (1 tab weekly for 4 weeks)     After you finish taking the 50,000 units vitamin D,  You can start taking vitamin D 1,000 units daily     Labs prior to next visit in 4 months     If questions or concerns about the medications or your Diabetes, feel free to call or message me at the clinic.     Have a nice day.     Sincerely,       Jonathon Medel MD   Endocrinology   9/14/2020 10:37 AM

## 2020-09-14 NOTE — ASSESSMENT & PLAN NOTE
-Vitamin D 14 (Low) on 9/20   -Not on Vitamin D supplements     Plan:   -Start Ergocalciferol 50,000 units weekly x 4 weeks   -Start Vitamin D 1,000 units daily   -Monitor levels

## 2020-09-14 NOTE — ASSESSMENT & PLAN NOTE
- (Slightly above goal)   -Diet, exercise and lifestyle modifications discussed   -C/W Atorvastatin 40mg PO daily

## 2020-09-14 NOTE — ASSESSMENT & PLAN NOTE
-Ca 9.4 on labs from 9/20   10.9 ON 3/2020    - >>> 540 on 3/20   (Likely w/ TERTIARY HPT and superimposed secondary hyperparathyroidism from vitamin D deficiency component)   -Pt currently on Cinacalcet 30mg PO daily   -Vitamin D  14 (LOW)  on 9/20   -Asymptomatic   -Calcium supplements -DENIES   -HCTZ - DENIES   -GFR 34   -Kidney stones - DENIES   -DXA on 2/20:  Normal Mineral Bone Density     Plan:   -Likely w/ TERTIARY HPT and superimposed secondary hyperparathyroidism from vitamin D deficiency    -Being managed by Kidney transplant medicine     -Start Vitamin D 50,000 units weekly x 4 weeks     -Start Vitamin D 1,000 units daily     -C/w Cinacalcet 30mg daily     -Due to patient being 1 year post Kidney transplant can consider stopping cinacalcet 30mg daily and repeating PTH and Renal panel to evaluate for remission of hyperparathyroidism once vitamin D is repleted.

## 2020-09-14 NOTE — ASSESSMENT & PLAN NOTE
-A1c 4.8 from 5.1 (At goal) (Could be falsely low 2/2 Anemia)     -FS log  AT GOAL    -Diet, exercise, lifestyle modifications and A1c Goals discussed   -Hypoglycemia symptoms and plan of action discussed   -Ophthalmology (Seen on June 2020)   -ASCVD (LDL at goal on Atorvastatin 40mg PO daily)     Plan:   -C/w Trulicity to 1.5mg SC weekly   -Send Fructosamine levels prior to next visit (Rule out falsely low A1C)   -Send A1c prior to next visit (For correlation)

## 2020-09-17 DIAGNOSIS — K76.89 LIVER CYST: Primary | ICD-10-CM

## 2020-09-17 NOTE — PROGRESS NOTES
Kidney Post-Transplant Assessment    Referring Physician: Bruce Khan  Current Nephrologist: Bruce Khan    ORGAN: LEFT KIDNEY  Donor Type: donation after brain death  PHS Increased Risk: no  Cold Ischemia: 545 mins  Induction Medications: thymoglobulin    Subjective:     CC:  Reassessment of renal allograft function and management of chronic immunosuppression.    HPI:  Ms. Schulte is a 64 y.o. 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  She has CKD stage 3 - GFR 30-59 and her baseline creatinine is between 1.2 to 1.5. She takes tacrolimus for maintenance immunosuppression. She denies any recent hospitalizations or ER visits since her previous clinic visit.    She is here with her daughter.       Patient had 6 BP readings 3 are high 1 is low and 2 are normal.     Patient has a good appetite    No nausea vomit or diarrhea.    Patient is not seen her own nephrologist.        Prograf level low adjusted last week.  Some issues with taking medications incorrectly in the past.   Modest CMV viral load 6 months ago  No BK     Seen by endocrine due to DM  Scheduled to see hematology due to pancytopenia OFF MMF. Mild cmv viremia resolved.     Allosure and DSA until March 2020 were negative for rejection. Last PRA 48% historic PRA 48 to 88%. Creatinine elevated on no MMF.               Current Outpatient Medications on File Prior to Visit   Medication Sig Dispense Refill    atorvastatin (LIPITOR) 40 MG tablet Take 40 mg by mouth once daily.       atovaquone (MEPRON) 750 mg/5 mL Susp Take 10 mLs (1,500 mg total) by mouth once daily. Stop 9/19/2020 300 mL 11    blood sugar diagnostic Strp Test blood glucose 4 (four) times daily. 100 each 5    blood-glucose meter kit Use as instructed 1 each 0    carvedilol (COREG) 25 MG tablet Take 1/2 tablet (12.5 mg total) by mouth 2 (two) times daily with meals. Hold for SBP<120, HR<60 30 tablet 11     "cholecalciferol, vitamin D3, (VITAMIN D3) 25 mcg (1,000 unit) capsule Take 1 capsule (1,000 Units total) by mouth once daily. 30 capsule 11    cinacalcet (SENSIPAR) 30 MG Tab Take 1 tablet (30 mg total) by mouth daily with breakfast. 30 tablet 11    diclofenac sodium (VOLTAREN) 1 % Gel       dulaglutide (TRULICITY) 1.5 mg/0.5 mL PnIj Inject 1.5 mg into the skin once a week. 12 Syringe 3    ergocalciferol (ERGOCALCIFEROL) 50,000 unit Cap Take 1 capsule (50,000 Units total) by mouth every 7 days. 4 capsule 0    famotidine (PEPCID) 20 MG tablet Take 1 tablet (20 mg total) by mouth every evening. 30 tablet 0    hydrALAZINE (APRESOLINE) 25 MG tablet Take 1 tablet (25 mg total) by mouth 3 (three) times daily. 90 tablet 11    HYDROcodone-acetaminophen (NORCO) 7.5-325 mg per tablet Take 1 tablet by mouth 3 (three) times daily as needed.      insulin aspart U-100 (NOVOLOG FLEXPEN U-100 INSULIN) 100 unit/mL (3 mL) InPn pen Inject 5 Units into the skin 3 (three) times daily with meals. 5 mL 11    insulin detemir U-100 (LEVEMIR FLEXTOUCH) 100 unit/mL (3 mL) SubQ InPn pen Inject 24 Units into the skin every evening. 15 mL 5    k phos di & mono-sod phos mono (K-PHOS-NEUTRAL) 250 mg Tab Take 2 tablets by mouth 2 (two) times daily. 120 tablet 11    ketoconazole (NIZORAL) 200 mg Tab Take HALF tablet (100 mg total) by mouth once daily. 15 tablet 11    lancets 28 gauge Misc Test blood glucose 4 (four) times daily. 100 each 5    magnesium oxide (MAG-OX) 400 mg (241.3 mg magnesium) tablet Take 2 tablets (800 mg total) by mouth 3 (three) times daily. 180 tablet 11    NIFEdipine (ADALAT CC) 60 MG TbSR Take 1 tablet (60 mg total) by mouth 2 (two) times daily. 60 tablet 11    pen needle, diabetic 32 gauge x 5/32" Ndle Use to inject insulin 4 (four) times daily. 100 each 5    predniSONE (DELTASONE) 5 MG tablet Take by mouth daily: 20mg (4 tablets) 9/23-10/22; 15 mg (3 tablets) 10/23-11/22; 10 mg (2 tablets) 11/23-12/22; then " 5 mg (1 tablet) daily thereafter 12/23/19 120 tablet 11    sodium bicarbonate 650 MG tablet Take 1 tablet (650 mg total) by mouth 2 (two) times daily. 60 tablet 11    tacrolimus (PROGRAF) 1 MG Cap Take 8 capsules (8 mg total) by mouth every 12 (twelve) hours. 480 capsule 11     No current facility-administered medications on file prior to visit.       Review of Systems     Current Outpatient Medications on File Prior to Visit   Medication Sig Dispense Refill    atorvastatin (LIPITOR) 40 MG tablet Take 40 mg by mouth once daily.       atovaquone (MEPRON) 750 mg/5 mL Susp Take 10 mLs (1,500 mg total) by mouth once daily. Stop 9/19/2020 300 mL 11    blood sugar diagnostic Strp Test blood glucose 4 (four) times daily. 100 each 5    blood-glucose meter kit Use as instructed 1 each 0    carvedilol (COREG) 25 MG tablet Take 1/2 tablet (12.5 mg total) by mouth 2 (two) times daily with meals. Hold for SBP<120, HR<60 30 tablet 11    cholecalciferol, vitamin D3, (VITAMIN D3) 25 mcg (1,000 unit) capsule Take 1 capsule (1,000 Units total) by mouth once daily. 30 capsule 11    cinacalcet (SENSIPAR) 30 MG Tab Take 1 tablet (30 mg total) by mouth daily with breakfast. 30 tablet 11    diclofenac sodium (VOLTAREN) 1 % Gel       dulaglutide (TRULICITY) 1.5 mg/0.5 mL PnIj Inject 1.5 mg into the skin once a week. 12 Syringe 3    ergocalciferol (ERGOCALCIFEROL) 50,000 unit Cap Take 1 capsule (50,000 Units total) by mouth every 7 days. 4 capsule 0    famotidine (PEPCID) 20 MG tablet Take 1 tablet (20 mg total) by mouth every evening. 30 tablet 0    hydrALAZINE (APRESOLINE) 25 MG tablet Take 1 tablet (25 mg total) by mouth 3 (three) times daily. 90 tablet 11    HYDROcodone-acetaminophen (NORCO) 7.5-325 mg per tablet Take 1 tablet by mouth 3 (three) times daily as needed.      insulin aspart U-100 (NOVOLOG FLEXPEN U-100 INSULIN) 100 unit/mL (3 mL) InPn pen Inject 5 Units into the skin 3 (three) times daily with meals. 5 mL  "11    insulin detemir U-100 (LEVEMIR FLEXTOUCH) 100 unit/mL (3 mL) SubQ InPn pen Inject 24 Units into the skin every evening. 15 mL 5    k phos di & mono-sod phos mono (K-PHOS-NEUTRAL) 250 mg Tab Take 2 tablets by mouth 2 (two) times daily. 120 tablet 11    ketoconazole (NIZORAL) 200 mg Tab Take HALF tablet (100 mg total) by mouth once daily. 15 tablet 11    lancets 28 gauge Misc Test blood glucose 4 (four) times daily. 100 each 5    magnesium oxide (MAG-OX) 400 mg (241.3 mg magnesium) tablet Take 2 tablets (800 mg total) by mouth 3 (three) times daily. 180 tablet 11    NIFEdipine (ADALAT CC) 60 MG TbSR Take 1 tablet (60 mg total) by mouth 2 (two) times daily. 60 tablet 11    pen needle, diabetic 32 gauge x 5/32" Ndle Use to inject insulin 4 (four) times daily. 100 each 5    predniSONE (DELTASONE) 5 MG tablet Take by mouth daily: 20mg (4 tablets) 9/23-10/22; 15 mg (3 tablets) 10/23-11/22; 10 mg (2 tablets) 11/23-12/22; then 5 mg (1 tablet) daily thereafter 12/23/19 120 tablet 11    sodium bicarbonate 650 MG tablet Take 1 tablet (650 mg total) by mouth 2 (two) times daily. 60 tablet 11    tacrolimus (PROGRAF) 1 MG Cap Take 8 capsules (8 mg total) by mouth every 12 (twelve) hours. 480 capsule 11     No current facility-administered medications on file prior to visit.         Objective:         Physical Exam     BP (!) 160/68 (BP Location: Right arm, Patient Position: Sitting, BP Method: Medium (Automatic))   Pulse 82   Resp 17   Ht 5' 3" (1.6 m)   Wt 82.9 kg (182 lb 12.2 oz)   SpO2 99%   BMI 32.37 kg/m²       Head: normocephalic  Neck: No JVD, cervical axillary, or femoral adenopathies  Heart: no murmurs, Normal s1 and s2, No gallops, no rubs, No murmurs  Lungs; CTA, good respiratory effort, no crackles  Abdomen: soft, non tender, no splenomegaly or hepatomegaly, no massess, no bruits  Extremities: No edema, skin rash, joint pain  SNC: awake, alert oriented. Cranial nerves are intact, no focalized, " sensitivity and strength preserved      Labs:  Lab Results   Component Value Date    WBC 2.30 (L) 09/08/2020    HGB 8.9 (L) 09/08/2020    HCT 29.5 (L) 09/08/2020     09/08/2020    K 3.8 09/08/2020     (H) 09/08/2020    CO2 20 (L) 09/08/2020    BUN 27 (H) 09/08/2020    CREATININE 1.8 (H) 09/08/2020    EGFRNONAA 29.7 (A) 09/08/2020    CALCIUM 9.4 09/08/2020    PHOS 2.9 09/08/2020    MG 1.5 (L) 09/08/2020    ALBUMIN 3.9 09/08/2020    ALBUMIN 3.9 09/08/2020    AST 14 09/08/2020    ALT 18 09/08/2020    UTPCR 0.49 (H) 09/08/2020    .0 (H) 03/13/2020    TACROLIMUS 3.6 (L) 09/08/2020       No results found for: EXTANC, EXTWBC, EXTSEGS, EXTPLATELETS, EXTHEMOGLOBI, EXTHEMATOCRI, EXTCREATININ, EXTSODIUM, EXTPOTASSIUM, EXTBUN, EXTCO2, EXTCALCIUM, EXTPHOSPHORU, EXTGLUCOSE, EXTALBUMIN, EXTAST, EXTALT, EXTBILITOTAL, EXTLIPASE, EXTAMYLASE    No results found for: EXTCYCLOSLVL, EXTSIROLIMUS, EXTTACROLVL, EXTPROTCRE, EXTPTHINTACT, EXTPROTEINUA, EXTWBCUA, EXTRBCUA    Labs were reviewed with the patient.    Assessment:     1. CKD (chronic kidney disease) stage 3, GFR 30-59 ml/min    2. SAUNDRA (acute kidney injury)    3. At risk for opportunistic infections    4. Cytomegalovirus (CMV) viremia    5. Drug-induced pancytopenia    6. Immunocompromised state    7. Post-transplant diabetes mellitus    8. Status post kidney transplant        Plan:     Creatinine is again down to 1.5 from 1.8     Prograf dose adjusted     Labs ordered for 9/17    Extensive education provided to the patient and care giver (lashawn goff) about prograf dose ketoconazole, referral to hematology due to low WBC      Will re-start  bid due to erratic prograf level due to not taking prograf correctly by the patient. She is sensitized on monotherapy, If prograf level drops will adjust MMF dose  Patient to stop Atovaquone 9/19/2020.    Weekly labs    I advised patient to see her nephrologist Dr Khan      1. CKD stage: will continue follow up as per  our center guidelines. patient to continue close follow up with the local General nephrologist. Education provided in appropriate fluid intake, potassium intake. Continue with oral hydration.        2. Immunosuppression:   Lab Results   Component Value Date    TACROLIMUS 3.6 (L) 09/08/2020    TACROLIMUS 7.6 08/12/2020    TACROLIMUS 6.9 07/14/2020     No results found for: CYCLOSPORINE  @   Will closely monitor for toxicities, education provided about adherence to medicines and need to communicate any side effect to the transplant nurse or physician.    3. Allograft Function:stable at baseline for the patient. Continue follow up as per our guidelines and with the local General nephrologist. Communication will be sent today.  Lab Results   Component Value Date    CREATININE 1.8 (H) 09/08/2020    CREATININE 1.5 (H) 08/12/2020    CREATININE 1.4 07/14/2020     Lab Results   Component Value Date    LIPASE 36 12/17/2019       4. Hypertension management:  Continue with home blood pressure monitoring, low salt and healthy life discussed with the patient..    5. Metabolic Bone Disease/Secondary Hyperparathyroidism:calcium and phosphorus level discussed with the patient, patient will continue follow up with the general nephrologist for management of metabolic bone disease   calcium and phosphorus as per our center protocol. Will monitor PTH, Vit D level, calcium.      Lab Results   Component Value Date    .0 (H) 03/13/2020    CALCIUM 9.4 09/08/2020    CAION 1.40 03/13/2020    PHOS 2.9 09/08/2020    PHOS 3.3 08/12/2020    PHOS 3.1 07/14/2020       6. Electrolytes: reviewed with the patient, essentially within the normal range no need for acute changes today, will monitor as per our center guidelines.     Lab Results   Component Value Date     09/08/2020    K 3.8 09/08/2020     (H) 09/08/2020    CO2 20 (L) 09/08/2020    CO2 21 (L) 08/12/2020    CO2 23 07/14/2020       7. Anemia: will continue monitoring as per  our center guidelines. No indication for acute intervention today.     Lab Results   Component Value Date    WBC 2.30 (L) 09/08/2020    HGB 8.9 (L) 09/08/2020    HCT 29.5 (L) 09/08/2020     (H) 09/08/2020     (L) 09/08/2020       8.Proteinuria: will continue with pr/cr ratio as per our center guidelines  Lab Results   Component Value Date    PROTEINURINE 58 (H) 09/08/2020    CREATRANDUR 119.0 09/08/2020    UTPCR 0.49 (H) 09/08/2020    UTPCR 0.41 (H) 06/09/2020    UTPCR 0.16 03/09/2020        9. BK virus infection screening: will continue with urine or blood PCR as per our guidelines to prevent BK virus viremia and allograft dysfunction  Lab Results   Component Value Date    BKVIRUSPCRQB <125 09/08/2020         10. Weight education: provided during the clinic visit.   There is no height or weight on file to calculate BMI.       11.Patient safety education regarding immunosuppression including prophylaxis posttransplant for CMV, PCP : Education provided about vaccination and prevention of infections.    12.  Cytopenias: no significant cytopenias will monitor as per our guidelines. Medicine list reviewed including potential causes of drug-induced cytopenias     Lab Results   Component Value Date    WBC 2.30 (L) 09/08/2020    HGB 8.9 (L) 09/08/2020    HCT 29.5 (L) 09/08/2020     (H) 09/08/2020     (L) 09/08/2020       13. Post-transplant Prophylaxis; CMV Infection, PJP and Candida mucosistis and other indicated for this particular patient.     I spoke with the patient for 30 minutes. More than half dedicated to counseling and education. All questions answered    Pavel Padilla MD  Transplant Nephrology            Follow-up:   Clinic: return to transplant clinic weekly for the first month after transplant; every 2 weeks during months 2-3; then at 6-, 9-, 12-, 18-, 24-, and 36- months post-transplant to reassess for complications from immunosuppression toxicity and monitor for rejection.   Annually thereafter.    Labs: since patient remains at high risk for rejection and drug-related complications that warrant close monitoring, labs will be ordered as follows: continue twice weekly CBC, renal panel, and drug level for first month; then same labs once weekly through 3rd month post-transplant.  Urine for UA and protein/creatinine ratio monthly.  Serum BK - PCR at 1-, 3-, 6-, 9-, 12-, 18-, 24-, 36- 48-, and 60 months post-transplant.  Hepatic panel at 1-, 2-, 3-, 6-, 9-, 12-, 18-, 24-, and 36- months post-transplant.    Pavel Padilla MD       Education:   Material provided to the patient.  Patient reminded to call with any health changes since these can be early signs of significant complications.  Also, I advised the patient to be sure any new medications or changes of old medications are discussed with either a pharmacist or physician knowledgeable with transplant to avoid rejection/drug toxicity related to significant drug interactions.    UNOS Patient Status  Functional Status: 90% - Able to carry on normal activity: minor symptoms of disease  Physical Capacity: No Limitations

## 2020-09-18 ENCOUNTER — CLINICAL SUPPORT (OUTPATIENT)
Dept: TRANSPLANT | Facility: CLINIC | Age: 65
End: 2020-09-18
Payer: MEDICARE

## 2020-09-18 ENCOUNTER — OFFICE VISIT (OUTPATIENT)
Dept: TRANSPLANT | Facility: CLINIC | Age: 65
End: 2020-09-18
Payer: MEDICARE

## 2020-09-18 VITALS
HEART RATE: 82 BPM | SYSTOLIC BLOOD PRESSURE: 160 MMHG | WEIGHT: 182.75 LBS | DIASTOLIC BLOOD PRESSURE: 68 MMHG | BODY MASS INDEX: 32.38 KG/M2 | HEIGHT: 63 IN

## 2020-09-18 VITALS
HEIGHT: 63 IN | WEIGHT: 182.75 LBS | SYSTOLIC BLOOD PRESSURE: 160 MMHG | OXYGEN SATURATION: 99 % | DIASTOLIC BLOOD PRESSURE: 68 MMHG | HEART RATE: 82 BPM | BODY MASS INDEX: 32.38 KG/M2 | RESPIRATION RATE: 17 BRPM

## 2020-09-18 DIAGNOSIS — N18.9 ANEMIA ASSOCIATED WITH CHRONIC RENAL FAILURE: ICD-10-CM

## 2020-09-18 DIAGNOSIS — D61.811 DRUG-INDUCED PANCYTOPENIA: ICD-10-CM

## 2020-09-18 DIAGNOSIS — E13.9 POST-TRANSPLANT DIABETES MELLITUS: ICD-10-CM

## 2020-09-18 DIAGNOSIS — D84.9 IMMUNOCOMPROMISED STATE: ICD-10-CM

## 2020-09-18 DIAGNOSIS — D63.1 ANEMIA ASSOCIATED WITH CHRONIC RENAL FAILURE: ICD-10-CM

## 2020-09-18 DIAGNOSIS — B25.9 CYTOMEGALOVIRUS (CMV) VIREMIA: ICD-10-CM

## 2020-09-18 DIAGNOSIS — Z94.0 STATUS POST KIDNEY TRANSPLANT: ICD-10-CM

## 2020-09-18 DIAGNOSIS — N17.9 AKI (ACUTE KIDNEY INJURY): ICD-10-CM

## 2020-09-18 DIAGNOSIS — N18.30 CKD (CHRONIC KIDNEY DISEASE) STAGE 3, GFR 30-59 ML/MIN: Primary | ICD-10-CM

## 2020-09-18 DIAGNOSIS — Z94.0 STATUS POST KIDNEY TRANSPLANT: Primary | ICD-10-CM

## 2020-09-18 DIAGNOSIS — Z91.89 AT RISK FOR OPPORTUNISTIC INFECTIONS: ICD-10-CM

## 2020-09-18 PROCEDURE — 99999 PR PBB SHADOW E&M-EST. PATIENT-LVL II: ICD-10-PCS | Mod: PBBFAC,,,

## 2020-09-18 PROCEDURE — 99215 OFFICE O/P EST HI 40 MIN: CPT | Mod: PBBFAC,25 | Performed by: INTERNAL MEDICINE

## 2020-09-18 PROCEDURE — 99999 PR PBB SHADOW E&M-EST. PATIENT-LVL V: CPT | Mod: PBBFAC,,, | Performed by: INTERNAL MEDICINE

## 2020-09-18 PROCEDURE — 96372 THER/PROPH/DIAG INJ SC/IM: CPT | Mod: PBBFAC

## 2020-09-18 PROCEDURE — 99215 OFFICE O/P EST HI 40 MIN: CPT | Mod: S$PBB,,, | Performed by: INTERNAL MEDICINE

## 2020-09-18 PROCEDURE — 99999 PR PBB SHADOW E&M-EST. PATIENT-LVL II: CPT | Mod: PBBFAC,,,

## 2020-09-18 PROCEDURE — 99215 PR OFFICE/OUTPT VISIT, EST, LEVL V, 40-54 MIN: ICD-10-PCS | Mod: S$PBB,,, | Performed by: INTERNAL MEDICINE

## 2020-09-18 PROCEDURE — 99999 PR PBB SHADOW E&M-EST. PATIENT-LVL V: ICD-10-PCS | Mod: PBBFAC,,, | Performed by: INTERNAL MEDICINE

## 2020-09-18 RX ORDER — MYCOPHENOLATE MOFETIL 250 MG/1
500 CAPSULE ORAL 2 TIMES DAILY
Qty: 120 CAPSULE | Refills: 11 | Status: SHIPPED | OUTPATIENT
Start: 2020-09-18 | End: 2020-11-11 | Stop reason: SINTOL

## 2020-09-18 RX ADMIN — EPOETIN ALFA-EPBX 10000 UNITS: 2000 INJECTION, SOLUTION INTRAVENOUS; SUBCUTANEOUS at 11:09

## 2020-09-18 NOTE — LETTER
September 18, 2020        Bruce Khan  [address]  [place] [address]  Phone: [phone]  Fax: [phone]             [facility] 1st Fl  [address]  [facility] [address]  Phone: [phone]   Patient: Satinder Schulte   MR Number: [id number]   YOB: 1955   Date of Visit: 9/18/2020       Dear Dr. Bruce Khan    Thank you for referring Satinder Schulte to me for evaluation. Attached you will find relevant portions of my assessment and plan of care.    If you have questions, please do not hesitate to call me. I look forward to following Satinder Schulte along with you.    Sincerely,    Pavel Padilla MD    Enclosure    If you would like to receive this communication electronically, please contact [email or web address] or [phone] to request [employer] access.    [employer] is a tool which provides read-only access to select patient information with whom you have a relationship. Its easy to use and provides real time access to review your patients record including encounter summaries, notes, results, and demographic information.    If you feel you have received this communication in error or would no longer like to receive these types of communications, please e-mail [email or web address]

## 2020-09-18 NOTE — PROGRESS NOTES
Retacrit 10,000 units administered sq to right posterior arm per MD order. Patient tolerated with no complaints. Patient informed of side effects including bone pain, muscle aches and tiredness. Verbalized acknowledgment.

## 2020-09-23 ENCOUNTER — OFFICE VISIT (OUTPATIENT)
Dept: HEMATOLOGY/ONCOLOGY | Facility: CLINIC | Age: 65
End: 2020-09-23
Payer: MEDICARE

## 2020-09-23 ENCOUNTER — LAB VISIT (OUTPATIENT)
Dept: LAB | Facility: HOSPITAL | Age: 65
End: 2020-09-23
Attending: INTERNAL MEDICINE
Payer: MEDICARE

## 2020-09-23 VITALS
OXYGEN SATURATION: 98 % | HEART RATE: 70 BPM | BODY MASS INDEX: 32.07 KG/M2 | HEIGHT: 63 IN | TEMPERATURE: 98 F | WEIGHT: 181 LBS | SYSTOLIC BLOOD PRESSURE: 146 MMHG | DIASTOLIC BLOOD PRESSURE: 64 MMHG

## 2020-09-23 DIAGNOSIS — E11.9 DIABETES MELLITUS, NEW ONSET: ICD-10-CM

## 2020-09-23 DIAGNOSIS — D61.818 PANCYTOPENIA: ICD-10-CM

## 2020-09-23 DIAGNOSIS — D53.9 NUTRITIONAL ANEMIA, UNSPECIFIED: ICD-10-CM

## 2020-09-23 DIAGNOSIS — E13.9 POST-TRANSPLANT DIABETES MELLITUS: ICD-10-CM

## 2020-09-23 DIAGNOSIS — N18.30 CKD (CHRONIC KIDNEY DISEASE) STAGE 3, GFR 30-59 ML/MIN: ICD-10-CM

## 2020-09-23 DIAGNOSIS — D72.819 LEUKOPENIA, UNSPECIFIED TYPE: Primary | ICD-10-CM

## 2020-09-23 LAB
BASOPHILS # BLD AUTO: 0.01 K/UL (ref 0–0.2)
BASOPHILS NFR BLD: 0.4 % (ref 0–1.9)
DIFFERENTIAL METHOD: ABNORMAL
EOSINOPHIL # BLD AUTO: 0 K/UL (ref 0–0.5)
EOSINOPHIL NFR BLD: 0.7 % (ref 0–8)
ERYTHROCYTE [DISTWIDTH] IN BLOOD BY AUTOMATED COUNT: 14.7 % (ref 11.5–14.5)
FOLATE SERPL-MCNC: 4.8 NG/ML (ref 4–24)
HCT VFR BLD AUTO: 32.8 % (ref 37–48.5)
HGB BLD-MCNC: 10.4 G/DL (ref 12–16)
IMM GRANULOCYTES # BLD AUTO: 0.03 K/UL (ref 0–0.04)
IMM GRANULOCYTES NFR BLD AUTO: 1.1 % (ref 0–0.5)
LYMPHOCYTES # BLD AUTO: 0.6 K/UL (ref 1–4.8)
LYMPHOCYTES NFR BLD: 23.8 % (ref 18–48)
MCH RBC QN AUTO: 32.1 PG (ref 27–31)
MCHC RBC AUTO-ENTMCNC: 31.7 G/DL (ref 32–36)
MCV RBC AUTO: 101 FL (ref 82–98)
MONOCYTES # BLD AUTO: 0.3 K/UL (ref 0.3–1)
MONOCYTES NFR BLD: 10.8 % (ref 4–15)
NEUTROPHILS # BLD AUTO: 1.7 K/UL (ref 1.8–7.7)
NEUTROPHILS NFR BLD: 64.3 % (ref 38–73)
NRBC BLD-RTO: 0 /100 WBC
PATH REV BLD -IMP: NORMAL
PLATELET # BLD AUTO: 126 K/UL (ref 150–350)
PMV BLD AUTO: 10.7 FL (ref 9.2–12.9)
RBC # BLD AUTO: 3.24 M/UL (ref 4–5.4)
VIT B12 SERPL-MCNC: 559 PG/ML (ref 210–950)
WBC # BLD AUTO: 2.69 K/UL (ref 3.9–12.7)

## 2020-09-23 PROCEDURE — 99215 OFFICE O/P EST HI 40 MIN: CPT | Mod: PBBFAC | Performed by: INTERNAL MEDICINE

## 2020-09-23 PROCEDURE — 85025 COMPLETE CBC W/AUTO DIFF WBC: CPT

## 2020-09-23 PROCEDURE — 99214 PR OFFICE/OUTPT VISIT, EST, LEVL IV, 30-39 MIN: ICD-10-PCS | Mod: S$PBB,,, | Performed by: INTERNAL MEDICINE

## 2020-09-23 PROCEDURE — 99999 PR PBB SHADOW E&M-EST. PATIENT-LVL V: CPT | Mod: PBBFAC,,, | Performed by: INTERNAL MEDICINE

## 2020-09-23 PROCEDURE — 85060 PATHOLOGIST REVIEW: ICD-10-PCS | Mod: ,,, | Performed by: PATHOLOGY

## 2020-09-23 PROCEDURE — 85060 BLOOD SMEAR INTERPRETATION: CPT | Mod: ,,, | Performed by: PATHOLOGY

## 2020-09-23 PROCEDURE — 99999 PR PBB SHADOW E&M-EST. PATIENT-LVL V: ICD-10-PCS | Mod: PBBFAC,,, | Performed by: INTERNAL MEDICINE

## 2020-09-23 PROCEDURE — 36415 COLL VENOUS BLD VENIPUNCTURE: CPT

## 2020-09-23 PROCEDURE — 99214 OFFICE O/P EST MOD 30 MIN: CPT | Mod: S$PBB,,, | Performed by: INTERNAL MEDICINE

## 2020-09-23 PROCEDURE — 82746 ASSAY OF FOLIC ACID SERUM: CPT

## 2020-09-23 PROCEDURE — 82607 VITAMIN B-12: CPT

## 2020-09-23 NOTE — PROGRESS NOTES
Subjective:      DATE OF VISIT: 9/23/20     ?  Patient ID:?Satinder Schulte is a 64 y.o. female.?? MR#: [id number]   ?   REFERRING PROVIDER: Pavel Padilla MD  [address]  [place]  [place] [address]     ? Primary Care Providers:  Quirino Burnett MD, MD (General)     CHIEF COMPLAINT: ?  Pancytopenia, history of renal transplant??   ?   HPI    Ms. Schulte is a 64-year-old woman with type 2 diabetes, hypertension, and end-stage renal disease status post transplant September 2019. She presents for follow-up of anemia and thrombocytopenia.  Per review of labs she has previously seen by outside hematologist Dr. Iyer Shows reportedly had bone marrow biopsy around 2017.  She was then seen pre transplant by Dr. Anna Lin 09/12/2018 with repeat bone marrow biopsy 08/23/2018 without concerning findings.  She continues to have relatively stable anemia and thrombocytopenia.  She is on immunosuppressants tacrolimus and low-dose prednisone.  CKD stage 3 is stable.  She follows with endocrinology for hyperparathyroidism, hypercalcemia on vitamin-D supplementation.  No fevers, chills, night sweats or unintentional weight loss    Review of Systems    ?   A comprehensive 14-point review of systems was reviewed with patient and was negative other than as specified above.   ?   PAST MEDICAL HISTORY:   Past Medical History:   Diagnosis Date    Abnormal Pap smear     repeat paps were normal    Anemia associated with chronic renal failure     Awaiting organ transplant status  - 02/18/2013 6/6/2014    Blood type A+ 6/6/2014    CKD (chronic kidney disease) stage 3, GFR 30-59 ml/min 10/30/2019    CKD (chronic kidney disease) stage 5, GFR less than 15 ml/min     secondary hypertension    Diabetes mellitus     Essential hypertension 5/19/2017    Hyperlipidemia     Hypertension, renal 1992    Immunocompromised state 10/4/2019    Stroke 2007    Residual speech deficit    ?     PAST SURGICAL HISTORY:   Past Surgical History:    Procedure Laterality Date    AV FISTULA PLACEMENT  2014    BONE MARROW BIOPSY Right 8/23/2018    Procedure: Biopsy-bone marrow;  Surgeon: Anna Lin MD;  Location: [facility] OR [address] [place] [address];  Service: Oncology;  Laterality: Right;    CHOLECYSTECTOMY  2008    HYSTERECTOMY  2011    TAHBSO for benign reasons    KIDNEY TRANSPLANT N/A 9/20/2019    Procedure: TRANSPLANT, KIDNEY;  Surgeon: Guero Rogers MD;  Location: [facility] OR [address] [place] [address];  Service: Transplant;  Laterality: N/A;    OOPHORECTOMY        ?   ALLERGIES:   Allergies as of 09/23/2020    (No Known Allergies)      ?   MEDICATIONS:?   No outpatient medications have been marked as taking for the 9/23/20 encounter (Office Visit) with Theodora Orozco MD.     Current Facility-Administered Medications for the 9/23/20 encounter (Office Visit) with Theodora Orozco MD   Medication Dose Route Frequency Provider Last Rate Last Dose    epoetin jj-epbx injection 10,000 Units  10,000 Units Subcutaneous Q7 Days Pavel Padilla MD   10,000 Units at 09/18/20 1140      ?   SOCIAL HISTORY:?   Social History     Tobacco Use    Smoking status: Never Smoker    Smokeless tobacco: Never Used   Substance Use Topics    Alcohol use: No      ?     ?   FAMILY HISTORY:   family history includes Breast cancer in her daughter; Heart disease in her father, mother, and sister; Hypertension in her mother; Kidney disease in her brother and mother; Ovarian cancer in her cousin; Stroke in her mother and sister.   ?        Objective:      Physical Exam      ?   Vitals:    09/23/20 0856   BP: (!) 146/64   Pulse: 70   Temp: 98.1 °F (36.7 °C)      ?   ECOG:?1   General appearance: Generally well appearing, in no acute distress.   Head, eyes, ears, nose, and throat: Pupils round and equally reactive to light. Oropharynx clear with moist mucous membranes.   Lymph: No palpable cervical or supraclavicular lymphadenopathy.   Cardiovascular: Regular rate and rhythm, S1, S2, no audible murmurs.    Respiratory: Lungs clear to auscultation bilaterally.   Abdomen: Bowel sounds present, soft, nontender, nondistended. No palpable hepatosplenomegaly.   Extremities: Warm, without edema.   Neurologic: Alert and oriented. Grossly normal strength, coordination, and gait.   Skin: No rashes, ecchymoses or petechial lesion.   ?      ?   Laboratory:  ?   Lab Visit on 09/23/2020   Component Date Value Ref Range Status    WBC 09/23/2020 2.69* 3.90 - 12.70 K/uL Final    RBC 09/23/2020 3.24* 4.00 - 5.40 M/uL Final    Hemoglobin 09/23/2020 10.4* 12.0 - 16.0 g/dL Final    Hematocrit 09/23/2020 32.8* 37.0 - 48.5 % Final    Mean Corpuscular Volume 09/23/2020 101* 82 - 98 fL Final    Mean Corpuscular Hemoglobin 09/23/2020 32.1* 27.0 - 31.0 pg Final    Mean Corpuscular Hemoglobin Conc 09/23/2020 31.7* 32.0 - 36.0 g/dL Final    RDW 09/23/2020 14.7* 11.5 - 14.5 % Final    Platelets 09/23/2020 126* 150 - 350 K/uL Final    MPV 09/23/2020 10.7  9.2 - 12.9 fL Final    Immature Granulocytes 09/23/2020 1.1* 0.0 - 0.5 % Final    Gran # (ANC) 09/23/2020 1.7* 1.8 - 7.7 K/uL Final    Immature Grans (Abs) 09/23/2020 0.03  0.00 - 0.04 K/uL Final    Lymph # 09/23/2020 0.6* 1.0 - 4.8 K/uL Final    Mono # 09/23/2020 0.3  0.3 - 1.0 K/uL Final    Eos # 09/23/2020 0.0  0.0 - 0.5 K/uL Final    Baso # 09/23/2020 0.01  0.00 - 0.20 K/uL Final    nRBC 09/23/2020 0  0 /100 WBC Final    Gran% 09/23/2020 64.3  38.0 - 73.0 % Final    Lymph% 09/23/2020 23.8  18.0 - 48.0 % Final    Mono% 09/23/2020 10.8  4.0 - 15.0 % Final    Eosinophil% 09/23/2020 0.7  0.0 - 8.0 % Final    Basophil% 09/23/2020 0.4  0.0 - 1.9 % Final    Differential Method 09/23/2020 Automated   Final      PATHOLOGY 8/23/18  BONE MARROW ASPIRATE SMEARS, TOUCH IMPRINTS, CORE BIOPSY, RIGHT ILIAC CREST, AND CLOT  SECTION:  --Normocellular bone marrow with trilineage hematopoiesis and no definite morphologic or immunophenotypic  evidence of a hematolymphoid neoplasm, see  comment.  --Increased storage iron.  COMMENT: The core biopsy and clot section are normocellular for age (50%). Blasts are not increased.  Megakaryocytes are adequate in number and show a minor (less than 10%) subset of small, hypolobated forms.  However, minimal morphologic criteria for myelodysplasia are not met. Storage iron is increased, suggestive of a  component of anemia of chronic inflammation. Please correlate with the corresponding cytogenetics analysis.  Diagnosed by: Emily Thornton M.D.  (Electronically Signed: 2018-08-30 16:00:44)    9/23/20  Pancytopenia with no increased blasts, dysplasia, or atypical   lymphocytes evident, correlate clinically.   ?   Assessment/Plan:   Leukopenia, unspecified type    Pancytopenia  -     Ambulatory consult to Hematology / Oncology  -     Vitamin B12; Future; Expected date: 09/23/2020  -     Pathologist Interpretation Differential; Future; Expected date: 09/23/2020  -     Folate; Future; Expected date: 09/23/2020  -     CBC auto differential; Future; Expected date: 03/23/2021    Nutritional anemia, unspecified   -     Vitamin B12; Future; Expected date: 09/23/2020  -     Folate; Future; Expected date: 09/23/2020    CKD (chronic kidney disease) stage 3, GFR 30-59 ml/min    Diabetes mellitus, new onset    Post-transplant diabetes mellitus       1. Leukopenia, unspecified type    2. Pancytopenia    3. Nutritional anemia, unspecified     4. CKD (chronic kidney disease) stage 3, GFR 30-59 ml/min    5. Diabetes mellitus, new onset    6. Post-transplant diabetes mellitus          Plan:     # macrocytic anemia:  Relatively stable mild macrocytic anemia hemoglobin 10.4 . Iron indices consistent with anemia of chronic illness, no evidence of iron deficiency.  Prior bone marrow biopsy in 2018 without concern for hematologic neoplastic process.  Current anemia may be related to chronic kidney disease, anemia of chronic illness and possible medication induced.  Would continue  close surveillance and ruling out nutritional deficiency with vitamin B12 and folate levels drawn today.    # leukopenia:  No infectious complications, relatively stable, may be medication induced, currently on tacrolimus, bone marrow biopsy 2018 without concern for hematologic process.  No notable changes, normal differential, no constitutional symptoms.  If concerning clinical or lab changes would consider additional workup/bone marrow biopsy.    # thrombocytopenia:  Mild, relatively stable, no bleeding complications.  Would continue to monitor.    # CKD stage 3, status post renal transplant September 2019:  Continue to follow with Nephrology and Transplant, on tacrolimus and prednisone    Follow-Up:   Patient Instructions   Labs today  RV 1 year with cbc same day prior, NP

## 2020-09-23 NOTE — PROGRESS NOTES
[place] over 40 years ago. Type 2 DM following kidney transplant.    yesterday per patient (patient said that that's higher then what it   normally is, usually low 100's). Patient does not wear correction. Patient   reports blur OU at distance and near. Reports occasional itching. No gtts.   Denies flashes or floaters.    Hemoglobin A1C       Date                     Value               Ref Range             Status                12/17/2019               7.9 (H)             4.0 - 5.6 %           Final                Last edited by Anna Diaz, OD on 3/23/2020  2:56 PM. (History)            Assessment /Plan     For exam results, see Encounter Report.    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 Educated pt on findings and the importance of DM control. No diabetic retinopathy seen. Monitor yearly.   2. Refer to retina. Ed pt on findings.  3. Educated pt of today's findings. No sx needed at this time. Continue to monitor in 1 yr.  4. Prescribe Spec Rx. Educated patient on changes. RTC in 1 yr for annual eye exam or prn.

## 2020-09-23 NOTE — LETTER
September 23, 2020      Pavel Padilla MD  [address]  [facility] [address]           [facility]  [address]  [place] [address]  Phone: [phone]  Fax: [phone]          Patient: Satinder Schulte   MR Number: [id number]   YOB: 1955   Date of Visit: 9/23/2020       Dear Dr. Pavel Padilla:    Thank you for referring Satinder Schulte to me for evaluation. Attached you will find relevant portions of my assessment and plan of care.    If you have questions, please do not hesitate to call me. I look forward to following Satinder Schulte along with you.    Sincerely,    Theodora Orozco MD    Enclosure  CC:  No Recipients    If you would like to receive this communication electronically, please contact [email or web address] or [phone] to request more information on [employer] access.    For providers and/or their staff who would like to refer a patient to [facility], please contact us through our one-stop-shop provider referral line, [facility], at [phone].    If you feel you have received this communication in error or would no longer like to receive these types of communications, please e-mail [email or web address]

## 2020-09-24 LAB — PATH REV BLD -IMP: NORMAL

## 2020-09-29 ENCOUNTER — LAB VISIT (OUTPATIENT)
Dept: LAB | Facility: HOSPITAL | Age: 65
End: 2020-09-29
Attending: INTERNAL MEDICINE
Payer: MEDICARE

## 2020-09-29 DIAGNOSIS — Z94.0 KIDNEY REPLACED BY TRANSPLANT: ICD-10-CM

## 2020-09-29 PROCEDURE — 83735 ASSAY OF MAGNESIUM: CPT

## 2020-09-29 PROCEDURE — 80197 ASSAY OF TACROLIMUS: CPT

## 2020-09-29 PROCEDURE — 36415 COLL VENOUS BLD VENIPUNCTURE: CPT | Mod: PO

## 2020-09-29 PROCEDURE — 80069 RENAL FUNCTION PANEL: CPT

## 2020-09-29 PROCEDURE — 85025 COMPLETE CBC W/AUTO DIFF WBC: CPT

## 2020-09-30 DIAGNOSIS — Z29.89 PROPHYLACTIC IMMUNOTHERAPY: ICD-10-CM

## 2020-09-30 DIAGNOSIS — Z94.0 STATUS POST KIDNEY TRANSPLANT: ICD-10-CM

## 2020-09-30 DIAGNOSIS — Z79.60 LONG-TERM USE OF IMMUNOSUPPRESSANT MEDICATION: ICD-10-CM

## 2020-09-30 DIAGNOSIS — E83.39 HYPOPHOSPHATEMIA: ICD-10-CM

## 2020-09-30 LAB
ALBUMIN SERPL BCP-MCNC: 3.9 G/DL (ref 3.5–5.2)
ANION GAP SERPL CALC-SCNC: 15 MMOL/L (ref 8–16)
BASOPHILS # BLD AUTO: 0.01 K/UL (ref 0–0.2)
BASOPHILS NFR BLD: 0.4 % (ref 0–1.9)
BUN SERPL-MCNC: 20 MG/DL (ref 8–23)
CALCIUM SERPL-MCNC: 9.2 MG/DL (ref 8.7–10.5)
CHLORIDE SERPL-SCNC: 112 MMOL/L (ref 95–110)
CO2 SERPL-SCNC: 19 MMOL/L (ref 23–29)
CREAT SERPL-MCNC: 1.6 MG/DL (ref 0.5–1.4)
DIFFERENTIAL METHOD: ABNORMAL
EOSINOPHIL # BLD AUTO: 0 K/UL (ref 0–0.5)
EOSINOPHIL NFR BLD: 0 % (ref 0–8)
ERYTHROCYTE [DISTWIDTH] IN BLOOD BY AUTOMATED COUNT: 14.6 % (ref 11.5–14.5)
EST. GFR  (AFRICAN AMERICAN): 39.5 ML/MIN/1.73 M^2
EST. GFR  (NON AFRICAN AMERICAN): 34.3 ML/MIN/1.73 M^2
GLUCOSE SERPL-MCNC: 102 MG/DL (ref 70–110)
HCT VFR BLD AUTO: 31.5 % (ref 37–48.5)
HGB BLD-MCNC: 9.5 G/DL (ref 12–16)
IMM GRANULOCYTES # BLD AUTO: 0.02 K/UL (ref 0–0.04)
IMM GRANULOCYTES NFR BLD AUTO: 0.7 % (ref 0–0.5)
LYMPHOCYTES # BLD AUTO: 0.8 K/UL (ref 1–4.8)
LYMPHOCYTES NFR BLD: 28.1 % (ref 18–48)
MAGNESIUM SERPL-MCNC: 1.4 MG/DL (ref 1.6–2.6)
MCH RBC QN AUTO: 31.4 PG (ref 27–31)
MCHC RBC AUTO-ENTMCNC: 30.2 G/DL (ref 32–36)
MCV RBC AUTO: 104 FL (ref 82–98)
MONOCYTES # BLD AUTO: 0.3 K/UL (ref 0.3–1)
MONOCYTES NFR BLD: 9.4 % (ref 4–15)
NEUTROPHILS # BLD AUTO: 1.7 K/UL (ref 1.8–7.7)
NEUTROPHILS NFR BLD: 61.4 % (ref 38–73)
NRBC BLD-RTO: 0 /100 WBC
PHOSPHATE SERPL-MCNC: 2.8 MG/DL (ref 2.7–4.5)
PLATELET # BLD AUTO: 120 K/UL (ref 150–350)
PMV BLD AUTO: 11.3 FL (ref 9.2–12.9)
POTASSIUM SERPL-SCNC: 3.9 MMOL/L (ref 3.5–5.1)
RBC # BLD AUTO: 3.03 M/UL (ref 4–5.4)
SODIUM SERPL-SCNC: 146 MMOL/L (ref 136–145)
TACROLIMUS BLD-MCNC: 12.4 NG/ML (ref 5–15)
WBC # BLD AUTO: 2.78 K/UL (ref 3.9–12.7)

## 2020-09-30 RX ORDER — TACROLIMUS 1 MG/1
7 CAPSULE ORAL EVERY 12 HOURS
Qty: 420 CAPSULE | Refills: 11 | Status: SHIPPED | OUTPATIENT
Start: 2020-09-30 | End: 2020-10-28

## 2020-09-30 NOTE — TELEPHONE ENCOUNTER
Patient daughter sharif repeated back and voice a understanding of orders.   Next labs 10/13/2020.    ----- Message from Pavel Padilla MD sent at 9/30/2020 10:58 AM CDT -----  Let's lower prograf to 7 mg PO bid  Repeat labs in 2 weeks   Encourage hydration   Lower KPN to 250 bid

## 2020-09-30 NOTE — PROGRESS NOTES
Let's lower prograf to 7 mg PO bid  Repeat labs in 2 weeks   Encourage hydration   Lower KPN to 250 bid

## 2020-10-01 ENCOUNTER — TELEPHONE (OUTPATIENT)
Dept: RADIOLOGY | Facility: HOSPITAL | Age: 65
End: 2020-10-01

## 2020-10-02 ENCOUNTER — HOSPITAL ENCOUNTER (OUTPATIENT)
Dept: RADIOLOGY | Facility: HOSPITAL | Age: 65
Discharge: HOME | End: 2020-10-02
Attending: INTERNAL MEDICINE
Payer: MEDICARE

## 2020-10-02 DIAGNOSIS — K76.89 LIVER CYST: ICD-10-CM

## 2020-10-02 PROCEDURE — 76705 US ABDOMEN LIMITED: ICD-10-PCS | Mod: 26,,, | Performed by: RADIOLOGY

## 2020-10-02 PROCEDURE — 76705 ECHO EXAM OF ABDOMEN: CPT | Mod: TC

## 2020-10-02 PROCEDURE — 76705 ECHO EXAM OF ABDOMEN: CPT | Mod: 26,,, | Performed by: RADIOLOGY

## 2020-10-14 ENCOUNTER — LAB VISIT (OUTPATIENT)
Dept: LAB | Facility: HOSPITAL | Age: 65
End: 2020-10-14
Attending: INTERNAL MEDICINE
Payer: MEDICARE

## 2020-10-14 DIAGNOSIS — Z94.0 KIDNEY REPLACED BY TRANSPLANT: ICD-10-CM

## 2020-10-14 DIAGNOSIS — Z72.89 OTHER PROBLEMS RELATED TO LIFESTYLE: ICD-10-CM

## 2020-10-14 LAB
ALBUMIN SERPL BCP-MCNC: 3.9 G/DL (ref 3.5–5.2)
ANION GAP SERPL CALC-SCNC: 9 MMOL/L (ref 8–16)
BASOPHILS # BLD AUTO: 0.01 K/UL (ref 0–0.2)
BASOPHILS NFR BLD: 0.4 % (ref 0–1.9)
BUN SERPL-MCNC: 21 MG/DL (ref 8–23)
CALCIUM SERPL-MCNC: 9.3 MG/DL (ref 8.7–10.5)
CHLORIDE SERPL-SCNC: 110 MMOL/L (ref 95–110)
CO2 SERPL-SCNC: 24 MMOL/L (ref 23–29)
CREAT SERPL-MCNC: 1.6 MG/DL (ref 0.5–1.4)
DIFFERENTIAL METHOD: ABNORMAL
EOSINOPHIL # BLD AUTO: 0 K/UL (ref 0–0.5)
EOSINOPHIL NFR BLD: 0.4 % (ref 0–8)
ERYTHROCYTE [DISTWIDTH] IN BLOOD BY AUTOMATED COUNT: 14.3 % (ref 11.5–14.5)
EST. GFR  (AFRICAN AMERICAN): 39.3 ML/MIN/1.73 M^2
EST. GFR  (NON AFRICAN AMERICAN): 34 ML/MIN/1.73 M^2
GLUCOSE SERPL-MCNC: 227 MG/DL (ref 70–110)
HCT VFR BLD AUTO: 30 % (ref 37–48.5)
HGB BLD-MCNC: 9.5 G/DL (ref 12–16)
IMM GRANULOCYTES # BLD AUTO: 0.01 K/UL (ref 0–0.04)
IMM GRANULOCYTES NFR BLD AUTO: 0.4 % (ref 0–0.5)
LYMPHOCYTES # BLD AUTO: 0.6 K/UL (ref 1–4.8)
LYMPHOCYTES NFR BLD: 22.9 % (ref 18–48)
MAGNESIUM SERPL-MCNC: 1.2 MG/DL (ref 1.6–2.6)
MCH RBC QN AUTO: 31 PG (ref 27–31)
MCHC RBC AUTO-ENTMCNC: 31.7 G/DL (ref 32–36)
MCV RBC AUTO: 98 FL (ref 82–98)
MONOCYTES # BLD AUTO: 0.2 K/UL (ref 0.3–1)
MONOCYTES NFR BLD: 8.8 % (ref 4–15)
NEUTROPHILS # BLD AUTO: 1.7 K/UL (ref 1.8–7.7)
NEUTROPHILS NFR BLD: 67.1 % (ref 38–73)
NRBC BLD-RTO: 0 /100 WBC
PHOSPHATE SERPL-MCNC: 2.5 MG/DL (ref 2.7–4.5)
PLATELET # BLD AUTO: 123 K/UL (ref 150–350)
PMV BLD AUTO: 11.9 FL (ref 9.2–12.9)
POTASSIUM SERPL-SCNC: 4 MMOL/L (ref 3.5–5.1)
RBC # BLD AUTO: 3.06 M/UL (ref 4–5.4)
SODIUM SERPL-SCNC: 143 MMOL/L (ref 136–145)
WBC # BLD AUTO: 2.49 K/UL (ref 3.9–12.7)

## 2020-10-14 PROCEDURE — 83735 ASSAY OF MAGNESIUM: CPT

## 2020-10-14 PROCEDURE — 87517 HEPATITIS B DNA QUANT: CPT

## 2020-10-14 PROCEDURE — 80069 RENAL FUNCTION PANEL: CPT

## 2020-10-14 PROCEDURE — 36415 COLL VENOUS BLD VENIPUNCTURE: CPT | Mod: PO

## 2020-10-14 PROCEDURE — 85025 COMPLETE CBC W/AUTO DIFF WBC: CPT

## 2020-10-14 PROCEDURE — 80197 ASSAY OF TACROLIMUS: CPT

## 2020-10-14 PROCEDURE — 87340 HEPATITIS B SURFACE AG IA: CPT

## 2020-10-15 DIAGNOSIS — E83.42 HYPOMAGNESEMIA: ICD-10-CM

## 2020-10-15 LAB
HBV SURFACE AG SERPL QL IA: NEGATIVE
TACROLIMUS BLD-MCNC: 8.9 NG/ML (ref 5–15)

## 2020-10-15 RX ORDER — LANOLIN ALCOHOL/MO/W.PET/CERES
800 CREAM (GRAM) TOPICAL 2 TIMES DAILY
Qty: 120 TABLET | Refills: 11 | Status: SHIPPED | OUTPATIENT
Start: 2020-10-15 | End: 2020-10-22

## 2020-10-15 NOTE — TELEPHONE ENCOUNTER
Patient daughter sharif repeated back and voice a understanding of orders.  High mag diet reviewed.  Next labs 10/27/2020.      ----- Message from Pavel Padilla MD sent at 10/15/2020  1:31 PM CDT -----  WBc is low let's say every 2 weeks x 2  then monthly x 3 and then every 6 months if everything remains stable  ----- Message -----  From: Darci Flores RN  Sent: 10/15/2020  12:13 PM CDT  To: Pavel Padilla MD    How often continue to check labs ?  ----- Message -----  From: Pavel Padilla MD  Sent: 10/15/2020   7:26 AM CDT  To: [facility] Post[facility] Clinical    Increase ,ag oxide to 800 bid please  Encourage magnesium rich meals

## 2020-10-26 DIAGNOSIS — E55.9 VITAMIN D DEFICIENCY: Primary | ICD-10-CM

## 2020-10-27 ENCOUNTER — LAB VISIT (OUTPATIENT)
Dept: LAB | Facility: HOSPITAL | Age: 65
End: 2020-10-27
Attending: INTERNAL MEDICINE
Payer: MEDICARE

## 2020-10-27 DIAGNOSIS — Z94.0 KIDNEY REPLACED BY TRANSPLANT: ICD-10-CM

## 2020-10-27 DIAGNOSIS — E55.9 VITAMIN D DEFICIENCY: ICD-10-CM

## 2020-10-27 LAB
BASOPHILS # BLD AUTO: 0 K/UL (ref 0–0.2)
BASOPHILS NFR BLD: 0 % (ref 0–1.9)
DIFFERENTIAL METHOD: ABNORMAL
EOSINOPHIL # BLD AUTO: 0 K/UL (ref 0–0.5)
EOSINOPHIL NFR BLD: 0.7 % (ref 0–8)
ERYTHROCYTE [DISTWIDTH] IN BLOOD BY AUTOMATED COUNT: 14.1 % (ref 11.5–14.5)
HCT VFR BLD AUTO: 29.7 % (ref 37–48.5)
HGB BLD-MCNC: 9.5 G/DL (ref 12–16)
IMM GRANULOCYTES # BLD AUTO: 0.04 K/UL (ref 0–0.04)
IMM GRANULOCYTES NFR BLD AUTO: 1.4 % (ref 0–0.5)
LYMPHOCYTES # BLD AUTO: 0.6 K/UL (ref 1–4.8)
LYMPHOCYTES NFR BLD: 20.9 % (ref 18–48)
MCH RBC QN AUTO: 30.6 PG (ref 27–31)
MCHC RBC AUTO-ENTMCNC: 32 G/DL (ref 32–36)
MCV RBC AUTO: 96 FL (ref 82–98)
MONOCYTES # BLD AUTO: 0.2 K/UL (ref 0.3–1)
MONOCYTES NFR BLD: 7.9 % (ref 4–15)
NEUTROPHILS # BLD AUTO: 1.9 K/UL (ref 1.8–7.7)
NEUTROPHILS NFR BLD: 69.1 % (ref 38–73)
NRBC BLD-RTO: 0 /100 WBC
PLATELET # BLD AUTO: 117 K/UL (ref 150–350)
PMV BLD AUTO: 12.4 FL (ref 9.2–12.9)
RBC # BLD AUTO: 3.1 M/UL (ref 4–5.4)
WBC # BLD AUTO: 2.78 K/UL (ref 3.9–12.7)

## 2020-10-27 PROCEDURE — 36415 COLL VENOUS BLD VENIPUNCTURE: CPT | Mod: PO

## 2020-10-27 PROCEDURE — 85025 COMPLETE CBC W/AUTO DIFF WBC: CPT

## 2020-10-27 PROCEDURE — 80197 ASSAY OF TACROLIMUS: CPT

## 2020-10-27 PROCEDURE — 80069 RENAL FUNCTION PANEL: CPT

## 2020-10-27 PROCEDURE — 82306 VITAMIN D 25 HYDROXY: CPT

## 2020-10-27 PROCEDURE — 83735 ASSAY OF MAGNESIUM: CPT

## 2020-10-28 ENCOUNTER — TELEPHONE (OUTPATIENT)
Dept: TRANSPLANT | Facility: CLINIC | Age: 65
End: 2020-10-28

## 2020-10-28 DIAGNOSIS — Z29.89 PROPHYLACTIC IMMUNOTHERAPY: ICD-10-CM

## 2020-10-28 DIAGNOSIS — Z94.0 STATUS POST KIDNEY TRANSPLANT: ICD-10-CM

## 2020-10-28 DIAGNOSIS — Z79.60 LONG-TERM USE OF IMMUNOSUPPRESSANT MEDICATION: ICD-10-CM

## 2020-10-28 LAB
25(OH)D3+25(OH)D2 SERPL-MCNC: 20 NG/ML (ref 30–96)
ALBUMIN SERPL BCP-MCNC: 4 G/DL (ref 3.5–5.2)
ANION GAP SERPL CALC-SCNC: 11 MMOL/L (ref 8–16)
BUN SERPL-MCNC: 25 MG/DL (ref 8–23)
CALCIUM SERPL-MCNC: 9 MG/DL (ref 8.7–10.5)
CHLORIDE SERPL-SCNC: 107 MMOL/L (ref 95–110)
CO2 SERPL-SCNC: 24 MMOL/L (ref 23–29)
CREAT SERPL-MCNC: 2 MG/DL (ref 0.5–1.4)
EST. GFR  (AFRICAN AMERICAN): 30 ML/MIN/1.73 M^2
EST. GFR  (NON AFRICAN AMERICAN): 26 ML/MIN/1.73 M^2
GLUCOSE SERPL-MCNC: 249 MG/DL (ref 70–110)
MAGNESIUM SERPL-MCNC: 1.1 MG/DL (ref 1.6–2.6)
PHOSPHATE SERPL-MCNC: 3.7 MG/DL (ref 2.7–4.5)
POTASSIUM SERPL-SCNC: 3.6 MMOL/L (ref 3.5–5.1)
SODIUM SERPL-SCNC: 142 MMOL/L (ref 136–145)
TACROLIMUS BLD-MCNC: 9.8 NG/ML (ref 5–15)

## 2020-10-28 RX ORDER — MAGNESIUM SULFATE HEPTAHYDRATE 1 G/100ML
3 INJECTION, SOLUTION INTRAVENOUS ONCE
Status: CANCELLED
Start: 2020-10-28

## 2020-10-28 RX ORDER — TACROLIMUS 1 MG/1
6 CAPSULE ORAL EVERY 12 HOURS
Qty: 360 CAPSULE | Refills: 11 | Status: SHIPPED | OUTPATIENT
Start: 2020-10-28 | End: 2020-11-03 | Stop reason: SDUPTHER

## 2020-10-28 NOTE — TELEPHONE ENCOUNTER
Patient and Daughter Niay repeated back and voice a understanding of orders.  IV Mag and IV N/S schedule for 10/30/2020.  Patient has not been taking Mag ox and instructed to resatrt ASAP.  They also voice a undersatnding that she needs to be drinking over 2 liters of H2O QD.  High Mag diet reviewed.  Next labs scheduled for 11/2/2020.      ----- Message from Darci Flores RN sent at 10/28/2020  8:32 AM CDT -----    ----- Message -----  From: Pavel Padilla MD  Sent: 10/28/2020   8:15 AM CDT  To: [facility] Post-Kidney Transplant Clinical    Let's plan for IV magnesium 3 gm and IV fluids 2 lt of NSS  Repeat labs Monday  Plan for a kidney biopsy next week in case creatinine remains elevated

## 2020-10-28 NOTE — PROGRESS NOTES
Lower prograf to 6 mg po bid   Let's plan for IV magnesium 3 gm and IV fluids 2 lt of NSS  Repeat labs Monday  Plan for a kidney biopsy next week in case creatinine remains elevated

## 2020-10-28 NOTE — TELEPHONE ENCOUNTER
Left message with these changes and plans. Asked to call back prior to noon or call tomorrow.       ----- Message from Pavel Padilla MD sent at 10/28/2020 10:23 AM CDT -----  Lower prograf to 6 mg po bid   Let's plan for IV magnesium 3 gm and IV fluids 2 lt of NSS  Repeat labs Monday  Plan for a kidney biopsy next week in case creatinine remains elevated

## 2020-10-28 NOTE — TELEPHONE ENCOUNTER
Spoke with Daughter all orders are in place, IV fluids and Mag fri labs Monday will lower FK to 6mg q 12hrs, and is aware of possible Biopsy. She verbalized understanding.      ----- Message from Pavel Padilla MD sent at 10/28/2020 10:23 AM CDT -----  Lower prograf to 6 mg po bid   Let's plan for IV magnesium 3 gm and IV fluids 2 lt of NSS  Repeat labs Monday  Plan for a kidney biopsy next week in case creatinine remains elevated

## 2020-10-28 NOTE — PROGRESS NOTES
Let's plan for IV magnesium 3 gm and IV fluids 2 lt of NSS  Repeat labs Monday  Plan for a kidney biopsy next week in case creatinine remains elevated

## 2020-10-28 NOTE — TELEPHONE ENCOUNTER
Patient and daughter repeated back and voice a understanding of orders.  Next labs 11/2/2020.    ----- Message from Ladan Callahan RN sent at 10/28/2020 10:55 AM CDT -----  Left message , with FK changes however needs to confirm and set up the rest. Thanks J  ----- Message -----  From: Pavel Padilla MD  Sent: 10/28/2020  10:23 AM CDT  To: [facility] Post-Kidney Transplant Clinical    Lower prograf to 6 mg po bid   Let's plan for IV magnesium 3 gm and IV fluids 2 lt of NSS  Repeat labs Monday  Plan for a kidney biopsy next week in case creatinine remains elevated

## 2020-10-30 ENCOUNTER — INFUSION (OUTPATIENT)
Dept: INFUSION THERAPY | Facility: HOSPITAL | Age: 65
End: 2020-10-30
Attending: INTERNAL MEDICINE
Payer: MEDICARE

## 2020-10-30 VITALS
TEMPERATURE: 98 F | RESPIRATION RATE: 16 BRPM | HEART RATE: 82 BPM | DIASTOLIC BLOOD PRESSURE: 66 MMHG | BODY MASS INDEX: 31.45 KG/M2 | SYSTOLIC BLOOD PRESSURE: 146 MMHG | WEIGHT: 177.5 LBS | HEIGHT: 63 IN | OXYGEN SATURATION: 98 %

## 2020-10-30 DIAGNOSIS — Z94.0 STATUS POST KIDNEY TRANSPLANT: Primary | ICD-10-CM

## 2020-10-30 PROCEDURE — 25000003 PHARM REV CODE 250: Performed by: INTERNAL MEDICINE

## 2020-10-30 PROCEDURE — 96365 THER/PROPH/DIAG IV INF INIT: CPT

## 2020-10-30 PROCEDURE — 63600175 PHARM REV CODE 636 W HCPCS: Performed by: INTERNAL MEDICINE

## 2020-10-30 PROCEDURE — 96366 THER/PROPH/DIAG IV INF ADDON: CPT

## 2020-10-30 RX ORDER — MAGNESIUM SULFATE 1 G/100ML
3 INJECTION INTRAVENOUS ONCE
Status: COMPLETED | OUTPATIENT
Start: 2020-10-30 | End: 2020-10-30

## 2020-10-30 RX ORDER — SODIUM CHLORIDE 9 MG/ML
2000 INJECTION, SOLUTION INTRAVENOUS
Status: COMPLETED | OUTPATIENT
Start: 2020-10-30 | End: 2020-10-30

## 2020-10-30 RX ORDER — MAGNESIUM SULFATE HEPTAHYDRATE 1 G/100ML
3 INJECTION, SOLUTION INTRAVENOUS ONCE
Status: CANCELLED
Start: 2020-10-30

## 2020-10-30 RX ADMIN — MAGNESIUM SULFATE HEPTAHYDRATE 3 G: 1 INJECTION, SOLUTION INTRAVENOUS at 01:10

## 2020-10-30 RX ADMIN — SODIUM CHLORIDE 2000 ML: 0.9 INJECTION, SOLUTION INTRAVENOUS at 01:10

## 2020-11-02 ENCOUNTER — LAB VISIT (OUTPATIENT)
Dept: LAB | Facility: HOSPITAL | Age: 65
End: 2020-11-02
Attending: INTERNAL MEDICINE
Payer: MEDICARE

## 2020-11-02 DIAGNOSIS — Z94.0 KIDNEY REPLACED BY TRANSPLANT: ICD-10-CM

## 2020-11-02 PROCEDURE — 80197 ASSAY OF TACROLIMUS: CPT

## 2020-11-02 PROCEDURE — 36415 COLL VENOUS BLD VENIPUNCTURE: CPT | Mod: PO

## 2020-11-02 PROCEDURE — 80069 RENAL FUNCTION PANEL: CPT

## 2020-11-02 PROCEDURE — 83735 ASSAY OF MAGNESIUM: CPT

## 2020-11-02 PROCEDURE — 85025 COMPLETE CBC W/AUTO DIFF WBC: CPT

## 2020-11-03 DIAGNOSIS — Z79.60 LONG-TERM USE OF IMMUNOSUPPRESSANT MEDICATION: ICD-10-CM

## 2020-11-03 DIAGNOSIS — Z94.0 STATUS POST KIDNEY TRANSPLANT: ICD-10-CM

## 2020-11-03 DIAGNOSIS — Z29.89 PROPHYLACTIC IMMUNOTHERAPY: ICD-10-CM

## 2020-11-03 LAB
ALBUMIN SERPL BCP-MCNC: 3.9 G/DL (ref 3.5–5.2)
ANION GAP SERPL CALC-SCNC: 11 MMOL/L (ref 8–16)
BASOPHILS # BLD AUTO: 0.01 K/UL (ref 0–0.2)
BASOPHILS NFR BLD: 0.3 % (ref 0–1.9)
BUN SERPL-MCNC: 21 MG/DL (ref 8–23)
CALCIUM SERPL-MCNC: 9.6 MG/DL (ref 8.7–10.5)
CHLORIDE SERPL-SCNC: 107 MMOL/L (ref 95–110)
CO2 SERPL-SCNC: 21 MMOL/L (ref 23–29)
CREAT SERPL-MCNC: 1.7 MG/DL (ref 0.5–1.4)
DIFFERENTIAL METHOD: ABNORMAL
EOSINOPHIL # BLD AUTO: 0 K/UL (ref 0–0.5)
EOSINOPHIL NFR BLD: 1.2 % (ref 0–8)
ERYTHROCYTE [DISTWIDTH] IN BLOOD BY AUTOMATED COUNT: 14.4 % (ref 11.5–14.5)
EST. GFR  (AFRICAN AMERICAN): 36.5 ML/MIN/1.73 M^2
EST. GFR  (NON AFRICAN AMERICAN): 31.6 ML/MIN/1.73 M^2
GLUCOSE SERPL-MCNC: 195 MG/DL (ref 70–110)
HCT VFR BLD AUTO: 30.1 % (ref 37–48.5)
HGB BLD-MCNC: 9.2 G/DL (ref 12–16)
IMM GRANULOCYTES # BLD AUTO: 0.01 K/UL (ref 0–0.04)
IMM GRANULOCYTES NFR BLD AUTO: 0.3 % (ref 0–0.5)
LYMPHOCYTES # BLD AUTO: 0.5 K/UL (ref 1–4.8)
LYMPHOCYTES NFR BLD: 16.1 % (ref 18–48)
MAGNESIUM SERPL-MCNC: 1.5 MG/DL (ref 1.6–2.6)
MCH RBC QN AUTO: 30.5 PG (ref 27–31)
MCHC RBC AUTO-ENTMCNC: 30.6 G/DL (ref 32–36)
MCV RBC AUTO: 100 FL (ref 82–98)
MONOCYTES # BLD AUTO: 0.3 K/UL (ref 0.3–1)
MONOCYTES NFR BLD: 7.9 % (ref 4–15)
NEUTROPHILS # BLD AUTO: 2.5 K/UL (ref 1.8–7.7)
NEUTROPHILS NFR BLD: 74.2 % (ref 38–73)
NRBC BLD-RTO: 0 /100 WBC
PHOSPHATE SERPL-MCNC: 2.4 MG/DL (ref 2.7–4.5)
PLATELET # BLD AUTO: 116 K/UL (ref 150–350)
PMV BLD AUTO: 12.8 FL (ref 9.2–12.9)
POTASSIUM SERPL-SCNC: 3.6 MMOL/L (ref 3.5–5.1)
RBC # BLD AUTO: 3.02 M/UL (ref 4–5.4)
SODIUM SERPL-SCNC: 139 MMOL/L (ref 136–145)
TACROLIMUS BLD-MCNC: 6.2 NG/ML (ref 5–15)
WBC # BLD AUTO: 3.3 K/UL (ref 3.9–12.7)

## 2020-11-03 RX ORDER — TACROLIMUS 1 MG/1
CAPSULE ORAL
Qty: 390 CAPSULE | Refills: 11 | Status: SHIPPED | OUTPATIENT
Start: 2020-11-03 | End: 2020-11-18 | Stop reason: SDUPTHER

## 2020-11-03 NOTE — TELEPHONE ENCOUNTER
Patient and daughter sharif repeated back and voice a understanding of orders.  Next labs 11/10/2020.    ----- Message from Pavel Padilla MD sent at 11/3/2020 12:15 PM CST -----  Increase prograf to 7/6

## 2020-11-04 ENCOUNTER — TELEPHONE (OUTPATIENT)
Dept: OPHTHALMOLOGY | Facility: CLINIC | Age: 65
End: 2020-11-04

## 2020-11-10 ENCOUNTER — LAB VISIT (OUTPATIENT)
Dept: LAB | Facility: HOSPITAL | Age: 65
End: 2020-11-10
Attending: INTERNAL MEDICINE
Payer: MEDICARE

## 2020-11-10 DIAGNOSIS — Z94.0 KIDNEY REPLACED BY TRANSPLANT: ICD-10-CM

## 2020-11-10 LAB
ALBUMIN SERPL BCP-MCNC: 3.7 G/DL (ref 3.5–5.2)
ANION GAP SERPL CALC-SCNC: 10 MMOL/L (ref 8–16)
BUN SERPL-MCNC: 14 MG/DL (ref 8–23)
CALCIUM SERPL-MCNC: 9.3 MG/DL (ref 8.7–10.5)
CHLORIDE SERPL-SCNC: 107 MMOL/L (ref 95–110)
CO2 SERPL-SCNC: 25 MMOL/L (ref 23–29)
CREAT SERPL-MCNC: 2 MG/DL (ref 0.5–1.4)
EST. GFR  (AFRICAN AMERICAN): 30 ML/MIN/1.73 M^2
EST. GFR  (NON AFRICAN AMERICAN): 26 ML/MIN/1.73 M^2
GLUCOSE SERPL-MCNC: 178 MG/DL (ref 70–110)
MAGNESIUM SERPL-MCNC: 1.7 MG/DL (ref 1.6–2.6)
PHOSPHATE SERPL-MCNC: 3.3 MG/DL (ref 2.7–4.5)
POTASSIUM SERPL-SCNC: 3.6 MMOL/L (ref 3.5–5.1)
SODIUM SERPL-SCNC: 142 MMOL/L (ref 136–145)

## 2020-11-10 PROCEDURE — 85007 BL SMEAR W/DIFF WBC COUNT: CPT

## 2020-11-10 PROCEDURE — 80069 RENAL FUNCTION PANEL: CPT

## 2020-11-10 PROCEDURE — 36415 COLL VENOUS BLD VENIPUNCTURE: CPT | Mod: PO

## 2020-11-10 PROCEDURE — 82728 ASSAY OF FERRITIN: CPT

## 2020-11-10 PROCEDURE — 85027 COMPLETE CBC AUTOMATED: CPT

## 2020-11-10 PROCEDURE — 83735 ASSAY OF MAGNESIUM: CPT

## 2020-11-10 PROCEDURE — 80197 ASSAY OF TACROLIMUS: CPT

## 2020-11-10 PROCEDURE — 83540 ASSAY OF IRON: CPT

## 2020-11-11 ENCOUNTER — TELEPHONE (OUTPATIENT)
Dept: TRANSPLANT | Facility: CLINIC | Age: 65
End: 2020-11-11

## 2020-11-11 DIAGNOSIS — N18.9 ANEMIA OF RENAL DISEASE: ICD-10-CM

## 2020-11-11 DIAGNOSIS — D63.1 ANEMIA OF RENAL DISEASE: ICD-10-CM

## 2020-11-11 DIAGNOSIS — Z94.0 KIDNEY REPLACED BY TRANSPLANT: Primary | ICD-10-CM

## 2020-11-11 DIAGNOSIS — D70.2 OTHER DRUG-INDUCED NEUTROPENIA: ICD-10-CM

## 2020-11-11 LAB
ANISOCYTOSIS BLD QL SMEAR: SLIGHT
BASOPHILS # BLD AUTO: ABNORMAL K/UL (ref 0–0.2)
BASOPHILS NFR BLD: 3 % (ref 0–1.9)
DIFFERENTIAL METHOD: ABNORMAL
EOSINOPHIL # BLD AUTO: ABNORMAL K/UL (ref 0–0.5)
EOSINOPHIL NFR BLD: 2 % (ref 0–8)
ERYTHROCYTE [DISTWIDTH] IN BLOOD BY AUTOMATED COUNT: 14.1 % (ref 11.5–14.5)
FERRITIN SERPL-MCNC: 949 NG/ML (ref 20–300)
HCT VFR BLD AUTO: 26.1 % (ref 37–48.5)
HGB BLD-MCNC: 7.9 G/DL (ref 12–16)
HYPOCHROMIA BLD QL SMEAR: ABNORMAL
IMM GRANULOCYTES # BLD AUTO: ABNORMAL K/UL (ref 0–0.04)
IMM GRANULOCYTES NFR BLD AUTO: ABNORMAL % (ref 0–0.5)
IRON SERPL-MCNC: 54 UG/DL (ref 30–160)
LYMPHOCYTES # BLD AUTO: ABNORMAL K/UL (ref 1–4.8)
LYMPHOCYTES NFR BLD: 38 % (ref 18–48)
MCH RBC QN AUTO: 30 PG (ref 27–31)
MCHC RBC AUTO-ENTMCNC: 30.3 G/DL (ref 32–36)
MCV RBC AUTO: 99 FL (ref 82–98)
MONOCYTES # BLD AUTO: ABNORMAL K/UL (ref 0.3–1)
MONOCYTES NFR BLD: 9 % (ref 4–15)
NEUTROPHILS NFR BLD: 48 % (ref 38–73)
NRBC BLD-RTO: 0 /100 WBC
PLATELET # BLD AUTO: 124 K/UL (ref 150–350)
PLATELET BLD QL SMEAR: ABNORMAL
PMV BLD AUTO: 12.7 FL (ref 9.2–12.9)
RBC # BLD AUTO: 2.63 M/UL (ref 4–5.4)
SATURATED IRON: 20 % (ref 20–50)
TACROLIMUS BLD-MCNC: 8 NG/ML (ref 5–15)
TOTAL IRON BINDING CAPACITY: 272 UG/DL (ref 250–450)
TRANSFERRIN SERPL-MCNC: 184 MG/DL (ref 200–375)
WBC # BLD AUTO: 1.56 K/UL (ref 3.9–12.7)

## 2020-11-11 RX ORDER — PREDNISONE 5 MG/1
TABLET ORAL
Qty: 120 TABLET | Refills: 10 | Status: ON HOLD | OUTPATIENT
Start: 2020-11-11 | End: 2021-04-08 | Stop reason: SDUPTHER

## 2020-11-11 NOTE — TELEPHONE ENCOUNTER
Patient and Daughter Niya repeated back and voice a understanding of orders.  Injections schedule at [facility] in [place].    ----- Message from Pavel Padilla MD sent at 11/11/2020 12:26 PM CST -----  Let's prooced with therapy plan for procrit 10,000 weekly x 4 weeks

## 2020-11-11 NOTE — TELEPHONE ENCOUNTER
Patient and Daughter Niya repeated back and voice a understanding of orders.   Neutropenia precautions reviewed with patient , Next labs tomorrow 11/12/2020.  Patient denies taking Dapsone and having Black stools.    ----- Message from Pavel Padilla MD sent at 11/11/2020  8:36 AM CST -----  Let's d/c MMF  Let's add iron stores ferritin to the labs from yesterday  let's  repeat serum CMV pcr  Check the stool for occult blood  Ask the patient if she has black stool  Make sure she is not taking dapsone  Repeat labs 11/12 including LDH and haptoglobin

## 2020-11-11 NOTE — PROGRESS NOTES
Let's d/c MMF  Let's add iron stores ferritin to the labs from yesterday  let's  repeat serum CMV pcr  Check the stool for occult blood  Ask the patient if she has black stool  Make sure she is not taking dapsone  Repeat labs 11/12 including LDH and haptoglobin

## 2020-11-13 ENCOUNTER — INFUSION (OUTPATIENT)
Dept: INFUSION THERAPY | Facility: HOSPITAL | Age: 65
End: 2020-11-13
Attending: INTERNAL MEDICINE
Payer: MEDICARE

## 2020-11-13 ENCOUNTER — TELEPHONE (OUTPATIENT)
Dept: TRANSPLANT | Facility: CLINIC | Age: 65
End: 2020-11-13

## 2020-11-13 VITALS
WEIGHT: 179.13 LBS | RESPIRATION RATE: 17 BRPM | BODY MASS INDEX: 31.73 KG/M2 | OXYGEN SATURATION: 99 % | DIASTOLIC BLOOD PRESSURE: 70 MMHG | HEART RATE: 67 BPM | TEMPERATURE: 98 F | SYSTOLIC BLOOD PRESSURE: 150 MMHG

## 2020-11-13 DIAGNOSIS — D63.1 ANEMIA ASSOCIATED WITH CHRONIC RENAL FAILURE: ICD-10-CM

## 2020-11-13 DIAGNOSIS — N18.9 ANEMIA ASSOCIATED WITH CHRONIC RENAL FAILURE: ICD-10-CM

## 2020-11-13 DIAGNOSIS — Z94.0 STATUS POST KIDNEY TRANSPLANT: Primary | ICD-10-CM

## 2020-11-13 PROCEDURE — 96372 THER/PROPH/DIAG INJ SC/IM: CPT

## 2020-11-13 PROCEDURE — 63600175 PHARM REV CODE 636 W HCPCS: Performed by: INTERNAL MEDICINE

## 2020-11-13 RX ADMIN — EPOETIN ALFA-EPBX 10000 UNITS: 10000 INJECTION, SOLUTION INTRAVENOUS; SUBCUTANEOUS at 09:11

## 2020-11-16 ENCOUNTER — TELEPHONE (OUTPATIENT)
Dept: TRANSPLANT | Facility: CLINIC | Age: 65
End: 2020-11-16

## 2020-11-16 DIAGNOSIS — Z94.0 KIDNEY REPLACED BY TRANSPLANT: Primary | ICD-10-CM

## 2020-11-16 NOTE — TELEPHONE ENCOUNTER
Results reviewed with patient Daughter Niya .  Repeat Labs schedule for 11/17/2020 and plan Biopsy for 11/20/2020.    ----- Message from Pavel Padilla MD sent at 11/16/2020 10:00 AM CST -----  Let's repeat labs , and proceed scheduling a kidney biopsy. Her baseline was around 1.2 to 1.4  She also has some pancytopenia seen by hematology

## 2020-11-17 DIAGNOSIS — Z79.60 LONG-TERM USE OF IMMUNOSUPPRESSANT MEDICATION: ICD-10-CM

## 2020-11-17 DIAGNOSIS — E83.39 HYPOPHOSPHATEMIA: ICD-10-CM

## 2020-11-17 DIAGNOSIS — Z94.0 STATUS POST KIDNEY TRANSPLANT: ICD-10-CM

## 2020-11-17 DIAGNOSIS — Z29.89 PROPHYLACTIC IMMUNOTHERAPY: ICD-10-CM

## 2020-11-17 RX ORDER — DIBASIC SODIUM PHOSPHATE, MONOBASIC POTASSIUM PHOSPHATE AND MONOBASIC SODIUM PHOSPHATE 852; 155; 130 MG/1; MG/1; MG/1
TABLET ORAL
Qty: 120 TABLET | Refills: 11 | Status: SHIPPED | OUTPATIENT
Start: 2020-11-17 | End: 2020-12-10 | Stop reason: ALTCHOICE

## 2020-11-18 RX ORDER — TACROLIMUS 1 MG/1
6 CAPSULE ORAL EVERY 12 HOURS
Qty: 360 CAPSULE | Refills: 11 | Status: ON HOLD | OUTPATIENT
Start: 2020-11-18 | End: 2020-11-26 | Stop reason: HOSPADM

## 2020-11-18 NOTE — TELEPHONE ENCOUNTER
Patient and daughter Niya repeated back and voice a understanding of orders.  Next labs 11/24/2020.     ----- Message from Pavel Padilla MD sent at 11/18/2020 11:15 AM CST -----  Lets lower prograf to 6 mg po bid

## 2020-11-19 ENCOUNTER — TELEPHONE (OUTPATIENT)
Dept: RADIOLOGY | Facility: HOSPITAL | Age: 65
End: 2020-11-19

## 2020-11-20 ENCOUNTER — INFUSION (OUTPATIENT)
Dept: INFUSION THERAPY | Facility: HOSPITAL | Age: 65
End: 2020-11-20
Attending: INTERNAL MEDICINE
Payer: MEDICARE

## 2020-11-20 DIAGNOSIS — Z94.0 STATUS POST KIDNEY TRANSPLANT: Primary | ICD-10-CM

## 2020-11-20 DIAGNOSIS — N18.9 ANEMIA ASSOCIATED WITH CHRONIC RENAL FAILURE: ICD-10-CM

## 2020-11-20 DIAGNOSIS — D63.1 ANEMIA ASSOCIATED WITH CHRONIC RENAL FAILURE: ICD-10-CM

## 2020-11-20 PROCEDURE — 63600175 PHARM REV CODE 636 W HCPCS: Mod: EC | Performed by: INTERNAL MEDICINE

## 2020-11-20 PROCEDURE — 96372 THER/PROPH/DIAG INJ SC/IM: CPT

## 2020-11-20 RX ADMIN — EPOETIN ALFA-EPBX 10000 UNITS: 10000 INJECTION, SOLUTION INTRAVENOUS; SUBCUTANEOUS at 09:11

## 2020-11-20 NOTE — NURSING
Pt came in today for Retacrit injection and presents with left arm swelling and tenderness.  Fistula in left arm.  Pt states this started 2 days ago.  Spoke with Darci in transplant.  Pt advised to go to ED today.  Spoke with pt's daughter Niya and she will bring pt to the ED.

## 2020-11-23 ENCOUNTER — TELEPHONE (OUTPATIENT)
Dept: VASCULAR SURGERY | Facility: CLINIC | Age: 65
End: 2020-11-23

## 2020-11-23 DIAGNOSIS — T82.898A ARTERIOVENOUS FISTULA OCCLUSION, INITIAL ENCOUNTER: Primary | ICD-10-CM

## 2020-11-23 NOTE — TELEPHONE ENCOUNTER
Spoke with pt to schedule appt on 11/25/20 but pt states she has a previous appt for 1pm and can't make it.Pt was offered an earlier appt but declined due to transportation issues.Pt requested that I speak with her daughter Niya at [phone].Spoke with pt's daughter Niya and appt confirmed.

## 2020-11-24 ENCOUNTER — LAB VISIT (OUTPATIENT)
Dept: LAB | Facility: HOSPITAL | Age: 65
End: 2020-11-24
Attending: INTERNAL MEDICINE
Payer: MEDICARE

## 2020-11-24 DIAGNOSIS — Z94.0 KIDNEY REPLACED BY TRANSPLANT: ICD-10-CM

## 2020-11-24 LAB
ALBUMIN SERPL BCP-MCNC: 3.9 G/DL (ref 3.5–5.2)
ANION GAP SERPL CALC-SCNC: 12 MMOL/L (ref 8–16)
BASOPHILS # BLD AUTO: 0.01 K/UL (ref 0–0.2)
BASOPHILS NFR BLD: 0.6 % (ref 0–1.9)
BUN SERPL-MCNC: 27 MG/DL (ref 8–23)
CALCIUM SERPL-MCNC: 9.4 MG/DL (ref 8.7–10.5)
CHLORIDE SERPL-SCNC: 111 MMOL/L (ref 95–110)
CO2 SERPL-SCNC: 20 MMOL/L (ref 23–29)
CREAT SERPL-MCNC: 2 MG/DL (ref 0.5–1.4)
DIFFERENTIAL METHOD: ABNORMAL
EOSINOPHIL # BLD AUTO: 0.1 K/UL (ref 0–0.5)
EOSINOPHIL NFR BLD: 3.4 % (ref 0–8)
ERYTHROCYTE [DISTWIDTH] IN BLOOD BY AUTOMATED COUNT: 14.7 % (ref 11.5–14.5)
EST. GFR  (AFRICAN AMERICAN): 30 ML/MIN/1.73 M^2
EST. GFR  (NON AFRICAN AMERICAN): 26 ML/MIN/1.73 M^2
GLUCOSE SERPL-MCNC: 146 MG/DL (ref 70–110)
HCT VFR BLD AUTO: 27.6 % (ref 37–48.5)
HGB BLD-MCNC: 8.2 G/DL (ref 12–16)
IMM GRANULOCYTES # BLD AUTO: 0.03 K/UL (ref 0–0.04)
IMM GRANULOCYTES NFR BLD AUTO: 1.7 % (ref 0–0.5)
LYMPHOCYTES # BLD AUTO: 0.5 K/UL (ref 1–4.8)
LYMPHOCYTES NFR BLD: 27.7 % (ref 18–48)
MAGNESIUM SERPL-MCNC: 1.9 MG/DL (ref 1.6–2.6)
MCH RBC QN AUTO: 29.5 PG (ref 27–31)
MCHC RBC AUTO-ENTMCNC: 29.7 G/DL (ref 32–36)
MCV RBC AUTO: 99 FL (ref 82–98)
MONOCYTES # BLD AUTO: 0.3 K/UL (ref 0.3–1)
MONOCYTES NFR BLD: 15.3 % (ref 4–15)
NEUTROPHILS # BLD AUTO: 0.9 K/UL (ref 1.8–7.7)
NEUTROPHILS NFR BLD: 51.3 % (ref 38–73)
NRBC BLD-RTO: 0 /100 WBC
PHOSPHATE SERPL-MCNC: 3.4 MG/DL (ref 2.7–4.5)
PLATELET # BLD AUTO: 153 K/UL (ref 150–350)
PMV BLD AUTO: 10.7 FL (ref 9.2–12.9)
POTASSIUM SERPL-SCNC: 4.2 MMOL/L (ref 3.5–5.1)
RBC # BLD AUTO: 2.78 M/UL (ref 4–5.4)
SODIUM SERPL-SCNC: 143 MMOL/L (ref 136–145)
WBC # BLD AUTO: 1.77 K/UL (ref 3.9–12.7)

## 2020-11-24 PROCEDURE — 83735 ASSAY OF MAGNESIUM: CPT

## 2020-11-24 PROCEDURE — 80069 RENAL FUNCTION PANEL: CPT

## 2020-11-24 PROCEDURE — 80197 ASSAY OF TACROLIMUS: CPT

## 2020-11-24 PROCEDURE — 85025 COMPLETE CBC W/AUTO DIFF WBC: CPT

## 2020-11-24 PROCEDURE — 36415 COLL VENOUS BLD VENIPUNCTURE: CPT | Mod: PO

## 2020-11-25 ENCOUNTER — HOSPITAL ENCOUNTER (INPATIENT)
Facility: HOSPITAL | Age: 65
LOS: 1 days | Discharge: HOME | DRG: 699 | End: 2020-11-26
Attending: HOSPITALIST | Admitting: HOSPITALIST
Payer: MEDICARE

## 2020-11-25 DIAGNOSIS — N17.9 AKI (ACUTE KIDNEY INJURY): ICD-10-CM

## 2020-11-25 DIAGNOSIS — D63.1 ANEMIA ASSOCIATED WITH CHRONIC RENAL FAILURE: Chronic | ICD-10-CM

## 2020-11-25 DIAGNOSIS — N18.9 ANEMIA ASSOCIATED WITH CHRONIC RENAL FAILURE: Chronic | ICD-10-CM

## 2020-11-25 DIAGNOSIS — E55.9 VITAMIN D DEFICIENCY: ICD-10-CM

## 2020-11-25 DIAGNOSIS — R31.29 HEMATURIA, MICROSCOPIC: Primary | ICD-10-CM

## 2020-11-25 DIAGNOSIS — N39.0 UTI (URINARY TRACT INFECTION): ICD-10-CM

## 2020-11-25 PROBLEM — D70.9 NEUTROPENIA: Status: ACTIVE | Noted: 2020-11-25

## 2020-11-25 LAB
ABO + RH BLD: NORMAL
ALBUMIN SERPL BCP-MCNC: 3.6 G/DL (ref 3.5–5.2)
ALP SERPL-CCNC: 92 U/L (ref 55–135)
ALT SERPL W/O P-5'-P-CCNC: 10 U/L (ref 10–44)
ANION GAP SERPL CALC-SCNC: 9 MMOL/L (ref 8–16)
ANISOCYTOSIS BLD QL SMEAR: SLIGHT
AST SERPL-CCNC: 11 U/L (ref 10–40)
BACTERIA #/AREA URNS AUTO: NORMAL /HPF
BASO STIPL BLD QL SMEAR: ABNORMAL
BASOPHILS # BLD AUTO: 0 K/UL (ref 0–0.2)
BASOPHILS NFR BLD: 0 % (ref 0–1.9)
BILIRUB SERPL-MCNC: 0.3 MG/DL (ref 0.1–1)
BILIRUB UR QL STRIP: NEGATIVE
BLD GP AB SCN CELLS X3 SERPL QL: NORMAL
BLOOD GROUP ANTIBODIES SERPL: NORMAL
BUN SERPL-MCNC: 26 MG/DL (ref 8–23)
C3 SERPL-MCNC: 120 MG/DL (ref 50–180)
C4 SERPL-MCNC: 41 MG/DL (ref 11–44)
CALCIUM SERPL-MCNC: 8.8 MG/DL (ref 8.7–10.5)
CHLORIDE SERPL-SCNC: 111 MMOL/L (ref 95–110)
CLARITY UR REFRACT.AUTO: CLEAR
CO2 SERPL-SCNC: 21 MMOL/L (ref 23–29)
COLOR UR AUTO: YELLOW
CREAT SERPL-MCNC: 2 MG/DL (ref 0.5–1.4)
CREAT UR-MCNC: 100 MG/DL (ref 15–325)
CREAT UR-MCNC: 100 MG/DL (ref 15–325)
DACRYOCYTES BLD QL SMEAR: ABNORMAL
DACRYOCYTES BLD QL SMEAR: ABNORMAL
DIFFERENTIAL METHOD: ABNORMAL
EOSINOPHIL # BLD AUTO: 0 K/UL (ref 0–0.5)
EOSINOPHIL # BLD AUTO: 0.1 K/UL (ref 0–0.5)
EOSINOPHIL # BLD AUTO: 0.1 K/UL (ref 0–0.5)
EOSINOPHIL NFR BLD: 1.7 % (ref 0–8)
EOSINOPHIL NFR BLD: 3.1 % (ref 0–8)
EOSINOPHIL NFR BLD: 4.1 % (ref 0–8)
ERYTHROCYTE [DISTWIDTH] IN BLOOD BY AUTOMATED COUNT: 14.7 % (ref 11.5–14.5)
ERYTHROCYTE [DISTWIDTH] IN BLOOD BY AUTOMATED COUNT: 14.7 % (ref 11.5–14.5)
ERYTHROCYTE [DISTWIDTH] IN BLOOD BY AUTOMATED COUNT: 14.8 % (ref 11.5–14.5)
EST. GFR  (AFRICAN AMERICAN): 30 ML/MIN/1.73 M^2
EST. GFR  (NON AFRICAN AMERICAN): 26 ML/MIN/1.73 M^2
GLUCOSE SERPL-MCNC: 115 MG/DL (ref 70–110)
GLUCOSE UR QL STRIP: NEGATIVE
HAPTOGLOB SERPL-MCNC: <10 MG/DL (ref 30–250)
HCT VFR BLD AUTO: 23.2 % (ref 37–48.5)
HCT VFR BLD AUTO: 23.6 % (ref 37–48.5)
HCT VFR BLD AUTO: 25.9 % (ref 37–48.5)
HGB BLD-MCNC: 7 G/DL (ref 12–16)
HGB BLD-MCNC: 7.1 G/DL (ref 12–16)
HGB BLD-MCNC: 7.8 G/DL (ref 12–16)
HGB UR QL STRIP: NEGATIVE
HYALINE CASTS UR QL AUTO: 0 /LPF
HYPOCHROMIA BLD QL SMEAR: ABNORMAL
IMM GRANULOCYTES # BLD AUTO: 0.02 K/UL (ref 0–0.04)
IMM GRANULOCYTES NFR BLD AUTO: 1.1 % (ref 0–0.5)
IMM GRANULOCYTES NFR BLD AUTO: 1.2 % (ref 0–0.5)
IMM GRANULOCYTES NFR BLD AUTO: 1.3 % (ref 0–0.5)
KETONES UR QL STRIP: NEGATIVE
LDH SERPL L TO P-CCNC: 257 U/L (ref 110–260)
LEUKOCYTE ESTERASE UR QL STRIP: NEGATIVE
LYMPHOCYTES # BLD AUTO: 0.4 K/UL (ref 1–4.8)
LYMPHOCYTES # BLD AUTO: 0.4 K/UL (ref 1–4.8)
LYMPHOCYTES # BLD AUTO: 0.5 K/UL (ref 1–4.8)
LYMPHOCYTES NFR BLD: 21.8 % (ref 18–48)
LYMPHOCYTES NFR BLD: 23.1 % (ref 18–48)
LYMPHOCYTES NFR BLD: 26.9 % (ref 18–48)
MAGNESIUM SERPL-MCNC: 1.9 MG/DL (ref 1.6–2.6)
MCH RBC QN AUTO: 29.9 PG (ref 27–31)
MCH RBC QN AUTO: 30 PG (ref 27–31)
MCH RBC QN AUTO: 30.1 PG (ref 27–31)
MCHC RBC AUTO-ENTMCNC: 30.1 G/DL (ref 32–36)
MCHC RBC AUTO-ENTMCNC: 30.1 G/DL (ref 32–36)
MCHC RBC AUTO-ENTMCNC: 30.2 G/DL (ref 32–36)
MCV RBC AUTO: 100 FL (ref 82–98)
MCV RBC AUTO: 100 FL (ref 82–98)
MCV RBC AUTO: 99 FL (ref 82–98)
MICROSCOPIC COMMENT: NORMAL
MONOCYTES # BLD AUTO: 0.1 K/UL (ref 0.3–1)
MONOCYTES # BLD AUTO: 0.2 K/UL (ref 0.3–1)
MONOCYTES # BLD AUTO: 0.3 K/UL (ref 0.3–1)
MONOCYTES NFR BLD: 11.3 % (ref 4–15)
MONOCYTES NFR BLD: 17 % (ref 4–15)
MONOCYTES NFR BLD: 8 % (ref 4–15)
NEUTROPHILS # BLD AUTO: 0.9 K/UL (ref 1.8–7.7)
NEUTROPHILS # BLD AUTO: 1 K/UL (ref 1.8–7.7)
NEUTROPHILS # BLD AUTO: 1.2 K/UL (ref 1.8–7.7)
NEUTROPHILS NFR BLD: 50.8 % (ref 38–73)
NEUTROPHILS NFR BLD: 61.2 % (ref 38–73)
NEUTROPHILS NFR BLD: 67.4 % (ref 38–73)
NITRITE UR QL STRIP: NEGATIVE
NRBC BLD-RTO: 0 /100 WBC
OVALOCYTES BLD QL SMEAR: ABNORMAL
OVALOCYTES BLD QL SMEAR: ABNORMAL
PATH REV BLD -IMP: NORMAL
PH UR STRIP: 6 [PH] (ref 5–8)
PHOSPHATE SERPL-MCNC: 3.9 MG/DL (ref 2.7–4.5)
PLATELET # BLD AUTO: 119 K/UL (ref 150–350)
PLATELET # BLD AUTO: 124 K/UL (ref 150–350)
PLATELET # BLD AUTO: 130 K/UL (ref 150–350)
PLATELET BLD QL SMEAR: ABNORMAL
PLATELET BLD QL SMEAR: ABNORMAL
PMV BLD AUTO: 11.5 FL (ref 9.2–12.9)
PMV BLD AUTO: 12.1 FL (ref 9.2–12.9)
PMV BLD AUTO: 12.2 FL (ref 9.2–12.9)
POCT GLUCOSE: 137 MG/DL (ref 70–110)
POCT GLUCOSE: 145 MG/DL (ref 70–110)
POCT GLUCOSE: 151 MG/DL (ref 70–110)
POIKILOCYTOSIS BLD QL SMEAR: SLIGHT
POIKILOCYTOSIS BLD QL SMEAR: SLIGHT
POLYCHROMASIA BLD QL SMEAR: ABNORMAL
POLYCHROMASIA BLD QL SMEAR: ABNORMAL
POTASSIUM SERPL-SCNC: 3.9 MMOL/L (ref 3.5–5.1)
PROT SERPL-MCNC: 6 G/DL (ref 6–8.4)
PROT UR QL STRIP: ABNORMAL
PROT UR-MCNC: 31 MG/DL (ref 0–15)
PROT/CREAT UR: 0.31 MG/G{CREAT} (ref 0–0.2)
RBC # BLD AUTO: 2.33 M/UL (ref 4–5.4)
RBC # BLD AUTO: 2.36 M/UL (ref 4–5.4)
RBC # BLD AUTO: 2.61 M/UL (ref 4–5.4)
RBC #/AREA URNS AUTO: 3 /HPF (ref 0–4)
RETICS/RBC NFR AUTO: 5 % (ref 0.5–2.5)
SCHISTOCYTES BLD QL SMEAR: ABNORMAL
SODIUM SERPL-SCNC: 141 MMOL/L (ref 136–145)
SODIUM UR-SCNC: 74 MMOL/L (ref 20–250)
SP GR UR STRIP: 1.01 (ref 1–1.03)
SQUAMOUS #/AREA URNS AUTO: 1 /HPF
TACROLIMUS BLD-MCNC: 8.7 NG/ML (ref 5–15)
TACROLIMUS BLD-MCNC: 9 NG/ML (ref 5–15)
URN SPEC COLLECT METH UR: ABNORMAL
WBC # BLD AUTO: 1.6 K/UL (ref 3.9–12.7)
WBC # BLD AUTO: 1.71 K/UL (ref 3.9–12.7)
WBC # BLD AUTO: 1.74 K/UL (ref 3.9–12.7)
WBC #/AREA URNS AUTO: 2 /HPF (ref 0–5)

## 2020-11-25 PROCEDURE — 80197 ASSAY OF TACROLIMUS: CPT

## 2020-11-25 PROCEDURE — 97161 PT EVAL LOW COMPLEX 20 MIN: CPT

## 2020-11-25 PROCEDURE — 87799 DETECT AGENT NOS DNA QUANT: CPT

## 2020-11-25 PROCEDURE — 85045 AUTOMATED RETICULOCYTE COUNT: CPT

## 2020-11-25 PROCEDURE — 85060 BLOOD SMEAR INTERPRETATION: CPT | Mod: ,,, | Performed by: PATHOLOGY

## 2020-11-25 PROCEDURE — 85060 PATHOLOGIST REVIEW: ICD-10-PCS | Mod: ,,, | Performed by: PATHOLOGY

## 2020-11-25 PROCEDURE — 25000003 PHARM REV CODE 250: Performed by: STUDENT IN AN ORGANIZED HEALTH CARE EDUCATION/TRAINING PROGRAM

## 2020-11-25 PROCEDURE — 97165 OT EVAL LOW COMPLEX 30 MIN: CPT

## 2020-11-25 PROCEDURE — 94761 N-INVAS EAR/PLS OXIMETRY MLT: CPT

## 2020-11-25 PROCEDURE — 86160 COMPLEMENT ANTIGEN: CPT | Mod: 59

## 2020-11-25 PROCEDURE — 83735 ASSAY OF MAGNESIUM: CPT

## 2020-11-25 PROCEDURE — 87799 DETECT AGENT NOS DNA QUANT: CPT | Mod: 91

## 2020-11-25 PROCEDURE — 86160 COMPLEMENT ANTIGEN: CPT

## 2020-11-25 PROCEDURE — 81001 URINALYSIS AUTO W/SCOPE: CPT

## 2020-11-25 PROCEDURE — 99223 PR INITIAL HOSPITAL CARE,LEVL III: ICD-10-PCS | Mod: ,,, | Performed by: INTERNAL MEDICINE

## 2020-11-25 PROCEDURE — 99223 1ST HOSP IP/OBS HIGH 75: CPT | Mod: ,,, | Performed by: INTERNAL MEDICINE

## 2020-11-25 PROCEDURE — 84100 ASSAY OF PHOSPHORUS: CPT

## 2020-11-25 PROCEDURE — 99223 1ST HOSP IP/OBS HIGH 75: CPT | Mod: GC,,, | Performed by: INTERNAL MEDICINE

## 2020-11-25 PROCEDURE — C9399 UNCLASSIFIED DRUGS OR BIOLOG: HCPCS | Performed by: STUDENT IN AN ORGANIZED HEALTH CARE EDUCATION/TRAINING PROGRAM

## 2020-11-25 PROCEDURE — 83010 ASSAY OF HAPTOGLOBIN QUANT: CPT

## 2020-11-25 PROCEDURE — 20600001 HC STEP DOWN PRIVATE ROOM

## 2020-11-25 PROCEDURE — 87086 URINE CULTURE/COLONY COUNT: CPT

## 2020-11-25 PROCEDURE — 84300 ASSAY OF URINE SODIUM: CPT

## 2020-11-25 PROCEDURE — 99222 PR INITIAL HOSPITAL CARE,LEVL II: ICD-10-PCS | Mod: GC,,, | Performed by: INTERNAL MEDICINE

## 2020-11-25 PROCEDURE — 99223 PR INITIAL HOSPITAL CARE,LEVL III: ICD-10-PCS | Mod: GC,,, | Performed by: INTERNAL MEDICINE

## 2020-11-25 PROCEDURE — 36415 COLL VENOUS BLD VENIPUNCTURE: CPT

## 2020-11-25 PROCEDURE — 86901 BLOOD TYPING SEROLOGIC RH(D): CPT

## 2020-11-25 PROCEDURE — 99222 1ST HOSP IP/OBS MODERATE 55: CPT | Mod: GC,,, | Performed by: INTERNAL MEDICINE

## 2020-11-25 PROCEDURE — 83615 LACTATE (LD) (LDH) ENZYME: CPT

## 2020-11-25 PROCEDURE — 86870 RBC ANTIBODY IDENTIFICATION: CPT

## 2020-11-25 PROCEDURE — 63600175 PHARM REV CODE 636 W HCPCS: Performed by: STUDENT IN AN ORGANIZED HEALTH CARE EDUCATION/TRAINING PROGRAM

## 2020-11-25 PROCEDURE — 80053 COMPREHEN METABOLIC PANEL: CPT

## 2020-11-25 PROCEDURE — 82570 ASSAY OF URINE CREATININE: CPT

## 2020-11-25 PROCEDURE — 85025 COMPLETE CBC W/AUTO DIFF WBC: CPT

## 2020-11-25 RX ORDER — IBUPROFEN 200 MG
16 TABLET ORAL
Status: DISCONTINUED | OUTPATIENT
Start: 2020-11-25 | End: 2020-11-26 | Stop reason: HOSPADM

## 2020-11-25 RX ORDER — PREDNISONE 5 MG/1
5 TABLET ORAL 2 TIMES DAILY
Status: DISCONTINUED | OUTPATIENT
Start: 2020-11-25 | End: 2020-11-26 | Stop reason: HOSPADM

## 2020-11-25 RX ORDER — CINACALCET 30 MG/1
30 TABLET, FILM COATED ORAL
Status: DISCONTINUED | OUTPATIENT
Start: 2020-11-25 | End: 2020-11-26 | Stop reason: HOSPADM

## 2020-11-25 RX ORDER — ENOXAPARIN SODIUM 100 MG/ML
30 INJECTION SUBCUTANEOUS EVERY 24 HOURS
Status: DISCONTINUED | OUTPATIENT
Start: 2020-11-25 | End: 2020-11-26 | Stop reason: HOSPADM

## 2020-11-25 RX ORDER — HYDRALAZINE HYDROCHLORIDE 25 MG/1
25 TABLET, FILM COATED ORAL EVERY 8 HOURS
Status: DISCONTINUED | OUTPATIENT
Start: 2020-11-25 | End: 2020-11-26

## 2020-11-25 RX ORDER — SODIUM CHLORIDE 9 MG/ML
INJECTION, SOLUTION INTRAVENOUS CONTINUOUS
Status: DISCONTINUED | OUTPATIENT
Start: 2020-11-25 | End: 2020-11-25

## 2020-11-25 RX ORDER — ACETAMINOPHEN 325 MG/1
650 TABLET ORAL EVERY 4 HOURS PRN
Status: DISCONTINUED | OUTPATIENT
Start: 2020-11-25 | End: 2020-11-26 | Stop reason: HOSPADM

## 2020-11-25 RX ORDER — TACROLIMUS 1 MG/1
6 CAPSULE ORAL 2 TIMES DAILY
Status: DISCONTINUED | OUTPATIENT
Start: 2020-11-25 | End: 2020-11-26 | Stop reason: HOSPADM

## 2020-11-25 RX ORDER — SODIUM CHLORIDE 0.9 % (FLUSH) 0.9 %
10 SYRINGE (ML) INJECTION
Status: DISCONTINUED | OUTPATIENT
Start: 2020-11-25 | End: 2020-11-26 | Stop reason: HOSPADM

## 2020-11-25 RX ORDER — TALC
6 POWDER (GRAM) TOPICAL NIGHTLY PRN
Status: DISCONTINUED | OUTPATIENT
Start: 2020-11-25 | End: 2020-11-26 | Stop reason: HOSPADM

## 2020-11-25 RX ORDER — INSULIN ASPART 100 [IU]/ML
3 INJECTION, SOLUTION INTRAVENOUS; SUBCUTANEOUS
Status: DISCONTINUED | OUTPATIENT
Start: 2020-11-25 | End: 2020-11-26 | Stop reason: HOSPADM

## 2020-11-25 RX ORDER — ATORVASTATIN CALCIUM 20 MG/1
40 TABLET, FILM COATED ORAL DAILY
Status: DISCONTINUED | OUTPATIENT
Start: 2020-11-25 | End: 2020-11-26 | Stop reason: HOSPADM

## 2020-11-25 RX ORDER — LANOLIN ALCOHOL/MO/W.PET/CERES
400 CREAM (GRAM) TOPICAL DAILY
Status: DISCONTINUED | OUTPATIENT
Start: 2020-11-25 | End: 2020-11-26 | Stop reason: HOSPADM

## 2020-11-25 RX ORDER — SODIUM BICARBONATE 650 MG/1
650 TABLET ORAL 2 TIMES DAILY
Status: DISCONTINUED | OUTPATIENT
Start: 2020-11-25 | End: 2020-11-26 | Stop reason: HOSPADM

## 2020-11-25 RX ORDER — INSULIN ASPART 100 [IU]/ML
0-5 INJECTION, SOLUTION INTRAVENOUS; SUBCUTANEOUS
Status: DISCONTINUED | OUTPATIENT
Start: 2020-11-25 | End: 2020-11-26 | Stop reason: HOSPADM

## 2020-11-25 RX ORDER — ACETAMINOPHEN 325 MG/1
650 TABLET ORAL EVERY 8 HOURS PRN
Status: DISCONTINUED | OUTPATIENT
Start: 2020-11-25 | End: 2020-11-26 | Stop reason: HOSPADM

## 2020-11-25 RX ORDER — CARVEDILOL 12.5 MG/1
12.5 TABLET ORAL 2 TIMES DAILY WITH MEALS
Status: DISCONTINUED | OUTPATIENT
Start: 2020-11-25 | End: 2020-11-26 | Stop reason: HOSPADM

## 2020-11-25 RX ORDER — CEFTRIAXONE 1 G/1
1 INJECTION, POWDER, FOR SOLUTION INTRAMUSCULAR; INTRAVENOUS
Status: DISCONTINUED | OUTPATIENT
Start: 2020-11-25 | End: 2020-11-26 | Stop reason: HOSPADM

## 2020-11-25 RX ORDER — SODIUM CHLORIDE 9 MG/ML
INJECTION, SOLUTION INTRAVENOUS CONTINUOUS
Status: ACTIVE | OUTPATIENT
Start: 2020-11-25 | End: 2020-11-25

## 2020-11-25 RX ORDER — ERGOCALCIFEROL 1.25 MG/1
50000 CAPSULE ORAL
Status: DISCONTINUED | OUTPATIENT
Start: 2020-11-25 | End: 2020-11-26 | Stop reason: HOSPADM

## 2020-11-25 RX ORDER — ONDANSETRON 8 MG/1
8 TABLET, ORALLY DISINTEGRATING ORAL EVERY 8 HOURS PRN
Status: DISCONTINUED | OUTPATIENT
Start: 2020-11-25 | End: 2020-11-26 | Stop reason: HOSPADM

## 2020-11-25 RX ORDER — IBUPROFEN 200 MG
24 TABLET ORAL
Status: DISCONTINUED | OUTPATIENT
Start: 2020-11-25 | End: 2020-11-26 | Stop reason: HOSPADM

## 2020-11-25 RX ORDER — NIFEDIPINE 30 MG/1
60 TABLET, EXTENDED RELEASE ORAL 2 TIMES DAILY
Status: DISCONTINUED | OUTPATIENT
Start: 2020-11-25 | End: 2020-11-26 | Stop reason: HOSPADM

## 2020-11-25 RX ORDER — GLUCAGON 1 MG
1 KIT INJECTION
Status: DISCONTINUED | OUTPATIENT
Start: 2020-11-25 | End: 2020-11-26 | Stop reason: HOSPADM

## 2020-11-25 RX ADMIN — INSULIN DETEMIR 8 UNITS: 100 INJECTION, SOLUTION SUBCUTANEOUS at 08:11

## 2020-11-25 RX ADMIN — CINACALCET 30 MG: 30 TABLET, FILM COATED ORAL at 08:11

## 2020-11-25 RX ADMIN — NIFEDIPINE 60 MG: 30 TABLET, FILM COATED, EXTENDED RELEASE ORAL at 07:11

## 2020-11-25 RX ADMIN — NIFEDIPINE 60 MG: 30 TABLET, FILM COATED, EXTENDED RELEASE ORAL at 08:11

## 2020-11-25 RX ADMIN — MELATONIN TAB 3 MG 6 MG: 3 TAB at 07:11

## 2020-11-25 RX ADMIN — ERGOCALCIFEROL 50000 UNITS: 1.25 CAPSULE ORAL at 08:11

## 2020-11-25 RX ADMIN — TACROLIMUS 6 MG: 1 CAPSULE ORAL at 05:11

## 2020-11-25 RX ADMIN — CARVEDILOL 12.5 MG: 12.5 TABLET, FILM COATED ORAL at 04:11

## 2020-11-25 RX ADMIN — INSULIN ASPART 3 UNITS: 100 INJECTION, SOLUTION INTRAVENOUS; SUBCUTANEOUS at 04:11

## 2020-11-25 RX ADMIN — SODIUM BICARBONATE 650 MG TABLET 650 MG: at 07:11

## 2020-11-25 RX ADMIN — SODIUM BICARBONATE 650 MG TABLET 650 MG: at 08:11

## 2020-11-25 RX ADMIN — DIBASIC SODIUM PHOSPHATE, MONOBASIC POTASSIUM PHOSPHATE AND MONOBASIC SODIUM PHOSPHATE 2 TABLET: 852; 155; 130 TABLET ORAL at 08:11

## 2020-11-25 RX ADMIN — SODIUM CHLORIDE: 0.9 INJECTION, SOLUTION INTRAVENOUS at 04:11

## 2020-11-25 RX ADMIN — Medication 400 MG: at 08:11

## 2020-11-25 RX ADMIN — TACROLIMUS 6 MG: 1 CAPSULE ORAL at 08:11

## 2020-11-25 RX ADMIN — CARVEDILOL 12.5 MG: 12.5 TABLET, FILM COATED ORAL at 08:11

## 2020-11-25 RX ADMIN — INSULIN ASPART 3 UNITS: 100 INJECTION, SOLUTION INTRAVENOUS; SUBCUTANEOUS at 12:11

## 2020-11-25 RX ADMIN — INSULIN DETEMIR 8 UNITS: 100 INJECTION, SOLUTION SUBCUTANEOUS at 09:11

## 2020-11-25 RX ADMIN — ATORVASTATIN CALCIUM 40 MG: 20 TABLET, FILM COATED ORAL at 08:11

## 2020-11-25 RX ADMIN — PREDNISONE 5 MG: 5 TABLET ORAL at 07:11

## 2020-11-25 RX ADMIN — PREDNISONE 5 MG: 5 TABLET ORAL at 08:11

## 2020-11-25 RX ADMIN — DIBASIC SODIUM PHOSPHATE, MONOBASIC POTASSIUM PHOSPHATE AND MONOBASIC SODIUM PHOSPHATE 2 TABLET: 852; 155; 130 TABLET ORAL at 07:11

## 2020-11-25 RX ADMIN — INSULIN ASPART 3 UNITS: 100 INJECTION, SOLUTION INTRAVENOUS; SUBCUTANEOUS at 08:11

## 2020-11-25 RX ADMIN — ENOXAPARIN SODIUM 30 MG: 30 INJECTION SUBCUTANEOUS at 04:11

## 2020-11-25 RX ADMIN — HYDRALAZINE HYDROCHLORIDE 25 MG: 25 TABLET ORAL at 10:11

## 2020-11-25 RX ADMIN — CEFTRIAXONE SODIUM 1 G: 1 INJECTION, POWDER, FOR SOLUTION INTRAMUSCULAR; INTRAVENOUS at 02:11

## 2020-11-25 NOTE — CONSULTS
Consult Note    Inpatient consult to Hematology  Consult performed by: Gaby Tom MD  Consult ordered by: Eyal Manning DO        SUBJECTIVE:     History of Present Illness:    PMHX:  Esrd s/p transplant 9/2019  CKD3  DM  HTN  HLD  CVA in 2007    Pt is a 66 yo F with pMHX as noted above who Presented to [facility] with generalized weakness, dyspnea, lightheadedness. Noticed sx on walk in neighborhood yesterday.Presented to [facility] and [facility] [place] for Cr of 2.3.She has Has lower back and flank pain w dysuria, and she was diagnosed with UTI.  Pt had anemia with Hemoglobin of 8.5. She was transferred for KTM consult. We are consulted for pancytopenia.     PT denies any new onset bleeding. She endorses cough and runny nose last week but no fevers; pt tested COVID negative yesterday. She does not recall on having any of her immunosuppresant transplant meds changes recently.     She has seen Dr. Lin on 9/2018, with prior bone marrow in 8/2018, which was unremarkable for MDS. Pt was on prograf, cellcept, and fluconazole as outpatient; cellcept and ketoconazole were held.  Irisenlty on prednisone 5 mg. Recently saw Hematology, Dr. Orozco on 9/24/20. Noted on that visit, Hgb of 8.4, .      Review of patient's allergies indicates:  No Known Allergies  Past Medical History:   Diagnosis Date    Abnormal Pap smear     repeat paps were normal    Anemia associated with chronic renal failure     Awaiting organ transplant status  - 02/18/2013 6/6/2014    Blood type A+ 6/6/2014    CKD (chronic kidney disease) stage 3, GFR 30-59 ml/min 10/30/2019    CKD (chronic kidney disease) stage 5, GFR less than 15 ml/min     secondary hypertension    Diabetes mellitus     Essential hypertension 5/19/2017    Hyperlipidemia     Hypertension, renal 1992    Immunocompromised state 10/4/2019    Stroke 2007    Residual speech deficit     Past Surgical History:   Procedure Laterality Date    AV FISTULA  PLACEMENT  2014    BONE MARROW BIOPSY Right 8/23/2018    Procedure: Biopsy-bone marrow;  Surgeon: Anna Lin MD;  Location: [facility] OR [address] [place] [address];  Service: Oncology;  Laterality: Right;    CHOLECYSTECTOMY  2008    HYSTERECTOMY  2011    TAHBSO for benign reasons    KIDNEY TRANSPLANT N/A 9/20/2019    Procedure: TRANSPLANT, KIDNEY;  Surgeon: Guero Rogers MD;  Location: [facility] OR [address] [place] [address];  Service: Transplant;  Laterality: N/A;    OOPHORECTOMY       Family History   Problem Relation Age of Onset    Stroke Mother     Kidney disease Mother         on dialysis    Hypertension Mother     Heart disease Mother     Heart disease Father     Stroke Sister     Kidney disease Brother     Breast cancer Daughter     Heart disease Sister     Ovarian cancer Cousin     Colon cancer Neg Hx     Thrombophilia Neg Hx      Social History     Tobacco Use    Smoking status: Never Smoker    Smokeless tobacco: Never Used   Substance Use Topics    Alcohol use: No    Drug use: No     Review of Systems   Constitutional: Negative for chills, diaphoresis, fever and weight loss.   HENT: Negative for sore throat.    Eyes: Negative for double vision.   Respiratory: Negative for shortness of breath.    Cardiovascular: Negative for chest pain and leg swelling.   Gastrointestinal: Negative for abdominal pain, blood in stool and vomiting.   Genitourinary: Positive for dysuria.   Skin: Negative for rash.   Neurological: Negative for sensory change and headaches.     OBJECTIVE:     Vital Signs:  Temp:  [99 °F (37.2 °C)-99.2 °F (37.3 °C)]   Pulse:  [58-66]   Resp:  [18-23]   BP: (143-152)/(70-88)   SpO2:  [95 %-100 %]     Physical Exam  Constitutional:       General: She is not in acute distress.     Appearance: Normal appearance. She is obese. She is not ill-appearing or toxic-appearing.   HENT:      Head: Normocephalic and atraumatic.      Nose: Nose normal.      Mouth/Throat:      Pharynx: Oropharynx is clear.   Eyes:      Extraocular  Movements: Extraocular movements intact.      Conjunctiva/sclera: Conjunctivae normal.      Pupils: Pupils are equal, round, and reactive to light.   Neck:      Musculoskeletal: Normal range of motion and neck supple. No neck rigidity.   Cardiovascular:      Rate and Rhythm: Normal rate and regular rhythm.      Pulses: Normal pulses.      Heart sounds: Normal heart sounds. No murmur. No gallop.    Pulmonary:      Effort: Pulmonary effort is normal. No respiratory distress.      Breath sounds: Normal breath sounds. No wheezing or rales.   Abdominal:      General: Abdomen is flat. Bowel sounds are normal. There is no distension.      Palpations: Abdomen is soft.      Tenderness: There is no abdominal tenderness. There is no guarding.   Musculoskeletal: Normal range of motion.         General: No swelling.   Skin:     General: Skin is warm and dry.      Coloration: Skin is not jaundiced.   Neurological:      General: No focal deficit present.      Mental Status: She is alert and oriented to person, place, and time.      Cranial Nerves: No cranial nerve deficit.       Laboratory:  CBC:   Recent Labs   Lab 11/25/20  1042   WBC 1.60*   RBC 2.36*   HGB 7.1*   HCT 23.6*   *   *   MCH 30.1   MCHC 30.1*     BMP:   Recent Labs   Lab 11/25/20  0625   *      K 3.9   *   CO2 21*   BUN 26*   CREATININE 2.0*   CALCIUM 8.8   MG 1.9     CMP:   Recent Labs   Lab 11/25/20  0625   *   CALCIUM 8.8   ALBUMIN 3.6   PROT 6.0      K 3.9   CO2 21*   *   BUN 26*   CREATININE 2.0*   ALKPHOS 92   ALT 10   AST 11   BILITOT 0.3     LFTs:   Recent Labs   Lab 11/25/20  0625   ALT 10   AST 11   ALKPHOS 92   BILITOT 0.3   PROT 6.0   ALBUMIN 3.6     Coagulation:   Recent Labs   Lab 11/20/20  1315   LABPROT 10.0   INR 0.9   APTT 27.0     Cardiac markers: No results for input(s): CKMB, CPKMB, TROPONINT, TROPONINI, MYOGLOBIN in the last 168 hours.  ABGs: No results for input(s): PH, PCO2, PO2, HCO3,  POCSATURATED, BE in the last 168 hours.  Microbiology Results (last 7 days)     Procedure Component Value Units Date/Time    Urine culture [[id number]] Collected: 11/25/20 0356    Order Status: No result Specimen: Urine from No method given Updated: 11/25/20 122    Narrative:      High Risk    Urine Culture High Risk [[id number]]     Order Status: Completed Specimen: Urine         Specimen (12h ago, onward)    None        Recent Labs   Lab 11/25/20  0356   COLORU Yellow   SPECGRAV 1.015   PHUR 6.0   PROTEINUA 1+*   BACTERIA Rare   NITRITE Negative   LEUKOCYTESUR Negative   HYALINECASTS 0       Diagnostic Results:        PostOperative Diagnosis  cp  SPECIMEN  1) Bone marrow clot, right iliac crest.  2) Bone marrow core biopsy, right iliac crest.  3) Bone marrow aspirate (slides).  FINAL PATHOLOGIC DIAGNOSIS  BONE MARROW ASPIRATE SMEARS, TOUCH IMPRINTS, CORE BIOPSY, RIGHT ILIAC CREST, AND CLOT  SECTION:  --Normocellular bone marrow with trilineage hematopoiesis and no definite morphologic or immunophenotypic  evidence of a hematolymphoid neoplasm, see comment.  --Increased storage iron.  COMMENT: The core biopsy and clot section are normocellular for age (50%). Blasts are not increased.  Megakaryocytes are adequate in number and show a minor (less than 10%) subset of small, hypolobated forms.  However, minimal morphologic criteria for myelodysplasia are not met. Storage iron is increased, suggestive of a  component of anemia of chronic inflammation. Please correlate with the corresponding cytogenetics analysis.  Diagnosed by: Emily Thornton M.D.  (Electronically Signed: 2018-08-30 16:00:44)    Microscopic Examination  CBC (8/18/2018):  WBC 2.9 k/uL, RBC 3.1 M/uL, Hgb 9.5 g/dL, Hct 29.6%, MCV 96 fL, MCHC 32.1%, RDW 15.7%, Plt 115 k/uL  WBC Differential:  Segmented neutrophils: 37.3%  Lymphocytes: 47.6%  Monocytes: 11.0%  Eosinophils: 3.8%  Basophils: 0.3%    BONE MARROW DIFFERENTIAL:  Cells counted: 500 M:E ratio:  1.9  Blasts: 1.0%  Promyelocytes/Myelocytes: 12.0%  Metamyelocytes/Bands/Segmented neutrophils: 35.8%  Eosinophils: 8.0%  Monocytes: 2.0%  Lymphocytes: 8.8%  Plasma cells: 1.4%  Erythroid precursors: 31.0%  PERIPHERAL SMEAR:  Not received.  ASPIRATE SMEARS:  The aspirate smears contain adequate cellular particles for evaluation. Megakaryocytes are adequate in number and  show a minor subset of small, hypolobated forms; the majority appears unremarkable. The myelomonocytic elements  are adequate in number and show complete maturation with unremarkable morphology. The erythroid elements are  adequate in number and show complete maturation with predominantly unremarkable morphology; rare forms of  nuclear budding are seen. An iron stain demonstrates increased storage iron and increased sideroblastic iron. No  ring sideroblasts are seen.    CORE BIOPSY:  The right core biopsy consists of periosteum, cortical and trabecular bone, and predominantly subcortical bone marrow  and is adequate for evaluation. The cellularity is 50%. Megakaryocytes are adequate in number and show  predominantly unremarkable morphology. The myelomonocytic and erythroid elements are adequate in number and  show complete maturation. An iron stain demonstrates the presence of stainable iron.  CLOT SECTION:  The clot section contains adequate cellular particles. A small lipogranuloma is present; otherwise, the findings are  similar to the core biopsy. Immunohistochemical stains for CD34 and CD61 are performed for greater sensitivity and  architectural assessment. CD34 positive blasts are not increased. CD61 confirms the presence of adequate  megakaryocytes with heterogeneous morphology. An iron stain demonstrates the presence of stainable iron. Controls  stained appropriately.        (Supplemented by: Rich Verduzco 8/1/2018 9:56:51 AM)  .  (Supplemented by: Rich Verduzco 8/1/2018 4:27:46 PM)  ORDERING PHYSICIAN(S)  MALLORIE MILLS  "DIAGNOSIS/INFORMATION  dm  PreOperative Diagnosis  Pancytopenia.  SPECIMEN  1) Bone marrow clot, left iliac crest.  2) Bone marrow aspirate slides.  Supplemental Diagnosis  Please see fluorescent in-situ hybridization (FISH) results reported in [facility] from [facility]  ([facility], [address] [place] [address]):  "MDS FISH:  Interpretation:  Comments: This result is within normal limits for the MDS FISH panel."  Please see cytogenetic karyotype results reported in [facility] from [facility] ([facility],  [address] [place] [address]):  "46,XX[20], No clonal abnormality was apparent."  CD20,ASR,ASR  (Electronically Signed: 2018-08-01 16:28:15 )  Diagnosed by: Rich Verduzco  FINAL PATHOLOGIC DIAGNOSIS  BONE MARROW, LEFT ILIAC CREST (ASPIRATE SMEAR AND CLOT SECTION):  SUBOPTIMAL FOR MORPHOLOGIC EVALUATION.  -- TRILINEAGE HEMATOPOIESIS.  -- INCREASED IRON STORAGE.  -- SEE COMMENT.  COMMENT:  Flow cytometric analysis of bone marrow shows populations of polyclonal B lymphocytes and T lymphocytes that  are immunophenotypically unremarkable. Hematogones are detected. The blast gate is not increased.  Immunohistochemical stains are performed on the clot section for greater sensitivity and further architectural  assessment with adequate controls. The interstitial lymphocytes are composed of mixed CD3- positive T cells and  CD20-positive B cells. -positive plasma cells are not significantly increased. Immunoglobulin kappa/lambda  in situ hybridization study fails to detect monoclonal plasma cell population.  The specimen is suboptimal for morphologic evaluation. Correlation with other laboratory results is required. If  clinically indicated, repeat bone marrow aspiration and biopsy is recommended.  Diagnosed by: Rich Verduzco  (Electronically Signed: 2018-07-30 15:42:13)  Microscopic Examination  BONE MARROW ASPIRATE " DIFFERENTIAL:  Blasts------------------------------------------------------------------------------ 1.2 %  Promyelocytes/Myelocytes-------------------------------------------------- 5.2 %  Metamyelocytes/Bands/PMN Neutrophils------------------------------- 23.7 %  Eosinophils----------------------------------------------------------------------- 4.6 %  Basophils-------------------------------------------------------------------------- 0.0 %  Monocytes------------------------------------------------------------------------- 2.9 %  Lymphocytes---------------------------------------------------------------------- 21.0 %  Plasma cells----------------------------------------------------------------------- 0.4 %  Erythroid precursors------------------------------------------------------------- 41.0 %   PERIPHERAL BLOOD SMEAR:  Not provided for review.  BONE MARROW ASPIRATE SMEAR:  The smears are aspicular and hemodiluted, which is suboptimal for morphologic evaluation. The myeloid to erythroid  ratio is approximately 0.9:1. Both myeloid and erythroid maturation are evident. Blasts are not significantly increased.  Megakaryocytes are inconspicuous. Stainable iron is nonevaluable.  BONE MARROW CLOT SECTION:  The clot sections contain small spicules. The cellularity is approximately 40%. Myeloid and erythroid precursors and  megakaryocytes are present. Lymphoid aggregates or granulomas are not identified. Stainable iron is increased.  BONE MARROW CORE BIOPSY:  Not provided for review.    ASSESSMENT/PLAN:         Plan:       Pancytopenia  - Hgb in 9-11 range over past year , dropped to 7.9 in 11/10/20 and is currently 7.1 today  - chronic leukopenia in 2K range, currently 1.6K; ANC normal at 2.5 on 11/2/20 and is currently 0.9   - plts mostly around 110-120 K recently, now 119 K   - mild worsening of pancytopenia likely secondary to immunosuppresive meds and infection, agree on holding cellcept   - prior B12 of 559 and folate  4.8 normal on 9/23/20 , in setting of macrocytic anemia  - iron sat of 21% and TIBC normal, with Ferritin of 688 two in 9/8/20  - thrombocytopenia and intermittent leukopenia likely secondary to meds  - prior Hep C Ab negative and HIV negative  - prior BM Biopsy in 8/3/18 without any significant hematological malignancy; did not meet criteria for dysplasia  - peripheral smear reveals some teardrop cells (2-3 in HPF), no shistocytes, no toxic granulations in neutrophils and normal WBC morphology   - transfuse as needed for Hgb < 7 without sx of < 8 with sx of anemia  - transfuse for plts < 10 K in absence of bleeding or < 50 K if bleeding     No need for bone marrow biopsy at this time. Will sign off at this time, please feel free to contact our team with any additional questions.     Discussed with Dr. Geovany Tom MD  HEmatology/Oncology Fellow, PGY VI  [facility]

## 2020-11-25 NOTE — PLAN OF CARE
Pt AAO x 4 throughout shift. Kidney u/s completed. NS infusing at 125 ml/hr per order. Pt free from falls and injury throughout shift. Pt up with stand by assist. PT/OT evals completed. BG monitored AC/HS per order.

## 2020-11-25 NOTE — ASSESSMENT & PLAN NOTE
Pt's WBC is 2.0. This is her baseline. She had bone marrow biopsy in 2018, which was unremarkable. Heme/onc thinks it is due to her use of immunosuppressant.   - Continue monitoring.

## 2020-11-25 NOTE — SUBJECTIVE & OBJECTIVE
Past Medical History:   Diagnosis Date    Abnormal Pap smear     repeat paps were normal    Anemia associated with chronic renal failure     Awaiting organ transplant status  - 02/18/2013 6/6/2014    Blood type A+ 6/6/2014    CKD (chronic kidney disease) stage 3, GFR 30-59 ml/min 10/30/2019    CKD (chronic kidney disease) stage 5, GFR less than 15 ml/min     secondary hypertension    Diabetes mellitus     Essential hypertension 5/19/2017    Hyperlipidemia     Hypertension, renal 1992    Immunocompromised state 10/4/2019    Stroke 2007    Residual speech deficit       Past Surgical History:   Procedure Laterality Date    AV FISTULA PLACEMENT  2014    BONE MARROW BIOPSY Right 8/23/2018    Procedure: Biopsy-bone marrow;  Surgeon: Anna Lin MD;  Location: [facility] OR [address] [place] [address];  Service: Oncology;  Laterality: Right;    CHOLECYSTECTOMY  2008    HYSTERECTOMY  2011    TAHBSO for benign reasons    KIDNEY TRANSPLANT N/A 9/20/2019    Procedure: TRANSPLANT, KIDNEY;  Surgeon: Guero Rogers MD;  Location: [facility] OR [address] [place] [address];  Service: Transplant;  Laterality: N/A;    OOPHORECTOMY         Review of patient's allergies indicates:  No Known Allergies    Current Facility-Administered Medications on File Prior to Encounter   Medication    [DISCONTINUED] cefTRIAXone injection     Current Outpatient Medications on File Prior to Encounter   Medication Sig    carvedilol (COREG) 25 MG tablet Take 1/2 tablet (12.5 mg total) by mouth 2 (two) times daily with meals. Hold for SBP<120, HR<60    atorvastatin (LIPITOR) 40 MG tablet Take 40 mg by mouth once daily.     blood sugar diagnostic Strp Test blood glucose 4 (four) times daily.    blood-glucose meter kit Use as instructed    cholecalciferol, vitamin D3, (VITAMIN D3) 25 mcg (1,000 unit) capsule Take 1 capsule (1,000 Units total) by mouth once daily.    cinacalcet (SENSIPAR) 30 MG Tab Take 1 tablet (30 mg total) by mouth daily with breakfast.    diclofenac sodium  "(VOLTAREN) 1 % Gel     dulaglutide (TRULICITY) 1.5 mg/0.5 mL PnIj Inject 1.5 mg into the skin once a week.    ergocalciferol (ERGOCALCIFEROL) 50,000 unit Cap Take 1 capsule (50,000 Units total) by mouth every 7 days.    famotidine (PEPCID) 20 MG tablet Take 1 tablet (20 mg total) by mouth every evening.    hydrALAZINE (APRESOLINE) 25 MG tablet Take 1 tablet (25 mg total) by mouth 3 (three) times daily.    HYDROcodone-acetaminophen (NORCO) 7.5-325 mg per tablet Take 1 tablet by mouth 3 (three) times daily as needed.    insulin aspart U-100 (NOVOLOG FLEXPEN U-100 INSULIN) 100 unit/mL (3 mL) InPn pen Inject 5 Units into the skin 3 (three) times daily with meals.    insulin detemir U-100 (LEVEMIR FLEXTOUCH) 100 unit/mL (3 mL) SubQ InPn pen Inject 24 Units into the skin every evening.    ketoconazole (NIZORAL) 200 mg Tab Take HALF tablet (100 mg total) by mouth once daily.    lancets 28 gauge Misc Test blood glucose 4 (four) times daily.    magnesium oxide (MAG-OX) 400 mg (241.3 mg magnesium) tablet TAKE 1 TABLET (400 MG TOTAL) BY MOUTH 2 (TWO) TIMES DAILY.    NIFEdipine (ADALAT CC) 60 MG TbSR Take 1 tablet (60 mg total) by mouth 2 (two) times daily.    NIFEdipine (PROCARDIA-XL) 60 MG (OSM) 24 hr tablet TAKE ONE TABLET BY MOUTH TWICE DAILY    pen needle, diabetic 32 gauge x 5/32" Ndle Use to inject insulin 4 (four) times daily.    PHOSPHA 250 NEUTRAL 250 mg Tab TAKE 2 TABLETS BY MOUTH TWICE DAILY    predniSONE (DELTASONE) 5 MG tablet TAKE 4 TABLETS 9/23 TO 10/22, THEN TAKE 3 TABLETS 10/23 TO 11/22, THEN TAKE 2 TABLETS 11/23 TO 12/22, THEN TAKE 1 TABLET DAILY THEREAFTER    sodium bicarbonate 650 MG tablet Take 1 tablet (650 mg total) by mouth 2 (two) times daily.    tacrolimus (PROGRAF) 1 MG Cap Take 6 capsules (6 mg total) by mouth every 12 (twelve) hours.     Family History     Problem Relation (Age of Onset)    Breast cancer Daughter    Heart disease Mother, Father, Sister    Hypertension Mother    " Kidney disease Mother, Brother    Ovarian cancer Cousin    Stroke Mother, Sister        Tobacco Use    Smoking status: Never Smoker    Smokeless tobacco: Never Used   Substance and Sexual Activity    Alcohol use: No    Drug use: No    Sexual activity: Never     Comment: single, Lives with daughter, on Disability, Lives in [place]     Review of Systems   Constitutional: Positive for fatigue. Negative for activity change, fever and unexpected weight change.   HENT: Negative for congestion.    Eyes: Negative for redness.   Respiratory: Negative for cough, chest tightness and shortness of breath.    Cardiovascular: Negative for chest pain, palpitations and leg swelling.   Gastrointestinal: Negative for abdominal pain, blood in stool, diarrhea, nausea and vomiting.   Endocrine: Negative for polyuria.   Genitourinary: Positive for dysuria. Negative for frequency.   Musculoskeletal: Negative for arthralgias.   Skin: Negative for rash.   Neurological: Positive for dizziness. Negative for seizures and numbness.   Psychiatric/Behavioral: Negative for agitation and behavioral problems.     Objective:     Vital Signs (Most Recent):  Temp: 99 °F (37.2 °C) (11/25/20 0403)  Pulse: 66 (11/25/20 0403)  Resp: (!) 21 (11/25/20 0403)  BP: (!) 143/75 (11/25/20 0403)  SpO2: 95 % (11/25/20 0403) Vital Signs (24h Range):  Temp:  [98.2 °F (36.8 °C)-99.2 °F (37.3 °C)] 99 °F (37.2 °C)  Pulse:  [57-66] 66  Resp:  [16-21] 21  SpO2:  [95 %-100 %] 95 %  BP: (126-148)/(74-88) 143/75     Weight: 83.2 kg (183 lb 6.8 oz)  Body mass index is 32.49 kg/m².    Physical Exam  Constitutional:       Appearance: Normal appearance.      Comments: Mild dysarthria    HENT:      Head: Normocephalic and atraumatic.      Nose: Nose normal.      Mouth/Throat:      Mouth: Mucous membranes are moist.      Pharynx: Oropharynx is clear.   Eyes:      Conjunctiva/sclera: Conjunctivae normal.      Pupils: Pupils are equal, round, and reactive to light.       Comments: Reduced EOM at right side.    Cardiovascular:      Rate and Rhythm: Normal rate and regular rhythm.      Pulses: Normal pulses.      Heart sounds: Normal heart sounds.   Pulmonary:      Effort: Pulmonary effort is normal.      Breath sounds: Normal breath sounds.   Abdominal:      General: Abdomen is flat. Bowel sounds are normal. There is no distension.      Palpations: Abdomen is soft. There is no mass.      Tenderness: There is no abdominal tenderness. There is no right CVA tenderness, left CVA tenderness or guarding.      Hernia: No hernia is present.   Musculoskeletal: Normal range of motion.      Comments: 4/5 strength at the RUE and RLE.    Skin:     General: Skin is warm and dry.   Neurological:      General: No focal deficit present.      Mental Status: She is alert and oriented to person, place, and time. Mental status is at baseline.   Psychiatric:         Mood and Affect: Mood normal.         Behavior: Behavior normal.         Thought Content: Thought content normal.         Judgment: Judgment normal.           CRANIAL NERVES     CN III, IV, VI   Pupils are equal, round, and reactive to light.       Significant Labs:   A1C:   Recent Labs   Lab 08/26/20  0924 09/08/20  0840   HGBA1C 5.0 4.8     ABGs: No results for input(s): PH, PCO2, HCO3, POCSATURATED, BE, TOTALHB, COHB, METHB, O2HB, POCFIO2 in the last 48 hours.  Bilirubin:   Recent Labs   Lab 11/17/20  0852 11/20/20  1315 11/25/20  0625   BILIDIR 0.1  --   --    BILITOT 0.4 0.4 0.3     Blood Culture: No results for input(s): LABBLOO in the last 48 hours.  BMP:   Recent Labs   Lab 11/25/20  0625   *      K 3.9   *   CO2 21*   BUN 26*   CREATININE 2.0*   CALCIUM 8.8   MG 1.9     CBC:   Recent Labs   Lab 11/24/20  0950 11/25/20  0625   WBC 1.77* 1.71*   HGB 8.2*  --    HCT 27.6*  --      --      CMP:   Recent Labs   Lab 11/24/20  0950 11/25/20  0625    141   K 4.2 3.9   * 111*   CO2 20* 21*   * 115*    BUN 27* 26*   CREATININE 2.0* 2.0*   CALCIUM 9.4 8.8   PROT  --  6.0   ALBUMIN 3.9 3.6   BILITOT  --  0.3   ALKPHOS  --  92   AST  --  11   ALT  --  10   ANIONGAP 12 9   EGFRNONAA 26.0* 26.0*     Cardiac Markers: No results for input(s): CKMB, MYOGLOBIN, BNP, TROPISTAT in the last 48 hours.  Coagulation: No results for input(s): PT, INR, APTT in the last 48 hours.  Lactic Acid: No results for input(s): LACTATE in the last 48 hours.  Lipase: No results for input(s): LIPASE in the last 48 hours.  Lipid Panel: No results for input(s): CHOL, HDL, LDLCALC, TRIG, CHOLHDL in the last 48 hours.  Magnesium:   Recent Labs   Lab 11/24/20  0950 11/25/20  0625   MG 1.9 1.9     POCT Glucose: No results for input(s): POCTGLUCOSE in the last 48 hours.  Prealbumin: No results for input(s): PREALBUMIN in the last 48 hours.  Respiratory Culture: No results for input(s): GSRESP, RESPIRATORYC in the last 48 hours.  Troponin: No results for input(s): TROPONINI in the last 48 hours.  TSH: No results for input(s): TSH in the last 4320 hours.  Urine Culture: No results for input(s): LABURIN in the last 48 hours.  Urine Studies:   Recent Labs   Lab 11/25/20  0356   COLORU Yellow   APPEARANCEUA Clear   PHUR 6.0   SPECGRAV 1.015   PROTEINUA 1+*   GLUCUA Negative   KETONESU Negative   BILIRUBINUA Negative   OCCULTUA Negative   NITRITE Negative   LEUKOCYTESUR Negative   RBCUA 3   WBCUA 2   BACTERIA Rare   SQUAMEPITHEL 1   HYALINECASTS 0     All pertinent labs within the past 24 hours have been reviewed.    Significant Imaging: I have reviewed all pertinent imaging results/findings within the past 24 hours.

## 2020-11-25 NOTE — PLAN OF CARE
Pt arrived to unit floor from [facility]  Admitted with UTI. IV ceftriaxone to be administered later in the day.  UA/culture collected.   No HTN episodes overnight.   Skin intact.  IVF fluids initiaited. NS@125cc/hr.   IM4 at bedside.   Oriented pt to room. Educated pt on use of call bell.  Pt verbalize understanding to call when needed assistance. Notified .  Orders to follow. VSS Admission documents completed. Will continue to monitor.

## 2020-11-25 NOTE — PT/OT/SLP EVAL
"Occupational Therapy   Evaluation and Discharge Note    Name: Satinder Schulte  MRN: [id number]  Admitting Diagnosis:  UTI (urinary tract infection)      Recommendations:     Discharge Recommendations: home  Discharge Equipment Recommendations:  none  Barriers to discharge:  None    Assessment:     Satinder Schulte is a 65 y.o. female with a medical diagnosis of UTI (urinary tract infection). At this time, patient is functioning at their prior level of function and does not require further acute OT services.     Plan:     During this hospitalization, patient does not require further acute OT services.  Please re-consult if situation changes.    · Plan of Care Reviewed with: patient    Subjective     "My legs feel weak."    Occupational Profile:  Living Environment: pt lives with her nephew in a mobile home with 5 steps / BHRs and a tub setup.  Previous level of function: (I) with ADLs / IADLs / walking.  Equipment Used at home:  none  Assistance upon Discharge: nephew    Pain/Comfort:  · Pain Rating 1: 0/10    Patients cultural, spiritual, [employer] conflicts given the current situation:      Objective:     Communicated with: rn prior to session.  Patient found supine with peripheral IV upon OT entry to room.    General Precautions: Standard,       Occupational Performance:    Bed Mobility:    · Mod (I)    Functional Mobility/Transfers:  · Patient completed Sit <> Stand Transfer with supervision  with  no assistive device   · Patient completed Toilet Transfer Step Transfer technique with supervision with  no AD  · Functional Mobility: Pt walked within room with (S) and no AD.    Activities of Daily Living:  · Setup - (I)    Cognitive/Visual Perceptual:  Cognitive/Psychosocial Skills:     -       Oriented to: Person, Place, Time and Situation   -       Safety awareness/insight to disability: intact     Physical Exam:  BUE AROM/MMT: WNL    AMPAC 6 Click ADL:  AMPAC Total Score: 24    Treatment & Education:  UE " ROM/MMT  Bed mobility training / assessment  Functional mobility assessment  Sit/standing balance assessment  Educated on importance of sitting OOB in bedside chair to promote increased strength, endurance & breathing.  Discussed D/C of OT  Education:    Patient left up in chair with all lines intact and call button in reach    GOALS:   Multidisciplinary Problems     Occupational Therapy Goals     Not on file          Multidisciplinary Problems (Resolved)        Problem: Occupational Therapy Goal    Goal Priority Disciplines Outcome Interventions   Occupational Therapy Goal   (Resolved)     OT, PT/OT Met                    History:     Past Medical History:   Diagnosis Date    Abnormal Pap smear     repeat paps were normal    Anemia associated with chronic renal failure     Awaiting organ transplant status  - 02/18/2013 6/6/2014    Blood type A+ 6/6/2014    CKD (chronic kidney disease) stage 3, GFR 30-59 ml/min 10/30/2019    CKD (chronic kidney disease) stage 5, GFR less than 15 ml/min     secondary hypertension    Diabetes mellitus     Essential hypertension 5/19/2017    Hyperlipidemia     Hypertension, renal 1992    Immunocompromised state 10/4/2019    Stroke 2007    Residual speech deficit       Past Surgical History:   Procedure Laterality Date    AV FISTULA PLACEMENT  2014    BONE MARROW BIOPSY Right 8/23/2018    Procedure: Biopsy-bone marrow;  Surgeon: Anna Lin MD;  Location: [facility] OR [address] [place] [address];  Service: Oncology;  Laterality: Right;    CHOLECYSTECTOMY  2008    HYSTERECTOMY  2011    TAHBSO for benign reasons    KIDNEY TRANSPLANT N/A 9/20/2019    Procedure: TRANSPLANT, KIDNEY;  Surgeon: Guero Rogers MD;  Location: [facility] OR [address] [place] [address];  Service: Transplant;  Laterality: N/A;    OOPHORECTOMY         Time Tracking:     OT Date of Treatment: 11/25/20  OT Start Time: 1115  OT Stop Time: 1125  OT Total Time (min): 10 min    Billable Minutes:Evaluation 10    LOIS Morocho  11/25/2020

## 2020-11-25 NOTE — ASSESSMENT & PLAN NOTE
Pt has history of DM. Currently on trulicity. Was on Insulin detemir 24, and aspart 5 tid, which was d/c 3 months ago.   - Did weight based calculations.   - Placed on detemir 8 bid and aspart 3 tid.   - POCT glucose ACHS.

## 2020-11-25 NOTE — PLAN OF CARE
VIKY unable to complete discharge planning assessment due to patient being out of the room at the time of attempt. SW will attempt again at a later time.        11/25/20 6171   Discharge Assessment   Assessment Type Discharge Planning Assessment     Kathryn Barriga LMSW   - Case Management

## 2020-11-25 NOTE — ASSESSMENT & PLAN NOTE
Patient is a 65 y.o. female who has a past medical history of end-stage renal disease status post transplant September 2019, anemia associated with chronic renal failure, Diabetes mellitus, Essential hypertension, Hyperlipidemia, Hypertension, renal, Immunocompromised state, and Stroke in 2007 presented with generalized weakness and shortness of breath. Her UA was positive for 20-50 WBC, protein 30, presence of hyaline cast, and small bacteria (care everywhere). Vitals signs stable.     - Started on rocephin for 5 days.   - KDM transplant consultation order placed.   - Monitor CBC and VS.

## 2020-11-25 NOTE — HPI
Patient is a 65 y.o. female who has a past medical history of end-stage renal disease status post transplant September 2019, Anemia associated with chronic renal failure, Diabetes mellitus, Essential hypertension, Hyperlipidemia, Hypertension, renal, Immunocompromised state, and Stroke in 2007 presented with generalized weakness and shortness of breath. She states she was walking with her neighbor this yesterday morning when she noticed her SOB. Also felt dizzy while walking. Dizziness was not associated with change in position, was not preceded by palpitation or she did not feel like was not spinning during head movement. She also felt weak from chest level to the lower extremities, but denies any focal weakness. Denies any fever, nausea, vomiting, diarrhea, chest pain, sob. She endorses lower back/flank pain. She endorsed burning during urination, but was denies frequency.     Patient presented to [facility] and was found to have mild SAUNDRA with BUN/Cr of 30/2.3. UA consistent with UTI. CBC shows slight anemia with hemoglobin of 8.5. Patient started on IV rocephin and given 1 unit pRBC transfusion. Vitals stable. COVID negative. Facility seeking transfer for KTM consult. KTM made aware of patient and requested transfer to . KTM consult was placed.

## 2020-11-25 NOTE — ASSESSMENT & PLAN NOTE
Pt was noted to be on SAUNDRA on admission. Her  BUN/Cr of 27/2.0. Her Cr. Has been between 1.6-1.9 over past two months. She takes prograf, cellcept, ketoconazole, and prednisolone.  - SAUNDRA work up. Urine sodium, urine cr, and retroperitoneal us. F/u    - Stopped cellcept, ketoconazole  - Continued pred and prograf  - Prograf level daily.   - Placed on fluids 125 cc/hour for 10 hours.

## 2020-11-25 NOTE — H&P
[facility] Hospital Medicine  History & Physical    Patient Name: Satinder Schulte  MRN: [id number]  Admission Date: 11/25/2020  Attending Physician: Gladys Goel MD   Primary Care Provider: Quirino Burnett MD    [facility] Medicine Team: [facility] HOSP MED 4 Mary Beth Fermin MD     Patient information was obtained from patient, past medical records and ER records.     Subjective:     Principal Problem:UTI (urinary tract infection)    Chief Complaint: Dizziness, fatigue, and burning on urination.        HPI: Patient is a 65 y.o. female who has a past medical history of end-stage renal disease status post transplant September 2019, Anemia associated with chronic renal failure, Diabetes mellitus, Essential hypertension, Hyperlipidemia, Hypertension, renal, Immunocompromised state, and Stroke in 2007 presented with generalized weakness and shortness of breath. She states she was walking with her neighbor this yesterday morning when she noticed her SOB. Also felt dizzy while walking. Dizziness was not associated with change in position, was not preceded by palpitation or she did not feel like was not spinning during head movement. She also felt weak from chest level to the lower extremities, but denies any focal weakness. Denies any fever, nausea, vomiting, diarrhea, chest pain, sob. She endorses lower back/flank pain. She endorsed burning during urination, but was denies frequency.     Patient presented to [facility] and was found to have mild SAUNDRA with BUN/Cr of 30/2.3. UA consistent with UTI. CBC shows slight anemia with hemoglobin of 8.5. Patient started on IV rocephin and given 1 unit pRBC transfusion. Vitals stable. COVID negative. Facility seeking transfer for KTM consult. KTM made aware of patient and requested transfer to . KTM consult was placed.     Past Medical History:   Diagnosis Date    Abnormal Pap smear     repeat paps were normal    Anemia associated with chronic  renal failure     Awaiting organ transplant status  - 02/18/2013 6/6/2014    Blood type A+ 6/6/2014    CKD (chronic kidney disease) stage 3, GFR 30-59 ml/min 10/30/2019    CKD (chronic kidney disease) stage 5, GFR less than 15 ml/min     secondary hypertension    Diabetes mellitus     Essential hypertension 5/19/2017    Hyperlipidemia     Hypertension, renal 1992    Immunocompromised state 10/4/2019    Stroke 2007    Residual speech deficit       Past Surgical History:   Procedure Laterality Date    AV FISTULA PLACEMENT  2014    BONE MARROW BIOPSY Right 8/23/2018    Procedure: Biopsy-bone marrow;  Surgeon: Anna Lin MD;  Location: [facility] OR [address] [place] [address];  Service: Oncology;  Laterality: Right;    CHOLECYSTECTOMY  2008    HYSTERECTOMY  2011    TAHBSO for benign reasons    KIDNEY TRANSPLANT N/A 9/20/2019    Procedure: TRANSPLANT, KIDNEY;  Surgeon: Guero Rogers MD;  Location: [facility] OR [address] [place] [address];  Service: Transplant;  Laterality: N/A;    OOPHORECTOMY         Review of patient's allergies indicates:  No Known Allergies    Current Facility-Administered Medications on File Prior to Encounter   Medication    [DISCONTINUED] cefTRIAXone injection     Current Outpatient Medications on File Prior to Encounter   Medication Sig    carvedilol (COREG) 25 MG tablet Take 1/2 tablet (12.5 mg total) by mouth 2 (two) times daily with meals. Hold for SBP<120, HR<60    atorvastatin (LIPITOR) 40 MG tablet Take 40 mg by mouth once daily.     blood sugar diagnostic Strp Test blood glucose 4 (four) times daily.    blood-glucose meter kit Use as instructed    cholecalciferol, vitamin D3, (VITAMIN D3) 25 mcg (1,000 unit) capsule Take 1 capsule (1,000 Units total) by mouth once daily.    cinacalcet (SENSIPAR) 30 MG Tab Take 1 tablet (30 mg total) by mouth daily with breakfast.    diclofenac sodium (VOLTAREN) 1 % Gel     dulaglutide (TRULICITY) 1.5 mg/0.5 mL PnIj Inject 1.5 mg into the skin once a week.    ergocalciferol  "(ERGOCALCIFEROL) 50,000 unit Cap Take 1 capsule (50,000 Units total) by mouth every 7 days.    famotidine (PEPCID) 20 MG tablet Take 1 tablet (20 mg total) by mouth every evening.    hydrALAZINE (APRESOLINE) 25 MG tablet Take 1 tablet (25 mg total) by mouth 3 (three) times daily.    HYDROcodone-acetaminophen (NORCO) 7.5-325 mg per tablet Take 1 tablet by mouth 3 (three) times daily as needed.    insulin aspart U-100 (NOVOLOG FLEXPEN U-100 INSULIN) 100 unit/mL (3 mL) InPn pen Inject 5 Units into the skin 3 (three) times daily with meals.    insulin detemir U-100 (LEVEMIR FLEXTOUCH) 100 unit/mL (3 mL) SubQ InPn pen Inject 24 Units into the skin every evening.    ketoconazole (NIZORAL) 200 mg Tab Take HALF tablet (100 mg total) by mouth once daily.    lancets 28 gauge Misc Test blood glucose 4 (four) times daily.    magnesium oxide (MAG-OX) 400 mg (241.3 mg magnesium) tablet TAKE 1 TABLET (400 MG TOTAL) BY MOUTH 2 (TWO) TIMES DAILY.    NIFEdipine (ADALAT CC) 60 MG TbSR Take 1 tablet (60 mg total) by mouth 2 (two) times daily.    NIFEdipine (PROCARDIA-XL) 60 MG (OSM) 24 hr tablet TAKE ONE TABLET BY MOUTH TWICE DAILY    pen needle, diabetic 32 gauge x 5/32" Ndle Use to inject insulin 4 (four) times daily.    PHOSPHA 250 NEUTRAL 250 mg Tab TAKE 2 TABLETS BY MOUTH TWICE DAILY    predniSONE (DELTASONE) 5 MG tablet TAKE 4 TABLETS 9/23 TO 10/22, THEN TAKE 3 TABLETS 10/23 TO 11/22, THEN TAKE 2 TABLETS 11/23 TO 12/22, THEN TAKE 1 TABLET DAILY THEREAFTER    sodium bicarbonate 650 MG tablet Take 1 tablet (650 mg total) by mouth 2 (two) times daily.    tacrolimus (PROGRAF) 1 MG Cap Take 6 capsules (6 mg total) by mouth every 12 (twelve) hours.     Family History     Problem Relation (Age of Onset)    Breast cancer Daughter    Heart disease Mother, Father, Sister    Hypertension Mother    Kidney disease Mother, Brother    Ovarian cancer Cousin    Stroke Mother, Sister        Tobacco Use    Smoking status: Never " Smoker    Smokeless tobacco: Never Used   Substance and Sexual Activity    Alcohol use: No    Drug use: No    Sexual activity: Never     Comment: single, Lives with daughter, on Disability, Lives in [place]     Review of Systems   Constitutional: Positive for fatigue. Negative for activity change, fever and unexpected weight change.   HENT: Negative for congestion.    Eyes: Negative for redness.   Respiratory: Negative for cough, chest tightness and shortness of breath.    Cardiovascular: Negative for chest pain, palpitations and leg swelling.   Gastrointestinal: Negative for abdominal pain, blood in stool, diarrhea, nausea and vomiting.   Endocrine: Negative for polyuria.   Genitourinary: Positive for dysuria. Negative for frequency.   Musculoskeletal: Negative for arthralgias.   Skin: Negative for rash.   Neurological: Positive for dizziness. Negative for seizures and numbness.   Psychiatric/Behavioral: Negative for agitation and behavioral problems.     Objective:     Vital Signs (Most Recent):  Temp: 99 °F (37.2 °C) (11/25/20 0403)  Pulse: 66 (11/25/20 0403)  Resp: (!) 21 (11/25/20 0403)  BP: (!) 143/75 (11/25/20 0403)  SpO2: 95 % (11/25/20 0403) Vital Signs (24h Range):  Temp:  [98.2 °F (36.8 °C)-99.2 °F (37.3 °C)] 99 °F (37.2 °C)  Pulse:  [57-66] 66  Resp:  [16-21] 21  SpO2:  [95 %-100 %] 95 %  BP: (126-148)/(74-88) 143/75     Weight: 83.2 kg (183 lb 6.8 oz)  Body mass index is 32.49 kg/m².    Physical Exam  Constitutional:       Appearance: Normal appearance.      Comments: Mild dysarthria    HENT:      Head: Normocephalic and atraumatic.      Nose: Nose normal.      Mouth/Throat:      Mouth: Mucous membranes are moist.      Pharynx: Oropharynx is clear.   Eyes:      Conjunctiva/sclera: Conjunctivae normal.      Pupils: Pupils are equal, round, and reactive to light.      Comments: Reduced EOM at right side.    Cardiovascular:      Rate and Rhythm: Normal rate and regular rhythm.      Pulses: Normal  pulses.      Heart sounds: Normal heart sounds.   Pulmonary:      Effort: Pulmonary effort is normal.      Breath sounds: Normal breath sounds.   Abdominal:      General: Abdomen is flat. Bowel sounds are normal. There is no distension.      Palpations: Abdomen is soft. There is no mass.      Tenderness: There is no abdominal tenderness. There is no right CVA tenderness, left CVA tenderness or guarding.      Hernia: No hernia is present.   Musculoskeletal: Normal range of motion.      Comments: 4/5 strength at the RUE and RLE.    Skin:     General: Skin is warm and dry.   Neurological:      General: No focal deficit present.      Mental Status: She is alert and oriented to person, place, and time. Mental status is at baseline.   Psychiatric:         Mood and Affect: Mood normal.         Behavior: Behavior normal.         Thought Content: Thought content normal.         Judgment: Judgment normal.           CRANIAL NERVES     CN III, IV, VI   Pupils are equal, round, and reactive to light.       Significant Labs:   A1C:   Recent Labs   Lab 08/26/20  0924 09/08/20  0840   HGBA1C 5.0 4.8     ABGs: No results for input(s): PH, PCO2, HCO3, POCSATURATED, BE, TOTALHB, COHB, METHB, O2HB, POCFIO2 in the last 48 hours.  Bilirubin:   Recent Labs   Lab 11/17/20  0852 11/20/20  1315 11/25/20  0625   BILIDIR 0.1  --   --    BILITOT 0.4 0.4 0.3     Blood Culture: No results for input(s): LABBLOO in the last 48 hours.  BMP:   Recent Labs   Lab 11/25/20  0625   *      K 3.9   *   CO2 21*   BUN 26*   CREATININE 2.0*   CALCIUM 8.8   MG 1.9     CBC:   Recent Labs   Lab 11/24/20  0950 11/25/20  0625   WBC 1.77* 1.71*   HGB 8.2*  --    HCT 27.6*  --      --      CMP:   Recent Labs   Lab 11/24/20  0950 11/25/20  0625    141   K 4.2 3.9   * 111*   CO2 20* 21*   * 115*   BUN 27* 26*   CREATININE 2.0* 2.0*   CALCIUM 9.4 8.8   PROT  --  6.0   ALBUMIN 3.9 3.6   BILITOT  --  0.3   ALKPHOS  --  92    AST  --  11   ALT  --  10   ANIONGAP 12 9   EGFRNONAA 26.0* 26.0*     Cardiac Markers: No results for input(s): CKMB, MYOGLOBIN, BNP, TROPISTAT in the last 48 hours.  Coagulation: No results for input(s): PT, INR, APTT in the last 48 hours.  Lactic Acid: No results for input(s): LACTATE in the last 48 hours.  Lipase: No results for input(s): LIPASE in the last 48 hours.  Lipid Panel: No results for input(s): CHOL, HDL, LDLCALC, TRIG, CHOLHDL in the last 48 hours.  Magnesium:   Recent Labs   Lab 11/24/20  0950 11/25/20  0625   MG 1.9 1.9     POCT Glucose: No results for input(s): POCTGLUCOSE in the last 48 hours.  Prealbumin: No results for input(s): PREALBUMIN in the last 48 hours.  Respiratory Culture: No results for input(s): GSRESP, RESPIRATORYC in the last 48 hours.  Troponin: No results for input(s): TROPONINI in the last 48 hours.  TSH: No results for input(s): TSH in the last 4320 hours.  Urine Culture: No results for input(s): LABURIN in the last 48 hours.  Urine Studies:   Recent Labs   Lab 11/25/20  0356   COLORU Yellow   APPEARANCEUA Clear   PHUR 6.0   SPECGRAV 1.015   PROTEINUA 1+*   GLUCUA Negative   KETONESU Negative   BILIRUBINUA Negative   OCCULTUA Negative   NITRITE Negative   LEUKOCYTESUR Negative   RBCUA 3   WBCUA 2   BACTERIA Rare   SQUAMEPITHEL 1   HYALINECASTS 0     All pertinent labs within the past 24 hours have been reviewed.    Significant Imaging: I have reviewed all pertinent imaging results/findings within the past 24 hours.    Assessment/Plan:     * UTI (urinary tract infection)  Patient is a 65 y.o. female who has a past medical history of end-stage renal disease status post transplant September 2019, anemia associated with chronic renal failure, Diabetes mellitus, Essential hypertension, Hyperlipidemia, Hypertension, renal, Immunocompromised state, and Stroke in 2007 presented with generalized weakness and shortness of breath. Her UA was positive for 20-50 WBC, protein 30, presence  of hyaline cast, and small bacteria (care everywhere). Vitals signs stable.     - Started on rocephin for 5 days.   - KDM transplant consultation order placed.   - Monitor CBC and VS.     Neutropenia  Pt's WBC is 2.0. This is her baseline. She had bone marrow biopsy in 2018, which was unremarkable. Heme/onc thinks it is due to her use of immunosuppressant.   - Continue monitoring.       Type 2 diabetes mellitus  Pt has history of DM. Currently on trulicity. Was on Insulin detemir 24, and aspart 5 tid, which was d/c 3 months ago.   - Did weight based calculations.   - Placed on detemir 8 bid and aspart 3 tid.   - POCT glucose ACHS.       SAUNDRA (acute kidney injury)  Pt was noted to be on SAUNDRA on admission. Her  BUN/Cr of 27/2.0. Her Cr. Has been between 1.6-1.9 over past two months. She takes prograf, cellcept, ketoconazole, and prednisolone.  - SAUNDRA work up. Urine sodium, urine cr, and retroperitoneal us. F/u    - Stopped cellcept, ketoconazole  - Continued pred and prograf  - Prograf level daily.   - Placed on fluids 125 cc/hour for 10 hours.     Hypercalcemia  Placed on home cinacalcet. Ca WNL.     Status post kidney transplant  Kidney tranplant on sept 2019. On cellcept, prograf, and prednisolone.   - Stopped cellcept; continue prograf and pred.   - KDM consult placed.       Hyperlipidemia  Placed on home atorvostatin.       Anemia associated with chronic renal failure  Patient's Hgb is 8.2, stable from previous. Prior history of anemia of chronic disease.  - Monitor CBC daily.        Hypertension, renal  Placed on home coreg, nefidipine.         VTE Risk Mitigation (From admission, onward)         Ordered     enoxaparin injection 30 mg  Every 24 hours      11/25/20 0328     IP VTE HIGH RISK PATIENT  Once      11/25/20 0328     Place sequential compression device  Until discontinued      11/25/20 0328                   Mary Beth Fermin MD  Department of Hospital Medicine   [facility]

## 2020-11-25 NOTE — ASSESSMENT & PLAN NOTE
Patient's Hgb is 8.2, stable from previous. Prior history of anemia of chronic disease.  - Monitor CBC daily.

## 2020-11-25 NOTE — PT/OT/SLP EVAL
Physical Therapy Evaluation and Discharge Note    Patient Name:  Satinder Schulte   MRN:  [id number]    Recommendations:     Discharge Recommendations:  home   Discharge Equipment Recommendations: walker, rolling   Barriers to discharge: None    Assessment:     Satinder Schulte is a 65 y.o. female admitted with a medical diagnosis of UTI (urinary tract infection). .  At this time, patient is functioning at their prior level of function and does not require further acute PT services.     Recent Surgery: * No surgery found *      Plan:     During this hospitalization, patient does not require further acute PT services.  Please re-consult if situation changes.      Subjective     Chief Complaint: LE weakness  Patient/Family Comments/goals: to go home  Pain/Comfort:  · Pain Rating 1: 0/10  · Pain Rating Post-Intervention 1: 0/10    Patients cultural, spiritual, Orthodox conflicts given the current situation: no    Living Environment:  Pt. Lives with nephew in mobile home with 5 MERA and (B) handrails.  Prior to admission, patients level of function was indep.  Equipment used at home: none.  Upon discharge, patient will have assistance from family.    Objective:     Communicated with nursing prior to session.  Patient found up in chair with peripheral IV upon PT entry to room.    General Precautions: Standard, fall   Orthopedic Precautions:N/A   Braces:       Exams:  · RLE ROM: WFL  · RLE Strength: WFL  · LLE ROM: WFL  · LLE Strength: WFL    Functional Mobility:  · Transfers:     · Sit to Stand:  supervision with no AD  · Bed to Chair: supervision with  no AD  using  Stand Pivot  · Gait: >300' with RW and Supervision without LOB  · Balance: fair+  · Stairs:  Pt ascended/descended 5 stair(s) with No Assistive Device with right handrail with Stand-by Assistance.     AM-PAC 6 CLICK MOBILITY  Total Score:24       Therapeutic Activities and Exercises:   Discussed therapy/DME needs, goals, and POC.    AM-PAC 6 CLICK  MOBILITY  Total Score:24     Patient left up in chair with all lines intact and call button in reach.    GOALS:   Multidisciplinary Problems     Physical Therapy Goals     Not on file                History:     Past Medical History:   Diagnosis Date    Abnormal Pap smear     repeat paps were normal    Anemia associated with chronic renal failure     Awaiting organ transplant status  - 02/18/2013 6/6/2014    Blood type A+ 6/6/2014    CKD (chronic kidney disease) stage 3, GFR 30-59 ml/min 10/30/2019    CKD (chronic kidney disease) stage 5, GFR less than 15 ml/min     secondary hypertension    Diabetes mellitus     Essential hypertension 5/19/2017    Hyperlipidemia     Hypertension, renal 1992    Immunocompromised state 10/4/2019    Stroke 2007    Residual speech deficit       Past Surgical History:   Procedure Laterality Date    AV FISTULA PLACEMENT  2014    BONE MARROW BIOPSY Right 8/23/2018    Procedure: Biopsy-bone marrow;  Surgeon: Anna Lin MD;  Location: [facility] OR [address] [place] [address];  Service: Oncology;  Laterality: Right;    CHOLECYSTECTOMY  2008    HYSTERECTOMY  2011    TAHBSO for benign reasons    KIDNEY TRANSPLANT N/A 9/20/2019    Procedure: TRANSPLANT, KIDNEY;  Surgeon: Guero Rogers MD;  Location: [facility] OR [address] [place] [address];  Service: Transplant;  Laterality: N/A;    OOPHORECTOMY         Time Tracking:     PT Received On: 11/25/20  PT Start Time: 1309     PT Stop Time: 1322  PT Total Time (min): 13 min     Billable Minutes: Evaluation 13      Dank Lambert, PT  11/25/2020

## 2020-11-25 NOTE — ASSESSMENT & PLAN NOTE
Kidney tranplant on sept 2019. On cellcept, prograf, and prednisolone.   - Stopped cellcept; continue prograf and pred.   - KDM consult placed.

## 2020-11-25 NOTE — PLAN OF CARE
(Physician in Lead of Transfers)   Outside Transfer Acceptance Note / Regional Referral Center    Upon patient arrival to floor, please call extension [phone] (if no answer, this will flip to a beeper, so enter your call back number) for Hospital Medicine admit team assignment and for additional admit orders for the patient.  Do not page the attending physician associated with the patient on arrival (this physician may not be on duty at the time of arrival).  Rather, always call [phone] to reach the triage physician for orders and team assignment.    Transferring Physician: Froylan Fournier MD     Accepting Physician: Pedro Koroma MD    Date of Acceptance: 11/24/2020    Transferring Facility: [facility]    Reason for Transfer: higher level of care    Report from Transferring Physician/Hospital course:  Patient is a 65 y.o. female who has a past medical history of end-stage renal disease status post transplant September 2019, Anemia associated with chronic renal failure, Diabetes mellitus, Essential hypertension, Hyperlipidemia, Hypertension, renal, Immunocompromised state, and Stroke presented with generalized weakness and shortness of breath. Patient presented to [facility] and was found to have mild SAUNDRA with BUN/Cr of 30/2.3. UA consistent with UTI. CBC shows slight anemia with hemoglobin of 8.5. Lactic acid not checked. Rest of labs are at baseline (patient remains with leukopenia and thrombocytopenia). Patient started on IV rocephin and given 1 unit pRBC transfusion. Vitals stable. COVID negative. Facility seeking transfer for KTM consult. KTM made aware of patient and requested transfer to . They will consult on patient in AM.     Vital Signs (Most Recent):    Vital Signs (24h Range):  Temp:  [98.5 °F (36.9 °C)] 98.5 °F (36.9 °C)  Pulse:  [57] 57  Resp:  [16] 16  SpO2:  [100 %] 100 %  BP: (126)/(74) 126/74       Labs & Radiographs: see EPIC    Significant Labs: see  EPIC    Significant Imaging:  US Upper Extremity Veins Left  Narrative: EXAMINATION:  US UPPER EXTREMITY VEINS LEFT    CLINICAL HISTORY:  swelling;    FINDINGS:  There is no evidence of left upper extremity deep venous thrombosis with documentation of a combination of color Doppler flow, compressibility, and normal venous waveforms.  Normal cephalic vein.  The basilic vein is not identified.  The technologist notes that there is a known AV fistula left arm, not evaluated.  Impression: No evidence of deep venous thrombosis left upper extremity.  The basilic vein is not identified.    Electronically signed by: Juanito Chen MD  Date:    11/20/2020  Time:    13:19  X-Ray Chest PA And Lateral  Narrative: EXAMINATION:  XR CHEST PA AND LATERAL    CLINICAL HISTORY:  Chest Pain;    FINDINGS:  Comparison study 12/17/2019.  Mild cardiomegaly.  Aortic arch calcification.  Pulmonary vasculature is stable without significant congestion.  Lungs are clear.  Thoracic spondylosis.  Cholecystectomy clips.  Impression: Cardiomegaly.    Electronically signed by: Juanito Chen MD  Date:    11/20/2020  Time:    11:58        To Do List:   1. Admit to   2. Cont IV abx  3. Consult KTM    Upon patient arrival to floor, please call extension [phone] (if no answer, this will flip to a beeper, so enter your call back number) for Hospital Medicine admit team assignment and for additional admit orders for the patient.  Do not page the attending physician associated with the patient on arrival (this physician may not be on duty at the time of arrival).  Rather, always call [phone] to reach the triage physician for orders and team assignment.      Pedro Koroma MD  Hospital Medicine Staff

## 2020-11-26 VITALS
SYSTOLIC BLOOD PRESSURE: 152 MMHG | HEIGHT: 63 IN | RESPIRATION RATE: 19 BRPM | TEMPERATURE: 98 F | OXYGEN SATURATION: 97 % | BODY MASS INDEX: 32.5 KG/M2 | DIASTOLIC BLOOD PRESSURE: 77 MMHG | HEART RATE: 66 BPM | WEIGHT: 183.44 LBS

## 2020-11-26 LAB
ALBUMIN SERPL BCP-MCNC: 3.5 G/DL (ref 3.5–5.2)
ALP SERPL-CCNC: 90 U/L (ref 55–135)
ALT SERPL W/O P-5'-P-CCNC: 9 U/L (ref 10–44)
ANION GAP SERPL CALC-SCNC: 8 MMOL/L (ref 8–16)
ANISOCYTOSIS BLD QL SMEAR: SLIGHT
AST SERPL-CCNC: 10 U/L (ref 10–40)
BACTERIA UR CULT: NORMAL
BASOPHILS # BLD AUTO: 0 K/UL (ref 0–0.2)
BASOPHILS NFR BLD: 0 % (ref 0–1.9)
BILIRUB SERPL-MCNC: 0.3 MG/DL (ref 0.1–1)
BUN SERPL-MCNC: 21 MG/DL (ref 8–23)
CALCIUM SERPL-MCNC: 8.8 MG/DL (ref 8.7–10.5)
CHLORIDE SERPL-SCNC: 113 MMOL/L (ref 95–110)
CO2 SERPL-SCNC: 22 MMOL/L (ref 23–29)
CREAT SERPL-MCNC: 1.6 MG/DL (ref 0.5–1.4)
DACRYOCYTES BLD QL SMEAR: ABNORMAL
DIFFERENTIAL METHOD: ABNORMAL
EOSINOPHIL # BLD AUTO: 0 K/UL (ref 0–0.5)
EOSINOPHIL NFR BLD: 1.8 % (ref 0–8)
ERYTHROCYTE [DISTWIDTH] IN BLOOD BY AUTOMATED COUNT: 14.7 % (ref 11.5–14.5)
EST. GFR  (AFRICAN AMERICAN): 39.3 ML/MIN/1.73 M^2
EST. GFR  (NON AFRICAN AMERICAN): 34 ML/MIN/1.73 M^2
GLUCOSE SERPL-MCNC: 107 MG/DL (ref 70–110)
GLUCOSE SERPL-MCNC: 125 MG/DL (ref 70–110)
HCT VFR BLD AUTO: 24.1 % (ref 37–48.5)
HGB BLD-MCNC: 7.4 G/DL (ref 12–16)
HYPOCHROMIA BLD QL SMEAR: ABNORMAL
IMM GRANULOCYTES # BLD AUTO: 0.05 K/UL (ref 0–0.04)
IMM GRANULOCYTES NFR BLD AUTO: 3 % (ref 0–0.5)
LYMPHOCYTES # BLD AUTO: 0.5 K/UL (ref 1–4.8)
LYMPHOCYTES NFR BLD: 27.4 % (ref 18–48)
MAGNESIUM SERPL-MCNC: 1.7 MG/DL (ref 1.6–2.6)
MCH RBC QN AUTO: 30.2 PG (ref 27–31)
MCHC RBC AUTO-ENTMCNC: 30.7 G/DL (ref 32–36)
MCV RBC AUTO: 98 FL (ref 82–98)
MONOCYTES # BLD AUTO: 0.2 K/UL (ref 0.3–1)
MONOCYTES NFR BLD: 10.4 % (ref 4–15)
NEUTROPHILS # BLD AUTO: 0.9 K/UL (ref 1.8–7.7)
NEUTROPHILS NFR BLD: 57.4 % (ref 38–73)
NRBC BLD-RTO: 0 /100 WBC
OVALOCYTES BLD QL SMEAR: ABNORMAL
PATH REV BLD -IMP: NORMAL
PHOSPHATE SERPL-MCNC: 4.4 MG/DL (ref 2.7–4.5)
PLATELET # BLD AUTO: 131 K/UL (ref 150–350)
PMV BLD AUTO: 11.9 FL (ref 9.2–12.9)
POCT GLUCOSE: 100 MG/DL (ref 70–110)
POCT GLUCOSE: 126 MG/DL (ref 70–110)
POIKILOCYTOSIS BLD QL SMEAR: SLIGHT
POLYCHROMASIA BLD QL SMEAR: ABNORMAL
POTASSIUM SERPL-SCNC: 4 MMOL/L (ref 3.5–5.1)
PROT SERPL-MCNC: 5.9 G/DL (ref 6–8.4)
RBC # BLD AUTO: 2.45 M/UL (ref 4–5.4)
SCHISTOCYTES BLD QL SMEAR: ABNORMAL
SODIUM SERPL-SCNC: 143 MMOL/L (ref 136–145)
TACROLIMUS BLD-MCNC: 4.5 NG/ML (ref 5–15)
WBC # BLD AUTO: 1.64 K/UL (ref 3.9–12.7)

## 2020-11-26 PROCEDURE — 84100 ASSAY OF PHOSPHORUS: CPT

## 2020-11-26 PROCEDURE — 36415 COLL VENOUS BLD VENIPUNCTURE: CPT

## 2020-11-26 PROCEDURE — G0008 ADMIN INFLUENZA VIRUS VAC: HCPCS | Performed by: HOSPITALIST

## 2020-11-26 PROCEDURE — 80053 COMPREHEN METABOLIC PANEL: CPT

## 2020-11-26 PROCEDURE — 90471 IMMUNIZATION ADMIN: CPT | Performed by: HOSPITALIST

## 2020-11-26 PROCEDURE — 63600175 PHARM REV CODE 636 W HCPCS: Performed by: STUDENT IN AN ORGANIZED HEALTH CARE EDUCATION/TRAINING PROGRAM

## 2020-11-26 PROCEDURE — 63600175 PHARM REV CODE 636 W HCPCS: Performed by: HOSPITALIST

## 2020-11-26 PROCEDURE — 25000003 PHARM REV CODE 250: Performed by: STUDENT IN AN ORGANIZED HEALTH CARE EDUCATION/TRAINING PROGRAM

## 2020-11-26 PROCEDURE — 80197 ASSAY OF TACROLIMUS: CPT

## 2020-11-26 PROCEDURE — 99239 PR HOSPITAL DISCHARGE DAY,>30 MIN: ICD-10-PCS | Mod: GC,,, | Performed by: HOSPITALIST

## 2020-11-26 PROCEDURE — 90686 IIV4 VACC NO PRSV 0.5 ML IM: CPT | Performed by: HOSPITALIST

## 2020-11-26 PROCEDURE — 85025 COMPLETE CBC W/AUTO DIFF WBC: CPT

## 2020-11-26 PROCEDURE — 99239 HOSP IP/OBS DSCHRG MGMT >30: CPT | Mod: GC,,, | Performed by: HOSPITALIST

## 2020-11-26 PROCEDURE — 83735 ASSAY OF MAGNESIUM: CPT

## 2020-11-26 RX ORDER — ERGOCALCIFEROL 1.25 MG/1
50000 CAPSULE ORAL
Qty: 4 CAPSULE | Refills: 0 | Status: CANCELLED
Start: 2020-11-26

## 2020-11-26 RX ORDER — TACROLIMUS 1 MG/1
6 CAPSULE ORAL EVERY 12 HOURS
Start: 2020-11-26 | End: 2020-12-09 | Stop reason: SDUPTHER

## 2020-11-26 RX ORDER — HYDRALAZINE HYDROCHLORIDE 50 MG/1
50 TABLET, FILM COATED ORAL EVERY 8 HOURS
Qty: 90 TABLET | Refills: 11 | Status: ON HOLD | OUTPATIENT
Start: 2020-11-26 | End: 2021-04-09 | Stop reason: HOSPADM

## 2020-11-26 RX ORDER — FAMOTIDINE 20 MG/1
20 TABLET, FILM COATED ORAL NIGHTLY
Qty: 30 TABLET | Refills: 0 | Status: CANCELLED
Start: 2020-11-26

## 2020-11-26 RX ORDER — HYDRALAZINE HYDROCHLORIDE 50 MG/1
50 TABLET, FILM COATED ORAL EVERY 8 HOURS
Status: DISCONTINUED | OUTPATIENT
Start: 2020-11-26 | End: 2020-11-26 | Stop reason: HOSPADM

## 2020-11-26 RX ORDER — NITROFURANTOIN 25; 75 MG/1; MG/1
100 CAPSULE ORAL 2 TIMES DAILY
Qty: 6 CAPSULE | Refills: 0
Start: 2020-11-26 | End: 2020-11-29

## 2020-11-26 RX ORDER — NITROFURANTOIN 25; 75 MG/1; MG/1
100 CAPSULE ORAL 2 TIMES DAILY
Qty: 10 CAPSULE | Refills: 0 | Status: CANCELLED
Start: 2020-11-26 | End: 2020-12-01

## 2020-11-26 RX ADMIN — CINACALCET 30 MG: 30 TABLET, FILM COATED ORAL at 08:11

## 2020-11-26 RX ADMIN — DIBASIC SODIUM PHOSPHATE, MONOBASIC POTASSIUM PHOSPHATE AND MONOBASIC SODIUM PHOSPHATE 2 TABLET: 852; 155; 130 TABLET ORAL at 08:11

## 2020-11-26 RX ADMIN — HYDRALAZINE HYDROCHLORIDE 25 MG: 25 TABLET ORAL at 06:11

## 2020-11-26 RX ADMIN — CARVEDILOL 12.5 MG: 12.5 TABLET, FILM COATED ORAL at 08:11

## 2020-11-26 RX ADMIN — INFLUENZA VIRUS VACCINE 0.5 ML: 15; 15; 15; 15 SUSPENSION INTRAMUSCULAR at 02:11

## 2020-11-26 RX ADMIN — HYDRALAZINE HYDROCHLORIDE 25 MG: 25 TABLET ORAL at 02:11

## 2020-11-26 RX ADMIN — NIFEDIPINE 60 MG: 30 TABLET, FILM COATED, EXTENDED RELEASE ORAL at 08:11

## 2020-11-26 RX ADMIN — Medication 400 MG: at 08:11

## 2020-11-26 RX ADMIN — TACROLIMUS 6 MG: 1 CAPSULE ORAL at 08:11

## 2020-11-26 RX ADMIN — ATORVASTATIN CALCIUM 40 MG: 20 TABLET, FILM COATED ORAL at 08:11

## 2020-11-26 RX ADMIN — INSULIN DETEMIR 8 UNITS: 100 INJECTION, SOLUTION SUBCUTANEOUS at 08:11

## 2020-11-26 RX ADMIN — PREDNISONE 5 MG: 5 TABLET ORAL at 08:11

## 2020-11-26 RX ADMIN — SODIUM BICARBONATE 650 MG TABLET 650 MG: at 08:11

## 2020-11-26 RX ADMIN — CEFTRIAXONE SODIUM 1 G: 1 INJECTION, POWDER, FOR SOLUTION INTRAMUSCULAR; INTRAVENOUS at 02:11

## 2020-11-26 NOTE — NURSING
Voice message left for patient's daughter re: discharge ordered by MD.  Awaiting return call    1635: No call received from patient's family member.  Pt states she lives at home, has her purse and personal belongings at bedside.  Charge Nurse to contact CM/SW re: request transport for patient.    1705: Call returned by patient's daughter.  Unable to  pt this evening, will call in AM prior to travelling    1815: [facility] transport home arranged for patient.  Pickup planned for 1845.  Discharge instructions reviewed with patient, medication changes marked per patient request.  Ready for transport.

## 2020-11-26 NOTE — NURSING
Pt hypertensive since beginning of shift. Sys BP range 180-200's. Denies chest pain, headache or SOB. Hx of previous stroke. IM4 notified of VS change from earlier in the day. MD at bedside to assess patient at approximately 2030. Orders to continue to monitor for symptomatic hypertension and notify of changes. IM4 paged again to notify of sustaining BP>180's despite patient in bed asleep and resting. Restarted patient on home dose of 25mg of PO hydralazine and one dose administered at 2251. NAD. WCTM.

## 2020-11-26 NOTE — PROGRESS NOTES
KTM Chart Check      Intake/Output Summary (Last 24 hours) at 11/26/2020 1433  Last data filed at 11/26/2020 0500  Gross per 24 hour   Intake 2652.92 ml   Output 3150 ml   Net -497.08 ml       Vitals:    11/26/20 0736 11/26/20 0752 11/26/20 1151 11/26/20 1200   BP: (!) 156/79 (!) 148/75  (!) 161/82   BP Location:       Patient Position:       Pulse: 66 62  66   Resp: 17 20 19   Temp:  98.3 °F (36.8 °C) 98.2 °F (36.8 °C)    TempSrc:  Oral Oral    SpO2: 96% 97%  97%   Weight:       Height:           Recent Labs   Lab 11/24/20  0950 11/25/20  0625 11/26/20  0632    141 143   K 4.2 3.9 4.0   * 111* 113*   CO2 20* 21* 22*   BUN 27* 26* 21   CREATININE 2.0* 2.0* 1.6*   CALCIUM 9.4 8.8 8.8   PHOS 3.4 3.9 4.4     Creatinine back to baseline.   Advised to increase hydralazine from 25 mg tid to 50 mg tid, continue other home BP meds(nifedipine 60 mg bid, coreg 12.5 mg bid), continue to monitor blood pressures 120-140/60-80  Continue prograf and prednisone at same doses  Continue to hold cellcept given leukopenia  Immunosuppression Level goal 4-6  No acute issues identified. Can be discharged home from renal standpoint.   Continue to monitor CBC, sent message to Tx Coordinator  Case was discussed with Dr. Padilla

## 2020-11-26 NOTE — PLAN OF CARE
- Pt inquiring whether she will be able to return home today.  Notified MD, awaiting KTM recs  - Pt requesting flu shot.  Administer per protocol  - Rocephin IVP for UTI  - Keep bed low & locked, call light in reach, nonslip socks in use, calls for assistance appropriately  - No evidence of pressure areas, skin CDI, pt up independently & independently repositions in bed  - Pt eating few bites of breakfast and lunch, MTI held at those meals.  CBG ACHS, MTI + SSI as indicated.   - Encourage patient to drink adequate water

## 2020-11-26 NOTE — CONSULTS
[facility]  Kidney Transplant  Consult Note     Inpatient consult to Kidney/Pancreas Transplant Medicine  Consult performed by: Richi Ren MD  Consult ordered by: Mary Beth Fermin MD  Reason for consult: kidney transplant recipient  Assessment/Recommendations: Continue home suppressive meds except for mycophenolate      Subjective:      History of Present Illness:   Ms. Schulte is a 65 year old woman with pertinent medical history of hypertension, hyperlipidemia, history of CVA x2, and ESRD (2/2 hypertension) s/p DBD KTx 9/20/2019 (KDPI 77%, Thymo induction, CMV D-/R+) now with functional CKD IIIb, and NODAT. Ms. Schulte is also on chronic immunosuppression s/p transplant with tacrolimus and prednisone. Her MMF has recently been held (on 11/11/2020) due to recurrent issues with anemia and leukopenia/neutropenia. She initially presented to outside hospital system for evaluation of weakness and shortness of breath beyond baseline when ambulating with caretaker morning of presentation. She complained of dysuria. She was found to have UTI and mild SAUNDRA (Cr 2.3, up from baseline of 1.4-1.6. She was transferred to [facility] for further evaluation and for KTM management of IS. Currently with no complaints at time of KTM evaluation.      Interval Hospital course  11/25/2020 - transferred to [facility], started on maintenance fluids, given CTX for UTI, culture penidng, heme/onc consult for persistently low cell counts.     Past Medical and Surgical History:   Past Medical History:   Diagnosis Date    Abnormal Pap smear     repeat paps were normal    Anemia associated with chronic renal failure     Awaiting organ transplant status  - 02/18/2013 6/6/2014    Blood type A+ 6/6/2014    CKD (chronic kidney disease) stage 3, GFR 30-59 ml/min 10/30/2019    CKD (chronic kidney disease) stage 5, GFR less than 15 ml/min     secondary hypertension    Diabetes mellitus     Essential hypertension 5/19/2017     Hyperlipidemia     Hypertension, renal 1992    Immunocompromised state 10/4/2019    Stroke 2007    Residual speech deficit     Past Surgical History:   Procedure Laterality Date    AV FISTULA PLACEMENT  2014    BONE MARROW BIOPSY Right 8/23/2018    Procedure: Biopsy-bone marrow;  Surgeon: Anna Lin MD;  Location: [facility] OR [address] [place] [address];  Service: Oncology;  Laterality: Right;    CHOLECYSTECTOMY  2008    HYSTERECTOMY  2011    TAHBSO for benign reasons    KIDNEY TRANSPLANT N/A 9/20/2019    Procedure: TRANSPLANT, KIDNEY;  Surgeon: Guero Rogers MD;  Location: [facility] OR [address] [place] [address];  Service: Transplant;  Laterality: N/A;    OOPHORECTOMY          Past Social and Family History:   Family History   Problem Relation Age of Onset    Stroke Mother     Kidney disease Mother         on dialysis    Hypertension Mother     Heart disease Mother     Heart disease Father     Stroke Sister     Kidney disease Brother     Breast cancer Daughter     Heart disease Sister     Ovarian cancer Cousin     Colon cancer Neg Hx     Thrombophilia Neg Hx      Social History     Socioeconomic History    Marital status: Single     Spouse name: Not on file    Number of children: Not on file    Years of education: Not on file    Highest education level: Not on file   Occupational History    Not on file   Social Needs    Financial resource strain: Not on file    Food insecurity     Worry: Not on file     Inability: Not on file    Transportation needs     Medical: Not on file     Non-medical: Not on file   Tobacco Use    Smoking status: Never Smoker    Smokeless tobacco: Never Used   Substance and Sexual Activity    Alcohol use: No    Drug use: No    Sexual activity: Never     Comment: single, Lives with daughter, on Disability, Lives in [place]   Lifestyle    Physical activity     Days per week: Not on file     Minutes per session: Not on file    Stress: Not on file   Relationships    Social connections     Talks on phone:  "Not on file     Gets together: Not on file     Attends Jewish service: Not on file     Active member of club or organization: Not on file     Attends meetings of clubs or organizations: Not on file     Relationship status: Not on file   Other Topics Concern    Not on file   Social History Narrative    Disabled    Single    4 children    History of blood transfusions        Review of Systems  -12 systems reviewed and negative except as above per HPI     Objective:      Vital Signs (Most Recent):  Temp: 98 °F (36.7 °C) (11/16/20 1500)  Pulse: 96 (11/16/20 1700)  Resp: 18 (11/16/20 1700)  BP: (!) 153/86 (11/16/20 1700)  SpO2: 98 % (11/16/20 1700) Vital Signs (24h Range):  Temp:  [97.9 °F (36.6 °C)-98.3 °F (36.8 °C)] 98 °F (36.7 °C)  Pulse:  [] 96  Resp:  [14-23] 18  SpO2:  [93 %-100 %] 98 %  BP: (126-239)/() 153/86      Weight: 65.8 kg (145 lb)  Height: 5' 1" (154.9 cm)  Body mass index is 27.4 kg/m².    Intake and output:  Intake/Output - Last 3 Shifts       11/24 0700 - 11/25 0659 11/25 0700 - 11/26 0659    P.O. 0 980    I.V. (mL/kg) 108.3 (1.3) 1072.9 (12.9)    Other 0     Total Intake(mL/kg) 108.3 (1.3) 2052.9 (24.7)    Urine (mL/kg/hr) 200 1350 (0.7)    Emesis/NG output  0    Other  0    Stool  0    Blood  0    Total Output 200 1350    Net -91.7 +702.9          Stool Occurrence  0 x             Physical Exam  Vitals signs and nursing note reviewed.   General: NAD, resting comfortably  Head: normocephalic, atraumatic   Eyes: EOMI, PERRL,   Ears, Nose, Throat: no gross auditory deficit detected via conversation on normal room volume , without rhinorrhea,  Cardiovascular: NRRR, without murmurs or rubs  Pulmonary: CTABL, normal work of breathing without accessory muscle use,   Abdomen: soft, NT, ND   MSKL: without gross deformities; FORM intact  Skin: without cyanosis, clubbing, or edema   Neurologic: AAOX3, following two step commands, baseline right sided weakness             Significant Labs:  Labs " within the past 24 hours have been reviewed. Of note:   Recent Labs   Lab 11/20/20  1315 11/24/20  0950 11/25/20  0625 11/25/20  1042 11/25/20  1519   WBC 1.77* 1.77* 1.71* 1.60* 1.74*   HGB 8.6* 8.2* 7.0* 7.1* 7.8*   HCT 28.0* 27.6* 23.2* 23.6* 25.9*    153 130* 119* 124*   MCV 98 99* 100* 100* 99*   RDW 14.2 14.7* 14.7* 14.7* 14.8*    143 141  --   --    K 4.6 4.2 3.9  --   --     111* 111*  --   --    CO2 23 20* 21*  --   --    BUN 20 27* 26*  --   --    CREATININE 1.9* 2.0* 2.0*  --   --    * 146* 115*  --   --    PROT 8.0  --  6.0  --   --    ALBUMIN 4.6 3.9 3.6  --   --    BILITOT 0.4  --  0.3  --   --    AST 26  --  11  --   --    ALKPHOS 111  --  92  --   --    ALT 19  --  10  --   --      Lab Results   Component Value Date    HGB 7.8 (L) 11/25/2020    MCV 99 (H) 11/25/2020    FERRITIN 949 (H) 11/10/2020    FESATURATED 20 11/10/2020    FOLATE 4.8 09/23/2020    IMIIKIUB29 559 09/23/2020     Lab Results   Component Value Date    [id number] 20 (L) 10/27/2020    .0 (H) 03/13/2020    CALCIUM 8.8 11/25/2020    PHOS 3.9 11/25/2020    MG 1.9 11/25/2020    CO2 21 (L) 11/25/2020    ALBUMIN 3.6 11/25/2020       Urinalysis 11/25/2020  -spec 1.015, pH 6.0, 1+ protein, 3RBC, 2 wbc       Diagnostics:  US - Allograft: Interval elevation of intraparenchymal resistive indices which may be seen with rejection, medical renal disease, or drug toxicity.     Assessment/Plan:         Acute Kidney Injury on CKD stage IIIb  -Cr on arrival 2.3, baseline 1.4-1.6, appears to be recently uptrending as outpatient.   -currently improved back to 2.0 with current mgmt.   -avoid NSAIDs, unnecessary gadolinium based contrast, and renal dose abx to eGFR  -daily weights; strict I/O  -low probability TMA given benign sediment, normal complements  -cont mgmt for UTI  -may need renal biopsy inpatient vs outpatient if unable to return to baseline renal function; will discuss with team  -on IS. KTM to follow         Kidney replaced by transplant  -s/p DBD KTx 9/20/2019 (KDPI 77%, Thymo induction, CMV D-/R+)  -on immunosuppresion w/ tacrolimus and prednisone      Immunosuppressive management encounter following kidney transplant  -no history of rejection in TXP records, did have + CMV 11/2019  -holding MMF outpatient given recurrent anemia/leukopenia  -daily tacrolimus levels, on home 6/6mg dose; KTM will adjust  -continue prednisone        Urinary tract infection in immunocompromised patient  - on CFTX per primar service     Anemia/leukopenia  -agree with Heme/onc consultation- likely immunosuppression culprit  -haptoglobin low, LDH elevated, retic count elevated  -neuopogen and procrit in clinic.   -s/p pRBC transfusion on 11/25  -checking EBV, parvovirus, cmv     Hypertension  Resume home regiment        Secondary hyperparathyroidism of renal origin  -continue vit d supplementation  -continue bicarb supplement.    -continue sensipar           Discharge Planning:  Pending clinical improvement        Richi Ren MD  Kidney Transplant  [facility]

## 2020-11-27 ENCOUNTER — INFUSION (OUTPATIENT)
Dept: INFUSION THERAPY | Facility: HOSPITAL | Age: 65
End: 2020-11-27
Attending: INTERNAL MEDICINE
Payer: MEDICARE

## 2020-11-27 VITALS
TEMPERATURE: 98 F | HEART RATE: 60 BPM | OXYGEN SATURATION: 99 % | SYSTOLIC BLOOD PRESSURE: 156 MMHG | DIASTOLIC BLOOD PRESSURE: 66 MMHG | RESPIRATION RATE: 16 BRPM

## 2020-11-27 DIAGNOSIS — N18.9 ANEMIA ASSOCIATED WITH CHRONIC RENAL FAILURE: ICD-10-CM

## 2020-11-27 DIAGNOSIS — D63.1 ANEMIA ASSOCIATED WITH CHRONIC RENAL FAILURE: ICD-10-CM

## 2020-11-27 DIAGNOSIS — Z94.0 STATUS POST KIDNEY TRANSPLANT: Primary | ICD-10-CM

## 2020-11-27 LAB — EBV DNA SERPL NAA+PROBE-ACNC: NORMAL IU/ML

## 2020-11-27 PROCEDURE — 63600175 PHARM REV CODE 636 W HCPCS: Performed by: INTERNAL MEDICINE

## 2020-11-27 PROCEDURE — 96372 THER/PROPH/DIAG INJ SC/IM: CPT

## 2020-11-27 RX ADMIN — EPOETIN ALFA-EPBX 10000 UNITS: 10000 INJECTION, SOLUTION INTRAVENOUS; SUBCUTANEOUS at 01:11

## 2020-11-27 NOTE — ASSESSMENT & PLAN NOTE
Kidney tranplant on sept 2019. On cellcept, prograf, and prednisolone.   - Stopped cellcept; continue prograf and pred.   - KDM consult per their assessment patient creatinine in is improving, low probability of TMA because benign segmentation and normal complement count, SAUNDRA likely due to UTI and advised to continue UTI therapy, and if patient's creatinine does not improve advice for renal biopsy.

## 2020-11-27 NOTE — DISCHARGE SUMMARY
[facility] Hospital Medicine  Discharge Summary      Patient Name: Satinder Schulte  MRN: [id number]  Admission Date: 11/25/2020  Hospital Length of Stay: 1 days  Discharge Date and Time:  11/26/2020 6:28 PM  Attending Physician: Najma Denson*   Discharging Provider: Jose Francisco Oseguera MD  Primary Care Provider: Quirino Burnett MD  Hospital Medicine Team: [facility] HOSP MED 4 Jose Francisco Oseguera MD    HPI:   Patient is a 65 y.o. female who has a past medical history of end-stage renal disease status post transplant September 2019, Anemia associated with chronic renal failure, Diabetes mellitus, Essential hypertension, Hyperlipidemia, Hypertension, renal, Immunocompromised state, and Stroke in 2007 presented with generalized weakness and shortness of breath. She states she was walking with her neighbor this yesterday morning when she noticed her SOB. Also felt dizzy while walking. Dizziness was not associated with change in position, was not preceded by palpitation or she did not feel like was not spinning during head movement. She also felt weak from chest level to the lower extremities, but denies any focal weakness. Denies any fever, nausea, vomiting, diarrhea, chest pain, sob. She endorses lower back/flank pain. She endorsed burning during urination, but was denies frequency.     Patient presented to [facility] and was found to have mild SAUNDRA with BUN/Cr of 30/2.3. UA consistent with UTI. CBC shows slight anemia with hemoglobin of 8.5. Patient started on IV rocephin and given 1 unit pRBC transfusion. Vitals stable. COVID negative. Facility seeking transfer for KTM consult. KTM made aware of patient and requested transfer to . KTM consult was placed.     * No surgery found *      Hospital Course:   [facility] transferred from [facility] for KTM evaluation for SAUNDRA, in the setting of UTI patient was continue on Rocephin. KTM per their assessment patient creatinine in is  improving, low probability of TMA because benign segmentation and normal complement count, SAUNDRA likely due to UTI and advised to continue UTI therapy, and if patient's creatinine does not improve advice for renal biopsy.  In [facility] E was consulted for Janice WILLIS recommendation about neutropenia per oncology patient known neutropenic likely due to immunesuppressant therapy.  Per [facility] microbiology lab grew mixed of genital anita on urine culture, so patient was discharged on p.o. antibiotic to complete 7 days course and to follow-up with clinics, for urine culture.     Consults:   Consults (From admission, onward)        Status Ordering Provider     Inpatient consult to Hematology  Once     Provider:  (Not yet assigned)    Completed CARLIE PEREZ     Inpatient consult to Kidney/Pancreas Transplant Medicine  Once     Provider:  (Not yet assigned)    Completed EBER LYONS          * UTI (urinary tract infection)  Patient is a 65 y.o. female who has a past medical history of end-stage renal disease status post transplant September 2019, anemia associated with chronic renal failure, Diabetes mellitus, Essential hypertension, Hyperlipidemia, Hypertension, renal, Immunocompromised state, and Stroke in 2007 presented with generalized weakness and shortness of breath. Her UA was positive for 20-50 WBC, protein 30, presence of hyaline cast, and small bacteria (care everywhere). Vitals signs stable.   [facility] patient grew only genital fluoro on urine culture  In our urine culture no growth to date.    - Started on rocephin on 11/24, will discharge on nitrofurantoin 100 mg BID for total of 5 days course of abx         Neutropenia  Pt's WBC is 2.0. This is her baseline. She had bone marrow biopsy in 2018, which was unremarkable. Heme/onc consulted and per their assessment the mild worsening of pancytopenia likely secondary to immunosuppresive meds and infection.  No Indication for  "bone marrow biopsy.    -Continue to hold cellcept       Type 2 diabetes mellitus  Pt has history of DM. Currently on trulicity. Was on Insulin detemir 24, and aspart 5 tid, which was d/c 3 months ago.   - Did weight based calculations.   - Placed on detemir 8 bid and aspart 3 tid.   - POCT glucose ACHS.     Patient discharged on insulin will follow-up with PCP    SAUNDRA (acute kidney injury)  Pt was noted to be on SAUNDRA on admission. Her  BUN/Cr of 27/2.0. Her Cr. Has been between 1.6-1.9 over past two months. She takes prograf, cellcept, ketoconazole, and prednisolone.  Retroperitoneal US " Interval elevation of intraparenchymal resistive indices which may be seen with rejection, medical renal disease, or drug toxicity"  Pt started on IVF for 10hours    SAUNDRA improving creatinine in 1.6 (2 on admission)    - Continued pred and prograf  - Prograf level daily.   - continue to hold CellCept      Hypercalcemia  Placed on home cinacalcet. Ca WNL.     Status post kidney transplant  Kidney tranplant on sept 2019. On cellcept, prograf, and prednisolone.   - Stopped cellcept; continue prograf and pred.   - KDM consult per their assessment patient creatinine in is improving, low probability of TMA because benign segmentation and normal complement count, SAUNDRA likely due to UTI and advised to continue UTI therapy, and if patient's creatinine does not improve advice for renal biopsy.      Hyperlipidemia  Placed on home atorvostatin.       Anemia associated with chronic renal failure  Patient's Hgb is 8.2, stable from previous. Prior history of anemia of chronic disease.        Hypertension, renal  Placed on home coreg, nefidipine.   hydralazine 50 mg t.i.d. was started    Final Active Diagnoses:    Diagnosis Date Noted POA    PRINCIPAL PROBLEM:  UTI (urinary tract infection) [N39.0] 11/25/2020 Yes    Neutropenia [D70.9] 11/25/2020 Yes    Type 2 diabetes mellitus [E11.9]  Yes    SAUNDRA (acute kidney injury) [N17.9]  Yes    " Hypercalcemia [E83.52] 10/07/2019 Yes    Status post kidney transplant [Z94.0] 09/20/2019 Not Applicable    Hypertension, renal [I12.9]  Yes     Chronic    Hyperlipidemia [E78.5]  Yes     Chronic    Anemia associated with chronic renal failure [N18.9, D63.1]  Yes     Chronic      Problems Resolved During this Admission:       Discharged Condition: fair    Disposition: Home      Patient Instructions:   No discharge procedures on file.    Significant Diagnostic Studies: Microbiology:    and Urine Culture    Lab Results   Component Value Date    LABURIN No significant growth 11/25/2020       Pending Diagnostic Studies:     Procedure Component Value Units Date/Time    Jessi-Barr virus DNA, quantitative [[id number]] Collected: 11/25/20 1210    Order Status: Sent Lab Status: In process Updated: 11/25/20 1233    Specimen: Blood     Lactate dehydrogenase [[id number]] Collected: 11/25/20 1329    Order Status: Sent Lab Status: In process Updated: 11/25/20 1329    Specimen: Blood     Narrative:      Collection has been rescheduled by VAV at 11/25/2020 13:19 Reason:   Outside patient room .         Medications:   Satinder Schulte   Home Medication Instructions PONCHO:[id number]    Printed on:11/26/20 6390   Medication Information                      atorvastatin (LIPITOR) 40 MG tablet  Take 40 mg by mouth once daily.              blood sugar diagnostic Strp  Test blood glucose 4 (four) times daily.             blood-glucose meter kit  Use as instructed             carvedilol (COREG) 25 MG tablet  Take 1/2 tablet (12.5 mg total) by mouth 2 (two) times daily with meals. Hold for SBP<120, HR<60             cholecalciferol, vitamin D3, (VITAMIN D3) 25 mcg (1,000 unit) capsule  Take 1 capsule (1,000 Units total) by mouth once daily.             cinacalcet (SENSIPAR) 30 MG Tab  Take 1 tablet (30 mg total) by mouth daily with breakfast.             diclofenac sodium (VOLTAREN) 1 % Gel  Apply 2 g topically daily as needed (pain).  "             dulaglutide (TRULICITY) 1.5 mg/0.5 mL PnIj  Inject 1.5 mg into the skin once a week.             famotidine (PEPCID) 20 MG tablet  Take 1 tablet (20 mg total) by mouth every evening.             hydrALAZINE (APRESOLINE) 50 MG tablet  Take 1 tablet (50 mg total) by mouth every 8 (eight) hours.             HYDROcodone-acetaminophen (NORCO) 7.5-325 mg per tablet  Take 0.5-1 tablets by mouth 3 (three) times daily as needed for Pain.              ketoconazole (NIZORAL) 200 mg Tab  Take HALF tablet (100 mg total) by mouth once daily.             lancets 28 gauge Misc  Test blood glucose 4 (four) times daily.             magnesium oxide (MAG-OX) 400 mg (241.3 mg magnesium) tablet  TAKE 1 TABLET (400 MG TOTAL) BY MOUTH 2 (TWO) TIMES DAILY.             nitrofurantoin, macrocrystal-monohydrate, (MACROBID) 100 MG capsule  Take 1 capsule (100 mg total) by mouth 2 (two) times daily. for 3 days             pen needle, diabetic 32 gauge x 5/32" Ndle  Use to inject insulin 4 (four) times daily.             PHOSPHA 250 NEUTRAL 250 mg Tab  TAKE 2 TABLETS BY MOUTH TWICE DAILY             predniSONE (DELTASONE) 5 MG tablet  TAKE 4 TABLETS 9/23 TO 10/22, THEN TAKE 3 TABLETS 10/23 TO 11/22, THEN TAKE 2 TABLETS 11/23 TO 12/22, THEN TAKE 1 TABLET DAILY THEREAFTER             sodium bicarbonate 650 MG tablet  Take 1 tablet (650 mg total) by mouth 2 (two) times daily.             tacrolimus (PROGRAF) 1 MG Cap  Take 6 capsules (6 mg total) by mouth every 12 (twelve) hours.               Indwelling Lines/Drains at time of discharge:   Lines/Drains/Airways     Drain                 Hemodialysis AV Fistula 09/20/19 0600 Left upper arm 433 days                Time spent on the discharge of patient: 45 minutes  Patient was seen and examined on the date of discharge and determined to be suitable for discharge.         Jose Francisco Oseguera MD  Department of Hospital Medicine  [facility]  "

## 2020-11-27 NOTE — ASSESSMENT & PLAN NOTE
"Pt was noted to be on SAUNDRA on admission. Her  BUN/Cr of 27/2.0. Her Cr. Has been between 1.6-1.9 over past two months. She takes prograf, cellcept, ketoconazole, and prednisolone.  Retroperitoneal US " Interval elevation of intraparenchymal resistive indices which may be seen with rejection, medical renal disease, or drug toxicity"  Pt started on IVF for 10hours    SAUNDRA improving creatinine in 1.6 (2 on admission)    - Continued pred and prograf  - Prograf level daily.   - continue to hold CellCept    "

## 2020-11-27 NOTE — ASSESSMENT & PLAN NOTE
Pt's WBC is 2.0. This is her baseline. She had bone marrow biopsy in 2018, which was unremarkable. Heme/onc consulted and per their assessment the mild worsening of pancytopenia likely secondary to immunosuppresive meds and infection.  No Indication for bone marrow biopsy.    -Continue to hold cellcept

## 2020-11-27 NOTE — NURSING
Patient discharged to home per wheelchair van.  Left with all personal belongings and discharge handouts

## 2020-11-27 NOTE — ASSESSMENT & PLAN NOTE
Pt has history of DM. Currently on trulicity. Was on Insulin detemir 24, and aspart 5 tid, which was d/c 3 months ago.   - Did weight based calculations.   - Placed on detemir 8 bid and aspart 3 tid.   - POCT glucose ACHS.     Patient discharged on insulin will follow-up with PCP

## 2020-11-27 NOTE — PLAN OF CARE
Patient discharged home 11/26/20. Wheelchair van scheduled via [facility] to provide transportation.        11/27/20 0855   Final Note   Assessment Type Final Discharge Note   Anticipated Discharge Disposition Home   What phone number can be called within the next 1-3 days to see how you are doing after discharge? [phone]   Hospital Follow Up  Appt(s) scheduled? Yes   Discharge plans and expectations educations in teach back method with documentation complete? Yes   Post-Acute Status   Post-Acute Authorization Other   Other Status No Post-Acute Service Needs     Kathryn Barriga LMSW   - Case Management

## 2020-11-27 NOTE — NURSING
1307  11/27/20  Injection given without difficulties.Bandaid applied. Patient instructed to stay in the clinic for 15 minutes. Patient verbalized understanding and will notify nurse with any complaints.

## 2020-11-27 NOTE — ASSESSMENT & PLAN NOTE
Patient is a 65 y.o. female who has a past medical history of end-stage renal disease status post transplant September 2019, anemia associated with chronic renal failure, Diabetes mellitus, Essential hypertension, Hyperlipidemia, Hypertension, renal, Immunocompromised state, and Stroke in 2007 presented with generalized weakness and shortness of breath. Her UA was positive for 20-50 WBC, protein 30, presence of hyaline cast, and small bacteria (care everywhere). Vitals signs stable.   [facility] lab patient grew only genital fluoro on urine culture  In our urine culture no growth to date.    - Started on rocephin on 11/24, will discharge on nitrofurantoin 100 mg BID for total of 5 days course of abx

## 2020-11-27 NOTE — HOSPITAL COURSE
Patient transferred from [facility] for KTM evaluation for SAUNDRA, in the setting of UTI patient was continue on Rocephin. KTM per their assessment patient creatinine in is improving, low probability of TMA because benign segmentation and normal complement count, SAUNDRA likely due to UTI and advised to continue UTI therapy, and if patient's creatinine does not improve advice for renal biopsy.  In [facility] E was consulted for Janice WILLIS recommendation about neutropenia per oncology patient known neutropenic likely due to immunesuppressant therapy.  Per [facility] microbiology lab grew mixed of genital anita on urine culture, so patient was discharged on p.o. antibiotic to complete 7 days course and to follow-up with clinics, for urine culture.

## 2020-11-30 ENCOUNTER — HOSPITAL ENCOUNTER (OUTPATIENT)
Dept: RADIOLOGY | Facility: HOSPITAL | Age: 65
Discharge: HOME | End: 2020-11-30
Attending: INTERNAL MEDICINE
Payer: MEDICARE

## 2020-11-30 DIAGNOSIS — Z94.0 KIDNEY REPLACED BY TRANSPLANT: ICD-10-CM

## 2020-11-30 PROCEDURE — 76776 US EXAM K TRANSPL W/DOPPLER: CPT | Mod: 26,,, | Performed by: RADIOLOGY

## 2020-11-30 PROCEDURE — 76776 US EXAM K TRANSPL W/DOPPLER: CPT | Mod: TC

## 2020-11-30 PROCEDURE — 76776 US TRANSPLANT KIDNEY WITH DOPPLER: ICD-10-PCS | Mod: 26,,, | Performed by: RADIOLOGY

## 2020-11-30 NOTE — PROGRESS NOTES
She was in the hospital and her creatinine improved, however to 1.6 and her baseline was likely better than that in the past. I do not suspect an immune cause but the biopsy can give us some useful information about CAN or CNI toxicity     Lets repeat creatinine this week to see if there is any further improvement

## 2020-12-01 LAB
B19V DNA # SPEC NAA+PROBE: <100 COPIES/ML
SPECIMEN SOURCE: NORMAL

## 2020-12-02 ENCOUNTER — INITIAL CONSULT (OUTPATIENT)
Dept: VASCULAR SURGERY | Facility: CLINIC | Age: 65
End: 2020-12-02
Payer: MEDICARE

## 2020-12-02 ENCOUNTER — TELEPHONE (OUTPATIENT)
Dept: TRANSPLANT | Facility: CLINIC | Age: 65
End: 2020-12-02

## 2020-12-02 ENCOUNTER — HOSPITAL ENCOUNTER (OUTPATIENT)
Dept: VASCULAR SURGERY | Facility: CLINIC | Age: 65
Discharge: HOME | End: 2020-12-02
Attending: SURGERY
Payer: MEDICARE

## 2020-12-02 VITALS
WEIGHT: 180.75 LBS | SYSTOLIC BLOOD PRESSURE: 153 MMHG | TEMPERATURE: 99 F | DIASTOLIC BLOOD PRESSURE: 65 MMHG | HEIGHT: 63 IN | BODY MASS INDEX: 32.03 KG/M2 | HEART RATE: 61 BPM

## 2020-12-02 DIAGNOSIS — T82.898A ARTERIOVENOUS FISTULA OCCLUSION, INITIAL ENCOUNTER: ICD-10-CM

## 2020-12-02 DIAGNOSIS — T82.898A ARTERIOVENOUS FISTULA OCCLUSION, INITIAL ENCOUNTER: Primary | ICD-10-CM

## 2020-12-02 PROCEDURE — 99214 OFFICE O/P EST MOD 30 MIN: CPT | Mod: PBBFAC | Performed by: SURGERY

## 2020-12-02 PROCEDURE — 93990 PR DUPLEX HEMODIALYSIS ACCESS: ICD-10-PCS | Mod: 26,S$PBB,, | Performed by: SURGERY

## 2020-12-02 PROCEDURE — 99999 PR PBB SHADOW E&M-EST. PATIENT-LVL IV: ICD-10-PCS | Mod: PBBFAC,,, | Performed by: SURGERY

## 2020-12-02 PROCEDURE — 99204 PR OFFICE/OUTPT VISIT, NEW, LEVL IV, 45-59 MIN: ICD-10-PCS | Mod: S$PBB,,, | Performed by: SURGERY

## 2020-12-02 PROCEDURE — 99204 OFFICE O/P NEW MOD 45 MIN: CPT | Mod: S$PBB,,, | Performed by: SURGERY

## 2020-12-02 PROCEDURE — 93990 DOPPLER FLOW TESTING: CPT | Mod: 26,S$PBB,, | Performed by: SURGERY

## 2020-12-02 PROCEDURE — 99999 PR PBB SHADOW E&M-EST. PATIENT-LVL IV: CPT | Mod: PBBFAC,,, | Performed by: SURGERY

## 2020-12-02 PROCEDURE — 93990 DOPPLER FLOW TESTING: CPT | Mod: PBBFAC | Performed by: SURGERY

## 2020-12-02 NOTE — LETTER
December 2, 2020      Quirino Burnett MD  [address]  [place] [address]           [facility] - Vascular Surg 5th Fl  [address]  [facility] [address]  Phone: [phone]  Fax: [phone]          Patient: Satinder Schulte   MR Number: [id number]   YOB: 1955   Date of Visit: 12/2/2020       Dear Dr. Quirino Burnett:    Thank you for referring Satinder Schulte to me for evaluation. Attached you will find relevant portions of my assessment and plan of care.    If you have questions, please do not hesitate to call me. I look forward to following Satinder Schulte along with you.    Sincerely,    Ze Santos MD    Enclosure  CC:  No Recipients    If you would like to receive this communication electronically, please contact [email or web address] or [phone] to request more information on [employer] access.    For providers and/or their staff who would like to refer a patient to [facility], please contact us through our one-stop-shop provider referral line, [facility], at [phone].    If you feel you have received this communication in error or would no longer like to receive these types of communications, please e-mail [email or web address]

## 2020-12-02 NOTE — PROGRESS NOTES
Satinder Tabares  12/02/2020    HPI:  Mrs. tabares is a 65 y.o. female with a h/o ESRD s/p kidney transplant in 2019 and AV Fistula placement in 2014 who is here today following an episode of left arm swelling and redness that occurred two weeks ago - now resolved  Had L BVT AVF created by Dr Loo in  several yrs ago    Past Medical History:   Diagnosis Date    Abnormal Pap smear     repeat paps were normal    Anemia associated with chronic renal failure     Awaiting organ transplant status  - 02/18/2013 6/6/2014    Blood type A+ 6/6/2014    CKD (chronic kidney disease) stage 3, GFR 30-59 ml/min 10/30/2019    CKD (chronic kidney disease) stage 5, GFR less than 15 ml/min     secondary hypertension    Diabetes mellitus     Essential hypertension 5/19/2017    Hyperlipidemia     Hypertension, renal 1992    Immunocompromised state 10/4/2019    Stroke 2007    Residual speech deficit     Past Surgical History:   Procedure Laterality Date    AV FISTULA PLACEMENT  2014    BONE MARROW BIOPSY Right 8/23/2018    Procedure: Biopsy-bone marrow;  Surgeon: Anna Lin MD;  Location: [facility] OR [address] [place] [address];  Service: Oncology;  Laterality: Right;    CHOLECYSTECTOMY  2008    HYSTERECTOMY  2011    TAHBSO for benign reasons    KIDNEY TRANSPLANT N/A 9/20/2019    Procedure: TRANSPLANT, KIDNEY;  Surgeon: Guero Rogers MD;  Location: [facility] OR [address] [place] [address];  Service: Transplant;  Laterality: N/A;    OOPHORECTOMY       Family History   Problem Relation Age of Onset    Stroke Mother     Kidney disease Mother         on dialysis    Hypertension Mother     Heart disease Mother     Heart disease Father     Stroke Sister     Kidney disease Brother     Breast cancer Daughter     Heart disease Sister     Ovarian cancer Cousin     Colon cancer Neg Hx     Thrombophilia Neg Hx      Social History     Socioeconomic History    Marital status: Single     Spouse name: Not on file    Number of children: Not on file    Years of  education: Not on file    Highest education level: Not on file   Occupational History    Not on file   Social Needs    Financial resource strain: Not on file    Food insecurity     Worry: Not on file     Inability: Not on file    Transportation needs     Medical: Not on file     Non-medical: Not on file   Tobacco Use    Smoking status: Never Smoker    Smokeless tobacco: Never Used   Substance and Sexual Activity    Alcohol use: No    Drug use: No    Sexual activity: Never     Comment: single, Lives with daughter, on Disability, Lives in [place]   Lifestyle    Physical activity     Days per week: Not on file     Minutes per session: Not on file    Stress: Not on file   Relationships    Social connections     Talks on phone: Not on file     Gets together: Not on file     Attends L[employer]-day service: Not on file     Active member of club or organization: Not on file     Attends meetings of clubs or organizations: Not on file     Relationship status: Not on file   Other Topics Concern    Not on file   Social History Narrative    Disabled    Single    4 children    History of blood transfusions       Current Outpatient Medications:     atorvastatin (LIPITOR) 40 MG tablet, Take 40 mg by mouth once daily. , Disp: , Rfl:     blood sugar diagnostic Strp, Test blood glucose 4 (four) times daily. (Patient taking differently: 1 each by Misc.(Non-Drug; Combo Route) route 3 (three) times daily. ), Disp: 100 each, Rfl: 5    blood-glucose meter kit, Use as instructed, Disp: 1 each, Rfl: 0    carvedilol (COREG) 25 MG tablet, Take 1/2 tablet (12.5 mg total) by mouth 2 (two) times daily with meals. Hold for SBP<120, HR<60, Disp: 30 tablet, Rfl: 11    cholecalciferol, vitamin D3, (VITAMIN D3) 25 mcg (1,000 unit) capsule, Take 1 capsule (1,000 Units total) by mouth once daily., Disp: 30 capsule, Rfl: 11    cinacalcet (SENSIPAR) 30 MG Tab, Take 1 tablet (30 mg total) by mouth daily with breakfast., Disp: 30 tablet,  "Rfl: 11    diclofenac sodium (VOLTAREN) 1 % Gel, Apply 2 g topically daily as needed (pain). , Disp: , Rfl:     dulaglutide (TRULICITY) 1.5 mg/0.5 mL PnIj, Inject 1.5 mg into the skin once a week. (Patient taking differently: Inject 1.5 mg into the skin every Saturday. ), Disp: 12 Syringe, Rfl: 3    famotidine (PEPCID) 20 MG tablet, Take 1 tablet (20 mg total) by mouth every evening., Disp: 30 tablet, Rfl: 0    hydrALAZINE (APRESOLINE) 50 MG tablet, Take 1 tablet (50 mg total) by mouth every 8 (eight) hours., Disp: 90 tablet, Rfl: 11    HYDROcodone-acetaminophen (NORCO) 7.5-325 mg per tablet, Take 0.5-1 tablets by mouth 3 (three) times daily as needed for Pain. , Disp: , Rfl:     ketoconazole (NIZORAL) 200 mg Tab, Take HALF tablet (100 mg total) by mouth once daily., Disp: 15 tablet, Rfl: 11    lancets 28 gauge Misc, Test blood glucose 4 (four) times daily. (Patient taking differently: by Misc.(Non-Drug; Combo Route) route 3 (three) times daily. ), Disp: 100 each, Rfl: 5    magnesium oxide (MAG-OX) 400 mg (241.3 mg magnesium) tablet, TAKE 1 TABLET (400 MG TOTAL) BY MOUTH 2 (TWO) TIMES DAILY., Disp: 60 tablet, Rfl: 11    pen needle, diabetic 32 gauge x 5/32" Ndle, Use to inject insulin 4 (four) times daily., Disp: 100 each, Rfl: 5    PHOSPHA 250 NEUTRAL 250 mg Tab, TAKE 2 TABLETS BY MOUTH TWICE DAILY (Patient taking differently: Take 1 tablet by mouth once daily. ), Disp: 120 tablet, Rfl: 11    predniSONE (DELTASONE) 5 MG tablet, TAKE 4 TABLETS 9/23 TO 10/22, THEN TAKE 3 TABLETS 10/23 TO 11/22, THEN TAKE 2 TABLETS 11/23 TO 12/22, THEN TAKE 1 TABLET DAILY THEREAFTER (Patient taking differently: Take 5 mg by mouth once daily. ), Disp: 120 tablet, Rfl: 10    sodium bicarbonate 650 MG tablet, Take 1 tablet (650 mg total) by mouth 2 (two) times daily., Disp: 60 tablet, Rfl: 11    tacrolimus (PROGRAF) 1 MG Cap, Take 6 capsules (6 mg total) by mouth every 12 (twelve) hours., Disp: , Rfl:      REVIEW OF " SYSTEMS:  General: negative; Respiratory: no cough, shortness of breath, or wheezing; Cardiovascular: no chest pain or dyspnea on exertion; Gastrointestinal: no abdominal pain, change in bowel habits, or bloody stools; Genito-Urinary: no dysuria, trouble voiding, or hematuria; Musculoskeletal: no weakness or decreased range of motion, Neurological: no TIA or stroke symptoms; Psychiatric: no nervousness, anxiety or depression.    PHYSICAL EXAM:              General appearance: Alert, well-appearing, and in no distress.  Oriented to person, place, and time  Neurological: Normal speech, no focal findings noted; CN II - XII grossly intact  Musculoskeletal:Digits/nail without cyanosis/clubbing.  Normal muscle strength/tone.  Neck: Supple, no significant adenopathy, no carotid bruit can be auscultated  Chest:Clear to auscultation, no wheezes, rales or rhonchi, symmetric air entry, No use of accessory muscles   Cardiac:Normal rate and regular rhythm, S1 and S2 normal  Abdomen:Soft, nontender, nondistended, no masses or organomegaly, No rebound tenderness noted; bowel sounds normal, Pulsatile aortic mass is not palpable. No groin adenopathy   Extremities: Good thrill in L BVT AVF  Minimal L arm edema    LAB RESULTS:  Lab Results   Component Value Date    K 4.0 11/26/2020    K 3.9 11/25/2020    K 4.2 11/24/2020    CREATININE 1.6 (H) 11/26/2020    CREATININE 2.0 (H) 11/25/2020    CREATININE 2.0 (H) 11/24/2020     Lab Results   Component Value Date    WBC 2.85 (L) 11/30/2020    WBC 1.64 (LL) 11/26/2020    WBC 1.74 (LL) 11/25/2020    HCT 26.6 (L) 11/30/2020    HCT 24.1 (L) 11/26/2020    HCT 25.9 (L) 11/25/2020     11/30/2020     (L) 11/26/2020     (L) 11/25/2020     Lab Results   Component Value Date    HGBA1C 4.8 09/08/2020    HGBA1C 5.0 08/26/2020    HGBA1C 5.1 05/04/2020     IMAGING:  U/S w flow vol > 3 l/min; no stenosis    IMP/PLAN:  65 y.o. female with good thrill in L BVT AVF  Minimal L arm  edema  Expectant management; no need for resection now  F/u PRN    Ze Santos MD DFSVS FACS   Vascular/Endovascular Surgery

## 2020-12-02 NOTE — TELEPHONE ENCOUNTER
Plan reviewed with Patient daughter Niya and will have creatinine drawn here on Friday 12/4 before schedule Kidney transplant Biopsy.     ----- Message from Pavel Padilla MD sent at 11/30/2020 11:15 AM CST -----  She was in the hospital and her creatinine improved, however to 1.6 and her baseline was likely better than that in the past. I do not suspect an immune cause but the biopsy can give us some useful information about CAN or CNI toxicity     Lets repeat creatinine this week to see if there is any further improvement

## 2020-12-03 DIAGNOSIS — Z94.0 KIDNEY REPLACED BY TRANSPLANT: Primary | ICD-10-CM

## 2020-12-03 RX ORDER — MIDAZOLAM HYDROCHLORIDE 1 MG/ML
1 INJECTION INTRAMUSCULAR; INTRAVENOUS
Status: CANCELLED | OUTPATIENT
Start: 2020-12-03

## 2020-12-03 RX ORDER — FENTANYL CITRATE 50 UG/ML
50 INJECTION, SOLUTION INTRAMUSCULAR; INTRAVENOUS
Status: CANCELLED | OUTPATIENT
Start: 2020-12-03

## 2020-12-04 ENCOUNTER — HOSPITAL ENCOUNTER (OUTPATIENT)
Dept: INTERVENTIONAL RADIOLOGY/VASCULAR | Facility: HOSPITAL | Age: 65
Discharge: HOME | End: 2020-12-04
Attending: INTERNAL MEDICINE
Payer: MEDICARE

## 2020-12-04 VITALS
SYSTOLIC BLOOD PRESSURE: 158 MMHG | DIASTOLIC BLOOD PRESSURE: 48 MMHG | HEART RATE: 55 BPM | WEIGHT: 180 LBS | BODY MASS INDEX: 31.89 KG/M2 | OXYGEN SATURATION: 100 % | TEMPERATURE: 97 F | RESPIRATION RATE: 18 BRPM | HEIGHT: 63 IN

## 2020-12-04 DIAGNOSIS — Z94.0 KIDNEY REPLACED BY TRANSPLANT: ICD-10-CM

## 2020-12-04 LAB — POCT GLUCOSE: 132 MG/DL (ref 70–110)

## 2020-12-04 PROCEDURE — 76942 PR U/S GUIDANCE FOR NEEDLE GUIDANCE: ICD-10-PCS | Mod: 26,,, | Performed by: STUDENT IN AN ORGANIZED HEALTH CARE EDUCATION/TRAINING PROGRAM

## 2020-12-04 PROCEDURE — 76942 ECHO GUIDE FOR BIOPSY: CPT | Mod: 26,,, | Performed by: STUDENT IN AN ORGANIZED HEALTH CARE EDUCATION/TRAINING PROGRAM

## 2020-12-04 PROCEDURE — 88305 TISSUE EXAM BY PATHOLOGIST: CPT | Performed by: PATHOLOGY

## 2020-12-04 PROCEDURE — 27201068 IR BIOPSY KIDNEY

## 2020-12-04 PROCEDURE — 50200 RENAL BIOPSY PERQ: CPT | Mod: RT,,, | Performed by: STUDENT IN AN ORGANIZED HEALTH CARE EDUCATION/TRAINING PROGRAM

## 2020-12-04 PROCEDURE — 99152 PR MOD CONSCIOUS SEDATION, SAME PHYS, 5+ YRS, FIRST 15 MIN: ICD-10-PCS | Mod: ,,, | Performed by: STUDENT IN AN ORGANIZED HEALTH CARE EDUCATION/TRAINING PROGRAM

## 2020-12-04 PROCEDURE — 63600175 PHARM REV CODE 636 W HCPCS: Performed by: STUDENT IN AN ORGANIZED HEALTH CARE EDUCATION/TRAINING PROGRAM

## 2020-12-04 PROCEDURE — 76942 ECHO GUIDE FOR BIOPSY: CPT | Mod: TC

## 2020-12-04 PROCEDURE — 99152 MOD SED SAME PHYS/QHP 5/>YRS: CPT | Mod: ,,, | Performed by: STUDENT IN AN ORGANIZED HEALTH CARE EDUCATION/TRAINING PROGRAM

## 2020-12-04 PROCEDURE — 99152 MOD SED SAME PHYS/QHP 5/>YRS: CPT

## 2020-12-04 PROCEDURE — 50200 IR BIOPSY KIDNEY: ICD-10-PCS | Mod: RT,,, | Performed by: STUDENT IN AN ORGANIZED HEALTH CARE EDUCATION/TRAINING PROGRAM

## 2020-12-04 PROCEDURE — 88346 IMFLUOR 1ST 1ANTB STAIN PX: CPT | Performed by: PATHOLOGY

## 2020-12-04 PROCEDURE — 82962 GLUCOSE BLOOD TEST: CPT

## 2020-12-04 PROCEDURE — 88313 SPECIAL STAINS GROUP 2: CPT | Performed by: PATHOLOGY

## 2020-12-04 PROCEDURE — 88350 IMFLUOR EA ADDL 1ANTB STN PX: CPT | Mod: 59 | Performed by: PATHOLOGY

## 2020-12-04 RX ORDER — ONDANSETRON 2 MG/ML
4 INJECTION INTRAMUSCULAR; INTRAVENOUS EVERY 6 HOURS PRN
Status: DISCONTINUED | OUTPATIENT
Start: 2020-12-04 | End: 2020-12-05 | Stop reason: HOSPADM

## 2020-12-04 RX ORDER — SODIUM CHLORIDE 9 MG/ML
INJECTION, SOLUTION INTRAVENOUS CONTINUOUS
Status: DISCONTINUED | OUTPATIENT
Start: 2020-12-04 | End: 2020-12-05 | Stop reason: HOSPADM

## 2020-12-04 RX ORDER — FENTANYL CITRATE 50 UG/ML
INJECTION, SOLUTION INTRAMUSCULAR; INTRAVENOUS CODE/TRAUMA/SEDATION MEDICATION
Status: COMPLETED | OUTPATIENT
Start: 2020-12-04 | End: 2020-12-04

## 2020-12-04 RX ORDER — MIDAZOLAM HYDROCHLORIDE 1 MG/ML
INJECTION INTRAMUSCULAR; INTRAVENOUS CODE/TRAUMA/SEDATION MEDICATION
Status: COMPLETED | OUTPATIENT
Start: 2020-12-04 | End: 2020-12-04

## 2020-12-04 RX ADMIN — MIDAZOLAM HYDROCHLORIDE 1 MG: 1 INJECTION, SOLUTION INTRAMUSCULAR; INTRAVENOUS at 01:12

## 2020-12-04 RX ADMIN — FENTANYL CITRATE 50 MCG: 50 INJECTION, SOLUTION INTRAMUSCULAR; INTRAVENOUS at 01:12

## 2020-12-04 NOTE — H&P
Radiology History & Physical      SUBJECTIVE:     Chief Complaint: abnormal allograft function    History of Present Illness:  Satinder Schulte is a 65 y.o. female with history of ESRD s/p renal transplant on 9/20/2019 who presents for biopsy of renal transplant 2/2 abnormal allograft function. Renal transplant U/S from 11/25/20 showed nonspecific elevation in transplant arterial RIs.   Past Medical History:   Diagnosis Date    Abnormal Pap smear     repeat paps were normal    Anemia associated with chronic renal failure     Awaiting organ transplant status  - 02/18/2013 6/6/2014    Blood type A+ 6/6/2014    CKD (chronic kidney disease) stage 3, GFR 30-59 ml/min 10/30/2019    CKD (chronic kidney disease) stage 5, GFR less than 15 ml/min     secondary hypertension    Diabetes mellitus     Essential hypertension 5/19/2017    Hyperlipidemia     Hypertension, renal 1992    Immunocompromised state 10/4/2019    Stroke 2007    Residual speech deficit     Past Surgical History:   Procedure Laterality Date    AV FISTULA PLACEMENT  2014    BONE MARROW BIOPSY Right 8/23/2018    Procedure: Biopsy-bone marrow;  Surgeon: Anna Lin MD;  Location: [facility] OR [address] [place] [address];  Service: Oncology;  Laterality: Right;    CHOLECYSTECTOMY  2008    HYSTERECTOMY  2011    TAHBSO for benign reasons    KIDNEY TRANSPLANT N/A 9/20/2019    Procedure: TRANSPLANT, KIDNEY;  Surgeon: Guero Rogers MD;  Location: [facility] OR [address] [place] [address];  Service: Transplant;  Laterality: N/A;    OOPHORECTOMY         Home Meds:   Prior to Admission medications    Medication Sig Start Date End Date Taking? Authorizing Provider   atorvastatin (LIPITOR) 40 MG tablet Take 40 mg by mouth once daily.  4/14/14  Yes Historical Provider   cholecalciferol, vitamin D3, (VITAMIN D3) 25 mcg (1,000 unit) capsule Take 1 capsule (1,000 Units total) by mouth once daily. 9/14/20  Yes Jonathon Medel MD   diclofenac sodium (VOLTAREN) 1 % Gel Apply 2 g topically daily as needed (pain).   8/24/20  Yes Historical Provider   famotidine (PEPCID) 20 MG tablet Take 1 tablet (20 mg total) by mouth every evening. 9/23/19  Yes Pavel Padilla MD   hydrALAZINE (APRESOLINE) 50 MG tablet Take 1 tablet (50 mg total) by mouth every 8 (eight) hours. 11/26/20 11/26/21 Yes Eyal Manning DO   ketoconazole (NIZORAL) 200 mg Tab Take HALF tablet (100 mg total) by mouth once daily. 3/13/20  Yes Pavel Padilla MD   magnesium oxide (MAG-OX) 400 mg (241.3 mg magnesium) tablet TAKE 1 TABLET (400 MG TOTAL) BY MOUTH 2 (TWO) TIMES DAILY. 10/26/20  Yes Pavel Padilla MD   PHOSPHA 250 NEUTRAL 250 mg Tab TAKE 2 TABLETS BY MOUTH TWICE DAILY  Patient taking differently: Take 1 tablet by mouth once daily.  11/17/20  Yes Pavel Padilla MD   predniSONE (DELTASONE) 5 MG tablet TAKE 4 TABLETS 9/23 TO 10/22, THEN TAKE 3 TABLETS 10/23 TO 11/22, THEN TAKE 2 TABLETS 11/23 TO 12/22, THEN TAKE 1 TABLET DAILY THEREAFTER  Patient taking differently: Take 5 mg by mouth once daily.  11/11/20  Yes Pavel Padilla MD   tacrolimus (PROGRAF) 1 MG Cap Take 6 capsules (6 mg total) by mouth every 12 (twelve) hours. 11/26/20 11/26/21 Yes Eyal Manning DO   blood sugar diagnostic Strp Test blood glucose 4 (four) times daily.  Patient taking differently: 1 each by Misc.(Non-Drug; Combo Route) route 3 (three) times daily.  12/19/19   Guero Rogers MD   blood-glucose meter kit Use as instructed 12/19/19   Guero Rogers MD   carvedilol (COREG) 25 MG tablet Take 1/2 tablet (12.5 mg total) by mouth 2 (two) times daily with meals. Hold for SBP<120, HR<60 12/2/19 12/4/20  Pavel Padilla MD   cinacalcet (SENSIPAR) 30 MG Tab Take 1 tablet (30 mg total) by mouth daily with breakfast. 12/4/19 12/3/20  Pavel Padilla MD   dulaglutide (TRULICITY) 1.5 mg/0.5 mL PnIj Inject 1.5 mg into the skin once a week.  Patient taking differently: Inject 1.5 mg into the skin every Saturday.  5/14/20   Jonathon Mdeel MD   HYDROcodone-acetaminophen  "(NORCO) 7.5-325 mg per tablet Take 0.5-1 tablets by mouth 3 (three) times daily as needed for Pain.  8/26/20   Historical Provider   lancets 28 gauge Misc Test blood glucose 4 (four) times daily.  Patient taking differently: by Misc.(Non-Drug; Combo Route) route 3 (three) times daily.  12/19/19   Guero Rogers MD   pen needle, diabetic 32 gauge x 5/32" Ndle Use to inject insulin 4 (four) times daily. 12/19/19   Guero Rogers MD   sodium bicarbonate 650 MG tablet Take 1 tablet (650 mg total) by mouth 2 (two) times daily. 11/18/19 11/17/20  Emilia Amaya NP     Anticoagulants/Antiplatelets: no anticoagulation    Allergies: Review of patient's allergies indicates:  No Known Allergies  Sedation History:  no adverse reactions    Review of Systems:   Hematological: no known coagulopathies  Respiratory: no shortness of breath  Cardiovascular: no chest pain  Gastrointestinal: no abdominal pain  Genito-Urinary: no dysuria  Musculoskeletal: negative  Neurological: no TIA or stroke symptoms         OBJECTIVE:     Vital Signs (Most Recent)  Temp: 98.5 °F (36.9 °C) (12/04/20 1206)  Pulse: (!) 54 (12/04/20 1206)  Resp: 18 (12/04/20 1206)  BP: (!) 145/66 (12/04/20 1206)  SpO2: 100 % (12/04/20 1206)    Physical Exam:  ASA: 3  Mallampati: 3    General: no acute distress  Mental Status: alert and oriented to person, place and time  HEENT: normocephalic, atraumatic  Chest: unlabored breathing  Heart: regular heart rate  Abdomen: nondistended  Extremity: moves all extremities    Laboratory  Lab Results   Component Value Date    INR 0.9 11/20/2020       Lab Results   Component Value Date    WBC 2.85 (L) 11/30/2020    HGB 7.8 (L) 11/30/2020    HCT 26.6 (L) 11/30/2020     (H) 11/30/2020     11/30/2020      Lab Results   Component Value Date     (H) 12/04/2020     (H) 12/04/2020     12/04/2020     12/04/2020    K 4.1 12/04/2020    K 4.0 12/04/2020     (H) 12/04/2020     (H) 12/04/2020 "    CO2 23 12/04/2020    CO2 22 (L) 12/04/2020    BUN 20 12/04/2020    BUN 20 12/04/2020    CREATININE 1.7 (H) 12/04/2020    CREATININE 1.8 (H) 12/04/2020    CALCIUM 9.4 12/04/2020    CALCIUM 9.4 12/04/2020    MG 1.7 11/26/2020    ALT 13 12/04/2020    AST 13 12/04/2020    ALBUMIN 3.8 12/04/2020    ALBUMIN 3.8 12/04/2020    BILITOT 0.4 12/04/2020    BILIDIR 0.1 11/17/2020       ASSESSMENT/PLAN:     Sedation Plan: up to moderate  Patient will undergo random ultrasound guided percutaneous renal transplant biopsy.    Young Merrill MD PGY-III  Interventional Radiology  [facility]

## 2020-12-04 NOTE — NURSING
Report received from RUTH ANN Byers RN. Pt appears to be resting comfortably, sleeping in bed. NAD noted or reported.

## 2020-12-04 NOTE — PROCEDURES
"  Pre Op Diagnosis: renal transplant dysfunction  Post Op Diagnosis: Same    Procedure: KTx Bx    Procedure performed by: Zafar    Written Informed Consent Obtained: Yes  Specimen Removed: YES 3 18g cores  Estimated Blood Loss: Minimal    Findings:   Successful biopsy of RLQ renal allograft.. Gelfoam pledget used for hemostasis    Patient tolerated procedure well.    Mikal Stephen MD ([facility])  Interventional Radiology  [phone]        "

## 2020-12-04 NOTE — PROGRESS NOTES
[id number]. Called transportation for pt and notified him that he may leave. Pt's son is going to pick patient up.

## 2020-12-04 NOTE — NURSING
Procedure complete, pt tolerated well and without event, RLQ incision/dressing is clean, dry, intact and without bleeding or hematoma; pt escorted to ROCU for recovery under care of RN, bedside  Handoff provided to Luis MOORE, family updated per telephone

## 2020-12-04 NOTE — PLAN OF CARE
Pt arrived to IR procedure room 190  For transplant kidney biopsy, allergies rev'd, preparing for procedure

## 2020-12-05 NOTE — DISCHARGE INSTRUCTIONS
*** complete. Pt tolerated well. Discharge instructions and handouts provided. Pt verbalized understanding. Pt refused wheelchair, ambulated out of facility. Gait steady.

## 2020-12-05 NOTE — PROGRESS NOTES
Kidney bx is complete. Pt tolerated well. Discharge instructions and handouts provided. Pt verbalized understanding. Pt escorted to the Lobby via wheelchair.pt discharged

## 2020-12-05 NOTE — DISCHARGE SUMMARY
"Radiology Discharge Summary      Hospital Course: No complications    Admit Date: 12/4/2020  Discharge Date: 12/4/2020     Instructions Given to Patient: Yes  Diet: Resume prior diet  Activity: activity as tolerated and no driving for today    Description of Condition on Discharge: Stable  Vital Signs (Most Recent): Temp: 97.4 °F (36.3 °C) (12/04/20 1345)  Pulse: (!) 55 (12/04/20 1530)  Resp: 18 (12/04/20 1530)  BP: (!) 158/48 (12/04/20 1530)  SpO2: 100 % (12/04/20 1530)    Discharge Disposition: Home    Discharge Diagnosis: renal dysfunction     Follow-up: as scheduled    Mikal Stephen MD ([place])  Interventional Radiology  [phone]      "

## 2020-12-06 NOTE — PROGRESS NOTES
[facility]  Internal Medicine Teaching Residency Program  Inpatient Daily Progress Note  ______________________________________________________________________________    Patient: Crystal Canada  YOB: 1966   QND:[id number]    Acct: [de-identified]     Room: [facility]  Admit date: 12/5/2020  Today's date: 12/06/20  Number of days in the hospital: 1    SUBJECTIVE   Admitting Diagnosis: Hypertensive emergency without congestive heart failure  CC: Chest pressure and high BP. Pt examined at bedside. Chart & results reviewed. Pt NPO after midnight, Got ECHO last night, didn't get the report yet. Pt got s/l nitro for chest pain last night. No other fresh complaints overnight. Trops trending down 8>168>1076>1795>1241>672. Cardiology consulted. /85. Sat well on room air. Respiratory bedside for breathing treatment. ROS:  Constitutional:  negative for chills, fevers, sweats  Respiratory:  negative for cough, dyspnea on exertion, hemoptysis, shortness of breath, wheezing  Cardiovascular:  negative for chest pain, chest pressure/discomfort, lower extremity edema, palpitations  Gastrointestinal:  negative for abdominal pain, constipation, diarrhea, nausea, vomiting  Neurological:  negative for dizziness, headache  BRIEF HISTORY     The patient is a pleasant 48 y.o. male presents with a chief complaint of Chest Pressure/ tightness since morning. Pt stated that he woke up this morning and had some coffee and went for walk with his dogs. He started having mid strenal chest discomfort which he mentions as pressure and pain b/l arms, medial aspect, associated with profuse sweating. Pt took Tylenol but with no relief. Wife reported that the BP in 200s when she checked at home. Pt reported that he doesn't monitor his BP at home routinely.   Denies missing/ skiping any medication.     Wife reported that there daughter works as Nurse and she was tested [facility] for [facility]  Kidney Transplant  Progress Note      Reason for Follow-up: Reassessment of Kidney Transplant - 9/20/2019  (#1) recipient and management of immunosuppression.    ORGAN: LEFT KIDNEY    Donor Type: Donation after Brain Death    Donor CMV Status: Negative  Donor HBcAB:Positive  Donor HCV Status:Negative  Donor HBV NAOMI: Negative  Donor HCV NAOMI: Negative      Subjective:   HPI  Ms. Satinder Schulte is a 63 year old AA female with a PMHx relevant for HTN, CVA and ESRD presumed secondary to HTN who received DBD kidney transplant 9/20/2019 (Thymo induction 2/2 leukopenia, KDPI 78%, CIT 9 hr 5 min, CMV D-/R+, donor EBV IgG+, recipient HBsAB+/HBcAB -, donor HCV AB + NAOMI -). Of note, donor cultures positive for Staphylococcus Lugdunesis. Treated with Ancef.  She presents today after routine labs revealed glucose of 526 on 12/16/19. Complaining of blurry vision x 1 week and dry mouth. Otherwise, denies abdominal pain, change in urinary output, fever/chills, chest pain, shortness of breath, diarrhea, n/v, lightheadedness, and diaphoresis. No personal history of diabetes.     Interval history: No acute events overnight. Pt admitted with new onset diabetes. BG on arrival to ED > 700. Endocrinology managing. BG decreased to 160s on insulin gtt and basal/prandial insulin. Insulin gtt discontinued today. BG 160s-200s. Dietician consulted for diabetic diet education. Patient to undergo continued education for home insulin use today. Pt with CMV viremia. PCR detected at 314 on 12/9. Continue treatment dose Valcyte. Repeat CMV PCR In process. Wbc count decreased. ANC 1100. Monitor.     Past Medical and Surgical History: Ms. Schulte has a past medical history of Abnormal Pap smear, Anemia associated with chronic renal failure, Awaiting organ transplant status  - 02/18/2013 (6/6/2014), Blood type A+ (6/6/2014), CKD (chronic kidney disease) stage 3, GFR 30-59 ml/min (10/30/2019), CKD (chronic kidney disease)  COVID yesterday, although Rapid test done in ED is negative for Patient.     Pt works for [employer] as  and his last trip was yesterday. Denies chest pain, nausea, vomiting, shortness of breath.     Pt has PMH of COPD and KEVIN, on CPAP. Pt got Stress test in 2018 which was negative. H/O CVA ,HTN on Hydralazine, Lopressor and Amlodipine. OBJECTIVE     Vital Signs:  BP (!) 140/75   Pulse 61   Temp 98.7 °F (37.1 °C) (Oral)   Resp 16   Ht 6' 2\" (1.88 m)   Wt 260 lb (117.9 kg)   SpO2 96%   BMI 33.38 kg/m²     Temp (24hrs), Av.7 °F (37.1 °C), Min:98.6 °F (37 °C), Max:98.8 °F (37.1 °C)    In: -   Out: 400 [Urine:400]    Physical Exam  Constitutional:       Appearance: He is obese. He is not ill-appearing or toxic-appearing. HENT:      Head: Normocephalic and atraumatic. Nose: Nose normal.      Mouth/Throat:      Mouth: Mucous membranes are moist.   Eyes:      General: No scleral icterus. Extraocular Movements: Extraocular movements intact. Neck:      Musculoskeletal: Normal range of motion. No neck rigidity or muscular tenderness. Cardiovascular:      Rate and Rhythm: Normal rate and regular rhythm. Pulses: Normal pulses. Heart sounds: Normal heart sounds. Pulmonary:      Effort: Pulmonary effort is normal. No respiratory distress. Breath sounds: Normal breath sounds. Abdominal:      General: Bowel sounds are normal. There is no distension. Palpations: Abdomen is soft. Tenderness: There is no abdominal tenderness. Musculoskeletal: Normal range of motion. General: Swelling present. No tenderness. Comments: B/L mild pitting edema. Skin:     General: Skin is warm. Coloration: Skin is not jaundiced. Findings: No bruising. Neurological:      General: No focal deficit present. Mental Status: He is alert and oriented to person, place, and time.      Medications:  Scheduled Medications:    aspirin  81 mg Oral Daily    fluticasone stage 5, GFR less than 15 ml/min, Essential hypertension (5/19/2017), Hyperlipidemia, Hypertension, renal (1992), Immunocompromised state (10/4/2019), and Stroke (2007).  She has a past surgical history that includes Cholecystectomy (2008); Oophorectomy; AV fistula placement (2014); Hysterectomy (2011); Bone marrow biopsy (Right, 8/23/2018); and Kidney transplant (N/A, 9/20/2019).    Past Social and Family History: Ms. Schulte reports that she has never smoked. She has never used smokeless tobacco. She reports that she does not drink alcohol or use drugs.Her family history includes Breast cancer in her daughter; Heart disease in her father, mother, and sister; Hypertension in her mother; Kidney disease in her brother and mother; Ovarian cancer in her cousin; Stroke in her mother and sister.    Intake/Output - Last 3 Shifts       12/17 0700 - 12/18 0659 12/18 0700 - 12/19 0659 12/19 0700 - 12/20 0659    P.O. 360 1210 640    I.V. (mL/kg) 966.3 (11.5) 2690 (32) 300 (3.6)    IV Piggyback 2000      Total Intake(mL/kg) 3326.3 (39.6) 3900 (46.4) 940 (11.2)    Urine (mL/kg/hr) 750 1550 (0.8) 1700 (2.9)    Stool 0 0 0    Total Output 750 1550 1700    Net +2576.3 +2350 -760           Urine Occurrence   1 x    Stool Occurrence 0 x 1 x 1 x           Review of Systems   Constitutional: Negative for activity change, appetite change, chills, fatigue and fever.   HENT: Negative for congestion and ear pain.    Eyes: Positive for visual disturbance (blurred vision - chronic). Negative for pain and discharge.   Respiratory: Negative for cough, chest tightness, shortness of breath and wheezing.    Cardiovascular: Negative for chest pain, palpitations and leg swelling.   Gastrointestinal: Negative for abdominal distention, abdominal pain, constipation, diarrhea, nausea and vomiting.   Endocrine: Negative.    Genitourinary: Negative for decreased urine volume and difficulty urinating.   Musculoskeletal: Negative for back pain.   Skin:  "Negative for wound.   Allergic/Immunologic: Positive for immunocompromised state.   Neurological: Negative for dizziness and weakness.   Hematological: Negative.    Psychiatric/Behavioral: Negative for agitation and decreased concentration.     Objective:     Vital Signs (Most Recent):  Temp: 97.6 °F (36.4 °C) (12/19/19 1155)  Pulse: (!) 55 (12/19/19 1215)  Resp: 18 (12/19/19 1215)  BP: 135/60 (12/19/19 1215)  SpO2: 96 % (12/19/19 1215) Vital Signs (24h Range):  Temp:  [97.6 °F (36.4 °C)-98.5 °F (36.9 °C)] 97.6 °F (36.4 °C)  Pulse:  [53-64] 55  Resp:  [12-19] 18  SpO2:  [96 %-98 %] 96 %  BP: (115-160)/(60-80) 135/60     Weight: 84 kg (185 lb 3 oz)  Height: 5' 3" (160 cm)  Body mass index is 32.8 kg/m².    Physical Exam   Constitutional: She is oriented to person, place, and time. She appears well-developed.   Eyes: Pupils are equal, round, and reactive to light. EOM are normal.   Neck: Normal range of motion.   Cardiovascular: Normal rate, regular rhythm and normal heart sounds.   No murmur heard.  Pulmonary/Chest: Effort normal and breath sounds normal. No respiratory distress. She has no wheezes.   Abdominal: Soft. Bowel sounds are normal. She exhibits no distension. There is no tenderness. There is no guarding.   Musculoskeletal: Normal range of motion.   Neurological: She is alert and oriented to person, place, and time.   Skin: Skin is warm and dry.   Nursing note and vitals reviewed.      Significant Labs:  CBC:   Recent Labs   Lab 12/17/19  1210 12/18/19  0620 12/19/19  0645   WBC 3.10* 1.75* 1.86*   RBC 3.04* 2.79* 2.97*   HGB 9.7* 9.1* 9.6*   HCT 30.3* 28.5* 30.1*   * 110* 88*   * 102* 101*   MCH 31.9* 32.6* 32.3*   MCHC 32.0 31.9* 31.9*     BMP:   Recent Labs   Lab 12/17/19  1210 12/18/19  0620 12/19/19  0646   * 163* 159*    142 143   K 5.3* 4.6 4.2    112* 115*   CO2 25 22* 19*   BUN 26* 18 17   CREATININE 2.5* 1.5* 1.5*   CALCIUM 10.2 9.6 9.0 " 1 spray Each Nostril Daily    budesonide-formoterol  2 puff Inhalation BID    hydrALAZINE  25 mg Oral BID    metoprolol tartrate  50 mg Oral BID    tiotropium  2 puff Inhalation Daily    sodium chloride flush  10 mL Intravenous 2 times per day    famotidine  20 mg Oral BID    thiamine  100 mg Oral Daily    folic acid  1 mg Oral Daily     Continuous Infusions:    heparin (PORCINE) Infusion 16.4 Units/kg/hr (12/06/20 0708)     PRN Medicationsheparin (porcine), 4,000 Units, PRN  heparin (porcine), 2,000 Units, PRN  albuterol, 2.5 mg, Q6H PRN  polyethylene glycol, 17 g, Daily PRN  sodium chloride flush, 10 mL, PRN  potassium chloride, 40 mEq, PRN    Or  potassium alternative oral replacement, 40 mEq, PRN    Or  potassium chloride, 10 mEq, PRN  magnesium sulfate, 2 g, PRN  nitroGLYCERIN, 0.4 mg, Q5 Min PRN        Diagnostic Labs:  CBC:   Recent Labs     12/05/20  0957 12/05/20  1254 12/06/20  0628   WBC 8.0 12.4* 9.0   RBC 4.68 4.52 4.31   HGB 14.3 14.1 13.4   HCT 43.6 42.4 40.0*   MCV 93.2 93.8 92.8   RDW 12.2 12.0 11.9    304 216     BMP:   Recent Labs     12/05/20  0957 12/06/20  0628    136   K 4.4 3.9    103   CO2 21 23   BUN 12 11   CREATININE 0.92 0.82     BNP: No results for input(s): BNP in the last 72 hours. PT/INR:   Recent Labs     12/05/20  0957   PROTIME 9.7   INR 0.9     APTT:   Recent Labs     12/05/20  1852 12/06/20  0030 12/06/20  0628   APTT 28.3 41.1* 47.6*     CARDIAC ENZYMES: No results for input(s): CKMB, CKMBINDEX, TROPONINI in the last 72 hours.     Invalid input(s): CKTOTAL;3  FASTING LIPID PANEL:  Lab Results   Component Value Date    CHOL 152 08/24/2019    HDL 35 (L) 08/24/2019    TRIG 156 (H) 08/24/2019     LIVER PROFILE:   Recent Labs     12/05/20  1126 12/05/20  1400   AST 41* 123*   ALT 36 42*   BILIDIR 0.12 0.12   BILITOT 0.32 0.28*   ALKPHOS 100 91      MICROBIOLOGY:   Lab Results   Component Value Date/Time    CULTURE NO GROWTH 3 DAYS 07/16/2012 12:00 PM       Diagnostics:  None    Assessment/Plan:     * Diabetes mellitus, new onset  - a/w hyperglycemia (glucose 526 on 12/16). No hx of diabetes.  - POCT glucose > 500 on admit. A1c 7.9.  - No evidence of DKA.  - Endocrine consulted, appreciate their assistance with management  - sugars improving, now on diabetic diet, on levemir/novolog, and sliding scale  - Monitor.       Cytomegalovirus (CMV) viremia  - CMV PCR detected on 12/9 at 314.  - Continue treatment dose Valcyte.  - CMV PCR pending, 12/16.      Long-term use of immunosuppressant medication  - see prophylactic immunotherapy.      Prophylactic immunotherapy  - continue prograf and prednisone taper.  - will check daily prograf levels, monitor for toxic side effects, and adjust for therapeutic dose.        At risk for opportunistic infections  - continue mepron.  - Continue Valcyte for CMV.      Status post kidney transplant  - s/p DDRT on 9/20/19.  - Admitted with hyperglycemia.  - Cr elevated on admit.  - Improved with IVF.  - Monitor.      Drug-induced pancytopenia  - off cellcept.  - ANC 1100.  - Monitor with daily CBC.      Anemia associated with chronic renal failure  - H&H stable on admit.  - NO s/s of active bleeding.  - Monitor with daily CBC.      Hypertension, renal  - continue home BP medications (hydralazine, coreg, and nifedipine).   - monitor.          Discharge Planning: Not a candidate for discharge at this time.       She Santamaria PA-C  Kidney Transplant  [facility]   CULTURE  07/16/2012 12:00 PM     [address] [place], [place] [address] [address] [phone]    CULTURE NO GROWTH 49 DAYS 07/16/2012 12:00 PM    CULTURE  07/16/2012 12:00 PM     [address] [place] [address] [address] [phone]    CULTURE (A) 07/16/2012 12:00 PM      determined that such a clearance or approval is not necessary. CULTURE  07/16/2012 12:00 PM     [address] [place] [address] [address] [phone]    CULTURE  07/16/2012 12:00 PM     NEGATIVE:  Enterovirus RNA not detected by nucleic acid amplification. CULTURE  07/16/2012 12:00 PM     NEGATIVE:  Herpes Simplex Virus Type 1 or 2 DNA not detected by nucleic acid    CULTURE (A) 07/16/2012 12:00 PM      amplification. The results    CULTURE (A) 07/16/2012 12:00 PM      obtained should be interpreted in conjunction with clinical findings. The    CULTURE (A) 07/16/2012 12:00 PM      performance characteristics of this molecular test were validated by the    CULTURE (A) 07/16/2012 12:00 PM      microbiology department of [facility]. The test kit has not    CULTURE (A) 07/16/2012 12:00 PM      been approved or cleared by the U.S. Food and Drug Administration. The FDA has    CULTURE NO GROWTH 28 DAYS 07/16/2012 12:00 PM    CULTURE  07/16/2012 12:00 PM     [address] [place], [place] [address] [address] [phone]       Imaging:    Cta Chest W Wo Contrast    Result Date: 12/5/2020  No acute cardiopulmonary process. No CT evidence of aortic dissection, aneurysm or rupture.        ASSESSMENT & PLAN     Principal Problem:    Hypertensive emergency without congestive heart failure  Active Problems:    HTN (hypertension)    History of CVA (cerebrovascular accident)    Tobacco dependence    KEVIN on CPAP    Transient ischemic attack    NSTEMI (non-ST elevated myocardial infarction) (HCC)    COPD (chronic obstructive pulmonary disease) ([facility] [address]    Obesity (BMI 30-39. 9)  Resolved Problems:    * No resolved hospital problems. *        Hypertensive Emergency: Pt on Clevidipine drip. BP improving. Target BP reduction, 25% in first hr.  Will monitor BP.     NSTEMI: Pt On Heparin drip, Received Aspirin, Will trend Trops, EKG and ECHO ordered. Cardiology following. NPO after midnight. HTN: Will resume home meds, Hydralazine 25 mg and Lopressor 50 mg, once BP is controlled. Tobacco dependence: Smokes 1 pack/ day, willing to quit smoking, did try in past but due to some stress going in the family, resumed smoking. Will order Nicotine patch when stable.     KEVIN: Pt uses CPAP at home.     COPD: Home meds resumed. Symbicort , Spiriva and PRN Albuterol inhaler.        DVT ppx: Pt on Heparin   GI ppx: Pepcid     PT/OT/SW: consulted  Discharge Planning:  consulted. Karie Hudson MD  Internal Medicine Resident, PGY-1  [facility]; [place], [place]  12/6/2020, 8:34 AM    Attending Physician Statement  I have discussed the care of Katie Cano and I have examined the patient myselft and taken ros and hpi , including pertinent history and exam findings,  with the resident. I have reviewed the key elements of all parts of the encounter with the resident. I agree with the assessment, plan and orders as documented by the resident.   Morbid obesity active smoker history of stroke admitted with chest pain troponin elevation NSTEMI  Cardiology input for possible catheter tomorrow morning  Risk reduction counseled for better lifestyle and quit smoking    Electronically signed by Shaheed Joyner MD

## 2020-12-08 ENCOUNTER — LAB VISIT (OUTPATIENT)
Dept: LAB | Facility: HOSPITAL | Age: 65
End: 2020-12-08
Attending: INTERNAL MEDICINE
Payer: MEDICARE

## 2020-12-08 DIAGNOSIS — Z94.0 KIDNEY REPLACED BY TRANSPLANT: ICD-10-CM

## 2020-12-08 LAB
ANISOCYTOSIS BLD QL SMEAR: SLIGHT
BASOPHILS NFR BLD: 0 % (ref 0–1.9)
DACRYOCYTES BLD QL SMEAR: ABNORMAL
DIFFERENTIAL METHOD: ABNORMAL
EOSINOPHIL NFR BLD: 0 % (ref 0–8)
ERYTHROCYTE [DISTWIDTH] IN BLOOD BY AUTOMATED COUNT: 14.6 % (ref 11.5–14.5)
HCT VFR BLD AUTO: 27.9 % (ref 37–48.5)
HGB BLD-MCNC: 8.4 G/DL (ref 12–16)
HYPOCHROMIA BLD QL SMEAR: ABNORMAL
IMM GRANULOCYTES # BLD AUTO: ABNORMAL K/UL (ref 0–0.04)
IMM GRANULOCYTES NFR BLD AUTO: ABNORMAL % (ref 0–0.5)
LYMPHOCYTES NFR BLD: 23 % (ref 18–48)
MCH RBC QN AUTO: 30.2 PG (ref 27–31)
MCHC RBC AUTO-ENTMCNC: 30.1 G/DL (ref 32–36)
MCV RBC AUTO: 100 FL (ref 82–98)
MONOCYTES NFR BLD: 9 % (ref 4–15)
NEUTROPHILS NFR BLD: 68 % (ref 38–73)
NRBC BLD-RTO: 1 /100 WBC
OVALOCYTES BLD QL SMEAR: ABNORMAL
PLATELET # BLD AUTO: 102 K/UL (ref 150–350)
PLATELET BLD QL SMEAR: ABNORMAL
PMV BLD AUTO: 13 FL (ref 9.2–12.9)
POIKILOCYTOSIS BLD QL SMEAR: SLIGHT
POLYCHROMASIA BLD QL SMEAR: ABNORMAL
RBC # BLD AUTO: 2.78 M/UL (ref 4–5.4)
SCHISTOCYTES BLD QL SMEAR: ABNORMAL
WBC # BLD AUTO: 1.76 K/UL (ref 3.9–12.7)

## 2020-12-08 PROCEDURE — 80069 RENAL FUNCTION PANEL: CPT

## 2020-12-08 PROCEDURE — 83735 ASSAY OF MAGNESIUM: CPT

## 2020-12-08 PROCEDURE — 36415 COLL VENOUS BLD VENIPUNCTURE: CPT | Mod: PO

## 2020-12-08 PROCEDURE — 85007 BL SMEAR W/DIFF WBC COUNT: CPT

## 2020-12-08 PROCEDURE — 80197 ASSAY OF TACROLIMUS: CPT

## 2020-12-08 PROCEDURE — 85027 COMPLETE CBC AUTOMATED: CPT

## 2020-12-09 ENCOUNTER — TELEPHONE (OUTPATIENT)
Dept: TRANSPLANT | Facility: CLINIC | Age: 65
End: 2020-12-09

## 2020-12-09 LAB
ALBUMIN SERPL BCP-MCNC: 4.1 G/DL (ref 3.5–5.2)
ANION GAP SERPL CALC-SCNC: 12 MMOL/L (ref 8–16)
BUN SERPL-MCNC: 18 MG/DL (ref 8–23)
CALCIUM SERPL-MCNC: 8.8 MG/DL (ref 8.7–10.5)
CHLORIDE SERPL-SCNC: 110 MMOL/L (ref 95–110)
CO2 SERPL-SCNC: 21 MMOL/L (ref 23–29)
CREAT SERPL-MCNC: 1.7 MG/DL (ref 0.5–1.4)
EST. GFR  (AFRICAN AMERICAN): 36.5 ML/MIN/1.73 M^2
EST. GFR  (NON AFRICAN AMERICAN): 31.6 ML/MIN/1.73 M^2
FINAL PATHOLOGIC DIAGNOSIS: NORMAL
GLUCOSE SERPL-MCNC: 96 MG/DL (ref 70–110)
GROSS: NORMAL
MAGNESIUM SERPL-MCNC: 1.8 MG/DL (ref 1.6–2.6)
PHOSPHATE SERPL-MCNC: 4 MG/DL (ref 2.7–4.5)
POTASSIUM SERPL-SCNC: 3.9 MMOL/L (ref 3.5–5.1)
SODIUM SERPL-SCNC: 143 MMOL/L (ref 136–145)
TACROLIMUS BLD-MCNC: 10.9 NG/ML (ref 5–15)

## 2020-12-09 RX ORDER — TACROLIMUS 1 MG/1
4 CAPSULE ORAL EVERY 12 HOURS
Qty: 240 CAPSULE | Refills: 11 | Status: SHIPPED | OUTPATIENT
Start: 2020-12-09 | End: 2020-12-16 | Stop reason: SDUPTHER

## 2020-12-09 NOTE — TELEPHONE ENCOUNTER
Patient and Daughter Niya repeated back and voice a understanding of orders.  Biopsy report reviewed .    ----- Message from Pavel Padilla MD sent at 12/9/2020  1:02 PM CST -----  Lower prograf to 4 mg po bid   Repeat labs next Monday  Please inform patient that unfortunately the biopsy was a suboptima specimen. No glomeruli, so is inadequate for diagnosis. If the creatinine remains elevated may need to repeat the biopsy  Her last allosure was 0.17 so it is re-assuring to r/o rejection  Encourage hydration

## 2020-12-09 NOTE — PROGRESS NOTES
Lower prograf to 4 mg po bid   Repeat labs next Monday  Please inform patient that unfortunately the biopsy was a suboptima specimen. No glomeruli, so is inadequate for diagnosis. If the creatinine remains elevated may need to repeat the biopsy  Her last allosure was 0.17 so it is re-assuring to r/o rejection  Encourage hydration

## 2020-12-09 NOTE — PROGRESS NOTES
Please d/c KPN,   Repeat PTH    Encourage hydration  This specimen was inadequate, will monitor creatine for 1 or 2 weeks and repeat biopsy

## 2020-12-09 NOTE — TELEPHONE ENCOUNTER
12/4/2020 Kidney Transplant Biopsy was suboptimal with no cortex  Insufficient to give any diagnosis  Lets repeat labs in 2 weeks  If creatinine elevated repeat biopsy in 4 weeks.  __________________  Results reviewed with patient and daughter Niya.  Repeat labs schedule for Tuesday 12/15/2020.

## 2020-12-10 ENCOUNTER — TELEPHONE (OUTPATIENT)
Dept: TRANSPLANT | Facility: CLINIC | Age: 65
End: 2020-12-10

## 2020-12-10 NOTE — TELEPHONE ENCOUNTER
Patient and daughter Niya repeated back and voice a understanding of orders.      ----- Message from Pavel Padilla MD sent at 12/9/2020  5:27 PM CST -----  Please d/c KPN,   Repeat PTH    Encourage hydration  This specimen was inadequate, will monitor creatine for 1 or 2 weeks and repeat biopsy

## 2020-12-11 ENCOUNTER — INFUSION (OUTPATIENT)
Dept: INFUSION THERAPY | Facility: HOSPITAL | Age: 65
End: 2020-12-11
Attending: INTERNAL MEDICINE
Payer: MEDICARE

## 2020-12-11 VITALS
WEIGHT: 176.25 LBS | TEMPERATURE: 98 F | OXYGEN SATURATION: 99 % | RESPIRATION RATE: 18 BRPM | HEART RATE: 60 BPM | SYSTOLIC BLOOD PRESSURE: 158 MMHG | DIASTOLIC BLOOD PRESSURE: 65 MMHG | BODY MASS INDEX: 31.22 KG/M2

## 2020-12-11 DIAGNOSIS — D63.1 ANEMIA ASSOCIATED WITH CHRONIC RENAL FAILURE: ICD-10-CM

## 2020-12-11 DIAGNOSIS — Z94.0 STATUS POST KIDNEY TRANSPLANT: Primary | ICD-10-CM

## 2020-12-11 DIAGNOSIS — N18.9 ANEMIA ASSOCIATED WITH CHRONIC RENAL FAILURE: ICD-10-CM

## 2020-12-11 PROCEDURE — 96372 THER/PROPH/DIAG INJ SC/IM: CPT

## 2020-12-11 PROCEDURE — 63600175 PHARM REV CODE 636 W HCPCS: Mod: EC | Performed by: INTERNAL MEDICINE

## 2020-12-11 RX ADMIN — EPOETIN ALFA-EPBX 10000 UNITS: 10000 INJECTION, SOLUTION INTRAVENOUS; SUBCUTANEOUS at 01:12

## 2020-12-11 NOTE — NURSING
Problem: Patient Care Overview  Goal: Plan of Care Review  Outcome: Ongoing (interventions implemented as appropriate)  Goal: Individualization and Mutuality  Outcome: Ongoing (interventions implemented as appropriate)  Goal: Discharge Needs Assessment  Outcome: Ongoing (interventions implemented as appropriate)       P   Retacrit Injection 10, 000 Units Subcut to right upper arm given without difficulties.  Bandaid applied. Patient instructed to stay in the clinic for 15 minutes. Patient verbalized understanding and will notify nurse with any complaints.

## 2020-12-15 ENCOUNTER — LAB VISIT (OUTPATIENT)
Dept: LAB | Facility: HOSPITAL | Age: 65
End: 2020-12-15
Attending: INTERNAL MEDICINE
Payer: MEDICARE

## 2020-12-15 DIAGNOSIS — Z94.0 KIDNEY REPLACED BY TRANSPLANT: ICD-10-CM

## 2020-12-15 LAB
BASOPHILS # BLD AUTO: 0.01 K/UL (ref 0–0.2)
BASOPHILS NFR BLD: 0.5 % (ref 0–1.9)
DIFFERENTIAL METHOD: ABNORMAL
EOSINOPHIL # BLD AUTO: 0.1 K/UL (ref 0–0.5)
EOSINOPHIL NFR BLD: 2.5 % (ref 0–8)
ERYTHROCYTE [DISTWIDTH] IN BLOOD BY AUTOMATED COUNT: 15.2 % (ref 11.5–14.5)
HCT VFR BLD AUTO: 30.6 % (ref 37–48.5)
HGB BLD-MCNC: 9.1 G/DL (ref 12–16)
IMM GRANULOCYTES # BLD AUTO: 0.02 K/UL (ref 0–0.04)
IMM GRANULOCYTES NFR BLD AUTO: 1 % (ref 0–0.5)
LYMPHOCYTES # BLD AUTO: 0.5 K/UL (ref 1–4.8)
LYMPHOCYTES NFR BLD: 22.5 % (ref 18–48)
MCH RBC QN AUTO: 29.2 PG (ref 27–31)
MCHC RBC AUTO-ENTMCNC: 29.7 G/DL (ref 32–36)
MCV RBC AUTO: 98 FL (ref 82–98)
MONOCYTES # BLD AUTO: 0.2 K/UL (ref 0.3–1)
MONOCYTES NFR BLD: 10 % (ref 4–15)
NEUTROPHILS # BLD AUTO: 1.3 K/UL (ref 1.8–7.7)
NEUTROPHILS NFR BLD: 63.5 % (ref 38–73)
NRBC BLD-RTO: 0 /100 WBC
PLATELET # BLD AUTO: 139 K/UL (ref 150–350)
PMV BLD AUTO: 11.9 FL (ref 9.2–12.9)
RBC # BLD AUTO: 3.12 M/UL (ref 4–5.4)
WBC # BLD AUTO: 2 K/UL (ref 3.9–12.7)

## 2020-12-15 PROCEDURE — 36415 COLL VENOUS BLD VENIPUNCTURE: CPT | Mod: PO

## 2020-12-15 PROCEDURE — 80069 RENAL FUNCTION PANEL: CPT

## 2020-12-15 PROCEDURE — 83970 ASSAY OF PARATHORMONE: CPT

## 2020-12-15 PROCEDURE — 85025 COMPLETE CBC W/AUTO DIFF WBC: CPT

## 2020-12-15 PROCEDURE — 83735 ASSAY OF MAGNESIUM: CPT

## 2020-12-15 PROCEDURE — 80197 ASSAY OF TACROLIMUS: CPT

## 2020-12-16 LAB
ALBUMIN SERPL BCP-MCNC: 4.1 G/DL (ref 3.5–5.2)
ANION GAP SERPL CALC-SCNC: 8 MMOL/L (ref 8–16)
BUN SERPL-MCNC: 20 MG/DL (ref 8–23)
CALCIUM SERPL-MCNC: 9.9 MG/DL (ref 8.7–10.5)
CHLORIDE SERPL-SCNC: 113 MMOL/L (ref 95–110)
CO2 SERPL-SCNC: 21 MMOL/L (ref 23–29)
CREAT SERPL-MCNC: 1.5 MG/DL (ref 0.5–1.4)
EST. GFR  (AFRICAN AMERICAN): 42.4 ML/MIN/1.73 M^2
EST. GFR  (NON AFRICAN AMERICAN): 36.8 ML/MIN/1.73 M^2
GLUCOSE SERPL-MCNC: 80 MG/DL (ref 70–110)
MAGNESIUM SERPL-MCNC: 1.7 MG/DL (ref 1.6–2.6)
PHOSPHATE SERPL-MCNC: 3.2 MG/DL (ref 2.7–4.5)
POTASSIUM SERPL-SCNC: 4.4 MMOL/L (ref 3.5–5.1)
PTH-INTACT SERPL-MCNC: 680 PG/ML (ref 9–77)
SODIUM SERPL-SCNC: 142 MMOL/L (ref 136–145)
TACROLIMUS BLD-MCNC: 3.8 NG/ML (ref 5–15)

## 2020-12-16 RX ORDER — TACROLIMUS 1 MG/1
CAPSULE ORAL
Qty: 270 CAPSULE | Refills: 11 | Status: ON HOLD | OUTPATIENT
Start: 2020-12-16 | End: 2021-04-09 | Stop reason: SDUPTHER

## 2020-12-16 NOTE — TELEPHONE ENCOUNTER
Patient and daughter Niya repeated back and voice a understanding of orders.  Next labs 12/29/2020    ----- Message from Pavel Padilla MD sent at 12/16/2020  9:26 AM CST -----  Please, if this is a true trough level increase prograf to 5/4  Repeat labs in 10 days  advise hydration

## 2020-12-16 NOTE — PROGRESS NOTES
Please, if this is a true trough level increase prograf to 5/4  Repeat labs in 10 days  advise hydration

## 2020-12-18 ENCOUNTER — INFUSION (OUTPATIENT)
Dept: INFUSION THERAPY | Facility: HOSPITAL | Age: 65
End: 2020-12-18
Attending: INTERNAL MEDICINE
Payer: MEDICARE

## 2020-12-18 VITALS
SYSTOLIC BLOOD PRESSURE: 138 MMHG | OXYGEN SATURATION: 98 % | RESPIRATION RATE: 20 BRPM | HEART RATE: 66 BPM | TEMPERATURE: 98 F | DIASTOLIC BLOOD PRESSURE: 75 MMHG

## 2020-12-18 DIAGNOSIS — N18.9 ANEMIA ASSOCIATED WITH CHRONIC RENAL FAILURE: ICD-10-CM

## 2020-12-18 DIAGNOSIS — Z94.0 STATUS POST KIDNEY TRANSPLANT: Primary | ICD-10-CM

## 2020-12-18 DIAGNOSIS — D63.1 ANEMIA ASSOCIATED WITH CHRONIC RENAL FAILURE: ICD-10-CM

## 2020-12-18 PROCEDURE — 63600175 PHARM REV CODE 636 W HCPCS: Performed by: INTERNAL MEDICINE

## 2020-12-18 PROCEDURE — 96372 THER/PROPH/DIAG INJ SC/IM: CPT

## 2020-12-18 RX ADMIN — EPOETIN ALFA-EPBX 20000 UNITS: 10000 INJECTION, SOLUTION INTRAVENOUS; SUBCUTANEOUS at 02:12

## 2020-12-18 NOTE — DISCHARGE INSTRUCTIONS
[facility]  [address] [facility]  [address]  [phone] phone     [phone] fax  Hours of Operation: Monday- Friday 8:00am- 5:00pm  After hours phone  [phone]  Hematology / Oncology Physicians on call      Dr. Richie Ambrosio      Please call with any concerns regarding your appointment today.        FALL PREVENTION   Falls often occur due to slipping, tripping or losing your balance. Here are ways to reduce your risk of falling again.   Was there anything that caused your fall that can be fixed, removed or replaced?   Make your home safe by keeping walkways clear of objects you may trip over.   Use non-slip pads under rugs.   Do not walk in poorly lit areas.   Do not stand on chairs or wobbly ladders.   Use caution when reaching overhead or looking upward. This position can cause a loss of balance.   Be sure your shoes fit properly, have non-slip bottoms and are in good condition.   Be cautious when going up and down stairs, curbs, and when walking on uneven sidewalks.   If your balance is poor, consider using a cane or walker.   If your fall was related to alcohol use, stop or limit alcohol intake.   If your fall was related to use of sleeping medicines, talk to your doctor about this. You may need to reduce your dosage at bedtime if you awaken during the night to go to the bathroom.   To reduce the need for nighttime bathroom trips:   Avoid drinking fluids for several hours before going to bed   Empty your bladder before going to bed   Men can keep a urinal at the bedside   © 2344-0324 [employer], [address] [place] [address], [place] [address]. All rights reserved. This information is not intended as a substitute for professional medical care. Always follow your healthcare professional's instructions.      WAYS TO HELP PREVENT INFECTION         WASH YOUR HANDS OFTEN DURING THE DAY, ESPECIALLY BEFORE YOU EAT,  AFTER USING THE BATHROOM, AND AFTER TOUCHING ANIMALS     STAY AWAY FROM PEOPLE WHO HAVE ILLNESSES YOU CAN CATCH; SUCH AS COLDS, FLU, CHICKEN POX     TRY TO AVOID CROWDS     STAY AWAY FROM CHILDREN WHO RECENTLY HAVE RECEIVED LIVE VIRUS VACCINES     MAINTAIN GOOD MOUTH CARE     DO NOT SQUEEZE OR SCRATCH PIMPLES     CLEAN CUTS & SCRAPES RIGHT AWAY AND DAILY UNTIL HEALED WITH WARM WATER, SOAP & AN ANTISEPTIC     AVOID CONTACT WITH LITTER BOXES, BIRD CAGES, & FISH TANKS     AVOID STANDING WATER, IE., BIRD BATHS, FLOWER POTS/VASES, OR HUMIDIFIERS     WEAR GLOVES WHEN GARDENING OR CLEANING UP AFTER OTHERS, ESPECIALLY BABIES & SMALL CHILDREN     DO NOT EAT RAW FISH, SEAFOOD, MEAT, OR EGGS

## 2020-12-18 NOTE — NURSING
1439  12/18/20  Injection given without difficulties.Bandaid applied. Patient instructed to stay in the clinic for 15 minutes. Patient verbalized understanding and will notify nurse with any complaints.

## 2020-12-28 ENCOUNTER — TELEPHONE (OUTPATIENT)
Dept: INFUSION THERAPY | Facility: HOSPITAL | Age: 65
End: 2020-12-28

## 2020-12-29 ENCOUNTER — DOCUMENTATION ONLY (OUTPATIENT)
Dept: TRANSPLANT | Facility: CLINIC | Age: 65
End: 2020-12-29

## 2020-12-29 ENCOUNTER — TELEPHONE (OUTPATIENT)
Dept: TRANSPLANT | Facility: CLINIC | Age: 65
End: 2020-12-29

## 2020-12-29 ENCOUNTER — INFUSION (OUTPATIENT)
Dept: INFUSION THERAPY | Facility: HOSPITAL | Age: 65
End: 2020-12-29
Attending: INTERNAL MEDICINE
Payer: MEDICARE

## 2020-12-29 VITALS
TEMPERATURE: 98 F | SYSTOLIC BLOOD PRESSURE: 117 MMHG | OXYGEN SATURATION: 98 % | RESPIRATION RATE: 18 BRPM | HEART RATE: 63 BPM | DIASTOLIC BLOOD PRESSURE: 59 MMHG

## 2020-12-29 DIAGNOSIS — Z94.0 STATUS POST KIDNEY TRANSPLANT: Primary | ICD-10-CM

## 2020-12-29 DIAGNOSIS — D63.1 ANEMIA ASSOCIATED WITH CHRONIC RENAL FAILURE: ICD-10-CM

## 2020-12-29 DIAGNOSIS — N18.9 ANEMIA ASSOCIATED WITH CHRONIC RENAL FAILURE: ICD-10-CM

## 2020-12-29 PROCEDURE — 63600175 PHARM REV CODE 636 W HCPCS: Mod: EC | Performed by: INTERNAL MEDICINE

## 2020-12-29 PROCEDURE — 96372 THER/PROPH/DIAG INJ SC/IM: CPT

## 2020-12-29 RX ADMIN — EPOETIN ALFA-EPBX 20000 UNITS: 10000 INJECTION, SOLUTION INTRAVENOUS; SUBCUTANEOUS at 01:12

## 2020-12-29 NOTE — TELEPHONE ENCOUNTER
----- Message from Pavel Padilla MD sent at 12/29/2020  1:57 PM CST -----  Please repeat serum CMV pcr in am  Please make sure she is not taking MMF Bactrim by mistake  Will send a message to Dr Orozco (hematology). She was seen in November  Please add iron stores to labs this morning

## 2020-12-29 NOTE — PROGRESS NOTES
Critical WBC of 1.86 reported by Haley with [facility]. Diff pending.   Message sent to pt MD, Dr. Padilla, and Ladan Callahan ( coordinator covering for pt coordinator, Darci ).

## 2021-01-05 ENCOUNTER — LAB VISIT (OUTPATIENT)
Dept: LAB | Facility: HOSPITAL | Age: 66
End: 2021-01-05
Attending: INTERNAL MEDICINE
Payer: MEDICARE

## 2021-01-05 DIAGNOSIS — Z94.0 KIDNEY REPLACED BY TRANSPLANT: ICD-10-CM

## 2021-01-05 DIAGNOSIS — Z79.01 LONG TERM (CURRENT) USE OF ANTICOAGULANTS: ICD-10-CM

## 2021-01-05 PROCEDURE — 85027 COMPLETE CBC AUTOMATED: CPT

## 2021-01-05 PROCEDURE — 36415 COLL VENOUS BLD VENIPUNCTURE: CPT | Mod: PO

## 2021-01-05 PROCEDURE — 85007 BL SMEAR W/DIFF WBC COUNT: CPT

## 2021-01-06 ENCOUNTER — TELEPHONE (OUTPATIENT)
Dept: TRANSPLANT | Facility: CLINIC | Age: 66
End: 2021-01-06

## 2021-01-06 LAB
BASOPHILS # BLD AUTO: ABNORMAL K/UL (ref 0–0.2)
BASOPHILS NFR BLD: 0 % (ref 0–1.9)
DIFFERENTIAL METHOD: ABNORMAL
EOSINOPHIL # BLD AUTO: ABNORMAL K/UL (ref 0–0.5)
EOSINOPHIL NFR BLD: 3 % (ref 0–8)
ERYTHROCYTE [DISTWIDTH] IN BLOOD BY AUTOMATED COUNT: 14.6 % (ref 11.5–14.5)
HCT VFR BLD AUTO: 27.9 % (ref 37–48.5)
HGB BLD-MCNC: 8 G/DL (ref 12–16)
IMM GRANULOCYTES # BLD AUTO: ABNORMAL K/UL (ref 0–0.04)
IMM GRANULOCYTES NFR BLD AUTO: ABNORMAL % (ref 0–0.5)
LYMPHOCYTES # BLD AUTO: ABNORMAL K/UL (ref 1–4.8)
LYMPHOCYTES NFR BLD: 23 % (ref 18–48)
MCH RBC QN AUTO: 27.8 PG (ref 27–31)
MCHC RBC AUTO-ENTMCNC: 28.7 G/DL (ref 32–36)
MCV RBC AUTO: 97 FL (ref 82–98)
MONOCYTES # BLD AUTO: ABNORMAL K/UL (ref 0.3–1)
MONOCYTES NFR BLD: 12 % (ref 4–15)
NEUTROPHILS NFR BLD: 62 % (ref 38–73)
NRBC BLD-RTO: 0 /100 WBC
PLATELET # BLD AUTO: 151 K/UL (ref 150–350)
PMV BLD AUTO: 11.4 FL (ref 9.2–12.9)
RBC # BLD AUTO: 2.88 M/UL (ref 4–5.4)
WBC # BLD AUTO: 1.52 K/UL (ref 3.9–12.7)

## 2021-01-08 ENCOUNTER — PATIENT MESSAGE (OUTPATIENT)
Dept: TRANSPLANT | Facility: CLINIC | Age: 66
End: 2021-01-08

## 2021-01-11 ENCOUNTER — INFUSION (OUTPATIENT)
Dept: INFUSION THERAPY | Facility: HOSPITAL | Age: 66
End: 2021-01-11
Attending: INTERNAL MEDICINE
Payer: MEDICARE

## 2021-01-11 ENCOUNTER — OFFICE VISIT (OUTPATIENT)
Dept: HEMATOLOGY/ONCOLOGY | Facility: CLINIC | Age: 66
End: 2021-01-11
Payer: MEDICARE

## 2021-01-11 VITALS
HEART RATE: 62 BPM | RESPIRATION RATE: 16 BRPM | SYSTOLIC BLOOD PRESSURE: 168 MMHG | TEMPERATURE: 97 F | DIASTOLIC BLOOD PRESSURE: 71 MMHG | OXYGEN SATURATION: 99 %

## 2021-01-11 VITALS
TEMPERATURE: 97 F | DIASTOLIC BLOOD PRESSURE: 69 MMHG | WEIGHT: 167.13 LBS | HEART RATE: 61 BPM | BODY MASS INDEX: 26.86 KG/M2 | OXYGEN SATURATION: 99 % | SYSTOLIC BLOOD PRESSURE: 156 MMHG | HEIGHT: 66 IN

## 2021-01-11 DIAGNOSIS — D72.819 LEUKOPENIA, UNSPECIFIED TYPE: Primary | ICD-10-CM

## 2021-01-11 DIAGNOSIS — D63.1 ANEMIA IN CHRONIC KIDNEY DISEASE, UNSPECIFIED CKD STAGE: ICD-10-CM

## 2021-01-11 DIAGNOSIS — Z94.0 STATUS POST KIDNEY TRANSPLANT: ICD-10-CM

## 2021-01-11 DIAGNOSIS — D63.1 ANEMIA ASSOCIATED WITH CHRONIC RENAL FAILURE: Primary | ICD-10-CM

## 2021-01-11 DIAGNOSIS — N18.9 ANEMIA ASSOCIATED WITH CHRONIC RENAL FAILURE: Primary | ICD-10-CM

## 2021-01-11 DIAGNOSIS — Z94.0 KIDNEY REPLACED BY TRANSPLANT: ICD-10-CM

## 2021-01-11 DIAGNOSIS — N18.9 CHRONIC KIDNEY DISEASE, UNSPECIFIED CKD STAGE: ICD-10-CM

## 2021-01-11 DIAGNOSIS — N18.9 ANEMIA IN CHRONIC KIDNEY DISEASE, UNSPECIFIED CKD STAGE: ICD-10-CM

## 2021-01-11 PROCEDURE — 99213 OFFICE O/P EST LOW 20 MIN: CPT | Mod: S$PBB,,, | Performed by: INTERNAL MEDICINE

## 2021-01-11 PROCEDURE — 96372 THER/PROPH/DIAG INJ SC/IM: CPT

## 2021-01-11 PROCEDURE — 99999 PR PBB SHADOW E&M-EST. PATIENT-LVL V: CPT | Mod: PBBFAC,,, | Performed by: INTERNAL MEDICINE

## 2021-01-11 PROCEDURE — 99215 OFFICE O/P EST HI 40 MIN: CPT | Mod: PBBFAC,25 | Performed by: INTERNAL MEDICINE

## 2021-01-11 PROCEDURE — 99213 PR OFFICE/OUTPT VISIT, EST, LEVL III, 20-29 MIN: ICD-10-PCS | Mod: S$PBB,,, | Performed by: INTERNAL MEDICINE

## 2021-01-11 PROCEDURE — 63600175 PHARM REV CODE 636 W HCPCS: Mod: EC | Performed by: INTERNAL MEDICINE

## 2021-01-11 PROCEDURE — 99999 PR PBB SHADOW E&M-EST. PATIENT-LVL V: ICD-10-PCS | Mod: PBBFAC,,, | Performed by: INTERNAL MEDICINE

## 2021-01-11 RX ADMIN — EPOETIN ALFA-EPBX 20000 UNITS: 10000 INJECTION, SOLUTION INTRAVENOUS; SUBCUTANEOUS at 03:01

## 2021-01-19 ENCOUNTER — INFUSION (OUTPATIENT)
Dept: INFUSION THERAPY | Facility: HOSPITAL | Age: 66
End: 2021-01-19
Attending: INTERNAL MEDICINE
Payer: MEDICARE

## 2021-01-19 ENCOUNTER — OFFICE VISIT (OUTPATIENT)
Dept: ENDOCRINOLOGY | Facility: CLINIC | Age: 66
End: 2021-01-19
Payer: MEDICARE

## 2021-01-19 VITALS
RESPIRATION RATE: 16 BRPM | SYSTOLIC BLOOD PRESSURE: 146 MMHG | OXYGEN SATURATION: 99 % | BODY MASS INDEX: 27.05 KG/M2 | DIASTOLIC BLOOD PRESSURE: 67 MMHG | HEART RATE: 64 BPM | HEIGHT: 66 IN | WEIGHT: 168.31 LBS | TEMPERATURE: 98 F

## 2021-01-19 DIAGNOSIS — Z94.0 STATUS POST KIDNEY TRANSPLANT: Primary | ICD-10-CM

## 2021-01-19 DIAGNOSIS — E11.9 NEW ONSET TYPE 2 DIABETES MELLITUS: ICD-10-CM

## 2021-01-19 DIAGNOSIS — E11.69 DM COMA, TYPE 2: ICD-10-CM

## 2021-01-19 DIAGNOSIS — E83.52 HYPERCALCEMIA: ICD-10-CM

## 2021-01-19 DIAGNOSIS — E21.2 TERTIARY HYPERPARATHYROIDISM: ICD-10-CM

## 2021-01-19 DIAGNOSIS — E55.9 VITAMIN D DEFICIENCY: ICD-10-CM

## 2021-01-19 DIAGNOSIS — D63.1 ANEMIA ASSOCIATED WITH CHRONIC RENAL FAILURE: ICD-10-CM

## 2021-01-19 DIAGNOSIS — E11.22 CONTROLLED TYPE 2 DIABETES MELLITUS WITH CHRONIC KIDNEY DISEASE, WITHOUT LONG-TERM CURRENT USE OF INSULIN, UNSPECIFIED CKD STAGE: Primary | ICD-10-CM

## 2021-01-19 DIAGNOSIS — N18.9 ANEMIA ASSOCIATED WITH CHRONIC RENAL FAILURE: ICD-10-CM

## 2021-01-19 PROCEDURE — 99214 PR OFFICE/OUTPT VISIT, EST, LEVL IV, 30-39 MIN: ICD-10-PCS | Mod: 95,GC,, | Performed by: INTERNAL MEDICINE

## 2021-01-19 PROCEDURE — 99214 OFFICE O/P EST MOD 30 MIN: CPT | Mod: 95,GC,, | Performed by: INTERNAL MEDICINE

## 2021-01-19 PROCEDURE — 63600175 PHARM REV CODE 636 W HCPCS: Performed by: INTERNAL MEDICINE

## 2021-01-19 PROCEDURE — 96372 THER/PROPH/DIAG INJ SC/IM: CPT

## 2021-01-19 RX ORDER — DULAGLUTIDE 0.75 MG/.5ML
0.75 INJECTION, SOLUTION SUBCUTANEOUS
Qty: 3 ML | Refills: 11 | Status: SHIPPED | OUTPATIENT
Start: 2021-01-19 | End: 2021-01-19

## 2021-01-19 RX ADMIN — EPOETIN ALFA-EPBX 20000 UNITS: 10000 INJECTION, SOLUTION INTRAVENOUS; SUBCUTANEOUS at 11:01

## 2021-01-28 ENCOUNTER — INFUSION (OUTPATIENT)
Dept: INFUSION THERAPY | Facility: HOSPITAL | Age: 66
End: 2021-01-28
Attending: INTERNAL MEDICINE
Payer: MEDICARE

## 2021-01-28 ENCOUNTER — TELEPHONE (OUTPATIENT)
Dept: HEMATOLOGY/ONCOLOGY | Facility: CLINIC | Age: 66
End: 2021-01-28

## 2021-01-28 VITALS
BODY MASS INDEX: 26.72 KG/M2 | HEART RATE: 53 BPM | WEIGHT: 166.25 LBS | HEIGHT: 66 IN | DIASTOLIC BLOOD PRESSURE: 62 MMHG | SYSTOLIC BLOOD PRESSURE: 135 MMHG | RESPIRATION RATE: 16 BRPM | TEMPERATURE: 97 F | OXYGEN SATURATION: 98 %

## 2021-01-28 DIAGNOSIS — Z94.0 STATUS POST KIDNEY TRANSPLANT: Primary | ICD-10-CM

## 2021-01-28 DIAGNOSIS — D63.1 ANEMIA ASSOCIATED WITH CHRONIC RENAL FAILURE: ICD-10-CM

## 2021-01-28 DIAGNOSIS — N18.9 ANEMIA ASSOCIATED WITH CHRONIC RENAL FAILURE: ICD-10-CM

## 2021-01-28 PROCEDURE — 96372 THER/PROPH/DIAG INJ SC/IM: CPT

## 2021-01-28 PROCEDURE — 63600175 PHARM REV CODE 636 W HCPCS: Performed by: INTERNAL MEDICINE

## 2021-01-28 RX ADMIN — EPOETIN ALFA-EPBX 20000 UNITS: 10000 INJECTION, SOLUTION INTRAVENOUS; SUBCUTANEOUS at 10:01

## 2021-02-04 ENCOUNTER — INFUSION (OUTPATIENT)
Dept: INFUSION THERAPY | Facility: HOSPITAL | Age: 66
End: 2021-02-04
Attending: INTERNAL MEDICINE
Payer: MEDICARE

## 2021-02-04 ENCOUNTER — TELEPHONE (OUTPATIENT)
Dept: TRANSPLANT | Facility: CLINIC | Age: 66
End: 2021-02-04

## 2021-02-04 VITALS
BODY MASS INDEX: 26.72 KG/M2 | RESPIRATION RATE: 16 BRPM | TEMPERATURE: 98 F | SYSTOLIC BLOOD PRESSURE: 143 MMHG | HEIGHT: 66 IN | DIASTOLIC BLOOD PRESSURE: 60 MMHG | OXYGEN SATURATION: 99 % | HEART RATE: 58 BPM | WEIGHT: 166.25 LBS

## 2021-02-04 DIAGNOSIS — N18.9 ANEMIA ASSOCIATED WITH CHRONIC RENAL FAILURE: ICD-10-CM

## 2021-02-04 DIAGNOSIS — Z94.0 STATUS POST KIDNEY TRANSPLANT: Primary | ICD-10-CM

## 2021-02-04 DIAGNOSIS — D63.1 ANEMIA ASSOCIATED WITH CHRONIC RENAL FAILURE: ICD-10-CM

## 2021-02-04 PROCEDURE — 63600175 PHARM REV CODE 636 W HCPCS: Mod: EC | Performed by: INTERNAL MEDICINE

## 2021-02-04 PROCEDURE — 96372 THER/PROPH/DIAG INJ SC/IM: CPT

## 2021-02-04 RX ADMIN — EPOETIN ALFA-EPBX 20000 UNITS: 10000 INJECTION, SOLUTION INTRAVENOUS; SUBCUTANEOUS at 09:02

## 2021-02-11 ENCOUNTER — INFUSION (OUTPATIENT)
Dept: INFUSION THERAPY | Facility: HOSPITAL | Age: 66
End: 2021-02-11
Attending: INTERNAL MEDICINE
Payer: MEDICARE

## 2021-02-11 VITALS
HEART RATE: 68 BPM | DIASTOLIC BLOOD PRESSURE: 70 MMHG | TEMPERATURE: 98 F | SYSTOLIC BLOOD PRESSURE: 137 MMHG | RESPIRATION RATE: 16 BRPM | OXYGEN SATURATION: 98 %

## 2021-02-11 DIAGNOSIS — Z94.0 STATUS POST KIDNEY TRANSPLANT: Primary | ICD-10-CM

## 2021-02-11 DIAGNOSIS — N18.9 ANEMIA ASSOCIATED WITH CHRONIC RENAL FAILURE: ICD-10-CM

## 2021-02-11 DIAGNOSIS — D63.1 ANEMIA ASSOCIATED WITH CHRONIC RENAL FAILURE: ICD-10-CM

## 2021-02-11 PROCEDURE — 96372 THER/PROPH/DIAG INJ SC/IM: CPT

## 2021-02-11 PROCEDURE — 63600175 PHARM REV CODE 636 W HCPCS: Mod: EC | Performed by: INTERNAL MEDICINE

## 2021-02-11 RX ADMIN — EPOETIN ALFA-EPBX 20000 UNITS: 10000 INJECTION, SOLUTION INTRAVENOUS; SUBCUTANEOUS at 09:02

## 2021-02-23 ENCOUNTER — LAB VISIT (OUTPATIENT)
Dept: LAB | Facility: HOSPITAL | Age: 66
End: 2021-02-23
Attending: INTERNAL MEDICINE
Payer: MEDICARE

## 2021-02-23 DIAGNOSIS — Z94.0 KIDNEY REPLACED BY TRANSPLANT: ICD-10-CM

## 2021-02-23 LAB
ANISOCYTOSIS BLD QL SMEAR: SLIGHT
BASOPHILS NFR BLD: 2 % (ref 0–1.9)
DIFFERENTIAL METHOD: ABNORMAL
EOSINOPHIL NFR BLD: 8 % (ref 0–8)
ERYTHROCYTE [DISTWIDTH] IN BLOOD BY AUTOMATED COUNT: 15.9 % (ref 11.5–14.5)
HCT VFR BLD AUTO: 31.6 % (ref 37–48.5)
HGB BLD-MCNC: 9.6 G/DL (ref 12–16)
IMM GRANULOCYTES # BLD AUTO: ABNORMAL K/UL (ref 0–0.04)
IMM GRANULOCYTES NFR BLD AUTO: ABNORMAL % (ref 0–0.5)
LYMPHOCYTES NFR BLD: 36 % (ref 18–48)
MCH RBC QN AUTO: 27.4 PG (ref 27–31)
MCHC RBC AUTO-ENTMCNC: 30.4 G/DL (ref 32–36)
MCV RBC AUTO: 90 FL (ref 82–98)
MONOCYTES NFR BLD: 7 % (ref 4–15)
NEUTROPHILS NFR BLD: 47 % (ref 38–73)
NRBC BLD-RTO: 0 /100 WBC
PLATELET # BLD AUTO: 105 K/UL (ref 150–350)
PLATELET BLD QL SMEAR: ABNORMAL
PMV BLD AUTO: ABNORMAL FL (ref 9.2–12.9)
RBC # BLD AUTO: 3.5 M/UL (ref 4–5.4)
SCHISTOCYTES BLD QL SMEAR: ABNORMAL
WBC # BLD AUTO: 1.7 K/UL (ref 3.9–12.7)

## 2021-02-23 PROCEDURE — 85027 COMPLETE CBC AUTOMATED: CPT

## 2021-02-23 PROCEDURE — 36415 COLL VENOUS BLD VENIPUNCTURE: CPT | Mod: PO

## 2021-02-23 PROCEDURE — 80069 RENAL FUNCTION PANEL: CPT

## 2021-02-23 PROCEDURE — 85007 BL SMEAR W/DIFF WBC COUNT: CPT

## 2021-02-23 PROCEDURE — 80197 ASSAY OF TACROLIMUS: CPT

## 2021-02-23 PROCEDURE — 83735 ASSAY OF MAGNESIUM: CPT

## 2021-02-24 LAB
ALBUMIN SERPL BCP-MCNC: 4.1 G/DL (ref 3.5–5.2)
ANION GAP SERPL CALC-SCNC: 8 MMOL/L (ref 8–16)
BUN SERPL-MCNC: 19 MG/DL (ref 8–23)
CALCIUM SERPL-MCNC: 10.2 MG/DL (ref 8.7–10.5)
CHLORIDE SERPL-SCNC: 113 MMOL/L (ref 95–110)
CO2 SERPL-SCNC: 23 MMOL/L (ref 23–29)
CREAT SERPL-MCNC: 1.8 MG/DL (ref 0.5–1.4)
EST. GFR  (AFRICAN AMERICAN): 34 ML/MIN/1.73 M^2
EST. GFR  (NON AFRICAN AMERICAN): 29.5 ML/MIN/1.73 M^2
GLUCOSE SERPL-MCNC: 70 MG/DL (ref 70–110)
MAGNESIUM SERPL-MCNC: 1.7 MG/DL (ref 1.6–2.6)
PHOSPHATE SERPL-MCNC: 3.9 MG/DL (ref 2.7–4.5)
POTASSIUM SERPL-SCNC: 3.9 MMOL/L (ref 3.5–5.1)
SODIUM SERPL-SCNC: 144 MMOL/L (ref 136–145)
TACROLIMUS BLD-MCNC: 8 NG/ML (ref 5–15)

## 2021-03-16 ENCOUNTER — LAB VISIT (OUTPATIENT)
Dept: LAB | Facility: HOSPITAL | Age: 66
End: 2021-03-16
Attending: INTERNAL MEDICINE
Payer: MEDICARE

## 2021-03-16 DIAGNOSIS — Z94.0 KIDNEY REPLACED BY TRANSPLANT: ICD-10-CM

## 2021-03-16 LAB
BASOPHILS NFR BLD: 0 % (ref 0–1.9)
DIFFERENTIAL METHOD: ABNORMAL
EOSINOPHIL NFR BLD: 10 % (ref 0–8)
ERYTHROCYTE [DISTWIDTH] IN BLOOD BY AUTOMATED COUNT: 16.1 % (ref 11.5–14.5)
HCT VFR BLD AUTO: 30.3 % (ref 37–48.5)
HGB BLD-MCNC: 9.2 G/DL (ref 12–16)
IMM GRANULOCYTES # BLD AUTO: ABNORMAL K/UL (ref 0–0.04)
IMM GRANULOCYTES NFR BLD AUTO: ABNORMAL % (ref 0–0.5)
LYMPHOCYTES NFR BLD: 42 % (ref 18–48)
MCH RBC QN AUTO: 28 PG (ref 27–31)
MCHC RBC AUTO-ENTMCNC: 30.4 G/DL (ref 32–36)
MCV RBC AUTO: 92 FL (ref 82–98)
MONOCYTES NFR BLD: 3 % (ref 4–15)
NEUTROPHILS NFR BLD: 45 % (ref 38–73)
NRBC BLD-RTO: 0 /100 WBC
PLATELET # BLD AUTO: 95 K/UL (ref 150–350)
PLATELET BLD QL SMEAR: ABNORMAL
PMV BLD AUTO: 13.5 FL (ref 9.2–12.9)
RBC # BLD AUTO: 3.28 M/UL (ref 4–5.4)
SCHISTOCYTES BLD QL SMEAR: ABNORMAL
WBC # BLD AUTO: 1.56 K/UL (ref 3.9–12.7)

## 2021-03-16 PROCEDURE — 85007 BL SMEAR W/DIFF WBC COUNT: CPT | Performed by: INTERNAL MEDICINE

## 2021-03-16 PROCEDURE — 83735 ASSAY OF MAGNESIUM: CPT | Performed by: INTERNAL MEDICINE

## 2021-03-16 PROCEDURE — 80197 ASSAY OF TACROLIMUS: CPT | Performed by: INTERNAL MEDICINE

## 2021-03-16 PROCEDURE — 84075 ASSAY ALKALINE PHOSPHATASE: CPT | Performed by: INTERNAL MEDICINE

## 2021-03-16 PROCEDURE — 36415 COLL VENOUS BLD VENIPUNCTURE: CPT | Mod: PO | Performed by: INTERNAL MEDICINE

## 2021-03-16 PROCEDURE — 80069 RENAL FUNCTION PANEL: CPT | Performed by: INTERNAL MEDICINE

## 2021-03-16 PROCEDURE — 87799 DETECT AGENT NOS DNA QUANT: CPT | Performed by: INTERNAL MEDICINE

## 2021-03-16 PROCEDURE — 85027 COMPLETE CBC AUTOMATED: CPT | Performed by: INTERNAL MEDICINE

## 2021-03-17 ENCOUNTER — TELEPHONE (OUTPATIENT)
Dept: TRANSPLANT | Facility: CLINIC | Age: 66
End: 2021-03-17

## 2021-03-17 DIAGNOSIS — Z94.0 KIDNEY REPLACED BY TRANSPLANT: Primary | ICD-10-CM

## 2021-03-17 LAB
ALBUMIN SERPL BCP-MCNC: 4.2 G/DL (ref 3.5–5.2)
ALBUMIN SERPL BCP-MCNC: 4.2 G/DL (ref 3.5–5.2)
ALP SERPL-CCNC: 148 U/L (ref 55–135)
ALT SERPL W/O P-5'-P-CCNC: 36 U/L (ref 10–44)
ANION GAP SERPL CALC-SCNC: 11 MMOL/L (ref 8–16)
AST SERPL-CCNC: 19 U/L (ref 10–40)
BILIRUB DIRECT SERPL-MCNC: 0.2 MG/DL (ref 0.1–0.3)
BILIRUB SERPL-MCNC: 0.5 MG/DL (ref 0.1–1)
BUN SERPL-MCNC: 22 MG/DL (ref 8–23)
CALCIUM SERPL-MCNC: 10.3 MG/DL (ref 8.7–10.5)
CHLORIDE SERPL-SCNC: 114 MMOL/L (ref 95–110)
CO2 SERPL-SCNC: 22 MMOL/L (ref 23–29)
CREAT SERPL-MCNC: 2 MG/DL (ref 0.5–1.4)
EST. GFR  (AFRICAN AMERICAN): 30 ML/MIN/1.73 M^2
EST. GFR  (NON AFRICAN AMERICAN): 26 ML/MIN/1.73 M^2
GLUCOSE SERPL-MCNC: 65 MG/DL (ref 70–110)
MAGNESIUM SERPL-MCNC: 1.6 MG/DL (ref 1.6–2.6)
PHOSPHATE SERPL-MCNC: 4.3 MG/DL (ref 2.7–4.5)
POTASSIUM SERPL-SCNC: 4 MMOL/L (ref 3.5–5.1)
PROT SERPL-MCNC: 7.3 G/DL (ref 6–8.4)
SODIUM SERPL-SCNC: 147 MMOL/L (ref 136–145)
TACROLIMUS BLD-MCNC: 7.1 NG/ML (ref 5–15)

## 2021-03-18 DIAGNOSIS — Z94.0 KIDNEY REPLACED BY TRANSPLANT: Primary | ICD-10-CM

## 2021-03-19 RX ORDER — KETOCONAZOLE 200 MG/1
100 TABLET ORAL DAILY
Qty: 15 TABLET | Refills: 11 | Status: SHIPPED | OUTPATIENT
Start: 2021-03-19 | End: 2021-09-16 | Stop reason: SDUPTHER

## 2021-03-23 ENCOUNTER — TELEPHONE (OUTPATIENT)
Dept: TRANSPLANT | Facility: CLINIC | Age: 66
End: 2021-03-23

## 2021-03-25 ENCOUNTER — TELEPHONE (OUTPATIENT)
Dept: TRANSPLANT | Facility: CLINIC | Age: 66
End: 2021-03-25

## 2021-03-25 DIAGNOSIS — Z94.0 KIDNEY REPLACED BY TRANSPLANT: Primary | ICD-10-CM

## 2021-03-26 NOTE — PROGRESS NOTES
Labs and diagnostic tests were reviewed. No action/changes indicated.  
negative...

## 2021-03-30 ENCOUNTER — LAB VISIT (OUTPATIENT)
Dept: LAB | Facility: HOSPITAL | Age: 66
End: 2021-03-30
Attending: INTERNAL MEDICINE
Payer: MEDICARE

## 2021-03-30 DIAGNOSIS — Z94.0 KIDNEY REPLACED BY TRANSPLANT: ICD-10-CM

## 2021-03-30 LAB
ALBUMIN SERPL BCP-MCNC: 4 G/DL (ref 3.5–5.2)
ANION GAP SERPL CALC-SCNC: 9 MMOL/L (ref 8–16)
BUN SERPL-MCNC: 25 MG/DL (ref 8–23)
CALCIUM SERPL-MCNC: 10.3 MG/DL (ref 8.7–10.5)
CHLORIDE SERPL-SCNC: 111 MMOL/L (ref 95–110)
CO2 SERPL-SCNC: 21 MMOL/L (ref 23–29)
CREAT SERPL-MCNC: 2.8 MG/DL (ref 0.5–1.4)
EST. GFR  (AFRICAN AMERICAN): 20 ML/MIN/1.73 M^2
EST. GFR  (NON AFRICAN AMERICAN): 17.3 ML/MIN/1.73 M^2
GLUCOSE SERPL-MCNC: 76 MG/DL (ref 70–110)
MAGNESIUM SERPL-MCNC: 1.8 MG/DL (ref 1.6–2.6)
PHOSPHATE SERPL-MCNC: 4 MG/DL (ref 2.7–4.5)
POTASSIUM SERPL-SCNC: 3.7 MMOL/L (ref 3.5–5.1)
SODIUM SERPL-SCNC: 141 MMOL/L (ref 136–145)

## 2021-03-30 PROCEDURE — 80197 ASSAY OF TACROLIMUS: CPT | Performed by: INTERNAL MEDICINE

## 2021-03-30 PROCEDURE — 80069 RENAL FUNCTION PANEL: CPT | Performed by: INTERNAL MEDICINE

## 2021-03-30 PROCEDURE — 85007 BL SMEAR W/DIFF WBC COUNT: CPT | Performed by: INTERNAL MEDICINE

## 2021-03-30 PROCEDURE — 83735 ASSAY OF MAGNESIUM: CPT | Performed by: INTERNAL MEDICINE

## 2021-03-30 PROCEDURE — 36415 COLL VENOUS BLD VENIPUNCTURE: CPT | Mod: PO | Performed by: INTERNAL MEDICINE

## 2021-03-30 PROCEDURE — 85027 COMPLETE CBC AUTOMATED: CPT | Performed by: INTERNAL MEDICINE

## 2021-03-31 ENCOUNTER — HOSPITAL ENCOUNTER (INPATIENT)
Facility: HOSPITAL | Age: 66
LOS: 9 days | Discharge: HOME | DRG: 698 | End: 2021-04-09
Attending: INTERNAL MEDICINE | Admitting: INTERNAL MEDICINE
Payer: MEDICARE

## 2021-03-31 ENCOUNTER — TELEPHONE (OUTPATIENT)
Dept: TRANSPLANT | Facility: CLINIC | Age: 66
End: 2021-03-31

## 2021-03-31 DIAGNOSIS — Z79.60 LONG-TERM USE OF IMMUNOSUPPRESSANT MEDICATION: ICD-10-CM

## 2021-03-31 DIAGNOSIS — E21.2 TERTIARY HYPERPARATHYROIDISM: ICD-10-CM

## 2021-03-31 DIAGNOSIS — N25.81 SECONDARY HYPERPARATHYROIDISM: ICD-10-CM

## 2021-03-31 DIAGNOSIS — N18.9 ACUTE KIDNEY INJURY SUPERIMPOSED ON CKD: ICD-10-CM

## 2021-03-31 DIAGNOSIS — N30.01 ACUTE CYSTITIS WITH HEMATURIA: Chronic | ICD-10-CM

## 2021-03-31 DIAGNOSIS — I95.1 ORTHOSTATIC HYPOTENSION: ICD-10-CM

## 2021-03-31 DIAGNOSIS — D61.811 DRUG-INDUCED PANCYTOPENIA: ICD-10-CM

## 2021-03-31 DIAGNOSIS — R00.1 BRADYCARDIA: ICD-10-CM

## 2021-03-31 DIAGNOSIS — I12.9 HYPERTENSION, RENAL: Chronic | ICD-10-CM

## 2021-03-31 DIAGNOSIS — E78.00 PURE HYPERCHOLESTEROLEMIA: ICD-10-CM

## 2021-03-31 DIAGNOSIS — D63.1 ANEMIA ASSOCIATED WITH CHRONIC RENAL FAILURE: Chronic | ICD-10-CM

## 2021-03-31 DIAGNOSIS — R16.1 SPLENOMEGALY: ICD-10-CM

## 2021-03-31 DIAGNOSIS — N18.9 ANEMIA ASSOCIATED WITH CHRONIC RENAL FAILURE: Chronic | ICD-10-CM

## 2021-03-31 DIAGNOSIS — Z94.0 STATUS POST KIDNEY TRANSPLANT: ICD-10-CM

## 2021-03-31 DIAGNOSIS — N17.9 ACUTE KIDNEY INJURY SUPERIMPOSED ON CKD: ICD-10-CM

## 2021-03-31 DIAGNOSIS — Z94.0 KIDNEY REPLACED BY TRANSPLANT: ICD-10-CM

## 2021-03-31 DIAGNOSIS — N17.9 AKI (ACUTE KIDNEY INJURY): Primary | ICD-10-CM

## 2021-03-31 DIAGNOSIS — Z29.89 PROPHYLACTIC IMMUNOTHERAPY: ICD-10-CM

## 2021-03-31 DIAGNOSIS — N18.30 STAGE 3 CHRONIC KIDNEY DISEASE, UNSPECIFIED WHETHER STAGE 3A OR 3B CKD: ICD-10-CM

## 2021-03-31 DIAGNOSIS — E13.9 POST-TRANSPLANT DIABETES MELLITUS: ICD-10-CM

## 2021-03-31 DIAGNOSIS — D70.8 OTHER NEUTROPENIA: ICD-10-CM

## 2021-03-31 DIAGNOSIS — E55.9 VITAMIN D DEFICIENCY: ICD-10-CM

## 2021-03-31 PROBLEM — N39.0 UTI (URINARY TRACT INFECTION): Status: RESOLVED | Noted: 2020-11-25 | Resolved: 2021-03-31

## 2021-03-31 LAB
ABO + RH BLD: NORMAL
ANION GAP SERPL CALC-SCNC: 14 MMOL/L (ref 8–16)
ANISOCYTOSIS BLD QL SMEAR: SLIGHT
BASOPHILS # BLD AUTO: 0.01 K/UL (ref 0–0.2)
BASOPHILS NFR BLD: 0 % (ref 0–1.9)
BASOPHILS NFR BLD: 0.4 % (ref 0–1.9)
BLD GP AB SCN CELLS X3 SERPL QL: NORMAL
BLOOD GROUP ANTIBODIES SERPL: NORMAL
BUN SERPL-MCNC: 35 MG/DL (ref 8–23)
BURR CELLS BLD QL SMEAR: ABNORMAL
CALCIUM SERPL-MCNC: 10.4 MG/DL (ref 8.7–10.5)
CHLORIDE SERPL-SCNC: 109 MMOL/L (ref 95–110)
CO2 SERPL-SCNC: 19 MMOL/L (ref 23–29)
CREAT SERPL-MCNC: 3.2 MG/DL (ref 0.5–1.4)
DACRYOCYTES BLD QL SMEAR: ABNORMAL
DIFFERENTIAL METHOD: ABNORMAL
DIFFERENTIAL METHOD: ABNORMAL
EOSINOPHIL # BLD AUTO: 0.1 K/UL (ref 0–0.5)
EOSINOPHIL NFR BLD: 11 % (ref 0–8)
EOSINOPHIL NFR BLD: 5.5 % (ref 0–8)
ERYTHROCYTE [DISTWIDTH] IN BLOOD BY AUTOMATED COUNT: 16.6 % (ref 11.5–14.5)
ERYTHROCYTE [DISTWIDTH] IN BLOOD BY AUTOMATED COUNT: 16.7 % (ref 11.5–14.5)
EST. GFR  (AFRICAN AMERICAN): 17 ML/MIN/1.73 M^2
EST. GFR  (NON AFRICAN AMERICAN): 14.7 ML/MIN/1.73 M^2
GLUCOSE SERPL-MCNC: 159 MG/DL (ref 70–110)
HCT VFR BLD AUTO: 26 % (ref 37–48.5)
HCT VFR BLD AUTO: 27.6 % (ref 37–48.5)
HGB BLD-MCNC: 8.1 G/DL (ref 12–16)
HGB BLD-MCNC: 8.8 G/DL (ref 12–16)
HYPOCHROMIA BLD QL SMEAR: ABNORMAL
IMM GRANULOCYTES # BLD AUTO: 0.01 K/UL (ref 0–0.04)
IMM GRANULOCYTES # BLD AUTO: ABNORMAL K/UL (ref 0–0.04)
IMM GRANULOCYTES NFR BLD AUTO: 0.4 % (ref 0–0.5)
IMM GRANULOCYTES NFR BLD AUTO: ABNORMAL % (ref 0–0.5)
INR PPP: 1 (ref 0.8–1.2)
LYMPHOCYTES # BLD AUTO: 0.9 K/UL (ref 1–4.8)
LYMPHOCYTES NFR BLD: 33.9 % (ref 18–48)
LYMPHOCYTES NFR BLD: 38 % (ref 18–48)
MCH RBC QN AUTO: 27.7 PG (ref 27–31)
MCH RBC QN AUTO: 28.3 PG (ref 27–31)
MCHC RBC AUTO-ENTMCNC: 31.2 G/DL (ref 32–36)
MCHC RBC AUTO-ENTMCNC: 31.9 G/DL (ref 32–36)
MCV RBC AUTO: 89 FL (ref 82–98)
MCV RBC AUTO: 89 FL (ref 82–98)
MONOCYTES # BLD AUTO: 0.3 K/UL (ref 0.3–1)
MONOCYTES NFR BLD: 7 % (ref 4–15)
MONOCYTES NFR BLD: 9.8 % (ref 4–15)
NEUTROPHILS # BLD AUTO: 1.3 K/UL (ref 1.8–7.7)
NEUTROPHILS NFR BLD: 44 % (ref 38–73)
NEUTROPHILS NFR BLD: 50 % (ref 38–73)
NRBC BLD-RTO: 0 /100 WBC
NRBC BLD-RTO: 0 /100 WBC
OVALOCYTES BLD QL SMEAR: ABNORMAL
PLATELET # BLD AUTO: 102 K/UL (ref 150–450)
PLATELET # BLD AUTO: 128 K/UL (ref 150–450)
PLATELET BLD QL SMEAR: ABNORMAL
PMV BLD AUTO: 11.1 FL (ref 9.2–12.9)
PMV BLD AUTO: 12.2 FL (ref 9.2–12.9)
POCT GLUCOSE: 102 MG/DL (ref 70–110)
POCT GLUCOSE: 152 MG/DL (ref 70–110)
POIKILOCYTOSIS BLD QL SMEAR: SLIGHT
POTASSIUM SERPL-SCNC: 4.1 MMOL/L (ref 3.5–5.1)
PROTHROMBIN TIME: 11.2 SEC (ref 9–12.5)
RBC # BLD AUTO: 2.92 M/UL (ref 4–5.4)
RBC # BLD AUTO: 3.11 M/UL (ref 4–5.4)
SARS-COV-2 RDRP RESP QL NAA+PROBE: NEGATIVE
SODIUM SERPL-SCNC: 142 MMOL/L (ref 136–145)
TACROLIMUS BLD-MCNC: 8 NG/ML (ref 5–15)
WBC # BLD AUTO: 1.52 K/UL (ref 3.9–12.7)
WBC # BLD AUTO: 2.54 K/UL (ref 3.9–12.7)

## 2021-03-31 PROCEDURE — 63600175 PHARM REV CODE 636 W HCPCS: Performed by: PHYSICIAN ASSISTANT

## 2021-03-31 PROCEDURE — 80048 BASIC METABOLIC PNL TOTAL CA: CPT | Performed by: PHYSICIAN ASSISTANT

## 2021-03-31 PROCEDURE — 86870 RBC ANTIBODY IDENTIFICATION: CPT | Performed by: PHYSICIAN ASSISTANT

## 2021-03-31 PROCEDURE — 99222 1ST HOSP IP/OBS MODERATE 55: CPT | Mod: ,,, | Performed by: NURSE PRACTITIONER

## 2021-03-31 PROCEDURE — 86900 BLOOD TYPING SEROLOGIC ABO: CPT | Performed by: PHYSICIAN ASSISTANT

## 2021-03-31 PROCEDURE — 20600001 HC STEP DOWN PRIVATE ROOM

## 2021-03-31 PROCEDURE — U0002 COVID-19 LAB TEST NON-CDC: HCPCS | Performed by: PHYSICIAN ASSISTANT

## 2021-03-31 PROCEDURE — 25000003 PHARM REV CODE 250: Performed by: PHYSICIAN ASSISTANT

## 2021-03-31 PROCEDURE — 99222 PR INITIAL HOSPITAL CARE,LEVL II: ICD-10-PCS | Mod: ,,, | Performed by: NURSE PRACTITIONER

## 2021-03-31 PROCEDURE — 63600175 PHARM REV CODE 636 W HCPCS: Performed by: NURSE PRACTITIONER

## 2021-03-31 PROCEDURE — 85025 COMPLETE CBC W/AUTO DIFF WBC: CPT | Performed by: PHYSICIAN ASSISTANT

## 2021-03-31 PROCEDURE — 36415 COLL VENOUS BLD VENIPUNCTURE: CPT | Performed by: PHYSICIAN ASSISTANT

## 2021-03-31 PROCEDURE — 85610 PROTHROMBIN TIME: CPT | Performed by: PHYSICIAN ASSISTANT

## 2021-03-31 PROCEDURE — 25000003 PHARM REV CODE 250: Performed by: NURSE PRACTITIONER

## 2021-03-31 PROCEDURE — 99223 1ST HOSP IP/OBS HIGH 75: CPT | Mod: AI,,, | Performed by: NURSE PRACTITIONER

## 2021-03-31 PROCEDURE — 99223 PR INITIAL HOSPITAL CARE,LEVL III: ICD-10-PCS | Mod: AI,,, | Performed by: NURSE PRACTITIONER

## 2021-03-31 RX ORDER — SODIUM BICARBONATE 650 MG/1
1300 TABLET ORAL 2 TIMES DAILY
Status: DISCONTINUED | OUTPATIENT
Start: 2021-03-31 | End: 2021-04-06

## 2021-03-31 RX ORDER — HEPARIN SODIUM 5000 [USP'U]/ML
5000 INJECTION, SOLUTION INTRAVENOUS; SUBCUTANEOUS EVERY 8 HOURS
Status: DISCONTINUED | OUTPATIENT
Start: 2021-03-31 | End: 2021-04-09 | Stop reason: HOSPADM

## 2021-03-31 RX ORDER — SODIUM CHLORIDE 9 MG/ML
INJECTION, SOLUTION INTRAVENOUS CONTINUOUS
Status: DISCONTINUED | OUTPATIENT
Start: 2021-03-31 | End: 2021-04-01

## 2021-03-31 RX ORDER — INSULIN ASPART 100 [IU]/ML
0-5 INJECTION, SOLUTION INTRAVENOUS; SUBCUTANEOUS
Status: DISCONTINUED | OUTPATIENT
Start: 2021-03-31 | End: 2021-04-05

## 2021-03-31 RX ORDER — IBUPROFEN 200 MG
16 TABLET ORAL
Status: DISCONTINUED | OUTPATIENT
Start: 2021-03-31 | End: 2021-04-05

## 2021-03-31 RX ORDER — TACROLIMUS 1 MG/1
4 CAPSULE ORAL EVERY EVENING
Status: DISCONTINUED | OUTPATIENT
Start: 2021-03-31 | End: 2021-04-09 | Stop reason: HOSPADM

## 2021-03-31 RX ORDER — ATORVASTATIN CALCIUM 20 MG/1
40 TABLET, FILM COATED ORAL DAILY
Status: DISCONTINUED | OUTPATIENT
Start: 2021-04-01 | End: 2021-04-09 | Stop reason: HOSPADM

## 2021-03-31 RX ORDER — PREDNISONE 5 MG/1
5 TABLET ORAL DAILY
Status: DISCONTINUED | OUTPATIENT
Start: 2021-04-01 | End: 2021-04-09 | Stop reason: HOSPADM

## 2021-03-31 RX ORDER — GLUCAGON 1 MG
1 KIT INJECTION
Status: DISCONTINUED | OUTPATIENT
Start: 2021-03-31 | End: 2021-04-05

## 2021-03-31 RX ORDER — LANOLIN ALCOHOL/MO/W.PET/CERES
400 CREAM (GRAM) TOPICAL 2 TIMES DAILY
Status: DISCONTINUED | OUTPATIENT
Start: 2021-03-31 | End: 2021-04-05

## 2021-03-31 RX ORDER — TACROLIMUS 1 MG/1
5 CAPSULE ORAL EVERY MORNING
Status: DISCONTINUED | OUTPATIENT
Start: 2021-04-01 | End: 2021-04-09 | Stop reason: HOSPADM

## 2021-03-31 RX ORDER — NIFEDIPINE 30 MG/1
60 TABLET, EXTENDED RELEASE ORAL 2 TIMES DAILY
Status: DISCONTINUED | OUTPATIENT
Start: 2021-03-31 | End: 2021-04-05

## 2021-03-31 RX ORDER — FAMOTIDINE 20 MG/1
20 TABLET, FILM COATED ORAL NIGHTLY
Status: DISCONTINUED | OUTPATIENT
Start: 2021-03-31 | End: 2021-04-09 | Stop reason: HOSPADM

## 2021-03-31 RX ORDER — IBUPROFEN 200 MG
24 TABLET ORAL
Status: DISCONTINUED | OUTPATIENT
Start: 2021-03-31 | End: 2021-04-05

## 2021-03-31 RX ORDER — SODIUM CHLORIDE 0.9 % (FLUSH) 0.9 %
10 SYRINGE (ML) INJECTION
Status: DISCONTINUED | OUTPATIENT
Start: 2021-03-31 | End: 2021-04-09 | Stop reason: HOSPADM

## 2021-03-31 RX ORDER — SODIUM BICARBONATE 650 MG/1
650 TABLET ORAL 2 TIMES DAILY
Status: DISCONTINUED | OUTPATIENT
Start: 2021-03-31 | End: 2021-03-31

## 2021-03-31 RX ORDER — CARVEDILOL 25 MG/1
25 TABLET ORAL 2 TIMES DAILY WITH MEALS
Status: DISCONTINUED | OUTPATIENT
Start: 2021-03-31 | End: 2021-04-03

## 2021-03-31 RX ORDER — CARVEDILOL 12.5 MG/1
12.5 TABLET ORAL 2 TIMES DAILY WITH MEALS
Status: DISCONTINUED | OUTPATIENT
Start: 2021-03-31 | End: 2021-03-31

## 2021-03-31 RX ORDER — ONDANSETRON 8 MG/1
8 TABLET, ORALLY DISINTEGRATING ORAL EVERY 8 HOURS PRN
Status: DISCONTINUED | OUTPATIENT
Start: 2021-03-31 | End: 2021-04-09 | Stop reason: HOSPADM

## 2021-03-31 RX ORDER — HYDRALAZINE HYDROCHLORIDE 50 MG/1
50 TABLET, FILM COATED ORAL EVERY 8 HOURS
Status: DISCONTINUED | OUTPATIENT
Start: 2021-03-31 | End: 2021-03-31

## 2021-03-31 RX ADMIN — NIFEDIPINE 60 MG: 30 TABLET, FILM COATED, EXTENDED RELEASE ORAL at 09:03

## 2021-03-31 RX ADMIN — CARVEDILOL 25 MG: 25 TABLET, FILM COATED ORAL at 09:03

## 2021-03-31 RX ADMIN — SODIUM CHLORIDE: 0.9 INJECTION, SOLUTION INTRAVENOUS at 09:03

## 2021-03-31 RX ADMIN — MAGNESIUM OXIDE TAB 400 MG (241.3 MG ELEMENTAL MG) 400 MG: 400 (241.3 MG) TAB at 09:03

## 2021-03-31 RX ADMIN — FAMOTIDINE 20 MG: 20 TABLET ORAL at 09:03

## 2021-03-31 RX ADMIN — HEPARIN SODIUM 5000 UNITS: 5000 INJECTION INTRAVENOUS; SUBCUTANEOUS at 09:03

## 2021-03-31 RX ADMIN — TACROLIMUS 4 MG: 1 CAPSULE ORAL at 09:03

## 2021-03-31 RX ADMIN — SODIUM BICARBONATE 650 MG TABLET 1300 MG: at 09:03

## 2021-04-01 ENCOUNTER — TELEPHONE (OUTPATIENT)
Dept: TRANSPLANT | Facility: CLINIC | Age: 66
End: 2021-04-01

## 2021-04-01 LAB
ALBUMIN SERPL BCP-MCNC: 3.8 G/DL (ref 3.5–5.2)
ANION GAP SERPL CALC-SCNC: 8 MMOL/L (ref 8–16)
ANISOCYTOSIS BLD QL SMEAR: SLIGHT
BACTERIA #/AREA URNS AUTO: NORMAL /HPF
BASOPHILS # BLD AUTO: 0.01 K/UL (ref 0–0.2)
BASOPHILS NFR BLD: 0.8 % (ref 0–1.9)
BILIRUB UR QL STRIP: NEGATIVE
BUN SERPL-MCNC: 34 MG/DL (ref 8–23)
BURR CELLS BLD QL SMEAR: ABNORMAL
CALCIUM SERPL-MCNC: 9.9 MG/DL (ref 8.7–10.5)
CHLORIDE SERPL-SCNC: 113 MMOL/L (ref 95–110)
CLARITY UR REFRACT.AUTO: CLEAR
CLASS I ANTIBODY COMMENTS - LUMINEX: NORMAL
CLASS II ANTIBODY COMMENTS - LUMINEX: NORMAL
CO2 SERPL-SCNC: 22 MMOL/L (ref 23–29)
COLOR UR AUTO: ABNORMAL
CREAT SERPL-MCNC: 2.5 MG/DL (ref 0.5–1.4)
CREAT UR-MCNC: 41 MG/DL (ref 15–325)
DACRYOCYTES BLD QL SMEAR: ABNORMAL
DIFFERENTIAL METHOD: ABNORMAL
DSA1 TESTING DATE: NORMAL
DSA12 TESTING DATE: NORMAL
DSA2 TESTING DATE: NORMAL
EOSINOPHIL # BLD AUTO: 0.1 K/UL (ref 0–0.5)
EOSINOPHIL NFR BLD: 8.1 % (ref 0–8)
ERYTHROCYTE [DISTWIDTH] IN BLOOD BY AUTOMATED COUNT: 16.9 % (ref 11.5–14.5)
EST. GFR  (AFRICAN AMERICAN): 22.9 ML/MIN/1.73 M^2
EST. GFR  (NON AFRICAN AMERICAN): 19.9 ML/MIN/1.73 M^2
GLUCOSE SERPL-MCNC: 62 MG/DL (ref 70–110)
GLUCOSE UR QL STRIP: ABNORMAL
HCT VFR BLD AUTO: 24.1 % (ref 37–48.5)
HGB BLD-MCNC: 7.6 G/DL (ref 12–16)
HGB UR QL STRIP: ABNORMAL
IMM GRANULOCYTES # BLD AUTO: 0.01 K/UL (ref 0–0.04)
IMM GRANULOCYTES NFR BLD AUTO: 0.8 % (ref 0–0.5)
KETONES UR QL STRIP: NEGATIVE
LEUKOCYTE ESTERASE UR QL STRIP: NEGATIVE
LYMPHOCYTES # BLD AUTO: 0.4 K/UL (ref 1–4.8)
LYMPHOCYTES NFR BLD: 35 % (ref 18–48)
MAGNESIUM SERPL-MCNC: 1.7 MG/DL (ref 1.6–2.6)
MCH RBC QN AUTO: 27.6 PG (ref 27–31)
MCHC RBC AUTO-ENTMCNC: 31.5 G/DL (ref 32–36)
MCV RBC AUTO: 88 FL (ref 82–98)
MICROSCOPIC COMMENT: NORMAL
MONOCYTES # BLD AUTO: 0.2 K/UL (ref 0.3–1)
MONOCYTES NFR BLD: 12.2 % (ref 4–15)
NEUTROPHILS # BLD AUTO: 0.5 K/UL (ref 1.8–7.7)
NEUTROPHILS NFR BLD: 43.1 % (ref 38–73)
NITRITE UR QL STRIP: NEGATIVE
NRBC BLD-RTO: 0 /100 WBC
OVALOCYTES BLD QL SMEAR: ABNORMAL
PH UR STRIP: 7 [PH] (ref 5–8)
PHOSPHATE SERPL-MCNC: 3.3 MG/DL (ref 2.7–4.5)
PLATELET # BLD AUTO: 100 K/UL (ref 150–450)
PLATELET BLD QL SMEAR: ABNORMAL
PMV BLD AUTO: 11.5 FL (ref 9.2–12.9)
POCT GLUCOSE: 109 MG/DL (ref 70–110)
POCT GLUCOSE: 123 MG/DL (ref 70–110)
POCT GLUCOSE: 58 MG/DL (ref 70–110)
POCT GLUCOSE: 71 MG/DL (ref 70–110)
POCT GLUCOSE: 83 MG/DL (ref 70–110)
POCT GLUCOSE: 86 MG/DL (ref 70–110)
POCT GLUCOSE: 87 MG/DL (ref 70–110)
POIKILOCYTOSIS BLD QL SMEAR: SLIGHT
POTASSIUM SERPL-SCNC: 3.6 MMOL/L (ref 3.5–5.1)
PROT UR QL STRIP: NEGATIVE
PROT UR-MCNC: 27 MG/DL (ref 0–15)
PROT/CREAT UR: 0.66 MG/G{CREAT} (ref 0–0.2)
RBC # BLD AUTO: 2.75 M/UL (ref 4–5.4)
RBC #/AREA URNS AUTO: 4 /HPF (ref 0–4)
SERUM COLLECTION DT - LUMINEX CLASS I: NORMAL
SERUM COLLECTION DT - LUMINEX CLASS II: NORMAL
SODIUM SERPL-SCNC: 143 MMOL/L (ref 136–145)
SP GR UR STRIP: 1.01 (ref 1–1.03)
SQUAMOUS #/AREA URNS AUTO: 0 /HPF
TACROLIMUS BLD-MCNC: 6.5 NG/ML (ref 5–15)
URN SPEC COLLECT METH UR: ABNORMAL
WBC # BLD AUTO: 1.23 K/UL (ref 3.9–12.7)
WBC #/AREA URNS AUTO: 2 /HPF (ref 0–5)

## 2021-04-01 PROCEDURE — 88348 ELECTRON MICROSCOPY DX: CPT | Performed by: PATHOLOGY

## 2021-04-01 PROCEDURE — 36415 COLL VENOUS BLD VENIPUNCTURE: CPT | Performed by: PHYSICIAN ASSISTANT

## 2021-04-01 PROCEDURE — 63600175 PHARM REV CODE 636 W HCPCS: Performed by: NURSE PRACTITIONER

## 2021-04-01 PROCEDURE — 80069 RENAL FUNCTION PANEL: CPT | Performed by: PHYSICIAN ASSISTANT

## 2021-04-01 PROCEDURE — 25000003 PHARM REV CODE 250: Performed by: PHYSICIAN ASSISTANT

## 2021-04-01 PROCEDURE — 81001 URINALYSIS AUTO W/SCOPE: CPT | Performed by: INTERNAL MEDICINE

## 2021-04-01 PROCEDURE — 25000003 PHARM REV CODE 250: Performed by: NURSE PRACTITIONER

## 2021-04-01 PROCEDURE — 25000003 PHARM REV CODE 250: Performed by: STUDENT IN AN ORGANIZED HEALTH CARE EDUCATION/TRAINING PROGRAM

## 2021-04-01 PROCEDURE — 99233 SBSQ HOSP IP/OBS HIGH 50: CPT | Mod: ,,, | Performed by: PHYSICIAN ASSISTANT

## 2021-04-01 PROCEDURE — 99233 PR SUBSEQUENT HOSPITAL CARE,LEVL III: ICD-10-PCS | Mod: ,,, | Performed by: PHYSICIAN ASSISTANT

## 2021-04-01 PROCEDURE — 86832 HLA CLASS I HIGH DEFIN QUAL: CPT | Performed by: NURSE PRACTITIONER

## 2021-04-01 PROCEDURE — 86833 HLA CLASS II HIGH DEFIN QUAL: CPT | Performed by: NURSE PRACTITIONER

## 2021-04-01 PROCEDURE — 88350 IMFLUOR EA ADDL 1ANTB STN PX: CPT | Mod: 59 | Performed by: PATHOLOGY

## 2021-04-01 PROCEDURE — 85025 COMPLETE CBC W/AUTO DIFF WBC: CPT | Performed by: PHYSICIAN ASSISTANT

## 2021-04-01 PROCEDURE — 88305 TISSUE EXAM BY PATHOLOGIST: CPT | Performed by: PATHOLOGY

## 2021-04-01 PROCEDURE — 82570 ASSAY OF URINE CREATININE: CPT | Performed by: PHYSICIAN ASSISTANT

## 2021-04-01 PROCEDURE — 88346 IMFLUOR 1ST 1ANTB STAIN PX: CPT | Performed by: PATHOLOGY

## 2021-04-01 PROCEDURE — 86977 RBC SERUM PRETX INCUBJ/INHIB: CPT | Performed by: NURSE PRACTITIONER

## 2021-04-01 PROCEDURE — 83735 ASSAY OF MAGNESIUM: CPT | Performed by: PHYSICIAN ASSISTANT

## 2021-04-01 PROCEDURE — 80197 ASSAY OF TACROLIMUS: CPT | Performed by: PHYSICIAN ASSISTANT

## 2021-04-01 PROCEDURE — 63600175 PHARM REV CODE 636 W HCPCS: Mod: JG | Performed by: STUDENT IN AN ORGANIZED HEALTH CARE EDUCATION/TRAINING PROGRAM

## 2021-04-01 PROCEDURE — 20600001 HC STEP DOWN PRIVATE ROOM

## 2021-04-01 PROCEDURE — 88313 SPECIAL STAINS GROUP 2: CPT | Mod: 59 | Performed by: PATHOLOGY

## 2021-04-01 PROCEDURE — 63600175 PHARM REV CODE 636 W HCPCS: Performed by: PHYSICIAN ASSISTANT

## 2021-04-01 RX ORDER — SODIUM CHLORIDE 450 MG/100ML
INJECTION, SOLUTION INTRAVENOUS CONTINUOUS
Status: DISCONTINUED | OUTPATIENT
Start: 2021-04-01 | End: 2021-04-02

## 2021-04-01 RX ORDER — ONDANSETRON 2 MG/ML
4 INJECTION INTRAMUSCULAR; INTRAVENOUS EVERY 6 HOURS PRN
Status: DISCONTINUED | OUTPATIENT
Start: 2021-04-01 | End: 2021-04-09 | Stop reason: HOSPADM

## 2021-04-01 RX ADMIN — DESMOPRESSIN ACETATE 11.06 MCG: 4 SOLUTION INTRAVENOUS at 03:04

## 2021-04-01 RX ADMIN — TACROLIMUS 4 MG: 1 CAPSULE ORAL at 08:04

## 2021-04-01 RX ADMIN — ATORVASTATIN CALCIUM 40 MG: 20 TABLET, FILM COATED ORAL at 08:04

## 2021-04-01 RX ADMIN — KETOCONAZOLE 100 MG: 200 TABLET ORAL at 08:04

## 2021-04-01 RX ADMIN — NIFEDIPINE 60 MG: 30 TABLET, FILM COATED, EXTENDED RELEASE ORAL at 08:04

## 2021-04-01 RX ADMIN — MAGNESIUM OXIDE TAB 400 MG (241.3 MG ELEMENTAL MG) 400 MG: 400 (241.3 MG) TAB at 08:04

## 2021-04-01 RX ADMIN — SODIUM CHLORIDE: 0.9 INJECTION, SOLUTION INTRAVENOUS at 08:04

## 2021-04-01 RX ADMIN — HEPARIN SODIUM 5000 UNITS: 5000 INJECTION INTRAVENOUS; SUBCUTANEOUS at 05:04

## 2021-04-01 RX ADMIN — SODIUM CHLORIDE: 0.45 INJECTION, SOLUTION INTRAVENOUS at 10:04

## 2021-04-01 RX ADMIN — SODIUM BICARBONATE 650 MG TABLET 1300 MG: at 08:04

## 2021-04-01 RX ADMIN — FAMOTIDINE 20 MG: 20 TABLET ORAL at 08:04

## 2021-04-01 RX ADMIN — DEXTROSE MONOHYDRATE 12.5 G: 25 INJECTION, SOLUTION INTRAVENOUS at 08:04

## 2021-04-01 RX ADMIN — TACROLIMUS 5 MG: 1 CAPSULE ORAL at 08:04

## 2021-04-01 RX ADMIN — CARVEDILOL 25 MG: 25 TABLET, FILM COATED ORAL at 08:04

## 2021-04-01 RX ADMIN — PREDNISONE 5 MG: 5 TABLET ORAL at 08:04

## 2021-04-02 LAB
ALBUMIN SERPL BCP-MCNC: 3.8 G/DL (ref 3.5–5.2)
ANION GAP SERPL CALC-SCNC: 7 MMOL/L (ref 8–16)
ANISOCYTOSIS BLD QL SMEAR: SLIGHT
BASOPHILS # BLD AUTO: 0.01 K/UL (ref 0–0.2)
BASOPHILS NFR BLD: 0.2 % (ref 0–1.9)
BUN SERPL-MCNC: 25 MG/DL (ref 8–23)
CALCIUM SERPL-MCNC: 9.6 MG/DL (ref 8.7–10.5)
CHLORIDE SERPL-SCNC: 116 MMOL/L (ref 95–110)
CMV DNA SERPL NAA+PROBE-ACNC: NORMAL IU/ML
CO2 SERPL-SCNC: 21 MMOL/L (ref 23–29)
CREAT SERPL-MCNC: 2 MG/DL (ref 0.5–1.4)
DACRYOCYTES BLD QL SMEAR: ABNORMAL
DIFFERENTIAL METHOD: ABNORMAL
EOSINOPHIL # BLD AUTO: 0.1 K/UL (ref 0–0.5)
EOSINOPHIL NFR BLD: 2.5 % (ref 0–8)
ERYTHROCYTE [DISTWIDTH] IN BLOOD BY AUTOMATED COUNT: 16.9 % (ref 11.5–14.5)
EST. GFR  (AFRICAN AMERICAN): 30 ML/MIN/1.73 M^2
EST. GFR  (NON AFRICAN AMERICAN): 26 ML/MIN/1.73 M^2
FERRITIN SERPL-MCNC: 820 NG/ML (ref 20–300)
FOLATE SERPL-MCNC: 5.4 NG/ML (ref 4–24)
GLUCOSE SERPL-MCNC: 67 MG/DL (ref 70–110)
HAPTOGLOB SERPL-MCNC: <10 MG/DL (ref 30–250)
HCT VFR BLD AUTO: 24.5 % (ref 37–48.5)
HGB BLD-MCNC: 7.7 G/DL (ref 12–16)
IMM GRANULOCYTES # BLD AUTO: 0.05 K/UL (ref 0–0.04)
IMM GRANULOCYTES NFR BLD AUTO: 1.2 % (ref 0–0.5)
IRON SERPL-MCNC: 63 UG/DL (ref 30–160)
LDH SERPL L TO P-CCNC: 168 U/L (ref 110–260)
LYMPHOCYTES # BLD AUTO: 0.5 K/UL (ref 1–4.8)
LYMPHOCYTES NFR BLD: 11.1 % (ref 18–48)
MAGNESIUM SERPL-MCNC: 1.6 MG/DL (ref 1.6–2.6)
MCH RBC QN AUTO: 27.9 PG (ref 27–31)
MCHC RBC AUTO-ENTMCNC: 31.4 G/DL (ref 32–36)
MCV RBC AUTO: 89 FL (ref 82–98)
MONOCYTES # BLD AUTO: 0.3 K/UL (ref 0.3–1)
MONOCYTES NFR BLD: 6.7 % (ref 4–15)
NEUTROPHILS # BLD AUTO: 3.4 K/UL (ref 1.8–7.7)
NEUTROPHILS NFR BLD: 78.3 % (ref 38–73)
NRBC BLD-RTO: 0 /100 WBC
OVALOCYTES BLD QL SMEAR: ABNORMAL
PHOSPHATE SERPL-MCNC: 2.8 MG/DL (ref 2.7–4.5)
PLATELET # BLD AUTO: 85 K/UL (ref 150–450)
PLATELET BLD QL SMEAR: ABNORMAL
PMV BLD AUTO: ABNORMAL FL (ref 9.2–12.9)
POCT GLUCOSE: 109 MG/DL (ref 70–110)
POCT GLUCOSE: 119 MG/DL (ref 70–110)
POCT GLUCOSE: 178 MG/DL (ref 70–110)
POCT GLUCOSE: 73 MG/DL (ref 70–110)
POIKILOCYTOSIS BLD QL SMEAR: SLIGHT
POLYCHROMASIA BLD QL SMEAR: ABNORMAL
POTASSIUM SERPL-SCNC: 3.6 MMOL/L (ref 3.5–5.1)
RBC # BLD AUTO: 2.76 M/UL (ref 4–5.4)
RETICS/RBC NFR AUTO: 2.1 % (ref 0.5–2.5)
SATURATED IRON: 24 % (ref 20–50)
SODIUM SERPL-SCNC: 144 MMOL/L (ref 136–145)
TACROLIMUS BLD-MCNC: 6.6 NG/ML (ref 5–15)
TOTAL IRON BINDING CAPACITY: 265 UG/DL (ref 250–450)
TRANSFERRIN SERPL-MCNC: 179 MG/DL (ref 200–375)
VIT B12 SERPL-MCNC: 496 PG/ML (ref 210–950)
WBC # BLD AUTO: 4.34 K/UL (ref 3.9–12.7)

## 2021-04-02 PROCEDURE — 83010 ASSAY OF HAPTOGLOBIN QUANT: CPT | Performed by: PHYSICIAN ASSISTANT

## 2021-04-02 PROCEDURE — 63600175 PHARM REV CODE 636 W HCPCS: Performed by: NURSE PRACTITIONER

## 2021-04-02 PROCEDURE — 87799 DETECT AGENT NOS DNA QUANT: CPT | Performed by: PHYSICIAN ASSISTANT

## 2021-04-02 PROCEDURE — 20600001 HC STEP DOWN PRIVATE ROOM

## 2021-04-02 PROCEDURE — 99233 SBSQ HOSP IP/OBS HIGH 50: CPT | Mod: ,,, | Performed by: INTERNAL MEDICINE

## 2021-04-02 PROCEDURE — 82728 ASSAY OF FERRITIN: CPT | Performed by: PHYSICIAN ASSISTANT

## 2021-04-02 PROCEDURE — 87799 DETECT AGENT NOS DNA QUANT: CPT | Mod: 91 | Performed by: PHYSICIAN ASSISTANT

## 2021-04-02 PROCEDURE — 85045 AUTOMATED RETICULOCYTE COUNT: CPT | Performed by: PHYSICIAN ASSISTANT

## 2021-04-02 PROCEDURE — 83735 ASSAY OF MAGNESIUM: CPT | Performed by: PHYSICIAN ASSISTANT

## 2021-04-02 PROCEDURE — 99233 PR SUBSEQUENT HOSPITAL CARE,LEVL III: ICD-10-PCS | Mod: ,,, | Performed by: INTERNAL MEDICINE

## 2021-04-02 PROCEDURE — 85025 COMPLETE CBC W/AUTO DIFF WBC: CPT | Performed by: PHYSICIAN ASSISTANT

## 2021-04-02 PROCEDURE — 80069 RENAL FUNCTION PANEL: CPT | Performed by: PHYSICIAN ASSISTANT

## 2021-04-02 PROCEDURE — 25000003 PHARM REV CODE 250: Performed by: NURSE PRACTITIONER

## 2021-04-02 PROCEDURE — 82746 ASSAY OF FOLIC ACID SERUM: CPT | Performed by: PHYSICIAN ASSISTANT

## 2021-04-02 PROCEDURE — 63600175 PHARM REV CODE 636 W HCPCS: Performed by: PHYSICIAN ASSISTANT

## 2021-04-02 PROCEDURE — 82607 VITAMIN B-12: CPT | Performed by: PHYSICIAN ASSISTANT

## 2021-04-02 PROCEDURE — 80197 ASSAY OF TACROLIMUS: CPT | Performed by: PHYSICIAN ASSISTANT

## 2021-04-02 PROCEDURE — 25000003 PHARM REV CODE 250: Performed by: PHYSICIAN ASSISTANT

## 2021-04-02 PROCEDURE — 83615 LACTATE (LD) (LDH) ENZYME: CPT | Performed by: PHYSICIAN ASSISTANT

## 2021-04-02 PROCEDURE — 83540 ASSAY OF IRON: CPT | Performed by: PHYSICIAN ASSISTANT

## 2021-04-02 PROCEDURE — 36415 COLL VENOUS BLD VENIPUNCTURE: CPT | Performed by: PHYSICIAN ASSISTANT

## 2021-04-02 RX ORDER — TALC
6 POWDER (GRAM) TOPICAL NIGHTLY PRN
Status: DISCONTINUED | OUTPATIENT
Start: 2021-04-02 | End: 2021-04-09 | Stop reason: HOSPADM

## 2021-04-02 RX ORDER — SIMETHICONE 80 MG
1 TABLET,CHEWABLE ORAL 3 TIMES DAILY PRN
Status: DISCONTINUED | OUTPATIENT
Start: 2021-04-02 | End: 2021-04-09 | Stop reason: HOSPADM

## 2021-04-02 RX ORDER — DIPHENHYDRAMINE HCL 25 MG
25 CAPSULE ORAL EVERY 6 HOURS PRN
Status: DISCONTINUED | OUTPATIENT
Start: 2021-04-02 | End: 2021-04-09 | Stop reason: HOSPADM

## 2021-04-02 RX ORDER — ACETAMINOPHEN 325 MG/1
650 TABLET ORAL EVERY 6 HOURS PRN
Status: DISCONTINUED | OUTPATIENT
Start: 2021-04-02 | End: 2021-04-09 | Stop reason: HOSPADM

## 2021-04-02 RX ORDER — CLONIDINE HYDROCHLORIDE 0.1 MG/1
0.1 TABLET ORAL EVERY 6 HOURS PRN
Status: DISCONTINUED | OUTPATIENT
Start: 2021-04-02 | End: 2021-04-06

## 2021-04-02 RX ORDER — CALCIUM CARBONATE 200(500)MG
500 TABLET,CHEWABLE ORAL 3 TIMES DAILY PRN
Status: DISCONTINUED | OUTPATIENT
Start: 2021-04-02 | End: 2021-04-09 | Stop reason: HOSPADM

## 2021-04-02 RX ADMIN — KETOCONAZOLE 100 MG: 200 TABLET ORAL at 08:04

## 2021-04-02 RX ADMIN — FAMOTIDINE 20 MG: 20 TABLET ORAL at 08:04

## 2021-04-02 RX ADMIN — HEPARIN SODIUM 5000 UNITS: 5000 INJECTION INTRAVENOUS; SUBCUTANEOUS at 02:04

## 2021-04-02 RX ADMIN — TACROLIMUS 4 MG: 1 CAPSULE ORAL at 05:04

## 2021-04-02 RX ADMIN — CARVEDILOL 25 MG: 25 TABLET, FILM COATED ORAL at 05:04

## 2021-04-02 RX ADMIN — MAGNESIUM OXIDE TAB 400 MG (241.3 MG ELEMENTAL MG) 400 MG: 400 (241.3 MG) TAB at 08:04

## 2021-04-02 RX ADMIN — NIFEDIPINE 60 MG: 30 TABLET, FILM COATED, EXTENDED RELEASE ORAL at 08:04

## 2021-04-02 RX ADMIN — TACROLIMUS 5 MG: 1 CAPSULE ORAL at 08:04

## 2021-04-02 RX ADMIN — PREDNISONE 5 MG: 5 TABLET ORAL at 08:04

## 2021-04-02 RX ADMIN — HEPARIN SODIUM 5000 UNITS: 5000 INJECTION INTRAVENOUS; SUBCUTANEOUS at 08:04

## 2021-04-02 RX ADMIN — SODIUM BICARBONATE 650 MG TABLET 1300 MG: at 08:04

## 2021-04-02 RX ADMIN — ATORVASTATIN CALCIUM 40 MG: 20 TABLET, FILM COATED ORAL at 08:04

## 2021-04-02 RX ADMIN — HEPARIN SODIUM 5000 UNITS: 5000 INJECTION INTRAVENOUS; SUBCUTANEOUS at 05:04

## 2021-04-03 LAB
ALBUMIN SERPL BCP-MCNC: 3.6 G/DL (ref 3.5–5.2)
ALBUMIN SERPL BCP-MCNC: 3.6 G/DL (ref 3.5–5.2)
ALP SERPL-CCNC: 155 U/L (ref 55–135)
ALT SERPL W/O P-5'-P-CCNC: 27 U/L (ref 10–44)
ANION GAP SERPL CALC-SCNC: 7 MMOL/L (ref 8–16)
ANISOCYTOSIS BLD QL SMEAR: SLIGHT
AST SERPL-CCNC: 16 U/L (ref 10–40)
BASOPHILS NFR BLD: 0 % (ref 0–1.9)
BILIRUB DIRECT SERPL-MCNC: 0.2 MG/DL (ref 0.1–0.3)
BILIRUB SERPL-MCNC: 0.4 MG/DL (ref 0.1–1)
BUN SERPL-MCNC: 22 MG/DL (ref 8–23)
CALCIUM SERPL-MCNC: 9.8 MG/DL (ref 8.7–10.5)
CHLORIDE SERPL-SCNC: 113 MMOL/L (ref 95–110)
CO2 SERPL-SCNC: 22 MMOL/L (ref 23–29)
CREAT SERPL-MCNC: 1.9 MG/DL (ref 0.5–1.4)
DACRYOCYTES BLD QL SMEAR: ABNORMAL
DIFFERENTIAL METHOD: ABNORMAL
DOHLE BOD BLD QL SMEAR: PRESENT
EOSINOPHIL NFR BLD: 3 % (ref 0–8)
ERYTHROCYTE [DISTWIDTH] IN BLOOD BY AUTOMATED COUNT: 17.1 % (ref 11.5–14.5)
EST. GFR  (AFRICAN AMERICAN): 31.9 ML/MIN/1.73 M^2
EST. GFR  (NON AFRICAN AMERICAN): 27.7 ML/MIN/1.73 M^2
GLUCOSE SERPL-MCNC: 70 MG/DL (ref 70–110)
HCT VFR BLD AUTO: 24.4 % (ref 37–48.5)
HGB BLD-MCNC: 7.6 G/DL (ref 12–16)
HYPOCHROMIA BLD QL SMEAR: ABNORMAL
IMM GRANULOCYTES # BLD AUTO: ABNORMAL K/UL (ref 0–0.04)
IMM GRANULOCYTES NFR BLD AUTO: ABNORMAL % (ref 0–0.5)
LYMPHOCYTES NFR BLD: 9 % (ref 18–48)
MAGNESIUM SERPL-MCNC: 1.5 MG/DL (ref 1.6–2.6)
MCH RBC QN AUTO: 27.8 PG (ref 27–31)
MCHC RBC AUTO-ENTMCNC: 31.1 G/DL (ref 32–36)
MCV RBC AUTO: 89 FL (ref 82–98)
METAMYELOCYTES NFR BLD MANUAL: 1 %
MONOCYTES NFR BLD: 2 % (ref 4–15)
NEUTROPHILS NFR BLD: 83 % (ref 38–73)
NEUTS BAND NFR BLD MANUAL: 2 %
NRBC BLD-RTO: 0 /100 WBC
OVALOCYTES BLD QL SMEAR: ABNORMAL
PHOSPHATE SERPL-MCNC: 2.2 MG/DL (ref 2.7–4.5)
PLATELET # BLD AUTO: 80 K/UL (ref 150–450)
PMV BLD AUTO: ABNORMAL FL (ref 9.2–12.9)
POCT GLUCOSE: 108 MG/DL (ref 70–110)
POCT GLUCOSE: 155 MG/DL (ref 70–110)
POCT GLUCOSE: 157 MG/DL (ref 70–110)
POCT GLUCOSE: 86 MG/DL (ref 70–110)
POIKILOCYTOSIS BLD QL SMEAR: SLIGHT
POLYCHROMASIA BLD QL SMEAR: ABNORMAL
POTASSIUM SERPL-SCNC: 3.7 MMOL/L (ref 3.5–5.1)
PROT SERPL-MCNC: 6.1 G/DL (ref 6–8.4)
RBC # BLD AUTO: 2.73 M/UL (ref 4–5.4)
SODIUM SERPL-SCNC: 142 MMOL/L (ref 136–145)
TACROLIMUS BLD-MCNC: 6.4 NG/ML (ref 5–15)
WBC # BLD AUTO: 5.02 K/UL (ref 3.9–12.7)

## 2021-04-03 PROCEDURE — 84450 TRANSFERASE (AST) (SGOT): CPT | Performed by: PHYSICIAN ASSISTANT

## 2021-04-03 PROCEDURE — 80197 ASSAY OF TACROLIMUS: CPT | Performed by: PHYSICIAN ASSISTANT

## 2021-04-03 PROCEDURE — 25000003 PHARM REV CODE 250: Performed by: PHYSICIAN ASSISTANT

## 2021-04-03 PROCEDURE — 83735 ASSAY OF MAGNESIUM: CPT | Performed by: PHYSICIAN ASSISTANT

## 2021-04-03 PROCEDURE — 82247 BILIRUBIN TOTAL: CPT | Performed by: PHYSICIAN ASSISTANT

## 2021-04-03 PROCEDURE — 25000003 PHARM REV CODE 250: Performed by: NURSE PRACTITIONER

## 2021-04-03 PROCEDURE — 20600001 HC STEP DOWN PRIVATE ROOM

## 2021-04-03 PROCEDURE — 85007 BL SMEAR W/DIFF WBC COUNT: CPT | Performed by: PHYSICIAN ASSISTANT

## 2021-04-03 PROCEDURE — 36415 COLL VENOUS BLD VENIPUNCTURE: CPT | Performed by: PHYSICIAN ASSISTANT

## 2021-04-03 PROCEDURE — 63600175 PHARM REV CODE 636 W HCPCS: Performed by: PHYSICIAN ASSISTANT

## 2021-04-03 PROCEDURE — 99233 PR SUBSEQUENT HOSPITAL CARE,LEVL III: ICD-10-PCS | Mod: ,,, | Performed by: INTERNAL MEDICINE

## 2021-04-03 PROCEDURE — 85027 COMPLETE CBC AUTOMATED: CPT | Performed by: PHYSICIAN ASSISTANT

## 2021-04-03 PROCEDURE — 80069 RENAL FUNCTION PANEL: CPT | Performed by: PHYSICIAN ASSISTANT

## 2021-04-03 PROCEDURE — 99233 SBSQ HOSP IP/OBS HIGH 50: CPT | Mod: ,,, | Performed by: INTERNAL MEDICINE

## 2021-04-03 PROCEDURE — 84075 ASSAY ALKALINE PHOSPHATASE: CPT | Performed by: PHYSICIAN ASSISTANT

## 2021-04-03 PROCEDURE — 63600175 PHARM REV CODE 636 W HCPCS: Performed by: NURSE PRACTITIONER

## 2021-04-03 RX ORDER — CARVEDILOL 12.5 MG/1
12.5 TABLET ORAL 2 TIMES DAILY WITH MEALS
Status: DISCONTINUED | OUTPATIENT
Start: 2021-04-03 | End: 2021-04-05

## 2021-04-03 RX ADMIN — SODIUM BICARBONATE 650 MG TABLET 1300 MG: at 08:04

## 2021-04-03 RX ADMIN — TACROLIMUS 4 MG: 1 CAPSULE ORAL at 05:04

## 2021-04-03 RX ADMIN — HEPARIN SODIUM 5000 UNITS: 5000 INJECTION INTRAVENOUS; SUBCUTANEOUS at 02:04

## 2021-04-03 RX ADMIN — TACROLIMUS 5 MG: 1 CAPSULE ORAL at 08:04

## 2021-04-03 RX ADMIN — MAGNESIUM OXIDE TAB 400 MG (241.3 MG ELEMENTAL MG) 400 MG: 400 (241.3 MG) TAB at 08:04

## 2021-04-03 RX ADMIN — HEPARIN SODIUM 5000 UNITS: 5000 INJECTION INTRAVENOUS; SUBCUTANEOUS at 08:04

## 2021-04-03 RX ADMIN — SODIUM CHLORIDE 500 ML: 0.45 INJECTION, SOLUTION INTRAVENOUS at 04:04

## 2021-04-03 RX ADMIN — FAMOTIDINE 20 MG: 20 TABLET ORAL at 08:04

## 2021-04-03 RX ADMIN — PREDNISONE 5 MG: 5 TABLET ORAL at 08:04

## 2021-04-03 RX ADMIN — ATORVASTATIN CALCIUM 40 MG: 20 TABLET, FILM COATED ORAL at 08:04

## 2021-04-03 RX ADMIN — KETOCONAZOLE 100 MG: 200 TABLET ORAL at 08:04

## 2021-04-03 RX ADMIN — HEPARIN SODIUM 5000 UNITS: 5000 INJECTION INTRAVENOUS; SUBCUTANEOUS at 04:04

## 2021-04-04 LAB
ALBUMIN SERPL BCP-MCNC: 3.6 G/DL (ref 3.5–5.2)
ANION GAP SERPL CALC-SCNC: 7 MMOL/L (ref 8–16)
ANISOCYTOSIS BLD QL SMEAR: SLIGHT
BASOPHILS # BLD AUTO: 0.01 K/UL (ref 0–0.2)
BASOPHILS NFR BLD: 0.2 % (ref 0–1.9)
BUN SERPL-MCNC: 22 MG/DL (ref 8–23)
CALCIUM SERPL-MCNC: 9.8 MG/DL (ref 8.7–10.5)
CHLORIDE SERPL-SCNC: 113 MMOL/L (ref 95–110)
CO2 SERPL-SCNC: 23 MMOL/L (ref 23–29)
CREAT SERPL-MCNC: 1.9 MG/DL (ref 0.5–1.4)
DIFFERENTIAL METHOD: ABNORMAL
EOSINOPHIL # BLD AUTO: 0.2 K/UL (ref 0–0.5)
EOSINOPHIL NFR BLD: 3.5 % (ref 0–8)
ERYTHROCYTE [DISTWIDTH] IN BLOOD BY AUTOMATED COUNT: 17 % (ref 11.5–14.5)
EST. GFR  (AFRICAN AMERICAN): 31.9 ML/MIN/1.73 M^2
EST. GFR  (NON AFRICAN AMERICAN): 27.7 ML/MIN/1.73 M^2
GLUCOSE SERPL-MCNC: 65 MG/DL (ref 70–110)
HCT VFR BLD AUTO: 23.2 % (ref 37–48.5)
HGB BLD-MCNC: 7.4 G/DL (ref 12–16)
HYPOCHROMIA BLD QL SMEAR: ABNORMAL
IMM GRANULOCYTES # BLD AUTO: 0.1 K/UL (ref 0–0.04)
IMM GRANULOCYTES NFR BLD AUTO: 2.3 % (ref 0–0.5)
LYMPHOCYTES # BLD AUTO: 0.8 K/UL (ref 1–4.8)
LYMPHOCYTES NFR BLD: 18.8 % (ref 18–48)
MAGNESIUM SERPL-MCNC: 1.5 MG/DL (ref 1.6–2.6)
MCH RBC QN AUTO: 28.5 PG (ref 27–31)
MCHC RBC AUTO-ENTMCNC: 31.9 G/DL (ref 32–36)
MCV RBC AUTO: 89 FL (ref 82–98)
MONOCYTES # BLD AUTO: 0.4 K/UL (ref 0.3–1)
MONOCYTES NFR BLD: 8.4 % (ref 4–15)
NEUTROPHILS # BLD AUTO: 2.9 K/UL (ref 1.8–7.7)
NEUTROPHILS NFR BLD: 66.8 % (ref 38–73)
NRBC BLD-RTO: 1 /100 WBC
OVALOCYTES BLD QL SMEAR: ABNORMAL
PHOSPHATE SERPL-MCNC: 2.3 MG/DL (ref 2.7–4.5)
PLATELET # BLD AUTO: 85 K/UL (ref 150–450)
PMV BLD AUTO: 13.5 FL (ref 9.2–12.9)
POCT GLUCOSE: 113 MG/DL (ref 70–110)
POCT GLUCOSE: 141 MG/DL (ref 70–110)
POCT GLUCOSE: 176 MG/DL (ref 70–110)
POIKILOCYTOSIS BLD QL SMEAR: SLIGHT
POLYCHROMASIA BLD QL SMEAR: ABNORMAL
POTASSIUM SERPL-SCNC: 3.6 MMOL/L (ref 3.5–5.1)
RBC # BLD AUTO: 2.6 M/UL (ref 4–5.4)
SODIUM SERPL-SCNC: 143 MMOL/L (ref 136–145)
TACROLIMUS BLD-MCNC: 6.4 NG/ML (ref 5–15)
WBC # BLD AUTO: 4.3 K/UL (ref 3.9–12.7)

## 2021-04-04 PROCEDURE — 63600175 PHARM REV CODE 636 W HCPCS: Performed by: NURSE PRACTITIONER

## 2021-04-04 PROCEDURE — 80069 RENAL FUNCTION PANEL: CPT | Performed by: PHYSICIAN ASSISTANT

## 2021-04-04 PROCEDURE — 25000003 PHARM REV CODE 250: Performed by: NURSE PRACTITIONER

## 2021-04-04 PROCEDURE — 85025 COMPLETE CBC W/AUTO DIFF WBC: CPT | Performed by: PHYSICIAN ASSISTANT

## 2021-04-04 PROCEDURE — 20600001 HC STEP DOWN PRIVATE ROOM

## 2021-04-04 PROCEDURE — 83735 ASSAY OF MAGNESIUM: CPT | Performed by: PHYSICIAN ASSISTANT

## 2021-04-04 PROCEDURE — 80197 ASSAY OF TACROLIMUS: CPT | Performed by: PHYSICIAN ASSISTANT

## 2021-04-04 PROCEDURE — 36415 COLL VENOUS BLD VENIPUNCTURE: CPT | Performed by: PHYSICIAN ASSISTANT

## 2021-04-04 PROCEDURE — 99233 PR SUBSEQUENT HOSPITAL CARE,LEVL III: ICD-10-PCS | Mod: ,,, | Performed by: INTERNAL MEDICINE

## 2021-04-04 PROCEDURE — 99233 SBSQ HOSP IP/OBS HIGH 50: CPT | Mod: ,,, | Performed by: INTERNAL MEDICINE

## 2021-04-04 PROCEDURE — 63600175 PHARM REV CODE 636 W HCPCS: Performed by: PHYSICIAN ASSISTANT

## 2021-04-04 PROCEDURE — 25000003 PHARM REV CODE 250: Performed by: PHYSICIAN ASSISTANT

## 2021-04-04 RX ADMIN — MAGNESIUM OXIDE TAB 400 MG (241.3 MG ELEMENTAL MG) 400 MG: 400 (241.3 MG) TAB at 08:04

## 2021-04-04 RX ADMIN — DIBASIC SODIUM PHOSPHATE, MONOBASIC POTASSIUM PHOSPHATE AND MONOBASIC SODIUM PHOSPHATE 2 TABLET: 852; 155; 130 TABLET ORAL at 08:04

## 2021-04-04 RX ADMIN — NIFEDIPINE 60 MG: 30 TABLET, FILM COATED, EXTENDED RELEASE ORAL at 08:04

## 2021-04-04 RX ADMIN — KETOCONAZOLE 100 MG: 200 TABLET ORAL at 08:04

## 2021-04-04 RX ADMIN — ATORVASTATIN CALCIUM 40 MG: 20 TABLET, FILM COATED ORAL at 08:04

## 2021-04-04 RX ADMIN — HEPARIN SODIUM 5000 UNITS: 5000 INJECTION INTRAVENOUS; SUBCUTANEOUS at 08:04

## 2021-04-04 RX ADMIN — PREDNISONE 5 MG: 5 TABLET ORAL at 08:04

## 2021-04-04 RX ADMIN — HEPARIN SODIUM 5000 UNITS: 5000 INJECTION INTRAVENOUS; SUBCUTANEOUS at 02:04

## 2021-04-04 RX ADMIN — CARVEDILOL 12.5 MG: 25 TABLET, FILM COATED ORAL at 04:04

## 2021-04-04 RX ADMIN — CLONIDINE HYDROCHLORIDE 0.1 MG: 0.1 TABLET ORAL at 11:04

## 2021-04-04 RX ADMIN — TACROLIMUS 4 MG: 1 CAPSULE ORAL at 05:04

## 2021-04-04 RX ADMIN — SODIUM BICARBONATE 650 MG TABLET 1300 MG: at 08:04

## 2021-04-04 RX ADMIN — HEPARIN SODIUM 5000 UNITS: 5000 INJECTION INTRAVENOUS; SUBCUTANEOUS at 05:04

## 2021-04-04 RX ADMIN — FAMOTIDINE 20 MG: 20 TABLET ORAL at 08:04

## 2021-04-04 RX ADMIN — DIBASIC SODIUM PHOSPHATE, MONOBASIC POTASSIUM PHOSPHATE AND MONOBASIC SODIUM PHOSPHATE 2 TABLET: 852; 155; 130 TABLET ORAL at 11:04

## 2021-04-04 RX ADMIN — TACROLIMUS 5 MG: 1 CAPSULE ORAL at 08:04

## 2021-04-05 PROBLEM — B25.9 CYTOMEGALOVIRUS (CMV) VIREMIA: Status: RESOLVED | Noted: 2019-12-18 | Resolved: 2021-04-05

## 2021-04-05 PROBLEM — I95.1 ORTHOSTATIC HYPOTENSION: Status: ACTIVE | Noted: 2021-04-05

## 2021-04-05 PROBLEM — R00.1 BRADYCARDIA: Status: ACTIVE | Noted: 2021-04-05

## 2021-04-05 LAB
ALBUMIN SERPL BCP-MCNC: 3.5 G/DL (ref 3.5–5.2)
ANION GAP SERPL CALC-SCNC: 9 MMOL/L (ref 8–16)
BASOPHILS # BLD AUTO: 0.01 K/UL (ref 0–0.2)
BASOPHILS NFR BLD: 0.3 % (ref 0–1.9)
BUN SERPL-MCNC: 25 MG/DL (ref 8–23)
CALCIUM SERPL-MCNC: 9.9 MG/DL (ref 8.7–10.5)
CHLORIDE SERPL-SCNC: 110 MMOL/L (ref 95–110)
CO2 SERPL-SCNC: 23 MMOL/L (ref 23–29)
CREAT SERPL-MCNC: 2.2 MG/DL (ref 0.5–1.4)
DIFFERENTIAL METHOD: ABNORMAL
EOSINOPHIL # BLD AUTO: 0.1 K/UL (ref 0–0.5)
EOSINOPHIL NFR BLD: 3.7 % (ref 0–8)
ERYTHROCYTE [DISTWIDTH] IN BLOOD BY AUTOMATED COUNT: 17.1 % (ref 11.5–14.5)
EST. GFR  (AFRICAN AMERICAN): 26.7 ML/MIN/1.73 M^2
EST. GFR  (NON AFRICAN AMERICAN): 23.2 ML/MIN/1.73 M^2
GLUCOSE SERPL-MCNC: 76 MG/DL (ref 70–110)
HCT VFR BLD AUTO: 23.4 % (ref 37–48.5)
HGB BLD-MCNC: 7.4 G/DL (ref 12–16)
IMM GRANULOCYTES # BLD AUTO: 0.05 K/UL (ref 0–0.04)
IMM GRANULOCYTES NFR BLD AUTO: 1.7 % (ref 0–0.5)
LYMPHOCYTES # BLD AUTO: 0.7 K/UL (ref 1–4.8)
LYMPHOCYTES NFR BLD: 22.7 % (ref 18–48)
MAGNESIUM SERPL-MCNC: 1.4 MG/DL (ref 1.6–2.6)
MCH RBC QN AUTO: 28.4 PG (ref 27–31)
MCHC RBC AUTO-ENTMCNC: 31.6 G/DL (ref 32–36)
MCV RBC AUTO: 90 FL (ref 82–98)
MONOCYTES # BLD AUTO: 0.3 K/UL (ref 0.3–1)
MONOCYTES NFR BLD: 10.4 % (ref 4–15)
NEUTROPHILS # BLD AUTO: 1.8 K/UL (ref 1.8–7.7)
NEUTROPHILS NFR BLD: 61.2 % (ref 38–73)
NRBC BLD-RTO: 0 /100 WBC
PHOSPHATE SERPL-MCNC: 3.9 MG/DL (ref 2.7–4.5)
PLATELET # BLD AUTO: 93 K/UL (ref 150–450)
PMV BLD AUTO: 12.5 FL (ref 9.2–12.9)
POCT GLUCOSE: 130 MG/DL (ref 70–110)
POCT GLUCOSE: 81 MG/DL (ref 70–110)
POTASSIUM SERPL-SCNC: 3.5 MMOL/L (ref 3.5–5.1)
RBC # BLD AUTO: 2.61 M/UL (ref 4–5.4)
SODIUM SERPL-SCNC: 142 MMOL/L (ref 136–145)
TACROLIMUS BLD-MCNC: 6.3 NG/ML (ref 5–15)
TROPONIN I SERPL DL<=0.01 NG/ML-MCNC: 0.01 NG/ML (ref 0–0.03)
TROPONIN I SERPL DL<=0.01 NG/ML-MCNC: 0.01 NG/ML (ref 0–0.03)
WBC # BLD AUTO: 2.99 K/UL (ref 3.9–12.7)

## 2021-04-05 PROCEDURE — 93010 ELECTROCARDIOGRAM REPORT: CPT | Mod: ,,, | Performed by: INTERNAL MEDICINE

## 2021-04-05 PROCEDURE — 36415 COLL VENOUS BLD VENIPUNCTURE: CPT | Performed by: PHYSICIAN ASSISTANT

## 2021-04-05 PROCEDURE — 83735 ASSAY OF MAGNESIUM: CPT | Performed by: PHYSICIAN ASSISTANT

## 2021-04-05 PROCEDURE — 63600175 PHARM REV CODE 636 W HCPCS: Performed by: PHYSICIAN ASSISTANT

## 2021-04-05 PROCEDURE — 20600001 HC STEP DOWN PRIVATE ROOM

## 2021-04-05 PROCEDURE — 84484 ASSAY OF TROPONIN QUANT: CPT | Mod: 91 | Performed by: PHYSICIAN ASSISTANT

## 2021-04-05 PROCEDURE — 93005 ELECTROCARDIOGRAM TRACING: CPT

## 2021-04-05 PROCEDURE — 80197 ASSAY OF TACROLIMUS: CPT | Performed by: PHYSICIAN ASSISTANT

## 2021-04-05 PROCEDURE — 25000003 PHARM REV CODE 250: Performed by: PHYSICIAN ASSISTANT

## 2021-04-05 PROCEDURE — 99233 SBSQ HOSP IP/OBS HIGH 50: CPT | Mod: ,,, | Performed by: PHYSICIAN ASSISTANT

## 2021-04-05 PROCEDURE — 80069 RENAL FUNCTION PANEL: CPT | Performed by: PHYSICIAN ASSISTANT

## 2021-04-05 PROCEDURE — 63600175 PHARM REV CODE 636 W HCPCS: Performed by: NURSE PRACTITIONER

## 2021-04-05 PROCEDURE — 25000003 PHARM REV CODE 250: Performed by: NURSE PRACTITIONER

## 2021-04-05 PROCEDURE — 93010 EKG 12-LEAD: ICD-10-PCS | Mod: ,,, | Performed by: INTERNAL MEDICINE

## 2021-04-05 PROCEDURE — 99233 PR SUBSEQUENT HOSPITAL CARE,LEVL III: ICD-10-PCS | Mod: ,,, | Performed by: PHYSICIAN ASSISTANT

## 2021-04-05 PROCEDURE — 85025 COMPLETE CBC W/AUTO DIFF WBC: CPT | Performed by: PHYSICIAN ASSISTANT

## 2021-04-05 RX ORDER — LANOLIN ALCOHOL/MO/W.PET/CERES
800 CREAM (GRAM) TOPICAL 2 TIMES DAILY
Status: DISCONTINUED | OUTPATIENT
Start: 2021-04-05 | End: 2021-04-09 | Stop reason: HOSPADM

## 2021-04-05 RX ORDER — MAGNESIUM SULFATE HEPTAHYDRATE 40 MG/ML
2 INJECTION, SOLUTION INTRAVENOUS ONCE
Status: COMPLETED | OUTPATIENT
Start: 2021-04-05 | End: 2021-04-05

## 2021-04-05 RX ORDER — NIFEDIPINE 30 MG/1
30 TABLET, EXTENDED RELEASE ORAL 2 TIMES DAILY
Status: DISCONTINUED | OUTPATIENT
Start: 2021-04-05 | End: 2021-04-06

## 2021-04-05 RX ORDER — CINACALCET 30 MG/1
30 TABLET, FILM COATED ORAL
Status: DISCONTINUED | OUTPATIENT
Start: 2021-04-05 | End: 2021-04-08

## 2021-04-05 RX ADMIN — NIFEDIPINE 30 MG: 30 TABLET, FILM COATED, EXTENDED RELEASE ORAL at 10:04

## 2021-04-05 RX ADMIN — FAMOTIDINE 20 MG: 20 TABLET ORAL at 08:04

## 2021-04-05 RX ADMIN — MAGNESIUM OXIDE 400 MG (241.3 MG MAGNESIUM) TABLET 800 MG: TABLET at 08:04

## 2021-04-05 RX ADMIN — SODIUM BICARBONATE 650 MG TABLET 1300 MG: at 08:04

## 2021-04-05 RX ADMIN — HEPARIN SODIUM 5000 UNITS: 5000 INJECTION INTRAVENOUS; SUBCUTANEOUS at 01:04

## 2021-04-05 RX ADMIN — HEPARIN SODIUM 5000 UNITS: 5000 INJECTION INTRAVENOUS; SUBCUTANEOUS at 08:04

## 2021-04-05 RX ADMIN — HEPARIN SODIUM 5000 UNITS: 5000 INJECTION INTRAVENOUS; SUBCUTANEOUS at 05:04

## 2021-04-05 RX ADMIN — PREDNISONE 5 MG: 5 TABLET ORAL at 08:04

## 2021-04-05 RX ADMIN — TACROLIMUS 5 MG: 1 CAPSULE ORAL at 08:04

## 2021-04-05 RX ADMIN — CINACALCET 30 MG: 30 TABLET, FILM COATED ORAL at 09:04

## 2021-04-05 RX ADMIN — MAGNESIUM SULFATE 2 G: 2 INJECTION INTRAVENOUS at 08:04

## 2021-04-05 RX ADMIN — DIBASIC SODIUM PHOSPHATE, MONOBASIC POTASSIUM PHOSPHATE AND MONOBASIC SODIUM PHOSPHATE 2 TABLET: 852; 155; 130 TABLET ORAL at 08:04

## 2021-04-05 RX ADMIN — TACROLIMUS 4 MG: 1 CAPSULE ORAL at 05:04

## 2021-04-05 RX ADMIN — KETOCONAZOLE 100 MG: 200 TABLET ORAL at 08:04

## 2021-04-05 RX ADMIN — ATORVASTATIN CALCIUM 40 MG: 20 TABLET, FILM COATED ORAL at 08:04

## 2021-04-06 LAB
ABO + RH BLD: NORMAL
ALBUMIN SERPL BCP-MCNC: 3.5 G/DL (ref 3.5–5.2)
ANION GAP SERPL CALC-SCNC: 8 MMOL/L (ref 8–16)
BASOPHILS # BLD AUTO: 0.01 K/UL (ref 0–0.2)
BASOPHILS NFR BLD: 0.4 % (ref 0–1.9)
BLD GP AB SCN CELLS X3 SERPL QL: NORMAL
BLD PROD TYP BPU: NORMAL
BLOOD UNIT EXPIRATION DATE: NORMAL
BLOOD UNIT TYPE CODE: 6200
BLOOD UNIT TYPE: NORMAL
BUN SERPL-MCNC: 26 MG/DL (ref 8–23)
CALCIUM SERPL-MCNC: 9.3 MG/DL (ref 8.7–10.5)
CHLORIDE SERPL-SCNC: 111 MMOL/L (ref 95–110)
CO2 SERPL-SCNC: 23 MMOL/L (ref 23–29)
CODING SYSTEM: NORMAL
CREAT SERPL-MCNC: 2 MG/DL (ref 0.5–1.4)
DIFFERENTIAL METHOD: ABNORMAL
DISPENSE STATUS: NORMAL
EOSINOPHIL # BLD AUTO: 0.1 K/UL (ref 0–0.5)
EOSINOPHIL NFR BLD: 4.8 % (ref 0–8)
ERYTHROCYTE [DISTWIDTH] IN BLOOD BY AUTOMATED COUNT: 17.3 % (ref 11.5–14.5)
EST. GFR  (AFRICAN AMERICAN): 30 ML/MIN/1.73 M^2
EST. GFR  (NON AFRICAN AMERICAN): 26 ML/MIN/1.73 M^2
GLUCOSE SERPL-MCNC: 74 MG/DL (ref 70–110)
HCT VFR BLD AUTO: 24 % (ref 37–48.5)
HGB BLD-MCNC: 7.5 G/DL (ref 12–16)
IMM GRANULOCYTES # BLD AUTO: 0.11 K/UL (ref 0–0.04)
IMM GRANULOCYTES NFR BLD AUTO: 4.4 % (ref 0–0.5)
LYMPHOCYTES # BLD AUTO: 0.8 K/UL (ref 1–4.8)
LYMPHOCYTES NFR BLD: 30.3 % (ref 18–48)
MAGNESIUM SERPL-MCNC: 2 MG/DL (ref 1.6–2.6)
MCH RBC QN AUTO: 28.2 PG (ref 27–31)
MCHC RBC AUTO-ENTMCNC: 31.3 G/DL (ref 32–36)
MCV RBC AUTO: 90 FL (ref 82–98)
MONOCYTES # BLD AUTO: 0.3 K/UL (ref 0.3–1)
MONOCYTES NFR BLD: 10.4 % (ref 4–15)
NEUTROPHILS # BLD AUTO: 1.3 K/UL (ref 1.8–7.7)
NEUTROPHILS NFR BLD: 49.7 % (ref 38–73)
NRBC BLD-RTO: 0 /100 WBC
PHOSPHATE SERPL-MCNC: 3.4 MG/DL (ref 2.7–4.5)
PLATELET # BLD AUTO: 87 K/UL (ref 150–450)
PMV BLD AUTO: 12.4 FL (ref 9.2–12.9)
POTASSIUM SERPL-SCNC: 3.6 MMOL/L (ref 3.5–5.1)
RBC # BLD AUTO: 2.66 M/UL (ref 4–5.4)
SODIUM SERPL-SCNC: 142 MMOL/L (ref 136–145)
TACROLIMUS BLD-MCNC: 6.2 NG/ML (ref 5–15)
TRANS ERYTHROCYTES VOL PATIENT: NORMAL ML
WBC # BLD AUTO: 2.51 K/UL (ref 3.9–12.7)

## 2021-04-06 PROCEDURE — 80197 ASSAY OF TACROLIMUS: CPT | Performed by: PHYSICIAN ASSISTANT

## 2021-04-06 PROCEDURE — 94761 N-INVAS EAR/PLS OXIMETRY MLT: CPT

## 2021-04-06 PROCEDURE — 25000003 PHARM REV CODE 250: Performed by: NURSE PRACTITIONER

## 2021-04-06 PROCEDURE — 27201040 HC RC 50 FILTER: Performed by: PHYSICIAN ASSISTANT

## 2021-04-06 PROCEDURE — 86900 BLOOD TYPING SEROLOGIC ABO: CPT | Performed by: PHYSICIAN ASSISTANT

## 2021-04-06 PROCEDURE — 93010 ELECTROCARDIOGRAM REPORT: CPT | Mod: ,,, | Performed by: INTERNAL MEDICINE

## 2021-04-06 PROCEDURE — 83735 ASSAY OF MAGNESIUM: CPT | Performed by: PHYSICIAN ASSISTANT

## 2021-04-06 PROCEDURE — 99233 PR SUBSEQUENT HOSPITAL CARE,LEVL III: ICD-10-PCS | Mod: ,,, | Performed by: PHYSICIAN ASSISTANT

## 2021-04-06 PROCEDURE — 36430 TRANSFUSION BLD/BLD COMPNT: CPT

## 2021-04-06 PROCEDURE — 63600175 PHARM REV CODE 636 W HCPCS: Performed by: NURSE PRACTITIONER

## 2021-04-06 PROCEDURE — 36415 COLL VENOUS BLD VENIPUNCTURE: CPT | Performed by: PHYSICIAN ASSISTANT

## 2021-04-06 PROCEDURE — 86922 COMPATIBILITY TEST ANTIGLOB: CPT | Performed by: PHYSICIAN ASSISTANT

## 2021-04-06 PROCEDURE — P9021 RED BLOOD CELLS UNIT: HCPCS | Performed by: PHYSICIAN ASSISTANT

## 2021-04-06 PROCEDURE — 99233 SBSQ HOSP IP/OBS HIGH 50: CPT | Mod: ,,, | Performed by: PHYSICIAN ASSISTANT

## 2021-04-06 PROCEDURE — 99222 PR INITIAL HOSPITAL CARE,LEVL II: ICD-10-PCS | Mod: ,,, | Performed by: INTERNAL MEDICINE

## 2021-04-06 PROCEDURE — 63600175 PHARM REV CODE 636 W HCPCS: Performed by: PHYSICIAN ASSISTANT

## 2021-04-06 PROCEDURE — 80069 RENAL FUNCTION PANEL: CPT | Performed by: PHYSICIAN ASSISTANT

## 2021-04-06 PROCEDURE — 25000003 PHARM REV CODE 250: Performed by: PHYSICIAN ASSISTANT

## 2021-04-06 PROCEDURE — 20600001 HC STEP DOWN PRIVATE ROOM

## 2021-04-06 PROCEDURE — 99222 1ST HOSP IP/OBS MODERATE 55: CPT | Mod: ,,, | Performed by: INTERNAL MEDICINE

## 2021-04-06 PROCEDURE — 85025 COMPLETE CBC W/AUTO DIFF WBC: CPT | Performed by: PHYSICIAN ASSISTANT

## 2021-04-06 PROCEDURE — 93005 ELECTROCARDIOGRAM TRACING: CPT

## 2021-04-06 PROCEDURE — 93010 EKG 12-LEAD: ICD-10-PCS | Mod: ,,, | Performed by: INTERNAL MEDICINE

## 2021-04-06 RX ORDER — HYDRALAZINE HYDROCHLORIDE 50 MG/1
50 TABLET, FILM COATED ORAL EVERY 8 HOURS PRN
Status: DISCONTINUED | OUTPATIENT
Start: 2021-04-06 | End: 2021-04-09 | Stop reason: HOSPADM

## 2021-04-06 RX ORDER — SODIUM CHLORIDE 9 MG/ML
INJECTION, SOLUTION INTRAVENOUS CONTINUOUS
Status: CANCELLED | OUTPATIENT
Start: 2021-04-06

## 2021-04-06 RX ORDER — HYDROCODONE BITARTRATE AND ACETAMINOPHEN 500; 5 MG/1; MG/1
TABLET ORAL
Status: DISCONTINUED | OUTPATIENT
Start: 2021-04-06 | End: 2021-04-09 | Stop reason: HOSPADM

## 2021-04-06 RX ORDER — MIDODRINE HYDROCHLORIDE 5 MG/1
5 TABLET ORAL 3 TIMES DAILY
Status: CANCELLED | OUTPATIENT
Start: 2021-04-06

## 2021-04-06 RX ADMIN — FAMOTIDINE 20 MG: 20 TABLET ORAL at 08:04

## 2021-04-06 RX ADMIN — TACROLIMUS 4 MG: 1 CAPSULE ORAL at 05:04

## 2021-04-06 RX ADMIN — ATORVASTATIN CALCIUM 40 MG: 20 TABLET, FILM COATED ORAL at 08:04

## 2021-04-06 RX ADMIN — CINACALCET 30 MG: 30 TABLET, FILM COATED ORAL at 08:04

## 2021-04-06 RX ADMIN — HEPARIN SODIUM 5000 UNITS: 5000 INJECTION INTRAVENOUS; SUBCUTANEOUS at 08:04

## 2021-04-06 RX ADMIN — MAGNESIUM OXIDE 400 MG (241.3 MG MAGNESIUM) TABLET 800 MG: TABLET at 08:04

## 2021-04-06 RX ADMIN — PREDNISONE 5 MG: 5 TABLET ORAL at 08:04

## 2021-04-06 RX ADMIN — HYDRALAZINE HYDROCHLORIDE 50 MG: 50 TABLET, FILM COATED ORAL at 11:04

## 2021-04-06 RX ADMIN — TACROLIMUS 5 MG: 1 CAPSULE ORAL at 08:04

## 2021-04-06 RX ADMIN — KETOCONAZOLE 100 MG: 200 TABLET ORAL at 08:04

## 2021-04-06 RX ADMIN — HEPARIN SODIUM 5000 UNITS: 5000 INJECTION INTRAVENOUS; SUBCUTANEOUS at 01:04

## 2021-04-06 RX ADMIN — SODIUM BICARBONATE 650 MG TABLET 1300 MG: at 08:04

## 2021-04-06 RX ADMIN — HEPARIN SODIUM 5000 UNITS: 5000 INJECTION INTRAVENOUS; SUBCUTANEOUS at 05:04

## 2021-04-07 PROBLEM — N39.0 UTI (URINARY TRACT INFECTION): Chronic | Status: ACTIVE | Noted: 2020-11-25

## 2021-04-07 LAB
ALBUMIN SERPL BCP-MCNC: 3.5 G/DL (ref 3.5–5.2)
ANION GAP SERPL CALC-SCNC: 8 MMOL/L (ref 8–16)
ANISOCYTOSIS BLD QL SMEAR: SLIGHT
ASCENDING AORTA: 2.98 CM
AV INDEX (PROSTH): 0.66
AV MEAN GRADIENT: 7 MMHG
AV PEAK GRADIENT: 16 MMHG
AV VALVE AREA: 2.11 CM2
AV VELOCITY RATIO: 0.65
BACTERIA #/AREA URNS AUTO: ABNORMAL /HPF
BASOPHILS # BLD AUTO: ABNORMAL K/UL (ref 0–0.2)
BASOPHILS NFR BLD: 1 % (ref 0–1.9)
BILIRUB UR QL STRIP: NEGATIVE
BSA FOR ECHO PROCEDURE: 1.83 M2
BUN SERPL-MCNC: 24 MG/DL (ref 8–23)
CALCIUM SERPL-MCNC: 9.6 MG/DL (ref 8.7–10.5)
CHLORIDE SERPL-SCNC: 110 MMOL/L (ref 95–110)
CLARITY UR REFRACT.AUTO: ABNORMAL
CO2 SERPL-SCNC: 23 MMOL/L (ref 23–29)
COLOR UR AUTO: YELLOW
CREAT SERPL-MCNC: 2 MG/DL (ref 0.5–1.4)
CV ECHO LV RWT: 0.46 CM
DACRYOCYTES BLD QL SMEAR: ABNORMAL
DIFFERENTIAL METHOD: ABNORMAL
DOP CALC AO PEAK VEL: 1.99 M/S
DOP CALC AO VTI: 48.02 CM
DOP CALC LVOT AREA: 3.2 CM2
DOP CALC LVOT DIAMETER: 2.02 CM
DOP CALC LVOT PEAK VEL: 1.29 M/S
DOP CALC LVOT STROKE VOLUME: 101.28 CM3
DOP CALCLVOT PEAK VEL VTI: 31.62 CM
E WAVE DECELERATION TIME: 196.41 MSEC
E/A RATIO: 1.17
E/E' RATIO: 12.67 M/S
EBV DNA SERPL NAA+PROBE-ACNC: NORMAL IU/ML
ECHO LV POSTERIOR WALL: 1.18 CM (ref 0.6–1.1)
EJECTION FRACTION: 65 %
EOSINOPHIL # BLD AUTO: ABNORMAL K/UL (ref 0–0.5)
EOSINOPHIL NFR BLD: 5 % (ref 0–8)
ERYTHROCYTE [DISTWIDTH] IN BLOOD BY AUTOMATED COUNT: 16.3 % (ref 11.5–14.5)
EST. GFR  (AFRICAN AMERICAN): 30 ML/MIN/1.73 M^2
EST. GFR  (NON AFRICAN AMERICAN): 26 ML/MIN/1.73 M^2
FRACTIONAL SHORTENING: 34 % (ref 28–44)
GLUCOSE SERPL-MCNC: 73 MG/DL (ref 70–110)
GLUCOSE UR QL STRIP: NEGATIVE
HCT VFR BLD AUTO: 28.6 % (ref 37–48.5)
HGB BLD-MCNC: 9.2 G/DL (ref 12–16)
HGB UR QL STRIP: ABNORMAL
HYALINE CASTS UR QL AUTO: 0 /LPF
HYPOCHROMIA BLD QL SMEAR: ABNORMAL
IMM GRANULOCYTES # BLD AUTO: ABNORMAL K/UL (ref 0–0.04)
IMM GRANULOCYTES NFR BLD AUTO: ABNORMAL % (ref 0–0.5)
INTERVENTRICULAR SEPTUM: 1.06 CM (ref 0.6–1.1)
KETONES UR QL STRIP: NEGATIVE
LA MAJOR: 5.45 CM
LA MINOR: 5.29 CM
LA WIDTH: 4.18 CM
LEFT ATRIUM SIZE: 3.95 CM
LEFT ATRIUM VOLUME INDEX MOD: 29 ML/M2
LEFT ATRIUM VOLUME INDEX: 42.6 ML/M2
LEFT ATRIUM VOLUME MOD: 51.33 CM3
LEFT ATRIUM VOLUME: 75.35 CM3
LEFT INTERNAL DIMENSION IN SYSTOLE: 3.37 CM (ref 2.1–4)
LEFT VENTRICLE DIASTOLIC VOLUME INDEX: 71.29 ML/M2
LEFT VENTRICLE DIASTOLIC VOLUME: 126.18 ML
LEFT VENTRICLE MASS INDEX: 125 G/M2
LEFT VENTRICLE SYSTOLIC VOLUME INDEX: 26.3 ML/M2
LEFT VENTRICLE SYSTOLIC VOLUME: 46.58 ML
LEFT VENTRICULAR INTERNAL DIMENSION IN DIASTOLE: 5.14 CM (ref 3.5–6)
LEFT VENTRICULAR MASS: 222.04 G
LEUKOCYTE ESTERASE UR QL STRIP: ABNORMAL
LV LATERAL E/E' RATIO: 11.88 M/S
LV SEPTAL E/E' RATIO: 13.57 M/S
LYMPHOCYTES # BLD AUTO: ABNORMAL K/UL (ref 1–4.8)
LYMPHOCYTES NFR BLD: 15 % (ref 18–48)
MAGNESIUM SERPL-MCNC: 2 MG/DL (ref 1.6–2.6)
MCH RBC QN AUTO: 28.7 PG (ref 27–31)
MCHC RBC AUTO-ENTMCNC: 32.2 G/DL (ref 32–36)
MCV RBC AUTO: 89 FL (ref 82–98)
METAMYELOCYTES NFR BLD MANUAL: 1 %
MICROSCOPIC COMMENT: ABNORMAL
MONOCYTES # BLD AUTO: ABNORMAL K/UL (ref 0.3–1)
MONOCYTES NFR BLD: 5 % (ref 4–15)
MV A" WAVE DURATION": 12.94 MSEC
MV PEAK A VEL: 0.81 M/S
MV PEAK E VEL: 0.95 M/S
MV STENOSIS PRESSURE HALF TIME: 56.96 MS
MV VALVE AREA P 1/2 METHOD: 3.86 CM2
NEUTROPHILS NFR BLD: 72 % (ref 38–73)
NITRITE UR QL STRIP: NEGATIVE
NRBC BLD-RTO: 0 /100 WBC
OVALOCYTES BLD QL SMEAR: ABNORMAL
PH UR STRIP: 6 [PH] (ref 5–8)
PHOSPHATE SERPL-MCNC: 3.1 MG/DL (ref 2.7–4.5)
PISA TR MAX VEL: 2.73 M/S
PLATELET # BLD AUTO: 82 K/UL (ref 150–450)
PMV BLD AUTO: ABNORMAL FL (ref 9.2–12.9)
POIKILOCYTOSIS BLD QL SMEAR: SLIGHT
POLYCHROMASIA BLD QL SMEAR: ABNORMAL
POTASSIUM SERPL-SCNC: 3.7 MMOL/L (ref 3.5–5.1)
PROMYELOCYTES NFR BLD MANUAL: 1 %
PROT UR QL STRIP: ABNORMAL
PTH-INTACT SERPL-MCNC: 509 PG/ML (ref 9–77)
PULM VEIN S/D RATIO: 1.88
PV PEAK D VEL: 0.34 M/S
PV PEAK S VEL: 0.64 M/S
RA MAJOR: 5.18 CM
RA PRESSURE: 3 MMHG
RA WIDTH: 3.45 CM
RBC # BLD AUTO: 3.21 M/UL (ref 4–5.4)
RBC #/AREA URNS AUTO: >100 /HPF (ref 0–4)
RIGHT VENTRICULAR END-DIASTOLIC DIMENSION: 3.05 CM
RV TISSUE DOPPLER FREE WALL SYSTOLIC VELOCITY 1 (APICAL 4 CHAMBER VIEW): 13.44 CM/S
SINUS: 3.09 CM
SODIUM SERPL-SCNC: 141 MMOL/L (ref 136–145)
SP GR UR STRIP: 1.01 (ref 1–1.03)
SQUAMOUS #/AREA URNS AUTO: 14 /HPF
STJ: 2.71 CM
T4 FREE SERPL-MCNC: 1.08 NG/DL (ref 0.71–1.51)
TACROLIMUS BLD-MCNC: 7.2 NG/ML (ref 5–15)
TDI LATERAL: 0.08 M/S
TDI SEPTAL: 0.07 M/S
TDI: 0.08 M/S
TR MAX PG: 30 MMHG
TRICUSPID ANNULAR PLANE SYSTOLIC EXCURSION: 1.95 CM
TSH SERPL DL<=0.005 MIU/L-ACNC: 3.76 UIU/ML (ref 0.4–4)
TV REST PULMONARY ARTERY PRESSURE: 33 MMHG
URN SPEC COLLECT METH UR: ABNORMAL
WBC # BLD AUTO: 2.45 K/UL (ref 3.9–12.7)
WBC #/AREA URNS AUTO: 58 /HPF (ref 0–5)

## 2021-04-07 PROCEDURE — 83970 ASSAY OF PARATHORMONE: CPT | Performed by: INTERNAL MEDICINE

## 2021-04-07 PROCEDURE — 81001 URINALYSIS AUTO W/SCOPE: CPT | Performed by: PHYSICIAN ASSISTANT

## 2021-04-07 PROCEDURE — 84439 ASSAY OF FREE THYROXINE: CPT | Performed by: PHYSICIAN ASSISTANT

## 2021-04-07 PROCEDURE — 83735 ASSAY OF MAGNESIUM: CPT | Performed by: PHYSICIAN ASSISTANT

## 2021-04-07 PROCEDURE — 85027 COMPLETE CBC AUTOMATED: CPT | Performed by: PHYSICIAN ASSISTANT

## 2021-04-07 PROCEDURE — 25000003 PHARM REV CODE 250: Performed by: PHYSICIAN ASSISTANT

## 2021-04-07 PROCEDURE — 20600001 HC STEP DOWN PRIVATE ROOM

## 2021-04-07 PROCEDURE — 80197 ASSAY OF TACROLIMUS: CPT | Performed by: PHYSICIAN ASSISTANT

## 2021-04-07 PROCEDURE — 63600175 PHARM REV CODE 636 W HCPCS: Performed by: PHYSICIAN ASSISTANT

## 2021-04-07 PROCEDURE — 99233 PR SUBSEQUENT HOSPITAL CARE,LEVL III: ICD-10-PCS | Mod: ,,, | Performed by: PHYSICIAN ASSISTANT

## 2021-04-07 PROCEDURE — 63600175 PHARM REV CODE 636 W HCPCS: Performed by: NURSE PRACTITIONER

## 2021-04-07 PROCEDURE — 85007 BL SMEAR W/DIFF WBC COUNT: CPT | Performed by: PHYSICIAN ASSISTANT

## 2021-04-07 PROCEDURE — 99233 SBSQ HOSP IP/OBS HIGH 50: CPT | Mod: ,,, | Performed by: PHYSICIAN ASSISTANT

## 2021-04-07 PROCEDURE — 87086 URINE CULTURE/COLONY COUNT: CPT | Performed by: PHYSICIAN ASSISTANT

## 2021-04-07 PROCEDURE — 80069 RENAL FUNCTION PANEL: CPT | Performed by: PHYSICIAN ASSISTANT

## 2021-04-07 PROCEDURE — 36415 COLL VENOUS BLD VENIPUNCTURE: CPT | Performed by: PHYSICIAN ASSISTANT

## 2021-04-07 PROCEDURE — 84443 ASSAY THYROID STIM HORMONE: CPT | Performed by: PHYSICIAN ASSISTANT

## 2021-04-07 RX ORDER — CINACALCET 30 MG/1
30 TABLET, FILM COATED ORAL
Qty: 30 TABLET | Refills: 11 | Status: SHIPPED | OUTPATIENT
Start: 2021-04-07 | End: 2021-04-08

## 2021-04-07 RX ORDER — NIFEDIPINE 30 MG/1
30 TABLET, EXTENDED RELEASE ORAL DAILY
Status: DISCONTINUED | OUTPATIENT
Start: 2021-04-07 | End: 2021-04-08

## 2021-04-07 RX ADMIN — ATORVASTATIN CALCIUM 40 MG: 20 TABLET, FILM COATED ORAL at 08:04

## 2021-04-07 RX ADMIN — KETOCONAZOLE 100 MG: 200 TABLET ORAL at 09:04

## 2021-04-07 RX ADMIN — HEPARIN SODIUM 5000 UNITS: 5000 INJECTION INTRAVENOUS; SUBCUTANEOUS at 01:04

## 2021-04-07 RX ADMIN — CEFTRIAXONE 2 G: 2 INJECTION, SOLUTION INTRAVENOUS at 04:04

## 2021-04-07 RX ADMIN — CINACALCET 30 MG: 30 TABLET, FILM COATED ORAL at 09:04

## 2021-04-07 RX ADMIN — FAMOTIDINE 20 MG: 20 TABLET ORAL at 08:04

## 2021-04-07 RX ADMIN — TACROLIMUS 5 MG: 1 CAPSULE ORAL at 09:04

## 2021-04-07 RX ADMIN — PREDNISONE 5 MG: 5 TABLET ORAL at 09:04

## 2021-04-07 RX ADMIN — HEPARIN SODIUM 5000 UNITS: 5000 INJECTION INTRAVENOUS; SUBCUTANEOUS at 08:04

## 2021-04-07 RX ADMIN — MAGNESIUM OXIDE 400 MG (241.3 MG MAGNESIUM) TABLET 800 MG: TABLET at 08:04

## 2021-04-07 RX ADMIN — HEPARIN SODIUM 5000 UNITS: 5000 INJECTION INTRAVENOUS; SUBCUTANEOUS at 05:04

## 2021-04-07 RX ADMIN — NIFEDIPINE 30 MG: 30 TABLET, FILM COATED, EXTENDED RELEASE ORAL at 09:04

## 2021-04-07 RX ADMIN — TACROLIMUS 4 MG: 1 CAPSULE ORAL at 06:04

## 2021-04-08 PROBLEM — R16.1 SPLENOMEGALY: Status: ACTIVE | Noted: 2021-04-08

## 2021-04-08 PROBLEM — E21.2 TERTIARY HYPERPARATHYROIDISM: Status: RESOLVED | Noted: 2020-01-07 | Resolved: 2021-04-08

## 2021-04-08 LAB
ALBUMIN SERPL BCP-MCNC: 3.7 G/DL (ref 3.5–5.2)
ANION GAP SERPL CALC-SCNC: 8 MMOL/L (ref 8–16)
B19V DNA # SPEC NAA+PROBE: <100 COPIES/ML
BACTERIA UR CULT: NORMAL
BACTERIA UR CULT: NORMAL
BASOPHILS # BLD AUTO: 0.01 K/UL (ref 0–0.2)
BASOPHILS NFR BLD: 0.4 % (ref 0–1.9)
BUN SERPL-MCNC: 29 MG/DL (ref 8–23)
CALCIUM SERPL-MCNC: 9.7 MG/DL (ref 8.7–10.5)
CHLORIDE SERPL-SCNC: 110 MMOL/L (ref 95–110)
CO2 SERPL-SCNC: 22 MMOL/L (ref 23–29)
CREAT SERPL-MCNC: 2.1 MG/DL (ref 0.5–1.4)
DIFFERENTIAL METHOD: ABNORMAL
EOSINOPHIL # BLD AUTO: 0.1 K/UL (ref 0–0.5)
EOSINOPHIL NFR BLD: 5 % (ref 0–8)
ERYTHROCYTE [DISTWIDTH] IN BLOOD BY AUTOMATED COUNT: 16.6 % (ref 11.5–14.5)
EST. GFR  (AFRICAN AMERICAN): 28.3 ML/MIN/1.73 M^2
EST. GFR  (NON AFRICAN AMERICAN): 24.5 ML/MIN/1.73 M^2
FINAL PATHOLOGIC DIAGNOSIS: NORMAL
GLUCOSE SERPL-MCNC: 71 MG/DL (ref 70–110)
GROSS: NORMAL
HCT VFR BLD AUTO: 28.8 % (ref 37–48.5)
HGB BLD-MCNC: 9 G/DL (ref 12–16)
IMM GRANULOCYTES # BLD AUTO: 0.09 K/UL (ref 0–0.04)
IMM GRANULOCYTES NFR BLD AUTO: 3.8 % (ref 0–0.5)
LYMPHOCYTES # BLD AUTO: 0.6 K/UL (ref 1–4.8)
LYMPHOCYTES NFR BLD: 26.8 % (ref 18–48)
MAGNESIUM SERPL-MCNC: 2.2 MG/DL (ref 1.6–2.6)
MCH RBC QN AUTO: 28.8 PG (ref 27–31)
MCHC RBC AUTO-ENTMCNC: 31.3 G/DL (ref 32–36)
MCV RBC AUTO: 92 FL (ref 82–98)
MONOCYTES # BLD AUTO: 0.3 K/UL (ref 0.3–1)
MONOCYTES NFR BLD: 13.4 % (ref 4–15)
NEUTROPHILS # BLD AUTO: 1.2 K/UL (ref 1.8–7.7)
NEUTROPHILS NFR BLD: 50.6 % (ref 38–73)
NRBC BLD-RTO: 0 /100 WBC
PHOSPHATE SERPL-MCNC: 3.2 MG/DL (ref 2.7–4.5)
PLATELET # BLD AUTO: 101 K/UL (ref 150–450)
PMV BLD AUTO: 12.3 FL (ref 9.2–12.9)
POTASSIUM SERPL-SCNC: 3.8 MMOL/L (ref 3.5–5.1)
RBC # BLD AUTO: 3.12 M/UL (ref 4–5.4)
SODIUM SERPL-SCNC: 140 MMOL/L (ref 136–145)
SPECIMEN SOURCE: NORMAL
TACROLIMUS BLD-MCNC: 6.5 NG/ML (ref 5–15)
WBC # BLD AUTO: 2.39 K/UL (ref 3.9–12.7)

## 2021-04-08 PROCEDURE — 25000003 PHARM REV CODE 250: Performed by: PHYSICIAN ASSISTANT

## 2021-04-08 PROCEDURE — 80069 RENAL FUNCTION PANEL: CPT | Performed by: PHYSICIAN ASSISTANT

## 2021-04-08 PROCEDURE — 63600175 PHARM REV CODE 636 W HCPCS: Performed by: PHYSICIAN ASSISTANT

## 2021-04-08 PROCEDURE — 80197 ASSAY OF TACROLIMUS: CPT | Performed by: PHYSICIAN ASSISTANT

## 2021-04-08 PROCEDURE — 99223 PR INITIAL HOSPITAL CARE,LEVL III: ICD-10-PCS | Mod: GC,,, | Performed by: INTERNAL MEDICINE

## 2021-04-08 PROCEDURE — 20600001 HC STEP DOWN PRIVATE ROOM

## 2021-04-08 PROCEDURE — 99223 1ST HOSP IP/OBS HIGH 75: CPT | Mod: GC,,, | Performed by: INTERNAL MEDICINE

## 2021-04-08 PROCEDURE — 83735 ASSAY OF MAGNESIUM: CPT | Performed by: PHYSICIAN ASSISTANT

## 2021-04-08 PROCEDURE — 63600175 PHARM REV CODE 636 W HCPCS: Performed by: NURSE PRACTITIONER

## 2021-04-08 PROCEDURE — 36415 COLL VENOUS BLD VENIPUNCTURE: CPT | Performed by: PHYSICIAN ASSISTANT

## 2021-04-08 PROCEDURE — 85025 COMPLETE CBC W/AUTO DIFF WBC: CPT | Performed by: PHYSICIAN ASSISTANT

## 2021-04-08 RX ORDER — CINACALCET 30 MG/1
60 TABLET, FILM COATED ORAL
Qty: 60 TABLET | Refills: 11 | Status: SHIPPED | OUTPATIENT
Start: 2021-04-08 | End: 2021-04-09

## 2021-04-08 RX ORDER — CINACALCET 30 MG/1
60 TABLET, FILM COATED ORAL
Status: DISCONTINUED | OUTPATIENT
Start: 2021-04-09 | End: 2021-04-09 | Stop reason: HOSPADM

## 2021-04-08 RX ORDER — NIFEDIPINE 30 MG/1
30 TABLET, EXTENDED RELEASE ORAL 2 TIMES DAILY
Qty: 60 TABLET | Refills: 11 | Status: SHIPPED | OUTPATIENT
Start: 2021-04-08 | End: 2021-05-14

## 2021-04-08 RX ORDER — NIFEDIPINE 30 MG/1
30 TABLET, EXTENDED RELEASE ORAL 2 TIMES DAILY
Status: DISCONTINUED | OUTPATIENT
Start: 2021-04-08 | End: 2021-04-09 | Stop reason: HOSPADM

## 2021-04-08 RX ORDER — PREDNISONE 5 MG/1
5 TABLET ORAL DAILY
Qty: 30 TABLET | Refills: 11 | Status: SHIPPED | OUTPATIENT
Start: 2021-04-08 | End: 2021-04-09 | Stop reason: SDUPTHER

## 2021-04-08 RX ADMIN — TACROLIMUS 5 MG: 1 CAPSULE ORAL at 08:04

## 2021-04-08 RX ADMIN — NIFEDIPINE 30 MG: 30 TABLET, FILM COATED, EXTENDED RELEASE ORAL at 08:04

## 2021-04-08 RX ADMIN — KETOCONAZOLE 100 MG: 200 TABLET ORAL at 10:04

## 2021-04-08 RX ADMIN — HYDRALAZINE HYDROCHLORIDE 50 MG: 50 TABLET, FILM COATED ORAL at 12:04

## 2021-04-08 RX ADMIN — CINACALCET 30 MG: 30 TABLET, FILM COATED ORAL at 08:04

## 2021-04-08 RX ADMIN — HEPARIN SODIUM 5000 UNITS: 5000 INJECTION INTRAVENOUS; SUBCUTANEOUS at 01:04

## 2021-04-08 RX ADMIN — MAGNESIUM OXIDE 400 MG (241.3 MG MAGNESIUM) TABLET 800 MG: TABLET at 10:04

## 2021-04-08 RX ADMIN — TACROLIMUS 4 MG: 1 CAPSULE ORAL at 06:04

## 2021-04-08 RX ADMIN — ATORVASTATIN CALCIUM 40 MG: 20 TABLET, FILM COATED ORAL at 10:04

## 2021-04-08 RX ADMIN — PREDNISONE 5 MG: 5 TABLET ORAL at 10:04

## 2021-04-08 RX ADMIN — CEFTRIAXONE 2 G: 2 INJECTION, SOLUTION INTRAVENOUS at 04:04

## 2021-04-08 RX ADMIN — MAGNESIUM OXIDE 400 MG (241.3 MG MAGNESIUM) TABLET 800 MG: TABLET at 08:04

## 2021-04-08 RX ADMIN — FAMOTIDINE 20 MG: 20 TABLET ORAL at 08:04

## 2021-04-08 RX ADMIN — HEPARIN SODIUM 5000 UNITS: 5000 INJECTION INTRAVENOUS; SUBCUTANEOUS at 05:04

## 2021-04-08 RX ADMIN — HEPARIN SODIUM 5000 UNITS: 5000 INJECTION INTRAVENOUS; SUBCUTANEOUS at 08:04

## 2021-04-09 VITALS
RESPIRATION RATE: 18 BRPM | HEART RATE: 67 BPM | TEMPERATURE: 99 F | OXYGEN SATURATION: 99 % | HEIGHT: 63 IN | BODY MASS INDEX: 28.67 KG/M2 | SYSTOLIC BLOOD PRESSURE: 136 MMHG | WEIGHT: 161.81 LBS | DIASTOLIC BLOOD PRESSURE: 66 MMHG

## 2021-04-09 DIAGNOSIS — Z94.0 KIDNEY REPLACED BY TRANSPLANT: Primary | ICD-10-CM

## 2021-04-09 DIAGNOSIS — R74.8 ELEVATED LIVER ENZYMES: Primary | ICD-10-CM

## 2021-04-09 PROBLEM — I95.1 ORTHOSTATIC HYPOTENSION: Status: RESOLVED | Noted: 2021-04-05 | Resolved: 2021-04-09

## 2021-04-09 LAB
ALBUMIN SERPL BCP-MCNC: 3.6 G/DL (ref 3.5–5.2)
ANION GAP SERPL CALC-SCNC: 9 MMOL/L (ref 8–16)
BASOPHILS # BLD AUTO: 0.01 K/UL (ref 0–0.2)
BASOPHILS NFR BLD: 0.3 % (ref 0–1.9)
BUN SERPL-MCNC: 38 MG/DL (ref 8–23)
CALCIUM SERPL-MCNC: 9.8 MG/DL (ref 8.7–10.5)
CHLORIDE SERPL-SCNC: 110 MMOL/L (ref 95–110)
CO2 SERPL-SCNC: 20 MMOL/L (ref 23–29)
CREAT SERPL-MCNC: 2.2 MG/DL (ref 0.5–1.4)
DIFFERENTIAL METHOD: ABNORMAL
EOSINOPHIL # BLD AUTO: 0.1 K/UL (ref 0–0.5)
EOSINOPHIL NFR BLD: 4.3 % (ref 0–8)
ERYTHROCYTE [DISTWIDTH] IN BLOOD BY AUTOMATED COUNT: 16.7 % (ref 11.5–14.5)
EST. GFR  (AFRICAN AMERICAN): 26.7 ML/MIN/1.73 M^2
EST. GFR  (NON AFRICAN AMERICAN): 23.2 ML/MIN/1.73 M^2
GLUCOSE SERPL-MCNC: 100 MG/DL (ref 70–110)
HCT VFR BLD AUTO: 30.2 % (ref 37–48.5)
HGB BLD-MCNC: 9.5 G/DL (ref 12–16)
IMM GRANULOCYTES # BLD AUTO: 0.09 K/UL (ref 0–0.04)
IMM GRANULOCYTES NFR BLD AUTO: 3 % (ref 0–0.5)
LYMPHOCYTES # BLD AUTO: 0.6 K/UL (ref 1–4.8)
LYMPHOCYTES NFR BLD: 20.4 % (ref 18–48)
MAGNESIUM SERPL-MCNC: 2.3 MG/DL (ref 1.6–2.6)
MCH RBC QN AUTO: 29.1 PG (ref 27–31)
MCHC RBC AUTO-ENTMCNC: 31.5 G/DL (ref 32–36)
MCV RBC AUTO: 92 FL (ref 82–98)
MONOCYTES # BLD AUTO: 0.4 K/UL (ref 0.3–1)
MONOCYTES NFR BLD: 13.2 % (ref 4–15)
NEUTROPHILS # BLD AUTO: 1.8 K/UL (ref 1.8–7.7)
NEUTROPHILS NFR BLD: 58.8 % (ref 38–73)
NRBC BLD-RTO: 0 /100 WBC
PHOSPHATE SERPL-MCNC: 3.4 MG/DL (ref 2.7–4.5)
PLATELET # BLD AUTO: 84 K/UL (ref 150–450)
PMV BLD AUTO: 12.3 FL (ref 9.2–12.9)
POTASSIUM SERPL-SCNC: 4.1 MMOL/L (ref 3.5–5.1)
RBC # BLD AUTO: 3.27 M/UL (ref 4–5.4)
SODIUM SERPL-SCNC: 139 MMOL/L (ref 136–145)
TACROLIMUS BLD-MCNC: 6.7 NG/ML (ref 5–15)
WBC # BLD AUTO: 3.04 K/UL (ref 3.9–12.7)

## 2021-04-09 PROCEDURE — 36415 COLL VENOUS BLD VENIPUNCTURE: CPT | Performed by: PHYSICIAN ASSISTANT

## 2021-04-09 PROCEDURE — 83735 ASSAY OF MAGNESIUM: CPT | Performed by: PHYSICIAN ASSISTANT

## 2021-04-09 PROCEDURE — 25000003 PHARM REV CODE 250: Performed by: PHYSICIAN ASSISTANT

## 2021-04-09 PROCEDURE — 63600175 PHARM REV CODE 636 W HCPCS: Performed by: PHYSICIAN ASSISTANT

## 2021-04-09 PROCEDURE — 80197 ASSAY OF TACROLIMUS: CPT | Performed by: PHYSICIAN ASSISTANT

## 2021-04-09 PROCEDURE — 63600175 PHARM REV CODE 636 W HCPCS: Performed by: NURSE PRACTITIONER

## 2021-04-09 PROCEDURE — 80069 RENAL FUNCTION PANEL: CPT | Performed by: PHYSICIAN ASSISTANT

## 2021-04-09 PROCEDURE — 85025 COMPLETE CBC W/AUTO DIFF WBC: CPT | Performed by: PHYSICIAN ASSISTANT

## 2021-04-09 RX ORDER — CEFPODOXIME PROXETIL 200 MG/1
200 TABLET, FILM COATED ORAL EVERY 12 HOURS
Status: DISCONTINUED | OUTPATIENT
Start: 2021-04-09 | End: 2021-04-09 | Stop reason: HOSPADM

## 2021-04-09 RX ORDER — ATORVASTATIN CALCIUM 40 MG/1
40 TABLET, FILM COATED ORAL DAILY
Qty: 90 TABLET | Refills: 0 | Status: SHIPPED | OUTPATIENT
Start: 2021-04-09 | End: 2021-07-02 | Stop reason: SDUPTHER

## 2021-04-09 RX ORDER — CEFPODOXIME PROXETIL 200 MG/1
200 TABLET, FILM COATED ORAL EVERY 12 HOURS
Qty: 10 TABLET | Refills: 0 | Status: SHIPPED | OUTPATIENT
Start: 2021-04-09 | End: 2021-04-14

## 2021-04-09 RX ORDER — CINACALCET 60 MG/1
60 TABLET, FILM COATED ORAL
Qty: 30 TABLET | Refills: 11 | Status: SHIPPED | OUTPATIENT
Start: 2021-04-09 | End: 2022-04-06 | Stop reason: SDUPTHER

## 2021-04-09 RX ORDER — TACROLIMUS 1 MG/1
4 CAPSULE ORAL EVERY 12 HOURS
Qty: 240 CAPSULE | Refills: 11 | Status: SHIPPED | OUTPATIENT
Start: 2021-04-09 | End: 2021-04-13 | Stop reason: SDUPTHER

## 2021-04-09 RX ORDER — VIT C/E/ZN/COPPR/LUTEIN/ZEAXAN 250MG-90MG
1000 CAPSULE ORAL DAILY
Qty: 30 CAPSULE | Refills: 11 | Status: SHIPPED | OUTPATIENT
Start: 2021-04-09

## 2021-04-09 RX ORDER — PREDNISONE 5 MG/1
5 TABLET ORAL DAILY
Qty: 30 TABLET | Refills: 11 | Status: SHIPPED | OUTPATIENT
Start: 2021-04-09 | End: 2022-05-05 | Stop reason: SDUPTHER

## 2021-04-09 RX ADMIN — HEPARIN SODIUM 5000 UNITS: 5000 INJECTION INTRAVENOUS; SUBCUTANEOUS at 06:04

## 2021-04-09 RX ADMIN — CINACALCET 60 MG: 30 TABLET, FILM COATED ORAL at 08:04

## 2021-04-09 RX ADMIN — PREDNISONE 5 MG: 5 TABLET ORAL at 08:04

## 2021-04-09 RX ADMIN — NIFEDIPINE 30 MG: 30 TABLET, FILM COATED, EXTENDED RELEASE ORAL at 08:04

## 2021-04-09 RX ADMIN — TACROLIMUS 5 MG: 1 CAPSULE ORAL at 08:04

## 2021-04-09 RX ADMIN — ATORVASTATIN CALCIUM 40 MG: 20 TABLET, FILM COATED ORAL at 08:04

## 2021-04-09 RX ADMIN — KETOCONAZOLE 100 MG: 200 TABLET ORAL at 08:04

## 2021-04-09 RX ADMIN — MAGNESIUM OXIDE 400 MG (241.3 MG MAGNESIUM) TABLET 800 MG: TABLET at 08:04

## 2021-04-12 ENCOUNTER — TELEPHONE (OUTPATIENT)
Dept: HEPATOLOGY | Facility: CLINIC | Age: 66
End: 2021-04-12

## 2021-04-12 ENCOUNTER — PATIENT OUTREACH (OUTPATIENT)
Dept: ADMINISTRATIVE | Facility: CLINIC | Age: 66
End: 2021-04-12

## 2021-04-12 ENCOUNTER — LAB VISIT (OUTPATIENT)
Dept: LAB | Facility: HOSPITAL | Age: 66
End: 2021-04-12
Attending: INTERNAL MEDICINE
Payer: MEDICARE

## 2021-04-12 DIAGNOSIS — Z94.0 KIDNEY REPLACED BY TRANSPLANT: ICD-10-CM

## 2021-04-12 LAB
ALBUMIN SERPL BCP-MCNC: 4.3 G/DL (ref 3.5–5.2)
ANION GAP SERPL CALC-SCNC: 7 MMOL/L (ref 8–16)
BASOPHILS # BLD AUTO: 0.01 K/UL (ref 0–0.2)
BASOPHILS NFR BLD: 0.3 % (ref 0–1.9)
BUN SERPL-MCNC: 36 MG/DL (ref 8–23)
CALCIUM SERPL-MCNC: 10.2 MG/DL (ref 8.7–10.5)
CHLORIDE SERPL-SCNC: 111 MMOL/L (ref 95–110)
CO2 SERPL-SCNC: 21 MMOL/L (ref 23–29)
CREAT SERPL-MCNC: 2.2 MG/DL (ref 0.5–1.4)
DIFFERENTIAL METHOD: ABNORMAL
EOSINOPHIL # BLD AUTO: 0.1 K/UL (ref 0–0.5)
EOSINOPHIL NFR BLD: 1.6 % (ref 0–8)
ERYTHROCYTE [DISTWIDTH] IN BLOOD BY AUTOMATED COUNT: 16.7 % (ref 11.5–14.5)
EST. GFR  (AFRICAN AMERICAN): 26.7 ML/MIN/1.73 M^2
EST. GFR  (NON AFRICAN AMERICAN): 23.2 ML/MIN/1.73 M^2
GLUCOSE SERPL-MCNC: 111 MG/DL (ref 70–110)
HCT VFR BLD AUTO: 31 % (ref 37–48.5)
HGB BLD-MCNC: 9.8 G/DL (ref 12–16)
IMM GRANULOCYTES # BLD AUTO: 0.03 K/UL (ref 0–0.04)
IMM GRANULOCYTES NFR BLD AUTO: 0.8 % (ref 0–0.5)
LYMPHOCYTES # BLD AUTO: 0.9 K/UL (ref 1–4.8)
LYMPHOCYTES NFR BLD: 24 % (ref 18–48)
MAGNESIUM SERPL-MCNC: 2.2 MG/DL (ref 1.6–2.6)
MCH RBC QN AUTO: 29 PG (ref 27–31)
MCHC RBC AUTO-ENTMCNC: 31.6 G/DL (ref 32–36)
MCV RBC AUTO: 92 FL (ref 82–98)
MONOCYTES # BLD AUTO: 0.3 K/UL (ref 0.3–1)
MONOCYTES NFR BLD: 6.8 % (ref 4–15)
NEUTROPHILS # BLD AUTO: 2.4 K/UL (ref 1.8–7.7)
NEUTROPHILS NFR BLD: 66.5 % (ref 38–73)
NRBC BLD-RTO: 0 /100 WBC
PHOSPHATE SERPL-MCNC: 3.5 MG/DL (ref 2.7–4.5)
PLATELET # BLD AUTO: 104 K/UL (ref 150–450)
PMV BLD AUTO: 13 FL (ref 9.2–12.9)
POTASSIUM SERPL-SCNC: 4.4 MMOL/L (ref 3.5–5.1)
RBC # BLD AUTO: 3.38 M/UL (ref 4–5.4)
SODIUM SERPL-SCNC: 139 MMOL/L (ref 136–145)
WBC # BLD AUTO: 3.67 K/UL (ref 3.9–12.7)

## 2021-04-12 PROCEDURE — 80197 ASSAY OF TACROLIMUS: CPT | Performed by: INTERNAL MEDICINE

## 2021-04-12 PROCEDURE — 83735 ASSAY OF MAGNESIUM: CPT | Performed by: INTERNAL MEDICINE

## 2021-04-12 PROCEDURE — 85025 COMPLETE CBC W/AUTO DIFF WBC: CPT | Performed by: INTERNAL MEDICINE

## 2021-04-12 PROCEDURE — 36415 COLL VENOUS BLD VENIPUNCTURE: CPT | Mod: PO | Performed by: INTERNAL MEDICINE

## 2021-04-12 PROCEDURE — 80069 RENAL FUNCTION PANEL: CPT | Performed by: INTERNAL MEDICINE

## 2021-04-13 DIAGNOSIS — Z94.0 KIDNEY REPLACED BY TRANSPLANT: Primary | ICD-10-CM

## 2021-04-13 LAB — TACROLIMUS BLD-MCNC: 8 NG/ML (ref 5–15)

## 2021-04-13 RX ORDER — TACROLIMUS 1 MG/1
3 CAPSULE ORAL EVERY 12 HOURS
Qty: 180 CAPSULE | Refills: 11 | Status: SHIPPED | OUTPATIENT
Start: 2021-04-13 | End: 2021-07-10 | Stop reason: DRUGHIGH

## 2021-04-20 ENCOUNTER — LAB VISIT (OUTPATIENT)
Dept: LAB | Facility: HOSPITAL | Age: 66
End: 2021-04-20
Attending: INTERNAL MEDICINE
Payer: MEDICARE

## 2021-04-20 ENCOUNTER — TELEPHONE (OUTPATIENT)
Dept: HEPATOLOGY | Facility: CLINIC | Age: 66
End: 2021-04-20

## 2021-04-20 DIAGNOSIS — Z94.0 KIDNEY REPLACED BY TRANSPLANT: ICD-10-CM

## 2021-04-20 LAB
ALBUMIN SERPL BCP-MCNC: 3.9 G/DL (ref 3.5–5.2)
ANION GAP SERPL CALC-SCNC: 8 MMOL/L (ref 8–16)
BASOPHILS # BLD AUTO: 0.01 K/UL (ref 0–0.2)
BASOPHILS NFR BLD: 0.4 % (ref 0–1.9)
BUN SERPL-MCNC: 29 MG/DL (ref 8–23)
CALCIUM SERPL-MCNC: 8.7 MG/DL (ref 8.7–10.5)
CHLORIDE SERPL-SCNC: 111 MMOL/L (ref 95–110)
CO2 SERPL-SCNC: 22 MMOL/L (ref 23–29)
CREAT SERPL-MCNC: 1.7 MG/DL (ref 0.5–1.4)
DIFFERENTIAL METHOD: ABNORMAL
EOSINOPHIL # BLD AUTO: 0.2 K/UL (ref 0–0.5)
EOSINOPHIL NFR BLD: 7.8 % (ref 0–8)
ERYTHROCYTE [DISTWIDTH] IN BLOOD BY AUTOMATED COUNT: 16.2 % (ref 11.5–14.5)
EST. GFR  (AFRICAN AMERICAN): 36.5 ML/MIN/1.73 M^2
EST. GFR  (NON AFRICAN AMERICAN): 31.6 ML/MIN/1.73 M^2
GLUCOSE SERPL-MCNC: 88 MG/DL (ref 70–110)
HCT VFR BLD AUTO: 31.7 % (ref 37–48.5)
HGB BLD-MCNC: 10 G/DL (ref 12–16)
IMM GRANULOCYTES # BLD AUTO: 0 K/UL (ref 0–0.04)
IMM GRANULOCYTES NFR BLD AUTO: 0 % (ref 0–0.5)
LYMPHOCYTES # BLD AUTO: 0.7 K/UL (ref 1–4.8)
LYMPHOCYTES NFR BLD: 31.9 % (ref 18–48)
MAGNESIUM SERPL-MCNC: 1.8 MG/DL (ref 1.6–2.6)
MCH RBC QN AUTO: 29.9 PG (ref 27–31)
MCHC RBC AUTO-ENTMCNC: 31.5 G/DL (ref 32–36)
MCV RBC AUTO: 95 FL (ref 82–98)
MONOCYTES # BLD AUTO: 0.2 K/UL (ref 0.3–1)
MONOCYTES NFR BLD: 9.9 % (ref 4–15)
NEUTROPHILS # BLD AUTO: 1.2 K/UL (ref 1.8–7.7)
NEUTROPHILS NFR BLD: 50 % (ref 38–73)
NRBC BLD-RTO: 0 /100 WBC
PHOSPHATE SERPL-MCNC: 3.2 MG/DL (ref 2.7–4.5)
PLATELET # BLD AUTO: 146 K/UL (ref 150–450)
PMV BLD AUTO: 12.2 FL (ref 9.2–12.9)
POTASSIUM SERPL-SCNC: 4.2 MMOL/L (ref 3.5–5.1)
RBC # BLD AUTO: 3.34 M/UL (ref 4–5.4)
SODIUM SERPL-SCNC: 141 MMOL/L (ref 136–145)
WBC # BLD AUTO: 2.32 K/UL (ref 3.9–12.7)

## 2021-04-20 PROCEDURE — 86665 EPSTEIN-BARR CAPSID VCA: CPT | Performed by: INTERNAL MEDICINE

## 2021-04-20 PROCEDURE — 80197 ASSAY OF TACROLIMUS: CPT | Performed by: INTERNAL MEDICINE

## 2021-04-20 PROCEDURE — 85025 COMPLETE CBC W/AUTO DIFF WBC: CPT | Performed by: INTERNAL MEDICINE

## 2021-04-20 PROCEDURE — 36415 COLL VENOUS BLD VENIPUNCTURE: CPT | Mod: PO | Performed by: INTERNAL MEDICINE

## 2021-04-20 PROCEDURE — 80069 RENAL FUNCTION PANEL: CPT | Performed by: INTERNAL MEDICINE

## 2021-04-20 PROCEDURE — 83735 ASSAY OF MAGNESIUM: CPT | Performed by: INTERNAL MEDICINE

## 2021-04-21 LAB — TACROLIMUS BLD-MCNC: 2.1 NG/ML (ref 5–15)

## 2021-04-22 ENCOUNTER — TELEPHONE (OUTPATIENT)
Dept: TRANSPLANT | Facility: CLINIC | Age: 66
End: 2021-04-22

## 2021-04-27 LAB — EBV VCA IGG SER QL IA: POSITIVE

## 2021-05-06 ENCOUNTER — TELEPHONE (OUTPATIENT)
Dept: TRANSPLANT | Facility: CLINIC | Age: 66
End: 2021-05-06

## 2021-05-06 ENCOUNTER — LAB VISIT (OUTPATIENT)
Dept: LAB | Facility: HOSPITAL | Age: 66
End: 2021-05-06
Attending: INTERNAL MEDICINE
Payer: MEDICARE

## 2021-05-06 DIAGNOSIS — Z94.0 KIDNEY REPLACED BY TRANSPLANT: ICD-10-CM

## 2021-05-06 LAB
OSMOLALITY UR: 397 MOSM/KG (ref 50–1200)
SODIUM UR-SCNC: 89 MMOL/L (ref 20–250)

## 2021-05-06 PROCEDURE — 83935 ASSAY OF URINE OSMOLALITY: CPT | Performed by: INTERNAL MEDICINE

## 2021-05-06 PROCEDURE — 84300 ASSAY OF URINE SODIUM: CPT | Performed by: INTERNAL MEDICINE

## 2021-05-07 ENCOUNTER — LAB VISIT (OUTPATIENT)
Dept: LAB | Facility: HOSPITAL | Age: 66
End: 2021-05-07
Attending: INTERNAL MEDICINE
Payer: MEDICARE

## 2021-05-07 ENCOUNTER — CLINICAL SUPPORT (OUTPATIENT)
Dept: TRANSPLANT | Facility: CLINIC | Age: 66
End: 2021-05-07
Payer: MEDICARE

## 2021-05-07 ENCOUNTER — OFFICE VISIT (OUTPATIENT)
Dept: TRANSPLANT | Facility: CLINIC | Age: 66
End: 2021-05-07
Payer: MEDICARE

## 2021-05-07 ENCOUNTER — TELEPHONE (OUTPATIENT)
Dept: TRANSPLANT | Facility: CLINIC | Age: 66
End: 2021-05-07

## 2021-05-07 VITALS
WEIGHT: 164.69 LBS | HEART RATE: 63 BPM | DIASTOLIC BLOOD PRESSURE: 68 MMHG | WEIGHT: 164.69 LBS | BODY MASS INDEX: 29.18 KG/M2 | SYSTOLIC BLOOD PRESSURE: 145 MMHG | BODY MASS INDEX: 29.18 KG/M2 | SYSTOLIC BLOOD PRESSURE: 145 MMHG | HEIGHT: 63 IN | OXYGEN SATURATION: 99 % | OXYGEN SATURATION: 99 % | DIASTOLIC BLOOD PRESSURE: 68 MMHG | RESPIRATION RATE: 20 BRPM | RESPIRATION RATE: 20 BRPM | TEMPERATURE: 99 F | TEMPERATURE: 99 F | HEART RATE: 63 BPM | HEIGHT: 63 IN

## 2021-05-07 DIAGNOSIS — R16.1 SPLENOMEGALY: ICD-10-CM

## 2021-05-07 DIAGNOSIS — N18.9 CHRONIC KIDNEY DISEASE, UNSPECIFIED CKD STAGE: ICD-10-CM

## 2021-05-07 DIAGNOSIS — D72.819 LEUKOPENIA, UNSPECIFIED TYPE: ICD-10-CM

## 2021-05-07 DIAGNOSIS — I31.39 PERICARDIAL EFFUSION: ICD-10-CM

## 2021-05-07 DIAGNOSIS — I12.9 HYPERTENSION, RENAL: Chronic | ICD-10-CM

## 2021-05-07 DIAGNOSIS — N18.32 STAGE 3B CHRONIC KIDNEY DISEASE: ICD-10-CM

## 2021-05-07 DIAGNOSIS — Z79.60 LONG-TERM USE OF IMMUNOSUPPRESSANT MEDICATION: ICD-10-CM

## 2021-05-07 DIAGNOSIS — Z94.0 STATUS POST DECEASED-DONOR KIDNEY TRANSPLANTATION: Primary | ICD-10-CM

## 2021-05-07 DIAGNOSIS — Z94.0 KIDNEY REPLACED BY TRANSPLANT: ICD-10-CM

## 2021-05-07 DIAGNOSIS — R16.2 HEPATOSPLENOMEGALY: ICD-10-CM

## 2021-05-07 DIAGNOSIS — D84.9 IMMUNOCOMPROMISED STATE: ICD-10-CM

## 2021-05-07 LAB
ALBUMIN SERPL BCP-MCNC: 4 G/DL (ref 3.5–5.2)
ALP SERPL-CCNC: 177 U/L (ref 55–135)
ALT SERPL W/O P-5'-P-CCNC: 204 U/L (ref 10–44)
ANION GAP SERPL CALC-SCNC: 9 MMOL/L (ref 8–16)
AST SERPL-CCNC: 59 U/L (ref 10–40)
BASOPHILS # BLD AUTO: 0.02 K/UL (ref 0–0.2)
BASOPHILS NFR BLD: 0.7 % (ref 0–1.9)
BILIRUB SERPL-MCNC: 0.5 MG/DL (ref 0.1–1)
BUN SERPL-MCNC: 28 MG/DL (ref 8–23)
CALCIUM SERPL-MCNC: 9.4 MG/DL (ref 8.7–10.5)
CHLORIDE SERPL-SCNC: 110 MMOL/L (ref 95–110)
CO2 SERPL-SCNC: 23 MMOL/L (ref 23–29)
CREAT SERPL-MCNC: 1.8 MG/DL (ref 0.5–1.4)
DIFFERENTIAL METHOD: ABNORMAL
EOSINOPHIL # BLD AUTO: 0.2 K/UL (ref 0–0.5)
EOSINOPHIL NFR BLD: 7 % (ref 0–8)
ERYTHROCYTE [DISTWIDTH] IN BLOOD BY AUTOMATED COUNT: 16.8 % (ref 11.5–14.5)
EST. GFR  (AFRICAN AMERICAN): 34 ML/MIN/1.73 M^2
EST. GFR  (NON AFRICAN AMERICAN): 29.5 ML/MIN/1.73 M^2
FERRITIN SERPL-MCNC: 1294 NG/ML (ref 20–300)
GLUCOSE SERPL-MCNC: 69 MG/DL (ref 70–110)
HCT VFR BLD AUTO: 29.3 % (ref 37–48.5)
HGB BLD-MCNC: 9.3 G/DL (ref 12–16)
IGA SERPL-MCNC: 106 MG/DL (ref 40–350)
IGG SERPL-MCNC: 905 MG/DL (ref 650–1600)
IGM SERPL-MCNC: 74 MG/DL (ref 50–300)
IMM GRANULOCYTES # BLD AUTO: 0.02 K/UL (ref 0–0.04)
IMM GRANULOCYTES NFR BLD AUTO: 0.7 % (ref 0–0.5)
IRON SERPL-MCNC: 74 UG/DL (ref 30–160)
LYMPHOCYTES # BLD AUTO: 0.8 K/UL (ref 1–4.8)
LYMPHOCYTES NFR BLD: 26.5 % (ref 18–48)
MCH RBC QN AUTO: 30.3 PG (ref 27–31)
MCHC RBC AUTO-ENTMCNC: 31.7 G/DL (ref 32–36)
MCV RBC AUTO: 95 FL (ref 82–98)
MONOCYTES # BLD AUTO: 0.3 K/UL (ref 0.3–1)
MONOCYTES NFR BLD: 8.6 % (ref 4–15)
NEUTROPHILS # BLD AUTO: 1.7 K/UL (ref 1.8–7.7)
NEUTROPHILS NFR BLD: 56.5 % (ref 38–73)
NRBC BLD-RTO: 0 /100 WBC
PLATELET # BLD AUTO: 118 K/UL (ref 150–450)
PMV BLD AUTO: 12.4 FL (ref 9.2–12.9)
POTASSIUM SERPL-SCNC: 4.4 MMOL/L (ref 3.5–5.1)
PROT SERPL-MCNC: 7.2 G/DL (ref 6–8.4)
RBC # BLD AUTO: 3.07 M/UL (ref 4–5.4)
SATURATED IRON: 25 % (ref 20–50)
SODIUM SERPL-SCNC: 142 MMOL/L (ref 136–145)
TOTAL IRON BINDING CAPACITY: 292 UG/DL (ref 250–450)
TRANSFERRIN SERPL-MCNC: 197 MG/DL (ref 200–375)
WBC # BLD AUTO: 3.02 K/UL (ref 3.9–12.7)

## 2021-05-07 PROCEDURE — 86334 IMMUNOFIX E-PHORESIS SERUM: CPT | Mod: 26,,, | Performed by: PATHOLOGY

## 2021-05-07 PROCEDURE — 99999 PR PBB SHADOW E&M-EST. PATIENT-LVL IV: CPT | Mod: PBBFAC,,, | Performed by: NURSE PRACTITIONER

## 2021-05-07 PROCEDURE — 82784 ASSAY IGA/IGD/IGG/IGM EACH: CPT | Performed by: INTERNAL MEDICINE

## 2021-05-07 PROCEDURE — 85025 COMPLETE CBC W/AUTO DIFF WBC: CPT | Performed by: INTERNAL MEDICINE

## 2021-05-07 PROCEDURE — 83520 IMMUNOASSAY QUANT NOS NONAB: CPT | Performed by: INTERNAL MEDICINE

## 2021-05-07 PROCEDURE — 84165 PATHOLOGIST INTERPRETATION SPE: ICD-10-PCS | Mod: 26,,, | Performed by: PATHOLOGY

## 2021-05-07 PROCEDURE — 99215 PR OFFICE/OUTPT VISIT, EST, LEVL V, 40-54 MIN: ICD-10-PCS | Mod: S$PBB,,, | Performed by: NURSE PRACTITIONER

## 2021-05-07 PROCEDURE — 83540 ASSAY OF IRON: CPT | Performed by: INTERNAL MEDICINE

## 2021-05-07 PROCEDURE — 99214 OFFICE O/P EST MOD 30 MIN: CPT | Mod: PBBFAC | Performed by: NURSE PRACTITIONER

## 2021-05-07 PROCEDURE — 84165 PROTEIN E-PHORESIS SERUM: CPT | Performed by: INTERNAL MEDICINE

## 2021-05-07 PROCEDURE — 80053 COMPREHEN METABOLIC PANEL: CPT | Performed by: INTERNAL MEDICINE

## 2021-05-07 PROCEDURE — 99999 PR PBB SHADOW E&M-EST. PATIENT-LVL III: ICD-10-PCS | Mod: PBBFAC,,,

## 2021-05-07 PROCEDURE — 36415 COLL VENOUS BLD VENIPUNCTURE: CPT | Performed by: INTERNAL MEDICINE

## 2021-05-07 PROCEDURE — 99213 OFFICE O/P EST LOW 20 MIN: CPT | Mod: PBBFAC,27

## 2021-05-07 PROCEDURE — 86334 PATHOLOGIST INTERPRETATION IFE: ICD-10-PCS | Mod: 26,,, | Performed by: PATHOLOGY

## 2021-05-07 PROCEDURE — 84165 PROTEIN E-PHORESIS SERUM: CPT | Mod: 26,,, | Performed by: PATHOLOGY

## 2021-05-07 PROCEDURE — 99999 PR PBB SHADOW E&M-EST. PATIENT-LVL IV: ICD-10-PCS | Mod: PBBFAC,,, | Performed by: NURSE PRACTITIONER

## 2021-05-07 PROCEDURE — 99999 PR PBB SHADOW E&M-EST. PATIENT-LVL III: CPT | Mod: PBBFAC,,,

## 2021-05-07 PROCEDURE — 99215 OFFICE O/P EST HI 40 MIN: CPT | Mod: S$PBB,,, | Performed by: NURSE PRACTITIONER

## 2021-05-07 PROCEDURE — 86334 IMMUNOFIX E-PHORESIS SERUM: CPT | Performed by: INTERNAL MEDICINE

## 2021-05-07 PROCEDURE — 82728 ASSAY OF FERRITIN: CPT | Performed by: INTERNAL MEDICINE

## 2021-05-10 LAB
ALBUMIN SERPL ELPH-MCNC: 4.49 G/DL (ref 3.35–5.55)
ALPHA1 GLOB SERPL ELPH-MCNC: 0.39 G/DL (ref 0.17–0.41)
ALPHA2 GLOB SERPL ELPH-MCNC: 0.6 G/DL (ref 0.43–0.99)
B-GLOBULIN SERPL ELPH-MCNC: 0.66 G/DL (ref 0.5–1.1)
GAMMA GLOB SERPL ELPH-MCNC: 0.86 G/DL (ref 0.67–1.58)
INTERPRETATION SERPL IFE-IMP: NORMAL
KAPPA LC SER QL IA: 4.55 MG/DL (ref 0.33–1.94)
KAPPA LC/LAMBDA SER IA: 1.56 (ref 0.26–1.65)
LAMBDA LC SER QL IA: 2.92 MG/DL (ref 0.57–2.63)
PROT SERPL-MCNC: 7 G/DL (ref 6–8.4)

## 2021-05-11 ENCOUNTER — OFFICE VISIT (OUTPATIENT)
Dept: HEPATOLOGY | Facility: CLINIC | Age: 66
End: 2021-05-11
Payer: MEDICARE

## 2021-05-11 ENCOUNTER — LAB VISIT (OUTPATIENT)
Dept: LAB | Facility: HOSPITAL | Age: 66
End: 2021-05-11
Payer: MEDICARE

## 2021-05-11 VITALS
RESPIRATION RATE: 20 BRPM | DIASTOLIC BLOOD PRESSURE: 78 MMHG | HEIGHT: 63 IN | TEMPERATURE: 98 F | OXYGEN SATURATION: 100 % | BODY MASS INDEX: 29.49 KG/M2 | HEART RATE: 66 BPM | WEIGHT: 166.44 LBS | SYSTOLIC BLOOD PRESSURE: 179 MMHG

## 2021-05-11 DIAGNOSIS — R74.8 ELEVATED LIVER ENZYMES: ICD-10-CM

## 2021-05-11 DIAGNOSIS — D84.9 IMMUNOSUPPRESSION: ICD-10-CM

## 2021-05-11 DIAGNOSIS — Z94.0 STATUS POST KIDNEY TRANSPLANT: ICD-10-CM

## 2021-05-11 DIAGNOSIS — K76.9 LIVER LESION: ICD-10-CM

## 2021-05-11 DIAGNOSIS — Z51.81 THERAPEUTIC DRUG MONITORING: Primary | ICD-10-CM

## 2021-05-11 LAB
ALBUMIN SERPL BCP-MCNC: 4 G/DL (ref 3.5–5.2)
ALP SERPL-CCNC: 163 U/L (ref 55–135)
ALT SERPL W/O P-5'-P-CCNC: 143 U/L (ref 10–44)
ANION GAP SERPL CALC-SCNC: 7 MMOL/L (ref 8–16)
AST SERPL-CCNC: 39 U/L (ref 10–40)
BASOPHILS # BLD AUTO: 0.02 K/UL (ref 0–0.2)
BASOPHILS NFR BLD: 0.8 % (ref 0–1.9)
BILIRUB SERPL-MCNC: 0.4 MG/DL (ref 0.1–1)
BUN SERPL-MCNC: 33 MG/DL (ref 8–23)
CALCIUM SERPL-MCNC: 9.7 MG/DL (ref 8.7–10.5)
CERULOPLASMIN SERPL-MCNC: 27 MG/DL (ref 15–45)
CHLORIDE SERPL-SCNC: 114 MMOL/L (ref 95–110)
CHOLEST SERPL-MCNC: 220 MG/DL (ref 120–199)
CHOLEST/HDLC SERPL: 2.6 {RATIO} (ref 2–5)
CO2 SERPL-SCNC: 24 MMOL/L (ref 23–29)
CREAT SERPL-MCNC: 2 MG/DL (ref 0.5–1.4)
DIFFERENTIAL METHOD: ABNORMAL
EOSINOPHIL # BLD AUTO: 0.3 K/UL (ref 0–0.5)
EOSINOPHIL NFR BLD: 10.6 % (ref 0–8)
ERYTHROCYTE [DISTWIDTH] IN BLOOD BY AUTOMATED COUNT: 16.8 % (ref 11.5–14.5)
EST. GFR  (AFRICAN AMERICAN): 30 ML/MIN/1.73 M^2
EST. GFR  (NON AFRICAN AMERICAN): 26 ML/MIN/1.73 M^2
GLUCOSE SERPL-MCNC: 79 MG/DL (ref 70–110)
HCT VFR BLD AUTO: 29.4 % (ref 37–48.5)
HDLC SERPL-MCNC: 84 MG/DL (ref 40–75)
HDLC SERPL: 38.2 % (ref 20–50)
HGB BLD-MCNC: 9.3 G/DL (ref 12–16)
IGG SERPL-MCNC: 916 MG/DL (ref 650–1600)
IMM GRANULOCYTES # BLD AUTO: 0.01 K/UL (ref 0–0.04)
IMM GRANULOCYTES NFR BLD AUTO: 0.4 % (ref 0–0.5)
IRON SERPL-MCNC: 73 UG/DL (ref 30–160)
LDLC SERPL CALC-MCNC: 103.6 MG/DL (ref 63–159)
LYMPHOCYTES # BLD AUTO: 0.6 K/UL (ref 1–4.8)
LYMPHOCYTES NFR BLD: 23.2 % (ref 18–48)
MCH RBC QN AUTO: 30.1 PG (ref 27–31)
MCHC RBC AUTO-ENTMCNC: 31.6 G/DL (ref 32–36)
MCV RBC AUTO: 95 FL (ref 82–98)
MONOCYTES # BLD AUTO: 0.2 K/UL (ref 0.3–1)
MONOCYTES NFR BLD: 8.7 % (ref 4–15)
NEUTROPHILS # BLD AUTO: 1.5 K/UL (ref 1.8–7.7)
NEUTROPHILS NFR BLD: 56.3 % (ref 38–73)
NONHDLC SERPL-MCNC: 136 MG/DL
NRBC BLD-RTO: 0 /100 WBC
PATHOLOGIST INTERPRETATION IFE: NORMAL
PATHOLOGIST INTERPRETATION SPE: NORMAL
PLATELET # BLD AUTO: 129 K/UL (ref 150–450)
PMV BLD AUTO: 12.4 FL (ref 9.2–12.9)
POTASSIUM SERPL-SCNC: 4.1 MMOL/L (ref 3.5–5.1)
PROT SERPL-MCNC: 7.2 G/DL (ref 6–8.4)
RBC # BLD AUTO: 3.09 M/UL (ref 4–5.4)
SATURATED IRON: 25 % (ref 20–50)
SODIUM SERPL-SCNC: 145 MMOL/L (ref 136–145)
TOTAL IRON BINDING CAPACITY: 289 UG/DL (ref 250–450)
TRANSFERRIN SERPL-MCNC: 195 MG/DL (ref 200–375)
TRIGL SERPL-MCNC: 162 MG/DL (ref 30–150)
WBC # BLD AUTO: 2.63 K/UL (ref 3.9–12.7)

## 2021-05-11 PROCEDURE — 86256 FLUORESCENT ANTIBODY TITER: CPT | Performed by: INTERNAL MEDICINE

## 2021-05-11 PROCEDURE — 86038 ANTINUCLEAR ANTIBODIES: CPT | Performed by: INTERNAL MEDICINE

## 2021-05-11 PROCEDURE — 85025 COMPLETE CBC W/AUTO DIFF WBC: CPT | Performed by: INTERNAL MEDICINE

## 2021-05-11 PROCEDURE — 99999 PR PBB SHADOW E&M-EST. PATIENT-LVL IV: CPT | Mod: PBBFAC,,, | Performed by: INTERNAL MEDICINE

## 2021-05-11 PROCEDURE — 82784 ASSAY IGA/IGD/IGG/IGM EACH: CPT | Performed by: INTERNAL MEDICINE

## 2021-05-11 PROCEDURE — 86235 NUCLEAR ANTIGEN ANTIBODY: CPT | Mod: 91 | Performed by: INTERNAL MEDICINE

## 2021-05-11 PROCEDURE — 99214 OFFICE O/P EST MOD 30 MIN: CPT | Mod: PBBFAC | Performed by: INTERNAL MEDICINE

## 2021-05-11 PROCEDURE — 99999 PR PBB SHADOW E&M-EST. PATIENT-LVL IV: ICD-10-PCS | Mod: PBBFAC,,, | Performed by: INTERNAL MEDICINE

## 2021-05-11 PROCEDURE — 83540 ASSAY OF IRON: CPT | Performed by: INTERNAL MEDICINE

## 2021-05-11 PROCEDURE — 80321 ALCOHOLS BIOMARKERS 1OR 2: CPT | Performed by: INTERNAL MEDICINE

## 2021-05-11 PROCEDURE — 99214 OFFICE O/P EST MOD 30 MIN: CPT | Mod: S$PBB,,, | Performed by: INTERNAL MEDICINE

## 2021-05-11 PROCEDURE — 80053 COMPREHEN METABOLIC PANEL: CPT | Performed by: INTERNAL MEDICINE

## 2021-05-11 PROCEDURE — 80061 LIPID PANEL: CPT | Performed by: INTERNAL MEDICINE

## 2021-05-11 PROCEDURE — 99214 PR OFFICE/OUTPT VISIT, EST, LEVL IV, 30-39 MIN: ICD-10-PCS | Mod: S$PBB,,, | Performed by: INTERNAL MEDICINE

## 2021-05-11 PROCEDURE — 82390 ASSAY OF CERULOPLASMIN: CPT | Performed by: INTERNAL MEDICINE

## 2021-05-11 PROCEDURE — 36415 COLL VENOUS BLD VENIPUNCTURE: CPT | Performed by: INTERNAL MEDICINE

## 2021-05-12 LAB
ANA SER QL IF: NORMAL
HEPATITIS C VIRUS (HCV) RNA DETECTION/QUANTIFICATION RT-PCR: NORMAL IU/ML
MITOCHONDRIA AB TITR SER IF: NORMAL {TITER}
SMOOTH MUSCLE AB TITR SER IF: NORMAL {TITER}

## 2021-05-13 LAB — CMV DNA SERPL NAA+PROBE-ACNC: NORMAL IU/ML

## 2021-05-14 ENCOUNTER — OFFICE VISIT (OUTPATIENT)
Dept: HEMATOLOGY/ONCOLOGY | Facility: CLINIC | Age: 66
End: 2021-05-14
Payer: MEDICARE

## 2021-05-14 ENCOUNTER — LAB VISIT (OUTPATIENT)
Dept: LAB | Facility: HOSPITAL | Age: 66
End: 2021-05-14
Attending: INTERNAL MEDICINE
Payer: MEDICARE

## 2021-05-14 VITALS
WEIGHT: 165.13 LBS | DIASTOLIC BLOOD PRESSURE: 50 MMHG | OXYGEN SATURATION: 100 % | TEMPERATURE: 99 F | SYSTOLIC BLOOD PRESSURE: 144 MMHG | HEART RATE: 60 BPM | BODY MASS INDEX: 29.25 KG/M2

## 2021-05-14 DIAGNOSIS — Z94.0 KIDNEY REPLACED BY TRANSPLANT: ICD-10-CM

## 2021-05-14 DIAGNOSIS — D61.818 PANCYTOPENIA: ICD-10-CM

## 2021-05-14 DIAGNOSIS — N18.9 ANEMIA IN CHRONIC KIDNEY DISEASE, UNSPECIFIED CKD STAGE: ICD-10-CM

## 2021-05-14 DIAGNOSIS — D63.1 ANEMIA IN CHRONIC KIDNEY DISEASE, UNSPECIFIED CKD STAGE: ICD-10-CM

## 2021-05-14 DIAGNOSIS — D72.819 LEUKOPENIA, UNSPECIFIED TYPE: Primary | ICD-10-CM

## 2021-05-14 LAB
ALBUMIN SERPL BCP-MCNC: 4.2 G/DL (ref 3.5–5.2)
ANION GAP SERPL CALC-SCNC: 9 MMOL/L (ref 8–16)
BASOPHILS # BLD AUTO: 0.01 K/UL (ref 0–0.2)
BASOPHILS NFR BLD: 0.4 % (ref 0–1.9)
BUN SERPL-MCNC: 30 MG/DL (ref 8–23)
CALCIUM SERPL-MCNC: 10 MG/DL (ref 8.7–10.5)
CHLORIDE SERPL-SCNC: 111 MMOL/L (ref 95–110)
CO2 SERPL-SCNC: 21 MMOL/L (ref 23–29)
CREAT SERPL-MCNC: 1.8 MG/DL (ref 0.5–1.4)
DIFFERENTIAL METHOD: ABNORMAL
EOSINOPHIL # BLD AUTO: 0.3 K/UL (ref 0–0.5)
EOSINOPHIL NFR BLD: 9.6 % (ref 0–8)
ERYTHROCYTE [DISTWIDTH] IN BLOOD BY AUTOMATED COUNT: 16.6 % (ref 11.5–14.5)
EST. GFR  (AFRICAN AMERICAN): 34 ML/MIN/1.73 M^2
EST. GFR  (NON AFRICAN AMERICAN): 29.5 ML/MIN/1.73 M^2
GLUCOSE SERPL-MCNC: 72 MG/DL (ref 70–110)
HCT VFR BLD AUTO: 30.5 % (ref 37–48.5)
HGB BLD-MCNC: 9.7 G/DL (ref 12–16)
IMM GRANULOCYTES # BLD AUTO: 0.01 K/UL (ref 0–0.04)
IMM GRANULOCYTES NFR BLD AUTO: 0.4 % (ref 0–0.5)
LYMPHOCYTES # BLD AUTO: 1 K/UL (ref 1–4.8)
LYMPHOCYTES NFR BLD: 37.9 % (ref 18–48)
MAGNESIUM SERPL-MCNC: 2.1 MG/DL (ref 1.6–2.6)
MCH RBC QN AUTO: 31.2 PG (ref 27–31)
MCHC RBC AUTO-ENTMCNC: 31.8 G/DL (ref 32–36)
MCV RBC AUTO: 98 FL (ref 82–98)
MONOCYTES # BLD AUTO: 0.2 K/UL (ref 0.3–1)
MONOCYTES NFR BLD: 9.2 % (ref 4–15)
NEUTROPHILS # BLD AUTO: 1.1 K/UL (ref 1.8–7.7)
NEUTROPHILS NFR BLD: 42.5 % (ref 38–73)
NRBC BLD-RTO: 0 /100 WBC
PHOSPHATE SERPL-MCNC: 4 MG/DL (ref 2.7–4.5)
PLATELET # BLD AUTO: 130 K/UL (ref 150–450)
PMV BLD AUTO: 12.3 FL (ref 9.2–12.9)
POTASSIUM SERPL-SCNC: 4.3 MMOL/L (ref 3.5–5.1)
RBC # BLD AUTO: 3.11 M/UL (ref 4–5.4)
SODIUM SERPL-SCNC: 141 MMOL/L (ref 136–145)
WBC # BLD AUTO: 2.61 K/UL (ref 3.9–12.7)

## 2021-05-14 PROCEDURE — 83735 ASSAY OF MAGNESIUM: CPT | Performed by: INTERNAL MEDICINE

## 2021-05-14 PROCEDURE — 36415 COLL VENOUS BLD VENIPUNCTURE: CPT | Mod: PO | Performed by: INTERNAL MEDICINE

## 2021-05-14 PROCEDURE — 99999 PR PBB SHADOW E&M-EST. PATIENT-LVL V: CPT | Mod: PBBFAC,,, | Performed by: INTERNAL MEDICINE

## 2021-05-14 PROCEDURE — 99215 OFFICE O/P EST HI 40 MIN: CPT | Mod: PBBFAC,PO | Performed by: INTERNAL MEDICINE

## 2021-05-14 PROCEDURE — 99214 OFFICE O/P EST MOD 30 MIN: CPT | Mod: S$PBB,,, | Performed by: INTERNAL MEDICINE

## 2021-05-14 PROCEDURE — 80197 ASSAY OF TACROLIMUS: CPT | Performed by: INTERNAL MEDICINE

## 2021-05-14 PROCEDURE — 99999 PR PBB SHADOW E&M-EST. PATIENT-LVL V: ICD-10-PCS | Mod: PBBFAC,,, | Performed by: INTERNAL MEDICINE

## 2021-05-14 PROCEDURE — 85025 COMPLETE CBC W/AUTO DIFF WBC: CPT | Performed by: INTERNAL MEDICINE

## 2021-05-14 PROCEDURE — 99214 PR OFFICE/OUTPT VISIT, EST, LEVL IV, 30-39 MIN: ICD-10-PCS | Mod: S$PBB,,, | Performed by: INTERNAL MEDICINE

## 2021-05-14 PROCEDURE — 80069 RENAL FUNCTION PANEL: CPT | Performed by: INTERNAL MEDICINE

## 2021-05-14 RX ORDER — BELATACEPT 250 MG/1
INJECTION, POWDER, LYOPHILIZED, FOR SOLUTION INTRAVENOUS
COMMUNITY
Start: 2021-05-10 | End: 2021-05-14

## 2021-05-14 RX ORDER — DULAGLUTIDE 1.5 MG/.5ML
INJECTION, SOLUTION SUBCUTANEOUS
COMMUNITY
Start: 2021-05-09 | End: 2021-05-14

## 2021-05-14 RX ORDER — DIBASIC SODIUM PHOSPHATE, MONOBASIC POTASSIUM PHOSPHATE AND MONOBASIC SODIUM PHOSPHATE 852; 155; 130 MG/1; MG/1; MG/1
2 TABLET ORAL 2 TIMES DAILY
COMMUNITY
Start: 2021-03-11 | End: 2021-05-14

## 2021-05-14 RX ORDER — NIFEDIPINE 60 MG/1
TABLET, EXTENDED RELEASE ORAL
COMMUNITY
Start: 2021-03-08

## 2021-05-15 LAB — TACROLIMUS BLD-MCNC: 2.4 NG/ML (ref 5–15)

## 2021-05-17 ENCOUNTER — OFFICE VISIT (OUTPATIENT)
Dept: CARDIOLOGY | Facility: CLINIC | Age: 66
End: 2021-05-17
Payer: MEDICARE

## 2021-05-17 VITALS
SYSTOLIC BLOOD PRESSURE: 105 MMHG | DIASTOLIC BLOOD PRESSURE: 58 MMHG | OXYGEN SATURATION: 100 % | HEART RATE: 68 BPM | WEIGHT: 166.44 LBS | BODY MASS INDEX: 29.48 KG/M2

## 2021-05-17 DIAGNOSIS — R00.1 BRADYCARDIA: ICD-10-CM

## 2021-05-17 DIAGNOSIS — Z94.0 STATUS POST DECEASED-DONOR KIDNEY TRANSPLANTATION: ICD-10-CM

## 2021-05-17 DIAGNOSIS — D63.1 ANEMIA ASSOCIATED WITH CHRONIC RENAL FAILURE: Chronic | ICD-10-CM

## 2021-05-17 DIAGNOSIS — I12.9 HYPERTENSION, RENAL: Chronic | ICD-10-CM

## 2021-05-17 DIAGNOSIS — Z94.0 KIDNEY REPLACED BY TRANSPLANT: ICD-10-CM

## 2021-05-17 DIAGNOSIS — N18.9 ANEMIA ASSOCIATED WITH CHRONIC RENAL FAILURE: Chronic | ICD-10-CM

## 2021-05-17 DIAGNOSIS — I31.39 PERICARDIAL EFFUSION: Primary | ICD-10-CM

## 2021-05-17 DIAGNOSIS — E78.00 PURE HYPERCHOLESTEROLEMIA: ICD-10-CM

## 2021-05-17 PROCEDURE — 99214 OFFICE O/P EST MOD 30 MIN: CPT | Mod: PBBFAC | Performed by: INTERNAL MEDICINE

## 2021-05-17 PROCEDURE — 99214 OFFICE O/P EST MOD 30 MIN: CPT | Mod: S$PBB,,, | Performed by: INTERNAL MEDICINE

## 2021-05-17 PROCEDURE — 99214 PR OFFICE/OUTPT VISIT, EST, LEVL IV, 30-39 MIN: ICD-10-PCS | Mod: S$PBB,,, | Performed by: INTERNAL MEDICINE

## 2021-05-17 PROCEDURE — 99999 PR PBB SHADOW E&M-EST. PATIENT-LVL IV: CPT | Mod: PBBFAC,,, | Performed by: INTERNAL MEDICINE

## 2021-05-17 PROCEDURE — 99999 PR PBB SHADOW E&M-EST. PATIENT-LVL IV: ICD-10-PCS | Mod: PBBFAC,,, | Performed by: INTERNAL MEDICINE

## 2021-05-17 RX ORDER — ASPIRIN 81 MG/1
81 TABLET ORAL DAILY
Start: 2021-05-17

## 2021-05-17 RX ORDER — FOLIC ACID 1 MG/1
1 TABLET ORAL DAILY
Qty: 30 TABLET | Refills: 5 | Status: SHIPPED | OUTPATIENT
Start: 2021-05-17

## 2021-05-18 LAB — PHOSPHATIDYLETHANOL (PETH): NEGATIVE NG/ML

## 2021-05-19 ENCOUNTER — TELEPHONE (OUTPATIENT)
Dept: TRANSPLANT | Facility: CLINIC | Age: 66
End: 2021-05-19

## 2021-05-28 ENCOUNTER — LAB VISIT (OUTPATIENT)
Dept: LAB | Facility: HOSPITAL | Age: 66
End: 2021-05-28
Attending: INTERNAL MEDICINE
Payer: MEDICARE

## 2021-05-28 DIAGNOSIS — D64.9 ANEMIA, UNSPECIFIED: ICD-10-CM

## 2021-05-28 DIAGNOSIS — Z94.0 IMMUNOSUPPRESSIVE MANAGEMENT ENCOUNTER FOLLOWING KIDNEY TRANSPLANT: Primary | ICD-10-CM

## 2021-05-28 DIAGNOSIS — E55.9 VITAMIN D DEFICIENCY DISEASE: ICD-10-CM

## 2021-05-28 DIAGNOSIS — Z79.899 IMMUNOSUPPRESSIVE MANAGEMENT ENCOUNTER FOLLOWING KIDNEY TRANSPLANT: Primary | ICD-10-CM

## 2021-05-28 DIAGNOSIS — Z94.0 KIDNEY REPLACED BY TRANSPLANT: ICD-10-CM

## 2021-05-28 DIAGNOSIS — N25.81 SECONDARY HYPERPARATHYROIDISM OF RENAL ORIGIN: ICD-10-CM

## 2021-05-28 LAB
ALBUMIN SERPL BCP-MCNC: 4.1 G/DL (ref 3.5–5.2)
ALBUMIN SERPL BCP-MCNC: 4.1 G/DL (ref 3.5–5.2)
ALP SERPL-CCNC: 152 U/L (ref 55–135)
ALT SERPL W/O P-5'-P-CCNC: 95 U/L (ref 10–44)
ANION GAP SERPL CALC-SCNC: 10 MMOL/L (ref 8–16)
AST SERPL-CCNC: 26 U/L (ref 10–40)
BACTERIA #/AREA URNS AUTO: NORMAL /HPF
BASOPHILS # BLD AUTO: 0.01 K/UL (ref 0–0.2)
BASOPHILS NFR BLD: 0.5 % (ref 0–1.9)
BILIRUB DIRECT SERPL-MCNC: 0.2 MG/DL (ref 0.1–0.3)
BILIRUB SERPL-MCNC: 0.5 MG/DL (ref 0.1–1)
BILIRUB UR QL STRIP: NEGATIVE
BUN SERPL-MCNC: 31 MG/DL (ref 8–23)
CALCIUM SERPL-MCNC: 9.7 MG/DL (ref 8.7–10.5)
CHLORIDE SERPL-SCNC: 113 MMOL/L (ref 95–110)
CLARITY UR REFRACT.AUTO: CLEAR
CO2 SERPL-SCNC: 21 MMOL/L (ref 23–29)
COLOR UR AUTO: YELLOW
CREAT SERPL-MCNC: 1.7 MG/DL (ref 0.5–1.4)
DIFFERENTIAL METHOD: ABNORMAL
EOSINOPHIL # BLD AUTO: 0.1 K/UL (ref 0–0.5)
EOSINOPHIL NFR BLD: 5.2 % (ref 0–8)
ERYTHROCYTE [DISTWIDTH] IN BLOOD BY AUTOMATED COUNT: 16.2 % (ref 11.5–14.5)
EST. GFR  (AFRICAN AMERICAN): 36.5 ML/MIN/1.73 M^2
EST. GFR  (NON AFRICAN AMERICAN): 31.6 ML/MIN/1.73 M^2
GLUCOSE SERPL-MCNC: 70 MG/DL (ref 70–110)
GLUCOSE UR QL STRIP: NEGATIVE
HCT VFR BLD AUTO: 30.2 % (ref 37–48.5)
HGB BLD-MCNC: 9.4 G/DL (ref 12–16)
HGB UR QL STRIP: NEGATIVE
IMM GRANULOCYTES # BLD AUTO: 0.01 K/UL (ref 0–0.04)
IMM GRANULOCYTES NFR BLD AUTO: 0.5 % (ref 0–0.5)
KETONES UR QL STRIP: NEGATIVE
LEUKOCYTE ESTERASE UR QL STRIP: ABNORMAL
LYMPHOCYTES # BLD AUTO: 0.7 K/UL (ref 1–4.8)
LYMPHOCYTES NFR BLD: 33.3 % (ref 18–48)
MAGNESIUM SERPL-MCNC: 1.9 MG/DL (ref 1.6–2.6)
MCH RBC QN AUTO: 31.1 PG (ref 27–31)
MCHC RBC AUTO-ENTMCNC: 31.1 G/DL (ref 32–36)
MCV RBC AUTO: 100 FL (ref 82–98)
MICROSCOPIC COMMENT: NORMAL
MONOCYTES # BLD AUTO: 0.2 K/UL (ref 0.3–1)
MONOCYTES NFR BLD: 10.5 % (ref 4–15)
NEUTROPHILS # BLD AUTO: 1.1 K/UL (ref 1.8–7.7)
NEUTROPHILS NFR BLD: 50 % (ref 38–73)
NITRITE UR QL STRIP: NEGATIVE
NRBC BLD-RTO: 0 /100 WBC
PH UR STRIP: 6 [PH] (ref 5–8)
PHOSPHATE SERPL-MCNC: 3.8 MG/DL (ref 2.7–4.5)
PLATELET # BLD AUTO: 129 K/UL (ref 150–450)
PMV BLD AUTO: 12.4 FL (ref 9.2–12.9)
POTASSIUM SERPL-SCNC: 4 MMOL/L (ref 3.5–5.1)
PROT SERPL-MCNC: 7.2 G/DL (ref 6–8.4)
PROT UR QL STRIP: NEGATIVE
RBC # BLD AUTO: 3.02 M/UL (ref 4–5.4)
RBC #/AREA URNS AUTO: 2 /HPF (ref 0–4)
SODIUM SERPL-SCNC: 144 MMOL/L (ref 136–145)
SP GR UR STRIP: 1.01 (ref 1–1.03)
SQUAMOUS #/AREA URNS AUTO: 3 /HPF
URN SPEC COLLECT METH UR: ABNORMAL
WBC # BLD AUTO: 2.1 K/UL (ref 3.9–12.7)
WBC #/AREA URNS AUTO: 5 /HPF (ref 0–5)

## 2021-05-28 PROCEDURE — 81001 URINALYSIS AUTO W/SCOPE: CPT | Performed by: INTERNAL MEDICINE

## 2021-05-28 PROCEDURE — 80197 ASSAY OF TACROLIMUS: CPT | Performed by: INTERNAL MEDICINE

## 2021-05-28 PROCEDURE — 84075 ASSAY ALKALINE PHOSPHATASE: CPT | Mod: 91 | Performed by: INTERNAL MEDICINE

## 2021-05-28 PROCEDURE — 84460 ALANINE AMINO (ALT) (SGPT): CPT | Performed by: INTERNAL MEDICINE

## 2021-05-28 PROCEDURE — 83735 ASSAY OF MAGNESIUM: CPT | Performed by: INTERNAL MEDICINE

## 2021-05-28 PROCEDURE — 80069 RENAL FUNCTION PANEL: CPT | Performed by: INTERNAL MEDICINE

## 2021-05-28 PROCEDURE — 85025 COMPLETE CBC W/AUTO DIFF WBC: CPT | Performed by: INTERNAL MEDICINE

## 2021-05-29 LAB
TACROLIMUS BLD-MCNC: <2 NG/ML (ref 5–15)
TACROLIMUS, NORMALIZED: <2 NG/ML (ref 5–15)

## 2021-06-01 ENCOUNTER — TELEPHONE (OUTPATIENT)
Dept: TRANSPLANT | Facility: CLINIC | Age: 66
End: 2021-06-01

## 2021-06-01 DIAGNOSIS — Z94.0 STATUS POST DECEASED-DONOR KIDNEY TRANSPLANTATION: Primary | ICD-10-CM

## 2021-06-11 ENCOUNTER — LAB VISIT (OUTPATIENT)
Dept: LAB | Facility: HOSPITAL | Age: 66
End: 2021-06-11
Attending: INTERNAL MEDICINE
Payer: MEDICARE

## 2021-06-11 DIAGNOSIS — Z94.0 KIDNEY REPLACED BY TRANSPLANT: ICD-10-CM

## 2021-06-11 LAB
ALBUMIN SERPL BCP-MCNC: 4 G/DL (ref 3.5–5.2)
ALBUMIN SERPL BCP-MCNC: 4 G/DL (ref 3.5–5.2)
ALP SERPL-CCNC: 142 U/L (ref 55–135)
ALT SERPL W/O P-5'-P-CCNC: 208 U/L (ref 10–44)
ANION GAP SERPL CALC-SCNC: 11 MMOL/L (ref 8–16)
AST SERPL-CCNC: 46 U/L (ref 10–40)
BASOPHILS # BLD AUTO: 0.01 K/UL (ref 0–0.2)
BASOPHILS NFR BLD: 0.4 % (ref 0–1.9)
BILIRUB DIRECT SERPL-MCNC: 0.2 MG/DL (ref 0.1–0.3)
BILIRUB SERPL-MCNC: 0.5 MG/DL (ref 0.1–1)
BUN SERPL-MCNC: 30 MG/DL (ref 8–23)
CALCIUM SERPL-MCNC: 9.8 MG/DL (ref 8.7–10.5)
CHLORIDE SERPL-SCNC: 110 MMOL/L (ref 95–110)
CO2 SERPL-SCNC: 21 MMOL/L (ref 23–29)
CREAT SERPL-MCNC: 1.9 MG/DL (ref 0.5–1.4)
DIFFERENTIAL METHOD: ABNORMAL
EOSINOPHIL # BLD AUTO: 0.1 K/UL (ref 0–0.5)
EOSINOPHIL NFR BLD: 4 % (ref 0–8)
ERYTHROCYTE [DISTWIDTH] IN BLOOD BY AUTOMATED COUNT: 15.7 % (ref 11.5–14.5)
EST. GFR  (AFRICAN AMERICAN): 31.9 ML/MIN/1.73 M^2
EST. GFR  (NON AFRICAN AMERICAN): 27.7 ML/MIN/1.73 M^2
GLUCOSE SERPL-MCNC: 71 MG/DL (ref 70–110)
HCT VFR BLD AUTO: 30.3 % (ref 37–48.5)
HGB BLD-MCNC: 9.4 G/DL (ref 12–16)
IMM GRANULOCYTES # BLD AUTO: 0.01 K/UL (ref 0–0.04)
IMM GRANULOCYTES NFR BLD AUTO: 0.4 % (ref 0–0.5)
LYMPHOCYTES # BLD AUTO: 0.8 K/UL (ref 1–4.8)
LYMPHOCYTES NFR BLD: 35.3 % (ref 18–48)
MCH RBC QN AUTO: 30.7 PG (ref 27–31)
MCHC RBC AUTO-ENTMCNC: 31 G/DL (ref 32–36)
MCV RBC AUTO: 99 FL (ref 82–98)
MONOCYTES # BLD AUTO: 0.2 K/UL (ref 0.3–1)
MONOCYTES NFR BLD: 9.4 % (ref 4–15)
NEUTROPHILS # BLD AUTO: 1.1 K/UL (ref 1.8–7.7)
NEUTROPHILS NFR BLD: 50.5 % (ref 38–73)
NRBC BLD-RTO: 0 /100 WBC
PHOSPHATE SERPL-MCNC: 4 MG/DL (ref 2.7–4.5)
PLATELET # BLD AUTO: 133 K/UL (ref 150–450)
PMV BLD AUTO: 11 FL (ref 9.2–12.9)
POTASSIUM SERPL-SCNC: 4 MMOL/L (ref 3.5–5.1)
PROT SERPL-MCNC: 7 G/DL (ref 6–8.4)
RBC # BLD AUTO: 3.06 M/UL (ref 4–5.4)
SODIUM SERPL-SCNC: 142 MMOL/L (ref 136–145)
WBC # BLD AUTO: 2.24 K/UL (ref 3.9–12.7)

## 2021-06-11 PROCEDURE — 85025 COMPLETE CBC W/AUTO DIFF WBC: CPT | Performed by: INTERNAL MEDICINE

## 2021-06-11 PROCEDURE — 80197 ASSAY OF TACROLIMUS: CPT | Performed by: INTERNAL MEDICINE

## 2021-06-11 PROCEDURE — 80069 RENAL FUNCTION PANEL: CPT | Performed by: INTERNAL MEDICINE

## 2021-06-11 PROCEDURE — 36415 COLL VENOUS BLD VENIPUNCTURE: CPT | Mod: PO | Performed by: INTERNAL MEDICINE

## 2021-06-11 PROCEDURE — 84075 ASSAY ALKALINE PHOSPHATASE: CPT | Performed by: INTERNAL MEDICINE

## 2021-06-11 PROCEDURE — 83735 ASSAY OF MAGNESIUM: CPT | Performed by: INTERNAL MEDICINE

## 2021-06-12 LAB
MAGNESIUM SERPL-MCNC: 1.9 MG/DL (ref 1.6–2.6)
TACROLIMUS BLD-MCNC: 2.2 NG/ML (ref 5–15)
TACROLIMUS, NORMALIZED: 2.2 NG/ML (ref 5–15)

## 2021-06-25 ENCOUNTER — TELEPHONE (OUTPATIENT)
Dept: TRANSPLANT | Facility: CLINIC | Age: 66
End: 2021-06-25

## 2021-06-28 ENCOUNTER — TELEPHONE (OUTPATIENT)
Dept: TRANSPLANT | Facility: CLINIC | Age: 66
End: 2021-06-28

## 2021-06-28 ENCOUNTER — APPOINTMENT (OUTPATIENT)
Dept: LAB | Facility: HOSPITAL | Age: 66
End: 2021-06-28
Attending: INTERNAL MEDICINE
Payer: MEDICARE

## 2021-06-28 DIAGNOSIS — N18.32 STAGE 3B CHRONIC KIDNEY DISEASE: ICD-10-CM

## 2021-06-28 DIAGNOSIS — Z94.0 RENAL TRANSPLANT RECIPIENT: Primary | ICD-10-CM

## 2021-06-28 DIAGNOSIS — D51.9 VITAMIN B12 DEFICIENCY ANEMIA: ICD-10-CM

## 2021-06-28 DIAGNOSIS — E78.5 HYPERLIPEMIA: ICD-10-CM

## 2021-06-28 DIAGNOSIS — Z79.891 ENCOUNTER FOR LONG-TERM METHADONE USE: ICD-10-CM

## 2021-06-28 PROCEDURE — 81001 URINALYSIS AUTO W/SCOPE: CPT | Performed by: INTERNAL MEDICINE

## 2021-06-29 LAB
BACTERIA #/AREA URNS AUTO: NORMAL /HPF
BILIRUB UR QL STRIP: NEGATIVE
CLARITY UR REFRACT.AUTO: CLEAR
COLOR UR AUTO: ABNORMAL
GLUCOSE UR QL STRIP: NEGATIVE
HGB UR QL STRIP: NEGATIVE
HYALINE CASTS UR QL AUTO: 0 /LPF
KETONES UR QL STRIP: NEGATIVE
LEUKOCYTE ESTERASE UR QL STRIP: NEGATIVE
MICROSCOPIC COMMENT: NORMAL
NITRITE UR QL STRIP: NEGATIVE
PH UR STRIP: 6 [PH] (ref 5–8)
PROT UR QL STRIP: ABNORMAL
RBC #/AREA URNS AUTO: 1 /HPF (ref 0–4)
SP GR UR STRIP: 1.01 (ref 1–1.03)
SQUAMOUS #/AREA URNS AUTO: 2 /HPF
URN SPEC COLLECT METH UR: ABNORMAL
WBC #/AREA URNS AUTO: 1 /HPF (ref 0–5)

## 2021-06-30 ENCOUNTER — LAB VISIT (OUTPATIENT)
Dept: LAB | Facility: HOSPITAL | Age: 66
End: 2021-06-30
Attending: INTERNAL MEDICINE
Payer: MEDICARE

## 2021-06-30 DIAGNOSIS — Z94.0 KIDNEY REPLACED BY TRANSPLANT: ICD-10-CM

## 2021-06-30 LAB
ALBUMIN SERPL BCP-MCNC: 4.3 G/DL (ref 3.5–5.2)
ALBUMIN SERPL BCP-MCNC: 4.3 G/DL (ref 3.5–5.2)
ALP SERPL-CCNC: 162 U/L (ref 55–135)
ALT SERPL W/O P-5'-P-CCNC: 98 U/L (ref 10–44)
ANION GAP SERPL CALC-SCNC: 9 MMOL/L (ref 8–16)
AST SERPL-CCNC: 31 U/L (ref 10–40)
BASOPHILS # BLD AUTO: 0.01 K/UL (ref 0–0.2)
BASOPHILS NFR BLD: 0.4 % (ref 0–1.9)
BILIRUB DIRECT SERPL-MCNC: 0.2 MG/DL (ref 0.1–0.3)
BILIRUB SERPL-MCNC: 0.4 MG/DL (ref 0.1–1)
BUN SERPL-MCNC: 30 MG/DL (ref 8–23)
CALCIUM SERPL-MCNC: 9.9 MG/DL (ref 8.7–10.5)
CHLORIDE SERPL-SCNC: 112 MMOL/L (ref 95–110)
CO2 SERPL-SCNC: 20 MMOL/L (ref 23–29)
CREAT SERPL-MCNC: 1.9 MG/DL (ref 0.5–1.4)
DIFFERENTIAL METHOD: ABNORMAL
EOSINOPHIL # BLD AUTO: 0.1 K/UL (ref 0–0.5)
EOSINOPHIL NFR BLD: 6 % (ref 0–8)
ERYTHROCYTE [DISTWIDTH] IN BLOOD BY AUTOMATED COUNT: 14.9 % (ref 11.5–14.5)
EST. GFR  (AFRICAN AMERICAN): 31.9 ML/MIN/1.73 M^2
EST. GFR  (NON AFRICAN AMERICAN): 27.7 ML/MIN/1.73 M^2
GLUCOSE SERPL-MCNC: 72 MG/DL (ref 70–110)
HCT VFR BLD AUTO: 31.1 % (ref 37–48.5)
HGB BLD-MCNC: 9.8 G/DL (ref 12–16)
IMM GRANULOCYTES # BLD AUTO: 0.01 K/UL (ref 0–0.04)
IMM GRANULOCYTES NFR BLD AUTO: 0.4 % (ref 0–0.5)
LYMPHOCYTES # BLD AUTO: 0.8 K/UL (ref 1–4.8)
LYMPHOCYTES NFR BLD: 33.2 % (ref 18–48)
MAGNESIUM SERPL-MCNC: 2 MG/DL (ref 1.6–2.6)
MCH RBC QN AUTO: 30.6 PG (ref 27–31)
MCHC RBC AUTO-ENTMCNC: 31.5 G/DL (ref 32–36)
MCV RBC AUTO: 97 FL (ref 82–98)
MONOCYTES # BLD AUTO: 0.2 K/UL (ref 0.3–1)
MONOCYTES NFR BLD: 9.8 % (ref 4–15)
NEUTROPHILS # BLD AUTO: 1.2 K/UL (ref 1.8–7.7)
NEUTROPHILS NFR BLD: 50.2 % (ref 38–73)
NRBC BLD-RTO: 0 /100 WBC
PHOSPHATE SERPL-MCNC: 3.5 MG/DL (ref 2.7–4.5)
PLATELET # BLD AUTO: 131 K/UL (ref 150–450)
PMV BLD AUTO: 12.1 FL (ref 9.2–12.9)
POTASSIUM SERPL-SCNC: 4.4 MMOL/L (ref 3.5–5.1)
PROT SERPL-MCNC: 7.7 G/DL (ref 6–8.4)
RBC # BLD AUTO: 3.2 M/UL (ref 4–5.4)
SODIUM SERPL-SCNC: 141 MMOL/L (ref 136–145)
WBC # BLD AUTO: 2.35 K/UL (ref 3.9–12.7)

## 2021-06-30 PROCEDURE — 83735 ASSAY OF MAGNESIUM: CPT | Performed by: INTERNAL MEDICINE

## 2021-06-30 PROCEDURE — 80197 ASSAY OF TACROLIMUS: CPT | Performed by: INTERNAL MEDICINE

## 2021-06-30 PROCEDURE — 84450 TRANSFERASE (AST) (SGOT): CPT | Performed by: INTERNAL MEDICINE

## 2021-06-30 PROCEDURE — 84075 ASSAY ALKALINE PHOSPHATASE: CPT | Performed by: INTERNAL MEDICINE

## 2021-06-30 PROCEDURE — 85025 COMPLETE CBC W/AUTO DIFF WBC: CPT | Performed by: INTERNAL MEDICINE

## 2021-06-30 PROCEDURE — 36415 COLL VENOUS BLD VENIPUNCTURE: CPT | Mod: PO | Performed by: INTERNAL MEDICINE

## 2021-06-30 PROCEDURE — 80069 RENAL FUNCTION PANEL: CPT | Performed by: INTERNAL MEDICINE

## 2021-07-01 LAB
TACROLIMUS BLD-MCNC: 2.4 NG/ML (ref 5–15)
TACROLIMUS, NORMALIZED: 2.4 NG/ML (ref 5–15)

## 2021-07-02 RX ORDER — ATORVASTATIN CALCIUM 40 MG/1
40 TABLET, FILM COATED ORAL DAILY
Qty: 90 TABLET | Refills: 0 | Status: SHIPPED | OUTPATIENT
Start: 2021-07-02 | End: 2021-10-18 | Stop reason: SDUPTHER

## 2021-07-09 ENCOUNTER — LAB VISIT (OUTPATIENT)
Dept: LAB | Facility: HOSPITAL | Age: 66
End: 2021-07-09
Attending: INTERNAL MEDICINE
Payer: MEDICARE

## 2021-07-09 DIAGNOSIS — Z94.0 KIDNEY REPLACED BY TRANSPLANT: ICD-10-CM

## 2021-07-09 LAB
ALBUMIN SERPL BCP-MCNC: 3.9 G/DL (ref 3.5–5.2)
ALBUMIN SERPL BCP-MCNC: 3.9 G/DL (ref 3.5–5.2)
ALP SERPL-CCNC: 138 U/L (ref 55–135)
ALT SERPL W/O P-5'-P-CCNC: 38 U/L (ref 10–44)
ANION GAP SERPL CALC-SCNC: 6 MMOL/L (ref 8–16)
AST SERPL-CCNC: 17 U/L (ref 10–40)
BASOPHILS # BLD AUTO: 0.01 K/UL (ref 0–0.2)
BASOPHILS NFR BLD: 0.4 % (ref 0–1.9)
BILIRUB DIRECT SERPL-MCNC: 0.2 MG/DL (ref 0.1–0.3)
BILIRUB SERPL-MCNC: 0.5 MG/DL (ref 0.1–1)
BUN SERPL-MCNC: 32 MG/DL (ref 8–23)
CALCIUM SERPL-MCNC: 9.9 MG/DL (ref 8.7–10.5)
CHLORIDE SERPL-SCNC: 111 MMOL/L (ref 95–110)
CO2 SERPL-SCNC: 24 MMOL/L (ref 23–29)
CREAT SERPL-MCNC: 1.8 MG/DL (ref 0.5–1.4)
DIFFERENTIAL METHOD: ABNORMAL
EOSINOPHIL # BLD AUTO: 0.2 K/UL (ref 0–0.5)
EOSINOPHIL NFR BLD: 7 % (ref 0–8)
ERYTHROCYTE [DISTWIDTH] IN BLOOD BY AUTOMATED COUNT: 14.7 % (ref 11.5–14.5)
EST. GFR  (AFRICAN AMERICAN): 34 ML/MIN/1.73 M^2
EST. GFR  (NON AFRICAN AMERICAN): 29.5 ML/MIN/1.73 M^2
GLUCOSE SERPL-MCNC: 67 MG/DL (ref 70–110)
HCT VFR BLD AUTO: 30.1 % (ref 37–48.5)
HGB BLD-MCNC: 9.7 G/DL (ref 12–16)
IMM GRANULOCYTES # BLD AUTO: 0.02 K/UL (ref 0–0.04)
IMM GRANULOCYTES NFR BLD AUTO: 0.9 % (ref 0–0.5)
LYMPHOCYTES # BLD AUTO: 0.8 K/UL (ref 1–4.8)
LYMPHOCYTES NFR BLD: 36.6 % (ref 18–48)
MAGNESIUM SERPL-MCNC: 2.2 MG/DL (ref 1.6–2.6)
MCH RBC QN AUTO: 31.3 PG (ref 27–31)
MCHC RBC AUTO-ENTMCNC: 32.2 G/DL (ref 32–36)
MCV RBC AUTO: 97 FL (ref 82–98)
MONOCYTES # BLD AUTO: 0.3 K/UL (ref 0.3–1)
MONOCYTES NFR BLD: 11.5 % (ref 4–15)
NEUTROPHILS # BLD AUTO: 1 K/UL (ref 1.8–7.7)
NEUTROPHILS NFR BLD: 43.6 % (ref 38–73)
NRBC BLD-RTO: 0 /100 WBC
PHOSPHATE SERPL-MCNC: 4 MG/DL (ref 2.7–4.5)
PLATELET # BLD AUTO: 122 K/UL (ref 150–450)
PMV BLD AUTO: 12.1 FL (ref 9.2–12.9)
POTASSIUM SERPL-SCNC: 4.2 MMOL/L (ref 3.5–5.1)
PROT SERPL-MCNC: 7 G/DL (ref 6–8.4)
RBC # BLD AUTO: 3.1 M/UL (ref 4–5.4)
SODIUM SERPL-SCNC: 141 MMOL/L (ref 136–145)
WBC # BLD AUTO: 2.27 K/UL (ref 3.9–12.7)

## 2021-07-09 PROCEDURE — 80197 ASSAY OF TACROLIMUS: CPT | Performed by: INTERNAL MEDICINE

## 2021-07-09 PROCEDURE — 80069 RENAL FUNCTION PANEL: CPT | Performed by: INTERNAL MEDICINE

## 2021-07-09 PROCEDURE — 84075 ASSAY ALKALINE PHOSPHATASE: CPT | Performed by: INTERNAL MEDICINE

## 2021-07-09 PROCEDURE — 36415 COLL VENOUS BLD VENIPUNCTURE: CPT | Mod: PO | Performed by: INTERNAL MEDICINE

## 2021-07-09 PROCEDURE — 84460 ALANINE AMINO (ALT) (SGPT): CPT | Performed by: INTERNAL MEDICINE

## 2021-07-09 PROCEDURE — 85025 COMPLETE CBC W/AUTO DIFF WBC: CPT | Performed by: INTERNAL MEDICINE

## 2021-07-09 PROCEDURE — 83735 ASSAY OF MAGNESIUM: CPT | Performed by: INTERNAL MEDICINE

## 2021-07-10 DIAGNOSIS — Z94.0 KIDNEY REPLACED BY TRANSPLANT: ICD-10-CM

## 2021-07-10 LAB
TACROLIMUS BLD-MCNC: 2.1 NG/ML (ref 5–15)
TACROLIMUS, NORMALIZED: 2.2 NG/ML (ref 5–15)

## 2021-07-10 RX ORDER — TACROLIMUS 1 MG/1
4 CAPSULE ORAL EVERY 12 HOURS
Qty: 240 CAPSULE | Refills: 11 | Status: SHIPPED | OUTPATIENT
Start: 2021-07-10 | End: 2021-10-14 | Stop reason: SDUPTHER

## 2021-07-12 ENCOUNTER — TELEPHONE (OUTPATIENT)
Dept: TRANSPLANT | Facility: CLINIC | Age: 66
End: 2021-07-12

## 2021-07-13 NOTE — PROGRESS NOTES
Central line dc'd per MD order.  Occlusive Vaseline gauze dressing applied.  Patient instructed to lie flat for 30 minutes and keep dressing intact for 24 hours.  Patient verbalizes understanding and tolerated procedure well.  Will continue to monitor patient.   [facility] -- Marcia Pereira, Gino, Art, Johan, Ernesto, Layla Crawford: Office # [phone]    Patient is a 44y old  Male who presents with a chief complaint of hiccups and burning sensation in mid-abdomen radiating to esophagus.    FROM ADMISSION H&P: 43yo Male with no PMH presents to ED c/o hiccups and stomach burning for 2 days. Cardio consulted to evaluate for   HPI:      INTERVAL HPI: Patient c/o     PAST MEDICAL & SURGICAL HISTORY:  No pertinent past medical history    No significant past surgical history        ECHO FINDINGS:    MEDICATIONS  (STANDING):    MEDICATIONS  (PRN):      FAMILY HISTORY:    Denies Family history of CAD or early MI    ROS:  Constitutional: denies fever, chills  HEENT: denies blurry vision, difficulty hearing  Respiratory: denies SOB, THURMAN, cough  Cardiovascular: denies CP, palpitations, orthopnea, PND, LE edema  Gastrointestinal: denies nausea, vomiting, abdominal pain  Genitourinary: denies urinary changes  Skin: Denies rashes, itching  Neurologic: denies headache, weakness, dizziness  Hematology/Oncology: denies bleeding, easy bruising  ROS negative except as noted above      SOCIAL HISTORY:  No tobacco, Alcohol or Drug use    Vital Signs Last 24 Hrs  T(C): 36.7 (13 Jul 2021 11:02), Max: 36.7 (13 Jul 2021 11:02)  T(F): 98.1 (13 Jul 2021 11:02), Max: 98.1 (13 Jul 2021 11:02)  HR: 45 (13 Jul 2021 11:02) (45 - 45)  BP: 135/78 (13 Jul 2021 11:02) (135/78 - 135/78)  BP(mean): --  RR: 15 (13 Jul 2021 11:02) (15 - 15)  SpO2: 96% (13 Jul 2021 11:02) (96% - 96%)    Physical Exam:  General: Well developed, well nourished, NAD  HEENT: NCAT, PERRLA, EOMI bl, moist mucous membranes   Neck: Supple, nontender, no mass  Neurology: A&Ox3, nonfocal, sensation intact   Respiratory: CTA B/L, No W/R/R  CV: RRR, +S1/S2, no murmurs, rubs or gallops  Abdominal: Soft, NT, ND +BSx4, no palpable masses  Extremities: No C/C/E, + peripheral pulses  MSK: Normal ROM, no joint erythema or warmth, no joint swelling   Heme: No obvious ecchymosis or petechiae   Skin: warm, dry, normal color      ECG:    I&O's Detail      LABS:                        14.4   6.75  )-----------( 179      ( 13 Jul 2021 12:38 )             41.8     07-13    143  |  111<H>  |  18  ----------------------------<  105<H>  3.8   |  26  |  0.93    Ca    8.3<L>      13 Jul 2021 12:37    TPro  6.5  /  Alb  3.5  /  TBili  0.4  /  DBili  x   /  AST  60<H>  /  ALT  133<H>  /  AlkPhos  52  07-13    CARDIAC MARKERS ( 13 Jul 2021 12:37 )  <.015 ng/mL / x     / x     / x     / x              I&O's Summary    BNP    RADIOLOGY & ADDITIONAL STUDIES: [facility] -- Marcia Pereira, Gino, Art, Johan, Ernesto, Layla Crawford: Office # [phone]    Patient is a 44y old  Male who presents with a chief complaint of hiccups and burning sensation in mid-abdomen radiating to esophagus.    FROM ADMISSION H&P: 43yo Male with no PMH presents to ED c/o hiccups and stomach burning for 2 days.  HPI:      INTERVAL HPI:    ID #  Patient c/o intermittent hiccups and associated burning sensation radiating from his mid-abdomen up to his esophagus for 2 days. Denies provoking or alleviating factors.     Denies SOB, palpitations, dizziness, weakness, N/V/D, fever or other symptoms.    PAST MEDICAL & SURGICAL HISTORY:  No pertinent past medical history    No significant past surgical history        ECHO FINDINGS:    MEDICATIONS  (STANDING):    MEDICATIONS  (PRN):      FAMILY HISTORY:    Denies Family history of CAD or early MI    ROS:  Constitutional: denies fever, chills  HEENT: denies blurry vision, difficulty hearing  Respiratory: denies SOB, THURMAN, cough  Cardiovascular: denies CP, palpitations, orthopnea, PND, LE edema  Gastrointestinal: + abdominal pain, denies nausea, vomiting  Genitourinary: denies urinary changes  Skin: Denies rashes, itching  Neurologic: denies headache, weakness, dizziness  Hematology/Oncology: denies bleeding, easy bruising  ROS negative except as noted above      SOCIAL HISTORY:  Active smoker    Vital Signs Last 24 Hrs  T(C): 36.7 (13 Jul 2021 11:02), Max: 36.7 (13 Jul 2021 11:02)  T(F): 98.1 (13 Jul 2021 11:02), Max: 98.1 (13 Jul 2021 11:02)  HR: 45 (13 Jul 2021 11:02) (45 - 45)  BP: 135/78 (13 Jul 2021 11:02) (135/78 - 135/78)  BP(mean): --  RR: 15 (13 Jul 2021 11:02) (15 - 15)  SpO2: 96% (13 Jul 2021 11:02) (96% - 96%)    Physical Exam:  General: Well developed, well nourished, NAD  HEENT: NCAT, PERRLA, EOMI bl, moist mucous membranes   Neck: Supple, nontender, no mass  Neurology: A&Ox3, nonfocal, sensation intact   Respiratory: CTA B/L, No W/R/R  CV: RRR, +S1/S2, no murmurs, rubs or gallops  Abdominal: Soft, NT, ND +BSx4, no palpable masses  Extremities: No C/C/E, + peripheral pulses  MSK: Normal ROM, no joint erythema or warmth, no joint swelling   Heme: No obvious ecchymosis or petechiae   Skin: warm, dry, normal color      ECG: Sinus bradycardia rate 42    I&O's Detail      LABS:                        14.4   6.75  )-----------( 179      ( 13 Jul 2021 12:38 )             41.8     07-13    143  |  111<H>  |  18  ----------------------------<  105<H>  3.8   |  26  |  0.93    Ca    8.3<L>      13 Jul 2021 12:37    TPro  6.5  /  Alb  3.5  /  TBili  0.4  /  DBili  x   /  AST  60<H>  /  ALT  133<H>  /  AlkPhos  52  07-13    CARDIAC MARKERS ( 13 Jul 2021 12:37 )  <.015 ng/mL / x     / x     / x     / x              I&O's Summary    BNP    RADIOLOGY & ADDITIONAL STUDIES:     [facility] -- Marcia Pereira, Art Christian, Ernesto Prado Savella, Goodger: Office # [phone]    Patient is a 44y old  Male who presents with a chief complaint of hiccups and burning sensation in mid-abdomen radiating to esophagus.    FROM ADMISSION H&P: 45yo Male with no PMH presents to ED c/o hiccups and stomach burning for 2 days.  HPI:      INTERVAL HPI:    ID # [id number]  Patient c/o burning sensation radiating from his mid-abdomen up to his esophagus for 2 days and intermittent hiccups that started yesterday. Pt reports he was breathing into a plastic bag and felt like he could not catch his breath. Patient was given Reglen and Pepcid in the ED and reports abdominal pain reduced to 2/10. Denies cough, SOB, chest pain, palpitations. Pt states he went to [facility] 2 years ago and was diagnosed with cardiac arrest. Denies stent placement. He states the symptoms present today feels different than before.       PAST MEDICAL & SURGICAL HISTORY:  No pertinent past medical history    No significant past surgical history        ECHO FINDINGS: NA    MEDICATIONS  (STANDING):    MEDICATIONS  (PRN):      FAMILY HISTORY:    Denies Family history of CAD or early MI    ROS:  Constitutional: denies fever, chills  HEENT: denies blurry vision, difficulty hearing  Respiratory: denies SOB, THURMAN, cough  Cardiovascular: denies CP, palpitations, orthopnea, PND, LE edema  Gastrointestinal: + abdominal pain, denies nausea, vomiting  Genitourinary: denies urinary changes  Skin: Denies rashes, itching  Neurologic: denies headache, weakness, dizziness  Hematology/Oncology: denies bleeding, easy bruising  ROS negative except as noted above      SOCIAL HISTORY:  Active smoker    Vital Signs Last 24 Hrs  T(C): 36.7 (13 Jul 2021 11:02), Max: 36.7 (13 Jul 2021 11:02)  T(F): 98.1 (13 Jul 2021 11:02), Max: 98.1 (13 Jul 2021 11:02)  HR: 45 (13 Jul 2021 11:02) (45 - 45)  BP: 135/78 (13 Jul 2021 11:02) (135/78 - 135/78)  BP(mean): --  RR: 15 (13 Jul 2021 11:02) (15 - 15)  SpO2: 96% (13 Jul 2021 11:02) (96% - 96%)    Physical Exam:  General: Well developed, well nourished, NAD, appears stated age  HEENT: NCAT, PERRLA, EOMI bl, moist mucous membranes   Neck: Supple, nontender, no mass  Neurology: A&Ox3, nonfocal, sensation intact   Respiratory: CTA B/L, No W/R/R  CV: RRR, +S1/S2, no murmurs, rubs or gallops  Abdominal: Soft, NT, ND +BSx4, no palpable masses  Extremities: No C/C/E, + peripheral pulses  MSK: Normal ROM, no joint erythema or warmth, no joint swelling   Heme: No obvious ecchymosis or petechiae   Skin: warm, dry, normal color      ECG: Sinus bradycardia rate 42    I&O's Detail      LABS:                        14.4   6.75  )-----------( 179      ( 13 Jul 2021 12:38 )             41.8     07-13    143  |  111<H>  |  18  ----------------------------<  105<H>  3.8   |  26  |  0.93    Ca    8.3<L>      13 Jul 2021 12:37    TPro  6.5  /  Alb  3.5  /  TBili  0.4  /  DBili  x   /  AST  60<H>  /  ALT  133<H>  /  AlkPhos  52  07-13    CARDIAC MARKERS ( 13 Jul 2021 12:37 )  <.015 ng/mL / x     / x     / x     / x              I&O's Summary    BNP    RADIOLOGY & ADDITIONAL STUDIES: NA     [facility] -- Marcia Pereira, Gino, Art, Johan, Ernesto, Layla Crawford: Office # [phone]    Patient is a 44y old  Male who presents with a chief complaint of hiccups and burning sensation in mid-abdomen radiating to esophagus.    FROM ADMISSION H&P: 45yo Male with no PMH presents to ED c/o hiccups and stomach burning for 2 days.  HPI: 45 y/o M with no pmhx presents with c/o hiccups x 2 days. Pt states that he has intermittent hiccups since this Sunday, no provoking or alleviating factors. States that he also has associated burning sensation radiating from his mid abdomen up his esophagus. Denies SOB, palpitations, dizziness, weakness, N/V/D, fever or other symptoms.    INTERVAL HPI:    ID # [id number]  Patient c/o burning sensation radiating from his mid-abdomen up to his esophagus for 2 days and intermittent hiccups that started yesterday. Patient reports he was breathing into a plastic bag and felt like he could not catch his breath. Patient was given Reglen and Pepcid in the ED and reports abdominal pain reduced to 2/10. Denies cough, SOB, chest pain, palpitations. Patient states he last saw a cardiologist when he went to [facility] 2 years ago and was diagnosed with cardiac arrest. Denies stent placement. He states the symptoms present today feels different than before.       PAST MEDICAL & SURGICAL HISTORY:  No pertinent past medical history    No significant past surgical history        ECHO FINDINGS:   None available    MEDICATIONS  (STANDING):    MEDICATIONS  (PRN):      FAMILY HISTORY:    Denies Family history of CAD or early MI    ROS:  Constitutional: denies fever, chills  HEENT: denies blurry vision, difficulty hearing  Respiratory: denies SOB, THURMAN, cough  Cardiovascular: denies chest pain, palpitations, orthopnea, PND, LE edema  Gastrointestinal: +abdominal pain, denies nausea, vomiting  Genitourinary: denies urinary changes  Skin: Denies rashes, itching  Neurologic: denies headache, weakness, dizziness  Hematology/Oncology: denies bleeding, easy bruising  ROS negative except as noted above      SOCIAL HISTORY:  Currently active smoker, 1 pack per week for 20 years    Vital Signs Last 24 Hrs  T(C): 36.7 (13 Jul 2021 11:02), Max: 36.7 (13 Jul 2021 11:02)  T(F): 98.1 (13 Jul 2021 11:02), Max: 98.1 (13 Jul 2021 11:02)  HR: 45 (13 Jul 2021 11:02) (45 - 45)  BP: 135/78 (13 Jul 2021 11:02) (135/78 - 135/78)  BP(mean): --  RR: 15 (13 Jul 2021 11:02) (15 - 15)  SpO2: 96% (13 Jul 2021 11:02) (96% - 96%)    Physical Exam:  General: Well developed, well nourished, NAD, appears stated age  HEENT: NCAT, PERRLA, EOMI bl, moist mucous membranes   Neck: Supple, nontender, no mass  Neurology: A&Ox3, nonfocal, sensation intact   Respiratory: CTA B/L, No W/R/R  CV: Sinus bradycardia, +S1/S2, no murmurs, rubs or gallops  Abdominal: Soft, NT, ND +BSx4, no palpable masses  Extremities: No C/C/E, + peripheral pulses  MSK: Normal ROM, no joint erythema or warmth, no joint swelling   Heme: No obvious ecchymosis or petechiae   Skin: warm, dry, normal color      ECG: Sinus bradycardia rate 42    I&O's Detail      LABS:                        14.4   6.75  )-----------( 179      ( 13 Jul 2021 12:38 )             41.8     07-13    143  |  111<H>  |  18  ----------------------------<  105<H>  3.8   |  26  |  0.93    Ca    8.3<L>      13 Jul 2021 12:37    TPro  6.5  /  Alb  3.5  /  TBili  0.4  /  DBili  x   /  AST  60<H>  /  ALT  133<H>  /  AlkPhos  52  07-13    CARDIAC MARKERS ( 13 Jul 2021 12:37 )  <.015 ng/mL / x     / x     / x     / x              I&O's Summary    BNP    RADIOLOGY & ADDITIONAL STUDIES:   None available

## 2021-07-14 ENCOUNTER — TELEPHONE (OUTPATIENT)
Dept: TRANSPLANT | Facility: CLINIC | Age: 66
End: 2021-07-14

## 2021-07-15 ENCOUNTER — LAB VISIT (OUTPATIENT)
Dept: LAB | Facility: HOSPITAL | Age: 66
End: 2021-07-15
Attending: INTERNAL MEDICINE
Payer: MEDICARE

## 2021-07-15 DIAGNOSIS — Z94.0 KIDNEY REPLACED BY TRANSPLANT: ICD-10-CM

## 2021-07-15 PROCEDURE — 36415 COLL VENOUS BLD VENIPUNCTURE: CPT | Mod: PO | Performed by: INTERNAL MEDICINE

## 2021-07-15 PROCEDURE — 80197 ASSAY OF TACROLIMUS: CPT | Performed by: INTERNAL MEDICINE

## 2021-07-16 LAB
TACROLIMUS BLD-MCNC: 2.6 NG/ML (ref 5–15)
TACROLIMUS, NORMALIZED: 2.6 NG/ML (ref 5–15)

## 2021-07-23 ENCOUNTER — TELEPHONE (OUTPATIENT)
Dept: TRANSPLANT | Facility: CLINIC | Age: 66
End: 2021-07-23

## 2021-07-26 ENCOUNTER — LAB VISIT (OUTPATIENT)
Dept: LAB | Facility: HOSPITAL | Age: 66
End: 2021-07-26
Attending: INTERNAL MEDICINE
Payer: MEDICARE

## 2021-07-26 DIAGNOSIS — Z94.0 KIDNEY REPLACED BY TRANSPLANT: ICD-10-CM

## 2021-07-26 LAB
ALBUMIN SERPL BCP-MCNC: 3.6 G/DL (ref 3.5–5.2)
ALBUMIN SERPL BCP-MCNC: 3.6 G/DL (ref 3.5–5.2)
ALP SERPL-CCNC: 105 U/L (ref 55–135)
ALT SERPL W/O P-5'-P-CCNC: 30 U/L (ref 10–44)
ANION GAP SERPL CALC-SCNC: 9 MMOL/L (ref 8–16)
AST SERPL-CCNC: 14 U/L (ref 10–40)
BASOPHILS # BLD AUTO: 0.01 K/UL (ref 0–0.2)
BASOPHILS NFR BLD: 0.5 % (ref 0–1.9)
BILIRUB DIRECT SERPL-MCNC: 0.2 MG/DL (ref 0.1–0.3)
BILIRUB SERPL-MCNC: 0.4 MG/DL (ref 0.1–1)
BUN SERPL-MCNC: 35 MG/DL (ref 8–23)
CALCIUM SERPL-MCNC: 9.6 MG/DL (ref 8.7–10.5)
CHLORIDE SERPL-SCNC: 114 MMOL/L (ref 95–110)
CO2 SERPL-SCNC: 21 MMOL/L (ref 23–29)
CREAT SERPL-MCNC: 1.9 MG/DL (ref 0.5–1.4)
DIFFERENTIAL METHOD: ABNORMAL
EOSINOPHIL # BLD AUTO: 0.1 K/UL (ref 0–0.5)
EOSINOPHIL NFR BLD: 3.7 % (ref 0–8)
ERYTHROCYTE [DISTWIDTH] IN BLOOD BY AUTOMATED COUNT: 14.8 % (ref 11.5–14.5)
EST. GFR  (AFRICAN AMERICAN): 31.9 ML/MIN/1.73 M^2
EST. GFR  (NON AFRICAN AMERICAN): 27.7 ML/MIN/1.73 M^2
GLUCOSE SERPL-MCNC: 65 MG/DL (ref 70–110)
HCT VFR BLD AUTO: 26.7 % (ref 37–48.5)
HGB BLD-MCNC: 8.6 G/DL (ref 12–16)
IMM GRANULOCYTES # BLD AUTO: 0.02 K/UL (ref 0–0.04)
IMM GRANULOCYTES NFR BLD AUTO: 0.9 % (ref 0–0.5)
LYMPHOCYTES # BLD AUTO: 0.8 K/UL (ref 1–4.8)
LYMPHOCYTES NFR BLD: 34.4 % (ref 18–48)
MAGNESIUM SERPL-MCNC: 1.8 MG/DL (ref 1.6–2.6)
MCH RBC QN AUTO: 31.4 PG (ref 27–31)
MCHC RBC AUTO-ENTMCNC: 32.2 G/DL (ref 32–36)
MCV RBC AUTO: 97 FL (ref 82–98)
MONOCYTES # BLD AUTO: 0.2 K/UL (ref 0.3–1)
MONOCYTES NFR BLD: 10.1 % (ref 4–15)
NEUTROPHILS # BLD AUTO: 1.1 K/UL (ref 1.8–7.7)
NEUTROPHILS NFR BLD: 50.4 % (ref 38–73)
NRBC BLD-RTO: 0 /100 WBC
PHOSPHATE SERPL-MCNC: 3.6 MG/DL (ref 2.7–4.5)
PLATELET # BLD AUTO: 118 K/UL (ref 150–450)
PMV BLD AUTO: 11.7 FL (ref 9.2–12.9)
POTASSIUM SERPL-SCNC: 4.3 MMOL/L (ref 3.5–5.1)
PROT SERPL-MCNC: 6.2 G/DL (ref 6–8.4)
RBC # BLD AUTO: 2.74 M/UL (ref 4–5.4)
SODIUM SERPL-SCNC: 144 MMOL/L (ref 136–145)
WBC # BLD AUTO: 2.18 K/UL (ref 3.9–12.7)

## 2021-07-26 PROCEDURE — 84075 ASSAY ALKALINE PHOSPHATASE: CPT | Performed by: INTERNAL MEDICINE

## 2021-07-26 PROCEDURE — 36415 COLL VENOUS BLD VENIPUNCTURE: CPT | Mod: PO | Performed by: INTERNAL MEDICINE

## 2021-07-26 PROCEDURE — 80197 ASSAY OF TACROLIMUS: CPT | Performed by: INTERNAL MEDICINE

## 2021-07-26 PROCEDURE — 85025 COMPLETE CBC W/AUTO DIFF WBC: CPT | Performed by: INTERNAL MEDICINE

## 2021-07-26 PROCEDURE — 84460 ALANINE AMINO (ALT) (SGPT): CPT | Performed by: INTERNAL MEDICINE

## 2021-07-26 PROCEDURE — 83735 ASSAY OF MAGNESIUM: CPT | Performed by: INTERNAL MEDICINE

## 2021-07-26 PROCEDURE — 80069 RENAL FUNCTION PANEL: CPT | Performed by: INTERNAL MEDICINE

## 2021-07-27 ENCOUNTER — TELEPHONE (OUTPATIENT)
Dept: TRANSPLANT | Facility: CLINIC | Age: 66
End: 2021-07-27

## 2021-07-27 LAB
TACROLIMUS BLD-MCNC: 3.1 NG/ML (ref 5–15)
TACROLIMUS, NORMALIZED: 3 NG/ML (ref 5–15)

## 2021-08-02 RX ORDER — ATORVASTATIN CALCIUM 40 MG/1
40 TABLET, FILM COATED ORAL DAILY
Qty: 90 TABLET | Refills: 0 | OUTPATIENT
Start: 2021-08-02

## 2021-08-06 ENCOUNTER — LAB VISIT (OUTPATIENT)
Dept: LAB | Facility: HOSPITAL | Age: 66
End: 2021-08-06
Attending: INTERNAL MEDICINE
Payer: MEDICARE

## 2021-08-06 DIAGNOSIS — Z94.0 KIDNEY REPLACED BY TRANSPLANT: ICD-10-CM

## 2021-08-06 LAB
ALBUMIN SERPL BCP-MCNC: 3.8 G/DL (ref 3.5–5.2)
ALBUMIN SERPL BCP-MCNC: 3.8 G/DL (ref 3.5–5.2)
ALP SERPL-CCNC: 117 U/L (ref 55–135)
ALT SERPL W/O P-5'-P-CCNC: 61 U/L (ref 10–44)
ANION GAP SERPL CALC-SCNC: 8 MMOL/L (ref 8–16)
AST SERPL-CCNC: 23 U/L (ref 10–40)
BASOPHILS # BLD AUTO: 0.02 K/UL (ref 0–0.2)
BASOPHILS NFR BLD: 0.9 % (ref 0–1.9)
BILIRUB DIRECT SERPL-MCNC: 0.2 MG/DL (ref 0.1–0.3)
BILIRUB SERPL-MCNC: 0.5 MG/DL (ref 0.1–1)
BUN SERPL-MCNC: 38 MG/DL (ref 8–23)
CALCIUM SERPL-MCNC: 9.8 MG/DL (ref 8.7–10.5)
CHLORIDE SERPL-SCNC: 110 MMOL/L (ref 95–110)
CO2 SERPL-SCNC: 24 MMOL/L (ref 23–29)
CREAT SERPL-MCNC: 1.9 MG/DL (ref 0.5–1.4)
DIFFERENTIAL METHOD: ABNORMAL
EOSINOPHIL # BLD AUTO: 0.1 K/UL (ref 0–0.5)
EOSINOPHIL NFR BLD: 3.7 % (ref 0–8)
ERYTHROCYTE [DISTWIDTH] IN BLOOD BY AUTOMATED COUNT: 14.8 % (ref 11.5–14.5)
EST. GFR  (AFRICAN AMERICAN): 31.9 ML/MIN/1.73 M^2
EST. GFR  (NON AFRICAN AMERICAN): 27.7 ML/MIN/1.73 M^2
GLUCOSE SERPL-MCNC: 93 MG/DL (ref 70–110)
HCT VFR BLD AUTO: 30 % (ref 37–48.5)
HGB BLD-MCNC: 9.5 G/DL (ref 12–16)
IMM GRANULOCYTES # BLD AUTO: 0 K/UL (ref 0–0.04)
IMM GRANULOCYTES NFR BLD AUTO: 0 % (ref 0–0.5)
LYMPHOCYTES # BLD AUTO: 0.8 K/UL (ref 1–4.8)
LYMPHOCYTES NFR BLD: 34.9 % (ref 18–48)
MAGNESIUM SERPL-MCNC: 2 MG/DL (ref 1.6–2.6)
MCH RBC QN AUTO: 30.9 PG (ref 27–31)
MCHC RBC AUTO-ENTMCNC: 31.7 G/DL (ref 32–36)
MCV RBC AUTO: 98 FL (ref 82–98)
MONOCYTES # BLD AUTO: 0.2 K/UL (ref 0.3–1)
MONOCYTES NFR BLD: 10.1 % (ref 4–15)
NEUTROPHILS # BLD AUTO: 1.1 K/UL (ref 1.8–7.7)
NEUTROPHILS NFR BLD: 50.4 % (ref 38–73)
NRBC BLD-RTO: 0 /100 WBC
PHOSPHATE SERPL-MCNC: 3.6 MG/DL (ref 2.7–4.5)
PLATELET # BLD AUTO: 120 K/UL (ref 150–450)
PMV BLD AUTO: 12.3 FL (ref 9.2–12.9)
POTASSIUM SERPL-SCNC: 4 MMOL/L (ref 3.5–5.1)
PROT SERPL-MCNC: 7 G/DL (ref 6–8.4)
RBC # BLD AUTO: 3.07 M/UL (ref 4–5.4)
SODIUM SERPL-SCNC: 142 MMOL/L (ref 136–145)
WBC # BLD AUTO: 2.18 K/UL (ref 3.9–12.7)

## 2021-08-06 PROCEDURE — 84075 ASSAY ALKALINE PHOSPHATASE: CPT | Performed by: INTERNAL MEDICINE

## 2021-08-06 PROCEDURE — 36415 COLL VENOUS BLD VENIPUNCTURE: CPT | Mod: PO | Performed by: INTERNAL MEDICINE

## 2021-08-06 PROCEDURE — 83735 ASSAY OF MAGNESIUM: CPT | Performed by: INTERNAL MEDICINE

## 2021-08-06 PROCEDURE — 84450 TRANSFERASE (AST) (SGOT): CPT | Performed by: INTERNAL MEDICINE

## 2021-08-06 PROCEDURE — 85025 COMPLETE CBC W/AUTO DIFF WBC: CPT | Performed by: INTERNAL MEDICINE

## 2021-08-06 PROCEDURE — 80069 RENAL FUNCTION PANEL: CPT | Performed by: INTERNAL MEDICINE

## 2021-08-06 PROCEDURE — 80197 ASSAY OF TACROLIMUS: CPT | Performed by: INTERNAL MEDICINE

## 2021-08-07 LAB
TACROLIMUS BLD-MCNC: 3.1 NG/ML (ref 5–15)
TACROLIMUS, NORMALIZED: 2.7 NG/ML (ref 5–15)

## 2021-08-12 ENCOUNTER — LAB VISIT (OUTPATIENT)
Dept: LAB | Facility: HOSPITAL | Age: 66
End: 2021-08-12
Attending: INTERNAL MEDICINE
Payer: MEDICARE

## 2021-08-12 DIAGNOSIS — N18.9 ANEMIA IN CHRONIC KIDNEY DISEASE, UNSPECIFIED CKD STAGE: ICD-10-CM

## 2021-08-12 DIAGNOSIS — D63.1 ANEMIA IN CHRONIC KIDNEY DISEASE, UNSPECIFIED CKD STAGE: ICD-10-CM

## 2021-08-12 LAB
ALBUMIN SERPL BCP-MCNC: 3.9 G/DL (ref 3.5–5.2)
ALP SERPL-CCNC: 123 U/L (ref 55–135)
ALT SERPL W/O P-5'-P-CCNC: 151 U/L (ref 10–44)
ANION GAP SERPL CALC-SCNC: 11 MMOL/L (ref 8–16)
AST SERPL-CCNC: 47 U/L (ref 10–40)
BASOPHILS # BLD AUTO: 0.01 K/UL (ref 0–0.2)
BASOPHILS NFR BLD: 0.3 % (ref 0–1.9)
BILIRUB SERPL-MCNC: 0.4 MG/DL (ref 0.1–1)
BUN SERPL-MCNC: 31 MG/DL (ref 8–23)
CALCIUM SERPL-MCNC: 9.6 MG/DL (ref 8.7–10.5)
CHLORIDE SERPL-SCNC: 111 MMOL/L (ref 95–110)
CO2 SERPL-SCNC: 20 MMOL/L (ref 23–29)
CREAT SERPL-MCNC: 1.9 MG/DL (ref 0.5–1.4)
DIFFERENTIAL METHOD: ABNORMAL
EOSINOPHIL # BLD AUTO: 0.1 K/UL (ref 0–0.5)
EOSINOPHIL NFR BLD: 3.4 % (ref 0–8)
ERYTHROCYTE [DISTWIDTH] IN BLOOD BY AUTOMATED COUNT: 14.2 % (ref 11.5–14.5)
EST. GFR  (AFRICAN AMERICAN): 32 ML/MIN/1.73 M^2
EST. GFR  (NON AFRICAN AMERICAN): 28 ML/MIN/1.73 M^2
FERRITIN SERPL-MCNC: 1284 NG/ML (ref 20–300)
GLUCOSE SERPL-MCNC: 72 MG/DL (ref 70–110)
HCT VFR BLD AUTO: 31.1 % (ref 37–48.5)
HGB BLD-MCNC: 9.9 G/DL (ref 12–16)
IMM GRANULOCYTES # BLD AUTO: 0.01 K/UL (ref 0–0.04)
IMM GRANULOCYTES NFR BLD AUTO: 0.3 % (ref 0–0.5)
IRON SERPL-MCNC: 59 UG/DL (ref 30–160)
LYMPHOCYTES # BLD AUTO: 0.8 K/UL (ref 1–4.8)
LYMPHOCYTES NFR BLD: 25.9 % (ref 18–48)
MCH RBC QN AUTO: 30.9 PG (ref 27–31)
MCHC RBC AUTO-ENTMCNC: 31.8 G/DL (ref 32–36)
MCV RBC AUTO: 97 FL (ref 82–98)
MONOCYTES # BLD AUTO: 0.3 K/UL (ref 0.3–1)
MONOCYTES NFR BLD: 10.3 % (ref 4–15)
NEUTROPHILS # BLD AUTO: 1.7 K/UL (ref 1.8–7.7)
NEUTROPHILS NFR BLD: 59.8 % (ref 38–73)
NRBC BLD-RTO: 0 /100 WBC
PLATELET # BLD AUTO: 121 K/UL (ref 150–450)
PMV BLD AUTO: 11.5 FL (ref 9.2–12.9)
POTASSIUM SERPL-SCNC: 4.3 MMOL/L (ref 3.5–5.1)
PROT SERPL-MCNC: 6.8 G/DL (ref 6–8.4)
RBC # BLD AUTO: 3.2 M/UL (ref 4–5.4)
SATURATED IRON: 21 % (ref 20–50)
SODIUM SERPL-SCNC: 142 MMOL/L (ref 136–145)
TOTAL IRON BINDING CAPACITY: 283 UG/DL (ref 250–450)
TRANSFERRIN SERPL-MCNC: 191 MG/DL (ref 200–375)
WBC # BLD AUTO: 2.9 K/UL (ref 3.9–12.7)

## 2021-08-12 PROCEDURE — 36415 COLL VENOUS BLD VENIPUNCTURE: CPT | Mod: PO | Performed by: INTERNAL MEDICINE

## 2021-08-12 PROCEDURE — 84466 ASSAY OF TRANSFERRIN: CPT | Performed by: INTERNAL MEDICINE

## 2021-08-12 PROCEDURE — 82728 ASSAY OF FERRITIN: CPT | Performed by: INTERNAL MEDICINE

## 2021-08-12 PROCEDURE — 80053 COMPREHEN METABOLIC PANEL: CPT | Performed by: INTERNAL MEDICINE

## 2021-08-12 PROCEDURE — 85025 COMPLETE CBC W/AUTO DIFF WBC: CPT | Performed by: INTERNAL MEDICINE

## 2021-08-13 ENCOUNTER — TELEPHONE (OUTPATIENT)
Dept: HEMATOLOGY/ONCOLOGY | Facility: CLINIC | Age: 66
End: 2021-08-13

## 2021-08-16 ENCOUNTER — OFFICE VISIT (OUTPATIENT)
Dept: HEMATOLOGY/ONCOLOGY | Facility: CLINIC | Age: 66
End: 2021-08-16
Payer: MEDICARE

## 2021-08-16 DIAGNOSIS — D61.818 PANCYTOPENIA: ICD-10-CM

## 2021-08-16 DIAGNOSIS — N18.9 ANEMIA IN CHRONIC KIDNEY DISEASE, UNSPECIFIED CKD STAGE: Primary | ICD-10-CM

## 2021-08-16 DIAGNOSIS — D63.1 ANEMIA IN CHRONIC KIDNEY DISEASE, UNSPECIFIED CKD STAGE: Primary | ICD-10-CM

## 2021-08-16 PROCEDURE — 99214 PR OFFICE/OUTPT VISIT, EST, LEVL IV, 30-39 MIN: ICD-10-PCS | Mod: 95,,, | Performed by: INTERNAL MEDICINE

## 2021-08-16 PROCEDURE — 99214 OFFICE O/P EST MOD 30 MIN: CPT | Mod: 95,,, | Performed by: INTERNAL MEDICINE

## 2021-09-03 ENCOUNTER — TELEPHONE (OUTPATIENT)
Dept: TRANSPLANT | Facility: CLINIC | Age: 66
End: 2021-09-03

## 2021-09-16 ENCOUNTER — LAB VISIT (OUTPATIENT)
Dept: LAB | Facility: HOSPITAL | Age: 66
End: 2021-09-16
Attending: INTERNAL MEDICINE
Payer: MEDICARE

## 2021-09-16 DIAGNOSIS — Z94.0 KIDNEY REPLACED BY TRANSPLANT: ICD-10-CM

## 2021-09-16 PROCEDURE — 81001 URINALYSIS AUTO W/SCOPE: CPT | Performed by: INTERNAL MEDICINE

## 2021-09-16 PROCEDURE — 84156 ASSAY OF PROTEIN URINE: CPT | Performed by: INTERNAL MEDICINE

## 2021-09-16 RX ORDER — KETOCONAZOLE 200 MG/1
100 TABLET ORAL DAILY
Qty: 15 TABLET | Refills: 11 | Status: SHIPPED | OUTPATIENT
Start: 2021-09-16 | End: 2022-06-10 | Stop reason: SDUPTHER

## 2021-09-16 RX ORDER — KETOCONAZOLE 200 MG/1
100 TABLET ORAL DAILY
Qty: 15 TABLET | Refills: 11 | Status: CANCELLED | OUTPATIENT
Start: 2021-09-16

## 2021-09-16 RX ORDER — TACROLIMUS 1 MG/1
4 CAPSULE ORAL EVERY 12 HOURS
Qty: 240 CAPSULE | Refills: 11 | Status: CANCELLED | OUTPATIENT
Start: 2021-09-16 | End: 2022-09-16

## 2021-09-17 LAB
BACTERIA #/AREA URNS AUTO: ABNORMAL /HPF
BILIRUB UR QL STRIP: NEGATIVE
CLARITY UR REFRACT.AUTO: ABNORMAL
COLOR UR AUTO: YELLOW
CREAT UR-MCNC: 179 MG/DL (ref 15–325)
GLUCOSE UR QL STRIP: NEGATIVE
HGB UR QL STRIP: ABNORMAL
HYALINE CASTS UR QL AUTO: 5 /LPF
KETONES UR QL STRIP: NEGATIVE
LEUKOCYTE ESTERASE UR QL STRIP: NEGATIVE
MICROSCOPIC COMMENT: ABNORMAL
NITRITE UR QL STRIP: NEGATIVE
NON-SQ EPI CELLS #/AREA URNS AUTO: <1 /HPF
PH UR STRIP: 5 [PH] (ref 5–8)
PROT UR QL STRIP: ABNORMAL
PROT UR-MCNC: 69 MG/DL (ref 0–15)
PROT/CREAT UR: 0.39 MG/G{CREAT} (ref 0–0.2)
RBC #/AREA URNS AUTO: 6 /HPF (ref 0–4)
SP GR UR STRIP: 1.01 (ref 1–1.03)
SQUAMOUS #/AREA URNS AUTO: 4 /HPF
URN SPEC COLLECT METH UR: ABNORMAL
WBC #/AREA URNS AUTO: 5 /HPF (ref 0–5)

## 2021-09-21 ENCOUNTER — TELEPHONE (OUTPATIENT)
Dept: TRANSPLANT | Facility: CLINIC | Age: 66
End: 2021-09-21

## 2021-09-30 ENCOUNTER — LAB VISIT (OUTPATIENT)
Dept: LAB | Facility: HOSPITAL | Age: 66
End: 2021-09-30
Attending: INTERNAL MEDICINE
Payer: MEDICARE

## 2021-09-30 DIAGNOSIS — Z94.0 KIDNEY REPLACED BY TRANSPLANT: ICD-10-CM

## 2021-09-30 PROCEDURE — 87799 DETECT AGENT NOS DNA QUANT: CPT | Performed by: INTERNAL MEDICINE

## 2021-09-30 PROCEDURE — 36415 COLL VENOUS BLD VENIPUNCTURE: CPT | Mod: PO | Performed by: INTERNAL MEDICINE

## 2021-10-14 DIAGNOSIS — Z94.0 KIDNEY REPLACED BY TRANSPLANT: ICD-10-CM

## 2021-10-14 RX ORDER — TACROLIMUS 1 MG/1
4 CAPSULE ORAL EVERY 12 HOURS
Qty: 240 CAPSULE | Refills: 11 | Status: SHIPPED | OUTPATIENT
Start: 2021-10-14 | End: 2022-11-14

## 2021-10-14 RX ORDER — TACROLIMUS 1 MG/1
4 CAPSULE ORAL EVERY 12 HOURS
Qty: 240 CAPSULE | Refills: 11 | Status: CANCELLED | OUTPATIENT
Start: 2021-10-14 | End: 2022-10-14

## 2021-10-15 ENCOUNTER — LAB VISIT (OUTPATIENT)
Dept: LAB | Facility: HOSPITAL | Age: 66
End: 2021-10-15
Attending: INTERNAL MEDICINE
Payer: MEDICARE

## 2021-10-15 DIAGNOSIS — Z94.0 KIDNEY REPLACED BY TRANSPLANT: ICD-10-CM

## 2021-10-15 PROCEDURE — 36415 COLL VENOUS BLD VENIPUNCTURE: CPT | Mod: PO | Performed by: INTERNAL MEDICINE

## 2021-10-15 PROCEDURE — 87799 DETECT AGENT NOS DNA QUANT: CPT | Performed by: INTERNAL MEDICINE

## 2021-10-28 ENCOUNTER — LAB VISIT (OUTPATIENT)
Dept: LAB | Facility: HOSPITAL | Age: 66
End: 2021-10-28
Attending: INTERNAL MEDICINE
Payer: MEDICARE

## 2021-10-28 DIAGNOSIS — Z94.0 KIDNEY REPLACED BY TRANSPLANT: ICD-10-CM

## 2021-10-28 PROCEDURE — 87799 DETECT AGENT NOS DNA QUANT: CPT | Performed by: INTERNAL MEDICINE

## 2021-10-28 PROCEDURE — 36415 COLL VENOUS BLD VENIPUNCTURE: CPT | Mod: PO | Performed by: INTERNAL MEDICINE

## 2021-11-12 ENCOUNTER — LAB VISIT (OUTPATIENT)
Dept: LAB | Facility: HOSPITAL | Age: 66
End: 2021-11-12
Attending: INTERNAL MEDICINE
Payer: MEDICARE

## 2021-11-12 DIAGNOSIS — Z94.0 KIDNEY REPLACED BY TRANSPLANT: ICD-10-CM

## 2021-11-12 LAB
ALBUMIN SERPL BCP-MCNC: 3.8 G/DL (ref 3.5–5.2)
ALBUMIN SERPL BCP-MCNC: 3.8 G/DL (ref 3.5–5.2)
ALP SERPL-CCNC: 92 U/L (ref 55–135)
ALT SERPL W/O P-5'-P-CCNC: 40 U/L (ref 10–44)
ANION GAP SERPL CALC-SCNC: 10 MMOL/L (ref 8–16)
AST SERPL-CCNC: 13 U/L (ref 10–40)
BASOPHILS # BLD AUTO: 0.01 K/UL (ref 0–0.2)
BASOPHILS NFR BLD: 0.4 % (ref 0–1.9)
BILIRUB DIRECT SERPL-MCNC: 0.1 MG/DL (ref 0.1–0.3)
BILIRUB SERPL-MCNC: 0.4 MG/DL (ref 0.1–1)
BUN SERPL-MCNC: 29 MG/DL (ref 8–23)
CALCIUM SERPL-MCNC: 9.4 MG/DL (ref 8.7–10.5)
CHLORIDE SERPL-SCNC: 111 MMOL/L (ref 95–110)
CO2 SERPL-SCNC: 23 MMOL/L (ref 23–29)
CREAT SERPL-MCNC: 2 MG/DL (ref 0.5–1.4)
DIFFERENTIAL METHOD: ABNORMAL
EOSINOPHIL # BLD AUTO: 0 K/UL (ref 0–0.5)
EOSINOPHIL NFR BLD: 1.6 % (ref 0–8)
ERYTHROCYTE [DISTWIDTH] IN BLOOD BY AUTOMATED COUNT: 15.3 % (ref 11.5–14.5)
EST. GFR  (AFRICAN AMERICAN): 29.8 ML/MIN/1.73 M^2
EST. GFR  (NON AFRICAN AMERICAN): 25.8 ML/MIN/1.73 M^2
GLUCOSE SERPL-MCNC: 93 MG/DL (ref 70–110)
HCT VFR BLD AUTO: 32.6 % (ref 37–48.5)
HGB BLD-MCNC: 9.9 G/DL (ref 12–16)
IMM GRANULOCYTES # BLD AUTO: 0.02 K/UL (ref 0–0.04)
IMM GRANULOCYTES NFR BLD AUTO: 0.8 % (ref 0–0.5)
LYMPHOCYTES # BLD AUTO: 0.8 K/UL (ref 1–4.8)
LYMPHOCYTES NFR BLD: 32.1 % (ref 18–48)
MAGNESIUM SERPL-MCNC: 1.6 MG/DL (ref 1.6–2.6)
MCH RBC QN AUTO: 29.6 PG (ref 27–31)
MCHC RBC AUTO-ENTMCNC: 30.4 G/DL (ref 32–36)
MCV RBC AUTO: 98 FL (ref 82–98)
MONOCYTES # BLD AUTO: 0.3 K/UL (ref 0.3–1)
MONOCYTES NFR BLD: 11.1 % (ref 4–15)
NEUTROPHILS # BLD AUTO: 1.3 K/UL (ref 1.8–7.7)
NEUTROPHILS NFR BLD: 54 % (ref 38–73)
NRBC BLD-RTO: 1 /100 WBC
PHOSPHATE SERPL-MCNC: 3.1 MG/DL (ref 2.7–4.5)
PLATELET # BLD AUTO: 143 K/UL (ref 150–450)
PMV BLD AUTO: 12.5 FL (ref 9.2–12.9)
POTASSIUM SERPL-SCNC: 4.2 MMOL/L (ref 3.5–5.1)
PROT SERPL-MCNC: 6.6 G/DL (ref 6–8.4)
RBC # BLD AUTO: 3.34 M/UL (ref 4–5.4)
SODIUM SERPL-SCNC: 144 MMOL/L (ref 136–145)
WBC # BLD AUTO: 2.43 K/UL (ref 3.9–12.7)

## 2021-11-12 PROCEDURE — 83735 ASSAY OF MAGNESIUM: CPT | Performed by: INTERNAL MEDICINE

## 2021-11-12 PROCEDURE — 87799 DETECT AGENT NOS DNA QUANT: CPT | Performed by: INTERNAL MEDICINE

## 2021-11-12 PROCEDURE — 84075 ASSAY ALKALINE PHOSPHATASE: CPT | Performed by: INTERNAL MEDICINE

## 2021-11-12 PROCEDURE — 85025 COMPLETE CBC W/AUTO DIFF WBC: CPT | Performed by: INTERNAL MEDICINE

## 2021-11-12 PROCEDURE — 80197 ASSAY OF TACROLIMUS: CPT | Performed by: INTERNAL MEDICINE

## 2021-11-12 PROCEDURE — 80069 RENAL FUNCTION PANEL: CPT | Performed by: INTERNAL MEDICINE

## 2021-11-13 LAB — TACROLIMUS BLD-MCNC: 4.2 NG/ML (ref 5–15)

## 2021-11-16 ENCOUNTER — HOSPITAL ENCOUNTER (OUTPATIENT)
Dept: CARDIOLOGY | Facility: HOSPITAL | Age: 66
Discharge: HOME | End: 2021-11-16
Attending: INTERNAL MEDICINE
Payer: MEDICARE

## 2021-11-16 VITALS
BODY MASS INDEX: 29.41 KG/M2 | WEIGHT: 166 LBS | HEIGHT: 63 IN | SYSTOLIC BLOOD PRESSURE: 105 MMHG | DIASTOLIC BLOOD PRESSURE: 58 MMHG

## 2021-11-16 DIAGNOSIS — I31.39 PERICARDIAL EFFUSION: ICD-10-CM

## 2021-11-16 PROCEDURE — 93306 TTE W/DOPPLER COMPLETE: CPT | Mod: 26,,, | Performed by: INTERNAL MEDICINE

## 2021-11-16 PROCEDURE — 93306 ECHO (CUPID ONLY): ICD-10-PCS | Mod: 26,,, | Performed by: INTERNAL MEDICINE

## 2021-11-16 PROCEDURE — 93306 TTE W/DOPPLER COMPLETE: CPT | Mod: PO

## 2021-11-17 LAB
AORTIC ROOT ANNULUS: 3.26 CM
ASCENDING AORTA: 2.95 CM
AV INDEX (PROSTH): 0.67
AV MEAN GRADIENT: 13 MMHG
AV PEAK GRADIENT: 22 MMHG
AV VALVE AREA: 2.18 CM2
AV VELOCITY RATIO: 0.62
BSA FOR ECHO PROCEDURE: 1.83 M2
CV ECHO LV RWT: 0.45 CM
DOP CALC AO PEAK VEL: 2.37 M/S
DOP CALC AO VTI: 55.9 CM
DOP CALC LVOT AREA: 3.2 CM2
DOP CALC LVOT DIAMETER: 2.03 CM
DOP CALC LVOT PEAK VEL: 1.47 M/S
DOP CALC LVOT STROKE VOLUME: 121.96 CM3
DOP CALC RVOT PEAK VEL: 1.35 M/S
DOP CALC RVOT VTI: 30.3 CM
DOP CALCLVOT PEAK VEL VTI: 37.7 CM
E WAVE DECELERATION TIME: 265.48 MSEC
E/A RATIO: 0.93
E/E' RATIO: 10 M/S
ECHO EF ESTIMATED: 80 %
ECHO LV POSTERIOR WALL: 1.12 CM (ref 0.6–1.1)
EJECTION FRACTION: 65 %
FRACTIONAL SHORTENING: 49 % (ref 28–44)
HCV RNA # SERPL NAA+PROBE: 1768.4 MMHG/S
INTERVENTRICULAR SEPTUM: 1.06 CM (ref 0.6–1.1)
IVRT: 88.49 MSEC
LA MAJOR: 6.08 CM
LA WIDTH: 4.11 CM
LEFT ATRIUM SIZE: 4.08 CM
LEFT ATRIUM VOLUME INDEX MOD: 34.9 ML/M2
LEFT ATRIUM VOLUME MOD: 62.46 CM3
LEFT INTERNAL DIMENSION IN SYSTOLE: 2.57 CM (ref 2.1–4)
LEFT VENTRICLE DIASTOLIC VOLUME INDEX: 66.98 ML/M2
LEFT VENTRICLE DIASTOLIC VOLUME: 119.9 ML
LEFT VENTRICLE MASS INDEX: 115 G/M2
LEFT VENTRICLE SYSTOLIC VOLUME INDEX: 13.3 ML/M2
LEFT VENTRICLE SYSTOLIC VOLUME: 23.86 ML
LEFT VENTRICULAR INTERNAL DIMENSION IN DIASTOLE: 5.03 CM (ref 3.5–6)
LEFT VENTRICULAR MASS: 206.55 G
LV LATERAL E/E' RATIO: 7.92 M/S
LV SEPTAL E/E' RATIO: 13.57 M/S
LVOT MG: 5.66 MMHG
LVOT MV: 1.17 CM/S
MV PEAK A VEL: 1.02 M/S
MV PEAK E VEL: 0.95 M/S
PISA TR MAX VEL: 3.4 M/S
PV MEAN GRADIENT: 4.19 MMHG
PV MV: 1.09 M/S
PV PEAK VELOCITY: 1.5 CM/S
RA MAJOR: 4.4 CM
RA PRESSURE: 3 MMHG
RA WIDTH: 3.09 CM
RIGHT VENTRICULAR END-DIASTOLIC DIMENSION: 2.25 CM
SINUS: 2.65 CM
STJ: 2.47 CM
TDI LATERAL: 0.12 M/S
TDI SEPTAL: 0.07 M/S
TDI: 0.1 M/S
TR MAX PG: 46 MMHG
TRICUSPID ANNULAR PLANE SYSTOLIC EXCURSION: 2.3 CM
TV REST PULMONARY ARTERY PRESSURE: 49 MMHG

## 2021-11-18 ENCOUNTER — TELEPHONE (OUTPATIENT)
Dept: CARDIOLOGY | Facility: CLINIC | Age: 66
End: 2021-11-18
Payer: MEDICARE

## 2021-11-19 ENCOUNTER — OFFICE VISIT (OUTPATIENT)
Dept: CARDIOLOGY | Facility: CLINIC | Age: 66
End: 2021-11-19
Payer: MEDICARE

## 2021-11-19 ENCOUNTER — HOSPITAL ENCOUNTER (OUTPATIENT)
Dept: CARDIOLOGY | Facility: HOSPITAL | Age: 66
Discharge: HOME | End: 2021-11-19
Attending: INTERNAL MEDICINE
Payer: MEDICARE

## 2021-11-19 VITALS
BODY MASS INDEX: 32.65 KG/M2 | OXYGEN SATURATION: 98 % | HEART RATE: 73 BPM | SYSTOLIC BLOOD PRESSURE: 142 MMHG | DIASTOLIC BLOOD PRESSURE: 58 MMHG | WEIGHT: 184.31 LBS

## 2021-11-19 DIAGNOSIS — E78.00 PURE HYPERCHOLESTEROLEMIA: ICD-10-CM

## 2021-11-19 DIAGNOSIS — T82.898S ARTERIOVENOUS FISTULA OCCLUSION, SEQUELA: ICD-10-CM

## 2021-11-19 DIAGNOSIS — I12.9 HYPERTENSION, RENAL: Chronic | ICD-10-CM

## 2021-11-19 DIAGNOSIS — I12.9 HYPERTENSION, RENAL: ICD-10-CM

## 2021-11-19 DIAGNOSIS — Z94.0 STATUS POST KIDNEY TRANSPLANT: ICD-10-CM

## 2021-11-19 DIAGNOSIS — I31.39 PERICARDIAL EFFUSION: Primary | ICD-10-CM

## 2021-11-19 DIAGNOSIS — Z94.0 KIDNEY REPLACED BY TRANSPLANT: ICD-10-CM

## 2021-11-19 DIAGNOSIS — R00.1 BRADYCARDIA: ICD-10-CM

## 2021-11-19 DIAGNOSIS — I12.9 HYPERTENSION, RENAL: Primary | ICD-10-CM

## 2021-11-19 DIAGNOSIS — Z86.73 HISTORY OF STROKE: ICD-10-CM

## 2021-11-19 PROCEDURE — 93005 ELECTROCARDIOGRAM TRACING: CPT

## 2021-11-19 PROCEDURE — 93010 EKG 12-LEAD: ICD-10-PCS | Mod: ,,, | Performed by: INTERNAL MEDICINE

## 2021-11-19 PROCEDURE — 99214 PR OFFICE/OUTPT VISIT, EST, LEVL IV, 30-39 MIN: ICD-10-PCS | Mod: S$PBB,,, | Performed by: INTERNAL MEDICINE

## 2021-11-19 PROCEDURE — 99214 OFFICE O/P EST MOD 30 MIN: CPT | Mod: S$PBB,,, | Performed by: INTERNAL MEDICINE

## 2021-11-19 PROCEDURE — 99999 PR PBB SHADOW E&M-EST. PATIENT-LVL IV: ICD-10-PCS | Mod: PBBFAC,,, | Performed by: INTERNAL MEDICINE

## 2021-11-19 PROCEDURE — 93010 ELECTROCARDIOGRAM REPORT: CPT | Mod: ,,, | Performed by: INTERNAL MEDICINE

## 2021-11-19 PROCEDURE — 99999 PR PBB SHADOW E&M-EST. PATIENT-LVL IV: CPT | Mod: PBBFAC,,, | Performed by: INTERNAL MEDICINE

## 2021-11-19 PROCEDURE — 99214 OFFICE O/P EST MOD 30 MIN: CPT | Mod: PBBFAC | Performed by: INTERNAL MEDICINE

## 2021-11-22 ENCOUNTER — TELEPHONE (OUTPATIENT)
Dept: CARDIOLOGY | Facility: CLINIC | Age: 66
End: 2021-11-22
Payer: MEDICARE

## 2021-12-09 ENCOUNTER — LAB VISIT (OUTPATIENT)
Dept: LAB | Facility: HOSPITAL | Age: 66
End: 2021-12-09
Attending: INTERNAL MEDICINE
Payer: MEDICARE

## 2021-12-09 DIAGNOSIS — Z94.0 KIDNEY REPLACED BY TRANSPLANT: ICD-10-CM

## 2021-12-09 LAB
ALBUMIN SERPL BCP-MCNC: 3.8 G/DL (ref 3.5–5.2)
ALBUMIN SERPL BCP-MCNC: 3.8 G/DL (ref 3.5–5.2)
ALP SERPL-CCNC: 85 U/L (ref 55–135)
ALT SERPL W/O P-5'-P-CCNC: 36 U/L (ref 10–44)
ANION GAP SERPL CALC-SCNC: 9 MMOL/L (ref 8–16)
AST SERPL-CCNC: 16 U/L (ref 10–40)
BASOPHILS # BLD AUTO: 0.01 K/UL (ref 0–0.2)
BASOPHILS NFR BLD: 0.4 % (ref 0–1.9)
BILIRUB DIRECT SERPL-MCNC: 0.1 MG/DL (ref 0.1–0.3)
BILIRUB SERPL-MCNC: 0.5 MG/DL (ref 0.1–1)
BUN SERPL-MCNC: 36 MG/DL (ref 8–23)
CALCIUM SERPL-MCNC: 9.4 MG/DL (ref 8.7–10.5)
CHLORIDE SERPL-SCNC: 113 MMOL/L (ref 95–110)
CO2 SERPL-SCNC: 22 MMOL/L (ref 23–29)
CREAT SERPL-MCNC: 1.7 MG/DL (ref 0.5–1.4)
DIFFERENTIAL METHOD: ABNORMAL
EOSINOPHIL # BLD AUTO: 0.1 K/UL (ref 0–0.5)
EOSINOPHIL NFR BLD: 2.6 % (ref 0–8)
ERYTHROCYTE [DISTWIDTH] IN BLOOD BY AUTOMATED COUNT: 15.1 % (ref 11.5–14.5)
EST. GFR  (AFRICAN AMERICAN): 36.2 ML/MIN/1.73 M^2
EST. GFR  (NON AFRICAN AMERICAN): 31.4 ML/MIN/1.73 M^2
GLUCOSE SERPL-MCNC: 86 MG/DL (ref 70–110)
HCT VFR BLD AUTO: 31.9 % (ref 37–48.5)
HGB BLD-MCNC: 9.9 G/DL (ref 12–16)
IMM GRANULOCYTES # BLD AUTO: 0.01 K/UL (ref 0–0.04)
IMM GRANULOCYTES NFR BLD AUTO: 0.4 % (ref 0–0.5)
LYMPHOCYTES # BLD AUTO: 0.7 K/UL (ref 1–4.8)
LYMPHOCYTES NFR BLD: 31 % (ref 18–48)
MAGNESIUM SERPL-MCNC: 1.7 MG/DL (ref 1.6–2.6)
MCH RBC QN AUTO: 30.3 PG (ref 27–31)
MCHC RBC AUTO-ENTMCNC: 31 G/DL (ref 32–36)
MCV RBC AUTO: 98 FL (ref 82–98)
MONOCYTES # BLD AUTO: 0.2 K/UL (ref 0.3–1)
MONOCYTES NFR BLD: 10.3 % (ref 4–15)
NEUTROPHILS # BLD AUTO: 1.3 K/UL (ref 1.8–7.7)
NEUTROPHILS NFR BLD: 55.3 % (ref 38–73)
NRBC BLD-RTO: 0 /100 WBC
PHOSPHATE SERPL-MCNC: 2.9 MG/DL (ref 2.7–4.5)
PLATELET # BLD AUTO: 123 K/UL (ref 150–450)
PMV BLD AUTO: 12.5 FL (ref 9.2–12.9)
POTASSIUM SERPL-SCNC: 4.2 MMOL/L (ref 3.5–5.1)
PROT SERPL-MCNC: 6.5 G/DL (ref 6–8.4)
RBC # BLD AUTO: 3.27 M/UL (ref 4–5.4)
SODIUM SERPL-SCNC: 144 MMOL/L (ref 136–145)
WBC # BLD AUTO: 2.32 K/UL (ref 3.9–12.7)

## 2021-12-09 PROCEDURE — 85025 COMPLETE CBC W/AUTO DIFF WBC: CPT | Performed by: INTERNAL MEDICINE

## 2021-12-09 PROCEDURE — 80069 RENAL FUNCTION PANEL: CPT | Performed by: INTERNAL MEDICINE

## 2021-12-09 PROCEDURE — 83735 ASSAY OF MAGNESIUM: CPT | Performed by: INTERNAL MEDICINE

## 2021-12-09 PROCEDURE — 36415 COLL VENOUS BLD VENIPUNCTURE: CPT | Mod: PO | Performed by: INTERNAL MEDICINE

## 2021-12-09 PROCEDURE — 84075 ASSAY ALKALINE PHOSPHATASE: CPT | Performed by: INTERNAL MEDICINE

## 2021-12-09 PROCEDURE — 87799 DETECT AGENT NOS DNA QUANT: CPT | Performed by: INTERNAL MEDICINE

## 2021-12-09 PROCEDURE — 80197 ASSAY OF TACROLIMUS: CPT | Performed by: INTERNAL MEDICINE

## 2021-12-09 PROCEDURE — 84450 TRANSFERASE (AST) (SGOT): CPT | Performed by: INTERNAL MEDICINE

## 2021-12-10 LAB — TACROLIMUS BLD-MCNC: 4.2 NG/ML (ref 5–15)

## 2021-12-14 ENCOUNTER — TELEPHONE (OUTPATIENT)
Dept: TRANSPLANT | Facility: CLINIC | Age: 66
End: 2021-12-14
Payer: MEDICARE

## 2021-12-14 DIAGNOSIS — Z94.0 KIDNEY REPLACED BY TRANSPLANT: Primary | ICD-10-CM

## 2022-02-09 DIAGNOSIS — D84.9 IMMUNOSUPPRESSED STATUS: ICD-10-CM

## 2022-03-02 ENCOUNTER — LAB VISIT (OUTPATIENT)
Dept: LAB | Facility: HOSPITAL | Age: 67
End: 2022-03-02
Attending: INTERNAL MEDICINE
Payer: MEDICARE

## 2022-03-02 DIAGNOSIS — N23 PAIN IN TRANSPLANTED KIDNEY: ICD-10-CM

## 2022-03-02 DIAGNOSIS — N18.32 CHRONIC KIDNEY DISEASE (CKD) STAGE G3B/A1, MODERATELY DECREASED GLOMERULAR FILTRATION RATE (GFR) BETWEEN 30-44 ML/MIN/1.73 SQUARE METER AND ALBUMINURIA CREATININE RATIO LESS THAN 30 MG/G: ICD-10-CM

## 2022-03-02 DIAGNOSIS — E11.22 TYPE 2 DIABETES MELLITUS WITH END-STAGE RENAL DISEASE: ICD-10-CM

## 2022-03-02 DIAGNOSIS — N25.81 SECONDARY HYPERPARATHYROIDISM OF RENAL ORIGIN: ICD-10-CM

## 2022-03-02 DIAGNOSIS — R82.90 NONSPECIFIC FINDING ON EXAMINATION OF URINE: ICD-10-CM

## 2022-03-02 DIAGNOSIS — N18.6 TYPE 2 DIABETES MELLITUS WITH END-STAGE RENAL DISEASE: ICD-10-CM

## 2022-03-02 DIAGNOSIS — D64.9 ANEMIA, UNSPECIFIED: ICD-10-CM

## 2022-03-02 DIAGNOSIS — T86.19 PAIN IN TRANSPLANTED KIDNEY: ICD-10-CM

## 2022-03-02 LAB
FERRITIN SERPL-MCNC: 885 NG/ML (ref 20–300)
IRON SERPL-MCNC: 68 UG/DL (ref 30–160)
PTH-INTACT SERPL-MCNC: 340 PG/ML (ref 9–77)
SATURATED IRON: 25 % (ref 20–50)
TOTAL IRON BINDING CAPACITY: 269 UG/DL (ref 250–450)
TRANSFERRIN SERPL-MCNC: 182 MG/DL (ref 200–375)

## 2022-03-02 PROCEDURE — 84466 ASSAY OF TRANSFERRIN: CPT | Performed by: INTERNAL MEDICINE

## 2022-03-02 PROCEDURE — 82728 ASSAY OF FERRITIN: CPT | Performed by: INTERNAL MEDICINE

## 2022-03-02 PROCEDURE — 36415 COLL VENOUS BLD VENIPUNCTURE: CPT | Mod: PO | Performed by: INTERNAL MEDICINE

## 2022-03-02 PROCEDURE — 83970 ASSAY OF PARATHORMONE: CPT | Performed by: INTERNAL MEDICINE

## 2022-03-07 ENCOUNTER — LAB VISIT (OUTPATIENT)
Dept: LAB | Facility: HOSPITAL | Age: 67
End: 2022-03-07
Attending: INTERNAL MEDICINE
Payer: MEDICARE

## 2022-03-07 DIAGNOSIS — Z94.0 KIDNEY REPLACED BY TRANSPLANT: ICD-10-CM

## 2022-03-07 LAB
ALBUMIN SERPL BCP-MCNC: 3.7 G/DL (ref 3.5–5.2)
ALBUMIN SERPL BCP-MCNC: 3.7 G/DL (ref 3.5–5.2)
ALP SERPL-CCNC: 96 U/L (ref 55–135)
ALT SERPL W/O P-5'-P-CCNC: 32 U/L (ref 10–44)
ANION GAP SERPL CALC-SCNC: 11 MMOL/L (ref 8–16)
AST SERPL-CCNC: 19 U/L (ref 10–40)
BASOPHILS # BLD AUTO: 0.01 K/UL (ref 0–0.2)
BASOPHILS NFR BLD: 0.4 % (ref 0–1.9)
BILIRUB DIRECT SERPL-MCNC: 0.2 MG/DL (ref 0.1–0.3)
BILIRUB SERPL-MCNC: 0.5 MG/DL (ref 0.1–1)
BUN SERPL-MCNC: 31 MG/DL (ref 8–23)
CALCIUM SERPL-MCNC: 9.5 MG/DL (ref 8.7–10.5)
CHLORIDE SERPL-SCNC: 110 MMOL/L (ref 95–110)
CO2 SERPL-SCNC: 21 MMOL/L (ref 23–29)
CREAT SERPL-MCNC: 1.8 MG/DL (ref 0.5–1.4)
DIFFERENTIAL METHOD: ABNORMAL
EOSINOPHIL # BLD AUTO: 0.1 K/UL (ref 0–0.5)
EOSINOPHIL NFR BLD: 2.5 % (ref 0–8)
ERYTHROCYTE [DISTWIDTH] IN BLOOD BY AUTOMATED COUNT: 14.9 % (ref 11.5–14.5)
EST. GFR  (AFRICAN AMERICAN): 33.8 ML/MIN/1.73 M^2
EST. GFR  (NON AFRICAN AMERICAN): 29.3 ML/MIN/1.73 M^2
GLUCOSE SERPL-MCNC: 105 MG/DL (ref 70–110)
HCT VFR BLD AUTO: 33.6 % (ref 37–48.5)
HGB BLD-MCNC: 10.5 G/DL (ref 12–16)
IMM GRANULOCYTES # BLD AUTO: 0.01 K/UL (ref 0–0.04)
IMM GRANULOCYTES NFR BLD AUTO: 0.4 % (ref 0–0.5)
LYMPHOCYTES # BLD AUTO: 0.8 K/UL (ref 1–4.8)
LYMPHOCYTES NFR BLD: 28.6 % (ref 18–48)
MAGNESIUM SERPL-MCNC: 1.8 MG/DL (ref 1.6–2.6)
MCH RBC QN AUTO: 30.4 PG (ref 27–31)
MCHC RBC AUTO-ENTMCNC: 31.3 G/DL (ref 32–36)
MCV RBC AUTO: 97 FL (ref 82–98)
MONOCYTES # BLD AUTO: 0.3 K/UL (ref 0.3–1)
MONOCYTES NFR BLD: 10.7 % (ref 4–15)
NEUTROPHILS # BLD AUTO: 1.6 K/UL (ref 1.8–7.7)
NEUTROPHILS NFR BLD: 57.4 % (ref 38–73)
NRBC BLD-RTO: 0 /100 WBC
PHOSPHATE SERPL-MCNC: 2.9 MG/DL (ref 2.7–4.5)
PLATELET # BLD AUTO: 141 K/UL (ref 150–450)
PMV BLD AUTO: 12.3 FL (ref 9.2–12.9)
POTASSIUM SERPL-SCNC: 4.2 MMOL/L (ref 3.5–5.1)
PROT SERPL-MCNC: 6.6 G/DL (ref 6–8.4)
RBC # BLD AUTO: 3.45 M/UL (ref 4–5.4)
SODIUM SERPL-SCNC: 142 MMOL/L (ref 136–145)
WBC # BLD AUTO: 2.8 K/UL (ref 3.9–12.7)

## 2022-03-07 PROCEDURE — 85025 COMPLETE CBC W/AUTO DIFF WBC: CPT | Performed by: INTERNAL MEDICINE

## 2022-03-07 PROCEDURE — 80069 RENAL FUNCTION PANEL: CPT | Performed by: INTERNAL MEDICINE

## 2022-03-07 PROCEDURE — 84075 ASSAY ALKALINE PHOSPHATASE: CPT | Performed by: INTERNAL MEDICINE

## 2022-03-07 PROCEDURE — 80197 ASSAY OF TACROLIMUS: CPT | Performed by: INTERNAL MEDICINE

## 2022-03-07 PROCEDURE — 83735 ASSAY OF MAGNESIUM: CPT | Performed by: INTERNAL MEDICINE

## 2022-03-07 PROCEDURE — 36415 COLL VENOUS BLD VENIPUNCTURE: CPT | Mod: PO | Performed by: INTERNAL MEDICINE

## 2022-03-08 LAB — TACROLIMUS BLD-MCNC: 3.2 NG/ML (ref 5–15)

## 2022-04-05 DIAGNOSIS — D63.1 ANEMIA IN CHRONIC KIDNEY DISEASE, UNSPECIFIED CKD STAGE: Primary | ICD-10-CM

## 2022-04-05 DIAGNOSIS — N18.9 ANEMIA IN CHRONIC KIDNEY DISEASE, UNSPECIFIED CKD STAGE: Primary | ICD-10-CM

## 2022-05-05 ENCOUNTER — TELEPHONE (OUTPATIENT)
Dept: TRANSPLANT | Facility: CLINIC | Age: 67
End: 2022-05-05
Payer: MEDICARE

## 2022-05-05 RX ORDER — PREDNISONE 5 MG/1
5 TABLET ORAL DAILY
Qty: 30 TABLET | Refills: 11 | Status: SHIPPED | OUTPATIENT
Start: 2022-05-05 | End: 2023-05-24 | Stop reason: SDUPTHER

## 2022-05-11 ENCOUNTER — PATIENT MESSAGE (OUTPATIENT)
Dept: RESEARCH | Facility: CLINIC | Age: 67
End: 2022-05-11
Payer: MEDICARE

## 2022-05-18 ENCOUNTER — OFFICE VISIT (OUTPATIENT)
Dept: HEMATOLOGY/ONCOLOGY | Facility: CLINIC | Age: 67
End: 2022-05-18
Payer: MEDICARE

## 2022-05-18 ENCOUNTER — LAB VISIT (OUTPATIENT)
Dept: LAB | Facility: HOSPITAL | Age: 67
End: 2022-05-18
Attending: INTERNAL MEDICINE
Payer: MEDICARE

## 2022-05-18 VITALS
TEMPERATURE: 98 F | WEIGHT: 194.44 LBS | HEIGHT: 63 IN | HEART RATE: 56 BPM | SYSTOLIC BLOOD PRESSURE: 144 MMHG | DIASTOLIC BLOOD PRESSURE: 72 MMHG | BODY MASS INDEX: 34.45 KG/M2

## 2022-05-18 DIAGNOSIS — N18.9 ANEMIA IN CHRONIC KIDNEY DISEASE, UNSPECIFIED CKD STAGE: ICD-10-CM

## 2022-05-18 DIAGNOSIS — D63.1 ANEMIA IN CHRONIC KIDNEY DISEASE, UNSPECIFIED CKD STAGE: Primary | ICD-10-CM

## 2022-05-18 DIAGNOSIS — D63.1 ANEMIA IN CHRONIC KIDNEY DISEASE, UNSPECIFIED CKD STAGE: ICD-10-CM

## 2022-05-18 DIAGNOSIS — D61.818 PANCYTOPENIA: ICD-10-CM

## 2022-05-18 DIAGNOSIS — D61.811 DRUG-INDUCED PANCYTOPENIA: ICD-10-CM

## 2022-05-18 DIAGNOSIS — N18.9 ANEMIA ASSOCIATED WITH CHRONIC RENAL FAILURE: Chronic | ICD-10-CM

## 2022-05-18 DIAGNOSIS — N18.9 ANEMIA IN CHRONIC KIDNEY DISEASE, UNSPECIFIED CKD STAGE: Primary | ICD-10-CM

## 2022-05-18 DIAGNOSIS — D63.1 ANEMIA ASSOCIATED WITH CHRONIC RENAL FAILURE: Chronic | ICD-10-CM

## 2022-05-18 LAB
ALBUMIN SERPL BCP-MCNC: 3.9 G/DL (ref 3.5–5.2)
ALP SERPL-CCNC: 88 U/L (ref 55–135)
ALT SERPL W/O P-5'-P-CCNC: 32 U/L (ref 10–44)
ANION GAP SERPL CALC-SCNC: 12 MMOL/L (ref 8–16)
AST SERPL-CCNC: 17 U/L (ref 10–40)
BASOPHILS # BLD AUTO: 0.01 K/UL (ref 0–0.2)
BASOPHILS NFR BLD: 0.4 % (ref 0–1.9)
BILIRUB SERPL-MCNC: 0.5 MG/DL (ref 0.1–1)
BUN SERPL-MCNC: 24 MG/DL (ref 8–23)
CALCIUM SERPL-MCNC: 9.5 MG/DL (ref 8.7–10.5)
CHLORIDE SERPL-SCNC: 111 MMOL/L (ref 95–110)
CO2 SERPL-SCNC: 20 MMOL/L (ref 23–29)
CREAT SERPL-MCNC: 2 MG/DL (ref 0.5–1.4)
DIFFERENTIAL METHOD: ABNORMAL
EOSINOPHIL # BLD AUTO: 0.1 K/UL (ref 0–0.5)
EOSINOPHIL NFR BLD: 1.8 % (ref 0–8)
ERYTHROCYTE [DISTWIDTH] IN BLOOD BY AUTOMATED COUNT: 15 % (ref 11.5–14.5)
EST. GFR  (AFRICAN AMERICAN): 30 ML/MIN/1.73 M^2
EST. GFR  (NON AFRICAN AMERICAN): 26 ML/MIN/1.73 M^2
GLUCOSE SERPL-MCNC: 92 MG/DL (ref 70–110)
HCT VFR BLD AUTO: 34.2 % (ref 37–48.5)
HGB BLD-MCNC: 10.8 G/DL (ref 12–16)
IMM GRANULOCYTES # BLD AUTO: 0.01 K/UL (ref 0–0.04)
IMM GRANULOCYTES NFR BLD AUTO: 0.4 % (ref 0–0.5)
LYMPHOCYTES # BLD AUTO: 1.1 K/UL (ref 1–4.8)
LYMPHOCYTES NFR BLD: 40.9 % (ref 18–48)
MCH RBC QN AUTO: 30.7 PG (ref 27–31)
MCHC RBC AUTO-ENTMCNC: 31.6 G/DL (ref 32–36)
MCV RBC AUTO: 97 FL (ref 82–98)
MONOCYTES # BLD AUTO: 0.3 K/UL (ref 0.3–1)
MONOCYTES NFR BLD: 9.3 % (ref 4–15)
NEUTROPHILS # BLD AUTO: 1.3 K/UL (ref 1.8–7.7)
NEUTROPHILS NFR BLD: 47.2 % (ref 38–73)
NRBC BLD-RTO: 0 /100 WBC
PLATELET # BLD AUTO: 130 K/UL (ref 150–450)
PMV BLD AUTO: 12 FL (ref 9.2–12.9)
POTASSIUM SERPL-SCNC: 4.5 MMOL/L (ref 3.5–5.1)
PROT SERPL-MCNC: 6.7 G/DL (ref 6–8.4)
RBC # BLD AUTO: 3.52 M/UL (ref 4–5.4)
SODIUM SERPL-SCNC: 143 MMOL/L (ref 136–145)
WBC # BLD AUTO: 2.79 K/UL (ref 3.9–12.7)

## 2022-05-18 PROCEDURE — 99214 PR OFFICE/OUTPT VISIT, EST, LEVL IV, 30-39 MIN: ICD-10-PCS | Mod: S$PBB,,,

## 2022-05-18 PROCEDURE — 85025 COMPLETE CBC W/AUTO DIFF WBC: CPT

## 2022-05-18 PROCEDURE — 99999 PR PBB SHADOW E&M-EST. PATIENT-LVL IV: CPT | Mod: PBBFAC,,,

## 2022-05-18 PROCEDURE — 80053 COMPREHEN METABOLIC PANEL: CPT

## 2022-05-18 PROCEDURE — 99214 OFFICE O/P EST MOD 30 MIN: CPT | Mod: PBBFAC

## 2022-05-18 PROCEDURE — 99999 PR PBB SHADOW E&M-EST. PATIENT-LVL IV: ICD-10-PCS | Mod: PBBFAC,,,

## 2022-05-18 PROCEDURE — 36415 COLL VENOUS BLD VENIPUNCTURE: CPT

## 2022-05-18 PROCEDURE — 99214 OFFICE O/P EST MOD 30 MIN: CPT | Mod: S$PBB,,,

## 2022-05-18 RX ORDER — FERROUS SULFATE 325(65) MG
TABLET ORAL DAILY
COMMUNITY
Start: 2022-03-17

## 2022-05-19 DIAGNOSIS — I12.9 HYPERTENSION, RENAL: Primary | ICD-10-CM

## 2022-05-20 ENCOUNTER — HOSPITAL ENCOUNTER (OUTPATIENT)
Dept: CARDIOLOGY | Facility: HOSPITAL | Age: 67
Discharge: HOME | End: 2022-05-20
Attending: INTERNAL MEDICINE
Payer: MEDICARE

## 2022-05-20 ENCOUNTER — OFFICE VISIT (OUTPATIENT)
Dept: CARDIOLOGY | Facility: CLINIC | Age: 67
End: 2022-05-20
Payer: MEDICARE

## 2022-05-20 VITALS
HEIGHT: 63 IN | BODY MASS INDEX: 34.38 KG/M2 | OXYGEN SATURATION: 100 % | DIASTOLIC BLOOD PRESSURE: 86 MMHG | SYSTOLIC BLOOD PRESSURE: 138 MMHG | WEIGHT: 194 LBS | HEART RATE: 59 BPM

## 2022-05-20 DIAGNOSIS — N18.30 STAGE 3 CHRONIC KIDNEY DISEASE, UNSPECIFIED WHETHER STAGE 3A OR 3B CKD: ICD-10-CM

## 2022-05-20 DIAGNOSIS — Z79.60 LONG-TERM USE OF IMMUNOSUPPRESSANT MEDICATION: ICD-10-CM

## 2022-05-20 DIAGNOSIS — T82.898S ARTERIOVENOUS FISTULA OCCLUSION, SEQUELA: ICD-10-CM

## 2022-05-20 DIAGNOSIS — E78.00 PURE HYPERCHOLESTEROLEMIA: ICD-10-CM

## 2022-05-20 DIAGNOSIS — I12.9 HYPERTENSION, RENAL: ICD-10-CM

## 2022-05-20 DIAGNOSIS — Z94.0 KIDNEY REPLACED BY TRANSPLANT: ICD-10-CM

## 2022-05-20 DIAGNOSIS — I31.39 PERICARDIAL EFFUSION: Primary | ICD-10-CM

## 2022-05-20 DIAGNOSIS — I12.9 HYPERTENSION, RENAL: Chronic | ICD-10-CM

## 2022-05-20 DIAGNOSIS — Z86.73 HISTORY OF STROKE: ICD-10-CM

## 2022-05-20 PROCEDURE — 99999 PR PBB SHADOW E&M-EST. PATIENT-LVL IV: ICD-10-PCS | Mod: PBBFAC,,, | Performed by: INTERNAL MEDICINE

## 2022-05-20 PROCEDURE — 99214 PR OFFICE/OUTPT VISIT, EST, LEVL IV, 30-39 MIN: ICD-10-PCS | Mod: S$PBB,,, | Performed by: INTERNAL MEDICINE

## 2022-05-20 PROCEDURE — 93005 ELECTROCARDIOGRAM TRACING: CPT

## 2022-05-20 PROCEDURE — 93010 ELECTROCARDIOGRAM REPORT: CPT | Mod: ,,, | Performed by: INTERNAL MEDICINE

## 2022-05-20 PROCEDURE — 93010 EKG 12-LEAD: ICD-10-PCS | Mod: ,,, | Performed by: INTERNAL MEDICINE

## 2022-05-20 PROCEDURE — 99214 OFFICE O/P EST MOD 30 MIN: CPT | Mod: S$PBB,,, | Performed by: INTERNAL MEDICINE

## 2022-05-20 PROCEDURE — 99214 OFFICE O/P EST MOD 30 MIN: CPT | Mod: PBBFAC | Performed by: INTERNAL MEDICINE

## 2022-05-20 PROCEDURE — 99999 PR PBB SHADOW E&M-EST. PATIENT-LVL IV: CPT | Mod: PBBFAC,,, | Performed by: INTERNAL MEDICINE

## 2022-05-20 NOTE — PROGRESS NOTES
Subjective:   Patient ID:  Satinder Schulte is a 66 y.o. female who presents for follow up of No chief complaint on file.      65 yo female, 6 months f/u   PMH chronic pericardial effusion, DM, HTN HLD h/o stroke, chronic anemia and s/p renal Rx in 2019, ESRD   Echo in  at [facility] showed normal biv function, LVH, Large posterolateral pericardial effusion. The Effusion is moderate -large posterior-laterally, and small laterally and outside the RA. No temporade  Echo done in  normal EF and no pericardial effusion  Cardiac PET in  no ischemia and normal EF  No chest pain SOB faint dizziness and leg swelling  No fatigue      visit  Repeat ECHo showed moderate pericardial effusion 1.4 cm at posterior wall  No SOB fatigue dizziness and faint. weigth gained,  Sleeps on 1 p illow. EKG NSR    Interval history  No fatigue dizziness faint syncope chest pain   Chronic BARBA. Walks few times a week. Lives alone  ekg NSR. No acute stt change        Past Medical History:   Diagnosis Date    Abnormal Pap smear     repeat paps were normal    Anemia associated with chronic renal failure     Awaiting organ transplant status  - 02/18/2013 6/6/2014    Blood type A+ 6/6/2014    CKD (chronic kidney disease) stage 3, GFR 30-59 ml/min 10/30/2019    CKD (chronic kidney disease) stage 5, GFR less than 15 ml/min     secondary hypertension    Diabetes mellitus     Essential hypertension 5/19/2017    Hyperlipidemia     Hypertension, renal 1992    Immunocompromised state 10/4/2019    Stroke 2007    Residual speech deficit       Past Surgical History:   Procedure Laterality Date    AV FISTULA PLACEMENT  2014    BONE MARROW BIOPSY Right 8/23/2018    Procedure: Biopsy-bone marrow;  Surgeon: Anna Lin MD;  Location: [facility] OR [address] [place] [address];  Service: Oncology;  Laterality: Right;    CHOLECYSTECTOMY  2008    HYSTERECTOMY  2011    TAHBSO for benign reasons    KIDNEY TRANSPLANT N/A 9/20/2019    Procedure: TRANSPLANT,  KIDNEY;  Surgeon: Guero Rogers MD;  Location: [facility] OR [address] [place] [address];  Service: Transplant;  Laterality: N/A;    OOPHORECTOMY         Social History     Tobacco Use    Smoking status: Never Smoker    Smokeless tobacco: Never Used   Substance Use Topics    Alcohol use: No    Drug use: No       Family History   Problem Relation Age of Onset    Stroke Mother     Kidney disease Mother         on dialysis    Hypertension Mother     Heart disease Mother     Heart disease Father     Stroke Sister     Kidney disease Brother     Breast cancer Daughter     Heart disease Sister     Ovarian cancer Cousin     Colon cancer Neg Hx     Thrombophilia Neg Hx          Review of Systems   Constitutional: Negative for decreased appetite, diaphoresis, fever, malaise/fatigue and night sweats.   HENT: Negative for nosebleeds.    Eyes: Negative for blurred vision and double vision.   Cardiovascular: Negative for chest pain, claudication, dyspnea on exertion, irregular heartbeat, leg swelling, near-syncope, orthopnea, palpitations, paroxysmal nocturnal dyspnea and syncope.   Respiratory: Negative for cough, shortness of breath, sleep disturbances due to breathing, snoring, sputum production and wheezing.    Endocrine: Negative for cold intolerance and polyuria.   Hematologic/Lymphatic: Does not bruise/bleed easily.   Skin: Negative for rash.   Musculoskeletal: Negative for back pain, falls, joint pain, joint swelling and neck pain.   Gastrointestinal: Negative for abdominal pain, heartburn, nausea and vomiting.   Genitourinary: Negative for dysuria, frequency and hematuria.   Neurological: Negative for difficulty with concentration, dizziness, focal weakness, headaches, light-headedness, numbness, seizures and weakness.   Psychiatric/Behavioral: Negative for depression, memory loss and substance abuse. The patient does not have insomnia.    Allergic/Immunologic: Negative for HIV exposure and hives.       Objective:   Physical  Exam  HENT:      Head: Normocephalic.   Eyes:      Pupils: Pupils are equal, round, and reactive to light.   Neck:      Thyroid: No thyromegaly.      Vascular: Normal carotid pulses. No carotid bruit or JVD.   Cardiovascular:      Rate and Rhythm: Normal rate and regular rhythm.  No extrasystoles are present.     Chest Wall: PMI is not displaced.      Pulses: Normal pulses.      Heart sounds: Murmur (diffuse fistule murmur on precordial and neck area) heard.     No gallop. No S3 sounds.   Pulmonary:      Effort: No respiratory distress.      Breath sounds: Normal breath sounds. No stridor.   Abdominal:      General: Bowel sounds are normal.      Palpations: Abdomen is soft.      Tenderness: There is no abdominal tenderness. There is no rebound.   Musculoskeletal:      Comments: Left arm fistula   Skin:     Findings: No rash.   Neurological:      Mental Status: She is alert and oriented to person, place, and time.   Psychiatric:         Behavior: Behavior normal.         Lab Results   Component Value Date    CHOL 217 (H) 09/16/2021    CHOL 244 (H) 06/28/2021    CHOL 220 (H) 05/11/2021     Lab Results   Component Value Date    HDL 70 09/16/2021    HDL 98 (H) 06/28/2021    HDL 84 (H) 05/11/2021     Lab Results   Component Value Date    LDLCALC 117.6 09/16/2021    LDLCALC 115.4 06/28/2021    LDLCALC 103.6 05/11/2021     Lab Results   Component Value Date    TRIG 147 09/16/2021    TRIG 153 (H) 06/28/2021    TRIG 162 (H) 05/11/2021     Lab Results   Component Value Date    CHOLHDL 32.3 09/16/2021    CHOLHDL 40.2 06/28/2021    CHOLHDL 38.2 05/11/2021       Chemistry        Component Value Date/Time     05/18/2022 1245    K 4.5 05/18/2022 1245     (H) 05/18/2022 1245    CO2 20 (L) 05/18/2022 1245    BUN 24 (H) 05/18/2022 1245    CREATININE 2.0 (H) 05/18/2022 1245    GLU 92 05/18/2022 1245        Component Value Date/Time    CALCIUM 9.5 05/18/2022 1245    ALKPHOS 88 05/18/2022 1245    AST 17 05/18/2022 1245     ALT 32 05/18/2022 1245    BILITOT 0.5 05/18/2022 1245    ESTGFRAFRICA 30 (A) 05/18/2022 1245    EGFRNONAA 26 (A) 05/18/2022 1245          Lab Results   Component Value Date    HGBA1C 4.6 05/28/2021     Lab Results   Component Value Date    TSH 3.538 09/16/2021     Lab Results   Component Value Date    INR 1.0 03/31/2021    INR 0.9 11/20/2020    INR 1.0 09/19/2019     Lab Results   Component Value Date    WBC 2.79 (L) 05/18/2022    HGB 10.8 (L) 05/18/2022    HCT 34.2 (L) 05/18/2022    MCV 97 05/18/2022     (L) 05/18/2022     BMP  Sodium   Date Value Ref Range Status   05/18/2022 143 136 - 145 mmol/L Final     Potassium   Date Value Ref Range Status   05/18/2022 4.5 3.5 - 5.1 mmol/L Final     Chloride   Date Value Ref Range Status   05/18/2022 111 (H) 95 - 110 mmol/L Final     CO2   Date Value Ref Range Status   05/18/2022 20 (L) 23 - 29 mmol/L Final     BUN   Date Value Ref Range Status   05/18/2022 24 (H) 8 - 23 mg/dL Final     Creatinine   Date Value Ref Range Status   05/18/2022 2.0 (H) 0.5 - 1.4 mg/dL Final     Calcium   Date Value Ref Range Status   05/18/2022 9.5 8.7 - 10.5 mg/dL Final     Anion Gap   Date Value Ref Range Status   05/18/2022 12 8 - 16 mmol/L Final     eGFR if    Date Value Ref Range Status   05/18/2022 30 (A) >60 mL/min/1.73 m^2 Final     eGFR if non    Date Value Ref Range Status   05/18/2022 26 (A) >60 mL/min/1.73 m^2 Final     Comment:     Calculation used to obtain the estimated glomerular filtration  rate (eGFR) is the CKD-EPI equation.        BNP  @LABRCNTIP(BNP,BNPTRIAGEBLO)@  @LABRCNTIP(troponini)@  Estimated Creatinine Clearance: 29.9 mL/min (A) (based on SCr of 2 mg/dL (H)).  No results found in the last 24 hours.  No results found in the last 24 hours.  No results found in the last 24 hours.    Assessment:      1. Pericardial effusion    2. Hypertension, renal    3. Pure hypercholesterolemia    4. Arteriovenous fistula occlusion, sequela    5.  Kidney replaced by transplant    6. Stage 3 chronic kidney disease, unspecified whether stage 3a or 3b CKD    7. History of stroke    8. Long-term use of immunosuppressant medication        Plan:   Continue ASA statin and nifedipine  Repeat echo for pericaridal effusion  Repeat Lipid profile and if LDL > 79 , will add PCSK 9 inihitor given h.o CVA    Counseled DASH  Check Lipid profile in 6 months  Recommend heart-healthy diet, weight control and regular exercise.  Mae. Risk modification.   I have reviewed all pertinent labs and cardiac studies independently. Plans and recommendations have been formulated under my direct supervision. All questions answered and patient voiced understanding.   If symptoms persist go to the ED  RTC in 6 months

## 2022-05-23 NOTE — PROGRESS NOTES
Please increase prograf to 7/6 assuming this is a true trough level.   Please make sure she is taking sensipar correctly  Please make sure she is not taking calcium or Vit D supplements or any OTC with calcium or Vit D   Hydration   Start magnesium oxide 400 bid Transposition Flap Text: The defect edges were debeveled with a #15 scalpel blade.  Given the location of the defect and the proximity to free margins a transposition flap was deemed most appropriate.  Using a sterile surgical marker, an appropriate transposition flap was drawn incorporating the defect.    The area thus outlined was incised deep to adipose tissue with a #15 scalpel blade.  The skin margins were undermined to an appropriate distance in all directions utilizing iris scissors.

## 2022-05-26 NOTE — ASSESSMENT & PLAN NOTE
-Prior bone marrow biopsy in 2018 without concern for hematologic neoplastic process.   -Prior workup without paraprotein, elevated free light chains with normal ratio.   -No evidence of nutrient deficiency    Hgb 10.8 g/dL  Previously receiving SHILO   --no indication to initiate at this time.    Saturated iron 25; Ferritin 885  No indication for IV iron at this time  Continue po supplementation   Plan to repeat iron studies routinely and replete prn

## 2022-05-26 NOTE — PROGRESS NOTES
Subjective:      Patient ID: Satinder Schulte is a 66 y.o. female.    Chief Complaint: Follow-up (Anemia in CKD)      HPI:  Ms. Schulte is a 66-year-old woman with type 2 diabetes, hypertension, and end-stage renal disease status post transplant September 2019. She presents for follow-up of anemia and thrombocytopenia.  Per review of the medical record bone marrow biopsy completed in 08/2018 was without concerning findings. She continues to have relatively stable anemia and thrombocytopenia.  She is on immunosuppressants tacrolimus and low-dose prednisone.  Stage 3 CKD  Stable she is followed by Dr. Khan nephrologist at outside facility. Imaging 04/09/2021 showed portal hypertension and liver lesions which include stable complex right hepatic and simple hepatic cysts. Recommend continued close follow-up with hepatology/transplant. No colonoscopy on record pt states she will f/u with outside PCP to schedule this.    Interval History: Pt states overall she is feeling well and doing her best to maintain her health. She voices no c/o today.  Denies n/v/d/c, cp, sob, fevers, chills, night sweats or unintentional weight loss      Social History     Socioeconomic History    Marital status: Single   Tobacco Use    Smoking status: Never Smoker    Smokeless tobacco: Never Used   Substance and Sexual Activity    Alcohol use: No    Drug use: No    Sexual activity: Never     Comment: single, Lives with daughter, on Disability, Lives in [place]   Social History Narrative    Disabled    Single    4 children    History of blood transfusions       Family History   Problem Relation Age of Onset    Stroke Mother     Kidney disease Mother         on dialysis    Hypertension Mother     Heart disease Mother     Heart disease Father     Stroke Sister     Kidney disease Brother     Breast cancer Daughter     Heart disease Sister     Ovarian cancer Cousin     Colon cancer Neg Hx     Thrombophilia Neg Hx        Past  Surgical History:   Procedure Laterality Date    AV FISTULA PLACEMENT  2014    BONE MARROW BIOPSY Right 8/23/2018    Procedure: Biopsy-bone marrow;  Surgeon: Anna Lin MD;  Location: [facility] OR [address] [place] [address];  Service: Oncology;  Laterality: Right;    CHOLECYSTECTOMY  2008    HYSTERECTOMY  2011    TAHBSO for benign reasons    KIDNEY TRANSPLANT N/A 9/20/2019    Procedure: TRANSPLANT, KIDNEY;  Surgeon: Guero Rogers MD;  Location: [facility] OR [address] [place] [address];  Service: Transplant;  Laterality: N/A;    OOPHORECTOMY         Past Medical History:   Diagnosis Date    Abnormal Pap smear     repeat paps were normal    Anemia associated with chronic renal failure     Awaiting organ transplant status  - 02/18/2013 6/6/2014    Blood type A+ 6/6/2014    CKD (chronic kidney disease) stage 3, GFR 30-59 ml/min 10/30/2019    CKD (chronic kidney disease) stage 5, GFR less than 15 ml/min     secondary hypertension    Diabetes mellitus     Essential hypertension 5/19/2017    Hyperlipidemia     Hypertension, renal 1992    Immunocompromised state 10/4/2019    Stroke 2007    Residual speech deficit       Review of Systems   Constitutional: Negative for activity change, appetite change, chills, fatigue and fever.   HENT: Negative for congestion, facial swelling, nosebleeds, rhinorrhea, sore throat and trouble swallowing.    Eyes: Negative for photophobia, pain and visual disturbance.   Respiratory: Negative for cough, shortness of breath and wheezing.    Cardiovascular: Negative for chest pain, palpitations and leg swelling.   Gastrointestinal: Negative for abdominal distention, abdominal pain, anal bleeding, blood in stool, constipation, diarrhea, nausea, rectal pain and vomiting.   Genitourinary: Negative for difficulty urinating, dysuria, hematuria and vaginal bleeding.   Musculoskeletal: Negative for arthralgias.   Skin: Negative for color change, pallor, rash and wound.   Neurological: Negative for dizziness, light-headedness,  "numbness and headaches.   Hematological: Negative for adenopathy. Does not bruise/bleed easily.   Psychiatric/Behavioral: The patient is not nervous/anxious.        Medication List with Changes/Refills   Current Medications    ASPIRIN (ECOTRIN) 81 MG EC TABLET    Take 1 tablet (81 mg total) by mouth once daily.    ATORVASTATIN (LIPITOR) 40 MG TABLET    Take 1 tablet (40 mg total) by mouth once daily.    BELATACEPT 250 MG SOLR    362 mg IV Every 2 weeks x 5 doses then monthly thereafter    BLOOD SUGAR DIAGNOSTIC STRP    Test blood glucose 4 (four) times daily.    BLOOD-GLUCOSE METER KIT    Use as instructed    CHOLECALCIFEROL, VITAMIN D3, (VITAMIN D3) 25 MCG (1,000 UNIT) CAPSULE    Take 1 capsule (1,000 Units total) by mouth once daily.    CINACALCET (SENSIPAR) 30 MG TAB    TAKE ONE TABLET BY MOUTH EVERY DAY WITH BREAKFAST    CINACALCET (SENSIPAR) 60 MG TAB    Take 1 tablet (60 mg total) by mouth daily with breakfast.    DICLOFENAC SODIUM (VOLTAREN) 1 % GEL    Apply 2 g topically daily as needed (pain).     FERROUS SULFATE (FEOSOL) 325 MG (65 MG IRON) TAB TABLET    Take by mouth once daily.    FOLIC ACID (FOLVITE) 1 MG TABLET    Take 1 tablet (1 mg total) by mouth once daily.    HYDROCODONE-ACETAMINOPHEN (NORCO) 7.5-325 MG PER TABLET    Take 0.5-1 tablets by mouth 3 (three) times daily as needed for Pain.     KETOCONAZOLE (NIZORAL) 200 MG TAB    Take HALF tablet (100 mg total) by mouth once daily.    LANCETS 28 GAUGE MISC    Test blood glucose 4 (four) times daily.    MAGNESIUM OXIDE (MAG-OX) 400 MG (241.3 MG MAGNESIUM) TABLET    TAKE 1 TABLET (400 MG TOTAL) BY MOUTH 2 (TWO) TIMES DAILY.    NIFEDIPINE (ADALAT CC) 60 MG TBSR    TAKE 1 TABLET BY MOUTH TWICE A DAY FOR 2 WEEKS    PEN NEEDLE, DIABETIC 32 GAUGE X 5/32" NDLE    Use to inject insulin 4 (four) times daily.    PREDNISONE (DELTASONE) 5 MG TABLET    Take 1 tablet (5 mg total) by mouth once daily.    TACROLIMUS (PROGRAF) 1 MG CAP    Take 4 capsules (4 mg total) " by mouth every 12 (twelve) hours.        Objective:     Vitals:    05/18/22 1306   BP: (!) 144/72   Pulse: (!) 56   Temp: 97.7 °F (36.5 °C)       Physical Exam  Vitals reviewed.   Constitutional:       Appearance: Normal appearance. She is not ill-appearing.   HENT:      Head: Normocephalic and atraumatic.      Right Ear: External ear normal.      Left Ear: External ear normal.      Nose: Nose normal.      Mouth/Throat:      Mouth: Mucous membranes are moist.      Pharynx: Oropharynx is clear.   Eyes:      Conjunctiva/sclera: Conjunctivae normal.   Cardiovascular:      Rate and Rhythm: Normal rate and regular rhythm.      Heart sounds: Normal heart sounds.   Pulmonary:      Effort: Pulmonary effort is normal.      Breath sounds: Normal breath sounds.   Abdominal:      General: Abdomen is flat.   Genitourinary:     Comments: deferred  Musculoskeletal:         General: Normal range of motion.      Cervical back: Normal range of motion.      Right lower leg: No edema.      Left lower leg: No edema.   Skin:     General: Skin is warm and dry.      Capillary Refill: Capillary refill takes less than 2 seconds.   Neurological:      Mental Status: She is alert and oriented to person, place, and time.      Motor: No weakness.   Psychiatric:         Mood and Affect: Mood normal.         Behavior: Behavior normal.         Thought Content: Thought content normal.         Judgment: Judgment normal.         Lab Results   Component Value Date    WBC 2.79 (L) 05/18/2022    HGB 10.8 (L) 05/18/2022    HCT 34.2 (L) 05/18/2022    MCV 97 05/18/2022     (L) 05/18/2022       Lab Results   Component Value Date     05/18/2022    K 4.5 05/18/2022     (H) 05/18/2022    CO2 20 (L) 05/18/2022    BUN 24 (H) 05/18/2022    CREATININE 2.0 (H) 05/18/2022    CALCIUM 9.5 05/18/2022    ANIONGAP 12 05/18/2022    ESTGFRAFRICA 30 (A) 05/18/2022    EGFRNONAA 26 (A) 05/18/2022     Lab Results   Component Value Date    ALT 32 05/18/2022     AST 17 05/18/2022    ALKPHOS 88 05/18/2022    BILITOT 0.5 05/18/2022       Assessment/Plan:     Problem List Items Addressed This Visit        Oncology    Anemia associated with chronic renal failure (Chronic)     -Prior bone marrow biopsy in 2018 without concern for hematologic neoplastic process.   -Prior workup without paraprotein, elevated free light chains with normal ratio.   -No evidence of nutrient deficiency    Hgb 10.8 g/dL  Previously receiving SHILO   --no indication to initiate at this time.    Saturated iron 25; Ferritin 885  No indication for IV iron at this time  Continue po supplementation   Plan to repeat iron studies routinely and replete prn               Drug-induced pancytopenia     -2018 bone marrow biopsy without dysplasia/malignancy evident.    -Most recent labs with stable pancytopenia for several years without recurrent infection bleeding complications or fatigue.    -Bone marrow suppression likely due to Prograf  -Recommend surveillance and consideration of repeat bone marrow biopsy upon significant lab change or symptoms clinically.             Other Visit Diagnoses     Anemia in chronic kidney disease, unspecified CKD stage    -  Primary    Relevant Orders    CBC Auto Differential    Comprehensive Metabolic Panel    Ferritin    Iron and TIBC    Pancytopenia        Relevant Orders    CBC Auto Differential    Comprehensive Metabolic Panel        Follow up: In six months with cbc, cmp, and iron studies a few days prior.        RASHAD Renae  Hematology/Oncology

## 2022-05-26 NOTE — ASSESSMENT & PLAN NOTE
-2018 bone marrow biopsy without dysplasia/malignancy evident.    -Most recent labs with stable pancytopenia for several years without recurrent infection bleeding complications or fatigue.    -Bone marrow suppression likely due to Prograf  -Recommend surveillance and consideration of repeat bone marrow biopsy upon significant lab change or symptoms clinically.

## 2022-05-30 ENCOUNTER — TELEPHONE (OUTPATIENT)
Dept: TRANSPLANT | Facility: CLINIC | Age: 67
End: 2022-05-30
Payer: MEDICARE

## 2022-06-06 ENCOUNTER — LAB VISIT (OUTPATIENT)
Dept: LAB | Facility: HOSPITAL | Age: 67
End: 2022-06-06
Attending: INTERNAL MEDICINE
Payer: MEDICARE

## 2022-06-06 DIAGNOSIS — N23 PAIN IN TRANSPLANTED KIDNEY: ICD-10-CM

## 2022-06-06 DIAGNOSIS — Z94.0 KIDNEY REPLACED BY TRANSPLANT: ICD-10-CM

## 2022-06-06 DIAGNOSIS — R82.90 NONSPECIFIC FINDING ON EXAMINATION OF URINE: ICD-10-CM

## 2022-06-06 DIAGNOSIS — T86.19 PAIN IN TRANSPLANTED KIDNEY: ICD-10-CM

## 2022-06-06 DIAGNOSIS — E78.2 MIXED HYPERLIPIDEMIA: ICD-10-CM

## 2022-06-06 DIAGNOSIS — E78.00 PURE HYPERCHOLESTEROLEMIA: ICD-10-CM

## 2022-06-06 DIAGNOSIS — N18.32 CHRONIC KIDNEY DISEASE (CKD) STAGE G3B/A1, MODERATELY DECREASED GLOMERULAR FILTRATION RATE (GFR) BETWEEN 30-44 ML/MIN/1.73 SQUARE METER AND ALBUMINURIA CREATININE RATIO LESS THAN 30 MG/G: Primary | ICD-10-CM

## 2022-06-06 DIAGNOSIS — D64.9 ANEMIA: ICD-10-CM

## 2022-06-06 DIAGNOSIS — Z86.73 HISTORY OF STROKE: ICD-10-CM

## 2022-06-06 DIAGNOSIS — E11.22 DIABETES MELLITUS WITH CHRONIC KIDNEY DISEASE: ICD-10-CM

## 2022-06-06 LAB
ALBUMIN SERPL BCP-MCNC: 3.6 G/DL (ref 3.5–5.2)
ALBUMIN SERPL BCP-MCNC: 3.6 G/DL (ref 3.5–5.2)
ALP SERPL-CCNC: 94 U/L (ref 55–135)
ALT SERPL W/O P-5'-P-CCNC: 73 U/L (ref 10–44)
ANION GAP SERPL CALC-SCNC: 8 MMOL/L (ref 8–16)
AST SERPL-CCNC: 27 U/L (ref 10–40)
BASOPHILS # BLD AUTO: 0.01 K/UL (ref 0–0.2)
BASOPHILS NFR BLD: 0.3 % (ref 0–1.9)
BILIRUB DIRECT SERPL-MCNC: 0.1 MG/DL (ref 0.1–0.3)
BILIRUB SERPL-MCNC: 0.5 MG/DL (ref 0.1–1)
BUN SERPL-MCNC: 34 MG/DL (ref 8–23)
CALCIUM SERPL-MCNC: 9.3 MG/DL (ref 8.7–10.5)
CHLORIDE SERPL-SCNC: 113 MMOL/L (ref 95–110)
CHOLEST SERPL-MCNC: 243 MG/DL (ref 120–199)
CHOLEST/HDLC SERPL: 2.8 {RATIO} (ref 2–5)
CO2 SERPL-SCNC: 22 MMOL/L (ref 23–29)
CREAT SERPL-MCNC: 1.7 MG/DL (ref 0.5–1.4)
DIFFERENTIAL METHOD: ABNORMAL
EOSINOPHIL # BLD AUTO: 0 K/UL (ref 0–0.5)
EOSINOPHIL NFR BLD: 1.2 % (ref 0–8)
ERYTHROCYTE [DISTWIDTH] IN BLOOD BY AUTOMATED COUNT: 15.3 % (ref 11.5–14.5)
EST. GFR  (AFRICAN AMERICAN): 36.2 ML/MIN/1.73 M^2
EST. GFR  (NON AFRICAN AMERICAN): 31.4 ML/MIN/1.73 M^2
GLUCOSE SERPL-MCNC: 86 MG/DL (ref 70–110)
HCT VFR BLD AUTO: 35.8 % (ref 37–48.5)
HDLC SERPL-MCNC: 87 MG/DL (ref 40–75)
HDLC SERPL: 35.8 % (ref 20–50)
HGB BLD-MCNC: 11.1 G/DL (ref 12–16)
IMM GRANULOCYTES # BLD AUTO: 0.03 K/UL (ref 0–0.04)
IMM GRANULOCYTES NFR BLD AUTO: 0.9 % (ref 0–0.5)
LDLC SERPL CALC-MCNC: 130 MG/DL (ref 63–159)
LYMPHOCYTES # BLD AUTO: 0.7 K/UL (ref 1–4.8)
LYMPHOCYTES NFR BLD: 20.7 % (ref 18–48)
MAGNESIUM SERPL-MCNC: 1.8 MG/DL (ref 1.6–2.6)
MCH RBC QN AUTO: 29.5 PG (ref 27–31)
MCHC RBC AUTO-ENTMCNC: 31 G/DL (ref 32–36)
MCV RBC AUTO: 95 FL (ref 82–98)
MONOCYTES # BLD AUTO: 0.3 K/UL (ref 0.3–1)
MONOCYTES NFR BLD: 8.5 % (ref 4–15)
NEUTROPHILS # BLD AUTO: 2.2 K/UL (ref 1.8–7.7)
NEUTROPHILS NFR BLD: 68.4 % (ref 38–73)
NONHDLC SERPL-MCNC: 156 MG/DL
NRBC BLD-RTO: 0 /100 WBC
PHOSPHATE SERPL-MCNC: 3.6 MG/DL (ref 2.7–4.5)
PLATELET # BLD AUTO: 114 K/UL (ref 150–450)
PMV BLD AUTO: 10.8 FL (ref 9.2–12.9)
POTASSIUM SERPL-SCNC: 4.4 MMOL/L (ref 3.5–5.1)
PROT SERPL-MCNC: 6 G/DL (ref 6–8.4)
RBC # BLD AUTO: 3.76 M/UL (ref 4–5.4)
SODIUM SERPL-SCNC: 143 MMOL/L (ref 136–145)
TRIGL SERPL-MCNC: 130 MG/DL (ref 30–150)
WBC # BLD AUTO: 3.28 K/UL (ref 3.9–12.7)

## 2022-06-06 PROCEDURE — 85025 COMPLETE CBC W/AUTO DIFF WBC: CPT | Performed by: INTERNAL MEDICINE

## 2022-06-06 PROCEDURE — 83735 ASSAY OF MAGNESIUM: CPT | Performed by: INTERNAL MEDICINE

## 2022-06-06 PROCEDURE — 84075 ASSAY ALKALINE PHOSPHATASE: CPT | Mod: 59 | Performed by: INTERNAL MEDICINE

## 2022-06-06 PROCEDURE — 80061 LIPID PANEL: CPT | Performed by: INTERNAL MEDICINE

## 2022-06-06 PROCEDURE — 80197 ASSAY OF TACROLIMUS: CPT | Performed by: INTERNAL MEDICINE

## 2022-06-06 PROCEDURE — 84100 ASSAY OF PHOSPHORUS: CPT | Performed by: INTERNAL MEDICINE

## 2022-06-06 PROCEDURE — 87799 DETECT AGENT NOS DNA QUANT: CPT | Performed by: INTERNAL MEDICINE

## 2022-06-06 NOTE — PROGRESS NOTES
Kidney Post-Transplant Assessment    Referring Physician: Bruce Khan  Current Nephrologist: Bruce Khan    ORGAN: LEFT KIDNEY  Donor Type: donation after brain death  PHS Increased Risk: no  Cold Ischemia: 545 mins  Induction Medications: thymoglobulin    Subjective:     CC:  Reassessment of renal allograft function and management of chronic immunosuppression.    HPI:  Ms. Schulte is a 66 y.o. 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  She has CKD stage 3 - GFR 30-59 and her baseline creatinine is between 1.7. She takes prednisone and tacrolimus for maintenance immunosuppression. She denies any recent hospitalizations or ER visits since her previous clinic visit.    Kidney biopsy 4/1/21 results good sample with no rejection, 80% early interstitial fibrosis, multiple calcium phosphate crystals with associated acute tubular injury.     Hospitalization/ ED visits  None    Interval HX:  Reports doing well at home. Reports that she has an appointment with Dr. Khan on Tuesday. Reviewed labs with patient and caregiver. Discussed proteinuria in detail along treatment. Patient received olmesartan but has not taken it yet. BP elevate din clinic. Given water to start olmesartan. Patient has labs on Monday.      Current Outpatient Medications:     aspirin (ECOTRIN) 81 MG EC tablet, Take 1 tablet (81 mg total) by mouth once daily., Disp: , Rfl:     atorvastatin (LIPITOR) 40 MG tablet, Take 1 tablet (40 mg total) by mouth once daily., Disp: 90 tablet, Rfl: 3    belatacept 250 mg SolR, 362 mg IV Every 2 weeks x 5 doses then monthly thereafter, Disp: , Rfl:     blood sugar diagnostic Strp, Test blood glucose 4 (four) times daily. (Patient taking differently: 1 each by Misc.(Non-Drug; Combo Route) route 3 (three) times daily.), Disp: 100 each, Rfl: 5    blood-glucose meter kit, Use as instructed, Disp: 1 each, Rfl: 0    cholecalciferol, vitamin  "D3, (VITAMIN D3) 25 mcg (1,000 unit) capsule, Take 1 capsule (1,000 Units total) by mouth once daily., Disp: 30 capsule, Rfl: 11    cinacalcet (SENSIPAR) 30 MG Tab, TAKE ONE TABLET BY MOUTH EVERY DAY WITH BREAKFAST, Disp: 30 tablet, Rfl: 11    cinacalcet (SENSIPAR) 60 MG Tab, Take 1 tablet (60 mg total) by mouth daily with breakfast., Disp: 30 tablet, Rfl: 11    diclofenac sodium (VOLTAREN) 1 % Gel, Apply 2 g topically daily as needed (pain). , Disp: , Rfl:     evolocumab (REPATHA SURECLICK) 140 mg/mL PnIj, Inject 1 mL (140 mg total) into the skin every 14 (fourteen) days., Disp: 2 mL, Rfl: 0    ferrous sulfate (FEOSOL) 325 mg (65 mg iron) Tab tablet, Take by mouth once daily., Disp: , Rfl:     folic acid (FOLVITE) 1 MG tablet, Take 1 tablet (1 mg total) by mouth once daily., Disp: 30 tablet, Rfl: 5    HYDROcodone-acetaminophen (NORCO) 7.5-325 mg per tablet, Take 0.5-1 tablets by mouth 3 (three) times daily as needed for Pain. , Disp: , Rfl:     ketoconazole (NIZORAL) 200 mg Tab, Take HALF tablet (100 mg total) by mouth once daily., Disp: 15 tablet, Rfl: 11    magnesium oxide (MAG-OX) 400 mg (241.3 mg magnesium) tablet, TAKE 1 TABLET (400 MG TOTAL) BY MOUTH 2 (TWO) TIMES DAILY., Disp: 180 tablet, Rfl: 3    NIFEdipine (ADALAT CC) 60 MG TbSR, TAKE 1 TABLET BY MOUTH TWICE A DAY FOR 2 WEEKS, Disp: , Rfl:     olmesartan (BENICAR) 20 MG tablet, Take 1 tablet (20 mg total) by mouth once daily., Disp: 90 tablet, Rfl: 3    pen needle, diabetic 32 gauge x 5/32" Ndle, Use to inject insulin 4 (four) times daily., Disp: 100 each, Rfl: 5    predniSONE (DELTASONE) 5 MG tablet, Take 1 tablet (5 mg total) by mouth once daily., Disp: 30 tablet, Rfl: 11    tacrolimus (PROGRAF) 1 MG Cap, Take 4 capsules (4 mg total) by mouth every 12 (twelve) hours., Disp: 240 capsule, Rfl: 11    lancets 28 gauge Misc, Test blood glucose 4 (four) times daily. (Patient taking differently: by Misc.(Non-Drug; Combo Route) route 3 (three) " "times daily.), Disp: 100 each, Rfl: 5      Past Medical History:   Diagnosis Date    Abnormal Pap smear     repeat paps were normal    Anemia associated with chronic renal failure     Awaiting organ transplant status  - 02/18/2013 6/6/2014    Blood type A+ 6/6/2014    CKD (chronic kidney disease) stage 3, GFR 30-59 ml/min 10/30/2019    CKD (chronic kidney disease) stage 5, GFR less than 15 ml/min     secondary hypertension    Diabetes mellitus     Essential hypertension 5/19/2017    Hyperlipidemia     Hypertension, renal 1992    Immunocompromised state 10/4/2019    Stroke 2007    Residual speech deficit       Review of Systems   Constitutional: Negative for appetite change, chills, fatigue and fever.   HENT: Negative for trouble swallowing.    Respiratory: Negative for cough, chest tightness, shortness of breath and wheezing.    Cardiovascular: Negative for chest pain, palpitations and leg swelling.   Gastrointestinal: Negative for abdominal pain, constipation, diarrhea and nausea.   Genitourinary: Negative for difficulty urinating, frequency and urgency.   Musculoskeletal: Negative for arthralgias and myalgias.   Skin: Negative for rash.   Allergic/Immunologic: Positive for immunocompromised state.   Neurological: Negative for dizziness, weakness, light-headedness and headaches.   Psychiatric/Behavioral: Negative for sleep disturbance.       Objective:     Blood pressure (!) 182/78, pulse 78, temperature 97.3 °F (36.3 °C), temperature source Temporal, resp. rate 16, height 5' 3" (1.6 m), weight 88.7 kg (195 lb 8.8 oz), SpO2 95 %.body mass index is 34.64 kg/m².    Physical Exam  Constitutional:       General: She is not in acute distress.     Appearance: She is well-developed. She is not diaphoretic.   Cardiovascular:      Rate and Rhythm: Normal rate and regular rhythm.      Heart sounds: Normal heart sounds. No murmur heard.    No friction rub. No gallop.   Pulmonary:      Effort: Pulmonary effort is " normal. No respiratory distress.      Breath sounds: Normal breath sounds. No wheezing or rales.   Abdominal:      General: Bowel sounds are normal. There is no distension.      Palpations: Abdomen is soft.      Tenderness: There is no abdominal tenderness.   Musculoskeletal:         General: No tenderness. Normal range of motion.   Skin:     General: Skin is warm and dry.      Findings: No rash.      Nails: There is no clubbing.   Neurological:      Mental Status: She is alert and oriented to person, place, and time.   Psychiatric:         Behavior: Behavior normal.         Labs:  Lab Results   Component Value Date    WBC 3.28 (L) 06/06/2022    HGB 11.1 (L) 06/06/2022    HCT 35.8 (L) 06/06/2022     06/06/2022     06/06/2022    K 4.4 06/06/2022    K 4.4 06/06/2022     (H) 06/06/2022     (H) 06/06/2022    CO2 22 (L) 06/06/2022    CO2 20 (L) 06/06/2022    BUN 34 (H) 06/06/2022    BUN 33 (H) 06/06/2022    CREATININE 1.7 (H) 06/06/2022    CREATININE 1.7 (H) 06/06/2022    EGFRNONAA 31.4 (A) 06/06/2022    EGFRNONAA 31.4 (A) 06/06/2022    CALCIUM 9.3 06/06/2022    CALCIUM 9.0 06/06/2022    PHOS 3.6 06/06/2022    MG 1.8 06/06/2022    ALBUMIN 3.6 06/06/2022    ALBUMIN 3.6 06/06/2022    ALBUMIN 3.7 06/06/2022    AST 27 06/06/2022    AST 28 06/06/2022    ALT 73 (H) 06/06/2022    ALT 74 (H) 06/06/2022    UTPCR 4.68 (H) 06/06/2022    .0 (H) 03/02/2022    TACROLIMUS 2.0 (L) 06/06/2022       No results found for: EXTANC, EXTWBC, EXTSEGS, EXTPLATELETS, EXTHEMOGLOBI, EXTHEMATOCRI, EXTCREATININ, EXTSODIUM, EXTPOTASSIUM, EXTBUN, EXTCO2, EXTCALCIUM, EXTPHOSPHORU, EXTGLUCOSE, EXTALBUMIN, EXTAST, EXTALT, EXTBILITOTAL, EXTLIPASE, EXTAMYLASE    No results found for: EXTCYCLOSLVL, EXTSIROLIMUS, EXTTACROLVL, EXTPROTCRE, EXTPTHINTACT, EXTPROTEINUA, EXTWBCUA, EXTRBCUA    Labs were reviewed with the patient.    Assessment:     1. Kidney replaced by transplant    2. Stage 3 chronic kidney disease, unspecified  whether stage 3a or 3b CKD    3. Hypertension, renal    4. Immunocompromised state    5. Post-transplant diabetes mellitus    6. Long-term use of immunosuppressant medication        Plan:     Continue to follow with PCP (health maintenance and health screening) and General Nephrologist (CKD care)  Needs to establish Dermatologist for yearly skin check due to IS therapy.  Continue current IS therapy regimen  Given refill for keto  Proteinuria: recently started on olmesartan 20 mg daily Needs to follow up with her nephrologist to monitor Potassium   Needs BMP next week ALT increased to 74-- needs repeat if still elevated needs hepatology evaluation   Low K diet   Encourage hydration       1. CKD stage: will continue follow up as per our center guidelines. patient to continue close follow up with the local General nephrologist. Education provided in appropriate fluid intake, potassium intake. Continue with oral hydration.      2. Immunosuppression: Isabella infusions Continue Prograf 4/4, MMF--hold leukopenia, and Prednisone 5 mg QD  Lab Results   Component Value Date    TACROLIMUS 2.0 (L) 06/06/2022    TACROLIMUS 3.2 (L) 03/07/2022    TACROLIMUS 4.2 (L) 12/09/2021   Will closely monitor for toxicities, education provided about adherence to medicines and need to communicate any side effect to the transplant nurse or physician.      3. Allograft Function:stable at baseline for the patient. Continue follow up as per our guidelines and with the local General nephrologist. Communication will be sent today.      6/6/2022  2yr 8mo   Creatinine 0.5 - 1.4 mg/dL 1.7 (A)   eGFR if African American >60 mL/min/1.73 m^2 36.2 (A)   Glucose 70 - 110 mg/dL 86       4. Hypertension management:  Continue with home blood pressure monitoring, low salt and healthy life discussed with the patient.  BP Readings from Last 3 Encounters:   06/10/22 (!) 182/78   05/20/22 138/86   05/18/22 (!) 144/72   nifedipine   Recently started on benicar      5.  Metabolic Bone Disease/Secondary Hyperparathyroidism:calcium and phosphorus level discussed with the patient, patient will continue follow up with the general nephrologist for management of metabolic bone disease calcium and phosphorus as per our center protocol. Will monitor PTH, Vit D level, calcium.   Lab Results   Component Value Date    .0 (H) 03/02/2022    CALCIUM 9.3 06/06/2022    CALCIUM 9.0 06/06/2022    CAION 1.40 03/13/2020    PHOS 3.6 06/06/2022    PHOS 2.9 03/07/2022    PHOS 2.9 12/09/2021       6. Electrolytes: reviewed with the patient, essentially within the normal range no need for acute changes today, will monitor as per our center guidelines.     6/6/2022  2yr 8mo   Magnesium 1.6 - 2.6 mg/dL 1.8     Lab Results   Component Value Date     06/06/2022     06/06/2022    K 4.4 06/06/2022    K 4.4 06/06/2022     (H) 06/06/2022     (H) 06/06/2022    CO2 22 (L) 06/06/2022    CO2 20 (L) 06/06/2022    CO2 20 (L) 05/18/2022       7. Anemia: will continue monitoring as per our center guidelines. No indication for acute intervention today.  Lab Results   Component Value Date    WBC 3.28 (L) 06/06/2022    HGB 11.1 (L) 06/06/2022    HCT 35.8 (L) 06/06/2022    MCV 95 06/06/2022     (L) 06/06/2022       8.Proteinuria: will continue with pr/cr ratio as per our center guidelines  Lab Results   Component Value Date    PROTEINURINE 145 (H) 06/06/2022    CREATRANDUR 31.0 06/06/2022    UTPCR 4.68 (H) 06/06/2022    UTPCR 0.39 (H) 09/16/2021    UTPCR 0.66 (H) 04/01/2021        9. BK virus infection screening: will continue with urine or blood PCR as per our guidelines to prevent BK virus viremia and allograft dysfunction  Lab Results   Component Value Date    BKVIRUSPCRQB <125 06/06/2022         10. Weight education: provided during the clinic visit.   Body mass index is 34.64 kg/m².       11.Patient safety education regarding immunosuppression including prophylaxis posttransplant for  CMV, PCP : Education provided about vaccination and prevention of infections.    12.  Cytopenias: no significant cytopenias will monitor as per our guidelines. Medicine list reviewed including potential causes of drug-induced cytopenias  Lab Results   Component Value Date    WBC 3.28 (L) 06/06/2022    HGB 11.1 (L) 06/06/2022    HCT 35.8 (L) 06/06/2022    MCV 95 06/06/2022     (L) 06/06/2022         Follow-up:   Clinic: return to transplant clinic weekly for the first month after transplant; every 2 weeks during months 2-3; then at 6-, 9-, 12-, 18-, 24-, and 36- months post-transplant to reassess for complications from immunosuppression toxicity and monitor for rejection.  Annually thereafter.    Labs: since patient remains at high risk for rejection and drug-related complications that warrant close monitoring, labs will be ordered as follows: continue twice weekly CBC, renal panel, and drug level for first month; then same labs once weekly through 3rd month post-transplant.  Urine for UA and protein/creatinine ratio monthly.  Serum BK - PCR at 1-, 3-, 6-, 9-, 12-, 18-, 24-, 36- 48-, and 60 months post-transplant.  Hepatic panel at 1-, 2-, 3-, 6-, 9-, 12-, 18-, 24-, and 36- months post-transplant.    Education:   Material provided to the patient.  Patient reminded to call with any health changes since these can be early signs of significant complications.  Also, I advised the patient to be sure any new medications or changes of old medications are discussed with either a pharmacist or physician knowledgeable with transplant to avoid rejection/drug toxicity related to significant drug interactions.    Exercise: reminded Satinder of the importance of regular exercise for weight management, blood sugar and blood pressure management.  I also explained exercise has been shown to improve cardiovascular health, energy level, and sleep hygiene.  Lastly, I advised her that cardiovascular complications are leading cause of  death for renal transplant recipients, and regular exercise can help lower this risk.    I spoke with the patient for 30 minutes. More than half dedicated to counseling and education. All questions answered    Shyanne Lechuga NP-C  Transplant Nephrology    [facility] Patient Status  Functional Status: 90% - Able to carry on normal activity: minor symptoms of disease  Physical Capacity: No Limitations

## 2022-06-07 ENCOUNTER — TELEPHONE (OUTPATIENT)
Dept: CARDIOLOGY | Facility: CLINIC | Age: 67
End: 2022-06-07
Payer: MEDICARE

## 2022-06-07 ENCOUNTER — TELEPHONE (OUTPATIENT)
Dept: TRANSPLANT | Facility: CLINIC | Age: 67
End: 2022-06-07
Payer: MEDICARE

## 2022-06-07 LAB — TACROLIMUS BLD-MCNC: 2 NG/ML (ref 5–15)

## 2022-06-07 NOTE — TELEPHONE ENCOUNTER
Pt contacted about results, pt verbalized understanding.          ----- Message from Jean Hollingsworth MD sent at 6/7/2022  4:04 PM CDT -----  Elevated lipid per lab  Add repatha Rx and sent to [facility]

## 2022-06-07 NOTE — PROGRESS NOTES
I do not see she is on a PIETRO blocker, please ask the patient to make sure she is not on PIETRO blockers  If she is not start olmesartan 20 mg daily  Ask the patient to follow up with her nephrologist to monitor Potassium  Get a BMP in 7 days  Low K diet  Encourage hydration   Is she on MMF? If she is not start  bid

## 2022-06-07 NOTE — PROGRESS NOTES
Go it, she is on prograf. Ok Isabella and negrita is good for now. I am worried about her proteinuria, I hope she is also following with her nephrologist

## 2022-06-08 RX ORDER — OLMESARTAN MEDOXOMIL 20 MG/1
20 TABLET ORAL DAILY
Qty: 90 TABLET | Refills: 3 | Status: SHIPPED | OUTPATIENT
Start: 2022-06-08 | End: 2022-12-16 | Stop reason: SDUPTHER

## 2022-06-10 ENCOUNTER — OFFICE VISIT (OUTPATIENT)
Dept: TRANSPLANT | Facility: CLINIC | Age: 67
End: 2022-06-10
Payer: MEDICARE

## 2022-06-10 VITALS
HEART RATE: 78 BPM | SYSTOLIC BLOOD PRESSURE: 182 MMHG | TEMPERATURE: 97 F | BODY MASS INDEX: 34.65 KG/M2 | OXYGEN SATURATION: 95 % | WEIGHT: 195.56 LBS | RESPIRATION RATE: 16 BRPM | HEIGHT: 63 IN | DIASTOLIC BLOOD PRESSURE: 78 MMHG

## 2022-06-10 DIAGNOSIS — N18.30 STAGE 3 CHRONIC KIDNEY DISEASE, UNSPECIFIED WHETHER STAGE 3A OR 3B CKD: ICD-10-CM

## 2022-06-10 DIAGNOSIS — Z79.60 LONG-TERM USE OF IMMUNOSUPPRESSANT MEDICATION: ICD-10-CM

## 2022-06-10 DIAGNOSIS — Z94.0 KIDNEY REPLACED BY TRANSPLANT: Primary | ICD-10-CM

## 2022-06-10 DIAGNOSIS — E13.9 POST-TRANSPLANT DIABETES MELLITUS: ICD-10-CM

## 2022-06-10 DIAGNOSIS — I12.9 HYPERTENSION, RENAL: Chronic | ICD-10-CM

## 2022-06-10 DIAGNOSIS — D84.9 IMMUNOCOMPROMISED STATE: ICD-10-CM

## 2022-06-10 PROCEDURE — 99215 OFFICE O/P EST HI 40 MIN: CPT | Mod: S$PBB,,, | Performed by: NURSE PRACTITIONER

## 2022-06-10 PROCEDURE — 99999 PR PBB SHADOW E&M-EST. PATIENT-LVL V: CPT | Mod: PBBFAC,,, | Performed by: NURSE PRACTITIONER

## 2022-06-10 PROCEDURE — 99215 PR OFFICE/OUTPT VISIT, EST, LEVL V, 40-54 MIN: ICD-10-PCS | Mod: S$PBB,,, | Performed by: NURSE PRACTITIONER

## 2022-06-10 PROCEDURE — 99999 PR PBB SHADOW E&M-EST. PATIENT-LVL V: ICD-10-PCS | Mod: PBBFAC,,, | Performed by: NURSE PRACTITIONER

## 2022-06-10 PROCEDURE — 99215 OFFICE O/P EST HI 40 MIN: CPT | Mod: PBBFAC | Performed by: NURSE PRACTITIONER

## 2022-06-10 RX ORDER — KETOCONAZOLE 200 MG/1
100 TABLET ORAL DAILY
Qty: 15 TABLET | Refills: 11 | Status: SHIPPED | OUTPATIENT
Start: 2022-06-10 | End: 2023-05-15 | Stop reason: SDUPTHER

## 2022-06-10 NOTE — Clinical Note
Moi,  Please add LFTs on Monday for this patient her last ALT was 73. She does have a history of complex liver cysts. If her LFTs remain elevated she needs to follow-up with hepatology.  Thanks

## 2022-06-10 NOTE — LETTER
Carine 10, 2022        Bruce Khan  [address]  [place] [address]  Phone: [phone]  Fax: [phone]             [facility]  [address]  [facility] [address]  Phone: [phone]   Patient: Satinder Schulte   MR Number: [id number]   YOB: 1955   Date of Visit: 6/10/2022       Dear Dr. Bruce Khan    Thank you for referring Satinder Schulte to me for evaluation. Attached you will find relevant portions of my assessment and plan of care.    If you have questions, please do not hesitate to call me. I look forward to following Satinder Schulte along with you.    Sincerely,    Shyanne Lechuga, NP    Enclosure    If you would like to receive this communication electronically, please contact [email or web address] or [phone] to request [employer] access.    [employer] is a tool which provides read-only access to select patient information with whom you have a relationship. Its easy to use and provides real time access to review your patients record including encounter summaries, notes, results, and demographic information.    If you feel you have received this communication in error or would no longer like to receive these types of communications, please e-mail [email or web address]

## 2022-06-13 ENCOUNTER — LAB VISIT (OUTPATIENT)
Dept: LAB | Facility: HOSPITAL | Age: 67
End: 2022-06-13
Attending: INTERNAL MEDICINE
Payer: MEDICARE

## 2022-06-13 DIAGNOSIS — Z94.0 KIDNEY REPLACED BY TRANSPLANT: ICD-10-CM

## 2022-06-13 LAB
ALBUMIN SERPL BCP-MCNC: 3.6 G/DL (ref 3.5–5.2)
ALBUMIN SERPL BCP-MCNC: 3.6 G/DL (ref 3.5–5.2)
ALP SERPL-CCNC: 88 U/L (ref 55–135)
ALT SERPL W/O P-5'-P-CCNC: 46 U/L (ref 10–44)
ANION GAP SERPL CALC-SCNC: 10 MMOL/L (ref 8–16)
AST SERPL-CCNC: 17 U/L (ref 10–40)
BILIRUB DIRECT SERPL-MCNC: 0.2 MG/DL (ref 0.1–0.3)
BILIRUB SERPL-MCNC: 0.4 MG/DL (ref 0.1–1)
BUN SERPL-MCNC: 21 MG/DL (ref 8–23)
CALCIUM SERPL-MCNC: 9 MG/DL (ref 8.7–10.5)
CHLORIDE SERPL-SCNC: 110 MMOL/L (ref 95–110)
CO2 SERPL-SCNC: 22 MMOL/L (ref 23–29)
CREAT SERPL-MCNC: 1.6 MG/DL (ref 0.5–1.4)
EST. GFR  (AFRICAN AMERICAN): 39 ML/MIN/1.73 M^2
EST. GFR  (NON AFRICAN AMERICAN): 33.8 ML/MIN/1.73 M^2
GLUCOSE SERPL-MCNC: 68 MG/DL (ref 70–110)
MAGNESIUM SERPL-MCNC: 2.1 MG/DL (ref 1.6–2.6)
PHOSPHATE SERPL-MCNC: 3 MG/DL (ref 2.7–4.5)
POTASSIUM SERPL-SCNC: 4.2 MMOL/L (ref 3.5–5.1)
PROT SERPL-MCNC: 5.8 G/DL (ref 6–8.4)
SODIUM SERPL-SCNC: 142 MMOL/L (ref 136–145)

## 2022-06-13 PROCEDURE — 80069 RENAL FUNCTION PANEL: CPT | Performed by: INTERNAL MEDICINE

## 2022-06-13 PROCEDURE — 84075 ASSAY ALKALINE PHOSPHATASE: CPT | Performed by: INTERNAL MEDICINE

## 2022-06-13 PROCEDURE — 36415 COLL VENOUS BLD VENIPUNCTURE: CPT | Mod: PO | Performed by: INTERNAL MEDICINE

## 2022-06-13 PROCEDURE — 83735 ASSAY OF MAGNESIUM: CPT | Performed by: INTERNAL MEDICINE

## 2022-06-16 DIAGNOSIS — Z94.0 KIDNEY REPLACED BY TRANSPLANT: Primary | ICD-10-CM

## 2022-06-16 DIAGNOSIS — R74.01 ELEVATED ALT MEASUREMENT: ICD-10-CM

## 2022-06-20 ENCOUNTER — TELEPHONE (OUTPATIENT)
Dept: TRANSPLANT | Facility: CLINIC | Age: 67
End: 2022-06-20
Payer: MEDICARE

## 2022-06-20 NOTE — TELEPHONE ENCOUNTER
6/20 Hepatology appointment cancelled. Repeat LFT's scheduled for 7/12/2022. Patient notified and left voice message with daughter Niya.     ----- Message from Shyanne Lechuga NP sent at 6/19/2022 10:57 PM CDT -----  Please repeat in 4 weeks as Pavel suggested.     ----- Message -----  From: Darci Flores RN  Sent: 6/16/2022   8:52 AM CDT  To: Pavel Padilla MD, Shyanne Lechuga NP    Repeat ALT is 46.  Proceed with Hepatology referral ?    ----- Message -----  From: Shyanne Lechuga NP  Sent: 6/10/2022  11:18 AM CDT  To: Darci Flores RN, OSCAR Guerrero,   Please add LFTs on Monday for this patient her last ALT was 73. She does have a history of complex liver cysts. If her LFTs remain elevated she needs to follow-up with hepatology.   Thanks

## 2022-06-24 ENCOUNTER — SPECIALTY PHARMACY (OUTPATIENT)
Dept: PHARMACY | Facility: CLINIC | Age: 67
End: 2022-06-24
Payer: MEDICARE

## 2022-06-24 NOTE — TELEPHONE ENCOUNTER
Robbin, this is Daniel Mai with [facility].  We are working on your prescription that your doctor has sent us. We will be working with your insurance to get this approved for you. We will be calling you along the way with updates on your medication.  If you have any questions, you can reach us at [phone].    Welcome call outcome: Patient/caregiver reached     Pts insurance prefers Praluent. Provider messaged to switch medication from Repatha to Praluent.

## 2022-06-26 ENCOUNTER — TELEPHONE (OUTPATIENT)
Dept: CARDIOLOGY | Facility: CLINIC | Age: 67
End: 2022-06-26
Payer: MEDICARE

## 2022-06-26 RX ORDER — ALIROCUMAB 150 MG/ML
150 INJECTION, SOLUTION SUBCUTANEOUS
Qty: 2 EACH | Refills: 3 | Status: SHIPPED | OUTPATIENT
Start: 2022-06-26 | End: 2022-07-29

## 2022-06-26 NOTE — TELEPHONE ENCOUNTER
----- Message from Daniel Mai PharmD sent at 6/24/2022  2:44 PM CDT -----  Regarding: repatha  Good Afternoon,    Your patients insurance prefers Praluent over Repatha. Please send in an order for Praluent and I will work to get this covered for her.    Thank you,  Daniel Mai, PharmKAREN  [facility]

## 2022-06-27 ENCOUNTER — SPECIALTY PHARMACY (OUTPATIENT)
Dept: PHARMACY | Facility: CLINIC | Age: 67
End: 2022-06-27
Payer: MEDICARE

## 2022-06-27 NOTE — TELEPHONE ENCOUNTER
PA approved for Praluent 150mg/ml pen from 1/1/22-6/27/23    BI: COMPLETE                MEDICARE PLAN: [facility]  Estimated copay: $0  In Network: Yes  LIS level:2  FA not needed.     Pt was notified that PA for Praluent was approved

## 2022-06-27 NOTE — TELEPHONE ENCOUNTER
Robbin, this is Daniel Mai with [facility].  We are working on your prescription that your doctor has sent us. We will be working with your insurance to get this approved for you. We will be calling you along the way with updates on your medication.  If you have any questions, you can reach us at [phone].    Welcome call outcome: Patient/caregiver reached     Pt was notified that provider switched medication to Praluent and working on PA.    PA submitted to CM for [facility]: Key: [id number]

## 2022-06-28 ENCOUNTER — SPECIALTY PHARMACY (OUTPATIENT)
Dept: PHARMACY | Facility: CLINIC | Age: 67
End: 2022-06-28
Payer: MEDICARE

## 2022-06-28 DIAGNOSIS — E78.5 HYPERLIPIDEMIA, UNSPECIFIED HYPERLIPIDEMIA TYPE: Primary | ICD-10-CM

## 2022-07-03 ENCOUNTER — TELEPHONE (OUTPATIENT)
Dept: HEPATOLOGY | Facility: CLINIC | Age: 67
End: 2022-07-03
Payer: MEDICARE

## 2022-07-05 NOTE — TELEPHONE ENCOUNTER
Specialty Pharmacy - Initial Clinical Assessment    Specialty Medication Orders Linked to Encounter    Flowsheet Row Most Recent Value   Medication #1 alirocumab (PRALUENT PEN) 150 mg/mL PnIj (Order#[id number], Rx#[id number])        Patient Diagnosis   E78.5 - Hyperlipidemia    Subjective    Satinder Schulte is a 66 y.o. female, who is followed by the specialty pharmacy service for management and education.    Recent Encounters     Date Type Provider Description    06/28/2022 Specialty Pharmacy She Dooley PharmD Initial Clinical Assessment    06/27/2022 Specialty Pharmacy Daniel Mai PharmD Referral Authorization    06/24/2022 Specialty Pharmacy Daniel Mai PharmD Referral Authorization        Clinical call attempts since last clinical assessment   6/28/2022  2:25 PM - Specialty Pharmacy - Clinical Assessment by She Dooley PharmD     Current Outpatient Medications   Medication Sig    alirocumab (PRALUENT PEN) 150 mg/mL PnIj Inject 1 mL (150 mg total) into the skin every 14 (fourteen) days.    aspirin (ECOTRIN) 81 MG EC tablet Take 1 tablet (81 mg total) by mouth once daily.    atorvastatin (LIPITOR) 40 MG tablet Take 1 tablet (40 mg total) by mouth once daily.    belatacept 250 mg SolR 362 mg IV Every 2 weeks x 5 doses then monthly thereafter    blood sugar diagnostic Strp Test blood glucose 4 (four) times daily. (Patient taking differently: 1 each by Misc.(Non-Drug; Combo Route) route 3 (three) times daily.)    blood-glucose meter kit Use as instructed    cholecalciferol, vitamin D3, (VITAMIN D3) 25 mcg (1,000 unit) capsule Take 1 capsule (1,000 Units total) by mouth once daily.    cinacalcet (SENSIPAR) 30 MG Tab TAKE ONE TABLET BY MOUTH EVERY DAY WITH BREAKFAST    cinacalcet (SENSIPAR) 60 MG Tab Take 1 tablet (60 mg total) by mouth daily with breakfast.    diclofenac sodium (VOLTAREN) 1 % Gel Apply 2 g topically daily as needed (pain).     ferrous sulfate (FEOSOL) 325 mg (65 mg  "iron) Tab tablet Take by mouth once daily.    folic acid (FOLVITE) 1 MG tablet Take 1 tablet (1 mg total) by mouth once daily.    HYDROcodone-acetaminophen (NORCO) 7.5-325 mg per tablet Take 0.5-1 tablets by mouth 3 (three) times daily as needed for Pain.     ketoconazole (NIZORAL) 200 mg Tab Take HALF tablet (100 mg total) by mouth once daily.    lancets 28 gauge Misc Test blood glucose 4 (four) times daily. (Patient taking differently: by Misc.(Non-Drug; Combo Route) route 3 (three) times daily.)    magnesium oxide (MAG-OX) 400 mg (241.3 mg magnesium) tablet TAKE 1 TABLET (400 MG TOTAL) BY MOUTH 2 (TWO) TIMES DAILY.    NIFEdipine (ADALAT CC) 60 MG TbSR TAKE 1 TABLET BY MOUTH TWICE A DAY FOR 2 WEEKS    olmesartan (BENICAR) 20 MG tablet Take 1 tablet (20 mg total) by mouth once daily.    pen needle, diabetic 32 gauge x 5/32" Ndle Use to inject insulin 4 (four) times daily.    predniSONE (DELTASONE) 5 MG tablet Take 1 tablet (5 mg total) by mouth once daily.    tacrolimus (PROGRAF) 1 MG Cap Take 4 capsules (4 mg total) by mouth every 12 (twelve) hours.   Last reviewed on 6/10/2022 10:41 AM by Christiana Lucero MA    Review of patient's allergies indicates:   Allergen Reactions    Hydralazine analogues Nausea And Vomiting and Other (See Comments)     headaches   Last reviewed on  6/26/2022 5:47 PM by Jean Hollingsworth    Drug Interactions    Drug interactions evaluated: no  Clinically relevant drug interactions identified: no  Provided the patient with educational material regarding drug interactions: not applicable         Adverse Effects    Unable to perform a full ROS: Yes   Reason: other        Assessment Questions - Documented Responses    Flowsheet Row Most Recent Value   Assessment    Medication Reconciliation completed for patient No   Goals of Therapy Status Discussed (new start)   Status of the patients ability to self-administer: Is Able   All education points have been covered with patient? No, patient " declined- printed education provided   Welcome packet contents reviewed and discussed with patient? Yes   Assesment completed? Yes   Plan Therapy being initiated   Do you need to open a clinical intervention (i-vent)? No   Do you want to schedule first shipment? Yes   Medication #1 Assessment Info    Patient status New medication   Is this medication appropriate for the patient? Yes   Is this medication effective? Not yet started        Refill Questions - Documented Responses    Flowsheet Row Most Recent Value   Patient Availability and HIPAA Verification    Does patient want to proceed with activity? Yes   HIPAA/medical authority confirmed? Yes   Relationship to patient of person spoken to? Child   Refill Screening Questions    When does the patient need to receive the medication? 07/07/22   Refill Delivery Questions    How will the patient receive the medication? Delivery    When does the patient need to receive the medication? 07/07/22   Shipping Address Home   Address in [facility] confirmed and updated if neccessary? Yes   Expected Copay ($) 0   Is the patient able to afford the medication copay? Yes   Payment Method zero copay   Days supply of Refill 28   Supplies needed? No supplies needed   Refill activity completed? Yes   Refill activity plan Refill scheduled   Shipment/Pickup Date: 07/06/22          Objective    She has a past medical history of Abnormal Pap smear, Anemia associated with chronic renal failure, Awaiting organ transplant status  - 02/18/2013 (6/6/2014), Blood type A+ (6/6/2014), CKD (chronic kidney disease) stage 3, GFR 30-59 ml/min (10/30/2019), CKD (chronic kidney disease) stage 5, GFR less than 15 ml/min, Diabetes mellitus, Essential hypertension (5/19/2017), Hyperlipidemia, Hypertension, renal (1992), Immunocompromised state (10/4/2019), and Stroke (2007).        BP Readings from Last 4 Encounters:   06/10/22 (!) 182/78   05/20/22 138/86   05/18/22 (!) 144/72   11/19/21 (!)  "142/58     Ht Readings from Last 4 Encounters:   06/10/22 5' 3" (1.6 m)   05/20/22 5' 3" (1.6 m)   05/18/22 5' 3" (1.6 m)   11/16/21 5' 3" (1.6 m)     Wt Readings from Last 4 Encounters:   06/10/22 88.7 kg (195 lb 8.8 oz)   05/20/22 88 kg (194 lb)   05/18/22 88.2 kg (194 lb 7.1 oz)   11/19/21 83.6 kg (184 lb 4.9 oz)       The goals of prescribed drug therapy management include:  · Supporting patient to meet the prescriber's medical treatment objectives  · Improving or maintaining quality of life  · Maintaining optimal therapy adherence  · Minimizing and managing side effects      Goals of Therapy Status: Discussed (new start)    Assessment/Plan  Patient plans to start therapy on 07/07/22      Indication, dosage, appropriateness, effectiveness, safety and convenience of her specialty medication(s) were reviewed today.     Patient Education   Pharmacist offer to  patient was declined. Printed educational materials will be provided with medication.  Patient did accept verbal education on the following topics:  · Proper use, timely administration, and missed dose management  · Storage, safe handling, and disposal        Tasks added this encounter   No tasks added.   Tasks due within next 3 months   6/27/2022 - Clinical - Initial Assessment     She Dooley, PharmD  [facility]  [address] [place] [address]  Phone: [phone]  Fax: [phone]  "

## 2022-07-12 ENCOUNTER — LAB VISIT (OUTPATIENT)
Dept: LAB | Facility: HOSPITAL | Age: 67
End: 2022-07-12
Attending: INTERNAL MEDICINE
Payer: MEDICARE

## 2022-07-12 DIAGNOSIS — Z94.0 KIDNEY REPLACED BY TRANSPLANT: ICD-10-CM

## 2022-07-12 LAB
ALBUMIN SERPL BCP-MCNC: 4 G/DL (ref 3.5–5.2)
ALP SERPL-CCNC: 103 U/L (ref 55–135)
ALT SERPL W/O P-5'-P-CCNC: 93 U/L (ref 10–44)
AST SERPL-CCNC: 36 U/L (ref 10–40)
BILIRUB DIRECT SERPL-MCNC: 0.2 MG/DL (ref 0.1–0.3)
BILIRUB SERPL-MCNC: 0.5 MG/DL (ref 0.1–1)
PROT SERPL-MCNC: 6.5 G/DL (ref 6–8.4)

## 2022-07-12 PROCEDURE — 80076 HEPATIC FUNCTION PANEL: CPT | Performed by: INTERNAL MEDICINE

## 2022-07-12 PROCEDURE — 36415 COLL VENOUS BLD VENIPUNCTURE: CPT | Mod: PO | Performed by: INTERNAL MEDICINE

## 2022-07-14 ENCOUNTER — TELEPHONE (OUTPATIENT)
Dept: TRANSPLANT | Facility: CLINIC | Age: 67
End: 2022-07-14
Payer: MEDICARE

## 2022-07-14 NOTE — TELEPHONE ENCOUNTER
Results reviewed patient and reminded of 7/29 Hepatology appointment.  ----- Message from Coni Quiroz MD sent at 7/13/2022  2:00 PM CDT -----  LFTs labile; to f/u with hepatology 7/29/22.

## 2022-07-27 ENCOUNTER — TELEPHONE (OUTPATIENT)
Dept: HEPATOLOGY | Facility: CLINIC | Age: 67
End: 2022-07-27
Payer: MEDICARE

## 2022-07-27 NOTE — TELEPHONE ENCOUNTER
Spoke to patient's daughter Niya and moved her 7/29 appointment with Ms. García to 10/4 with Dr. Crawley in [facility] location. Mailed appointment letter to patient.

## 2022-07-28 ENCOUNTER — SPECIALTY PHARMACY (OUTPATIENT)
Dept: PHARMACY | Facility: CLINIC | Age: 67
End: 2022-07-28
Payer: MEDICARE

## 2022-07-28 NOTE — TELEPHONE ENCOUNTER
Specialty Pharmacy - Refill Coordination    Specialty Medication Orders Linked to Encounter    Flowsheet Row Most Recent Value   Medication #1 alirocumab (PRALUENT PEN) 150 mg/mL PnIj (Order#[id number], Rx#[id number])          Refill Questions - Documented Responses    Flowsheet Row Most Recent Value   Patient Availability and HIPAA Verification    Does patient want to proceed with activity? Yes   HIPAA/medical authority confirmed? Yes   Relationship to patient of person spoken to? Self   Refill Screening Questions    Would patient like to speak to a pharmacist? No   When does the patient need to receive the medication? 08/05/22   Refill Delivery Questions    How will the patient receive the medication? Delivery    When does the patient need to receive the medication? 08/05/22   Shipping Address Home   Address in [facility] confirmed and updated if neccessary? Yes   Expected Copay ($) 0   Is the patient able to afford the medication copay? Yes   Payment Method zero copay   Days supply of Refill 28   Supplies needed? No supplies needed   Refill activity completed? Yes   Refill activity plan Refill scheduled   Shipment/Pickup Date: 08/01/22          Current Outpatient Medications   Medication Sig    alirocumab (PRALUENT PEN) 150 mg/mL PnIj Inject 1 mL (150 mg total) into the skin every 14 (fourteen) days.    aspirin (ECOTRIN) 81 MG EC tablet Take 1 tablet (81 mg total) by mouth once daily.    atorvastatin (LIPITOR) 40 MG tablet Take 1 tablet (40 mg total) by mouth once daily.    belatacept 250 mg SolR 362 mg IV Every 2 weeks x 5 doses then monthly thereafter    blood sugar diagnostic Strp Test blood glucose 4 (four) times daily. (Patient taking differently: 1 each by Misc.(Non-Drug; Combo Route) route 3 (three) times daily.)    blood-glucose meter kit Use as instructed    cholecalciferol, vitamin D3, (VITAMIN D3) 25 mcg (1,000 unit) capsule Take 1 capsule (1,000 Units total) by mouth once daily.     "cinacalcet (SENSIPAR) 30 MG Tab TAKE ONE TABLET BY MOUTH EVERY DAY WITH BREAKFAST    cinacalcet (SENSIPAR) 60 MG Tab Take 1 tablet (60 mg total) by mouth daily with breakfast.    diclofenac sodium (VOLTAREN) 1 % Gel Apply 2 g topically daily as needed (pain).     ferrous sulfate (FEOSOL) 325 mg (65 mg iron) Tab tablet Take by mouth once daily.    folic acid (FOLVITE) 1 MG tablet Take 1 tablet (1 mg total) by mouth once daily.    HYDROcodone-acetaminophen (NORCO) 7.5-325 mg per tablet Take 0.5-1 tablets by mouth 3 (three) times daily as needed for Pain.     ketoconazole (NIZORAL) 200 mg Tab Take HALF tablet (100 mg total) by mouth once daily.    lancets 28 gauge Misc Test blood glucose 4 (four) times daily. (Patient taking differently: by Misc.(Non-Drug; Combo Route) route 3 (three) times daily.)    magnesium oxide (MAG-OX) 400 mg (241.3 mg magnesium) tablet TAKE 1 TABLET (400 MG TOTAL) BY MOUTH 2 (TWO) TIMES DAILY.    NIFEdipine (ADALAT CC) 60 MG TbSR TAKE 1 TABLET BY MOUTH TWICE A DAY FOR 2 WEEKS    olmesartan (BENICAR) 20 MG tablet Take 1 tablet (20 mg total) by mouth once daily.    pen needle, diabetic 32 gauge x 5/32" Ndle Use to inject insulin 4 (four) times daily.    predniSONE (DELTASONE) 5 MG tablet Take 1 tablet (5 mg total) by mouth once daily.    tacrolimus (PROGRAF) 1 MG Cap Take 4 capsules (4 mg total) by mouth every 12 (twelve) hours.   Last reviewed on 6/10/2022 10:41 AM by Christiana Lucero MA    Review of patient's allergies indicates:   Allergen Reactions    Hydralazine analogues Nausea And Vomiting and Other (See Comments)     headaches    Last reviewed on  6/26/2022 5:47 PM by Jean Hollingsworth      Tasks added this encounter   8/26/2022 - Refill Call (Auto Added)   Tasks due within next 3 months   No tasks due.     Demi Nair [facility]  [address] [facility] [address]  Phone: [phone]  Fax: [phone]      "

## 2022-07-29 ENCOUNTER — OFFICE VISIT (OUTPATIENT)
Dept: HEPATOLOGY | Facility: CLINIC | Age: 67
End: 2022-07-29
Payer: MEDICARE

## 2022-07-29 ENCOUNTER — PROCEDURE VISIT (OUTPATIENT)
Dept: HEPATOLOGY | Facility: CLINIC | Age: 67
End: 2022-07-29
Payer: MEDICARE

## 2022-07-29 VITALS
HEART RATE: 55 BPM | RESPIRATION RATE: 18 BRPM | TEMPERATURE: 97 F | DIASTOLIC BLOOD PRESSURE: 52 MMHG | BODY MASS INDEX: 34.88 KG/M2 | SYSTOLIC BLOOD PRESSURE: 122 MMHG | HEIGHT: 63 IN | WEIGHT: 196.88 LBS | OXYGEN SATURATION: 97 %

## 2022-07-29 DIAGNOSIS — R74.01 ELEVATED ALT MEASUREMENT: Primary | ICD-10-CM

## 2022-07-29 DIAGNOSIS — Z94.0 KIDNEY REPLACED BY TRANSPLANT: ICD-10-CM

## 2022-07-29 DIAGNOSIS — R74.01 ELEVATED ALT MEASUREMENT: ICD-10-CM

## 2022-07-29 DIAGNOSIS — K76.0 NAFLD (NONALCOHOLIC FATTY LIVER DISEASE): ICD-10-CM

## 2022-07-29 PROCEDURE — 99214 OFFICE O/P EST MOD 30 MIN: CPT | Mod: S$PBB,,, | Performed by: NURSE PRACTITIONER

## 2022-07-29 PROCEDURE — 99999 PR PBB SHADOW E&M-EST. PATIENT-LVL V: ICD-10-PCS | Mod: PBBFAC,,, | Performed by: NURSE PRACTITIONER

## 2022-07-29 PROCEDURE — 91200 LIVER ELASTOGRAPHY: CPT | Mod: PBBFAC | Performed by: NURSE PRACTITIONER

## 2022-07-29 PROCEDURE — 99214 PR OFFICE/OUTPT VISIT, EST, LEVL IV, 30-39 MIN: ICD-10-PCS | Mod: S$PBB,,, | Performed by: NURSE PRACTITIONER

## 2022-07-29 PROCEDURE — 99215 OFFICE O/P EST HI 40 MIN: CPT | Mod: PBBFAC | Performed by: NURSE PRACTITIONER

## 2022-07-29 PROCEDURE — 91200 FIBROSCAN (VIBRATION CONTROLLED TRANSIENT ELASTOGRAPHY): ICD-10-PCS | Mod: 26,S$PBB,, | Performed by: NURSE PRACTITIONER

## 2022-07-29 PROCEDURE — 99999 PR PBB SHADOW E&M-EST. PATIENT-LVL V: CPT | Mod: PBBFAC,,, | Performed by: NURSE PRACTITIONER

## 2022-07-29 PROCEDURE — 91200 LIVER ELASTOGRAPHY: CPT | Mod: 26,S$PBB,, | Performed by: NURSE PRACTITIONER

## 2022-07-29 RX ORDER — GABAPENTIN 300 MG/1
300 CAPSULE ORAL DAILY
COMMUNITY
Start: 2022-07-27 | End: 2022-07-29

## 2022-07-29 NOTE — PROGRESS NOTES
[facility] - Established Patient    Last Clinic Visit: 5/11/21    Chief Complaint: Follow-up for elevated liver enzymes       HISTORY     This is a 66 y.o. female with PMH noted below, here for follow-up of elevated liver enzymes. Previously seen by Dr. Crawley in 2021, new patient to me.     No known history of liver disease.    She is s/p kidney transplant 9/2019 (ESRD 2/2 HTN).    Review of labs:  - Transaminases: intermittently elevated since 2019, mainly ALT. Higher in 2021 at time of UTI. ALT most recently 40s-90s.  - Alk phos: WNL  - Synthetic liver function: WNL  - Platelets: chronically low, 114 (has had workup with Hem/Onc including BM biopsy; suspected some degree of BM suppression from prograf as she has pancytopenia)    Workup thus far:  Serologies:   Lab Results   Component Value Date    SMOOTHMUSCAB Negative 1:40 05/11/2021    AMAIFA Negative 1:40 05/11/2021    IGGSERUM 916 05/11/2021    ANASCREEN Negative <1:80 05/11/2021    FERRITIN 885 (H) 03/02/2022    FESATURATED 25 03/02/2022    PETH Negative 05/11/2021    HEPBSAG Negative 10/14/2020    HEPBIGM Negative 09/19/2019    HEPCAB Negative 11/20/2020    CERULOPLSM 27.0 05/11/2021    CMV previously undetected     Her imaging has shown multiple liver cysts, stable in size.  - CT 4/2021- multiple hepatic hypodensities, favored to be cysts. No suspicious liver lesions.  - US 4/2021- stable simple cysts + stable lesion in R lobe (favored to be complex cyst, 3.6 cm)    Risk factors for fatty liver:   · Weight -- Body mass index is 34.87 kg/m².      · Weight is up ~30 lb over the last 8 months                    · Dyslipidemia -- yes                                · Insulin resistance / diabetes -- yes, though last HgbA1c 4.6       · Hypertension -- yes  · Alcohol use -- none    Family history:  No family history of liver disease.    Meds:  -Rx: started olmesartan 6/8, atorvastatin since at least 2014   -OTC: n/a  -Herbs/supplements: n/a  No  recent medication changes.     Denies recent illness or infection.  Feels well at this time.  No s/s hepatic decompensation.           Past medical history, surgical history, problem list, family history, social history, allergies: Reviewed and updated in the appropriate section of the electronic medical record.      Current Outpatient Medications   Medication Sig Dispense Refill    aspirin (ECOTRIN) 81 MG EC tablet Take 1 tablet (81 mg total) by mouth once daily.      belatacept 250 mg SolR 362 mg IV Every 2 weeks x 5 doses then monthly thereafter      blood sugar diagnostic Strp Test blood glucose 4 (four) times daily. (Patient taking differently: 1 each by Misc.(Non-Drug; Combo Route) route 3 (three) times daily.) 100 each 5    blood-glucose meter kit Use as instructed 1 each 0    cholecalciferol, vitamin D3, (VITAMIN D3) 25 mcg (1,000 unit) capsule Take 1 capsule (1,000 Units total) by mouth once daily. 30 capsule 11    cinacalcet (SENSIPAR) 30 MG Tab TAKE ONE TABLET BY MOUTH EVERY DAY WITH BREAKFAST 30 tablet 11    cinacalcet (SENSIPAR) 60 MG Tab Take 1 tablet (60 mg total) by mouth daily with breakfast. 30 tablet 11    diclofenac sodium (VOLTAREN) 1 % Gel Apply 2 g topically daily as needed (pain).       ferrous sulfate (FEOSOL) 325 mg (65 mg iron) Tab tablet Take by mouth once daily.      folic acid (FOLVITE) 1 MG tablet Take 1 tablet (1 mg total) by mouth once daily. 30 tablet 5    HYDROcodone-acetaminophen (NORCO) 7.5-325 mg per tablet Take 0.5-1 tablets by mouth 3 (three) times daily as needed for Pain.       ketoconazole (NIZORAL) 200 mg Tab Take HALF tablet (100 mg total) by mouth once daily. 15 tablet 11    lancets 28 gauge Misc Test blood glucose 4 (four) times daily. (Patient taking differently: by Misc.(Non-Drug; Combo Route) route 3 (three) times daily.) 100 each 5    magnesium oxide (MAG-OX) 400 mg (241.3 mg magnesium) tablet TAKE 1 TABLET (400 MG TOTAL) BY MOUTH 2 (TWO) TIMES DAILY.  "180 tablet 3    NIFEdipine (ADALAT CC) 60 MG TbSR TAKE 1 TABLET BY MOUTH TWICE A DAY FOR 2 WEEKS      olmesartan (BENICAR) 20 MG tablet Take 1 tablet (20 mg total) by mouth once daily. 90 tablet 3    pen needle, diabetic 32 gauge x 5/32" Ndle Use to inject insulin 4 (four) times daily. 100 each 5    predniSONE (DELTASONE) 5 MG tablet Take 1 tablet (5 mg total) by mouth once daily. 30 tablet 11    tacrolimus (PROGRAF) 1 MG Cap Take 4 capsules (4 mg total) by mouth every 12 (twelve) hours. 240 capsule 11    atorvastatin (LIPITOR) 40 MG tablet Take 1 tablet (40 mg total) by mouth once daily. (Patient not taking: Reported on 7/29/2022) 90 tablet 3     No current facility-administered medications for this visit.     Medication list reviewed and updated.      Review of Systems   Constitutional: Negative for fatigue or unexpected weight change.   Respiratory: Negative for shortness of breath.    Cardiovascular: Negative for leg swelling.  Gastrointestinal: Negative for abdominal distention, abdominal pain, diarrhea, constipation, nausea, and vomiting. Negative for blood in stool, melena, or hematemesis.  Musculoskeletal: Negative for myalgias.    Skin: Negative for itching.  Neurological: Negative for dizziness or tremors. Negative for confusion, slowed mentation, or memory loss.   Hematological: Does not bruise/bleed easily.   Psychiatric: Negative for mood changes or sleep disturbance.      Physical Exam   Constitutional: Well-nourished. No distress. Alert and oriented.  Eyes: No scleral icterus.   Pulmonary/Chest: Respiratory effort normal. No respiratory distress.   Abdominal: No distension, no ascites appreciated.   Extremities: No edema.   Neurological: No tremor or asterixis. Gait normal.  Skin: No jaundice. No spider telangiectasias or palmar erythema.  Psychiatric: Normal mood and affect. Speech, behavior, and thought content normal. No depression or anxiety noted.       Vitals reviewed.  BP (!) 122/52 (BP " "Location: Right arm, Patient Position: Sitting, BP Method: Medium (Automatic))   Pulse (!) 55   Temp 96.6 °F (35.9 °C) (Oral)   Resp 18   Ht 5' 3" (1.6 m)   Wt 89.3 kg (196 lb 13.9 oz)   SpO2 97%   BMI 34.87 kg/m²       LABS & DIAGNOSTIC STUDIES     I have personally reviewed pertinent laboratory findings:    Lab Results   Component Value Date    ALT 93 (H) 07/12/2022    AST 36 07/12/2022    ALKPHOS 103 07/12/2022    BILITOT 0.5 07/12/2022    ALBUMIN 4.0 07/12/2022    INR 1.0 03/31/2021       Lab Results   Component Value Date    WBC 3.28 (L) 06/06/2022    HGB 11.1 (L) 06/06/2022    HCT 35.8 (L) 06/06/2022    MCV 95 06/06/2022     (L) 06/06/2022       Lab Results   Component Value Date     06/13/2022    K 4.2 06/13/2022    BUN 21 06/13/2022    CREATININE 1.6 (H) 06/13/2022    ESTGFRAFRICA 39.0 (A) 06/13/2022    EGFRNONAA 33.8 (A) 06/13/2022       Lab Results   Component Value Date    SMOOTHMUSCAB Negative 1:40 05/11/2021    AMAIFA Negative 1:40 05/11/2021    IGGSERUM 916 05/11/2021    ANASCREEN Negative <1:80 05/11/2021    FERRITIN 885 (H) 03/02/2022    FESATURATED 25 03/02/2022    PETH Negative 05/11/2021    CERULOPLSM 27.0 05/11/2021    HEPBSAG Negative 10/14/2020    HEPBIGM Negative 09/19/2019    HEPBCAB Negative 07/03/2012    HEPCAB Negative 11/20/2020    HCVRNAQUANTP <12 09/19/2019    [id number] Negative 11/20/2020       No results found for: AFP    I have personally reviewed the following result reports:  Abdominal US - 4/9/21  CT - 4/6/21      ASSESSMENT & PLAN     66 y.o. female with:    1. Elevated liver enzymes   -- Suspect due to fatty liver as enzymes are trending up with weight gain post transplant. Discussed that if determined to have fatty liver, her ALT should improve with weight loss and good control of metabolic risk factors.  -- Serologic workup has been negative for other causes of liver disease.   -- Obtain Fibroscan today for fibrosis and steatosis staging.  -- In regard to " meds, she started olmesartan 6/8 though enzymes started trending up prior to starting this (6/6).       Orders Placed This Encounter   Procedures    FibroScan (Vibration Controlled Transient Elastography)       ADDENDUM:  Fibroscan = F0-F1, no-minimal fibrosis   , S3 or >67% steatosis  *Results reviewed with patient in clinic. Discussed that the only treatment for fatty liver is weight loss and maintaining good control of blood pressure, cholesterol, and blood sugar. Liver enzymes should improve with weight loss. Recommend follow-up in 1 year with labs and ultrasound prior. Can see Dr. Crawley as she lives near [place].       Thank you for allowing me to participate in the care of Satinder Schulte    [facility]  Hepatology        Duration of encounter: 30 min  This includes face to face time and non-face to face time preparing to see the patient (eg, review of tests), obtaining and/or reviewing separately obtained history, documenting clinical information in the electronic or other health record, independently interpreting results and communicating results to the patient/family/caregiver, or care coordination.

## 2022-07-29 NOTE — Clinical Note
Please enter recall for f/u visit in 1 year with Dr. Crawley (she lives near ) with labs and US prior. Thanks!

## 2022-07-29 NOTE — PROCEDURES
FibroScan (Vibration Controlled Transient Elastography)    Date/Time: 7/29/2022 10:45 AM  Performed by: Raquel Jones NP  Authorized by: Raquel Jones NP     Diagnosis:  Other    Probe:  M    Universal Protocol: Patient's identity, procedure and site were verified, confirmatory pause was performed.  Discussed procedure including risks and potential complications.  Questions answered.  Patient verbalizes understanding and wishes to proceed with VCTE.     Procedure: After providing explanations of the procedure, patient was placed in the supine position with right arm in maximum abduction to allow optimal exposure of right lateral abdomen.  Patient was briefly assessed, Testing was performed in the mid-axillary location, 50Hz Shear Wave pulses were applied and the resulting Shear Wave and Propagation Speed detected with a 3.5 MHz ultrasonic signal, using the FibroScan probe, Skin to liver capsule distance and liver parenchyma were accessed during the entire examination with the FibroScan probe, Patient was instructed to breathe normally and to abstain from sudden movements during the procedure, allowing for random measurements of liver stiffness. At least 10 Shear Waves were produced, Individual measurements of each Shear Wave were calculated.  Patient tolerated the procedure well with no complications.  Meets discharge criteria as was dismissed.  Rates pain 0 out of 10.  Patient will follow up with ordering provider to review results.      Findings  Median liver stiffness score:  5.3  CAP Reading: dB/m:  312    IQR/med %:  9  Interpretation  Fibrosis interpretation is based on medial liver stiffness - Kilopascal (kPa).    Fibrosis Stage:  F 0-1  Steatosis interpretation is based on controlled attenuation parameter - (dB/m).    Steatosis Grade:  S3

## 2022-07-29 NOTE — PATIENT INSTRUCTIONS
Fibroscan shows fatty liver. Your liver enzymes should improve with weight loss. Reassuring, no-minimal liver scarring/damage.     Continue to maintain good control of blood pressure, cholesterol, and blood sugar as these also contribute to fatty liver.    Follow-up in 1 year with Dr. Crawley in [place] with labs and ultrasound before your visit.         Fatty liver    There is no FDA approved therapy for non-alcoholic fatty liver disease (NAFLD); therefore, lifestyle changes are important:  1. Weight loss goal of 19 lbs (10% based on current weight)  2. Low carb/sugar, high protein diet.   3. Exercise, 5 days per week, 30 minutes per day, as tolerated  4. Recommend good control of cholesterol, blood pressure, blood sugar levels (as these are risk factors for fatty liver). Cholesterol lowering medications including statins are typically safe and beneficial (even if liver enzymes are elevated).    5. Limit alcohol consumption, no more than 1 serving(s) of alcohol in any day (1 serving is 5 ounces of wine, 12 ounces of beer, or 1.5 ounces of liquor). Do not recommend daily alcohol use.      In some people, fatty liver can progress to steatohepatitis (inflamatory fatty liver) and possibly to cirrhosis, putting one at increased risk for liver cancer and liver failure. Lifestyle changes can help to decrease this risk.     Ask about our fatty liver/MÁRQUEZ clinical trials if you have fibrosis / scar tissue related to fatty liver.        Additional Resources:    Websites with information about fatty liver and inflammation related to fatty liver (MÁRQUEZ): www.nashtruth.NuConomy and www.nashactually.com   [facility]: Non-Alcoholic Fatty Liver Disease (NAFLD)    Facebook support group with tips and recipes:   Non-Alcoholic Fatty Liver Disease (NAFLD) Diet & Nutrition Support     Can download MyFitness Pal or Lose It lo to add up your carbohydrates throughout the day.   Tip -- Avoid beverages with calories or carbohydrates.   Try  www.dietdoctor.com for recipes.    If interested in seeing a dietician to create a weight loss plan, contact the dietician team at [facility]: [email or web address].  You can also call to schedule a consult with a dietician: [phone]  *Virtual visits are available with one of our dieticians.     If you have diabetes or high blood pressure, you can meet with a Health  through [facility] [employer] program. Let us know if you are interested in a referral.     Let us know if you are interested in a referral to [facility] program.

## 2022-08-01 ENCOUNTER — SPECIALTY PHARMACY (OUTPATIENT)
Dept: PHARMACY | Facility: CLINIC | Age: 67
End: 2022-08-01
Payer: MEDICARE

## 2022-08-01 NOTE — TELEPHONE ENCOUNTER
Praluent recently discontinued by [facility]. ALT levels were elevated on recent lab. Patient was notified. Will disenroll with [facility].

## 2022-09-06 ENCOUNTER — LAB VISIT (OUTPATIENT)
Dept: LAB | Facility: HOSPITAL | Age: 67
End: 2022-09-06
Attending: INTERNAL MEDICINE
Payer: MEDICARE

## 2022-09-06 DIAGNOSIS — Z94.0 KIDNEY REPLACED BY TRANSPLANT: ICD-10-CM

## 2022-09-06 LAB
ALBUMIN SERPL BCP-MCNC: 4.3 G/DL (ref 3.5–5.2)
ALBUMIN SERPL BCP-MCNC: 4.3 G/DL (ref 3.5–5.2)
ALP SERPL-CCNC: 93 U/L (ref 55–135)
ALT SERPL W/O P-5'-P-CCNC: 38 U/L (ref 10–44)
ANION GAP SERPL CALC-SCNC: 9 MMOL/L (ref 8–16)
AST SERPL-CCNC: 15 U/L (ref 10–40)
BASOPHILS # BLD AUTO: 0.01 K/UL (ref 0–0.2)
BASOPHILS NFR BLD: 0.3 % (ref 0–1.9)
BILIRUB DIRECT SERPL-MCNC: 0.2 MG/DL (ref 0.1–0.3)
BILIRUB SERPL-MCNC: 0.4 MG/DL (ref 0.1–1)
BUN SERPL-MCNC: 37 MG/DL (ref 8–23)
CALCIUM SERPL-MCNC: 10.4 MG/DL (ref 8.7–10.5)
CHLORIDE SERPL-SCNC: 111 MMOL/L (ref 95–110)
CO2 SERPL-SCNC: 23 MMOL/L (ref 23–29)
CREAT SERPL-MCNC: 1.9 MG/DL (ref 0.5–1.4)
DIFFERENTIAL METHOD: ABNORMAL
EOSINOPHIL # BLD AUTO: 0 K/UL (ref 0–0.5)
EOSINOPHIL NFR BLD: 1.4 % (ref 0–8)
ERYTHROCYTE [DISTWIDTH] IN BLOOD BY AUTOMATED COUNT: 14.1 % (ref 11.5–14.5)
EST. GFR  (NO RACE VARIABLE): 29.1 ML/MIN/1.73 M^2
GLUCOSE SERPL-MCNC: 92 MG/DL (ref 70–110)
HCT VFR BLD AUTO: 36 % (ref 37–48.5)
HGB BLD-MCNC: 11.3 G/DL (ref 12–16)
IMM GRANULOCYTES # BLD AUTO: 0.01 K/UL (ref 0–0.04)
IMM GRANULOCYTES NFR BLD AUTO: 0.3 % (ref 0–0.5)
LYMPHOCYTES # BLD AUTO: 0.9 K/UL (ref 1–4.8)
LYMPHOCYTES NFR BLD: 29.7 % (ref 18–48)
MCH RBC QN AUTO: 31 PG (ref 27–31)
MCHC RBC AUTO-ENTMCNC: 31.4 G/DL (ref 32–36)
MCV RBC AUTO: 99 FL (ref 82–98)
MONOCYTES # BLD AUTO: 0.3 K/UL (ref 0.3–1)
MONOCYTES NFR BLD: 10.2 % (ref 4–15)
NEUTROPHILS # BLD AUTO: 1.7 K/UL (ref 1.8–7.7)
NEUTROPHILS NFR BLD: 58.1 % (ref 38–73)
NRBC BLD-RTO: 0 /100 WBC
PHOSPHATE SERPL-MCNC: 3.2 MG/DL (ref 2.7–4.5)
PLATELET # BLD AUTO: 147 K/UL (ref 150–450)
PMV BLD AUTO: 12.1 FL (ref 9.2–12.9)
POTASSIUM SERPL-SCNC: 4.7 MMOL/L (ref 3.5–5.1)
PROT SERPL-MCNC: 6.8 G/DL (ref 6–8.4)
RBC # BLD AUTO: 3.64 M/UL (ref 4–5.4)
SODIUM SERPL-SCNC: 143 MMOL/L (ref 136–145)
WBC # BLD AUTO: 2.93 K/UL (ref 3.9–12.7)

## 2022-09-06 PROCEDURE — 84100 ASSAY OF PHOSPHORUS: CPT | Performed by: INTERNAL MEDICINE

## 2022-09-06 PROCEDURE — 85025 COMPLETE CBC W/AUTO DIFF WBC: CPT | Performed by: INTERNAL MEDICINE

## 2022-09-06 PROCEDURE — 87799 DETECT AGENT NOS DNA QUANT: CPT | Performed by: INTERNAL MEDICINE

## 2022-09-06 PROCEDURE — 80197 ASSAY OF TACROLIMUS: CPT | Performed by: INTERNAL MEDICINE

## 2022-09-06 PROCEDURE — 84075 ASSAY ALKALINE PHOSPHATASE: CPT | Mod: 91 | Performed by: INTERNAL MEDICINE

## 2022-09-07 ENCOUNTER — TELEPHONE (OUTPATIENT)
Dept: TRANSPLANT | Facility: CLINIC | Age: 67
End: 2022-09-07
Payer: MEDICARE

## 2022-09-07 LAB — TACROLIMUS BLD-MCNC: 30.7 NG/ML (ref 5–15)

## 2022-09-07 NOTE — TELEPHONE ENCOUNTER
Per patient and daughter Niya 9/6 [place] level not a 12 hour trough.  12 hour trough reviewed with patient and will repeat level tomorrow 9/8/2022. Patient also voice a understanding that she needs to be drinking over 2 liters of H2O daily.  ----- Message from Pavel Padilla MD sent at 9/7/2022 12:19 PM CDT -----  Ok, thanks for the update. Agree with plan.

## 2022-09-08 ENCOUNTER — LAB VISIT (OUTPATIENT)
Dept: LAB | Facility: HOSPITAL | Age: 67
End: 2022-09-08
Attending: INTERNAL MEDICINE
Payer: MEDICARE

## 2022-09-08 DIAGNOSIS — Z94.0 KIDNEY REPLACED BY TRANSPLANT: ICD-10-CM

## 2022-09-08 PROCEDURE — 36415 COLL VENOUS BLD VENIPUNCTURE: CPT | Mod: PO | Performed by: INTERNAL MEDICINE

## 2022-09-08 PROCEDURE — 80197 ASSAY OF TACROLIMUS: CPT | Performed by: INTERNAL MEDICINE

## 2022-09-09 ENCOUNTER — TELEPHONE (OUTPATIENT)
Dept: TRANSPLANT | Facility: CLINIC | Age: 67
End: 2022-09-09
Payer: MEDICARE

## 2022-09-09 LAB — TACROLIMUS BLD-MCNC: 3.9 NG/ML (ref 5–15)

## 2022-09-09 NOTE — TELEPHONE ENCOUNTER
Left voice message to call coordinator back.  ----- Message from Alfredo Malik Jr., MD sent at 9/9/2022  9:52 AM CDT -----  This level is ok for someone on belatacept. No need to make any changes at this time

## 2022-09-21 ENCOUNTER — OFFICE VISIT (OUTPATIENT)
Dept: TRANSPLANT | Facility: CLINIC | Age: 67
End: 2022-09-21
Payer: MEDICARE

## 2022-09-21 VITALS — BODY MASS INDEX: 33.66 KG/M2 | SYSTOLIC BLOOD PRESSURE: 135 MMHG | WEIGHT: 190 LBS | DIASTOLIC BLOOD PRESSURE: 70 MMHG

## 2022-09-21 DIAGNOSIS — D61.811 DRUG-INDUCED PANCYTOPENIA: ICD-10-CM

## 2022-09-21 DIAGNOSIS — D84.9 IMMUNOCOMPROMISED STATE: ICD-10-CM

## 2022-09-21 DIAGNOSIS — Z91.89 AT RISK FOR OPPORTUNISTIC INFECTIONS: ICD-10-CM

## 2022-09-21 DIAGNOSIS — E11.9 DIABETES MELLITUS, NEW ONSET: ICD-10-CM

## 2022-09-21 DIAGNOSIS — N18.31 STAGE 3A CHRONIC KIDNEY DISEASE: Primary | ICD-10-CM

## 2022-09-21 DIAGNOSIS — R31.29 HEMATURIA, MICROSCOPIC: ICD-10-CM

## 2022-09-21 DIAGNOSIS — Z79.60 LONG-TERM USE OF IMMUNOSUPPRESSANT MEDICATION: ICD-10-CM

## 2022-09-21 PROCEDURE — 99214 OFFICE O/P EST MOD 30 MIN: CPT | Mod: 95,,, | Performed by: INTERNAL MEDICINE

## 2022-09-21 PROCEDURE — 99214 PR OFFICE/OUTPT VISIT, EST, LEVL IV, 30-39 MIN: ICD-10-PCS | Mod: 95,,, | Performed by: INTERNAL MEDICINE

## 2022-09-21 NOTE — PROGRESS NOTES
"     The patient location is:  home  The chief complaint leading to consultation is:  Reassessment of kidney function, complex immunosuppressive medication, BK infection protocol, management of electrolytes, anemia, proteinuria and blood pressure advice.   Visit type: Virtual visit with synchronous audio and video  Total time spent with patient: 12  min  Each patient to whom he or she provides medical services by telemedicine is:  (1) informed of the relationship between the physician and patient and the respective role of any other health care provider with respect to management of the patient; and (2) notified that he or she may decline to receive medical services by telemedicine and may withdraw from such care at any time.    Kidney Post-Transplant Assessment    Referring Physician: Bruce Khan  Current Nephrologist: Bruce Khan    ORGAN: LEFT KIDNEY  Donor Type: donation after brain death  PHS Increased Risk: no  Cold Ischemia: 545 mins  Induction Medications: thymoglobulin    Subjective:     CC:  Reassessment of renal allograft function and management of chronic immunosuppression.    HPI:1.8 to 2  Ms. Schulte is a 66 y.o. 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  She has CKD stage 3 - GFR 30-59 and her baseline creatinine is between 1.6 to 2. She takes prednisone, tacrolimus, and Belatacept  for maintenance immunosuppression. She denies any recent hospitalizations or ER visits since her previous clinic visit.    Patient is following with Dr Khan, and everything is the same she says"    No ER visits    No admissions    Appetite is fine    No nausea vomit or diarrhea    No chest pain or SOB.    She lives by myself and the daughter is with her most if the time     Current Outpatient Medications on File Prior to Visit   Medication Sig Dispense Refill    aspirin (ECOTRIN) 81 MG EC tablet Take 1 tablet (81 mg total) by mouth once daily.  " "    atorvastatin (LIPITOR) 40 MG tablet Take 1 tablet (40 mg total) by mouth once daily. (Patient not taking: Reported on 7/29/2022) 90 tablet 3    belatacept 250 mg SolR 362 mg IV Every 2 weeks x 5 doses then monthly thereafter      blood sugar diagnostic Strp Test blood glucose 4 (four) times daily. (Patient taking differently: 1 each by Misc.(Non-Drug; Combo Route) route 3 (three) times daily.) 100 each 5    blood-glucose meter kit Use as instructed 1 each 0    cholecalciferol, vitamin D3, (VITAMIN D3) 25 mcg (1,000 unit) capsule Take 1 capsule (1,000 Units total) by mouth once daily. 30 capsule 11    cinacalcet (SENSIPAR) 30 MG Tab TAKE ONE TABLET BY MOUTH EVERY DAY WITH BREAKFAST 30 tablet 11    cinacalcet (SENSIPAR) 60 MG Tab Take 1 tablet (60 mg total) by mouth daily with breakfast. 30 tablet 11    diclofenac sodium (VOLTAREN) 1 % Gel Apply 2 g topically daily as needed (pain).       ferrous sulfate (FEOSOL) 325 mg (65 mg iron) Tab tablet Take by mouth once daily.      folic acid (FOLVITE) 1 MG tablet Take 1 tablet (1 mg total) by mouth once daily. 30 tablet 5    HYDROcodone-acetaminophen (NORCO) 7.5-325 mg per tablet Take 0.5-1 tablets by mouth 3 (three) times daily as needed for Pain.       ketoconazole (NIZORAL) 200 mg Tab Take HALF tablet (100 mg total) by mouth once daily. 15 tablet 11    lancets 28 gauge Misc Test blood glucose 4 (four) times daily. (Patient taking differently: by Misc.(Non-Drug; Combo Route) route 3 (three) times daily.) 100 each 5    magnesium oxide (MAG-OX) 400 mg (241.3 mg magnesium) tablet TAKE 1 TABLET (400 MG TOTAL) BY MOUTH 2 (TWO) TIMES DAILY. 180 tablet 3    NIFEdipine (ADALAT CC) 60 MG TbSR TAKE 1 TABLET BY MOUTH TWICE A DAY FOR 2 WEEKS      olmesartan (BENICAR) 20 MG tablet Take 1 tablet (20 mg total) by mouth once daily. 90 tablet 3    pen needle, diabetic 32 gauge x 5/32" Ndle Use to inject insulin 4 (four) times daily. 100 each 5    predniSONE (DELTASONE) 5 MG tablet Take " 1 tablet (5 mg total) by mouth once daily. 30 tablet 11    tacrolimus (PROGRAF) 1 MG Cap Take 4 capsules (4 mg total) by mouth every 12 (twelve) hours. 240 capsule 11     No current facility-administered medications on file prior to visit.        Review of Systems    Skin: no skin rash  CNS; no headaches, blurred vision, seizure, or syncope  ENT: No JVD,  Adenopathies,  nasal congestion. No oral lesions  Cardiac: No chest pain, dyspnea, claudication, edema or palpitations  Respiratory: No SOB, cough, hemoptysis   Gastro-intestinal: No diarrhea, constipation, abdominal pain, nausea, vomit. No ascitis  Genitourinary: no hematuria, dysuria, frequency, frequency  Musculoskeletal: joint pain, arthritis or vasculitic changes  Psych: alert awake, oriented, No cranial nerves deficit.      Objective:       Physical Exam    /70   Wt 86.2 kg (190 lb)   BMI 33.66 kg/m²     Labs:  Lab Results   Component Value Date    WBC 2.93 (L) 09/06/2022    HGB 11.3 (L) 09/06/2022    HCT 36.0 (L) 09/06/2022     09/06/2022     09/06/2022    K 4.7 09/06/2022    K 4.6 09/06/2022     (H) 09/06/2022     09/06/2022    CO2 23 09/06/2022    CO2 20 (L) 09/06/2022    BUN 37 (H) 09/06/2022    BUN 38 (H) 09/06/2022    CREATININE 1.9 (H) 09/06/2022    CREATININE 1.8 (H) 09/06/2022    EGFRNONAA 33.8 (A) 06/13/2022    CALCIUM 10.4 09/06/2022    CALCIUM 9.9 09/06/2022    PHOS 3.2 09/06/2022    PHOS 3.1 09/06/2022    MG 1.8 09/06/2022    ALBUMIN 4.3 09/06/2022    ALBUMIN 4.3 09/06/2022    ALBUMIN 4.1 09/06/2022    AST 15 09/06/2022    AST 15 09/06/2022    ALT 38 09/06/2022    ALT 37 09/06/2022       No results found for: EXTANC, EXTWBC, EXTSEGS, EXTPLATELETS, EXTHEMOGLOBI, EXTHEMATOCRI, EXTCREATININ, EXTSODIUM, EXTPOTASSIUM, EXTBUN, EXTCO2, EXTCALCIUM, EXTPHOSPHORU, EXTGLUCOSE, EXTALBUMIN, EXTAST, EXTALT, EXTBILITOTAL, EXTLIPASE, EXTAMYLASE    No results found for: EXTCYCLOSLVL, EXTSIROLIMUS, EXTTACROLVL, EXTPROTCRE,  EXTPTHINTACT, EXTPROTEINUA, EXTWBCUA, EXTRBCUA    Labs were reviewed with the patient.    Assessment:     1. Stage 3a chronic kidney disease    2. At risk for opportunistic infections    3. Diabetes mellitus, new onset    4. Drug-induced pancytopenia    5. Hematuria, microscopic    6. Immunocompromised state    7. Long-term use of immunosuppressant medication        Plan:   We discussed in detail immunosuppression with arlene per protocol  We discussed blood work  Blood pressure control healthy diet  We discussed need for close follow up with her general nephrologist  All questions answered  Education provided          1. CKD stage 3b with stable creatinine : will continue follow up as per our center guidelines. patient to continue close follow up with the local General nephrologist. Education provided in appropriate fluid intake, potassium intake. Continue with oral hydration.        2. Immunosuppression: arlene infusion prograf and Prednisone.   Lab Results   Component Value Date    TACROLIMUS 3.9 (L) 09/08/2022    TACROLIMUS 30.7 (HH) 09/06/2022    TACROLIMUS 2.0 (L) 06/06/2022     No results found for: CYCLOSPORINE  @   Will closely monitor for toxicities, education provided about adherence to medicines and need to communicate any side effect to the transplant nurse or physician.    3. Allograft Function:stable at baseline for the patient. Continue follow up as per our guidelines and with the local General nephrologist. Communication will be sent today.  Lab Results   Component Value Date    CREATININE 1.9 (H) 09/06/2022    CREATININE 1.8 (H) 09/06/2022    CREATININE 1.6 (H) 06/13/2022     Lab Results   Component Value Date    LIPASE 36 12/17/2019       4. Hypertension management:  I advised HBP monitoring and discuss with her local nephrologist. Continue with home blood pressure monitoring, low salt and healthy life discussed with the patient..    5. Metabolic Bone Disease/Secondary Hyperparathyroidism:calcium and  phosphorus level discussed with the patient, patient will continue follow up with the general nephrologist for management of metabolic bone disease   calcium and phosphorus as per our center protocol. Will monitor PTH, Vit D level, calcium.      Lab Results   Component Value Date    .0 (H) 03/02/2022    CALCIUM 10.4 09/06/2022    CALCIUM 9.9 09/06/2022    CAION 1.40 03/13/2020    PHOS 3.2 09/06/2022    PHOS 3.1 09/06/2022    PHOS 3.0 06/13/2022       6. Electrolytes: reviewed with the patient, essentially within the normal range no need for acute changes today, will monitor as per our center guidelines.     Lab Results   Component Value Date     09/06/2022     09/06/2022    K 4.7 09/06/2022    K 4.6 09/06/2022     (H) 09/06/2022     09/06/2022    CO2 23 09/06/2022    CO2 20 (L) 09/06/2022    CO2 22 (L) 06/13/2022       7. Anemia: will continue monitoring as per our center guidelines. No indication for acute intervention today.     Lab Results   Component Value Date    WBC 2.93 (L) 09/06/2022    HGB 11.3 (L) 09/06/2022    HCT 36.0 (L) 09/06/2022    MCV 99 (H) 09/06/2022     (L) 09/06/2022       8.Proteinuria: will continue with pr/cr ratio as per our center guidelines  Lab Results   Component Value Date    PROTEINURINE 114 (H) 09/06/2022    CREATRANDUR 130.0 09/06/2022    UTPCR 0.88 (H) 09/06/2022    UTPCR 4.68 (H) 06/06/2022    UTPCR 0.39 (H) 09/16/2021        9. BK virus infection screening: undetectable, will continue with urine or blood PCR as per our guidelines to prevent BK virus viremia and allograft dysfunction  Lab Results   Component Value Date    BKVIRUSPCRQB <125 09/06/2022         10. Weight education: provided during the clinic visit.   Body mass index is 33.66 kg/m².       11.Patient safety education regarding immunosuppression including prophylaxis posttransplant for CMV, PCP : Education provided about vaccination and prevention of infections.    12.  Cytopenias:  no significant cytopenias will monitor as per our guidelines. Medicine list reviewed including potential causes of drug-induced cytopenias     Lab Results   Component Value Date    WBC 2.93 (L) 09/06/2022    HGB 11.3 (L) 09/06/2022    HCT 36.0 (L) 09/06/2022    MCV 99 (H) 09/06/2022     (L) 09/06/2022       13. Post-transplant Prophylaxis; CMV Infection, PJP and Candida mucosistis and other indicated for this particular patient. OFF prophylaxis > 3 years posttransplant    I spoke with the patient for 30 minutes. More than half dedicated to counseling and education. All questions answered    Pavel Padilla MD  Transplant Nephrology            Follow-up:   Annual follow-up with kidney transplant clinic per written guidelines.  Patient also reminded to follow-up with general nephrologist.    Labs: since patient remains at high risk for rejection and drug-related complications that warrant close monitoring, labs will be ordered as follows: continue twice weekly CBC, renal panel, and drug level for first month; then same labs once weekly through 3rd month post-transplant.  Urine for UA and protein/creatinine ratio monthly.  Serum BK - PCR at 1-, 3-, 6-, 9-, 12-, 18-, 24-, 36-, 48-, and 60 months post-transplant.  Hepatic panel at 1-, 2-, 3-, 6-, 9-, 12-, 18-, 24-, and 36- months post-transplant.    Pavel Padilla MD       Education:   Material provided to the patient.  Patient reminded to call with any health changes since these can be early signs of significant complications.  Also, I advised the patient to be sure any new medications or changes of old medications are discussed with either a pharmacist or physician knowledgeable with transplant to avoid rejection/drug toxicity related to significant drug interactions.    Patient advised that it is recommended that all transplant candidates, and their close contacts and household members receive Covid vaccination.    UNOS Patient Status  Functional Status: 100%  - Normal, no complaints, no evidence of disease  Physical Capacity: No Limitations

## 2022-09-21 NOTE — LETTER
September 21, 2022        Bruce Khan  [address]  [place] [address]  Phone: [phone]  Fax: [phone]             [facility] Transplant 1st Fl  [address]  [facility] [address]  Phone: [phone]     Patient: Satinder Schulte   MR Number: [id number]   YOB: 1955   Date of Visit: 9/21/2022       Dear Dr. Bruce Khan    Thank you for referring Satinder Schulte to me for evaluation. Attached you will find relevant portions of my assessment and plan of care.    If you have questions, please do not hesitate to call me. I look forward to following Satinder Schulte along with you.    Sincerely,    Pavel Padilla MD    Enclosure    If you would like to receive this communication electronically, please contact [email or web address] or [phone] to request [employer] access.    [employer] is a tool which provides read-only access to select patient information with whom you have a relationship. Its easy to use and provides real time access to review your patients record including encounter summaries, notes, results, and demographic information.    If you feel you have received this communication in error or would no longer like to receive these types of communications, please e-mail [email or web address]

## 2022-09-22 DIAGNOSIS — Z94.0 KIDNEY REPLACED BY TRANSPLANT: Primary | ICD-10-CM

## 2022-10-04 NOTE — TELEPHONE ENCOUNTER
Patient daughter Niya repeated back and voice a understanding of orders.Denies taking any PIETRO blockers. Low K diet reinforced.  Next BMP on 6/13/2022.  Reminded of 6/10 clinic appointment .  ----- Message from Pavel Padilla MD sent at 6/7/2022 11:02 AM CDT -----  Disregard the prior message about MMF, she is on Isabella and prograf. No need to re-start nay MMF  I do not see she is on a PIETRO blocker, please ask the patient to make sure she is not on PIETRO blockers   If she is not start olmesartan 20 mg daily   Ask the patient to follow up with her nephrologist to monitor Potassium   Get a BMP in 7 days   Low K diet   Encourage hydration       
PAST MEDICAL HISTORY:  Constipation     Diaphragmatic hernia without obstruction or gangrene     Gastric reflux     HTN (hypertension)     Hyperlipidemia

## 2022-11-07 DIAGNOSIS — I12.9 HYPERTENSION, RENAL: Primary | ICD-10-CM

## 2022-11-08 DIAGNOSIS — Z94.0 KIDNEY REPLACED BY TRANSPLANT: ICD-10-CM

## 2022-11-08 RX ORDER — TACROLIMUS 1 MG/1
4 CAPSULE ORAL EVERY 12 HOURS
Qty: 240 CAPSULE | Refills: 11 | Status: CANCELLED | OUTPATIENT
Start: 2022-11-08 | End: 2023-11-08

## 2022-11-18 ENCOUNTER — TELEPHONE (OUTPATIENT)
Dept: HEMATOLOGY/ONCOLOGY | Facility: CLINIC | Age: 67
End: 2022-11-18
Payer: MEDICARE

## 2022-11-18 ENCOUNTER — LAB VISIT (OUTPATIENT)
Dept: LAB | Facility: HOSPITAL | Age: 67
End: 2022-11-18
Payer: MEDICARE

## 2022-11-18 DIAGNOSIS — D63.1 ANEMIA IN CHRONIC KIDNEY DISEASE, UNSPECIFIED CKD STAGE: ICD-10-CM

## 2022-11-18 DIAGNOSIS — N18.9 ANEMIA IN CHRONIC KIDNEY DISEASE, UNSPECIFIED CKD STAGE: ICD-10-CM

## 2022-11-18 DIAGNOSIS — D61.818 PANCYTOPENIA: ICD-10-CM

## 2022-11-18 LAB
ALBUMIN SERPL BCP-MCNC: 3.5 G/DL (ref 3.5–5.2)
ALP SERPL-CCNC: 95 U/L (ref 55–135)
ALT SERPL W/O P-5'-P-CCNC: 34 U/L (ref 10–44)
ANION GAP SERPL CALC-SCNC: 9 MMOL/L (ref 8–16)
AST SERPL-CCNC: 15 U/L (ref 10–40)
BASOPHILS # BLD AUTO: 0 K/UL (ref 0–0.2)
BASOPHILS NFR BLD: 0 % (ref 0–1.9)
BILIRUB SERPL-MCNC: 0.3 MG/DL (ref 0.1–1)
BUN SERPL-MCNC: 37 MG/DL (ref 8–23)
CALCIUM SERPL-MCNC: 9 MG/DL (ref 8.7–10.5)
CHLORIDE SERPL-SCNC: 109 MMOL/L (ref 95–110)
CO2 SERPL-SCNC: 22 MMOL/L (ref 23–29)
CREAT SERPL-MCNC: 2.5 MG/DL (ref 0.5–1.4)
DIFFERENTIAL METHOD: ABNORMAL
EOSINOPHIL # BLD AUTO: 0 K/UL (ref 0–0.5)
EOSINOPHIL NFR BLD: 1.2 % (ref 0–8)
ERYTHROCYTE [DISTWIDTH] IN BLOOD BY AUTOMATED COUNT: 14 % (ref 11.5–14.5)
EST. GFR  (NO RACE VARIABLE): 21 ML/MIN/1.73 M^2
FERRITIN SERPL-MCNC: 838 NG/ML (ref 20–300)
GLUCOSE SERPL-MCNC: 316 MG/DL (ref 70–110)
HCT VFR BLD AUTO: 32.5 % (ref 37–48.5)
HGB BLD-MCNC: 10.2 G/DL (ref 12–16)
IMM GRANULOCYTES # BLD AUTO: 0.02 K/UL (ref 0–0.04)
IMM GRANULOCYTES NFR BLD AUTO: 0.8 % (ref 0–0.5)
LYMPHOCYTES # BLD AUTO: 0.5 K/UL (ref 1–4.8)
LYMPHOCYTES NFR BLD: 20.7 % (ref 18–48)
MCH RBC QN AUTO: 30.2 PG (ref 27–31)
MCHC RBC AUTO-ENTMCNC: 31.4 G/DL (ref 32–36)
MCV RBC AUTO: 96 FL (ref 82–98)
MONOCYTES # BLD AUTO: 0.2 K/UL (ref 0.3–1)
MONOCYTES NFR BLD: 7 % (ref 4–15)
NEUTROPHILS # BLD AUTO: 1.8 K/UL (ref 1.8–7.7)
NEUTROPHILS NFR BLD: 70.3 % (ref 38–73)
NRBC BLD-RTO: 1 /100 WBC
PLATELET # BLD AUTO: 127 K/UL (ref 150–450)
PMV BLD AUTO: 11.9 FL (ref 9.2–12.9)
POTASSIUM SERPL-SCNC: 5.4 MMOL/L (ref 3.5–5.1)
PROT SERPL-MCNC: 6.4 G/DL (ref 6–8.4)
RBC # BLD AUTO: 3.38 M/UL (ref 4–5.4)
SODIUM SERPL-SCNC: 140 MMOL/L (ref 136–145)
WBC # BLD AUTO: 2.56 K/UL (ref 3.9–12.7)

## 2022-11-18 PROCEDURE — 84466 ASSAY OF TRANSFERRIN: CPT

## 2022-11-18 PROCEDURE — 80053 COMPREHEN METABOLIC PANEL: CPT

## 2022-11-18 PROCEDURE — 85025 COMPLETE CBC W/AUTO DIFF WBC: CPT

## 2022-11-18 PROCEDURE — 82728 ASSAY OF FERRITIN: CPT

## 2022-11-18 PROCEDURE — 36415 COLL VENOUS BLD VENIPUNCTURE: CPT | Mod: PN

## 2022-11-19 LAB
IRON SERPL-MCNC: 50 UG/DL (ref 30–160)
SATURATED IRON: 19 % (ref 20–50)
TOTAL IRON BINDING CAPACITY: 268 UG/DL (ref 250–450)
TRANSFERRIN SERPL-MCNC: 181 MG/DL (ref 200–375)

## 2022-11-21 PROBLEM — E87.5 HYPERKALEMIA: Status: ACTIVE | Noted: 2022-11-21

## 2022-11-22 ENCOUNTER — OFFICE VISIT (OUTPATIENT)
Dept: HEMATOLOGY/ONCOLOGY | Facility: CLINIC | Age: 67
End: 2022-11-22
Payer: MEDICARE

## 2022-11-22 VITALS
SYSTOLIC BLOOD PRESSURE: 126 MMHG | HEIGHT: 63 IN | DIASTOLIC BLOOD PRESSURE: 57 MMHG | BODY MASS INDEX: 36.09 KG/M2 | TEMPERATURE: 98 F | HEART RATE: 56 BPM | WEIGHT: 203.69 LBS | OXYGEN SATURATION: 97 %

## 2022-11-22 DIAGNOSIS — E87.5 HYPERKALEMIA: ICD-10-CM

## 2022-11-22 DIAGNOSIS — D63.1 ANEMIA ASSOCIATED WITH CHRONIC RENAL FAILURE: Chronic | ICD-10-CM

## 2022-11-22 DIAGNOSIS — E11.9 DIABETES MELLITUS, NEW ONSET: ICD-10-CM

## 2022-11-22 DIAGNOSIS — N18.9 ANEMIA ASSOCIATED WITH CHRONIC RENAL FAILURE: Chronic | ICD-10-CM

## 2022-11-22 DIAGNOSIS — D61.811 DRUG-INDUCED PANCYTOPENIA: ICD-10-CM

## 2022-11-22 PROCEDURE — 99999 PR PBB SHADOW E&M-EST. PATIENT-LVL IV: CPT | Mod: PBBFAC,,,

## 2022-11-22 PROCEDURE — 99214 OFFICE O/P EST MOD 30 MIN: CPT | Mod: S$PBB,,,

## 2022-11-22 PROCEDURE — 99214 PR OFFICE/OUTPT VISIT, EST, LEVL IV, 30-39 MIN: ICD-10-PCS | Mod: S$PBB,,,

## 2022-11-22 PROCEDURE — 99999 PR PBB SHADOW E&M-EST. PATIENT-LVL IV: ICD-10-PCS | Mod: PBBFAC,,,

## 2022-11-22 PROCEDURE — 99214 OFFICE O/P EST MOD 30 MIN: CPT | Mod: PBBFAC

## 2022-11-22 RX ORDER — SODIUM CHLORIDE 9 MG/ML
INJECTION, SOLUTION INTRAVENOUS
COMMUNITY
Start: 2022-09-21

## 2022-11-22 RX ORDER — GABAPENTIN 300 MG/1
300 CAPSULE ORAL NIGHTLY
COMMUNITY
Start: 2022-10-21

## 2022-11-22 NOTE — ASSESSMENT & PLAN NOTE
Last   -pt notes increased intake of sweet tea  -advised pt against this and recommend f/u with PCP for management.

## 2022-11-22 NOTE — PROGRESS NOTES
Subjective:      Patient ID: Satinder Schulte is a 67 y.o. female.    Chief Complaint: Follow-up (Anemia in CKD)      HPI:  Ms. Schulte is a 66-year-old woman with type 2 diabetes, hypertension, and end-stage renal disease status post transplant September 2019. She presents for follow-up of anemia and thrombocytopenia.  Per review of the medical record bone marrow biopsy completed in 08/2018 was without concerning findings. She continues to have relatively stable anemia and thrombocytopenia.  She is on immunosuppressants tacrolimus and low-dose prednisone.  Stage 3 CKD  Stable she is followed by Dr. Khan nephrologist at outside facility. Imaging 04/09/2021 showed portal hypertension and liver lesions which include stable complex right hepatic and simple hepatic cysts. Recommend continued close follow-up with hepatology/transplant. No colonoscopy on record pt states she will f/u with outside PCP to schedule this.    Interval History: Pt states overall she is feeling well and doing her best to maintain her health. She continues close follow-up with nephrologist-Dr. Khan. She notes leg pain with walking, bilateral shoulder pain, and low back pain--follows with pain management in [place] Dr. Weller.  Denies n/v/d/c, cp, sob, fevers, chills, night sweats or unintentional weight loss, abnormal bleeding.      Social History     Socioeconomic History    Marital status: Single   Tobacco Use    Smoking status: Never    Smokeless tobacco: Never   Substance and Sexual Activity    Alcohol use: No    Drug use: No    Sexual activity: Never     Comment: single, Lives with daughter, on Disability, Lives in [place]   Social History Narrative    Disabled    Single    4 children    History of blood transfusions       Family History   Problem Relation Age of Onset    Stroke Mother     Kidney disease Mother         on dialysis    Hypertension Mother     Heart disease Mother     Heart disease Father     Stroke Sister     Kidney disease  Brother     Breast cancer Daughter     Heart disease Sister     Ovarian cancer Cousin     Colon cancer Neg Hx     Thrombophilia Neg Hx        Past Surgical History:   Procedure Laterality Date    AV FISTULA PLACEMENT  2014    BONE MARROW BIOPSY Right 8/23/2018    Procedure: Biopsy-bone marrow;  Surgeon: Anna Lin MD;  Location: [address];  Service: Oncology;  Laterality: Right;    CHOLECYSTECTOMY  2008    HYSTERECTOMY  2011    TAHBSO for benign reasons    KIDNEY TRANSPLANT N/A 9/20/2019    Procedure: TRANSPLANT, KIDNEY;  Surgeon: Guero Rogers MD;  Location: [address];  Service: Transplant;  Laterality: N/A;    OOPHORECTOMY         Past Medical History:   Diagnosis Date    Abnormal Pap smear     repeat paps were normal    Anemia associated with chronic renal failure     Awaiting organ transplant status  - 02/18/2013 6/6/2014    Blood type A+ 6/6/2014    CKD (chronic kidney disease) stage 3, GFR 30-59 ml/min 10/30/2019    CKD (chronic kidney disease) stage 5, GFR less than 15 ml/min     secondary hypertension    Diabetes mellitus     Essential hypertension 5/19/2017    Hyperlipidemia     Hypertension, renal 1992    Immunocompromised state 10/4/2019    Stroke 2007    Residual speech deficit       Review of Systems   Constitutional:  Negative for activity change, appetite change, chills, fatigue and fever.   HENT:  Negative for congestion, facial swelling, nosebleeds, rhinorrhea, sore throat and trouble swallowing.    Eyes:  Negative for photophobia, pain and visual disturbance.   Respiratory:  Negative for cough, shortness of breath and wheezing.    Cardiovascular:  Negative for chest pain, palpitations and leg swelling.   Gastrointestinal:  Negative for abdominal distention, abdominal pain, anal bleeding, blood in stool, constipation, diarrhea, nausea, rectal pain and vomiting.   Genitourinary:  Negative for difficulty urinating, dysuria, hematuria and vaginal bleeding.   Musculoskeletal:  Positive for  arthralgias, back pain and myalgias.   Skin:  Negative for color change, pallor, rash and wound.   Neurological:  Negative for dizziness, light-headedness, numbness and headaches.   Hematological:  Negative for adenopathy. Does not bruise/bleed easily.   Psychiatric/Behavioral:  The patient is not nervous/anxious.      Medication List with Changes/Refills   Current Medications    0.9 % SODIUM CHLORIDE (SODIUM CHLORIDE 0.9%) SOLUTION    Inject into the vein.    ASPIRIN (ECOTRIN) 81 MG EC TABLET    Take 1 tablet (81 mg total) by mouth once daily.    ATORVASTATIN (LIPITOR) 40 MG TABLET    Take 1 tablet (40 mg total) by mouth once daily.    BELATACEPT 250 MG SOLR    362 mg IV Every 2 weeks x 5 doses then monthly thereafter    BLOOD SUGAR DIAGNOSTIC STRP    Test blood glucose 4 (four) times daily.    BLOOD-GLUCOSE METER KIT    Use as instructed    CHOLECALCIFEROL, VITAMIN D3, (VITAMIN D3) 25 MCG (1,000 UNIT) CAPSULE    Take 1 capsule (1,000 Units total) by mouth once daily.    CINACALCET (SENSIPAR) 30 MG TAB    TAKE ONE TABLET BY MOUTH EVERY DAY WITH BREAKFAST    CINACALCET (SENSIPAR) 60 MG TAB    Take 1 tablet (60 mg total) by mouth daily with breakfast.    DICLOFENAC SODIUM (VOLTAREN) 1 % GEL    Apply 2 g topically daily as needed (pain).     FERROUS SULFATE (FEOSOL) 325 MG (65 MG IRON) TAB TABLET    Take by mouth once daily.    FOLIC ACID (FOLVITE) 1 MG TABLET    Take 1 tablet (1 mg total) by mouth once daily.    GABAPENTIN (NEURONTIN) 300 MG CAPSULE    Take 300 mg by mouth every evening.    HYDROCODONE-ACETAMINOPHEN (NORCO) 7.5-325 MG PER TABLET    Take 0.5-1 tablets by mouth 3 (three) times daily as needed for Pain.     KETOCONAZOLE (NIZORAL) 200 MG TAB    Take HALF tablet (100 mg total) by mouth once daily.    LANCETS 28 GAUGE MISC    Test blood glucose 4 (four) times daily.    MAGNESIUM OXIDE (MAG-OX) 400 MG (241.3 MG MAGNESIUM) TABLET    TAKE 1 TABLET (400 MG TOTAL) BY MOUTH 2 (TWO) TIMES DAILY.    NIFEDIPINE  "(ADALAT CC) 60 MG TBSR    TAKE 1 TABLET BY MOUTH TWICE A DAY FOR 2 WEEKS    OLMESARTAN (BENICAR) 20 MG TABLET    Take 1 tablet (20 mg total) by mouth once daily.    PEN NEEDLE, DIABETIC 32 GAUGE X 5/32" NDLE    Use to inject insulin 4 (four) times daily.    PREDNISONE (DELTASONE) 5 MG TABLET    Take 1 tablet (5 mg total) by mouth once daily.    TACROLIMUS (PROGRAF) 1 MG CAP    Take 4 capsules (4 mg total) by mouth every 12 (twelve) hours.        Objective:     Vitals:    11/22/22 0752   BP: (!) 126/57   Pulse: (!) 56   Temp: 97.9 °F (36.6 °C)       Physical Exam  Vitals reviewed.   Constitutional:       Appearance: Normal appearance. She is not ill-appearing.   HENT:      Head: Normocephalic and atraumatic.      Right Ear: External ear normal.      Left Ear: External ear normal.      Nose: Nose normal.      Mouth/Throat:      Mouth: Mucous membranes are moist.      Pharynx: Oropharynx is clear.   Eyes:      Conjunctiva/sclera: Conjunctivae normal.   Cardiovascular:      Rate and Rhythm: Regular rhythm. Bradycardia present.      Heart sounds: Normal heart sounds.   Pulmonary:      Effort: Pulmonary effort is normal.      Breath sounds: Normal breath sounds.   Abdominal:      General: Abdomen is flat.   Genitourinary:     Comments: deferred  Musculoskeletal:         General: Normal range of motion.      Cervical back: Normal range of motion.      Right lower leg: No edema.      Left lower leg: No edema.   Skin:     General: Skin is warm and dry.      Capillary Refill: Capillary refill takes less than 2 seconds.   Neurological:      Mental Status: She is alert and oriented to person, place, and time.      Motor: No weakness.   Psychiatric:         Mood and Affect: Mood normal.         Behavior: Behavior normal.         Thought Content: Thought content normal.         Judgment: Judgment normal.       Lab Results   Component Value Date    WBC 2.56 (L) 11/18/2022    HGB 10.2 (L) 11/18/2022    HCT 32.5 (L) 11/18/2022    " MCV 96 11/18/2022     (L) 11/18/2022       Lab Results   Component Value Date     11/18/2022    K 5.4 (H) 11/18/2022     11/18/2022    CO2 22 (L) 11/18/2022    BUN 37 (H) 11/18/2022    CREATININE 2.5 (H) 11/18/2022    CALCIUM 9.0 11/18/2022    ANIONGAP 9 11/18/2022    ESTGFRAFRICA 39.0 (A) 06/13/2022    EGFRNONAA 33.8 (A) 06/13/2022     Lab Results   Component Value Date    ALT 34 11/18/2022    AST 15 11/18/2022    ALKPHOS 95 11/18/2022    BILITOT 0.3 11/18/2022       Assessment/Plan:     Problem List Items Addressed This Visit          Renal/    Hyperkalemia     Lab Results   Component Value Date    K 5.4 (H) 11/18/2022   --likely related to CKD  --Educated pt to avoid K rich foods              Oncology    Anemia associated with chronic renal failure (Chronic)     -Prior bone marrow biopsy in 2018 without concern for hematologic neoplastic process.   -Prior workup without paraprotein, elevated free light chains with normal ratio.   -No evidence of nutrient deficiency    Lab Results   Component Value Date    HGB 10.2 (L) 11/18/2022     Previously receiving SHILO   --no indication to reinitiate at this time.    Lab Results   Component Value Date    IRON 50 11/18/2022    TRANSFERRIN 181 (L) 11/18/2022    TIBC 268 11/18/2022    FESATURATED 19 (L) 11/18/2022      No indication for IV iron at this time  Continue po iron supplementation   Plan to repeat iron studies routinely and replete prn             Relevant Orders    CBC Auto Differential    Comprehensive Metabolic Panel    Ferritin    Iron and TIBC    Drug-induced pancytopenia     -2018 bone marrow biopsy without dysplasia/malignancy evident.    -Most recent labs with stable pancytopenia for several years without recurrent infection bleeding complications or fatigue.    -Bone marrow suppression likely due to Prograf  -Recommend surveillance and consideration of repeat bone marrow biopsy upon significant lab change or symptoms clinically.          Relevant Orders    CBC Auto Differential    Comprehensive Metabolic Panel    Ferritin    Iron and TIBC       Endocrine    Diabetes mellitus, new onset     Last   -pt notes increased intake of sweet tea  -advised pt against this and recommend f/u with PCP for management.                Med Onc Chart Routing      Follow up with physician    Follow up with TIA 3 months.   Infusion scheduling note    Injection scheduling note    Labs Ferritin, iron and TIBC, CMP and CBC   Lab interval:     Imaging    Pharmacy appointment    Other referrals         ANDREA Arthur, FNP-C  Hematology/Oncology

## 2022-11-22 NOTE — ASSESSMENT & PLAN NOTE
Lab Results   Component Value Date    K 5.4 (H) 11/18/2022   --likely related to CKD  --Educated pt to avoid K rich foods

## 2022-11-22 NOTE — ASSESSMENT & PLAN NOTE
-Prior bone marrow biopsy in 2018 without concern for hematologic neoplastic process.   -Prior workup without paraprotein, elevated free light chains with normal ratio.   -No evidence of nutrient deficiency    Lab Results   Component Value Date    HGB 10.2 (L) 11/18/2022     Previously receiving SHILO   --no indication to reinitiate at this time.    Lab Results   Component Value Date    IRON 50 11/18/2022    TRANSFERRIN 181 (L) 11/18/2022    TIBC 268 11/18/2022    FESATURATED 19 (L) 11/18/2022      No indication for IV iron at this time  Continue po iron supplementation   Plan to repeat iron studies routinely and replete prn

## 2022-12-07 ENCOUNTER — LAB VISIT (OUTPATIENT)
Dept: LAB | Facility: HOSPITAL | Age: 67
End: 2022-12-07
Attending: INTERNAL MEDICINE
Payer: MEDICARE

## 2022-12-07 DIAGNOSIS — E11.22 TYPE 2 DIABETES MELLITUS WITH END-STAGE RENAL DISEASE: ICD-10-CM

## 2022-12-07 DIAGNOSIS — I12.9 PARENCHYMAL RENAL HYPERTENSION: ICD-10-CM

## 2022-12-07 DIAGNOSIS — N18.6 TYPE 2 DIABETES MELLITUS WITH END-STAGE RENAL DISEASE: ICD-10-CM

## 2022-12-07 DIAGNOSIS — N23 PAIN IN TRANSPLANTED KIDNEY: ICD-10-CM

## 2022-12-07 DIAGNOSIS — N18.32 CHRONIC KIDNEY DISEASE (CKD) STAGE G3B/A1, MODERATELY DECREASED GLOMERULAR FILTRATION RATE (GFR) BETWEEN 30-44 ML/MIN/1.73 SQUARE METER AND ALBUMINURIA CREATININE RATIO LESS THAN 30 MG/G: Primary | ICD-10-CM

## 2022-12-07 DIAGNOSIS — R82.90 NONSPECIFIC FINDING ON EXAMINATION OF URINE: ICD-10-CM

## 2022-12-07 DIAGNOSIS — T86.19 PAIN IN TRANSPLANTED KIDNEY: ICD-10-CM

## 2022-12-07 DIAGNOSIS — N25.81 SECONDARY HYPERPARATHYROIDISM OF RENAL ORIGIN: ICD-10-CM

## 2022-12-07 LAB
ALBUMIN SERPL BCP-MCNC: 3.9 G/DL (ref 3.5–5.2)
ALP SERPL-CCNC: 86 U/L (ref 55–135)
ALT SERPL W/O P-5'-P-CCNC: 19 U/L (ref 10–44)
ANION GAP SERPL CALC-SCNC: 9 MMOL/L (ref 8–16)
AST SERPL-CCNC: 16 U/L (ref 10–40)
BASOPHILS # BLD AUTO: 0 K/UL (ref 0–0.2)
BASOPHILS NFR BLD: 0 % (ref 0–1.9)
BILIRUB SERPL-MCNC: 0.5 MG/DL (ref 0.1–1)
BUN SERPL-MCNC: 32 MG/DL (ref 8–23)
CALCIUM SERPL-MCNC: 10 MG/DL (ref 8.7–10.5)
CHLORIDE SERPL-SCNC: 111 MMOL/L (ref 95–110)
CHOLEST SERPL-MCNC: 216 MG/DL (ref 120–199)
CHOLEST/HDLC SERPL: 2.5 {RATIO} (ref 2–5)
CO2 SERPL-SCNC: 21 MMOL/L (ref 23–29)
CREAT SERPL-MCNC: 2 MG/DL (ref 0.5–1.4)
DIFFERENTIAL METHOD: ABNORMAL
EOSINOPHIL # BLD AUTO: 0 K/UL (ref 0–0.5)
EOSINOPHIL NFR BLD: 1.4 % (ref 0–8)
ERYTHROCYTE [DISTWIDTH] IN BLOOD BY AUTOMATED COUNT: 14.2 % (ref 11.5–14.5)
EST. GFR  (NO RACE VARIABLE): 27.2 ML/MIN/1.73 M^2
GLUCOSE SERPL-MCNC: 70 MG/DL (ref 70–110)
HCT VFR BLD AUTO: 33.6 % (ref 37–48.5)
HDLC SERPL-MCNC: 85 MG/DL (ref 40–75)
HDLC SERPL: 39.4 % (ref 20–50)
HGB BLD-MCNC: 10.5 G/DL (ref 12–16)
IMM GRANULOCYTES # BLD AUTO: 0.01 K/UL (ref 0–0.04)
IMM GRANULOCYTES NFR BLD AUTO: 0.5 % (ref 0–0.5)
LDLC SERPL CALC-MCNC: 109.4 MG/DL (ref 63–159)
LYMPHOCYTES # BLD AUTO: 0.7 K/UL (ref 1–4.8)
LYMPHOCYTES NFR BLD: 35.2 % (ref 18–48)
MCH RBC QN AUTO: 30.6 PG (ref 27–31)
MCHC RBC AUTO-ENTMCNC: 31.3 G/DL (ref 32–36)
MCV RBC AUTO: 98 FL (ref 82–98)
MONOCYTES # BLD AUTO: 0.2 K/UL (ref 0.3–1)
MONOCYTES NFR BLD: 11.4 % (ref 4–15)
NEUTROPHILS # BLD AUTO: 1.1 K/UL (ref 1.8–7.7)
NEUTROPHILS NFR BLD: 51.5 % (ref 38–73)
NONHDLC SERPL-MCNC: 131 MG/DL
NRBC BLD-RTO: 0 /100 WBC
PLATELET # BLD AUTO: 137 K/UL (ref 150–450)
PMV BLD AUTO: 12.3 FL (ref 9.2–12.9)
POTASSIUM SERPL-SCNC: 4.3 MMOL/L (ref 3.5–5.1)
PROT SERPL-MCNC: 6.5 G/DL (ref 6–8.4)
PTH-INTACT SERPL-MCNC: 461.5 PG/ML (ref 9–77)
RBC # BLD AUTO: 3.43 M/UL (ref 4–5.4)
SODIUM SERPL-SCNC: 141 MMOL/L (ref 136–145)
TRIGL SERPL-MCNC: 108 MG/DL (ref 30–150)
WBC # BLD AUTO: 2.1 K/UL (ref 3.9–12.7)

## 2022-12-07 PROCEDURE — 80197 ASSAY OF TACROLIMUS: CPT | Performed by: INTERNAL MEDICINE

## 2022-12-07 PROCEDURE — 80053 COMPREHEN METABOLIC PANEL: CPT | Performed by: INTERNAL MEDICINE

## 2022-12-07 PROCEDURE — 80061 LIPID PANEL: CPT | Performed by: INTERNAL MEDICINE

## 2022-12-07 PROCEDURE — 83970 ASSAY OF PARATHORMONE: CPT | Performed by: INTERNAL MEDICINE

## 2022-12-07 PROCEDURE — 85025 COMPLETE CBC W/AUTO DIFF WBC: CPT | Performed by: INTERNAL MEDICINE

## 2022-12-07 PROCEDURE — 36415 COLL VENOUS BLD VENIPUNCTURE: CPT | Mod: PO | Performed by: INTERNAL MEDICINE

## 2022-12-08 LAB — TACROLIMUS BLD-MCNC: 4.4 NG/ML (ref 5–15)

## 2022-12-12 ENCOUNTER — LAB VISIT (OUTPATIENT)
Dept: LAB | Facility: HOSPITAL | Age: 67
End: 2022-12-12
Attending: INTERNAL MEDICINE
Payer: MEDICARE

## 2022-12-12 DIAGNOSIS — Z94.0 KIDNEY REPLACED BY TRANSPLANT: ICD-10-CM

## 2022-12-12 LAB
ALBUMIN SERPL BCP-MCNC: 3.7 G/DL (ref 3.5–5.2)
ANION GAP SERPL CALC-SCNC: 9 MMOL/L (ref 8–16)
BASOPHILS # BLD AUTO: 0 K/UL (ref 0–0.2)
BASOPHILS NFR BLD: 0 % (ref 0–1.9)
BUN SERPL-MCNC: 25 MG/DL (ref 8–23)
CALCIUM SERPL-MCNC: 9.2 MG/DL (ref 8.7–10.5)
CHLORIDE SERPL-SCNC: 112 MMOL/L (ref 95–110)
CO2 SERPL-SCNC: 23 MMOL/L (ref 23–29)
CREAT SERPL-MCNC: 1.8 MG/DL (ref 0.5–1.4)
DIFFERENTIAL METHOD: ABNORMAL
EOSINOPHIL # BLD AUTO: 0 K/UL (ref 0–0.5)
EOSINOPHIL NFR BLD: 1.4 % (ref 0–8)
ERYTHROCYTE [DISTWIDTH] IN BLOOD BY AUTOMATED COUNT: 14.6 % (ref 11.5–14.5)
EST. GFR  (NO RACE VARIABLE): 30.9 ML/MIN/1.73 M^2
GLUCOSE SERPL-MCNC: 65 MG/DL (ref 70–110)
HCT VFR BLD AUTO: 32.5 % (ref 37–48.5)
HGB BLD-MCNC: 10.1 G/DL (ref 12–16)
IMM GRANULOCYTES # BLD AUTO: 0.02 K/UL (ref 0–0.04)
IMM GRANULOCYTES NFR BLD AUTO: 0.9 % (ref 0–0.5)
LYMPHOCYTES # BLD AUTO: 0.7 K/UL (ref 1–4.8)
LYMPHOCYTES NFR BLD: 33.3 % (ref 18–48)
MAGNESIUM SERPL-MCNC: 1.7 MG/DL (ref 1.6–2.6)
MCH RBC QN AUTO: 30.5 PG (ref 27–31)
MCHC RBC AUTO-ENTMCNC: 31.1 G/DL (ref 32–36)
MCV RBC AUTO: 98 FL (ref 82–98)
MONOCYTES # BLD AUTO: 0.2 K/UL (ref 0.3–1)
MONOCYTES NFR BLD: 10.6 % (ref 4–15)
NEUTROPHILS # BLD AUTO: 1.2 K/UL (ref 1.8–7.7)
NEUTROPHILS NFR BLD: 53.8 % (ref 38–73)
NRBC BLD-RTO: 0 /100 WBC
PHOSPHATE SERPL-MCNC: 2.9 MG/DL (ref 2.7–4.5)
PLATELET # BLD AUTO: 135 K/UL (ref 150–450)
PMV BLD AUTO: 11.9 FL (ref 9.2–12.9)
POTASSIUM SERPL-SCNC: 3.9 MMOL/L (ref 3.5–5.1)
RBC # BLD AUTO: 3.31 M/UL (ref 4–5.4)
SODIUM SERPL-SCNC: 144 MMOL/L (ref 136–145)
WBC # BLD AUTO: 2.16 K/UL (ref 3.9–12.7)

## 2022-12-12 PROCEDURE — 83735 ASSAY OF MAGNESIUM: CPT | Performed by: INTERNAL MEDICINE

## 2022-12-12 PROCEDURE — 80069 RENAL FUNCTION PANEL: CPT | Performed by: INTERNAL MEDICINE

## 2022-12-12 PROCEDURE — 80197 ASSAY OF TACROLIMUS: CPT | Performed by: INTERNAL MEDICINE

## 2022-12-12 PROCEDURE — 36415 COLL VENOUS BLD VENIPUNCTURE: CPT | Mod: PO | Performed by: INTERNAL MEDICINE

## 2022-12-12 PROCEDURE — 85025 COMPLETE CBC W/AUTO DIFF WBC: CPT | Performed by: INTERNAL MEDICINE

## 2022-12-13 LAB — TACROLIMUS BLD-MCNC: 4.1 NG/ML (ref 5–15)

## 2022-12-16 ENCOUNTER — HOSPITAL ENCOUNTER (OUTPATIENT)
Dept: CARDIOLOGY | Facility: HOSPITAL | Age: 67
Discharge: HOME | End: 2022-12-16
Attending: INTERNAL MEDICINE
Payer: MEDICARE

## 2022-12-16 ENCOUNTER — OFFICE VISIT (OUTPATIENT)
Dept: CARDIOLOGY | Facility: CLINIC | Age: 67
End: 2022-12-16
Payer: MEDICARE

## 2022-12-16 VITALS
HEIGHT: 63 IN | WEIGHT: 200 LBS | DIASTOLIC BLOOD PRESSURE: 68 MMHG | BODY MASS INDEX: 35.44 KG/M2 | SYSTOLIC BLOOD PRESSURE: 148 MMHG

## 2022-12-16 VITALS
DIASTOLIC BLOOD PRESSURE: 68 MMHG | BODY MASS INDEX: 35.43 KG/M2 | OXYGEN SATURATION: 96 % | HEART RATE: 81 BPM | SYSTOLIC BLOOD PRESSURE: 148 MMHG | WEIGHT: 200 LBS

## 2022-12-16 DIAGNOSIS — N18.31 STAGE 3A CHRONIC KIDNEY DISEASE: ICD-10-CM

## 2022-12-16 DIAGNOSIS — I12.9 HYPERTENSION, RENAL: Chronic | ICD-10-CM

## 2022-12-16 DIAGNOSIS — I31.39 PERICARDIAL EFFUSION: ICD-10-CM

## 2022-12-16 DIAGNOSIS — Z86.73 HISTORY OF STROKE: ICD-10-CM

## 2022-12-16 DIAGNOSIS — E78.2 MIXED HYPERLIPIDEMIA: Primary | ICD-10-CM

## 2022-12-16 DIAGNOSIS — I12.9 HYPERTENSION, RENAL: ICD-10-CM

## 2022-12-16 LAB
AORTIC ROOT ANNULUS: 3.21 CM
AV INDEX (PROSTH): 0.61
AV MEAN GRADIENT: 10 MMHG
AV PEAK GRADIENT: 18 MMHG
AV VALVE AREA: 2 CM2
AV VELOCITY RATIO: 0.57
BSA FOR ECHO PROCEDURE: 2.01 M2
CV ECHO LV RWT: 0.42 CM
DOP CALC AO PEAK VEL: 2.14 M/S
DOP CALC AO VTI: 46.8 CM
DOP CALC LVOT AREA: 3.3 CM2
DOP CALC LVOT DIAMETER: 2.04 CM
DOP CALC LVOT PEAK VEL: 1.21 M/S
DOP CALC LVOT STROKE VOLUME: 93.76 CM3
DOP CALC RVOT PEAK VEL: 1.24 M/S
DOP CALC RVOT VTI: 25.9 CM
DOP CALCLVOT PEAK VEL VTI: 28.7 CM
E WAVE DECELERATION TIME: 227.25 MSEC
E/A RATIO: 0.76
E/E' RATIO: 7.7 M/S
ECHO LV POSTERIOR WALL: 1.04 CM (ref 0.6–1.1)
EJECTION FRACTION: 60 %
FRACTIONAL SHORTENING: 43 % (ref 28–44)
INTERVENTRICULAR SEPTUM: 1.16 CM (ref 0.6–1.1)
IVRT: 99.9 MSEC
LA MAJOR: 5.8 CM
LA MINOR: 5.45 CM
LA WIDTH: 3.4 CM
LEFT ATRIUM SIZE: 4.17 CM
LEFT ATRIUM VOLUME INDEX MOD: 29.1 ML/M2
LEFT ATRIUM VOLUME INDEX: 35.1 ML/M2
LEFT ATRIUM VOLUME MOD: 56.08 CM3
LEFT ATRIUM VOLUME: 67.72 CM3
LEFT INTERNAL DIMENSION IN SYSTOLE: 2.81 CM (ref 2.1–4)
LEFT VENTRICLE DIASTOLIC VOLUME INDEX: 58.75 ML/M2
LEFT VENTRICLE DIASTOLIC VOLUME: 113.38 ML
LEFT VENTRICLE MASS INDEX: 104 G/M2
LEFT VENTRICLE SYSTOLIC VOLUME INDEX: 15.4 ML/M2
LEFT VENTRICLE SYSTOLIC VOLUME: 29.69 ML
LEFT VENTRICULAR INTERNAL DIMENSION IN DIASTOLE: 4.91 CM (ref 3.5–6)
LEFT VENTRICULAR MASS: 201.16 G
LV LATERAL E/E' RATIO: 9.63 M/S
LV SEPTAL E/E' RATIO: 6.42 M/S
LVOT MG: 3.39 MMHG
LVOT MV: 0.87 CM/S
MV PEAK A VEL: 1.01 M/S
MV PEAK E VEL: 0.77 M/S
MV STENOSIS PRESSURE HALF TIME: 65.9 MS
MV VALVE AREA P 1/2 METHOD: 3.34 CM2
PISA TR MAX VEL: 2.7 M/S
PV MEAN GRADIENT: 3.35 MMHG
PV PEAK VELOCITY: 1.49 CM/S
RA MAJOR: 4.56 CM
RA PRESSURE: 3 MMHG
RA WIDTH: 3.04 CM
RIGHT VENTRICULAR END-DIASTOLIC DIMENSION: 2.4 CM
SINUS: 2.79 CM
STJ: 3.09 CM
TDI LATERAL: 0.08 M/S
TDI SEPTAL: 0.12 M/S
TDI: 0.1 M/S
TR MAX PG: 29 MMHG
TV REST PULMONARY ARTERY PRESSURE: 32 MMHG

## 2022-12-16 PROCEDURE — 99214 PR OFFICE/OUTPT VISIT, EST, LEVL IV, 30-39 MIN: ICD-10-PCS | Mod: 25,S$PBB,, | Performed by: INTERNAL MEDICINE

## 2022-12-16 PROCEDURE — 93010 EKG 12-LEAD: ICD-10-PCS | Mod: ,,, | Performed by: INTERNAL MEDICINE

## 2022-12-16 PROCEDURE — 99999 PR PBB SHADOW E&M-EST. PATIENT-LVL IV: ICD-10-PCS | Mod: PBBFAC,,, | Performed by: INTERNAL MEDICINE

## 2022-12-16 PROCEDURE — 93005 ELECTROCARDIOGRAM TRACING: CPT

## 2022-12-16 PROCEDURE — 99999 PR PBB SHADOW E&M-EST. PATIENT-LVL IV: CPT | Mod: PBBFAC,,, | Performed by: INTERNAL MEDICINE

## 2022-12-16 PROCEDURE — 99214 OFFICE O/P EST MOD 30 MIN: CPT | Mod: PBBFAC,25 | Performed by: INTERNAL MEDICINE

## 2022-12-16 PROCEDURE — 93306 TTE W/DOPPLER COMPLETE: CPT | Mod: 26,,, | Performed by: INTERNAL MEDICINE

## 2022-12-16 PROCEDURE — 93306 ECHO (CUPID ONLY): ICD-10-PCS | Mod: 26,,, | Performed by: INTERNAL MEDICINE

## 2022-12-16 PROCEDURE — 99214 OFFICE O/P EST MOD 30 MIN: CPT | Mod: 25,S$PBB,, | Performed by: INTERNAL MEDICINE

## 2022-12-16 PROCEDURE — 93306 TTE W/DOPPLER COMPLETE: CPT

## 2022-12-16 PROCEDURE — 93010 ELECTROCARDIOGRAM REPORT: CPT | Mod: ,,, | Performed by: INTERNAL MEDICINE

## 2022-12-16 RX ORDER — OLMESARTAN MEDOXOMIL 20 MG/1
20 TABLET ORAL DAILY
Qty: 90 TABLET | Refills: 3 | Status: SHIPPED | OUTPATIENT
Start: 2022-12-16 | End: 2023-01-09 | Stop reason: SDUPTHER

## 2022-12-16 RX ORDER — EZETIMIBE 10 MG/1
10 TABLET ORAL DAILY
Qty: 90 TABLET | Refills: 3 | Status: SHIPPED | OUTPATIENT
Start: 2022-12-16 | End: 2023-01-09 | Stop reason: SDUPTHER

## 2022-12-16 NOTE — PROGRESS NOTES
Subjective:   Patient ID:  Satinder Schulte is a 67 y.o. female who presents for follow up of No chief complaint on file.      65 yo female, 6 months f/u   PMH chronic pericardial effusion, DM, HTN HLD h/o stroke, chronic anemia and s/p renal Rx in 2019, ESRD   Echo in  at [facility] showed normal biv function, LVH, Large posterolateral pericardial effusion. The Effusion is moderate -large posterior-laterally, and small laterally and outside the RA. No temporade  Echo done in  normal EF and no pericardial effusion  Cardiac PET in  no ischemia and normal EF  No chest pain SOB faint dizziness and leg swelling  No fatigue      visit  Repeat ECHo showed moderate pericardial effusion 1.4 cm at posterior wall  No SOB fatigue dizziness and faint. weigth gained,  Sleeps on 1 p illow. EKG NSR     visit  No fatigue dizziness faint syncope chest pain   Chronic BARBA. Walks few times a week. Lives alone  ekg NSR. No acute stt change    Interval history  Drinks 50 oz water a day.  No SOB leg swelling dizziness faint and chest pain   Ekg today NSR. Run out of olmesartan yesterday. BP high today      Past Medical History:   Diagnosis Date    Abnormal Pap smear     repeat paps were normal    Anemia associated with chronic renal failure     Awaiting organ transplant status  - 02/18/2013 6/6/2014    Blood type A+ 6/6/2014    CKD (chronic kidney disease) stage 3, GFR 30-59 ml/min 10/30/2019    CKD (chronic kidney disease) stage 5, GFR less than 15 ml/min     secondary hypertension    Diabetes mellitus     Essential hypertension 5/19/2017    Hyperlipidemia     Hypertension, renal 1992    Immunocompromised state 10/4/2019    Stroke 2007    Residual speech deficit       Past Surgical History:   Procedure Laterality Date    AV FISTULA PLACEMENT  2014    BONE MARROW BIOPSY Right 8/23/2018    Procedure: Biopsy-bone marrow;  Surgeon: Anna Lin MD;  Location: [facility] OR [address] [place] [address];  Service: Oncology;  Laterality:  Right;    CHOLECYSTECTOMY  2008    HYSTERECTOMY  2011    TAHBSO for benign reasons    KIDNEY TRANSPLANT N/A 9/20/2019    Procedure: TRANSPLANT, KIDNEY;  Surgeon: Guero Rogers MD;  Location: [facility] OR [address] [place] [address];  Service: Transplant;  Laterality: N/A;    OOPHORECTOMY         Social History     Tobacco Use    Smoking status: Never    Smokeless tobacco: Never   Substance Use Topics    Alcohol use: No    Drug use: No       Family History   Problem Relation Age of Onset    Stroke Mother     Kidney disease Mother         on dialysis    Hypertension Mother     Heart disease Mother     Heart disease Father     Stroke Sister     Kidney disease Brother     Breast cancer Daughter     Heart disease Sister     Ovarian cancer Cousin     Colon cancer Neg Hx     Thrombophilia Neg Hx          Review of Systems   Constitutional: Negative for decreased appetite, diaphoresis, fever, malaise/fatigue and night sweats.   HENT:  Negative for nosebleeds.    Eyes:  Negative for blurred vision and double vision.   Cardiovascular:  Negative for chest pain, claudication, dyspnea on exertion, irregular heartbeat, leg swelling, near-syncope, orthopnea, palpitations, paroxysmal nocturnal dyspnea and syncope.   Respiratory:  Negative for cough, shortness of breath, sleep disturbances due to breathing, snoring, sputum production and wheezing.    Endocrine: Negative for cold intolerance and polyuria.   Hematologic/Lymphatic: Does not bruise/bleed easily.   Skin:  Negative for rash.   Musculoskeletal:  Negative for back pain, falls, joint pain, joint swelling and neck pain.   Gastrointestinal:  Negative for abdominal pain, heartburn, nausea and vomiting.   Genitourinary:  Negative for dysuria, frequency and hematuria.   Neurological:  Negative for difficulty with concentration, dizziness, focal weakness, headaches, light-headedness, numbness, seizures and weakness.   Psychiatric/Behavioral:  Negative for depression, memory loss and substance abuse. The  patient does not have insomnia.    Allergic/Immunologic: Negative for HIV exposure and hives.     Objective:   Physical Exam  HENT:      Head: Normocephalic.   Eyes:      Pupils: Pupils are equal, round, and reactive to light.   Neck:      Thyroid: No thyromegaly.      Vascular: Normal carotid pulses. No carotid bruit or JVD.   Cardiovascular:      Rate and Rhythm: Normal rate and regular rhythm. No extrasystoles are present.     Chest Wall: PMI is not displaced.      Pulses: Normal pulses.      Heart sounds: Murmur (diffuse fistule murmur on precordial and neck area) heard.     No gallop. No S3 sounds.   Pulmonary:      Effort: No respiratory distress.      Breath sounds: Normal breath sounds. No stridor.   Abdominal:      General: Bowel sounds are normal.      Palpations: Abdomen is soft.      Tenderness: There is no abdominal tenderness. There is no rebound.   Musculoskeletal:      Comments: Left arm fistula   Skin:     Findings: No rash.   Neurological:      Mental Status: She is alert and oriented to person, place, and time.   Psychiatric:         Behavior: Behavior normal.       Lab Results   Component Value Date    CHOL 216 (H) 12/07/2022    CHOL 202 (H) 09/06/2022    CHOL 243 (H) 06/06/2022     Lab Results   Component Value Date    HDL 85 (H) 12/07/2022    HDL 84 (H) 09/06/2022    HDL 87 (H) 06/06/2022     Lab Results   Component Value Date    LDLCALC 109.4 12/07/2022    LDLCALC 98.2 09/06/2022    LDLCALC 130.0 06/06/2022     Lab Results   Component Value Date    TRIG 108 12/07/2022    TRIG 99 09/06/2022    TRIG 130 06/06/2022     Lab Results   Component Value Date    CHOLHDL 39.4 12/07/2022    CHOLHDL 41.6 09/06/2022    CHOLHDL 35.8 06/06/2022       Chemistry        Component Value Date/Time     12/12/2022 0834    K 3.9 12/12/2022 0834     (H) 12/12/2022 0834    CO2 23 12/12/2022 0834    BUN 25 (H) 12/12/2022 0834    CREATININE 1.8 (H) 12/12/2022 0834    GLU 65 (L) 12/12/2022 0834         Component Value Date/Time    CALCIUM 9.2 12/12/2022 0834    ALKPHOS 86 12/07/2022 0904    AST 16 12/07/2022 0904    ALT 19 12/07/2022 0904    BILITOT 0.5 12/07/2022 0904    ESTGFRAFRICA 39.0 (A) 06/13/2022 1119    EGFRNONAA 33.8 (A) 06/13/2022 1119          Lab Results   Component Value Date    HGBA1C 4.6 05/28/2021     Lab Results   Component Value Date    TSH 3.295 09/06/2022     Lab Results   Component Value Date    INR 1.0 03/31/2021    INR 0.9 11/20/2020    INR 1.0 09/19/2019     Lab Results   Component Value Date    WBC 2.16 (L) 12/12/2022    HGB 10.1 (L) 12/12/2022    HCT 32.5 (L) 12/12/2022    MCV 98 12/12/2022     (L) 12/12/2022     BMP  Sodium   Date Value Ref Range Status   12/12/2022 144 136 - 145 mmol/L Final     Potassium   Date Value Ref Range Status   12/12/2022 3.9 3.5 - 5.1 mmol/L Final     Chloride   Date Value Ref Range Status   12/12/2022 112 (H) 95 - 110 mmol/L Final     CO2   Date Value Ref Range Status   12/12/2022 23 23 - 29 mmol/L Final     BUN   Date Value Ref Range Status   12/12/2022 25 (H) 8 - 23 mg/dL Final     Creatinine   Date Value Ref Range Status   12/12/2022 1.8 (H) 0.5 - 1.4 mg/dL Final     Calcium   Date Value Ref Range Status   12/12/2022 9.2 8.7 - 10.5 mg/dL Final     Anion Gap   Date Value Ref Range Status   12/12/2022 9 8 - 16 mmol/L Final     eGFR if    Date Value Ref Range Status   06/13/2022 39.0 (A) >60 mL/min/1.73 m^2 Final     eGFR if non    Date Value Ref Range Status   06/13/2022 33.8 (A) >60 mL/min/1.73 m^2 Final     Comment:     Calculation used to obtain the estimated glomerular filtration  rate (eGFR) is the CKD-EPI equation.        BNP  @LABRCNTIP(BNP,BNPTRIAGEBLO)@  @LABRCNTIP(troponini)@  Estimated Creatinine Clearance: 33.3 mL/min (A) (based on SCr of 1.8 mg/dL (H)).  No results found in the last 24 hours.  No results found in the last 24 hours.  No results found in the last 24 hours.    Assessment:      1. Mixed  hyperlipidemia    2. Pericardial effusion    3. Stage 3a chronic kidney disease    4. History of stroke    5. Hypertension, renal        Plan:   Refill olmesartan   Add zetia 10 mg daily for HLD and stroke  Continue ASA Lipitor and nifedipine    Counseled DASH  Check Lipid profile in 6 months  Recommend heart-healthy diet, weight control and regular exercise.  Mae. Risk modification.   I have reviewed all pertinent labs and cardiac studies independently. Plans and recommendations have been formulated under my direct supervision. All questions answered and patient voiced understanding.   If symptoms persist go to the ED  RTC in 6 months

## 2022-12-19 ENCOUNTER — PATIENT MESSAGE (OUTPATIENT)
Dept: CARDIOLOGY | Facility: CLINIC | Age: 67
End: 2022-12-19
Payer: MEDICARE

## 2022-12-19 NOTE — TELEPHONE ENCOUNTER
"----- Message from Darci Flores RN sent at 1/6/2020  9:25 AM CST -----      ----- Message -----  From: Baylee Echeverria  Sent: 1/6/2020   8:23 AM CST  To: Randy VALLECILLO Staff    Consult/Advisory:    Name Of Caller: Niya   Provider Name:   Does patient feel the need to be seen today? No   Relationship to the Pt?: daughter   Contact Preference?: [phone]  What is the nature of the call?:  - calling in regards to the pts appts.     Additional Notes:  "Thank you for all that you do for our patients'"      "
Patient no show for 1/2,1/3 and 1/6  lab appointments.  Spoke with patient daughter Mimi she was unaware that her Mom missed schedule lab appointments .  She voice a understanding of the importance of appointments due to patients chronic Neutropenia S/P Kidney Transplant.  Labs schedule for here tomorrow 1/7/20.  
yes

## 2022-12-22 DIAGNOSIS — E83.42 HYPOMAGNESEMIA: ICD-10-CM

## 2022-12-22 RX ORDER — LANOLIN ALCOHOL/MO/W.PET/CERES
CREAM (GRAM) TOPICAL
Qty: 180 TABLET | Refills: 2 | OUTPATIENT
Start: 2022-12-22

## 2023-01-10 RX ORDER — OLMESARTAN MEDOXOMIL 20 MG/1
20 TABLET ORAL DAILY
Qty: 90 TABLET | Refills: 3 | Status: SHIPPED | OUTPATIENT
Start: 2023-01-10 | End: 2023-07-24

## 2023-01-10 RX ORDER — EZETIMIBE 10 MG/1
10 TABLET ORAL DAILY
Qty: 90 TABLET | Refills: 3 | Status: SHIPPED | OUTPATIENT
Start: 2023-01-10 | End: 2023-07-24

## 2023-03-07 ENCOUNTER — TELEPHONE (OUTPATIENT)
Dept: TRANSPLANT | Facility: CLINIC | Age: 68
End: 2023-03-07
Payer: MEDICARE

## 2023-03-08 ENCOUNTER — LAB VISIT (OUTPATIENT)
Dept: LAB | Facility: HOSPITAL | Age: 68
End: 2023-03-08
Payer: MEDICARE

## 2023-03-08 DIAGNOSIS — Z94.0 KIDNEY REPLACED BY TRANSPLANT: ICD-10-CM

## 2023-03-08 LAB
ALBUMIN SERPL BCP-MCNC: 4.1 G/DL (ref 3.5–5.2)
ANION GAP SERPL CALC-SCNC: 10 MMOL/L (ref 8–16)
BUN SERPL-MCNC: 29 MG/DL (ref 8–23)
CALCIUM SERPL-MCNC: 10.2 MG/DL (ref 8.7–10.5)
CHLORIDE SERPL-SCNC: 110 MMOL/L (ref 95–110)
CO2 SERPL-SCNC: 24 MMOL/L (ref 23–29)
CREAT SERPL-MCNC: 2 MG/DL (ref 0.5–1.4)
EST. GFR  (NO RACE VARIABLE): 27.2 ML/MIN/1.73 M^2
GLUCOSE SERPL-MCNC: 69 MG/DL (ref 70–110)
MAGNESIUM SERPL-MCNC: 1.9 MG/DL (ref 1.6–2.6)
PHOSPHATE SERPL-MCNC: 3 MG/DL (ref 2.7–4.5)
POTASSIUM SERPL-SCNC: 4.7 MMOL/L (ref 3.5–5.1)
SODIUM SERPL-SCNC: 144 MMOL/L (ref 136–145)

## 2023-03-08 PROCEDURE — 84100 ASSAY OF PHOSPHORUS: CPT | Performed by: INTERNAL MEDICINE

## 2023-03-08 PROCEDURE — 80197 ASSAY OF TACROLIMUS: CPT | Performed by: INTERNAL MEDICINE

## 2023-03-08 PROCEDURE — 83735 ASSAY OF MAGNESIUM: CPT | Performed by: INTERNAL MEDICINE

## 2023-03-09 ENCOUNTER — TELEPHONE (OUTPATIENT)
Dept: TRANSPLANT | Facility: CLINIC | Age: 68
End: 2023-03-09
Payer: MEDICARE

## 2023-03-09 ENCOUNTER — OFFICE VISIT (OUTPATIENT)
Dept: HEMATOLOGY/ONCOLOGY | Facility: CLINIC | Age: 68
End: 2023-03-09
Payer: MEDICARE

## 2023-03-09 VITALS
OXYGEN SATURATION: 98 % | SYSTOLIC BLOOD PRESSURE: 130 MMHG | DIASTOLIC BLOOD PRESSURE: 57 MMHG | BODY MASS INDEX: 34.61 KG/M2 | WEIGHT: 195.31 LBS | HEART RATE: 61 BPM | HEIGHT: 63 IN | TEMPERATURE: 98 F

## 2023-03-09 DIAGNOSIS — D63.1 ANEMIA IN CHRONIC KIDNEY DISEASE, UNSPECIFIED CKD STAGE: Primary | ICD-10-CM

## 2023-03-09 DIAGNOSIS — N18.9 ANEMIA IN CHRONIC KIDNEY DISEASE, UNSPECIFIED CKD STAGE: Primary | ICD-10-CM

## 2023-03-09 DIAGNOSIS — N18.9 ANEMIA ASSOCIATED WITH CHRONIC RENAL FAILURE: Chronic | ICD-10-CM

## 2023-03-09 DIAGNOSIS — D61.811 DRUG-INDUCED PANCYTOPENIA: ICD-10-CM

## 2023-03-09 DIAGNOSIS — D63.1 ANEMIA ASSOCIATED WITH CHRONIC RENAL FAILURE: Chronic | ICD-10-CM

## 2023-03-09 LAB — TACROLIMUS BLD-MCNC: 5.6 NG/ML (ref 5–15)

## 2023-03-09 PROCEDURE — 99999 PR PBB SHADOW E&M-EST. PATIENT-LVL IV: ICD-10-PCS | Mod: PBBFAC,,,

## 2023-03-09 PROCEDURE — 99999 PR PBB SHADOW E&M-EST. PATIENT-LVL IV: CPT | Mod: PBBFAC,,,

## 2023-03-09 PROCEDURE — 1159F MED LIST DOCD IN RCRD: CPT | Mod: CPTII,S$GLB,,

## 2023-03-09 PROCEDURE — 4010F ACE/ARB THERAPY RXD/TAKEN: CPT | Mod: CPTII,S$GLB,,

## 2023-03-09 PROCEDURE — 3008F PR BODY MASS INDEX (BMI) DOCUMENTED: ICD-10-PCS | Mod: CPTII,S$GLB,,

## 2023-03-09 PROCEDURE — 1126F AMNT PAIN NOTED NONE PRSNT: CPT | Mod: CPTII,S$GLB,,

## 2023-03-09 PROCEDURE — 1126F PR PAIN SEVERITY QUANTIFIED, NO PAIN PRESENT: ICD-10-PCS | Mod: CPTII,S$GLB,,

## 2023-03-09 PROCEDURE — 99214 PR OFFICE/OUTPT VISIT, EST, LEVL IV, 30-39 MIN: ICD-10-PCS | Mod: S$GLB,,,

## 2023-03-09 PROCEDURE — 1160F RVW MEDS BY RX/DR IN RCRD: CPT | Mod: CPTII,S$GLB,,

## 2023-03-09 PROCEDURE — 3075F PR MOST RECENT SYSTOLIC BLOOD PRESS GE 130-139MM HG: ICD-10-PCS | Mod: CPTII,S$GLB,,

## 2023-03-09 PROCEDURE — 99214 OFFICE O/P EST MOD 30 MIN: CPT | Mod: S$GLB,,,

## 2023-03-09 PROCEDURE — 3008F BODY MASS INDEX DOCD: CPT | Mod: CPTII,S$GLB,,

## 2023-03-09 PROCEDURE — 1159F PR MEDICATION LIST DOCUMENTED IN MEDICAL RECORD: ICD-10-PCS | Mod: CPTII,S$GLB,,

## 2023-03-09 PROCEDURE — 1101F PR PT FALLS ASSESS DOC 0-1 FALLS W/OUT INJ PAST YR: ICD-10-PCS | Mod: CPTII,S$GLB,,

## 2023-03-09 PROCEDURE — 3075F SYST BP GE 130 - 139MM HG: CPT | Mod: CPTII,S$GLB,,

## 2023-03-09 PROCEDURE — 1101F PT FALLS ASSESS-DOCD LE1/YR: CPT | Mod: CPTII,S$GLB,,

## 2023-03-09 PROCEDURE — 3078F PR MOST RECENT DIASTOLIC BLOOD PRESSURE < 80 MM HG: ICD-10-PCS | Mod: CPTII,S$GLB,,

## 2023-03-09 PROCEDURE — 1160F PR REVIEW ALL MEDS BY PRESCRIBER/CLIN PHARMACIST DOCUMENTED: ICD-10-PCS | Mod: CPTII,S$GLB,,

## 2023-03-09 PROCEDURE — 3078F DIAST BP <80 MM HG: CPT | Mod: CPTII,S$GLB,,

## 2023-03-09 PROCEDURE — 4010F PR ACE/ARB THEARPY RXD/TAKEN: ICD-10-PCS | Mod: CPTII,S$GLB,,

## 2023-03-09 PROCEDURE — 3288F PR FALLS RISK ASSESSMENT DOCUMENTED: ICD-10-PCS | Mod: CPTII,S$GLB,,

## 2023-03-09 PROCEDURE — 3288F FALL RISK ASSESSMENT DOCD: CPT | Mod: CPTII,S$GLB,,

## 2023-03-09 RX ORDER — SODIUM CHLORIDE 0.9 % (FLUSH) 0.9 %
SYRINGE (ML) INJECTION
COMMUNITY
Start: 2023-02-21

## 2023-03-09 NOTE — TELEPHONE ENCOUNTER
Left voice message to call coordinator back.  ----- Message from Pavel Padilla MD sent at 3/9/2023  9:43 AM CST -----  Please lower prograf to 3 mg PO bid   Please repeat a tacro level in 2 weeks

## 2023-03-10 DIAGNOSIS — Z94.0 KIDNEY REPLACED BY TRANSPLANT: ICD-10-CM

## 2023-03-10 RX ORDER — TACROLIMUS 1 MG/1
3 CAPSULE ORAL EVERY 12 HOURS
Qty: 180 CAPSULE | Refills: 11 | Status: SHIPPED | OUTPATIENT
Start: 2023-03-10 | End: 2023-07-26 | Stop reason: DRUGHIGH

## 2023-03-10 NOTE — TELEPHONE ENCOUNTER
Patient daughter Niya repeated back and voice a understanding of orders.Next tacro level on 3/22/2023.  ----- Message from Pavel Padilla MD sent at 3/9/2023  9:43 AM CST -----  Please lower prograf to 3 mg PO bid   Please repeat a tacro level in 2 weeks

## 2023-03-10 NOTE — PROGRESS NOTES
Subjective:      Patient ID: Satinder Schulte is a 67 y.o. female.    Chief Complaint: Follow-up (Anemia in CKD)        HPI:  Ms. Schulte is a 66-year-old woman with type 2 diabetes, hypertension, and end-stage renal disease status post transplant September 2019. She presents for follow-up of anemia and thrombocytopenia.  Per review of the medical record bone marrow biopsy completed in 08/2018 was without concerning findings. She continues to have relatively stable anemia and thrombocytopenia.  She is on immunosuppressants tacrolimus and low-dose prednisone.  Stage 3 CKD  Stable she is followed by Dr. Khan nephrologist at outside facility. Imaging 04/09/2021 showed portal hypertension and liver lesions which include stable complex right hepatic and simple hepatic cysts. Recommend continued close follow-up with hepatology/transplant. No colonoscopy on record pt states she will f/u with outside PCP to schedule this.    Interval History: Pt states overall she is feeling well and doing her best to maintain her health. She continues close follow-up with nephrologist-Dr. Khan.  Denies n/v/d/c, cp, sob, fevers, chills, night sweats or unintentional weight loss, abnormal bleeding.      Social History     Socioeconomic History    Marital status: Single   Tobacco Use    Smoking status: Never    Smokeless tobacco: Never   Substance and Sexual Activity    Alcohol use: No    Drug use: No    Sexual activity: Never     Comment: single, Lives with daughter, on Disability, Lives in [place]   Social History Narrative    Disabled    Single    4 children    History of blood transfusions       Family History   Problem Relation Age of Onset    Stroke Mother     Kidney disease Mother         on dialysis    Hypertension Mother     Heart disease Mother     Heart disease Father     Stroke Sister     Kidney disease Brother     Breast cancer Daughter     Heart disease Sister     Ovarian cancer Cousin     Colon cancer Neg Hx     Thrombophilia  Neg Hx        Past Surgical History:   Procedure Laterality Date    AV FISTULA PLACEMENT  2014    BONE MARROW BIOPSY Right 8/23/2018    Procedure: Biopsy-bone marrow;  Surgeon: Anna Lin MD;  Location: [facility] OR [address] [place] [address];  Service: Oncology;  Laterality: Right;    CHOLECYSTECTOMY  2008    HYSTERECTOMY  2011    TAHBSO for benign reasons    KIDNEY TRANSPLANT N/A 9/20/2019    Procedure: TRANSPLANT, KIDNEY;  Surgeon: Guero Rogers MD;  Location: [facility] OR [address] [place] [address];  Service: Transplant;  Laterality: N/A;    OOPHORECTOMY         Past Medical History:   Diagnosis Date    Abnormal Pap smear     repeat paps were normal    Anemia associated with chronic renal failure     Awaiting organ transplant status  - 02/18/2013 6/6/2014    Blood type A+ 6/6/2014    CKD (chronic kidney disease) stage 3, GFR 30-59 ml/min 10/30/2019    CKD (chronic kidney disease) stage 5, GFR less than 15 ml/min     secondary hypertension    Diabetes mellitus     Essential hypertension 5/19/2017    Hyperlipidemia     Hypertension, renal 1992    Immunocompromised state 10/4/2019    Stroke 2007    Residual speech deficit       Review of Systems   Constitutional:  Negative for activity change, appetite change, chills, fatigue and fever.   HENT:  Negative for congestion, facial swelling, nosebleeds, rhinorrhea, sore throat and trouble swallowing.    Eyes:  Negative for photophobia, pain and visual disturbance.   Respiratory:  Negative for cough, shortness of breath and wheezing.    Cardiovascular:  Negative for chest pain, palpitations and leg swelling.   Gastrointestinal:  Negative for abdominal distention, abdominal pain, anal bleeding, blood in stool, constipation, diarrhea, nausea, rectal pain and vomiting.   Genitourinary:  Negative for difficulty urinating, dysuria, hematuria and vaginal bleeding.   Musculoskeletal:  Positive for arthralgias, back pain and myalgias.   Skin:  Negative for color change, pallor, rash and wound.   Neurological:  Negative for  dizziness, light-headedness, numbness and headaches.   Hematological:  Negative for adenopathy. Does not bruise/bleed easily.   Psychiatric/Behavioral:  The patient is not nervous/anxious.      Medication List with Changes/Refills   Current Medications    0.9 % SODIUM CHLORIDE (SODIUM CHLORIDE 0.9%) SOLUTION    Inject into the vein.    ASPIRIN (ECOTRIN) 81 MG EC TABLET    Take 1 tablet (81 mg total) by mouth once daily.    ATORVASTATIN (LIPITOR) 40 MG TABLET    Take 1 tablet (40 mg total) by mouth once daily.    BD POSIFLUSH NORMAL SALINE 0.9 INJECTION        BELATACEPT 250 MG SOLR    362 mg IV Every 2 weeks x 5 doses then monthly thereafter    BLOOD SUGAR DIAGNOSTIC STRP    Test blood glucose 4 (four) times daily.    BLOOD-GLUCOSE METER KIT    Use as instructed    CHOLECALCIFEROL, VITAMIN D3, (VITAMIN D3) 25 MCG (1,000 UNIT) CAPSULE    Take 1 capsule (1,000 Units total) by mouth once daily.    CINACALCET (SENSIPAR) 30 MG TAB    TAKE ONE TABLET BY MOUTH EVERY DAY WITH BREAKFAST    CINACALCET (SENSIPAR) 60 MG TAB    Take 1 tablet (60 mg total) by mouth daily with breakfast.    DICLOFENAC SODIUM (VOLTAREN) 1 % GEL    Apply 2 g topically daily as needed (pain).     EZETIMIBE (ZETIA) 10 MG TABLET    Take 1 tablet (10 mg total) by mouth once daily.    FERROUS SULFATE (FEOSOL) 325 MG (65 MG IRON) TAB TABLET    Take by mouth once daily.    FOLIC ACID (FOLVITE) 1 MG TABLET    Take 1 tablet (1 mg total) by mouth once daily.    GABAPENTIN (NEURONTIN) 300 MG CAPSULE    Take 300 mg by mouth every evening.    HYDROCODONE-ACETAMINOPHEN (NORCO) 7.5-325 MG PER TABLET    Take 0.5-1 tablets by mouth 3 (three) times daily as needed for Pain.     KETOCONAZOLE (NIZORAL) 200 MG TAB    Take HALF tablet (100 mg total) by mouth once daily.    LANCETS 28 GAUGE MISC    Test blood glucose 4 (four) times daily.    MAGNESIUM OXIDE (MAG-OX) 400 MG (241.3 MG MAGNESIUM) TABLET    TAKE 1 TABLET BY MOUTH TWICE A DAY    NIFEDIPINE (ADALAT CC) 60 MG  "TBSR    TAKE 1 TABLET BY MOUTH TWICE A DAY FOR 2 WEEKS    OLMESARTAN (BENICAR) 20 MG TABLET    Take 1 tablet (20 mg total) by mouth once daily.    PEN NEEDLE, DIABETIC 32 GAUGE X 5/32" NDLE    Use to inject insulin 4 (four) times daily.    PREDNISONE (DELTASONE) 5 MG TABLET    Take 1 tablet (5 mg total) by mouth once daily.   Changed and/or Refilled Medications    Modified Medication Previous Medication    TACROLIMUS (PROGRAF) 1 MG CAP tacrolimus (PROGRAF) 1 MG Cap       Take 3 capsules (3 mg total) by mouth every 12 (twelve) hours. Txp Date:9/20/2019 (Kidney) Disch Date:9/25/2019 ICD10:Z94.0 Txp Location:[facility]    Take 4 capsules (4 mg total) by mouth every 12 (twelve) hours.        Objective:     Vitals:    03/09/23 1336   BP: (!) 130/57   Pulse: 61   Temp: 97.9 °F (36.6 °C)       Physical Exam  Vitals reviewed.   Constitutional:       Appearance: Normal appearance. She is not ill-appearing.   HENT:      Head: Normocephalic and atraumatic.      Right Ear: External ear normal.      Left Ear: External ear normal.      Nose: Nose normal.      Mouth/Throat:      Mouth: Mucous membranes are moist.      Pharynx: Oropharynx is clear.   Eyes:      Conjunctiva/sclera: Conjunctivae normal.   Cardiovascular:      Rate and Rhythm: Regular rhythm. Bradycardia present.      Heart sounds: Normal heart sounds.   Pulmonary:      Effort: Pulmonary effort is normal.      Breath sounds: Normal breath sounds.   Abdominal:      General: Abdomen is flat.   Genitourinary:     Comments: deferred  Musculoskeletal:         General: Normal range of motion.      Cervical back: Normal range of motion.      Right lower leg: No edema.      Left lower leg: No edema.   Skin:     General: Skin is warm and dry.      Capillary Refill: Capillary refill takes less than 2 seconds.   Neurological:      Mental Status: She is alert and oriented to person, place, and time.      Motor: No weakness.   Psychiatric:         Mood and Affect: Mood normal.         " Behavior: Behavior normal.         Thought Content: Thought content normal.         Judgment: Judgment normal.       Lab Results   Component Value Date    WBC 2.33 (L) 03/08/2023    HGB 11.3 (L) 03/08/2023    HCT 36.2 (L) 03/08/2023    MCV 96 03/08/2023     (L) 03/08/2023       Lab Results   Component Value Date     03/08/2023     03/08/2023    K 4.9 03/08/2023    K 4.7 03/08/2023     (H) 03/08/2023     03/08/2023    CO2 24 03/08/2023    CO2 24 03/08/2023    BUN 31 (H) 03/08/2023    BUN 29 (H) 03/08/2023    CREATININE 2.1 (H) 03/08/2023    CREATININE 2.0 (H) 03/08/2023    CALCIUM 9.9 03/08/2023    CALCIUM 10.2 03/08/2023    ANIONGAP 8 03/08/2023    ANIONGAP 10 03/08/2023    ESTGFRAFRICA 39.0 (A) 06/13/2022    EGFRNONAA 33.8 (A) 06/13/2022     Lab Results   Component Value Date    ALT 25 03/08/2023    AST 17 03/08/2023    ALKPHOS 94 03/08/2023    BILITOT 0.4 03/08/2023       Assessment/Plan:     Problem List Items Addressed This Visit          Oncology    Anemia associated with chronic renal failure (Chronic)     -Prior bone marrow biopsy in 2018 without concern for hematologic neoplastic process.   -Prior workup without paraprotein, elevated free light chains with normal ratio.   -No evidence of nutrient deficiency    Lab Results   Component Value Date    HGB 11.3 (L) 03/08/2023     Previously receiving SHILO   --no indication to reinitiate at this time as Hgb remains >10g/dL    Lab Results   Component Value Date    IRON 69 03/08/2023    TRANSFERRIN 176 (L) 03/08/2023    TIBC 260 03/08/2023    FESATURATED 27 03/08/2023      Iron studies remain WNL  No indication for IV iron therapy at this time  Continue po iron supplementation   Plan to repeat iron studies routinely and replete prn             Relevant Orders    CBC Auto Differential    Comprehensive Metabolic Panel    Ferritin    Iron and TIBC    Drug-induced pancytopenia     -2018 bone marrow biopsy without dysplasia/malignancy  evident.    -Most recent labs with stable pancytopenia for several years without recurrent infection bleeding complications or fatigue.    -Bone marrow suppression likely due to Prograf  -Recommend surveillance and consideration of repeat bone marrow biopsy upon significant lab change or symptoms clinically.         Relevant Orders    CBC Auto Differential    Comprehensive Metabolic Panel    Ferritin    Iron and TIBC     Other Visit Diagnoses       Anemia in chronic kidney disease, unspecified CKD stage    -  Primary    Relevant Orders    CBC Auto Differential    Ferritin    Comprehensive Metabolic Panel    Iron and TIBC    CBC Auto Differential    Comprehensive Metabolic Panel    Ferritin    Iron and TIBC                Med Onc Chart Routing      Follow up with physician    Follow up with TIA 4 months. with labs a few days prior   Infusion scheduling note    Injection scheduling note    Labs CBC, CMP, ferritin and iron and TIBC   Scheduling:  Preferred lab:  Lab interval:     Imaging    Pharmacy appointment    Other referrals            HARI RenaeP-C  Hematology/Oncology

## 2023-03-10 NOTE — ASSESSMENT & PLAN NOTE
-Prior bone marrow biopsy in 2018 without concern for hematologic neoplastic process.   -Prior workup without paraprotein, elevated free light chains with normal ratio.   -No evidence of nutrient deficiency    Lab Results   Component Value Date    HGB 11.3 (L) 03/08/2023     Previously receiving SHILO   --no indication to reinitiate at this time as Hgb remains >10g/dL    Lab Results   Component Value Date    IRON 69 03/08/2023    TRANSFERRIN 176 (L) 03/08/2023    TIBC 260 03/08/2023    FESATURATED 27 03/08/2023      Iron studies remain WNL  No indication for IV iron therapy at this time  Continue po iron supplementation   Plan to repeat iron studies routinely and replete prn

## 2023-03-22 ENCOUNTER — TELEPHONE (OUTPATIENT)
Dept: TRANSPLANT | Facility: CLINIC | Age: 68
End: 2023-03-22
Payer: MEDICARE

## 2023-03-23 ENCOUNTER — TELEPHONE (OUTPATIENT)
Dept: TRANSPLANT | Facility: CLINIC | Age: 68
End: 2023-03-23
Payer: MEDICARE

## 2023-03-28 ENCOUNTER — LAB VISIT (OUTPATIENT)
Dept: LAB | Facility: HOSPITAL | Age: 68
End: 2023-03-28
Attending: INTERNAL MEDICINE
Payer: MEDICARE

## 2023-03-28 DIAGNOSIS — Z94.0 KIDNEY REPLACED BY TRANSPLANT: ICD-10-CM

## 2023-03-28 PROCEDURE — 80197 ASSAY OF TACROLIMUS: CPT | Performed by: INTERNAL MEDICINE

## 2023-03-28 PROCEDURE — 36415 COLL VENOUS BLD VENIPUNCTURE: CPT | Mod: PO | Performed by: INTERNAL MEDICINE

## 2023-03-29 LAB — TACROLIMUS BLD-MCNC: 3.4 NG/ML (ref 5–15)

## 2023-05-15 RX ORDER — PREDNISONE 5 MG/1
5 TABLET ORAL DAILY
Qty: 30 TABLET | Refills: 11 | Status: CANCELLED | OUTPATIENT
Start: 2023-05-15

## 2023-05-15 RX ORDER — KETOCONAZOLE 200 MG/1
100 TABLET ORAL DAILY
Qty: 15 TABLET | Refills: 11 | Status: SHIPPED | OUTPATIENT
Start: 2023-05-15 | End: 2023-06-19 | Stop reason: SDUPTHER

## 2023-05-17 RX ORDER — PREDNISONE 5 MG/1
5 TABLET ORAL DAILY
Qty: 30 TABLET | Refills: 11 | Status: CANCELLED | OUTPATIENT
Start: 2023-05-15

## 2023-05-22 RX ORDER — PREDNISONE 5 MG/1
5 TABLET ORAL DAILY
Qty: 30 TABLET | Refills: 11 | Status: CANCELLED | OUTPATIENT
Start: 2023-05-15

## 2023-05-24 RX ORDER — PREDNISONE 5 MG/1
5 TABLET ORAL DAILY
Qty: 30 TABLET | Refills: 11 | Status: SHIPPED | OUTPATIENT
Start: 2023-05-24 | End: 2023-08-22 | Stop reason: SDUPTHER

## 2023-05-31 RX ORDER — KETOCONAZOLE 200 MG/1
TABLET ORAL
Qty: 15 TABLET | Refills: 11 | OUTPATIENT
Start: 2023-05-31

## 2023-06-07 ENCOUNTER — LAB VISIT (OUTPATIENT)
Dept: LAB | Facility: HOSPITAL | Age: 68
End: 2023-06-07
Attending: INTERNAL MEDICINE
Payer: MEDICARE

## 2023-06-07 DIAGNOSIS — Z94.0 KIDNEY REPLACED BY TRANSPLANT: ICD-10-CM

## 2023-06-07 LAB
ALBUMIN SERPL BCP-MCNC: 3.5 G/DL (ref 3.5–5.2)
ANION GAP SERPL CALC-SCNC: 9 MMOL/L (ref 8–16)
BASOPHILS # BLD AUTO: 0.01 K/UL (ref 0–0.2)
BASOPHILS NFR BLD: 0.5 % (ref 0–1.9)
BUN SERPL-MCNC: 29 MG/DL (ref 8–23)
CALCIUM SERPL-MCNC: 9 MG/DL (ref 8.7–10.5)
CHLORIDE SERPL-SCNC: 109 MMOL/L (ref 95–110)
CO2 SERPL-SCNC: 26 MMOL/L (ref 23–29)
CREAT SERPL-MCNC: 1.7 MG/DL (ref 0.5–1.4)
DIFFERENTIAL METHOD: ABNORMAL
EOSINOPHIL # BLD AUTO: 0.1 K/UL (ref 0–0.5)
EOSINOPHIL NFR BLD: 2.4 % (ref 0–8)
ERYTHROCYTE [DISTWIDTH] IN BLOOD BY AUTOMATED COUNT: 14.4 % (ref 11.5–14.5)
EST. GFR  (NO RACE VARIABLE): 33.1 ML/MIN/1.73 M^2
GLUCOSE SERPL-MCNC: 66 MG/DL (ref 70–110)
HCT VFR BLD AUTO: 33.8 % (ref 37–48.5)
HGB BLD-MCNC: 10.5 G/DL (ref 12–16)
IMM GRANULOCYTES # BLD AUTO: 0.01 K/UL (ref 0–0.04)
IMM GRANULOCYTES NFR BLD AUTO: 0.5 % (ref 0–0.5)
LYMPHOCYTES # BLD AUTO: 0.7 K/UL (ref 1–4.8)
LYMPHOCYTES NFR BLD: 32.7 % (ref 18–48)
MAGNESIUM SERPL-MCNC: 1.9 MG/DL (ref 1.6–2.6)
MCH RBC QN AUTO: 29.8 PG (ref 27–31)
MCHC RBC AUTO-ENTMCNC: 31.1 G/DL (ref 32–36)
MCV RBC AUTO: 96 FL (ref 82–98)
MONOCYTES # BLD AUTO: 0.3 K/UL (ref 0.3–1)
MONOCYTES NFR BLD: 13.3 % (ref 4–15)
NEUTROPHILS # BLD AUTO: 1.1 K/UL (ref 1.8–7.7)
NEUTROPHILS NFR BLD: 50.6 % (ref 38–73)
NRBC BLD-RTO: 0 /100 WBC
PHOSPHATE SERPL-MCNC: 2.7 MG/DL (ref 2.7–4.5)
PLATELET # BLD AUTO: 135 K/UL (ref 150–450)
PMV BLD AUTO: 12 FL (ref 9.2–12.9)
POTASSIUM SERPL-SCNC: 4.2 MMOL/L (ref 3.5–5.1)
RBC # BLD AUTO: 3.52 M/UL (ref 4–5.4)
SODIUM SERPL-SCNC: 144 MMOL/L (ref 136–145)
WBC # BLD AUTO: 2.11 K/UL (ref 3.9–12.7)

## 2023-06-07 PROCEDURE — 80197 ASSAY OF TACROLIMUS: CPT | Performed by: INTERNAL MEDICINE

## 2023-06-07 PROCEDURE — 83735 ASSAY OF MAGNESIUM: CPT | Performed by: INTERNAL MEDICINE

## 2023-06-07 PROCEDURE — 85025 COMPLETE CBC W/AUTO DIFF WBC: CPT | Performed by: INTERNAL MEDICINE

## 2023-06-07 PROCEDURE — 36415 COLL VENOUS BLD VENIPUNCTURE: CPT | Mod: PO | Performed by: INTERNAL MEDICINE

## 2023-06-07 PROCEDURE — 80069 RENAL FUNCTION PANEL: CPT | Performed by: INTERNAL MEDICINE

## 2023-06-08 LAB — TACROLIMUS BLD-MCNC: 2.4 NG/ML (ref 5–15)

## 2023-06-09 DIAGNOSIS — I31.39 PERICARDIAL EFFUSION: ICD-10-CM

## 2023-06-09 DIAGNOSIS — R00.1 BRADYCARDIA: Primary | ICD-10-CM

## 2023-06-14 RX ORDER — KETOCONAZOLE 200 MG/1
TABLET ORAL
Qty: 15 TABLET | Refills: 11 | OUTPATIENT
Start: 2023-06-14

## 2023-06-19 RX ORDER — KETOCONAZOLE 200 MG/1
100 TABLET ORAL DAILY
Qty: 15 TABLET | Refills: 11 | Status: SHIPPED | OUTPATIENT
Start: 2023-06-19 | End: 2023-08-22 | Stop reason: SDUPTHER

## 2023-06-21 ENCOUNTER — LAB VISIT (OUTPATIENT)
Dept: LAB | Facility: HOSPITAL | Age: 68
End: 2023-06-21
Attending: INTERNAL MEDICINE
Payer: MEDICARE

## 2023-06-21 DIAGNOSIS — N25.81 SECONDARY HYPERPARATHYROIDISM OF RENAL ORIGIN: Primary | ICD-10-CM

## 2023-06-21 DIAGNOSIS — Z79.810 CARE RELATED TO CURRENT TAMOXIFEN USE: ICD-10-CM

## 2023-06-21 DIAGNOSIS — Z94.0 KIDNEY REPLACED BY TRANSPLANT: ICD-10-CM

## 2023-06-21 LAB — TSH SERPL DL<=0.005 MIU/L-ACNC: 3.6 UIU/ML (ref 0.4–4)

## 2023-06-21 PROCEDURE — 36415 COLL VENOUS BLD VENIPUNCTURE: CPT | Mod: PO | Performed by: INTERNAL MEDICINE

## 2023-06-21 PROCEDURE — 84443 ASSAY THYROID STIM HORMONE: CPT | Performed by: INTERNAL MEDICINE

## 2023-06-21 PROCEDURE — 80197 ASSAY OF TACROLIMUS: CPT | Performed by: INTERNAL MEDICINE

## 2023-06-22 LAB — TACROLIMUS BLD-MCNC: <2 NG/ML (ref 5–15)

## 2023-07-06 DIAGNOSIS — R00.1 BRADYCARDIA: Primary | ICD-10-CM

## 2023-07-06 DIAGNOSIS — I12.9 HYPERTENSION, RENAL: ICD-10-CM

## 2023-07-07 ENCOUNTER — HOSPITAL ENCOUNTER (OUTPATIENT)
Dept: CARDIOLOGY | Facility: HOSPITAL | Age: 68
Discharge: HOME | End: 2023-07-07
Attending: INTERNAL MEDICINE
Payer: MEDICARE

## 2023-07-07 ENCOUNTER — OFFICE VISIT (OUTPATIENT)
Dept: CARDIOLOGY | Facility: CLINIC | Age: 68
End: 2023-07-07
Payer: MEDICARE

## 2023-07-07 VITALS
HEIGHT: 63 IN | WEIGHT: 199.94 LBS | HEART RATE: 63 BPM | DIASTOLIC BLOOD PRESSURE: 60 MMHG | SYSTOLIC BLOOD PRESSURE: 138 MMHG | OXYGEN SATURATION: 97 % | BODY MASS INDEX: 35.43 KG/M2

## 2023-07-07 DIAGNOSIS — Z94.0 KIDNEY REPLACED BY TRANSPLANT: ICD-10-CM

## 2023-07-07 DIAGNOSIS — I12.9 HYPERTENSION, RENAL: Primary | Chronic | ICD-10-CM

## 2023-07-07 DIAGNOSIS — Z94.0 STATUS POST KIDNEY TRANSPLANT: ICD-10-CM

## 2023-07-07 DIAGNOSIS — E11.9 TYPE 2 DIABETES MELLITUS WITHOUT COMPLICATION, WITHOUT LONG-TERM CURRENT USE OF INSULIN: ICD-10-CM

## 2023-07-07 DIAGNOSIS — I12.9 HYPERTENSION, RENAL: ICD-10-CM

## 2023-07-07 DIAGNOSIS — R00.1 BRADYCARDIA: ICD-10-CM

## 2023-07-07 PROCEDURE — 4010F ACE/ARB THERAPY RXD/TAKEN: CPT | Mod: CPTII,S$GLB,, | Performed by: INTERNAL MEDICINE

## 2023-07-07 PROCEDURE — 1160F PR REVIEW ALL MEDS BY PRESCRIBER/CLIN PHARMACIST DOCUMENTED: ICD-10-PCS | Mod: CPTII,S$GLB,, | Performed by: INTERNAL MEDICINE

## 2023-07-07 PROCEDURE — 99214 PR OFFICE/OUTPT VISIT, EST, LEVL IV, 30-39 MIN: ICD-10-PCS | Mod: S$GLB,,, | Performed by: INTERNAL MEDICINE

## 2023-07-07 PROCEDURE — 3075F SYST BP GE 130 - 139MM HG: CPT | Mod: CPTII,S$GLB,, | Performed by: INTERNAL MEDICINE

## 2023-07-07 PROCEDURE — 99214 OFFICE O/P EST MOD 30 MIN: CPT | Mod: S$GLB,,, | Performed by: INTERNAL MEDICINE

## 2023-07-07 PROCEDURE — 1101F PT FALLS ASSESS-DOCD LE1/YR: CPT | Mod: CPTII,S$GLB,, | Performed by: INTERNAL MEDICINE

## 2023-07-07 PROCEDURE — 1126F PR PAIN SEVERITY QUANTIFIED, NO PAIN PRESENT: ICD-10-PCS | Mod: CPTII,S$GLB,, | Performed by: INTERNAL MEDICINE

## 2023-07-07 PROCEDURE — 3008F PR BODY MASS INDEX (BMI) DOCUMENTED: ICD-10-PCS | Mod: CPTII,S$GLB,, | Performed by: INTERNAL MEDICINE

## 2023-07-07 PROCEDURE — 99999 PR PBB SHADOW E&M-EST. PATIENT-LVL IV: ICD-10-PCS | Mod: PBBFAC,,, | Performed by: INTERNAL MEDICINE

## 2023-07-07 PROCEDURE — 1159F MED LIST DOCD IN RCRD: CPT | Mod: CPTII,S$GLB,, | Performed by: INTERNAL MEDICINE

## 2023-07-07 PROCEDURE — 3078F PR MOST RECENT DIASTOLIC BLOOD PRESSURE < 80 MM HG: ICD-10-PCS | Mod: CPTII,S$GLB,, | Performed by: INTERNAL MEDICINE

## 2023-07-07 PROCEDURE — 1126F AMNT PAIN NOTED NONE PRSNT: CPT | Mod: CPTII,S$GLB,, | Performed by: INTERNAL MEDICINE

## 2023-07-07 PROCEDURE — 4010F PR ACE/ARB THEARPY RXD/TAKEN: ICD-10-PCS | Mod: CPTII,S$GLB,, | Performed by: INTERNAL MEDICINE

## 2023-07-07 PROCEDURE — 93005 ELECTROCARDIOGRAM TRACING: CPT

## 2023-07-07 PROCEDURE — 3078F DIAST BP <80 MM HG: CPT | Mod: CPTII,S$GLB,, | Performed by: INTERNAL MEDICINE

## 2023-07-07 PROCEDURE — 1159F PR MEDICATION LIST DOCUMENTED IN MEDICAL RECORD: ICD-10-PCS | Mod: CPTII,S$GLB,, | Performed by: INTERNAL MEDICINE

## 2023-07-07 PROCEDURE — 3288F PR FALLS RISK ASSESSMENT DOCUMENTED: ICD-10-PCS | Mod: CPTII,S$GLB,, | Performed by: INTERNAL MEDICINE

## 2023-07-07 PROCEDURE — 3288F FALL RISK ASSESSMENT DOCD: CPT | Mod: CPTII,S$GLB,, | Performed by: INTERNAL MEDICINE

## 2023-07-07 PROCEDURE — 93010 EKG 12-LEAD: ICD-10-PCS | Mod: ,,, | Performed by: INTERNAL MEDICINE

## 2023-07-07 PROCEDURE — 1160F RVW MEDS BY RX/DR IN RCRD: CPT | Mod: CPTII,S$GLB,, | Performed by: INTERNAL MEDICINE

## 2023-07-07 PROCEDURE — 3075F PR MOST RECENT SYSTOLIC BLOOD PRESS GE 130-139MM HG: ICD-10-PCS | Mod: CPTII,S$GLB,, | Performed by: INTERNAL MEDICINE

## 2023-07-07 PROCEDURE — 93010 ELECTROCARDIOGRAM REPORT: CPT | Mod: ,,, | Performed by: INTERNAL MEDICINE

## 2023-07-07 PROCEDURE — 3008F BODY MASS INDEX DOCD: CPT | Mod: CPTII,S$GLB,, | Performed by: INTERNAL MEDICINE

## 2023-07-07 PROCEDURE — 1101F PR PT FALLS ASSESS DOC 0-1 FALLS W/OUT INJ PAST YR: ICD-10-PCS | Mod: CPTII,S$GLB,, | Performed by: INTERNAL MEDICINE

## 2023-07-07 PROCEDURE — 99999 PR PBB SHADOW E&M-EST. PATIENT-LVL IV: CPT | Mod: PBBFAC,,, | Performed by: INTERNAL MEDICINE

## 2023-07-07 NOTE — PROGRESS NOTES
Subjective:   Patient ID:  Satinder Schulte is a 67 y.o. female who presents for follow up of No chief complaint on file.      66 yo female, 6 months f/u   PMH chronic pericardial effusion, DM, HTN HLD h/o stroke, chronic anemia and s/p renal Rx in 2019, ESRD   Echo in  at [facility] showed normal biv function, LVH, Large posterolateral pericardial effusion. The Effusion is moderate -large posterior-laterally, and small laterally and outside the RA. No temporade  Echo done in  normal EF and no pericardial effusion  Cardiac PET in  no ischemia and normal EF  No chest pain SOB faint dizziness and leg swelling  No fatigue      visit  Repeat ECHo showed moderate pericardial effusion 1.4 cm at posterior wall  No SOB fatigue dizziness and faint. weigth gained,  Sleeps on 1 p illow. EKG NSR     visit  No fatigue dizziness faint syncope chest pain   Chronic BARBA. Walks few times a week. Lives alone  ekg NSR. No acute stt change     visit  Drinks 50 oz water a day.  No SOB leg swelling dizziness faint and chest pain   Ekg today NSR. Run out of olmesartan yesterday. BP high today    Interval history  BP controlled, added Zetia for HLD and no repeat Lipid profile check yet. Stable and walks a lot. Ekg NSR   Echo in 12-22 EF nl small pericardial effusion and LVH      Past Medical History:   Diagnosis Date    Abnormal Pap smear     repeat paps were normal    Anemia associated with chronic renal failure     Awaiting organ transplant status  - 02/18/2013 6/6/2014    Blood type A+ 6/6/2014    CKD (chronic kidney disease) stage 3, GFR 30-59 ml/min 10/30/2019    CKD (chronic kidney disease) stage 5, GFR less than 15 ml/min     secondary hypertension    Diabetes mellitus     Essential hypertension 5/19/2017    Hyperlipidemia     Hypertension, renal 1992    Immunocompromised state 10/4/2019    Stroke 2007    Residual speech deficit       Past Surgical History:   Procedure Laterality Date    AV FISTULA  PLACEMENT  2014    BONE MARROW BIOPSY Right 8/23/2018    Procedure: Biopsy-bone marrow;  Surgeon: Anna Lin MD;  Location: [facility] OR [address] [place] [address];  Service: Oncology;  Laterality: Right;    CHOLECYSTECTOMY  2008    HYSTERECTOMY  2011    TAHBSO for benign reasons    KIDNEY TRANSPLANT N/A 9/20/2019    Procedure: TRANSPLANT, KIDNEY;  Surgeon: Guero Rogers MD;  [facility];  Service: Transplant;  Laterality: N/A;    OOPHORECTOMY         Social History     Tobacco Use    Smoking status: Never    Smokeless tobacco: Never   Substance Use Topics    Alcohol use: No    Drug use: No       Family History   Problem Relation Age of Onset    Stroke Mother     Kidney disease Mother         on dialysis    Hypertension Mother     Heart disease Mother     Heart disease Father     Stroke Sister     Kidney disease Brother     Breast cancer Daughter     Heart disease Sister     Ovarian cancer Cousin     Colon cancer Neg Hx     Thrombophilia Neg Hx          ROS    Objective:   Physical Exam  HENT:      Head: Normocephalic.   Eyes:      Pupils: Pupils are equal, round, and reactive to light.   Neck:      Thyroid: No thyromegaly.      Vascular: Normal carotid pulses. No carotid bruit or JVD.   Cardiovascular:      Rate and Rhythm: Normal rate and regular rhythm. No extrasystoles are present.     Chest Wall: PMI is not displaced.      Pulses: Normal pulses.      Heart sounds: Murmur (diffuse fistule murmur on precordial and neck area) heard.     No gallop. No S3 sounds.   Pulmonary:      Effort: No respiratory distress.      Breath sounds: Normal breath sounds. No stridor.   Abdominal:      General: Bowel sounds are normal.      Palpations: Abdomen is soft.      Tenderness: There is no abdominal tenderness. There is no rebound.   Musculoskeletal:      Comments: Left arm fistula   Skin:     Findings: No rash.   Neurological:      Mental Status: She is alert and oriented to person, place, and time.   Psychiatric:         Behavior:  Behavior normal.       Lab Results   Component Value Date    CHOL 216 (H) 12/07/2022    CHOL 202 (H) 09/06/2022    CHOL 243 (H) 06/06/2022     Lab Results   Component Value Date    HDL 85 (H) 12/07/2022    HDL 84 (H) 09/06/2022    HDL 87 (H) 06/06/2022     Lab Results   Component Value Date    LDLCALC 109.4 12/07/2022    LDLCALC 98.2 09/06/2022    LDLCALC 130.0 06/06/2022     Lab Results   Component Value Date    TRIG 108 12/07/2022    TRIG 99 09/06/2022    TRIG 130 06/06/2022     Lab Results   Component Value Date    CHOLHDL 39.4 12/07/2022    CHOLHDL 41.6 09/06/2022    CHOLHDL 35.8 06/06/2022       Chemistry        Component Value Date/Time     06/07/2023 0848    K 4.2 06/07/2023 0848     06/07/2023 0848    CO2 26 06/07/2023 0848    BUN 29 (H) 06/07/2023 0848    CREATININE 1.7 (H) 06/07/2023 0848    GLU 66 (L) 06/07/2023 0848        Component Value Date/Time    CALCIUM 9.0 06/07/2023 0848    ALKPHOS 94 03/08/2023 0845    AST 17 03/08/2023 0845    ALT 25 03/08/2023 0845    BILITOT 0.4 03/08/2023 0845    ESTGFRAFRICA 39.0 (A) 06/13/2022 1119    EGFRNONAA 33.8 (A) 06/13/2022 1119          Lab Results   Component Value Date    HGBA1C 4.6 05/28/2021     Lab Results   Component Value Date    TSH 3.599 06/21/2023     Lab Results   Component Value Date    INR 1.0 03/31/2021    INR 0.9 11/20/2020    INR 1.0 09/19/2019     Lab Results   Component Value Date    WBC 2.11 (L) 06/07/2023    HGB 10.5 (L) 06/07/2023    HCT 33.8 (L) 06/07/2023    MCV 96 06/07/2023     (L) 06/07/2023     BMP  Sodium   Date Value Ref Range Status   06/07/2023 144 136 - 145 mmol/L Final     Potassium   Date Value Ref Range Status   06/07/2023 4.2 3.5 - 5.1 mmol/L Final     Chloride   Date Value Ref Range Status   06/07/2023 109 95 - 110 mmol/L Final     CO2   Date Value Ref Range Status   06/07/2023 26 23 - 29 mmol/L Final     BUN   Date Value Ref Range Status   06/07/2023 29 (H) 8 - 23 mg/dL Final     Creatinine   Date Value Ref  Range Status   06/07/2023 1.7 (H) 0.5 - 1.4 mg/dL Final     Calcium   Date Value Ref Range Status   06/07/2023 9.0 8.7 - 10.5 mg/dL Final     Anion Gap   Date Value Ref Range Status   06/07/2023 9 8 - 16 mmol/L Final     eGFR if    Date Value Ref Range Status   06/13/2022 39.0 (A) >60 mL/min/1.73 m^2 Final     eGFR if non    Date Value Ref Range Status   06/13/2022 33.8 (A) >60 mL/min/1.73 m^2 Final     Comment:     Calculation used to obtain the estimated glomerular filtration  rate (eGFR) is the CKD-EPI equation.        BNP  @LABRCNTIP(BNP,BNPTRIAGEBLO)@  @LABRCNTIP(troponini)@  CrCl cannot be calculated (Patient's most recent lab result is older than the maximum 7 days allowed.).  No results found in the last 24 hours.  No results found in the last 24 hours.  No results found in the last 24 hours.    Assessment:      1. Hypertension, renal    2. Kidney replaced by transplant    3. Status post kidney transplant    4. Type 2 diabetes mellitus without complication, without long-term current use of insulin        Plan:   Continue olmesartan zetia 10 mg daily ASA Lipitor and nifedipine  DM Rx per PCP  Counseled DASH  Check Lipid profile with PCP in 6 months  Recommend heart-healthy diet, weight control and regular exercise.  Mae. Risk modification.   I have reviewed all pertinent labs and cardiac studies independently. Plans and recommendations have been formulated under my direct supervision. All questions answered and patient voiced understanding.   If symptoms persist go to the ED  RTC in 6 months

## 2023-07-10 ENCOUNTER — LAB VISIT (OUTPATIENT)
Dept: LAB | Facility: HOSPITAL | Age: 68
End: 2023-07-10
Payer: MEDICARE

## 2023-07-10 DIAGNOSIS — D63.1 ANEMIA IN CHRONIC KIDNEY DISEASE, UNSPECIFIED CKD STAGE: ICD-10-CM

## 2023-07-10 DIAGNOSIS — N18.9 ANEMIA IN CHRONIC KIDNEY DISEASE, UNSPECIFIED CKD STAGE: ICD-10-CM

## 2023-07-10 DIAGNOSIS — N18.9 ANEMIA ASSOCIATED WITH CHRONIC RENAL FAILURE: Chronic | ICD-10-CM

## 2023-07-10 DIAGNOSIS — D63.1 ANEMIA ASSOCIATED WITH CHRONIC RENAL FAILURE: Chronic | ICD-10-CM

## 2023-07-10 DIAGNOSIS — D61.811 DRUG-INDUCED PANCYTOPENIA: ICD-10-CM

## 2023-07-10 LAB
ALBUMIN SERPL BCP-MCNC: 3.8 G/DL (ref 3.5–5.2)
ALP SERPL-CCNC: 86 U/L (ref 55–135)
ALT SERPL W/O P-5'-P-CCNC: 19 U/L (ref 10–44)
ANION GAP SERPL CALC-SCNC: 11 MMOL/L (ref 8–16)
AST SERPL-CCNC: 14 U/L (ref 10–40)
BASOPHILS # BLD AUTO: 0.01 K/UL (ref 0–0.2)
BASOPHILS NFR BLD: 0.4 % (ref 0–1.9)
BILIRUB SERPL-MCNC: 0.4 MG/DL (ref 0.1–1)
BUN SERPL-MCNC: 34 MG/DL (ref 8–23)
CALCIUM SERPL-MCNC: 9.1 MG/DL (ref 8.7–10.5)
CHLORIDE SERPL-SCNC: 112 MMOL/L (ref 95–110)
CO2 SERPL-SCNC: 21 MMOL/L (ref 23–29)
CREAT SERPL-MCNC: 2.1 MG/DL (ref 0.5–1.4)
DIFFERENTIAL METHOD: ABNORMAL
EOSINOPHIL # BLD AUTO: 0 K/UL (ref 0–0.5)
EOSINOPHIL NFR BLD: 1.2 % (ref 0–8)
ERYTHROCYTE [DISTWIDTH] IN BLOOD BY AUTOMATED COUNT: 14 % (ref 11.5–14.5)
EST. GFR  (NO RACE VARIABLE): 26 ML/MIN/1.73 M^2
FERRITIN SERPL-MCNC: 900 NG/ML (ref 20–300)
GLUCOSE SERPL-MCNC: 74 MG/DL (ref 70–110)
HCT VFR BLD AUTO: 34.9 % (ref 37–48.5)
HGB BLD-MCNC: 10.8 G/DL (ref 12–16)
IMM GRANULOCYTES # BLD AUTO: 0.03 K/UL (ref 0–0.04)
IMM GRANULOCYTES NFR BLD AUTO: 1.2 % (ref 0–0.5)
IRON SERPL-MCNC: 77 UG/DL (ref 30–160)
LYMPHOCYTES # BLD AUTO: 0.8 K/UL (ref 1–4.8)
LYMPHOCYTES NFR BLD: 33.2 % (ref 18–48)
MCH RBC QN AUTO: 29.6 PG (ref 27–31)
MCHC RBC AUTO-ENTMCNC: 30.9 G/DL (ref 32–36)
MCV RBC AUTO: 96 FL (ref 82–98)
MONOCYTES # BLD AUTO: 0.3 K/UL (ref 0.3–1)
MONOCYTES NFR BLD: 11.9 % (ref 4–15)
NEUTROPHILS # BLD AUTO: 1.3 K/UL (ref 1.8–7.7)
NEUTROPHILS NFR BLD: 52.1 % (ref 38–73)
NRBC BLD-RTO: 0 /100 WBC
PLATELET # BLD AUTO: 146 K/UL (ref 150–450)
PMV BLD AUTO: 11.4 FL (ref 9.2–12.9)
POTASSIUM SERPL-SCNC: 4.4 MMOL/L (ref 3.5–5.1)
PROT SERPL-MCNC: 6.6 G/DL (ref 6–8.4)
RBC # BLD AUTO: 3.65 M/UL (ref 4–5.4)
SATURATED IRON: 30 % (ref 20–50)
SODIUM SERPL-SCNC: 144 MMOL/L (ref 136–145)
TOTAL IRON BINDING CAPACITY: 256 UG/DL (ref 250–450)
TRANSFERRIN SERPL-MCNC: 173 MG/DL (ref 200–375)
WBC # BLD AUTO: 2.44 K/UL (ref 3.9–12.7)

## 2023-07-10 PROCEDURE — 82728 ASSAY OF FERRITIN: CPT

## 2023-07-10 PROCEDURE — 84466 ASSAY OF TRANSFERRIN: CPT

## 2023-07-10 PROCEDURE — 85025 COMPLETE CBC W/AUTO DIFF WBC: CPT

## 2023-07-10 PROCEDURE — 80053 COMPREHEN METABOLIC PANEL: CPT

## 2023-07-12 ENCOUNTER — OFFICE VISIT (OUTPATIENT)
Dept: HEMATOLOGY/ONCOLOGY | Facility: CLINIC | Age: 68
End: 2023-07-12
Payer: MEDICARE

## 2023-07-12 VITALS
HEART RATE: 60 BPM | TEMPERATURE: 98 F | BODY MASS INDEX: 35.78 KG/M2 | WEIGHT: 201.94 LBS | DIASTOLIC BLOOD PRESSURE: 64 MMHG | SYSTOLIC BLOOD PRESSURE: 149 MMHG | HEIGHT: 63 IN

## 2023-07-12 DIAGNOSIS — D51.8 OTHER VITAMIN B12 DEFICIENCY ANEMIAS: ICD-10-CM

## 2023-07-12 DIAGNOSIS — D61.811 DRUG-INDUCED PANCYTOPENIA: ICD-10-CM

## 2023-07-12 DIAGNOSIS — D63.1 ANEMIA ASSOCIATED WITH CHRONIC RENAL FAILURE: Chronic | ICD-10-CM

## 2023-07-12 DIAGNOSIS — N18.9 ANEMIA ASSOCIATED WITH CHRONIC RENAL FAILURE: Chronic | ICD-10-CM

## 2023-07-12 DIAGNOSIS — D52.0 DIETARY FOLATE DEFICIENCY ANEMIA: ICD-10-CM

## 2023-07-12 PROCEDURE — 1101F PT FALLS ASSESS-DOCD LE1/YR: CPT | Mod: CPTII,S$GLB,,

## 2023-07-12 PROCEDURE — 1126F PR PAIN SEVERITY QUANTIFIED, NO PAIN PRESENT: ICD-10-PCS | Mod: CPTII,S$GLB,,

## 2023-07-12 PROCEDURE — 99999 PR PBB SHADOW E&M-EST. PATIENT-LVL V: ICD-10-PCS | Mod: PBBFAC,,,

## 2023-07-12 PROCEDURE — 3008F PR BODY MASS INDEX (BMI) DOCUMENTED: ICD-10-PCS | Mod: CPTII,S$GLB,,

## 2023-07-12 PROCEDURE — 1101F PR PT FALLS ASSESS DOC 0-1 FALLS W/OUT INJ PAST YR: ICD-10-PCS | Mod: CPTII,S$GLB,,

## 2023-07-12 PROCEDURE — 3077F PR MOST RECENT SYSTOLIC BLOOD PRESSURE >= 140 MM HG: ICD-10-PCS | Mod: CPTII,S$GLB,,

## 2023-07-12 PROCEDURE — 99999 PR PBB SHADOW E&M-EST. PATIENT-LVL V: CPT | Mod: PBBFAC,,,

## 2023-07-12 PROCEDURE — 1159F PR MEDICATION LIST DOCUMENTED IN MEDICAL RECORD: ICD-10-PCS | Mod: CPTII,S$GLB,,

## 2023-07-12 PROCEDURE — 4010F ACE/ARB THERAPY RXD/TAKEN: CPT | Mod: CPTII,S$GLB,,

## 2023-07-12 PROCEDURE — 3288F FALL RISK ASSESSMENT DOCD: CPT | Mod: CPTII,S$GLB,,

## 2023-07-12 PROCEDURE — 3077F SYST BP >= 140 MM HG: CPT | Mod: CPTII,S$GLB,,

## 2023-07-12 PROCEDURE — 3078F PR MOST RECENT DIASTOLIC BLOOD PRESSURE < 80 MM HG: ICD-10-PCS | Mod: CPTII,S$GLB,,

## 2023-07-12 PROCEDURE — 99214 OFFICE O/P EST MOD 30 MIN: CPT | Mod: S$GLB,,,

## 2023-07-12 PROCEDURE — 1160F PR REVIEW ALL MEDS BY PRESCRIBER/CLIN PHARMACIST DOCUMENTED: ICD-10-PCS | Mod: CPTII,S$GLB,,

## 2023-07-12 PROCEDURE — 4010F PR ACE/ARB THEARPY RXD/TAKEN: ICD-10-PCS | Mod: CPTII,S$GLB,,

## 2023-07-12 PROCEDURE — 1160F RVW MEDS BY RX/DR IN RCRD: CPT | Mod: CPTII,S$GLB,,

## 2023-07-12 PROCEDURE — 3008F BODY MASS INDEX DOCD: CPT | Mod: CPTII,S$GLB,,

## 2023-07-12 PROCEDURE — 1159F MED LIST DOCD IN RCRD: CPT | Mod: CPTII,S$GLB,,

## 2023-07-12 PROCEDURE — 3078F DIAST BP <80 MM HG: CPT | Mod: CPTII,S$GLB,,

## 2023-07-12 PROCEDURE — 3288F PR FALLS RISK ASSESSMENT DOCUMENTED: ICD-10-PCS | Mod: CPTII,S$GLB,,

## 2023-07-12 PROCEDURE — 1126F AMNT PAIN NOTED NONE PRSNT: CPT | Mod: CPTII,S$GLB,,

## 2023-07-12 PROCEDURE — 99214 PR OFFICE/OUTPT VISIT, EST, LEVL IV, 30-39 MIN: ICD-10-PCS | Mod: S$GLB,,,

## 2023-07-13 NOTE — ASSESSMENT & PLAN NOTE
-Prior bone marrow biopsy in 2018 without concern for hematologic neoplastic process.   -Prior workup without paraprotein, elevated free light chains with normal ratio.   -No evidence of nutrient deficiency  -Plan to repeat above labs at next visit    Lab Results   Component Value Date    HGB 10.8 (L) 07/10/2023    HGB 10.9 (L) 07/10/2023     Previously receiving SHILO   --no indication to reinitiate at this time as Hgb remains >10g/dL    Lab Results   Component Value Date    IRON 77 07/10/2023    TRANSFERRIN 173 (L) 07/10/2023    TIBC 256 07/10/2023    FESATURATED 30 07/10/2023      Iron studies remain WNL  No indication for IV iron therapy at this time  Continue po iron supplementation may reduce to qod dosing   Plan to repeat iron studies routinely and replete prn        Follow-up in 6 months with repeat labs labs prior

## 2023-07-13 NOTE — PROGRESS NOTES
Subjective:      Patient ID: Satinder Schulte is a 67 y.o. female.    Chief Complaint: Follow-up (Anemia of CKD)    HPI:  Ms. Schulte is a 66-year-old woman with type 2 diabetes, hypertension, and end-stage renal disease status post transplant September 2019. She presents for follow-up of anemia and thrombocytopenia.  Per review of the medical record bone marrow biopsy completed in 08/2018 was without concerning findings. She continues to have relatively stable anemia and thrombocytopenia.  She is on immunosuppressants tacrolimus and low-dose prednisone.  Stage 3 CKD  Stable she is followed by Dr. Khan nephrologist at outside facility. Imaging 04/09/2021 showed portal hypertension and liver lesions which include stable complex right hepatic and simple hepatic cysts. Recommend continued close follow-up with hepatology/transplant. No colonoscopy on record pt states she will f/u with outside PCP to schedule this.    Interval History: Pt states overall she is feeling well and doing her best to maintain her health. Continues f/u with pain mgmt and recently received steroid shot in right hip to help with arthritis pain. She continues close follow-up with nephrologist-Dr. Khan and notes she is scheduled to f/u up with PCP Dr. Kathy garcia. Denies n/v/d/c, cp, sob, fevers, chills, night sweats or unintentional weight loss, abnormal bleeding.      Social History     Socioeconomic History    Marital status: Single   Tobacco Use    Smoking status: Never    Smokeless tobacco: Never   Substance and Sexual Activity    Alcohol use: No    Drug use: No    Sexual activity: Never     Comment: single, Lives with daughter, on Disability, Lives in [place]   Social History Narrative    Disabled    Single    4 children    History of blood transfusions       Family History   Problem Relation Age of Onset    Stroke Mother     Kidney disease Mother         on dialysis    Hypertension Mother     Heart disease Mother     Heart disease Father      Stroke Sister     Kidney disease Brother     Breast cancer Daughter     Heart disease Sister     Ovarian cancer Cousin     Colon cancer Neg Hx     Thrombophilia Neg Hx        Past Surgical History:   Procedure Laterality Date    AV FISTULA PLACEMENT  2014    BONE MARROW BIOPSY Right 8/23/2018    Procedure: Biopsy-bone marrow;  Surgeon: Anna Lin MD;  Location: [address];  Service: Oncology;  Laterality: Right;    CHOLECYSTECTOMY  2008    HYSTERECTOMY  2011    TAHBSO for benign reasons    KIDNEY TRANSPLANT N/A 9/20/2019    Procedure: TRANSPLANT, KIDNEY;  Surgeon: Guero Rogers MD;  Location: [address];  Service: Transplant;  Laterality: N/A;    OOPHORECTOMY         Past Medical History:   Diagnosis Date    Abnormal Pap smear     repeat paps were normal    Anemia associated with chronic renal failure     Awaiting organ transplant status  - 02/18/2013 6/6/2014    Blood type A+ 6/6/2014    CKD (chronic kidney disease) stage 3, GFR 30-59 ml/min 10/30/2019    CKD (chronic kidney disease) stage 5, GFR less than 15 ml/min     secondary hypertension    Diabetes mellitus     Essential hypertension 5/19/2017    Hyperlipidemia     Hypertension, renal 1992    Immunocompromised state 10/4/2019    Stroke 2007    Residual speech deficit       Review of Systems   Constitutional:  Negative for activity change, appetite change, chills, fatigue and fever.   HENT:  Negative for congestion, facial swelling, nosebleeds, rhinorrhea, sore throat and trouble swallowing.    Eyes:  Negative for photophobia, pain and visual disturbance.   Respiratory:  Negative for cough, shortness of breath and wheezing.    Cardiovascular:  Negative for chest pain, palpitations and leg swelling.   Gastrointestinal:  Negative for abdominal distention, abdominal pain, anal bleeding, blood in stool, constipation, diarrhea, nausea, rectal pain and vomiting.   Genitourinary:  Negative for difficulty urinating, dysuria, hematuria and vaginal  bleeding.   Musculoskeletal:  Positive for arthralgias and myalgias. Negative for back pain.   Skin:  Negative for color change, pallor, rash and wound.   Neurological:  Negative for dizziness, light-headedness, numbness and headaches.   Hematological:  Negative for adenopathy. Does not bruise/bleed easily.   Psychiatric/Behavioral:  The patient is not nervous/anxious.      Medication List with Changes/Refills   Current Medications    0.9 % SODIUM CHLORIDE (SODIUM CHLORIDE 0.9%) SOLUTION    Inject into the vein.    ASPIRIN (ECOTRIN) 81 MG EC TABLET    Take 1 tablet (81 mg total) by mouth once daily.    ATORVASTATIN (LIPITOR) 40 MG TABLET    Take 1 tablet (40 mg total) by mouth once daily.    BD POSIFLUSH NORMAL SALINE 0.9 INJECTION        BELATACEPT 250 MG SOLR    362 mg IV Every 2 weeks x 5 doses then monthly thereafter    BLOOD SUGAR DIAGNOSTIC STRP    Test blood glucose 4 (four) times daily.    BLOOD-GLUCOSE METER KIT    Use as instructed    CHOLECALCIFEROL, VITAMIN D3, (VITAMIN D3) 25 MCG (1,000 UNIT) CAPSULE    Take 1 capsule (1,000 Units total) by mouth once daily.    CINACALCET (SENSIPAR) 30 MG TAB    TAKE ONE TABLET BY MOUTH EVERY DAY WITH BREAKFAST    CINACALCET (SENSIPAR) 60 MG TAB    Take 1 tablet (60 mg total) by mouth once daily.    DICLOFENAC SODIUM (VOLTAREN) 1 % GEL    Apply 2 g topically daily as needed (pain).     EZETIMIBE (ZETIA) 10 MG TABLET    Take 1 tablet (10 mg total) by mouth once daily.    FERROUS SULFATE (FEOSOL) 325 MG (65 MG IRON) TAB TABLET    Take by mouth once daily.    FOLIC ACID (FOLVITE) 1 MG TABLET    Take 1 tablet (1 mg total) by mouth once daily.    GABAPENTIN (NEURONTIN) 300 MG CAPSULE    Take 300 mg by mouth every evening.    HYDROCODONE-ACETAMINOPHEN (NORCO) 7.5-325 MG PER TABLET    Take 0.5-1 tablets by mouth 3 (three) times daily as needed for Pain.     KETOCONAZOLE (NIZORAL) 200 MG TAB    Take ½ tablet (100 mg total) by mouth once daily.    LANCETS 28 GAUGE MISC    Test  "blood glucose 4 (four) times daily.    MAGNESIUM OXIDE (MAG-OX) 400 MG (241.3 MG MAGNESIUM) TABLET    TAKE 1 TABLET BY MOUTH TWICE A DAY    NIFEDIPINE (ADALAT CC) 60 MG TBSR    TAKE 1 TABLET BY MOUTH TWICE A DAY FOR 2 WEEKS    OLMESARTAN (BENICAR) 20 MG TABLET    Take 1 tablet (20 mg total) by mouth once daily.    PEN NEEDLE, DIABETIC 32 GAUGE X 5/32" NDLE    Use to inject insulin 4 (four) times daily.    PREDNISONE (DELTASONE) 5 MG TABLET    Take 1 tablet (5 mg total) by mouth once daily.    TACROLIMUS (PROGRAF) 1 MG CAP    Take 3 capsules (3 mg total) by mouth every 12 (twelve) hours. Txp Date:9/20/2019 (Kidney) Disch Date:9/25/2019 ICD10:Z94.0 Txp Location:[facility]        Objective:     Vitals:    07/12/23 1514   BP: (!) 149/64   Pulse: 60   Temp: 98.2 °F (36.8 °C)       Physical Exam  Vitals reviewed.   Constitutional:       Appearance: Normal appearance. She is not ill-appearing.   HENT:      Head: Normocephalic and atraumatic.      Right Ear: External ear normal.      Left Ear: External ear normal.      Nose: Nose normal.      Mouth/Throat:      Mouth: Mucous membranes are moist.      Pharynx: Oropharynx is clear.   Eyes:      Conjunctiva/sclera: Conjunctivae normal.   Cardiovascular:      Rate and Rhythm: Normal rate and regular rhythm.   Pulmonary:      Effort: Pulmonary effort is normal.      Breath sounds: Normal breath sounds.   Abdominal:      General: Abdomen is flat.   Genitourinary:     Comments: deferred  Musculoskeletal:         General: Normal range of motion.      Cervical back: Normal range of motion.      Right lower leg: No edema.      Left lower leg: No edema.   Skin:     General: Skin is warm and dry.      Capillary Refill: Capillary refill takes less than 2 seconds.   Neurological:      Mental Status: She is alert and oriented to person, place, and time.      Motor: No weakness.   Psychiatric:         Mood and Affect: Mood normal.         Behavior: Behavior normal.         Thought Content: " Thought content normal.         Judgment: Judgment normal.       Lab Results   Component Value Date    WBC 2.44 (L) 07/10/2023    WBC 2.58 (L) 07/10/2023    HGB 10.8 (L) 07/10/2023    HGB 10.9 (L) 07/10/2023    HCT 34.9 (L) 07/10/2023    HCT 35.5 (L) 07/10/2023    MCV 96 07/10/2023    MCV 99 (H) 07/10/2023     (L) 07/10/2023     (L) 07/10/2023       Lab Results   Component Value Date     07/10/2023     07/10/2023    K 4.4 07/10/2023    K 4.4 07/10/2023     (H) 07/10/2023     07/10/2023    CO2 21 (L) 07/10/2023    CO2 22 (L) 07/10/2023    BUN 34 (H) 07/10/2023    BUN 33 (H) 07/10/2023    CREATININE 2.1 (H) 07/10/2023    CREATININE 2.0 (H) 07/10/2023    CALCIUM 9.1 07/10/2023    CALCIUM 9.1 07/10/2023    ANIONGAP 11 07/10/2023    ANIONGAP 10 07/10/2023    ESTGFRAFRICA 39.0 (A) 06/13/2022    EGFRNONAA 33.8 (A) 06/13/2022     Lab Results   Component Value Date    ALT 19 07/10/2023    AST 14 07/10/2023    ALKPHOS 86 07/10/2023    BILITOT 0.4 07/10/2023       Assessment/Plan:     Problem List Items Addressed This Visit          Oncology    Anemia associated with chronic renal failure (Chronic)     -Prior bone marrow biopsy in 2018 without concern for hematologic neoplastic process.   -Prior workup without paraprotein, elevated free light chains with normal ratio.   -No evidence of nutrient deficiency  -Plan to repeat above labs at next visit    Lab Results   Component Value Date    HGB 10.8 (L) 07/10/2023    HGB 10.9 (L) 07/10/2023     Previously receiving SHILO   --no indication to reinitiate at this time as Hgb remains >10g/dL    Lab Results   Component Value Date    IRON 77 07/10/2023    TRANSFERRIN 173 (L) 07/10/2023    TIBC 256 07/10/2023    FESATURATED 30 07/10/2023      Iron studies remain WNL  No indication for IV iron therapy at this time  Continue po iron supplementation may reduce to qod dosing   Plan to repeat iron studies routinely and replete prn        Follow-up in 6  months with repeat labs labs prior          Relevant Orders    CBC Auto Differential    Comprehensive Metabolic Panel    Ferritin    Iron and TIBC    Protein Electrophoresis, Serum    Immunofixation Electrophoresis    Immunoglobulin Free LT Chains Blood    Folate    Vitamin B12    Drug-induced pancytopenia     -2018 bone marrow biopsy without dysplasia/malignancy evident.    -Most recent labs with stable pancytopenia for several years without recurrent infection bleeding complications or fatigue.    -Bone marrow suppression likely due to Prograf  -Recommend surveillance and consideration of repeat bone marrow biopsy upon significant lab change or symptoms clinically.         Relevant Orders    CBC Auto Differential    Comprehensive Metabolic Panel    Ferritin    Iron and TIBC    Protein Electrophoresis, Serum    Immunofixation Electrophoresis    Immunoglobulin Free LT Chains Blood    Folate    Vitamin B12     Other Visit Diagnoses       Dietary folate deficiency anemia        Relevant Orders    Folate    Other vitamin B12 deficiency anemias        Relevant Orders    Vitamin B12              Med Onc Chart Routing      Follow up with physician    Follow up with TIA 6 months. with labs a few days prior   Infusion scheduling note    Injection scheduling note    Labs CBC, CMP, ferritin, iron and TIBC, folate, vitamin B12, free light chains, SPEP and other   Scheduling:  Preferred lab:  Lab interval:     Imaging    Pharmacy appointment    Other referrals             ANDREA Arthur FNP-C  Hematology/Oncology

## 2023-07-22 DIAGNOSIS — E83.42 HYPOMAGNESEMIA: ICD-10-CM

## 2023-07-24 RX ORDER — OLMESARTAN MEDOXOMIL 20 MG/1
TABLET ORAL
Qty: 90 TABLET | Refills: 3 | Status: SHIPPED | OUTPATIENT
Start: 2023-07-24 | End: 2023-08-09 | Stop reason: SDUPTHER

## 2023-07-24 RX ORDER — EZETIMIBE 10 MG/1
TABLET ORAL
Qty: 90 TABLET | Refills: 3 | Status: SHIPPED | OUTPATIENT
Start: 2023-07-24

## 2023-07-25 RX ORDER — LANOLIN ALCOHOL/MO/W.PET/CERES
1 CREAM (GRAM) TOPICAL 2 TIMES DAILY
Qty: 180 TABLET | Refills: 3 | Status: SHIPPED | OUTPATIENT
Start: 2023-07-25 | End: 2024-07-24

## 2023-08-09 RX ORDER — OLMESARTAN MEDOXOMIL 20 MG/1
20 TABLET ORAL DAILY
Qty: 90 TABLET | Refills: 3 | Status: SHIPPED | OUTPATIENT
Start: 2023-08-09 | End: 2024-08-08

## 2023-08-22 RX ORDER — KETOCONAZOLE 200 MG/1
100 TABLET ORAL DAILY
Qty: 15 TABLET | Refills: 11 | Status: SHIPPED | OUTPATIENT
Start: 2023-08-22 | End: 2024-08-21

## 2023-08-22 RX ORDER — PREDNISONE 5 MG/1
5 TABLET ORAL DAILY
Qty: 30 TABLET | Refills: 11 | Status: SHIPPED | OUTPATIENT
Start: 2023-08-22 | End: 2024-08-21

## 2023-08-22 RX ORDER — TACROLIMUS 1 MG/1
5 CAPSULE ORAL EVERY 12 HOURS
Qty: 300 CAPSULE | Refills: 11 | Status: SHIPPED | OUTPATIENT
Start: 2023-08-22 | End: 2024-08-21

## 2023-08-30 DIAGNOSIS — Z94.0 KIDNEY REPLACED BY TRANSPLANT: Primary | ICD-10-CM

## 2023-09-07 ENCOUNTER — LAB VISIT (OUTPATIENT)
Dept: LAB | Facility: HOSPITAL | Age: 68
End: 2023-09-07
Attending: INTERNAL MEDICINE
Payer: MEDICARE

## 2023-09-07 DIAGNOSIS — N18.32 CHRONIC KIDNEY DISEASE (CKD) STAGE G3B/A1, MODERATELY DECREASED GLOMERULAR FILTRATION RATE (GFR) BETWEEN 30-44 ML/MIN/1.73 SQUARE METER AND ALBUMINURIA CREATININE RATIO LESS THAN 30 MG/G: ICD-10-CM

## 2023-09-07 DIAGNOSIS — D64.9 ANEMIA, UNSPECIFIED: ICD-10-CM

## 2023-09-07 DIAGNOSIS — Z94.0 KIDNEY REPLACED BY TRANSPLANT: Primary | ICD-10-CM

## 2023-09-07 DIAGNOSIS — R82.90 NONSPECIFIC FINDING ON EXAMINATION OF URINE: ICD-10-CM

## 2023-09-07 DIAGNOSIS — Z79.891 ENCOUNTER FOR LONG-TERM METHADONE USE: ICD-10-CM

## 2023-09-07 DIAGNOSIS — I12.9 PARENCHYMAL RENAL HYPERTENSION: ICD-10-CM

## 2023-09-07 LAB
ALBUMIN SERPL BCP-MCNC: 3.8 G/DL (ref 3.5–5.2)
ALP SERPL-CCNC: 81 U/L (ref 55–135)
ALT SERPL W/O P-5'-P-CCNC: 47 U/L (ref 10–44)
ANION GAP SERPL CALC-SCNC: 13 MMOL/L (ref 8–16)
AST SERPL-CCNC: 22 U/L (ref 10–40)
BACTERIA #/AREA URNS AUTO: ABNORMAL /HPF
BASOPHILS # BLD AUTO: ABNORMAL K/UL (ref 0–0.2)
BASOPHILS NFR BLD: 0 % (ref 0–1.9)
BILIRUB SERPL-MCNC: 0.3 MG/DL (ref 0.1–1)
BILIRUB UR QL STRIP: NEGATIVE
BUN SERPL-MCNC: 30 MG/DL (ref 8–23)
CALCIUM SERPL-MCNC: 9.2 MG/DL (ref 8.7–10.5)
CHLORIDE SERPL-SCNC: 112 MMOL/L (ref 95–110)
CLARITY UR REFRACT.AUTO: CLEAR
CO2 SERPL-SCNC: 20 MMOL/L (ref 23–29)
COLOR UR AUTO: YELLOW
CREAT SERPL-MCNC: 2 MG/DL (ref 0.5–1.4)
DIFFERENTIAL METHOD: ABNORMAL
EOSINOPHIL # BLD AUTO: ABNORMAL K/UL (ref 0–0.5)
EOSINOPHIL NFR BLD: 1 % (ref 0–8)
ERYTHROCYTE [DISTWIDTH] IN BLOOD BY AUTOMATED COUNT: 13.7 % (ref 11.5–14.5)
EST. GFR  (NO RACE VARIABLE): 27.2 ML/MIN/1.73 M^2
GLUCOSE SERPL-MCNC: 93 MG/DL (ref 70–110)
GLUCOSE UR QL STRIP: NEGATIVE
HCT VFR BLD AUTO: 35.4 % (ref 37–48.5)
HGB BLD-MCNC: 11 G/DL (ref 12–16)
HGB UR QL STRIP: ABNORMAL
HYALINE CASTS UR QL AUTO: 0 /LPF
IMM GRANULOCYTES # BLD AUTO: ABNORMAL K/UL (ref 0–0.04)
IMM GRANULOCYTES NFR BLD AUTO: ABNORMAL % (ref 0–0.5)
KETONES UR QL STRIP: NEGATIVE
LEUKOCYTE ESTERASE UR QL STRIP: ABNORMAL
LYMPHOCYTES # BLD AUTO: ABNORMAL K/UL (ref 1–4.8)
LYMPHOCYTES NFR BLD: 51 % (ref 18–48)
MCH RBC QN AUTO: 30 PG (ref 27–31)
MCHC RBC AUTO-ENTMCNC: 31.1 G/DL (ref 32–36)
MCV RBC AUTO: 97 FL (ref 82–98)
MICROSCOPIC COMMENT: ABNORMAL
MONOCYTES # BLD AUTO: ABNORMAL K/UL (ref 0.3–1)
MONOCYTES NFR BLD: 6 % (ref 4–15)
NEUTROPHILS NFR BLD: 42 % (ref 38–73)
NITRITE UR QL STRIP: NEGATIVE
NRBC BLD-RTO: 0 /100 WBC
PH UR STRIP: 6 [PH] (ref 5–8)
PLATELET # BLD AUTO: 132 K/UL (ref 150–450)
PLATELET BLD QL SMEAR: ABNORMAL
PMV BLD AUTO: 11.7 FL (ref 9.2–12.9)
POTASSIUM SERPL-SCNC: 4.5 MMOL/L (ref 3.5–5.1)
PROT SERPL-MCNC: 6.6 G/DL (ref 6–8.4)
PROT UR QL STRIP: ABNORMAL
RBC # BLD AUTO: 3.67 M/UL (ref 4–5.4)
RBC #/AREA URNS AUTO: 22 /HPF (ref 0–4)
SMUDGE CELLS BLD QL SMEAR: PRESENT
SODIUM SERPL-SCNC: 145 MMOL/L (ref 136–145)
SP GR UR STRIP: 1.01 (ref 1–1.03)
SQUAMOUS #/AREA URNS AUTO: 13 /HPF
URN SPEC COLLECT METH UR: ABNORMAL
WBC # BLD AUTO: 1.89 K/UL (ref 3.9–12.7)
WBC #/AREA URNS AUTO: 3 /HPF (ref 0–5)

## 2023-09-07 PROCEDURE — 80197 ASSAY OF TACROLIMUS: CPT | Performed by: INTERNAL MEDICINE

## 2023-09-07 PROCEDURE — 85007 BL SMEAR W/DIFF WBC COUNT: CPT | Performed by: INTERNAL MEDICINE

## 2023-09-07 PROCEDURE — 81001 URINALYSIS AUTO W/SCOPE: CPT | Performed by: INTERNAL MEDICINE

## 2023-09-07 PROCEDURE — 36415 COLL VENOUS BLD VENIPUNCTURE: CPT | Mod: PO | Performed by: INTERNAL MEDICINE

## 2023-09-07 PROCEDURE — 85027 COMPLETE CBC AUTOMATED: CPT | Performed by: INTERNAL MEDICINE

## 2023-09-07 PROCEDURE — 80053 COMPREHEN METABOLIC PANEL: CPT | Performed by: INTERNAL MEDICINE

## 2023-09-08 LAB — TACROLIMUS BLD-MCNC: 4.9 NG/ML (ref 5–15)

## 2023-09-08 NOTE — TELEPHONE ENCOUNTER
Patient daughter Niya repeated back and voice a understanding of orders.  Requesting Labs here at [facility] on Firiday 3/13 .    ----- Message from Pavel Padilla MD sent at 3/10/2020  9:26 AM CDT -----  Make sure she is taking sensipar as ordered   Lets hold Vit D supplements for now  Repeat PTH with next blood work  Add ionized calcium to next blood work  'hydration  Increase prograf to 6 mg po bid  She had a modest CMV viremia and currently OFF MMF and bactrim still remains pancytopenic. Let's get a CT of the abdomen and pelvis without contrast. Just looking for lymphadenopathies.        Physical Therapy Visit    Visit Type: Daily Treatment Note  Visit: 4  Referring Provider: Delonte Damico, *  Medical Diagnosis (from order): Diagnosis Information    Diagnosis  721.0 (ICD-9-CM) - M47.22 (ICD-10-CM) - Other spondylosis with radiculopathy, cervical region         SUBJECTIVE                                                                                                               Patient reports still having issues with her upper right shoulder.  She feels like its kind of stagnant.  Denies numbing or tingling.        OBJECTIVE                                                                                                                                       Treatment     Therapeutic Exercise  UBE fwd and retro: 4 min total  Chin tucks 3\" x 10  GTB horiz abduction supine x15  YTB shoulder extension 2x15   YTB shoulder external rotation bilateral   Lower cervical upper thoracic stretch: 15 sec hold x 2  Over chair stretch: 15 sec hold x 5 (R)  TRX shoulder extension: 8x 5 sec hold    Wall slides with lift off: 10x  Doorway pec str 3 x 30\"     Manual Therapy   Supine and sitting STM- to (R) upper/ middle trap  Sub occipital  Release       Skilled input: verbal instruction/cues and tactile instruction/cues    Writer verbally educated and received verbal consent for hand placement, positioning of patient, and techniques to be performed today from patient for therapist position for techniques as described above and how they are pertinent to the patient's plan of care.      ASSESSMENT                                                                                                            Patient tolerated treatment session well.  Currently still presents with tender to palpation right> left sub occipitals, upper trap, infraspinatus.  Mild pain with shoulder scaption manual muscle test.  Will continue to progress as tolerated.       Skilled PT medically necessary warranted to address  aforementioned deficits and return patient to prior level of function.  No adverse response to treatment session observed or reported.      PLAN                                                                                                                           Suggestions for next session as indicated: Progress per plan of care  STM sub occipital, right upper trap, infraspinatus  Sub occipital stretches  Postural, scapular work       Therapy procedure time and total treatment time can be found documented on the Time Entry flowsheet

## 2023-09-20 ENCOUNTER — LAB VISIT (OUTPATIENT)
Dept: LAB | Facility: HOSPITAL | Age: 68
End: 2023-09-20
Attending: INTERNAL MEDICINE
Payer: MEDICARE

## 2023-09-20 DIAGNOSIS — Z94.0 KIDNEY REPLACED BY TRANSPLANT: ICD-10-CM

## 2023-09-20 LAB
BACTERIA #/AREA URNS AUTO: ABNORMAL /HPF
BILIRUB UR QL STRIP: NEGATIVE
CLARITY UR REFRACT.AUTO: CLEAR
COLOR UR AUTO: COLORLESS
CREAT UR-MCNC: 57 MG/DL (ref 15–325)
GLUCOSE UR QL STRIP: NEGATIVE
HGB UR QL STRIP: NEGATIVE
HYALINE CASTS UR QL AUTO: 0 /LPF
KETONES UR QL STRIP: NEGATIVE
LEUKOCYTE ESTERASE UR QL STRIP: NEGATIVE
MICROSCOPIC COMMENT: ABNORMAL
NITRITE UR QL STRIP: NEGATIVE
PH UR STRIP: 7 [PH] (ref 5–8)
PROT UR QL STRIP: ABNORMAL
PROT UR-MCNC: 88 MG/DL (ref 0–15)
PROT/CREAT UR: 1.54 MG/G{CREAT} (ref 0–0.2)
RBC #/AREA URNS AUTO: 12 /HPF (ref 0–4)
SP GR UR STRIP: 1.01 (ref 1–1.03)
SQUAMOUS #/AREA URNS AUTO: 13 /HPF
URN SPEC COLLECT METH UR: ABNORMAL
WBC #/AREA URNS AUTO: 15 /HPF (ref 0–5)

## 2023-09-20 PROCEDURE — 84156 ASSAY OF PROTEIN URINE: CPT | Performed by: INTERNAL MEDICINE

## 2023-09-20 PROCEDURE — 81001 URINALYSIS AUTO W/SCOPE: CPT | Performed by: INTERNAL MEDICINE

## 2023-09-22 PROBLEM — N18.4 CKD (CHRONIC KIDNEY DISEASE), STAGE IV: Status: ACTIVE | Noted: 2019-10-30

## 2023-09-22 NOTE — PROGRESS NOTES
Kidney Post-Transplant Assessment    Referring Physician: Bruce Khan  Current Nephrologist: Bruce Khan    ORGAN: LEFT KIDNEY  Donor Type: donation after brain death  PHS Increased Risk: no  Cold Ischemia: 545 mins  Induction Medications: thymoglobulin    Subjective:     CC:  Reassessment of renal allograft function and management of chronic immunosuppression.    HPI:  Ms. Schulte is a 67 y.o. 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  She has CKD stage 3 - GFR 30-59 and her baseline creatinine is between 1.7-2.1. She takes prednisone and tacrolimus for maintenance immunosuppression. She denies any recent hospitalizations or ER visits since her previous clinic visit.    Kidney biopsy 4/1/21: no rejection, 80% early interstitial fibrosis, multiple calcium phosphate crystals with associated acute tubular injury.     Hospitalization/ ED visits  None    LOV 9/21/22  Interval HX:  Intake : reports drinking at least 2L water   UOP: denies s/s LUTs   Follows with gen nephrology , Dr. Khan  --scheduled for f/u 10/10/23--scheduled for repeat renal US and UA  Follows with [facility] for anemia/leukopenia mgmt LOV 7/12/23  Follows with cardiology for HTN mgmt --LOV 7/7/23  HX NAFLD--follows with hepatology   Reviewed labs with patient and caregiver.    HX proteinuria remains on olmesartan        BP Readings from Last 3 Encounters:   09/25/23 (!) 148/67   07/12/23 (!) 149/64   07/07/23 138/60       Past Medical History:   Diagnosis Date    Abnormal Pap smear     repeat paps were normal    Anemia associated with chronic renal failure     Awaiting organ transplant status  - 02/18/2013 6/6/2014    Blood type A+ 6/6/2014    CKD (chronic kidney disease) stage 3, GFR 30-59 ml/min 10/30/2019    CKD (chronic kidney disease) stage 5, GFR less than 15 ml/min     secondary hypertension    Diabetes mellitus     Essential hypertension 5/19/2017    Hyperlipidemia  "    Hypertension, renal 1992    Immunocompromised state 10/4/2019    Stroke 2007    Residual speech deficit       Review of Systems   Constitutional:  Negative for appetite change, chills, fatigue and fever.   HENT:  Negative for trouble swallowing.    Respiratory:  Negative for cough, chest tightness, shortness of breath and wheezing.    Cardiovascular:  Negative for chest pain, palpitations and leg swelling.   Gastrointestinal:  Negative for abdominal pain, constipation, diarrhea and nausea.   Genitourinary:  Negative for difficulty urinating, frequency and urgency.   Musculoskeletal:  Negative for arthralgias and myalgias.   Skin:  Negative for rash.   Allergic/Immunologic: Positive for immunocompromised state.   Neurological:  Negative for dizziness, weakness, light-headedness and headaches.   Psychiatric/Behavioral:  Negative for sleep disturbance.        Objective:     Blood pressure (!) 148/67, pulse (!) 59, temperature 97.3 °F (36.3 °C), temperature source Temporal, resp. rate 16, height 5' 3" (1.6 m), weight 92 kg (202 lb 13.2 oz), SpO2 97 %.body mass index is 35.93 kg/m².    Physical Exam  Constitutional:       General: She is not in acute distress.     Appearance: She is well-developed. She is not diaphoretic.   Cardiovascular:      Rate and Rhythm: Normal rate and regular rhythm.      Heart sounds: Normal heart sounds. No murmur heard.     No friction rub. No gallop.   Pulmonary:      Effort: Pulmonary effort is normal. No respiratory distress.      Breath sounds: Normal breath sounds. No wheezing or rales.   Abdominal:      General: Bowel sounds are normal. There is no distension.      Palpations: Abdomen is soft.      Tenderness: There is no abdominal tenderness.   Musculoskeletal:         General: No tenderness. Normal range of motion.   Skin:     General: Skin is warm and dry.      Findings: No rash.      Nails: There is no clubbing.   Neurological:      Mental Status: She is alert and oriented to " "person, place, and time.   Psychiatric:         Behavior: Behavior normal.         Labs:  Lab Results   Component Value Date    WBC 2.25 (L) 09/20/2023    HGB 10.5 (L) 09/20/2023    HCT 34.1 (L) 09/20/2023     09/20/2023    K 4.6 09/20/2023     (H) 09/20/2023    CO2 21 (L) 09/20/2023    BUN 27 (H) 09/20/2023    CREATININE 2.2 (H) 09/20/2023    EGFRNONAA 33.8 (A) 06/13/2022    CALCIUM 9.4 09/20/2023    PHOS 2.9 07/10/2023    MG 2.0 07/10/2023    ALBUMIN 3.8 09/20/2023    AST 18 09/20/2023    ALT 37 09/20/2023    UTPCR 1.54 (H) 09/20/2023    .5 (H) 12/07/2022    TACROLIMUS 4.1 (L) 09/20/2023       No results found for: "EXTANC", "EXTWBC", "EXTSEGS", "EXTPLATELETS", "EXTHEMOGLOBI", "EXTHEMATOCRI", "EXTCREATININ", "EXTSODIUM", "EXTPOTASSIUM", "EXTBUN", "EXTCO2", "EXTCALCIUM", "EXTPHOSPHORU", "EXTGLUCOSE", "EXTALBUMIN", "EXTAST", "EXTALT", "EXTBILITOTAL", "EXTLIPASE", "EXTAMYLASE"    No results found for: "EXTCYCLOSLVL", "EXTSIROLIMUS", "EXTTACROLVL", "EXTPROTCRE", "EXTPTHINTACT", "EXTPROTEINUA", "EXTWBCUA", "EXTRBCUA"    Labs were reviewed with the patient.    Assessment:     1. Status post kidney transplant    2. Long-term use of immunosuppressant medication    3. At risk for opportunistic infections    4. Hypertension, renal    5. Secondary hyperparathyroidism    6. CKD (chronic kidney disease), stage IV    7. Proteinuria, unspecified type        Plan:           NAFLD-- mgmt deferred to  hepatology  Encourage hydration     Follows with gen nephrology , Dr. Khan  --scheduled for f/u 10/10/23--scheduled for repeat renal US and UA  Proteinuria--->Repeat   UPC in ~ 1 month  Reports she Cont Taking Benicar   Leukopenia/ anemia mgmt--cont f/u with hemonc, mgmt deferred    1. CKD stage: will continue follow up as per our center guidelines. patient to continue close follow up with the local General nephrologist. Education provided in appropriate fluid intake, potassium intake. Continue with oral " hydration.      2. Immunosuppression: Isabella infusions , Prograf trough 4.1, cont  Prograf 5/5, MMF--hold chronic  leukopenia, and Prednisone 5 mg QD,. Ketoconazole 100 mg QD  Will closely monitor for toxicities, education provided about adherence to medicines and need to communicate any side effect to the transplant nurse or physician.  Lab Results   Component Value Date    TACROLIMUS 4.1 (L) 09/20/2023    TACROLIMUS 4.9 (L) 09/07/2023    TACROLIMUS 4.2 (L) 07/10/2023         3. Allograft Function: CKD IV: slightly higher than baseline for the patient. Continue follow up as per our guidelines and with the local General nephrologist. Communication will be sent today.    SCr BL ~ 1.7-2.1    Lab Results   Component Value Date    CREATININE 2.2 (H) 09/20/2023      Latest Reference Range & Units 4yr 0mo,   Kidney-Post 5 Year  09/20/23   eGFR >60 mL/min/1.73 m^2 24.3 !          4. Hypertension management:  Continue with home blood pressure monitoring, low salt and healthy life discussed with the patient.  BP Readings from Last 3 Encounters:   09/25/23 (!) 148/67   07/12/23 (!) 149/64   07/07/23 138/60   Cont nifedipine and  benicar as prescribed       5. Metabolic Bone Disease/Secondary Hyperparathyroidism:calcium and phosphorus level discussed with the patient, patient will continue follow up with the general nephrologist for management of metabolic bone disease calcium and phosphorus as per our center protocol. Will monitor PTH, Vit D level, calcium.   Cont vit D , Mg ox, sensipar as prescribed -->MGMT deferred to general nephrology    Lab Results   Component Value Date    .5 (H) 12/07/2022    CALCIUM 9.4 09/20/2023    CAION 1.40 03/13/2020    PHOS 2.9 07/10/2023    PHOS 2.7 06/07/2023    PHOS 3.0 03/08/2023       6. Electrolytes: reviewed with the patient, essentially within the normal range no need for acute changes today, will monitor as per our center guidelines.       Lab Results   Component Value Date    NA  140 09/20/2023    K 4.6 09/20/2023     (H) 09/20/2023    CO2 21 (L) 09/20/2023    CO2 20 (L) 09/07/2023    CO2 21 (L) 07/10/2023    CO2 22 (L) 07/10/2023       7. Anemia: will continue monitoring as per our center guidelines. No indication for acute intervention today.  -cont Fe sulfate as prescribed   Lab Results   Component Value Date    WBC 2.25 (L) 09/20/2023    HGB 10.5 (L) 09/20/2023    HCT 34.1 (L) 09/20/2023    MCV 99 (H) 09/20/2023     (L) 09/20/2023       8.Proteinuria: will continue with pr/cr ratio as per our center guidelines   Worsening of proteinuria: Cont  ARB /Benicar as prescribed   Lab Results   Component Value Date    PROTEINURINE 88 (H) 09/20/2023    CREATRANDUR 57.0 09/20/2023    UTPCR 1.54 (H) 09/20/2023    UTPCR 0.88 (H) 09/06/2022    UTPCR 4.68 (H) 06/06/2022        9. BK virus infection screening: will continue with urine or blood PCR as per our guidelines to prevent BK virus viremia and allograft dysfunction  Lab Results   Component Value Date    BKVIRUSPCRQB <125 09/06/2022         10. Weight education: provided during the clinic visit.   Body mass index is 35.93 kg/m².       11.Patient safety education regarding immunosuppression including prophylaxis posttransplant for CMV, PCP : Education provided about vaccination and prevention of infections.    12.  Cytopenias:   will monitor as per our guidelines. Medicine list reviewed including potential causes of drug-induced cytopenias  Chronic Leukopenia , improved-->ANC 1200  Lab Results   Component Value Date    WBC 2.25 (L) 09/20/2023    HGB 10.5 (L) 09/20/2023    HCT 34.1 (L) 09/20/2023    MCV 99 (H) 09/20/2023     (L) 09/20/2023         Follow-up:   Clinic: return to transplant clinic weekly for the first month after transplant; every 2 weeks during months 2-3; then at 6-, 9-, 12-, 18-, 24-, and 36- months post-transplant to reassess for complications from immunosuppression toxicity and monitor for rejection.  Annually  thereafter.    Labs: since patient remains at high risk for rejection and drug-related complications that warrant close monitoring, labs will be ordered as follows: continue twice weekly CBC, renal panel, and drug level for first month; then same labs once weekly through 3rd month post-transplant.  Urine for UA and protein/creatinine ratio monthly.  Serum BK - PCR at 1-, 3-, 6-, 9-, 12-, 18-, 24-, 36- 48-, and 60 months post-transplant.  Hepatic panel at 1-, 2-, 3-, 6-, 9-, 12-, 18-, 24-, and 36- months post-transplant.    Education:   Material provided to the patient.  Patient reminded to call with any health changes since these can be early signs of significant complications.  Also, I advised the patient to be sure any new medications or changes of old medications are discussed with either a pharmacist or physician knowledgeable with transplant to avoid rejection/drug toxicity related to significant drug interactions.    Exercise: reminded Satinder of the importance of regular exercise for weight management, blood sugar and blood pressure management.  I also explained exercise has been shown to improve cardiovascular health, energy level, and sleep hygiene.  Lastly, I advised her that cardiovascular complications are leading cause of death for renal transplant recipients, and regular exercise can help lower this risk.    I spoke with the patient for 30 minutes. More than half dedicated to counseling and education. All questions answered     Emilia Amaya  NP-C  Transplant Nephrology    UNOS Patient Status  Functional Status: 90% - Able to carry on normal activity: minor symptoms of disease  Physical Capacity: No Limitations

## 2023-09-25 ENCOUNTER — OFFICE VISIT (OUTPATIENT)
Dept: TRANSPLANT | Facility: CLINIC | Age: 68
End: 2023-09-25
Payer: MEDICARE

## 2023-09-25 VITALS
SYSTOLIC BLOOD PRESSURE: 148 MMHG | TEMPERATURE: 97 F | WEIGHT: 202.81 LBS | DIASTOLIC BLOOD PRESSURE: 67 MMHG | BODY MASS INDEX: 35.93 KG/M2 | HEART RATE: 59 BPM | HEIGHT: 63 IN | OXYGEN SATURATION: 97 % | RESPIRATION RATE: 16 BRPM

## 2023-09-25 DIAGNOSIS — N25.81 SECONDARY HYPERPARATHYROIDISM: ICD-10-CM

## 2023-09-25 DIAGNOSIS — N18.4 CKD (CHRONIC KIDNEY DISEASE), STAGE IV: ICD-10-CM

## 2023-09-25 DIAGNOSIS — I12.9 HYPERTENSION, RENAL: Chronic | ICD-10-CM

## 2023-09-25 DIAGNOSIS — Z91.89 AT RISK FOR OPPORTUNISTIC INFECTIONS: ICD-10-CM

## 2023-09-25 DIAGNOSIS — Z79.60 LONG-TERM USE OF IMMUNOSUPPRESSANT MEDICATION: ICD-10-CM

## 2023-09-25 DIAGNOSIS — R80.9 PROTEINURIA, UNSPECIFIED TYPE: ICD-10-CM

## 2023-09-25 DIAGNOSIS — Z94.0 STATUS POST KIDNEY TRANSPLANT: Primary | ICD-10-CM

## 2023-09-25 PROCEDURE — 3008F BODY MASS INDEX DOCD: CPT | Mod: CPTII,S$GLB,, | Performed by: NURSE PRACTITIONER

## 2023-09-25 PROCEDURE — 3077F PR MOST RECENT SYSTOLIC BLOOD PRESSURE >= 140 MM HG: ICD-10-PCS | Mod: CPTII,S$GLB,, | Performed by: NURSE PRACTITIONER

## 2023-09-25 PROCEDURE — 1159F MED LIST DOCD IN RCRD: CPT | Mod: CPTII,S$GLB,, | Performed by: NURSE PRACTITIONER

## 2023-09-25 PROCEDURE — 3078F DIAST BP <80 MM HG: CPT | Mod: CPTII,S$GLB,, | Performed by: NURSE PRACTITIONER

## 2023-09-25 PROCEDURE — 99215 OFFICE O/P EST HI 40 MIN: CPT | Mod: S$GLB,,, | Performed by: NURSE PRACTITIONER

## 2023-09-25 PROCEDURE — 3288F FALL RISK ASSESSMENT DOCD: CPT | Mod: CPTII,S$GLB,, | Performed by: NURSE PRACTITIONER

## 2023-09-25 PROCEDURE — 99999 PR PBB SHADOW E&M-EST. PATIENT-LVL V: CPT | Mod: PBBFAC,,, | Performed by: NURSE PRACTITIONER

## 2023-09-25 PROCEDURE — 1159F PR MEDICATION LIST DOCUMENTED IN MEDICAL RECORD: ICD-10-PCS | Mod: CPTII,S$GLB,, | Performed by: NURSE PRACTITIONER

## 2023-09-25 PROCEDURE — 99999 PR PBB SHADOW E&M-EST. PATIENT-LVL V: ICD-10-PCS | Mod: PBBFAC,,, | Performed by: NURSE PRACTITIONER

## 2023-09-25 PROCEDURE — 1126F PR PAIN SEVERITY QUANTIFIED, NO PAIN PRESENT: ICD-10-PCS | Mod: CPTII,S$GLB,, | Performed by: NURSE PRACTITIONER

## 2023-09-25 PROCEDURE — 4010F ACE/ARB THERAPY RXD/TAKEN: CPT | Mod: CPTII,S$GLB,, | Performed by: NURSE PRACTITIONER

## 2023-09-25 PROCEDURE — 4010F PR ACE/ARB THEARPY RXD/TAKEN: ICD-10-PCS | Mod: CPTII,S$GLB,, | Performed by: NURSE PRACTITIONER

## 2023-09-25 PROCEDURE — 3077F SYST BP >= 140 MM HG: CPT | Mod: CPTII,S$GLB,, | Performed by: NURSE PRACTITIONER

## 2023-09-25 PROCEDURE — 1126F AMNT PAIN NOTED NONE PRSNT: CPT | Mod: CPTII,S$GLB,, | Performed by: NURSE PRACTITIONER

## 2023-09-25 PROCEDURE — 1101F PR PT FALLS ASSESS DOC 0-1 FALLS W/OUT INJ PAST YR: ICD-10-PCS | Mod: CPTII,S$GLB,, | Performed by: NURSE PRACTITIONER

## 2023-09-25 PROCEDURE — 3008F PR BODY MASS INDEX (BMI) DOCUMENTED: ICD-10-PCS | Mod: CPTII,S$GLB,, | Performed by: NURSE PRACTITIONER

## 2023-09-25 PROCEDURE — 1101F PT FALLS ASSESS-DOCD LE1/YR: CPT | Mod: CPTII,S$GLB,, | Performed by: NURSE PRACTITIONER

## 2023-09-25 PROCEDURE — 99215 PR OFFICE/OUTPT VISIT, EST, LEVL V, 40-54 MIN: ICD-10-PCS | Mod: S$GLB,,, | Performed by: NURSE PRACTITIONER

## 2023-09-25 PROCEDURE — 3078F PR MOST RECENT DIASTOLIC BLOOD PRESSURE < 80 MM HG: ICD-10-PCS | Mod: CPTII,S$GLB,, | Performed by: NURSE PRACTITIONER

## 2023-09-25 PROCEDURE — 3288F PR FALLS RISK ASSESSMENT DOCUMENTED: ICD-10-PCS | Mod: CPTII,S$GLB,, | Performed by: NURSE PRACTITIONER

## 2023-09-25 NOTE — LETTER
September 25, 2023        Bruce Khan  [address]  [place] [address]  Phone: [phone]  Fax: [phone]             [facility]- Transplant 1st Fl  [address]  [facility] [address]  Phone: [phone]   Patient: Satinder Schulte   MR Number: [id number]   YOB: 1955   Date of Visit: 9/25/2023       Dear Dr. Bruce Khan    Thank you for referring Satinder Schulte to me for evaluation. Attached you will find relevant portions of my assessment and plan of care.    If you have questions, please do not hesitate to call me. I look forward to following Satinder Schulte along with you.    Sincerely,    Emilia Amaya, NP    Enclosure    If you would like to receive this communication electronically, please contact [email or web address] or [phone] to request [employer] Link access.    [employer] Link is a tool which provides read-only access to select patient information with whom you have a relationship. Its easy to use and provides real time access to review your patients record including encounter summaries, notes, results, and demographic information.    If you feel you have received this communication in error or would no longer like to receive these types of communications, please e-mail [email or web address]

## 2023-09-26 ENCOUNTER — PATIENT MESSAGE (OUTPATIENT)
Dept: TRANSPLANT | Facility: CLINIC | Age: 68
End: 2023-09-26
Payer: MEDICARE

## 2023-09-28 ENCOUNTER — LAB VISIT (OUTPATIENT)
Dept: LAB | Facility: HOSPITAL | Age: 68
End: 2023-09-28
Attending: INTERNAL MEDICINE
Payer: MEDICARE

## 2023-09-28 DIAGNOSIS — D63.1 ANEMIA OF CHRONIC RENAL FAILURE: Primary | ICD-10-CM

## 2023-09-28 DIAGNOSIS — Z94.0 KIDNEY REPLACED BY TRANSPLANT: ICD-10-CM

## 2023-09-28 DIAGNOSIS — I12.9 PARENCHYMAL RENAL HYPERTENSION: ICD-10-CM

## 2023-09-28 DIAGNOSIS — Z79.891 ENCOUNTER FOR LONG-TERM METHADONE USE: ICD-10-CM

## 2023-09-28 DIAGNOSIS — N18.32 CHRONIC KIDNEY DISEASE (CKD) STAGE G3B/A1, MODERATELY DECREASED GLOMERULAR FILTRATION RATE (GFR) BETWEEN 30-44 ML/MIN/1.73 SQUARE METER AND ALBUMINURIA CREATININE RATIO LESS THAN 30 MG/G: ICD-10-CM

## 2023-09-28 DIAGNOSIS — N18.9 ANEMIA OF CHRONIC RENAL FAILURE: Primary | ICD-10-CM

## 2023-09-28 DIAGNOSIS — D64.9 ANEMIA, UNSPECIFIED: ICD-10-CM

## 2023-09-28 LAB
ALBUMIN SERPL BCP-MCNC: 3.8 G/DL (ref 3.5–5.2)
ALP SERPL-CCNC: 76 U/L (ref 55–135)
ALT SERPL W/O P-5'-P-CCNC: 29 U/L (ref 10–44)
ANION GAP SERPL CALC-SCNC: 7 MMOL/L (ref 8–16)
AST SERPL-CCNC: 17 U/L (ref 10–40)
BASOPHILS # BLD AUTO: 0 K/UL (ref 0–0.2)
BASOPHILS NFR BLD: 0 % (ref 0–1.9)
BILIRUB SERPL-MCNC: 0.4 MG/DL (ref 0.1–1)
BUN SERPL-MCNC: 32 MG/DL (ref 8–23)
CALCIUM SERPL-MCNC: 9.2 MG/DL (ref 8.7–10.5)
CHLORIDE SERPL-SCNC: 114 MMOL/L (ref 95–110)
CO2 SERPL-SCNC: 23 MMOL/L (ref 23–29)
CREAT SERPL-MCNC: 1.9 MG/DL (ref 0.5–1.4)
DIFFERENTIAL METHOD: ABNORMAL
EOSINOPHIL # BLD AUTO: 0 K/UL (ref 0–0.5)
EOSINOPHIL NFR BLD: 0.9 % (ref 0–8)
ERYTHROCYTE [DISTWIDTH] IN BLOOD BY AUTOMATED COUNT: 14.1 % (ref 11.5–14.5)
EST. GFR  (NO RACE VARIABLE): 28.9 ML/MIN/1.73 M^2
GLUCOSE SERPL-MCNC: 64 MG/DL (ref 70–110)
HCT VFR BLD AUTO: 33.6 % (ref 37–48.5)
HGB BLD-MCNC: 10.3 G/DL (ref 12–16)
IMM GRANULOCYTES # BLD AUTO: 0.02 K/UL (ref 0–0.04)
IMM GRANULOCYTES NFR BLD AUTO: 0.9 % (ref 0–0.5)
LYMPHOCYTES # BLD AUTO: 0.6 K/UL (ref 1–4.8)
LYMPHOCYTES NFR BLD: 28.8 % (ref 18–48)
MCH RBC QN AUTO: 29.9 PG (ref 27–31)
MCHC RBC AUTO-ENTMCNC: 30.7 G/DL (ref 32–36)
MCV RBC AUTO: 97 FL (ref 82–98)
MONOCYTES # BLD AUTO: 0.2 K/UL (ref 0.3–1)
MONOCYTES NFR BLD: 11 % (ref 4–15)
NEUTROPHILS # BLD AUTO: 1.3 K/UL (ref 1.8–7.7)
NEUTROPHILS NFR BLD: 58.4 % (ref 38–73)
NRBC BLD-RTO: 0 /100 WBC
PLATELET # BLD AUTO: 107 K/UL (ref 150–450)
PMV BLD AUTO: 13 FL (ref 9.2–12.9)
POTASSIUM SERPL-SCNC: 4.3 MMOL/L (ref 3.5–5.1)
PROT SERPL-MCNC: 6.5 G/DL (ref 6–8.4)
PTH-INTACT SERPL-MCNC: 455.3 PG/ML (ref 9–77)
RBC # BLD AUTO: 3.45 M/UL (ref 4–5.4)
SODIUM SERPL-SCNC: 144 MMOL/L (ref 136–145)
WBC # BLD AUTO: 2.19 K/UL (ref 3.9–12.7)

## 2023-09-28 PROCEDURE — 36415 COLL VENOUS BLD VENIPUNCTURE: CPT | Mod: PO | Performed by: INTERNAL MEDICINE

## 2023-09-28 PROCEDURE — 80197 ASSAY OF TACROLIMUS: CPT | Performed by: INTERNAL MEDICINE

## 2023-09-28 PROCEDURE — 80053 COMPREHEN METABOLIC PANEL: CPT | Performed by: INTERNAL MEDICINE

## 2023-09-28 PROCEDURE — 85025 COMPLETE CBC W/AUTO DIFF WBC: CPT | Performed by: INTERNAL MEDICINE

## 2023-09-28 PROCEDURE — 83970 ASSAY OF PARATHORMONE: CPT | Performed by: INTERNAL MEDICINE

## 2023-09-29 LAB — TACROLIMUS BLD-MCNC: 5 NG/ML (ref 5–15)

## 2023-11-15 ENCOUNTER — TELEPHONE (OUTPATIENT)
Dept: TRANSPLANT | Facility: CLINIC | Age: 68
End: 2023-11-15
Payer: MEDICARE

## 2023-11-30 ENCOUNTER — TELEPHONE (OUTPATIENT)
Dept: TRANSPLANT | Facility: CLINIC | Age: 68
End: 2023-11-30
Payer: MEDICARE

## 2023-12-08 ENCOUNTER — LAB VISIT (OUTPATIENT)
Dept: LAB | Facility: HOSPITAL | Age: 68
End: 2023-12-08
Attending: INTERNAL MEDICINE
Payer: MEDICARE

## 2023-12-08 DIAGNOSIS — Z94.0 KIDNEY REPLACED BY TRANSPLANT: ICD-10-CM

## 2023-12-08 LAB
ALBUMIN SERPL BCP-MCNC: 4 G/DL (ref 3.5–5.2)
ALP SERPL-CCNC: 89 U/L (ref 55–135)
ALT SERPL W/O P-5'-P-CCNC: 27 U/L (ref 10–44)
ANION GAP SERPL CALC-SCNC: 9 MMOL/L (ref 8–16)
AST SERPL-CCNC: 16 U/L (ref 10–40)
BASOPHILS # BLD AUTO: 0.01 K/UL (ref 0–0.2)
BASOPHILS NFR BLD: 0.3 % (ref 0–1.9)
BILIRUB SERPL-MCNC: 0.4 MG/DL (ref 0.1–1)
BUN SERPL-MCNC: 52 MG/DL (ref 8–23)
CALCIUM SERPL-MCNC: 10.3 MG/DL (ref 8.7–10.5)
CHLORIDE SERPL-SCNC: 111 MMOL/L (ref 95–110)
CO2 SERPL-SCNC: 20 MMOL/L (ref 23–29)
CREAT SERPL-MCNC: 2.3 MG/DL (ref 0.5–1.4)
DIFFERENTIAL METHOD: ABNORMAL
EOSINOPHIL # BLD AUTO: 0 K/UL (ref 0–0.5)
EOSINOPHIL NFR BLD: 1.2 % (ref 0–8)
ERYTHROCYTE [DISTWIDTH] IN BLOOD BY AUTOMATED COUNT: 14.2 % (ref 11.5–14.5)
EST. GFR  (NO RACE VARIABLE): 22.9 ML/MIN/1.73 M^2
GLUCOSE SERPL-MCNC: 74 MG/DL (ref 70–110)
HCT VFR BLD AUTO: 34.4 % (ref 37–48.5)
HGB BLD-MCNC: 11 G/DL (ref 12–16)
IMM GRANULOCYTES # BLD AUTO: 0.03 K/UL (ref 0–0.04)
IMM GRANULOCYTES NFR BLD AUTO: 0.9 % (ref 0–0.5)
LYMPHOCYTES # BLD AUTO: 1.1 K/UL (ref 1–4.8)
LYMPHOCYTES NFR BLD: 31.8 % (ref 18–48)
MCH RBC QN AUTO: 30.6 PG (ref 27–31)
MCHC RBC AUTO-ENTMCNC: 32 G/DL (ref 32–36)
MCV RBC AUTO: 96 FL (ref 82–98)
MONOCYTES # BLD AUTO: 0.3 K/UL (ref 0.3–1)
MONOCYTES NFR BLD: 10.1 % (ref 4–15)
NEUTROPHILS # BLD AUTO: 1.9 K/UL (ref 1.8–7.7)
NEUTROPHILS NFR BLD: 55.7 % (ref 38–73)
NRBC BLD-RTO: 0 /100 WBC
PLATELET # BLD AUTO: 156 K/UL (ref 150–450)
PMV BLD AUTO: 12.5 FL (ref 9.2–12.9)
POTASSIUM SERPL-SCNC: 4.3 MMOL/L (ref 3.5–5.1)
PROT SERPL-MCNC: 6.8 G/DL (ref 6–8.4)
RBC # BLD AUTO: 3.6 M/UL (ref 4–5.4)
SODIUM SERPL-SCNC: 140 MMOL/L (ref 136–145)
WBC # BLD AUTO: 3.36 K/UL (ref 3.9–12.7)

## 2023-12-08 PROCEDURE — 80053 COMPREHEN METABOLIC PANEL: CPT | Performed by: INTERNAL MEDICINE

## 2023-12-08 PROCEDURE — 80197 ASSAY OF TACROLIMUS: CPT | Performed by: INTERNAL MEDICINE

## 2023-12-08 PROCEDURE — 85025 COMPLETE CBC W/AUTO DIFF WBC: CPT | Performed by: INTERNAL MEDICINE

## 2023-12-08 PROCEDURE — 36415 COLL VENOUS BLD VENIPUNCTURE: CPT | Mod: PO | Performed by: INTERNAL MEDICINE

## 2023-12-09 LAB — TACROLIMUS BLD-MCNC: 5 NG/ML (ref 5–15)

## 2023-12-11 ENCOUNTER — TELEPHONE (OUTPATIENT)
Dept: TRANSPLANT | Facility: CLINIC | Age: 68
End: 2023-12-11
Payer: MEDICARE

## 2023-12-11 NOTE — TELEPHONE ENCOUNTER
Unable to contact pt after multiple attempts to her and her daughter's numbers      ----- Message from Pavel Padilla MD sent at 12/9/2023  8:35 AM CST -----  Any UTI symptoms? Need to follow up with her PCP

## 2024-01-04 DIAGNOSIS — R00.1 BRADYCARDIA: Primary | ICD-10-CM

## 2024-01-04 DIAGNOSIS — I12.9 HYPERTENSION, RENAL: Chronic | ICD-10-CM

## 2024-01-05 ENCOUNTER — LAB VISIT (OUTPATIENT)
Dept: LAB | Facility: HOSPITAL | Age: 69
End: 2024-01-05
Attending: INTERNAL MEDICINE
Payer: MEDICARE

## 2024-01-05 DIAGNOSIS — I12.9 PARENCHYMAL RENAL HYPERTENSION: ICD-10-CM

## 2024-01-05 DIAGNOSIS — E55.9 AVITAMINOSIS D: ICD-10-CM

## 2024-01-05 DIAGNOSIS — N18.32 CHRONIC KIDNEY DISEASE (CKD) STAGE G3B/A1, MODERATELY DECREASED GLOMERULAR FILTRATION RATE (GFR) BETWEEN 30-44 ML/MIN/1.73 SQUARE METER AND ALBUMINURIA CREATININE RATIO LESS THAN 30 MG/G: Primary | ICD-10-CM

## 2024-01-05 DIAGNOSIS — D64.9 ANEMIA, UNSPECIFIED: ICD-10-CM

## 2024-01-05 DIAGNOSIS — Z79.891 ENCOUNTER FOR LONG-TERM METHADONE USE: ICD-10-CM

## 2024-01-05 DIAGNOSIS — Z94.0 KIDNEY REPLACED BY TRANSPLANT: ICD-10-CM

## 2024-01-05 DIAGNOSIS — R82.90 NONSPECIFIC FINDING ON EXAMINATION OF URINE: ICD-10-CM

## 2024-01-05 DIAGNOSIS — N25.81 SECONDARY HYPERPARATHYROIDISM OF RENAL ORIGIN: ICD-10-CM

## 2024-01-05 LAB
BACTERIA #/AREA URNS AUTO: ABNORMAL /HPF
BILIRUB UR QL STRIP: NEGATIVE
CLARITY UR REFRACT.AUTO: ABNORMAL
COLOR UR AUTO: YELLOW
GLUCOSE UR QL STRIP: NEGATIVE
HGB UR QL STRIP: ABNORMAL
HYALINE CASTS UR QL AUTO: 0 /LPF
KETONES UR QL STRIP: NEGATIVE
LEUKOCYTE ESTERASE UR QL STRIP: ABNORMAL
MICROSCOPIC COMMENT: ABNORMAL
NITRITE UR QL STRIP: NEGATIVE
PH UR STRIP: 5 [PH] (ref 5–8)
PROT UR QL STRIP: ABNORMAL
RBC #/AREA URNS AUTO: 12 /HPF (ref 0–4)
SP GR UR STRIP: 1.01 (ref 1–1.03)
SQUAMOUS #/AREA URNS AUTO: 65 /HPF
URN SPEC COLLECT METH UR: ABNORMAL
WBC #/AREA URNS AUTO: 62 /HPF (ref 0–5)

## 2024-01-05 PROCEDURE — 81001 URINALYSIS AUTO W/SCOPE: CPT | Performed by: INTERNAL MEDICINE

## 2024-01-05 PROCEDURE — 80061 LIPID PANEL: CPT | Performed by: INTERNAL MEDICINE

## 2024-01-05 PROCEDURE — 82652 VIT D 1 25-DIHYDROXY: CPT | Performed by: INTERNAL MEDICINE

## 2024-01-05 PROCEDURE — 83970 ASSAY OF PARATHORMONE: CPT | Performed by: INTERNAL MEDICINE

## 2024-01-05 PROCEDURE — 80197 ASSAY OF TACROLIMUS: CPT | Performed by: INTERNAL MEDICINE

## 2024-01-05 PROCEDURE — 80053 COMPREHEN METABOLIC PANEL: CPT | Performed by: INTERNAL MEDICINE

## 2024-01-06 LAB
ALBUMIN SERPL BCP-MCNC: 3.9 G/DL (ref 3.5–5.2)
ALP SERPL-CCNC: 91 U/L (ref 55–135)
ALT SERPL W/O P-5'-P-CCNC: 30 U/L (ref 10–44)
ANION GAP SERPL CALC-SCNC: 10 MMOL/L (ref 8–16)
AST SERPL-CCNC: 17 U/L (ref 10–40)
BILIRUB SERPL-MCNC: 0.4 MG/DL (ref 0.1–1)
BUN SERPL-MCNC: 35 MG/DL (ref 8–23)
CALCIUM SERPL-MCNC: 9.7 MG/DL (ref 8.7–10.5)
CHLORIDE SERPL-SCNC: 114 MMOL/L (ref 95–110)
CHOLEST SERPL-MCNC: 210 MG/DL (ref 120–199)
CHOLEST/HDLC SERPL: 2.8 {RATIO} (ref 2–5)
CO2 SERPL-SCNC: 21 MMOL/L (ref 23–29)
CREAT SERPL-MCNC: 1.9 MG/DL (ref 0.5–1.4)
EST. GFR  (NO RACE VARIABLE): 28.8 ML/MIN/1.73 M^2
GLUCOSE SERPL-MCNC: 79 MG/DL (ref 70–110)
HDLC SERPL-MCNC: 75 MG/DL (ref 40–75)
HDLC SERPL: 35.7 % (ref 20–50)
LDLC SERPL CALC-MCNC: 111 MG/DL (ref 63–159)
NONHDLC SERPL-MCNC: 135 MG/DL
POTASSIUM SERPL-SCNC: 4.7 MMOL/L (ref 3.5–5.1)
PROT SERPL-MCNC: 6.9 G/DL (ref 6–8.4)
PTH-INTACT SERPL-MCNC: 399.9 PG/ML (ref 9–77)
SODIUM SERPL-SCNC: 145 MMOL/L (ref 136–145)
TACROLIMUS BLD-MCNC: 2.2 NG/ML (ref 5–15)
TRIGL SERPL-MCNC: 120 MG/DL (ref 30–150)

## 2024-01-08 LAB — 1,25(OH)2D3 SERPL-MCNC: 74 PG/ML (ref 20–79)

## 2024-01-11 ENCOUNTER — HOSPITAL ENCOUNTER (OUTPATIENT)
Dept: CARDIOLOGY | Facility: HOSPITAL | Age: 69
Discharge: HOME | End: 2024-01-11
Attending: INTERNAL MEDICINE
Payer: MEDICARE

## 2024-01-11 ENCOUNTER — OFFICE VISIT (OUTPATIENT)
Dept: CARDIOLOGY | Facility: CLINIC | Age: 69
End: 2024-01-11
Payer: MEDICARE

## 2024-01-11 VITALS
HEIGHT: 63 IN | WEIGHT: 196.44 LBS | DIASTOLIC BLOOD PRESSURE: 68 MMHG | BODY MASS INDEX: 34.8 KG/M2 | OXYGEN SATURATION: 99 % | SYSTOLIC BLOOD PRESSURE: 139 MMHG | HEART RATE: 60 BPM

## 2024-01-11 DIAGNOSIS — I12.9 HYPERTENSION, RENAL: Primary | Chronic | ICD-10-CM

## 2024-01-11 DIAGNOSIS — Z94.0 KIDNEY REPLACED BY TRANSPLANT: ICD-10-CM

## 2024-01-11 DIAGNOSIS — Z86.73 HISTORY OF STROKE: ICD-10-CM

## 2024-01-11 DIAGNOSIS — I12.9 HYPERTENSION, RENAL: Chronic | ICD-10-CM

## 2024-01-11 DIAGNOSIS — E66.09 CLASS 1 OBESITY DUE TO EXCESS CALORIES WITH SERIOUS COMORBIDITY AND BODY MASS INDEX (BMI) OF 34.0 TO 34.9 IN ADULT: ICD-10-CM

## 2024-01-11 DIAGNOSIS — N18.30 TYPE 2 DIABETES MELLITUS WITH STAGE 3 CHRONIC KIDNEY DISEASE, WITHOUT LONG-TERM CURRENT USE OF INSULIN, UNSPECIFIED WHETHER STAGE 3A OR 3B CKD: ICD-10-CM

## 2024-01-11 DIAGNOSIS — E66.01 CLASS 2 SEVERE OBESITY DUE TO EXCESS CALORIES WITH SERIOUS COMORBIDITY IN ADULT, UNSPECIFIED BMI: ICD-10-CM

## 2024-01-11 DIAGNOSIS — R00.1 BRADYCARDIA: ICD-10-CM

## 2024-01-11 DIAGNOSIS — I70.0 AORTIC ATHEROSCLEROSIS: ICD-10-CM

## 2024-01-11 DIAGNOSIS — T82.898S ARTERIOVENOUS FISTULA OCCLUSION, SEQUELA: ICD-10-CM

## 2024-01-11 DIAGNOSIS — E78.2 MIXED HYPERLIPIDEMIA: ICD-10-CM

## 2024-01-11 DIAGNOSIS — I31.39 PERICARDIAL EFFUSION: ICD-10-CM

## 2024-01-11 DIAGNOSIS — E11.22 TYPE 2 DIABETES MELLITUS WITH STAGE 3 CHRONIC KIDNEY DISEASE, WITHOUT LONG-TERM CURRENT USE OF INSULIN, UNSPECIFIED WHETHER STAGE 3A OR 3B CKD: ICD-10-CM

## 2024-01-11 DIAGNOSIS — D61.811 DRUG-INDUCED PANCYTOPENIA: ICD-10-CM

## 2024-01-11 PROBLEM — E66.812 CLASS 2 SEVERE OBESITY DUE TO EXCESS CALORIES WITH SERIOUS COMORBIDITY IN ADULT, UNSPECIFIED BMI: Status: ACTIVE | Noted: 2024-01-11

## 2024-01-11 PROBLEM — E66.811 CLASS 1 OBESITY DUE TO EXCESS CALORIES WITH SERIOUS COMORBIDITY AND BODY MASS INDEX (BMI) OF 34.0 TO 34.9 IN ADULT: Status: ACTIVE | Noted: 2024-01-11

## 2024-01-11 PROCEDURE — 99214 OFFICE O/P EST MOD 30 MIN: CPT | Mod: 25,S$GLB,, | Performed by: INTERNAL MEDICINE

## 2024-01-11 PROCEDURE — 1126F AMNT PAIN NOTED NONE PRSNT: CPT | Mod: CPTII,S$GLB,, | Performed by: INTERNAL MEDICINE

## 2024-01-11 PROCEDURE — 99999 PR PBB SHADOW E&M-EST. PATIENT-LVL IV: CPT | Mod: PBBFAC,,, | Performed by: INTERNAL MEDICINE

## 2024-01-11 PROCEDURE — 4010F ACE/ARB THERAPY RXD/TAKEN: CPT | Mod: CPTII,S$GLB,, | Performed by: INTERNAL MEDICINE

## 2024-01-11 PROCEDURE — 3075F SYST BP GE 130 - 139MM HG: CPT | Mod: CPTII,S$GLB,, | Performed by: INTERNAL MEDICINE

## 2024-01-11 PROCEDURE — 93005 ELECTROCARDIOGRAM TRACING: CPT

## 2024-01-11 PROCEDURE — 93010 ELECTROCARDIOGRAM REPORT: CPT | Mod: ,,, | Performed by: INTERNAL MEDICINE

## 2024-01-11 PROCEDURE — 1101F PT FALLS ASSESS-DOCD LE1/YR: CPT | Mod: CPTII,S$GLB,, | Performed by: INTERNAL MEDICINE

## 2024-01-11 PROCEDURE — 1160F RVW MEDS BY RX/DR IN RCRD: CPT | Mod: CPTII,S$GLB,, | Performed by: INTERNAL MEDICINE

## 2024-01-11 PROCEDURE — 3078F DIAST BP <80 MM HG: CPT | Mod: CPTII,S$GLB,, | Performed by: INTERNAL MEDICINE

## 2024-01-11 PROCEDURE — 3008F BODY MASS INDEX DOCD: CPT | Mod: CPTII,S$GLB,, | Performed by: INTERNAL MEDICINE

## 2024-01-11 PROCEDURE — 1159F MED LIST DOCD IN RCRD: CPT | Mod: CPTII,S$GLB,, | Performed by: INTERNAL MEDICINE

## 2024-01-11 PROCEDURE — 3288F FALL RISK ASSESSMENT DOCD: CPT | Mod: CPTII,S$GLB,, | Performed by: INTERNAL MEDICINE

## 2024-01-11 RX ORDER — EVOLOCUMAB 140 MG/ML
140 INJECTION, SOLUTION SUBCUTANEOUS
Qty: 2 EACH | Refills: 0 | Status: ACTIVE | OUTPATIENT
Start: 2024-01-11 | End: 2024-02-08 | Stop reason: SDUPTHER

## 2024-01-11 NOTE — PROGRESS NOTES
Subjective:   Patient ID:  Satinder Schulte is a 68 y.o. female who presents for follow up of No chief complaint on file.      68 yo female, 6 months f/u   PMH chronic pericardial effusion, DM, HTN HLD h/o stroke, chronic anemia and s/p renal Rx in 2019, ESRD   Echo in  at [facility] showed normal biv function, LVH, Large posterolateral pericardial effusion. The Effusion is moderate -large posterior-laterally, and small laterally and outside the RA. No temporade  Echo done in  normal EF and no pericardial effusion  Cardiac PET in  no ischemia and normal EF  No chest pain SOB faint dizziness and leg swelling  No fatigue      visit  Repeat ECHo showed moderate pericardial effusion 1.4 cm at posterior wall  No SOB fatigue dizziness and faint. weigth gained,  Sleeps on 1 p illow. EKG NSR     visit  No fatigue dizziness faint syncope chest pain   Chronic BARBA. Walks few times a week. Lives alone  ekg NSR. No acute stt change     visit  Drinks 50 oz water a day.  No SOB leg swelling dizziness faint and chest pain   Ekg today NSR. Run out of olmesartan yesterday. BP high today    07/23 visit  BP controlled, added Zetia for HLD and no repeat Lipid profile check yet. Stable and walks a lot. Ekg NSR   Echo in 12-22 EF nl small pericardial effusion and LVH    Interval history  F/u at Tx NO for renal Tx. No chest pain dyspnea faint palpitation orthopnea   EKG NSr. Her BP at 120 mmhg at home.         Past Medical History:   Diagnosis Date    Abnormal Pap smear     repeat paps were normal    Anemia associated with chronic renal failure     Awaiting organ transplant status  - 02/18/2013 6/6/2014    Blood type A+ 6/6/2014    CKD (chronic kidney disease) stage 3, GFR 30-59 ml/min 10/30/2019    CKD (chronic kidney disease) stage 5, GFR less than 15 ml/min     secondary hypertension    Diabetes mellitus     Essential hypertension 5/19/2017    Hyperlipidemia     Hypertension, renal 1992     Immunocompromised state 10/4/2019    Stroke 2007    Residual speech deficit       Past Surgical History:   Procedure Laterality Date    AV FISTULA PLACEMENT  2014    BONE MARROW BIOPSY Right 8/23/2018    Procedure: Biopsy-bone marrow;  Surgeon: Anna Lin MD;  Location: [facility] OR [address] [place] [address];  Service: Oncology;  Laterality: Right;    CHOLECYSTECTOMY  2008    HYSTERECTOMY  2011    TAHBSO for benign reasons    KIDNEY TRANSPLANT N/A 9/20/2019    Procedure: TRANSPLANT, KIDNEY;  Surgeon: Guero Rogers MD;  Location: [facility] OR [address] [place] [address];  Service: Transplant;  Laterality: N/A;    OOPHORECTOMY         Social History     Tobacco Use    Smoking status: Never    Smokeless tobacco: Never   Substance Use Topics    Alcohol use: No    Drug use: No       Family History   Problem Relation Age of Onset    Stroke Mother     Kidney disease Mother         on dialysis    Hypertension Mother     Heart disease Mother     Heart disease Father     Stroke Sister     Kidney disease Brother     Breast cancer Daughter     Heart disease Sister     Ovarian cancer Cousin     Colon cancer Neg Hx     Thrombophilia Neg Hx          ROS    Objective:   Physical Exam  HENT:      Head: Normocephalic.   Eyes:      Pupils: Pupils are equal, round, and reactive to light.   Neck:      Thyroid: No thyromegaly.      Vascular: Normal carotid pulses. No carotid bruit or JVD.   Cardiovascular:      Rate and Rhythm: Normal rate and regular rhythm. No extrasystoles are present.     Chest Wall: PMI is not displaced.      Pulses: Normal pulses.      Heart sounds: Murmur (diffuse fistule murmur on precordial and neck area) heard.      No gallop. No S3 sounds.   Pulmonary:      Effort: No respiratory distress.      Breath sounds: Normal breath sounds. No stridor.   Abdominal:      General: Bowel sounds are normal.      Palpations: Abdomen is soft.      Tenderness: There is no abdominal tenderness. There is no rebound.   Musculoskeletal:      Comments: Left arm fistula    Skin:     Findings: No rash.   Neurological:      Mental Status: She is alert and oriented to person, place, and time.   Psychiatric:         Behavior: Behavior normal.         Lab Results   Component Value Date    CHOL 210 (H) 01/05/2024    CHOL 216 (H) 12/07/2022    CHOL 202 (H) 09/06/2022     Lab Results   Component Value Date    HDL 75 01/05/2024    HDL 85 (H) 12/07/2022    HDL 84 (H) 09/06/2022     Lab Results   Component Value Date    LDLCALC 111.0 01/05/2024    LDLCALC 109.4 12/07/2022    LDLCALC 98.2 09/06/2022     Lab Results   Component Value Date    TRIG 120 01/05/2024    TRIG 108 12/07/2022    TRIG 99 09/06/2022     Lab Results   Component Value Date    CHOLHDL 35.7 01/05/2024    CHOLHDL 39.4 12/07/2022    CHOLHDL 41.6 09/06/2022       Chemistry        Component Value Date/Time     01/05/2024 1022    K 4.7 01/05/2024 1022     (H) 01/05/2024 1022    CO2 21 (L) 01/05/2024 1022    BUN 35 (H) 01/05/2024 1022    CREATININE 1.9 (H) 01/05/2024 1022    GLU 79 01/05/2024 1022        Component Value Date/Time    CALCIUM 9.7 01/05/2024 1022    ALKPHOS 91 01/05/2024 1022    AST 17 01/05/2024 1022    ALT 30 01/05/2024 1022    BILITOT 0.4 01/05/2024 1022    ESTGFRAFRICA 39.0 (A) 06/13/2022 1119    EGFRNONAA 33.8 (A) 06/13/2022 1119          Lab Results   Component Value Date    HGBA1C 4.6 05/28/2021     Lab Results   Component Value Date    TSH 3.599 06/21/2023     Lab Results   Component Value Date    INR 1.0 03/31/2021    INR 0.9 11/20/2020    INR 1.0 09/19/2019     Lab Results   Component Value Date    WBC 3.13 (L) 01/11/2024    HGB 10.1 (L) 01/11/2024    HCT 31.6 (L) 01/11/2024    MCV 96 01/11/2024     (L) 01/11/2024     BMP  Sodium   Date Value Ref Range Status   01/05/2024 145 136 - 145 mmol/L Final     Potassium   Date Value Ref Range Status   01/05/2024 4.7 3.5 - 5.1 mmol/L Final     Chloride   Date Value Ref Range Status   01/05/2024 114 (H) 95 - 110 mmol/L Final     CO2   Date Value Ref  Range Status   01/05/2024 21 (L) 23 - 29 mmol/L Final     BUN   Date Value Ref Range Status   01/05/2024 35 (H) 8 - 23 mg/dL Final     Creatinine   Date Value Ref Range Status   01/05/2024 1.9 (H) 0.5 - 1.4 mg/dL Final     Calcium   Date Value Ref Range Status   01/05/2024 9.7 8.7 - 10.5 mg/dL Final     Anion Gap   Date Value Ref Range Status   01/05/2024 10 8 - 16 mmol/L Final     eGFR if    Date Value Ref Range Status   06/13/2022 39.0 (A) >60 mL/min/1.73 m^2 Final     eGFR if non    Date Value Ref Range Status   06/13/2022 33.8 (A) >60 mL/min/1.73 m^2 Final     Comment:     Calculation used to obtain the estimated glomerular filtration  rate (eGFR) is the CKD-EPI equation.        BNP  @LABRCNTIP(BNP,BNPTRIAGEBLO)@  @LABRCNTIP(troponini)@  Estimated Creatinine Clearance: 30.9 mL/min (A) (based on SCr of 1.9 mg/dL (H)).  No results found in the last 24 hours.  No results found in the last 24 hours.  No results found in the last 24 hours.    Assessment:      1. Hypertension, renal    2. History of stroke    3. Mixed hyperlipidemia    4. Arteriovenous fistula occlusion, sequela    5. Pericardial effusion    6. Bradycardia    7. Kidney replaced by transplant    8. Type 2 diabetes mellitus with stage 3 chronic kidney disease, without long-term current use of insulin, unspecified whether stage 3a or 3b CKD    9. Class 1 obesity due to excess calories with serious comorbidity and body mass index (BMI) of 34.0 to 34.9 in adult    10. Class 2 severe obesity due to excess calories with serious comorbidity in adult, unspecified BMI    11. Aortic atherosclerosis    12. Drug-induced pancytopenia        Plan:   Add repatha for HLD and h/o TIA  Continue olmesartan zetia 10 mg daily ASA Lipitor and nifedipine   DM Rx per PCP  Counseled DASH  Check Lipid profile with PCP in 6 months  Recommend heart-healthy diet, weight control and regular exercise.  Mae. Risk modification.   I have reviewed all  pertinent labs and cardiac studies independently. Plans and recommendations have been formulated under my direct supervision. All questions answered and patient voiced understanding.   If symptoms persist go to the ED  RTC in 6 months

## 2024-01-29 PROBLEM — D52.0 DIETARY FOLATE DEFICIENCY ANEMIA: Status: ACTIVE | Noted: 2024-01-29

## 2024-01-29 PROBLEM — D51.8 OTHER VITAMIN B12 DEFICIENCY ANEMIAS: Status: ACTIVE | Noted: 2024-01-29

## 2024-02-08 DIAGNOSIS — Z86.73 HISTORY OF STROKE: ICD-10-CM

## 2024-02-08 DIAGNOSIS — E78.2 MIXED HYPERLIPIDEMIA: ICD-10-CM

## 2024-02-09 RX ORDER — EVOLOCUMAB 140 MG/ML
140 INJECTION, SOLUTION SUBCUTANEOUS
Qty: 2 EACH | Refills: 11 | Status: ACTIVE | OUTPATIENT
Start: 2024-02-09

## 2024-03-09 NOTE — LETTER
December 30, 2019        Bruce Khan  [address]  [place] [address]  Phone: [phone]  Fax: [phone]             [facility]- Transplant  [address]  [facility] [address]  Phone: [phone]   Patient: Satinder Schulte   MR Number: [id number]   YOB: 1955   Date of Visit: 12/30/2019       Dear Dr. Bruce Khan    Thank you for referring Satinder Schulte to me for evaluation. Attached you will find relevant portions of my assessment and plan of care.    If you have questions, please do not hesitate to call me. I look forward to following Satinder Schulte along with you.    Sincerely,    Pavel Padilla MD    Enclosure    If you would like to receive this communication electronically, please contact [email or web address] or [phone] to request [employer] Link access.    [employer] Link is a tool which provides read-only access to select patient information with whom you have a relationship. Its easy to use and provides real time access to review your patients record including encounter summaries, notes, results, and demographic information.    If you feel you have received this communication in error or would no longer like to receive these types of communications, please e-mail [email or web address]       
98

## 2024-03-21 ENCOUNTER — TELEPHONE (OUTPATIENT)
Dept: TRANSPLANT | Facility: CLINIC | Age: 69
End: 2024-03-21
Payer: MEDICARE

## 2024-03-26 ENCOUNTER — LAB VISIT (OUTPATIENT)
Dept: LAB | Facility: HOSPITAL | Age: 69
End: 2024-03-26
Attending: INTERNAL MEDICINE
Payer: MEDICARE

## 2024-03-26 DIAGNOSIS — Z94.0 KIDNEY REPLACED BY TRANSPLANT: ICD-10-CM

## 2024-03-26 LAB
BACTERIA #/AREA URNS AUTO: ABNORMAL /HPF
BILIRUB UR QL STRIP: NEGATIVE
CLARITY UR REFRACT.AUTO: ABNORMAL
COLOR UR AUTO: YELLOW
CREAT UR-MCNC: 83 MG/DL (ref 15–325)
GLUCOSE UR QL STRIP: NEGATIVE
HGB UR QL STRIP: ABNORMAL
HYALINE CASTS UR QL AUTO: 0 /LPF
KETONES UR QL STRIP: NEGATIVE
LEUKOCYTE ESTERASE UR QL STRIP: ABNORMAL
MICROSCOPIC COMMENT: ABNORMAL
NITRITE UR QL STRIP: NEGATIVE
PH UR STRIP: 5 [PH] (ref 5–8)
PROT UR QL STRIP: ABNORMAL
PROT UR-MCNC: 99 MG/DL (ref 0–15)
PROT/CREAT UR: 1.19 MG/G{CREAT} (ref 0–0.2)
RBC #/AREA URNS AUTO: 46 /HPF (ref 0–4)
SP GR UR STRIP: 1.01 (ref 1–1.03)
SQUAMOUS #/AREA URNS AUTO: 38 /HPF
URN SPEC COLLECT METH UR: ABNORMAL
WBC #/AREA URNS AUTO: 66 /HPF (ref 0–5)

## 2024-03-26 PROCEDURE — 84156 ASSAY OF PROTEIN URINE: CPT | Performed by: INTERNAL MEDICINE

## 2024-03-26 PROCEDURE — 81001 URINALYSIS AUTO W/SCOPE: CPT | Performed by: INTERNAL MEDICINE

## 2024-04-02 ENCOUNTER — LAB VISIT (OUTPATIENT)
Dept: LAB | Facility: HOSPITAL | Age: 69
End: 2024-04-02
Attending: INTERNAL MEDICINE
Payer: MEDICARE

## 2024-04-02 DIAGNOSIS — E55.9 AVITAMINOSIS D: ICD-10-CM

## 2024-04-02 PROCEDURE — 82652 VIT D 1 25-DIHYDROXY: CPT | Performed by: INTERNAL MEDICINE

## 2024-04-02 PROCEDURE — 36415 COLL VENOUS BLD VENIPUNCTURE: CPT | Mod: PO | Performed by: INTERNAL MEDICINE

## 2024-04-05 LAB — 1,25(OH)2D3 SERPL-MCNC: 55 PG/ML (ref 20–79)

## 2024-04-15 ENCOUNTER — LAB VISIT (OUTPATIENT)
Dept: LAB | Facility: HOSPITAL | Age: 69
End: 2024-04-15
Attending: INTERNAL MEDICINE
Payer: MEDICARE

## 2024-04-15 DIAGNOSIS — N18.4 CHRONIC KIDNEY DISEASE, STAGE IV (SEVERE): Primary | ICD-10-CM

## 2024-04-15 LAB
ANION GAP SERPL CALC-SCNC: 8 MMOL/L (ref 8–16)
BUN SERPL-MCNC: 31 MG/DL (ref 8–23)
CALCIUM SERPL-MCNC: 9.4 MG/DL (ref 8.7–10.5)
CHLORIDE SERPL-SCNC: 112 MMOL/L (ref 95–110)
CO2 SERPL-SCNC: 22 MMOL/L (ref 23–29)
CREAT SERPL-MCNC: 2.1 MG/DL (ref 0.5–1.4)
EST. GFR  (NO RACE VARIABLE): 25.5 ML/MIN/1.73 M^2
GLUCOSE SERPL-MCNC: 79 MG/DL (ref 70–110)
POTASSIUM SERPL-SCNC: 4.3 MMOL/L (ref 3.5–5.1)
SODIUM SERPL-SCNC: 142 MMOL/L (ref 136–145)

## 2024-04-15 PROCEDURE — 80048 BASIC METABOLIC PNL TOTAL CA: CPT | Performed by: INTERNAL MEDICINE

## 2024-04-15 PROCEDURE — 36415 COLL VENOUS BLD VENIPUNCTURE: CPT | Mod: PO | Performed by: INTERNAL MEDICINE

## 2024-06-04 ENCOUNTER — LAB VISIT (OUTPATIENT)
Dept: LAB | Facility: HOSPITAL | Age: 69
End: 2024-06-04
Attending: INTERNAL MEDICINE
Payer: MEDICARE

## 2024-06-04 DIAGNOSIS — E78.2 MIXED HYPERLIPIDEMIA: ICD-10-CM

## 2024-06-04 DIAGNOSIS — D64.9 ANEMIA, UNSPECIFIED: ICD-10-CM

## 2024-06-04 DIAGNOSIS — R82.90 NONSPECIFIC FINDING ON EXAMINATION OF URINE: ICD-10-CM

## 2024-06-04 DIAGNOSIS — E66.9 OBESITY, UNSPECIFIED: ICD-10-CM

## 2024-06-04 DIAGNOSIS — N25.81 SECONDARY HYPERPARATHYROIDISM OF RENAL ORIGIN: ICD-10-CM

## 2024-06-04 DIAGNOSIS — E78.5 HYPERLIPEMIA: ICD-10-CM

## 2024-06-04 DIAGNOSIS — N18.30 CHRONIC KIDNEY DISEASE, STAGE III (MODERATE): ICD-10-CM

## 2024-06-04 DIAGNOSIS — T86.10 COMPLICATION OF TRANSPLANTED KIDNEY: ICD-10-CM

## 2024-06-04 LAB
ALBUMIN SERPL BCP-MCNC: 3.9 G/DL (ref 3.5–5.2)
ALP SERPL-CCNC: 117 U/L (ref 55–135)
ALT SERPL W/O P-5'-P-CCNC: 21 U/L (ref 10–44)
ANION GAP SERPL CALC-SCNC: 9 MMOL/L (ref 8–16)
AST SERPL-CCNC: 17 U/L (ref 10–40)
BASOPHILS # BLD AUTO: 0.01 K/UL (ref 0–0.2)
BASOPHILS NFR BLD: 0.4 % (ref 0–1.9)
BILIRUB SERPL-MCNC: 0.4 MG/DL (ref 0.1–1)
BUN SERPL-MCNC: 44 MG/DL (ref 8–23)
CALCIUM SERPL-MCNC: 9.9 MG/DL (ref 8.7–10.5)
CHLORIDE SERPL-SCNC: 110 MMOL/L (ref 95–110)
CHOLEST SERPL-MCNC: 172 MG/DL (ref 120–199)
CHOLEST/HDLC SERPL: 2.3 {RATIO} (ref 2–5)
CO2 SERPL-SCNC: 22 MMOL/L (ref 23–29)
CREAT SERPL-MCNC: 2.3 MG/DL (ref 0.5–1.4)
DIFFERENTIAL METHOD BLD: ABNORMAL
EOSINOPHIL # BLD AUTO: 0.1 K/UL (ref 0–0.5)
EOSINOPHIL NFR BLD: 2.5 % (ref 0–8)
ERYTHROCYTE [DISTWIDTH] IN BLOOD BY AUTOMATED COUNT: 13.4 % (ref 11.5–14.5)
EST. GFR  (NO RACE VARIABLE): 22.9 ML/MIN/1.73 M^2
GLUCOSE SERPL-MCNC: 84 MG/DL (ref 70–110)
HCT VFR BLD AUTO: 33.8 % (ref 37–48.5)
HDLC SERPL-MCNC: 76 MG/DL (ref 40–75)
HDLC SERPL: 44.2 % (ref 20–50)
HGB BLD-MCNC: 10.9 G/DL (ref 12–16)
IMM GRANULOCYTES # BLD AUTO: 0.01 K/UL (ref 0–0.04)
IMM GRANULOCYTES NFR BLD AUTO: 0.4 % (ref 0–0.5)
LDLC SERPL CALC-MCNC: 75.6 MG/DL (ref 63–159)
LYMPHOCYTES # BLD AUTO: 0.8 K/UL (ref 1–4.8)
LYMPHOCYTES NFR BLD: 31.5 % (ref 18–48)
MCH RBC QN AUTO: 31.4 PG (ref 27–31)
MCHC RBC AUTO-ENTMCNC: 32.2 G/DL (ref 32–36)
MCV RBC AUTO: 97 FL (ref 82–98)
MONOCYTES # BLD AUTO: 0.3 K/UL (ref 0.3–1)
MONOCYTES NFR BLD: 14.1 % (ref 4–15)
NEUTROPHILS # BLD AUTO: 1.2 K/UL (ref 1.8–7.7)
NEUTROPHILS NFR BLD: 51.1 % (ref 38–73)
NONHDLC SERPL-MCNC: 96 MG/DL
NRBC BLD-RTO: 0 /100 WBC
PLATELET # BLD AUTO: 146 K/UL (ref 150–450)
PMV BLD AUTO: 12.3 FL (ref 9.2–12.9)
POTASSIUM SERPL-SCNC: 4.4 MMOL/L (ref 3.5–5.1)
PROT SERPL-MCNC: 6.7 G/DL (ref 6–8.4)
PTH-INTACT SERPL-MCNC: 368.8 PG/ML (ref 9–77)
RBC # BLD AUTO: 3.47 M/UL (ref 4–5.4)
SODIUM SERPL-SCNC: 141 MMOL/L (ref 136–145)
TRIGL SERPL-MCNC: 102 MG/DL (ref 30–150)
WBC # BLD AUTO: 2.41 K/UL (ref 3.9–12.7)

## 2024-06-04 PROCEDURE — 80061 LIPID PANEL: CPT | Performed by: INTERNAL MEDICINE

## 2024-06-04 PROCEDURE — 85025 COMPLETE CBC W/AUTO DIFF WBC: CPT | Performed by: INTERNAL MEDICINE

## 2024-06-04 PROCEDURE — 80053 COMPREHEN METABOLIC PANEL: CPT | Performed by: INTERNAL MEDICINE

## 2024-06-04 PROCEDURE — 80197 ASSAY OF TACROLIMUS: CPT | Performed by: INTERNAL MEDICINE

## 2024-06-04 PROCEDURE — 83970 ASSAY OF PARATHORMONE: CPT | Performed by: INTERNAL MEDICINE

## 2024-06-04 PROCEDURE — 36415 COLL VENOUS BLD VENIPUNCTURE: CPT | Mod: PO | Performed by: INTERNAL MEDICINE

## 2024-06-05 LAB — TACROLIMUS BLD-MCNC: 3.5 NG/ML (ref 5–15)

## 2024-06-14 ENCOUNTER — TELEPHONE (OUTPATIENT)
Dept: CARDIOLOGY | Facility: CLINIC | Age: 69
End: 2024-06-14
Payer: MEDICARE

## 2024-06-14 NOTE — TELEPHONE ENCOUNTER
Based on her h/o CVA and DM, the target of LDL is < 70  Recent one was 111 in 01/24. Advise to continue it.

## 2024-06-14 NOTE — TELEPHONE ENCOUNTER
----- Message from Madelaine Pringle sent at 6/14/2024 10:12 AM CDT -----  Regarding: FW: Repatha rx  Please advise  ----- Message -----  From: Lauro Prieto, PharmD  Sent: 6/14/2024   9:51 AM CDT  To: [facility] Staff  Subject: Repatha rx                                       Good morning,    OSP had reached to Pt in regards to her refill of Repatha. She informed us that Dr. Bruce Khan, Nephrologist, had informed her her cholesterol is good and that she is ok in stopping medication. I don't see anything in regards to it on her chart notes. Please confirm if Pt is to stay on medication or not.     Thank you,  Lauro Prieto  Clinical Pharmacist, PharmD    [facility]  [address] [place], [place] [address] [place] [address]  Phone: [phone]  Fax: [phone]

## 2024-06-21 ENCOUNTER — LAB VISIT (OUTPATIENT)
Dept: LAB | Facility: HOSPITAL | Age: 69
End: 2024-06-21
Attending: INTERNAL MEDICINE
Payer: MEDICARE

## 2024-06-21 DIAGNOSIS — Z94.0 KIDNEY REPLACED BY TRANSPLANT: ICD-10-CM

## 2024-06-21 LAB
ALBUMIN SERPL BCP-MCNC: 3.7 G/DL (ref 3.5–5.2)
ALP SERPL-CCNC: 115 U/L (ref 55–135)
ALT SERPL W/O P-5'-P-CCNC: 23 U/L (ref 10–44)
ANION GAP SERPL CALC-SCNC: 7 MMOL/L (ref 8–16)
AST SERPL-CCNC: 14 U/L (ref 10–40)
BASOPHILS # BLD AUTO: 0.01 K/UL (ref 0–0.2)
BASOPHILS NFR BLD: 0.5 % (ref 0–1.9)
BILIRUB SERPL-MCNC: 0.4 MG/DL (ref 0.1–1)
BUN SERPL-MCNC: 40 MG/DL (ref 8–23)
CALCIUM SERPL-MCNC: 9.5 MG/DL (ref 8.7–10.5)
CHLORIDE SERPL-SCNC: 113 MMOL/L (ref 95–110)
CO2 SERPL-SCNC: 21 MMOL/L (ref 23–29)
CREAT SERPL-MCNC: 2.1 MG/DL (ref 0.5–1.4)
DIFFERENTIAL METHOD BLD: ABNORMAL
EOSINOPHIL # BLD AUTO: 0.1 K/UL (ref 0–0.5)
EOSINOPHIL NFR BLD: 2.5 % (ref 0–8)
ERYTHROCYTE [DISTWIDTH] IN BLOOD BY AUTOMATED COUNT: 13.7 % (ref 11.5–14.5)
EST. GFR  (NO RACE VARIABLE): 25.5 ML/MIN/1.73 M^2
GLUCOSE SERPL-MCNC: 93 MG/DL (ref 70–110)
HCT VFR BLD AUTO: 34.6 % (ref 37–48.5)
HGB BLD-MCNC: 10.6 G/DL (ref 12–16)
IMM GRANULOCYTES # BLD AUTO: 0.02 K/UL (ref 0–0.04)
IMM GRANULOCYTES NFR BLD AUTO: 1 % (ref 0–0.5)
LYMPHOCYTES # BLD AUTO: 0.7 K/UL (ref 1–4.8)
LYMPHOCYTES NFR BLD: 36.3 % (ref 18–48)
MCH RBC QN AUTO: 30 PG (ref 27–31)
MCHC RBC AUTO-ENTMCNC: 30.6 G/DL (ref 32–36)
MCV RBC AUTO: 98 FL (ref 82–98)
MONOCYTES # BLD AUTO: 0.3 K/UL (ref 0.3–1)
MONOCYTES NFR BLD: 13.7 % (ref 4–15)
NEUTROPHILS # BLD AUTO: 0.9 K/UL (ref 1.8–7.7)
NEUTROPHILS NFR BLD: 46 % (ref 38–73)
NRBC BLD-RTO: 0 /100 WBC
PLATELET # BLD AUTO: 135 K/UL (ref 150–450)
PMV BLD AUTO: 11.5 FL (ref 9.2–12.9)
POTASSIUM SERPL-SCNC: 4.3 MMOL/L (ref 3.5–5.1)
PROT SERPL-MCNC: 6.6 G/DL (ref 6–8.4)
RBC # BLD AUTO: 3.53 M/UL (ref 4–5.4)
SODIUM SERPL-SCNC: 141 MMOL/L (ref 136–145)
WBC # BLD AUTO: 2.04 K/UL (ref 3.9–12.7)

## 2024-06-21 PROCEDURE — 80197 ASSAY OF TACROLIMUS: CPT | Performed by: INTERNAL MEDICINE

## 2024-06-21 PROCEDURE — 80053 COMPREHEN METABOLIC PANEL: CPT | Performed by: INTERNAL MEDICINE

## 2024-06-21 PROCEDURE — 85025 COMPLETE CBC W/AUTO DIFF WBC: CPT | Performed by: INTERNAL MEDICINE

## 2024-06-21 PROCEDURE — 87799 DETECT AGENT NOS DNA QUANT: CPT | Performed by: INTERNAL MEDICINE

## 2024-06-22 LAB — TACROLIMUS BLD-MCNC: 4.4 NG/ML (ref 5–15)

## 2024-06-24 ENCOUNTER — PATIENT MESSAGE (OUTPATIENT)
Dept: TRANSPLANT | Facility: CLINIC | Age: 69
End: 2024-06-24
Payer: MEDICARE

## 2024-06-24 ENCOUNTER — TELEPHONE (OUTPATIENT)
Dept: TRANSPLANT | Facility: CLINIC | Age: 69
End: 2024-06-24
Payer: MEDICARE

## 2024-06-24 DIAGNOSIS — Z94.0 KIDNEY REPLACED BY TRANSPLANT: Primary | ICD-10-CM

## 2024-06-24 NOTE — TELEPHONE ENCOUNTER
----- Message -----  From: Kirstin Rome MD  Sent: 6/21/2024   4:33 PM CDT  To: [facility] Post-Kidney Transplant Clinical    Does she follow with hematology for chronic leukopenia?  Check CMV PCR and EBV PCR next week

## 2024-06-28 ENCOUNTER — LAB VISIT (OUTPATIENT)
Dept: LAB | Facility: HOSPITAL | Age: 69
End: 2024-06-28
Attending: INTERNAL MEDICINE
Payer: MEDICARE

## 2024-06-28 DIAGNOSIS — Z94.0 KIDNEY REPLACED BY TRANSPLANT: ICD-10-CM

## 2024-06-28 PROCEDURE — 87799 DETECT AGENT NOS DNA QUANT: CPT | Performed by: INTERNAL MEDICINE

## 2024-06-28 PROCEDURE — 36415 COLL VENOUS BLD VENIPUNCTURE: CPT | Mod: PO | Performed by: INTERNAL MEDICINE

## 2024-07-01 LAB
CMV DNA SPEC QL NAA+PROBE: NORMAL
CYTOMEGALOVIRUS PCR, QUANT: NOT DETECTED IU/ML
EPSTEIN-BARR VIRUS DNA: ABNORMAL
EPSTEIN-BARR VIRUS PCR, QUANT: ABNORMAL IU/ML

## 2024-08-23 DIAGNOSIS — Z94.0 KIDNEY REPLACED BY TRANSPLANT: Primary | ICD-10-CM

## 2024-08-23 RX ORDER — KETOCONAZOLE 200 MG/1
100 TABLET ORAL DAILY
Qty: 15 TABLET | Refills: 2 | Status: SHIPPED | OUTPATIENT
Start: 2024-08-23

## 2024-08-23 RX ORDER — TACROLIMUS 1 MG/1
5 CAPSULE ORAL EVERY 12 HOURS
Qty: 300 CAPSULE | Refills: 2 | Status: SHIPPED | OUTPATIENT
Start: 2024-08-23

## 2024-09-05 ENCOUNTER — LAB VISIT (OUTPATIENT)
Dept: LAB | Facility: HOSPITAL | Age: 69
End: 2024-09-05
Attending: INTERNAL MEDICINE
Payer: MEDICARE

## 2024-09-05 DIAGNOSIS — Z79.891 ENCOUNTER FOR LONG-TERM METHADONE USE: ICD-10-CM

## 2024-09-05 DIAGNOSIS — R82.90 NONSPECIFIC FINDING ON EXAMINATION OF URINE: ICD-10-CM

## 2024-09-05 DIAGNOSIS — N25.81 SECONDARY HYPERPARATHYROIDISM OF RENAL ORIGIN: ICD-10-CM

## 2024-09-05 DIAGNOSIS — D64.9 ANEMIA, UNSPECIFIED: ICD-10-CM

## 2024-09-05 DIAGNOSIS — I12.9 PARENCHYMAL RENAL HYPERTENSION: ICD-10-CM

## 2024-09-05 DIAGNOSIS — N18.32 CHRONIC KIDNEY DISEASE (CKD) STAGE G3B/A1, MODERATELY DECREASED GLOMERULAR FILTRATION RATE (GFR) BETWEEN 30-44 ML/MIN/1.73 SQUARE METER AND ALBUMINURIA CREATININE RATIO LESS THAN 30 MG/G: ICD-10-CM

## 2024-09-05 DIAGNOSIS — E55.9 AVITAMINOSIS D: ICD-10-CM

## 2024-09-05 DIAGNOSIS — Z94.0 KIDNEY REPLACED BY TRANSPLANT: ICD-10-CM

## 2024-09-05 LAB
ALBUMIN SERPL BCP-MCNC: 4 G/DL (ref 3.5–5.2)
ALP SERPL-CCNC: 107 U/L (ref 55–135)
ALT SERPL W/O P-5'-P-CCNC: 29 U/L (ref 10–44)
ANION GAP SERPL CALC-SCNC: 9 MMOL/L (ref 8–16)
AST SERPL-CCNC: 24 U/L (ref 10–40)
BASOPHILS # BLD AUTO: 0 K/UL (ref 0–0.2)
BASOPHILS # BLD AUTO: 0 K/UL (ref 0–0.2)
BASOPHILS NFR BLD: 0 % (ref 0–1.9)
BASOPHILS NFR BLD: 0 % (ref 0–1.9)
BILIRUB SERPL-MCNC: 0.5 MG/DL (ref 0.1–1)
BUN SERPL-MCNC: 30 MG/DL (ref 8–23)
CALCIUM SERPL-MCNC: 11 MG/DL (ref 8.7–10.5)
CHLORIDE SERPL-SCNC: 113 MMOL/L (ref 95–110)
CO2 SERPL-SCNC: 20 MMOL/L (ref 23–29)
CREAT SERPL-MCNC: 2.2 MG/DL (ref 0.5–1.4)
DIFFERENTIAL METHOD BLD: ABNORMAL
DIFFERENTIAL METHOD BLD: ABNORMAL
EOSINOPHIL # BLD AUTO: 0 K/UL (ref 0–0.5)
EOSINOPHIL # BLD AUTO: 0 K/UL (ref 0–0.5)
EOSINOPHIL NFR BLD: 1.9 % (ref 0–8)
EOSINOPHIL NFR BLD: 1.9 % (ref 0–8)
ERYTHROCYTE [DISTWIDTH] IN BLOOD BY AUTOMATED COUNT: 14.9 % (ref 11.5–14.5)
ERYTHROCYTE [DISTWIDTH] IN BLOOD BY AUTOMATED COUNT: 14.9 % (ref 11.5–14.5)
EST. GFR  (NO RACE VARIABLE): 24.1 ML/MIN/1.73 M^2
GLUCOSE SERPL-MCNC: 70 MG/DL (ref 70–110)
HCT VFR BLD AUTO: 35 % (ref 37–48.5)
HCT VFR BLD AUTO: 35 % (ref 37–48.5)
HGB BLD-MCNC: 10.7 G/DL (ref 12–16)
HGB BLD-MCNC: 10.7 G/DL (ref 12–16)
IMM GRANULOCYTES # BLD AUTO: 0.01 K/UL (ref 0–0.04)
IMM GRANULOCYTES # BLD AUTO: 0.01 K/UL (ref 0–0.04)
IMM GRANULOCYTES NFR BLD AUTO: 0.5 % (ref 0–0.5)
IMM GRANULOCYTES NFR BLD AUTO: 0.5 % (ref 0–0.5)
LYMPHOCYTES # BLD AUTO: 0.6 K/UL (ref 1–4.8)
LYMPHOCYTES # BLD AUTO: 0.6 K/UL (ref 1–4.8)
LYMPHOCYTES NFR BLD: 27.5 % (ref 18–48)
LYMPHOCYTES NFR BLD: 27.5 % (ref 18–48)
MCH RBC QN AUTO: 29.7 PG (ref 27–31)
MCH RBC QN AUTO: 29.7 PG (ref 27–31)
MCHC RBC AUTO-ENTMCNC: 30.6 G/DL (ref 32–36)
MCHC RBC AUTO-ENTMCNC: 30.6 G/DL (ref 32–36)
MCV RBC AUTO: 97 FL (ref 82–98)
MCV RBC AUTO: 97 FL (ref 82–98)
MONOCYTES # BLD AUTO: 0.2 K/UL (ref 0.3–1)
MONOCYTES # BLD AUTO: 0.2 K/UL (ref 0.3–1)
MONOCYTES NFR BLD: 10.1 % (ref 4–15)
MONOCYTES NFR BLD: 10.1 % (ref 4–15)
NEUTROPHILS # BLD AUTO: 1.2 K/UL (ref 1.8–7.7)
NEUTROPHILS # BLD AUTO: 1.2 K/UL (ref 1.8–7.7)
NEUTROPHILS NFR BLD: 60 % (ref 38–73)
NEUTROPHILS NFR BLD: 60 % (ref 38–73)
NRBC BLD-RTO: 0 /100 WBC
NRBC BLD-RTO: 0 /100 WBC
PLATELET # BLD AUTO: 97 K/UL (ref 150–450)
PLATELET # BLD AUTO: 97 K/UL (ref 150–450)
PMV BLD AUTO: 12.5 FL (ref 9.2–12.9)
PMV BLD AUTO: 12.5 FL (ref 9.2–12.9)
POTASSIUM SERPL-SCNC: 4.7 MMOL/L (ref 3.5–5.1)
PROT SERPL-MCNC: 7.1 G/DL (ref 6–8.4)
RBC # BLD AUTO: 3.6 M/UL (ref 4–5.4)
RBC # BLD AUTO: 3.6 M/UL (ref 4–5.4)
SODIUM SERPL-SCNC: 142 MMOL/L (ref 136–145)
WBC # BLD AUTO: 2.07 K/UL (ref 3.9–12.7)
WBC # BLD AUTO: 2.07 K/UL (ref 3.9–12.7)

## 2024-09-05 PROCEDURE — 80197 ASSAY OF TACROLIMUS: CPT | Performed by: INTERNAL MEDICINE

## 2024-09-05 PROCEDURE — 80053 COMPREHEN METABOLIC PANEL: CPT | Performed by: INTERNAL MEDICINE

## 2024-09-05 PROCEDURE — 85025 COMPLETE CBC W/AUTO DIFF WBC: CPT | Performed by: INTERNAL MEDICINE

## 2024-09-06 DIAGNOSIS — Z94.0 KIDNEY REPLACED BY TRANSPLANT: Primary | ICD-10-CM

## 2024-09-06 LAB — TACROLIMUS BLD-MCNC: 4.2 NG/ML (ref 5–15)

## 2024-09-06 RX ORDER — PREDNISONE 5 MG/1
5 TABLET ORAL DAILY
Qty: 30 TABLET | Refills: 11 | Status: SHIPPED | OUTPATIENT
Start: 2024-09-06 | End: 2025-09-06

## 2024-09-06 RX ORDER — CINACALCET 90 MG/1
90 TABLET, FILM COATED ORAL DAILY
Qty: 30 TABLET | Refills: 11 | Status: SHIPPED | OUTPATIENT
Start: 2024-09-06 | End: 2025-09-06

## 2024-09-06 NOTE — TELEPHONE ENCOUNTER
Pt states taking sensipar 90 mg daily, managed by general nephrologist. Pt instructed to d/c vit D supplement. Has f/u appt with gen neph on 9/10/24    ----- Message from Pavel Padilla MD sent at 9/6/2024  7:04 AM CDT -----  Please verify she is taking sensipar  Hold Vit D Supplements and advise patient to follow up with her nephrologist to monitor elevated calcium level

## 2024-09-06 NOTE — PROGRESS NOTES
Please verify she is taking sensipar  Hold Vit D Supplements and advise patient to follow up with her nephrologist to monitor elevated calcium level

## 2024-09-06 NOTE — TELEPHONE ENCOUNTER
Pt states taking sensipar 90 dg daily, managed by general nephrologist.     ----- Message from Pavel Padilla MD sent at 9/6/2024  7:09 AM CDT -----  Please increase sensipar to 90 mg daily if she is taking the prior dose correctly (60)

## 2024-09-06 NOTE — TELEPHONE ENCOUNTER
Pt denies UTI symptoms or discharge. Urine culture added on. Repeat UPCR on 9/26    ----- Message from Pavel Padilla MD sent at 9/6/2024  7:07 AM CDT -----  Lets get a urine culture  Does she have any UTI or vaginal discharge?  Is she following with her gynecologist?   Repeat pr/cr ratio in 3 weeks after we address a potential UTI or vaginitis

## 2024-09-12 ENCOUNTER — OFFICE VISIT (OUTPATIENT)
Dept: TRANSPLANT | Facility: CLINIC | Age: 69
End: 2024-09-12
Payer: MEDICARE

## 2024-09-12 ENCOUNTER — TELEPHONE (OUTPATIENT)
Dept: TRANSPLANT | Facility: CLINIC | Age: 69
End: 2024-09-12
Payer: MEDICARE

## 2024-09-12 ENCOUNTER — PATIENT MESSAGE (OUTPATIENT)
Dept: TRANSPLANT | Facility: CLINIC | Age: 69
End: 2024-09-12
Payer: MEDICARE

## 2024-09-12 VITALS — DIASTOLIC BLOOD PRESSURE: 67 MMHG | SYSTOLIC BLOOD PRESSURE: 130 MMHG

## 2024-09-12 DIAGNOSIS — Z91.89 AT RISK FOR OPPORTUNISTIC INFECTIONS: ICD-10-CM

## 2024-09-12 DIAGNOSIS — I12.9 HYPERTENSION, RENAL: Chronic | ICD-10-CM

## 2024-09-12 DIAGNOSIS — N18.4 CKD (CHRONIC KIDNEY DISEASE), STAGE IV: Primary | ICD-10-CM

## 2024-09-12 DIAGNOSIS — Z94.0 KIDNEY REPLACED BY TRANSPLANT: Primary | ICD-10-CM

## 2024-09-12 DIAGNOSIS — E13.9 POST-TRANSPLANT DIABETES MELLITUS: ICD-10-CM

## 2024-09-12 DIAGNOSIS — D84.9 IMMUNOCOMPROMISED STATE: ICD-10-CM

## 2024-09-12 DIAGNOSIS — N25.81 SECONDARY HYPERPARATHYROIDISM: ICD-10-CM

## 2024-09-12 DIAGNOSIS — Z79.60 LONG-TERM USE OF IMMUNOSUPPRESSANT MEDICATION: ICD-10-CM

## 2024-09-12 DIAGNOSIS — Z94.0 STATUS POST KIDNEY TRANSPLANT: ICD-10-CM

## 2024-09-12 NOTE — TELEPHONE ENCOUNTER
----- Message from Pavel Padilla MD sent at 9/12/2024  3:21 PM CDT -----      Care plan for the patient  Pancytopenia: will check a CMV pcr and will order a non contrast  CT of the abdomen and pelvis. Will refer to hematology.     Hypercalcemia patient to discuss with Dr Khan. I advised follow up with the nephrologist 3 o 4 times a year.   Proteinuria on olmesartan  Immunosuppression: Isabella. Tacrolimus and prednisone   I spoke with patient and daughter. All questions answered. We discussed pancytopenia and CKD stage 4 with emphasis on close follow upw ith her [facility] nephrologist.

## 2024-09-12 NOTE — LETTER
September 12, 2024        Bruce Khan  [address]  [place] [address]  Phone: [phone]  Fax: [phone]             [facility]- Transplant 1st Fl  [address]  [facility] [address]  Phone: [phone]   Patient: Satinder Schulte   MR Number: [id number]   YOB: 1955   Date of Visit: 9/12/2024       Dear Dr. Bruce Khan    Thank you for referring Satinder Schulte to me for evaluation. Attached you will find relevant portions of my assessment and plan of care.    If you have questions, please do not hesitate to call me. I look forward to following Satinder Schulte along with you.    Sincerely,    Pavel Padilla MD    Enclosure    If you would like to receive this communication electronically, please contact [email or web address] or [phone] to request [employer] access.    [employer] is a tool which provides read-only access to select patient information with whom you have a relationship. Its easy to use and provides real time access to review your patients record including encounter summaries, notes, results, and demographic information.    If you feel you have received this communication in error or would no longer like to receive these types of communications, please e-mail [email or web address]

## 2024-09-12 NOTE — PROGRESS NOTES
The patient location is:  home  The chief complaint leading to consultation is:  Reassessment of kidney function, complex immunosuppressive medication, BK infection protocol, management of electrolytes, anemia, proteinuria and blood pressure advice.   Visit type: Virtual visit with synchronous audio and video  Total time spent with patient:  15 min  Each patient to whom he or she provides medical services by telemedicine is:  (1) informed of the relationship between the physician and patient and the respective role of any other health care provider with respect to management of the patient; and (2) notified that he or she may decline to receive medical services by telemedicine and may withdraw from such care at any time.    Kidney Post-Transplant Assessment    Referring Physician: Bruce Khan  Current Nephrologist: Bruce Khan    ORGAN: LEFT KIDNEY  Donor Type: donation after brain death  PHS Increased Risk: no  Cold Ischemia: 545 mins  Induction Medications: thymoglobulin    Subjective:     CC:  Reassessment of renal allograft function and management of chronic immunosuppression.    HPI:  Ms. Schulte is a 68 y.o. 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  She has CKD stage 4 - GFR 15-29 and her baseline creatinine is between please see chart below. She takes mycophenolate mofetil prednisone and Belatacept for maintenance immunosuppression. She denies any recent hospitalizations or ER visits since her previous clinic visit.    Refers that her BP is fine. No hypotension  States that the wt is stable. She does not remember exactly the wt.   Good appetite  No chest pain or SOB  No nausea vomit or diarrhea      Current Outpatient Medications on File Prior to Visit   Medication Sig Dispense Refill    0.9 % sodium chloride (SODIUM CHLORIDE 0.9%) solution Inject into the vein.      aspirin (ECOTRIN) 81 MG EC tablet Take 1 tablet (81 mg total)  "by mouth once daily.      atorvastatin (LIPITOR) 40 MG tablet Take 1 tablet (40 mg total) by mouth once daily. 90 tablet 3    BD POSIFLUSH NORMAL SALINE 0.9 injection       belatacept 250 mg SolR 362 mg IV Every 2 weeks x 5 doses then monthly thereafter (Patient not taking: Reported on 1/11/2024)      blood sugar diagnostic Strp Test blood glucose 4 (four) times daily. (Patient taking differently: 1 each by Misc.(Non-Drug; Combo Route) route 3 (three) times daily.) 100 each 5    blood-glucose meter kit Use as instructed 1 each 0    cinacalcet (SENSIPAR) 90 MG Tab Take 1 tablet (90 mg total) by mouth once daily. 30 tablet 11    diclofenac sodium (VOLTAREN) 1 % Gel Apply 2 g topically daily as needed (pain).       evolocumab (REPATHA SURECLICK) 140 mg/mL PnIj Inject 1 mL (140 mg total) into the skin every 14 (fourteen) days. 2 each 11    ezetimibe (ZETIA) 10 mg tablet TAKE 1 TABLET BY MOUTH EVERY DAY 90 tablet 3    ferrous sulfate (FEOSOL) 325 mg (65 mg iron) Tab tablet Take by mouth once daily.      folic acid (FOLVITE) 1 MG tablet Take 1 tablet (1 mg total) by mouth once daily. 30 tablet 5    gabapentin (NEURONTIN) 300 MG capsule Take 300 mg by mouth every evening.      HYDROcodone-acetaminophen (NORCO) 7.5-325 mg per tablet Take 0.5-1 tablets by mouth 3 (three) times daily as needed for Pain.       ketoconazole (NIZORAL) 200 mg Tab Take HALF tablet (100 mg total) by mouth once daily. 15 tablet 2    lancets 28 gauge Misc Test blood glucose 4 (four) times daily. (Patient taking differently: by Misc.(Non-Drug; Combo Route) route 3 (three) times daily.) 100 each 5    NIFEdipine (ADALAT CC) 60 MG TbSR TAKE 1 TABLET BY MOUTH TWICE A DAY FOR 2 WEEKS      olmesartan (BENICAR) 20 MG tablet Take 1 tablet (20 mg total) by mouth once daily. 90 tablet 3    pen needle, diabetic 32 gauge x 5/32" Ndle Use to inject insulin 4 (four) times daily. 100 each 5    predniSONE (DELTASONE) 5 MG tablet Take 1 tablet (5 mg total) by mouth " "once daily. Txp Date:9/20/2019 (Kidney) Disch Date:9/25/2019 ICD10:Z94.0 Txp Location:[facility] 30 tablet 11    tacrolimus (PROGRAF) 1 MG Cap Take 5 capsules (5 mg total) by mouth every 12 (twelve) hours. 300 capsule 2     No current facility-administered medications on file prior to visit.              Review of Systems    Skin: no skin rash  CNS; no headaches, blurred vision, seizure, or syncope  ENT: No JVD,  Adenopathies,  nasal congestion. No oral lesions  Cardiac: No chest pain, dyspnea, claudication, edema or palpitations  Respiratory: No SOB, cough, hemoptysis   Gastro-intestinal: No diarrhea, constipation, abdominal pain, nausea, vomit. No ascitis  Genitourinary: no hematuria, dysuria, frequency, frequency  Musculoskeletal: joint pain, arthritis or vasculitic changes  Psych: alert awake, oriented, No cranial nerves deficit.      Objective:       Physical Exam  Video visit Np Physical exam    Labs:  Lab Results   Component Value Date    WBC 2.07 (L) 09/05/2024    WBC 2.07 (L) 09/05/2024    HGB 10.7 (L) 09/05/2024    HGB 10.7 (L) 09/05/2024    HCT 35.0 (L) 09/05/2024    HCT 35.0 (L) 09/05/2024     09/05/2024    K 4.7 09/05/2024     (H) 09/05/2024    CO2 20 (L) 09/05/2024    BUN 30 (H) 09/05/2024    CREATININE 2.2 (H) 09/05/2024    EGFRNORACEVR 24.1 (A) 09/05/2024    CALCIUM 11.0 (H) 09/05/2024    PHOS 2.9 07/10/2023    MG 2.0 07/10/2023    ALBUMIN 4.0 09/05/2024    AST 24 09/05/2024    ALT 29 09/05/2024       No results found for: "EXTANC", "EXTWBC", "EXTSEGS", "EXTPLATELETS", "EXTHEMOGLOBI", "EXTHEMATOCRI", "EXTCREATININ", "EXTSODIUM", "EXTPOTASSIUM", "EXTBUN", "EXTCO2", "EXTCALCIUM", "EXTPHOSPHORU", "EXTGLUCOSE", "EXTALBUMIN", "EXTAST", "EXTALT", "EXTBILITOTAL", "EXTLIPASE", "EXTAMYLASE"    No results found for: "EXTCYCLOSLVL", "EXTSIROLIMUS", "EXTTACROLVL", "EXTPROTCRE", "EXTPTHINTACT", "EXTPROTEINUA", "EXTWBCUA", "EXTRBCUA"    Labs were reviewed with the patient.    Assessment:     1. CKD (chronic " "kidney disease), stage IV    2. Status post kidney transplant    3. Secondary hyperparathyroidism    4. Post-transplant diabetes mellitus    5. Long-term use of immunosuppressant medication    6. Immunocompromised state    7. Hypertension, renal    8. At risk for opportunistic infections        Plan:     Pancytopenia: will check a CMV pcr and will order a non - contrast CT of the abdomen and pelvis. Will refer to hematology.     Hypercalcemia patient to discuss with Dr Khan. I advised follow up with the nephrologist 3 o 4 times a year.   Proteinuria on olmesartan  Immunosuppression: Isabella. Tacrolimus and prednisone   I spoke with patient and daughter. All questions answered. We discussed pancytopenia and CKD stage 4 with emphasis on close follow up with her local nephrologist.     1. CKD stage 4 with stable GFR. 5th year anniversary. She is following with her nephrologist : will continue follow up as per our center guidelines. patient to continue close follow up with the local General nephrologist. Education provided in appropriate fluid intake, potassium intake. Continue with oral hydration.    1A. Pancreatic Function: N/A for non-pancreas allograft recipients:     2. High risk immunosuppression medication for organ transplantation, requiring regular intensive follow up and monitoring : Isabella and tacro Tacro at target OFF MMF.   Lab Results   Component Value Date    TACROLIMUS 4.2 (L) 09/05/2024    TACROLIMUS 4.4 (L) 06/21/2024    TACROLIMUS 3.5 (L) 06/04/2024     No results found for: "CYCLOSPORINE"  @   Will closely monitor for toxicities, education provided about adherence to medicines and need to communicate any side effects to the transplant nurse or physician.    3. Allograft Function:stable at baseline for the patient. Continue follow up as per our guidelines and with the local General nephrologist. Communication will be sent today.  Lab Results   Component Value Date    CREATININE 2.2 (H) 09/05/2024    " CREATININE 2.1 (H) 06/21/2024    CREATININE 2.3 (H) 06/04/2024     Lab Results   Component Value Date    LIPASE 36 12/17/2019       4. Hypertension management:  well controlled per patient and daughter Continue with home blood pressure monitoring, low salt and healthy life discussed with the patient..    5. Metabolic Bone Disease/Secondary Hyperparathyroidism:calcium and phosphorus level discussed with the patient, patient will continue follow up with the general nephrologist for management of metabolic bone disease. Will monitor PTH and Vit D per our transplant center guidelines.      Lab Results   Component Value Date    .2 (H) 09/05/2024    CALCIUM 11.0 (H) 09/05/2024    CAION 1.40 03/13/2020    PHOS 2.9 07/10/2023    PHOS 2.7 06/07/2023    PHOS 3.0 03/08/2023       6. Electrolytes and acid base balance: reviewed with the patient, essentially within the normal range no need for acute changes today, will monitor as per our center guidelines.     Lab Results   Component Value Date     09/05/2024    K 4.7 09/05/2024     (H) 09/05/2024    CO2 20 (L) 09/05/2024    CO2 21 (L) 06/21/2024    CO2 22 (L) 06/04/2024       7. Anemia: will refer to hematology, CMC pcr and Ct of the abdomen and pelvis will continue monitoring as per our center guidelines. No indication for acute intervention today.     Lab Results   Component Value Date    WBC 2.07 (L) 09/05/2024    WBC 2.07 (L) 09/05/2024    HGB 10.7 (L) 09/05/2024    HGB 10.7 (L) 09/05/2024    HCT 35.0 (L) 09/05/2024    HCT 35.0 (L) 09/05/2024    MCV 97 09/05/2024    MCV 97 09/05/2024    PLT 97 (L) 09/05/2024    PLT 97 (L) 09/05/2024       8.Proteinuria: on Olmesartan. will continue with pr/cr ratio as per our center guidelines.  Lab Results   Component Value Date    PROTEINURINE 238 (H) 09/05/2024    CREATRANDUR 105.0 09/05/2024    UTPCR 2.27 (H) 09/05/2024    UTPCR 1.09 (H) 06/21/2024    UTPCR 1.19 (H) 03/26/2024        9. BK virus infection screening:  will continue with urine or blood PCR as per our guidelines to prevent BK virus viremia and allograft dysfunction  Lab Results   Component Value Date    BKVIRUSPCRQB <125 06/21/2024         10. Weight and metabolic management: education provided provided during the clinic visit.   There is no height or weight on file to calculate BMI.       11.Patient safety education regarding immunosuppression including prophylaxis posttransplant for CMV, PCP : Education provided about vaccination and prevention of infections.    12.  Cytopenias: no significant cytopenias will monitor as per our guidelines. Medicine list reviewed including potential causes of drug-induced cytopenias     Lab Results   Component Value Date    WBC 2.07 (L) 09/05/2024    WBC 2.07 (L) 09/05/2024    HGB 10.7 (L) 09/05/2024    HGB 10.7 (L) 09/05/2024    HCT 35.0 (L) 09/05/2024    HCT 35.0 (L) 09/05/2024    MCV 97 09/05/2024    MCV 97 09/05/2024    PLT 97 (L) 09/05/2024    PLT 97 (L) 09/05/2024       13. Post-transplant Prophylaxis; CMV Infection, PJP and Candida mucosistis and other indicated for this particular patient. OFF prophylaxis.     I spoke with the patient for 30 minutes. More than half dedicated to counseling and education. All questions answered    Pavel Padilla MD  Transplant Nephrology            Follow-up:   Annual follow-up with kidney transplant clinic per written guidelines.  Patient also reminded to follow-up with general nephrologist.    Labs: since patient remains at high risk for rejection and drug-related complications that warrant close monitoring, labs will be ordered as follows: continue twice weekly CBC, renal panel, and drug level for first month; then same labs once weekly through 3rd month post-transplant.  Urine for UA and protein/creatinine ratio monthly.  Serum BK - PCR at 1-, 3-, 6-, 9-, 12-, 18-, 24-, 36-, 48-, and 60 months post-transplant.  Hepatic panel at 1-, 2-, 3-, 6-, 9-, 12-, 18-, 24-, and 36- months  post-transplant.    Pavel Padilla MD       Education:   Material provided to the patient.  Patient reminded to call with any health changes since these can be early signs of significant complications.  Also, I advised the patient to be sure any new medications or changes of old medications are discussed with either a pharmacist or physician knowledgeable with transplant to avoid rejection/drug toxicity related to significant drug interactions.    Patient advised that it is recommended that all transplant candidates, and their close contacts and household members receive Covid vaccination.    UNOS Patient Status  Functional Status: 100% - Normal, no complaints, no evidence of disease  Physical Capacity: No Limitations

## 2024-09-12 NOTE — PROGRESS NOTES
Labs and diagnostic tests were reviewed. No action/changes indicated.   improve bed mobility with CGA by d/c

## 2024-09-13 ENCOUNTER — TELEPHONE (OUTPATIENT)
Dept: HEMATOLOGY/ONCOLOGY | Facility: CLINIC | Age: 69
End: 2024-09-13
Payer: MEDICARE

## 2024-09-13 DIAGNOSIS — N18.9 ANEMIA IN CHRONIC KIDNEY DISEASE, UNSPECIFIED CKD STAGE: Primary | ICD-10-CM

## 2024-09-13 DIAGNOSIS — D52.0 DIETARY FOLATE DEFICIENCY ANEMIA: ICD-10-CM

## 2024-09-13 DIAGNOSIS — D61.811 DRUG-INDUCED PANCYTOPENIA: ICD-10-CM

## 2024-09-13 DIAGNOSIS — D63.1 ANEMIA IN CHRONIC KIDNEY DISEASE, UNSPECIFIED CKD STAGE: Primary | ICD-10-CM

## 2024-09-13 DIAGNOSIS — D61.818 PANCYTOPENIA: ICD-10-CM

## 2024-09-13 DIAGNOSIS — Z94.0 KIDNEY REPLACED BY TRANSPLANT: Primary | ICD-10-CM

## 2024-09-13 DIAGNOSIS — D51.8 OTHER VITAMIN B12 DEFICIENCY ANEMIAS: ICD-10-CM

## 2024-09-13 NOTE — TELEPHONE ENCOUNTER
Spoke to patient in reference to Hematology referral from Dr. Padilla and need to schedule f/u d/t already being an established patient. Appointment scheduled per request next available in [place] notice via pt portal.

## 2024-09-14 NOTE — TELEPHONE ENCOUNTER
Patient not here for her lab and retacrit appointment.    Spoke with her daughter and rescheduled her appointment for tomorrow.   Him/He

## 2024-09-16 RX ORDER — LANOLIN ALCOHOL/MO/W.PET/CERES
1 CREAM (GRAM) TOPICAL 2 TIMES DAILY
Qty: 180 TABLET | Refills: 3 | Status: SHIPPED | OUTPATIENT
Start: 2024-09-16 | End: 2025-09-16

## 2024-09-20 ENCOUNTER — HOSPITAL ENCOUNTER (OUTPATIENT)
Dept: RADIOLOGY | Facility: HOSPITAL | Age: 69
Discharge: HOME | End: 2024-09-20
Attending: INTERNAL MEDICINE
Payer: MEDICARE

## 2024-09-20 DIAGNOSIS — Z94.0 KIDNEY REPLACED BY TRANSPLANT: ICD-10-CM

## 2024-09-20 DIAGNOSIS — D61.818 PANCYTOPENIA: ICD-10-CM

## 2024-09-20 PROCEDURE — 74176 CT ABD & PELVIS W/O CONTRAST: CPT | Mod: 26,,, | Performed by: RADIOLOGY

## 2024-09-20 PROCEDURE — 74176 CT ABD & PELVIS W/O CONTRAST: CPT | Mod: TC,PN

## 2024-09-23 ENCOUNTER — TELEPHONE (OUTPATIENT)
Dept: TRANSPLANT | Facility: CLINIC | Age: 69
End: 2024-09-23
Payer: MEDICARE

## 2024-09-23 DIAGNOSIS — Z94.0 KIDNEY REPLACED BY TRANSPLANT: Primary | ICD-10-CM

## 2024-09-23 DIAGNOSIS — K76.89 LIVER CYST: ICD-10-CM

## 2024-09-23 NOTE — TELEPHONE ENCOUNTER
Results reviewed with patient and agrees to follow up with Hepatology.    ----- Message from Pavel Padilla MD sent at 9/23/2024  8:16 AM CDT -----  Lets refer the lady to se hepatology, she has some liver cysts and one of them looks very prominent

## 2024-09-24 ENCOUNTER — TELEPHONE (OUTPATIENT)
Dept: HEPATOLOGY | Facility: CLINIC | Age: 69
End: 2024-09-24
Payer: MEDICARE

## 2024-09-24 DIAGNOSIS — K76.0 METABOLIC DYSFUNCTION-ASSOCIATED STEATOTIC LIVER DISEASE (MASLD): Primary | ICD-10-CM

## 2024-09-24 NOTE — TELEPHONE ENCOUNTER
Pt was called. Appt and follow up were schld with her.    Emilia    ----- Message from Raquel Jones NP sent at 9/24/2024  8:43 AM CDT -----  Please schedule f/u visit with [facility]. Lives closer to [place] though if no appointments available, can see any TIA (virtual or in-person). Thanks.  ----- Message -----  From: Darci Flores RN  Sent: 9/23/2024  11:46 AM CDT  To: Raquel Jones NP    FYI, last seen on 7/29/2022.  ----- Message -----  From: Pavel Padilla MD  Sent: 9/23/2024   8:16 AM CDT  To: [facility] Post-Kidney Transplant Clinical    Lets refer the lady to  hepatology, she has some liver cysts and one of them looks very prominent

## 2024-09-30 ENCOUNTER — LAB VISIT (OUTPATIENT)
Dept: LAB | Facility: HOSPITAL | Age: 69
End: 2024-09-30
Attending: INTERNAL MEDICINE
Payer: MEDICARE

## 2024-09-30 DIAGNOSIS — Z94.0 KIDNEY REPLACED BY TRANSPLANT: ICD-10-CM

## 2024-09-30 LAB
ALBUMIN SERPL BCP-MCNC: 3.8 G/DL (ref 3.5–5.2)
ALP SERPL-CCNC: 105 U/L (ref 55–135)
ALT SERPL W/O P-5'-P-CCNC: 11 U/L (ref 10–44)
ANION GAP SERPL CALC-SCNC: 7 MMOL/L (ref 8–16)
AST SERPL-CCNC: 13 U/L (ref 10–40)
BASOPHILS # BLD AUTO: 0 K/UL (ref 0–0.2)
BASOPHILS NFR BLD: 0 % (ref 0–1.9)
BILIRUB SERPL-MCNC: 0.4 MG/DL (ref 0.1–1)
BUN SERPL-MCNC: 39 MG/DL (ref 8–23)
CALCIUM SERPL-MCNC: 10.5 MG/DL (ref 8.7–10.5)
CHLORIDE SERPL-SCNC: 115 MMOL/L (ref 95–110)
CO2 SERPL-SCNC: 20 MMOL/L (ref 23–29)
CREAT SERPL-MCNC: 2.7 MG/DL (ref 0.5–1.4)
DIFFERENTIAL METHOD BLD: ABNORMAL
EOSINOPHIL # BLD AUTO: 0 K/UL (ref 0–0.5)
EOSINOPHIL NFR BLD: 1.8 % (ref 0–8)
ERYTHROCYTE [DISTWIDTH] IN BLOOD BY AUTOMATED COUNT: 15 % (ref 11.5–14.5)
EST. GFR  (NO RACE VARIABLE): 18.9 ML/MIN/1.73 M^2
GLUCOSE SERPL-MCNC: 82 MG/DL (ref 70–110)
HCT VFR BLD AUTO: 32.9 % (ref 37–48.5)
HGB BLD-MCNC: 10.3 G/DL (ref 12–16)
IMM GRANULOCYTES # BLD AUTO: 0.02 K/UL (ref 0–0.04)
IMM GRANULOCYTES NFR BLD AUTO: 0.9 % (ref 0–0.5)
LYMPHOCYTES # BLD AUTO: 0.8 K/UL (ref 1–4.8)
LYMPHOCYTES NFR BLD: 33.2 % (ref 18–48)
MCH RBC QN AUTO: 29.9 PG (ref 27–31)
MCHC RBC AUTO-ENTMCNC: 31.3 G/DL (ref 32–36)
MCV RBC AUTO: 96 FL (ref 82–98)
MONOCYTES # BLD AUTO: 0.3 K/UL (ref 0.3–1)
MONOCYTES NFR BLD: 12.4 % (ref 4–15)
NEUTROPHILS # BLD AUTO: 1.2 K/UL (ref 1.8–7.7)
NEUTROPHILS NFR BLD: 51.7 % (ref 38–73)
NRBC BLD-RTO: 0 /100 WBC
PLATELET # BLD AUTO: 151 K/UL (ref 150–450)
PMV BLD AUTO: 13.1 FL (ref 9.2–12.9)
POTASSIUM SERPL-SCNC: 4.7 MMOL/L (ref 3.5–5.1)
PROT SERPL-MCNC: 6.5 G/DL (ref 6–8.4)
RBC # BLD AUTO: 3.44 M/UL (ref 4–5.4)
SODIUM SERPL-SCNC: 142 MMOL/L (ref 136–145)
WBC # BLD AUTO: 2.26 K/UL (ref 3.9–12.7)

## 2024-09-30 PROCEDURE — 80197 ASSAY OF TACROLIMUS: CPT | Performed by: INTERNAL MEDICINE

## 2024-09-30 PROCEDURE — 85025 COMPLETE CBC W/AUTO DIFF WBC: CPT | Performed by: INTERNAL MEDICINE

## 2024-09-30 PROCEDURE — 36415 COLL VENOUS BLD VENIPUNCTURE: CPT | Mod: PN | Performed by: INTERNAL MEDICINE

## 2024-09-30 PROCEDURE — 80053 COMPREHEN METABOLIC PANEL: CPT | Performed by: INTERNAL MEDICINE

## 2024-09-30 PROCEDURE — 87799 DETECT AGENT NOS DNA QUANT: CPT | Performed by: INTERNAL MEDICINE

## 2024-10-01 ENCOUNTER — TELEPHONE (OUTPATIENT)
Dept: TRANSPLANT | Facility: CLINIC | Age: 69
End: 2024-10-01
Payer: MEDICARE

## 2024-10-01 DIAGNOSIS — Z94.0 KIDNEY REPLACED BY TRANSPLANT: Primary | ICD-10-CM

## 2024-10-01 LAB — TACROLIMUS BLD-MCNC: 4.6 NG/ML (ref 5–15)

## 2024-10-01 RX ORDER — HEPARIN 100 UNIT/ML
500 SYRINGE INTRAVENOUS
OUTPATIENT
Start: 2024-10-01

## 2024-10-01 RX ORDER — SODIUM CHLORIDE 0.9 % (FLUSH) 0.9 %
10 SYRINGE (ML) INJECTION
OUTPATIENT
Start: 2024-10-01

## 2024-10-01 NOTE — PROGRESS NOTES
Lets reach out to the care giver/patient to see what changed. She was very stable last time we spoke recently. Hydration and please ask for acute illness. Tacro level pending
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
## 2024-10-01 NOTE — PROGRESS NOTES
Message sent about increased creatinine. Assess with a patient or caregiver acute changes, hydration and repeat creatinine in 2 weeks

## 2024-10-14 ENCOUNTER — LAB VISIT (OUTPATIENT)
Dept: LAB | Facility: HOSPITAL | Age: 69
End: 2024-10-14
Attending: INTERNAL MEDICINE
Payer: MEDICARE

## 2024-10-14 ENCOUNTER — TELEPHONE (OUTPATIENT)
Dept: TRANSPLANT | Facility: CLINIC | Age: 69
End: 2024-10-14
Payer: MEDICARE

## 2024-10-14 DIAGNOSIS — D51.8 OTHER VITAMIN B12 DEFICIENCY ANEMIAS: ICD-10-CM

## 2024-10-14 DIAGNOSIS — D63.1 ANEMIA IN CHRONIC KIDNEY DISEASE, UNSPECIFIED CKD STAGE: ICD-10-CM

## 2024-10-14 DIAGNOSIS — D52.0 DIETARY FOLATE DEFICIENCY ANEMIA: ICD-10-CM

## 2024-10-14 DIAGNOSIS — N18.9 ANEMIA IN CHRONIC KIDNEY DISEASE, UNSPECIFIED CKD STAGE: ICD-10-CM

## 2024-10-14 DIAGNOSIS — Z94.0 KIDNEY REPLACED BY TRANSPLANT: ICD-10-CM

## 2024-10-14 DIAGNOSIS — D61.811 DRUG-INDUCED PANCYTOPENIA: ICD-10-CM

## 2024-10-14 LAB
ACANTHOCYTES BLD QL SMEAR: PRESENT
ACANTHOCYTES BLD QL SMEAR: PRESENT
ALBUMIN SERPL BCP-MCNC: 3.8 G/DL (ref 3.5–5.2)
ALBUMIN SERPL BCP-MCNC: 3.8 G/DL (ref 3.5–5.2)
ALP SERPL-CCNC: 119 U/L (ref 55–135)
ALP SERPL-CCNC: 119 U/L (ref 55–135)
ALT SERPL W/O P-5'-P-CCNC: 13 U/L (ref 10–44)
ALT SERPL W/O P-5'-P-CCNC: 13 U/L (ref 10–44)
ANION GAP SERPL CALC-SCNC: 8 MMOL/L (ref 8–16)
ANION GAP SERPL CALC-SCNC: 8 MMOL/L (ref 8–16)
AST SERPL-CCNC: 13 U/L (ref 10–40)
AST SERPL-CCNC: 13 U/L (ref 10–40)
BASOPHILS NFR BLD: 0 % (ref 0–1.9)
BASOPHILS NFR BLD: 0 % (ref 0–1.9)
BILIRUB SERPL-MCNC: 0.4 MG/DL (ref 0.1–1)
BILIRUB SERPL-MCNC: 0.4 MG/DL (ref 0.1–1)
BUN SERPL-MCNC: 29 MG/DL (ref 8–23)
BUN SERPL-MCNC: 29 MG/DL (ref 8–23)
CALCIUM SERPL-MCNC: 10.5 MG/DL (ref 8.7–10.5)
CALCIUM SERPL-MCNC: 10.5 MG/DL (ref 8.7–10.5)
CHLORIDE SERPL-SCNC: 114 MMOL/L (ref 95–110)
CHLORIDE SERPL-SCNC: 114 MMOL/L (ref 95–110)
CO2 SERPL-SCNC: 20 MMOL/L (ref 23–29)
CO2 SERPL-SCNC: 20 MMOL/L (ref 23–29)
CREAT SERPL-MCNC: 2 MG/DL (ref 0.5–1.4)
CREAT SERPL-MCNC: 2 MG/DL (ref 0.5–1.4)
DACRYOCYTES BLD QL SMEAR: ABNORMAL
DACRYOCYTES BLD QL SMEAR: ABNORMAL
DIFFERENTIAL METHOD BLD: ABNORMAL
DIFFERENTIAL METHOD BLD: ABNORMAL
EOSINOPHIL NFR BLD: 1 % (ref 0–8)
EOSINOPHIL NFR BLD: 1 % (ref 0–8)
ERYTHROCYTE [DISTWIDTH] IN BLOOD BY AUTOMATED COUNT: 15 % (ref 11.5–14.5)
ERYTHROCYTE [DISTWIDTH] IN BLOOD BY AUTOMATED COUNT: 15 % (ref 11.5–14.5)
EST. GFR  (NO RACE VARIABLE): 27 ML/MIN/1.73 M^2
EST. GFR  (NO RACE VARIABLE): 27 ML/MIN/1.73 M^2
FERRITIN SERPL-MCNC: 816 NG/ML (ref 20–300)
FOLATE SERPL-MCNC: 8.4 NG/ML (ref 4–24)
GLUCOSE SERPL-MCNC: 74 MG/DL (ref 70–110)
GLUCOSE SERPL-MCNC: 74 MG/DL (ref 70–110)
HCT VFR BLD AUTO: 34.4 % (ref 37–48.5)
HCT VFR BLD AUTO: 34.4 % (ref 37–48.5)
HGB BLD-MCNC: 10.8 G/DL (ref 12–16)
HGB BLD-MCNC: 10.8 G/DL (ref 12–16)
IMM GRANULOCYTES # BLD AUTO: ABNORMAL K/UL (ref 0–0.04)
IMM GRANULOCYTES # BLD AUTO: ABNORMAL K/UL (ref 0–0.04)
IMM GRANULOCYTES NFR BLD AUTO: ABNORMAL % (ref 0–0.5)
IMM GRANULOCYTES NFR BLD AUTO: ABNORMAL % (ref 0–0.5)
IRON SERPL-MCNC: 63 UG/DL (ref 30–160)
LYMPHOCYTES NFR BLD: 41 % (ref 18–48)
LYMPHOCYTES NFR BLD: 41 % (ref 18–48)
MCH RBC QN AUTO: 29.6 PG (ref 27–31)
MCH RBC QN AUTO: 29.6 PG (ref 27–31)
MCHC RBC AUTO-ENTMCNC: 31.4 G/DL (ref 32–36)
MCHC RBC AUTO-ENTMCNC: 31.4 G/DL (ref 32–36)
MCV RBC AUTO: 94 FL (ref 82–98)
MCV RBC AUTO: 94 FL (ref 82–98)
MONOCYTES NFR BLD: 5 % (ref 4–15)
MONOCYTES NFR BLD: 5 % (ref 4–15)
NEUTROPHILS NFR BLD: 53 % (ref 38–73)
NEUTROPHILS NFR BLD: 53 % (ref 38–73)
NRBC BLD-RTO: 0 /100 WBC
NRBC BLD-RTO: 0 /100 WBC
PLATELET # BLD AUTO: 113 K/UL (ref 150–450)
PLATELET # BLD AUTO: 113 K/UL (ref 150–450)
PLATELET BLD QL SMEAR: ABNORMAL
PLATELET BLD QL SMEAR: ABNORMAL
PMV BLD AUTO: 12.3 FL (ref 9.2–12.9)
PMV BLD AUTO: 12.3 FL (ref 9.2–12.9)
POIKILOCYTOSIS BLD QL SMEAR: SLIGHT
POIKILOCYTOSIS BLD QL SMEAR: SLIGHT
POTASSIUM SERPL-SCNC: 4.4 MMOL/L (ref 3.5–5.1)
POTASSIUM SERPL-SCNC: 4.4 MMOL/L (ref 3.5–5.1)
PROT SERPL-MCNC: 6.8 G/DL (ref 6–8.4)
PROT SERPL-MCNC: 6.8 G/DL (ref 6–8.4)
RBC # BLD AUTO: 3.65 M/UL (ref 4–5.4)
RBC # BLD AUTO: 3.65 M/UL (ref 4–5.4)
SATURATED IRON: 24 % (ref 20–50)
SCHISTOCYTES BLD QL SMEAR: PRESENT
SCHISTOCYTES BLD QL SMEAR: PRESENT
SODIUM SERPL-SCNC: 142 MMOL/L (ref 136–145)
SODIUM SERPL-SCNC: 142 MMOL/L (ref 136–145)
TOTAL IRON BINDING CAPACITY: 266 UG/DL (ref 250–450)
TRANSFERRIN SERPL-MCNC: 180 MG/DL (ref 200–375)
VIT B12 SERPL-MCNC: 503 PG/ML (ref 210–950)
WBC # BLD AUTO: 1.75 K/UL (ref 3.9–12.7)
WBC # BLD AUTO: 1.75 K/UL (ref 3.9–12.7)

## 2024-10-14 PROCEDURE — 86334 IMMUNOFIX E-PHORESIS SERUM: CPT | Performed by: NURSE PRACTITIONER

## 2024-10-14 PROCEDURE — 85007 BL SMEAR W/DIFF WBC COUNT: CPT | Performed by: INTERNAL MEDICINE

## 2024-10-14 PROCEDURE — 83540 ASSAY OF IRON: CPT | Performed by: NURSE PRACTITIONER

## 2024-10-14 PROCEDURE — 83521 IG LIGHT CHAINS FREE EACH: CPT | Performed by: NURSE PRACTITIONER

## 2024-10-14 PROCEDURE — 82746 ASSAY OF FOLIC ACID SERUM: CPT | Performed by: NURSE PRACTITIONER

## 2024-10-14 PROCEDURE — 84165 PROTEIN E-PHORESIS SERUM: CPT | Performed by: NURSE PRACTITIONER

## 2024-10-14 PROCEDURE — 36415 COLL VENOUS BLD VENIPUNCTURE: CPT | Mod: PN | Performed by: NURSE PRACTITIONER

## 2024-10-14 PROCEDURE — 85027 COMPLETE CBC AUTOMATED: CPT | Performed by: INTERNAL MEDICINE

## 2024-10-14 PROCEDURE — 80053 COMPREHEN METABOLIC PANEL: CPT | Performed by: INTERNAL MEDICINE

## 2024-10-14 PROCEDURE — 82728 ASSAY OF FERRITIN: CPT | Performed by: NURSE PRACTITIONER

## 2024-10-14 PROCEDURE — 86334 IMMUNOFIX E-PHORESIS SERUM: CPT | Mod: 26,,, | Performed by: PATHOLOGY

## 2024-10-14 PROCEDURE — 84165 PROTEIN E-PHORESIS SERUM: CPT | Mod: 26,,, | Performed by: PATHOLOGY

## 2024-10-14 PROCEDURE — 80197 ASSAY OF TACROLIMUS: CPT | Performed by: INTERNAL MEDICINE

## 2024-10-14 PROCEDURE — 82607 VITAMIN B-12: CPT | Performed by: NURSE PRACTITIONER

## 2024-10-14 NOTE — TELEPHONE ENCOUNTER
Results reviewed with patient daughter Niya and will have CMV drawn tomorrow 10/15.  Neutropenic precautions reviewed.  Patient schedule to be seen in [facility] on 10/21.    ----- Message from Pavel Padilla MD sent at 10/14/2024 12:10 PM CDT -----  Please let's get a serum CMV PCR  I do not see she saw hematology, was she able to schedule an appointment with them due to her pancytopenia ?  I include Oli Arthur NP with hematology to see if they can give us advice with this lady. Thanks     Pavel Padilla MD  Transplant Nephrology

## 2024-10-14 NOTE — PROGRESS NOTES
Please let's get a serum CMV PCR  I do not see she saw hematology, was she able to schedule an appointment with them due to her pancytopenia ?  I include Oli Arthur NP with hematology to see if they can give us advice with this lady. Thanks     Pavel Padilla MD  Transplant Nephrology

## 2024-10-15 ENCOUNTER — LAB VISIT (OUTPATIENT)
Dept: LAB | Facility: HOSPITAL | Age: 69
End: 2024-10-15
Attending: INTERNAL MEDICINE
Payer: MEDICARE

## 2024-10-15 DIAGNOSIS — Z94.0 KIDNEY REPLACED BY TRANSPLANT: ICD-10-CM

## 2024-10-15 LAB
ALBUMIN SERPL ELPH-MCNC: 4.08 G/DL (ref 3.35–5.55)
ALPHA1 GLOB SERPL ELPH-MCNC: 0.33 G/DL (ref 0.17–0.41)
ALPHA2 GLOB SERPL ELPH-MCNC: 0.53 G/DL (ref 0.43–0.99)
ANISOCYTOSIS BLD QL SMEAR: SLIGHT
B-GLOBULIN SERPL ELPH-MCNC: 0.65 G/DL (ref 0.5–1.1)
BASOPHILS # BLD AUTO: 0.01 K/UL (ref 0–0.2)
BASOPHILS NFR BLD: 0.6 % (ref 0–1.9)
DACRYOCYTES BLD QL SMEAR: ABNORMAL
DIFFERENTIAL METHOD BLD: ABNORMAL
EOSINOPHIL # BLD AUTO: 0.1 K/UL (ref 0–0.5)
EOSINOPHIL NFR BLD: 2.9 % (ref 0–8)
ERYTHROCYTE [DISTWIDTH] IN BLOOD BY AUTOMATED COUNT: 15.1 % (ref 11.5–14.5)
GAMMA GLOB SERPL ELPH-MCNC: 0.72 G/DL (ref 0.67–1.58)
HCT VFR BLD AUTO: 34.1 % (ref 37–48.5)
HGB BLD-MCNC: 10.5 G/DL (ref 12–16)
IMM GRANULOCYTES # BLD AUTO: 0.01 K/UL (ref 0–0.04)
IMM GRANULOCYTES NFR BLD AUTO: 0.6 % (ref 0–0.5)
INTERPRETATION SERPL IFE-IMP: NORMAL
KAPPA LC SER QL IA: 4.36 MG/DL (ref 0.33–1.94)
KAPPA LC/LAMBDA SER IA: 1.35 (ref 0.26–1.65)
LAMBDA LC SER QL IA: 3.24 MG/DL (ref 0.57–2.63)
LYMPHOCYTES # BLD AUTO: 0.6 K/UL (ref 1–4.8)
LYMPHOCYTES NFR BLD: 37.2 % (ref 18–48)
MCH RBC QN AUTO: 29.5 PG (ref 27–31)
MCHC RBC AUTO-ENTMCNC: 30.8 G/DL (ref 32–36)
MCV RBC AUTO: 96 FL (ref 82–98)
MONOCYTES # BLD AUTO: 0.3 K/UL (ref 0.3–1)
MONOCYTES NFR BLD: 14.5 % (ref 4–15)
NEUTROPHILS # BLD AUTO: 0.8 K/UL (ref 1.8–7.7)
NEUTROPHILS NFR BLD: 44.2 % (ref 38–73)
NRBC BLD-RTO: 0 /100 WBC
PLATELET # BLD AUTO: 105 K/UL (ref 150–450)
PLATELET BLD QL SMEAR: ABNORMAL
PMV BLD AUTO: 12.7 FL (ref 9.2–12.9)
POIKILOCYTOSIS BLD QL SMEAR: SLIGHT
POLYCHROMASIA BLD QL SMEAR: ABNORMAL
PROT SERPL-MCNC: 6.3 G/DL (ref 6–8.4)
RBC # BLD AUTO: 3.56 M/UL (ref 4–5.4)
TACROLIMUS BLD-MCNC: 2.6 NG/ML (ref 5–15)
WBC # BLD AUTO: 1.72 K/UL (ref 3.9–12.7)

## 2024-10-15 PROCEDURE — 36415 COLL VENOUS BLD VENIPUNCTURE: CPT | Mod: PN | Performed by: INTERNAL MEDICINE

## 2024-10-15 PROCEDURE — 85025 COMPLETE CBC W/AUTO DIFF WBC: CPT | Performed by: INTERNAL MEDICINE

## 2024-10-15 RX ORDER — OLMESARTAN MEDOXOMIL 20 MG/1
20 TABLET ORAL DAILY
Qty: 90 TABLET | Refills: 3 | Status: SHIPPED | OUTPATIENT
Start: 2024-10-15 | End: 2025-10-15

## 2024-10-15 NOTE — TELEPHONE ENCOUNTER
Patient daughter sharif repeated back and voice a understanding of orders.  Next labs on Monday 10/21.  12 hour Tacrolimus trough reviewed.    ----- Message from Pavel Padilla MD sent at 10/15/2024  2:29 PM CDT -----  Let's repeat a tacro level in 1 week  ----- Message -----  From: Darci Flores, OSCAR  Sent: 10/15/2024  11:18 AM CDT  To: Pavel Padilla MD    Per patient , she is taking tacrolimus 5mg BID.  States she took her PM dose on Ramon 10/13 at 8pm and labs drawn on Monday 10/14 at 8:44am.  Her caregiver did not see her take her meds.  ----- Message -----  From: Pavel Padilla MD  Sent: 10/15/2024  10:46 AM CDT  To: [facility] Post-Kidney Transplant Clinical    Please verify current dose of prograf with care giver.

## 2024-10-16 ENCOUNTER — TELEPHONE (OUTPATIENT)
Dept: TRANSPLANT | Facility: CLINIC | Age: 69
End: 2024-10-16
Payer: MEDICARE

## 2024-10-16 DIAGNOSIS — Z94.0 KIDNEY REPLACED BY TRANSPLANT: Primary | ICD-10-CM

## 2024-10-16 LAB
CMV DNA SPEC QL NAA+PROBE: NORMAL
CYTOMEGALOVIRUS PCR, QUANT: NOT DETECTED IU/ML
PATHOLOGIST INTERPRETATION IFE: NORMAL
PATHOLOGIST INTERPRETATION SPE: NORMAL

## 2024-10-16 NOTE — TELEPHONE ENCOUNTER
Results reviewed with patient and Daughter Niya.  Neutropenic precautions reviewed.  Next labs on Monday 10/21.   ----- Message from Pavel Padilla MD sent at 10/15/2024  5:06 PM CDT -----  Still not meeting criteria for Neupogen

## 2024-10-22 ENCOUNTER — LAB VISIT (OUTPATIENT)
Dept: LAB | Facility: HOSPITAL | Age: 69
End: 2024-10-22
Attending: INTERNAL MEDICINE
Payer: MEDICARE

## 2024-10-22 DIAGNOSIS — Z94.0 KIDNEY REPLACED BY TRANSPLANT: ICD-10-CM

## 2024-10-22 LAB
ALBUMIN SERPL BCP-MCNC: 3.8 G/DL (ref 3.5–5.2)
ANION GAP SERPL CALC-SCNC: 9 MMOL/L (ref 8–16)
BASOPHILS # BLD AUTO: 0.01 K/UL (ref 0–0.2)
BASOPHILS NFR BLD: 0.5 % (ref 0–1.9)
BUN SERPL-MCNC: 22 MG/DL (ref 8–23)
CALCIUM SERPL-MCNC: 9.9 MG/DL (ref 8.7–10.5)
CHLORIDE SERPL-SCNC: 111 MMOL/L (ref 95–110)
CO2 SERPL-SCNC: 22 MMOL/L (ref 23–29)
CREAT SERPL-MCNC: 1.9 MG/DL (ref 0.5–1.4)
DIFFERENTIAL METHOD BLD: ABNORMAL
EOSINOPHIL # BLD AUTO: 0 K/UL (ref 0–0.5)
EOSINOPHIL NFR BLD: 1.5 % (ref 0–8)
ERYTHROCYTE [DISTWIDTH] IN BLOOD BY AUTOMATED COUNT: 14.8 % (ref 11.5–14.5)
EST. GFR  (NO RACE VARIABLE): 28.6 ML/MIN/1.73 M^2
GLUCOSE SERPL-MCNC: 80 MG/DL (ref 70–110)
HCT VFR BLD AUTO: 33.8 % (ref 37–48.5)
HGB BLD-MCNC: 10.6 G/DL (ref 12–16)
IMM GRANULOCYTES # BLD AUTO: 0.01 K/UL (ref 0–0.04)
IMM GRANULOCYTES NFR BLD AUTO: 0.5 % (ref 0–0.5)
LYMPHOCYTES # BLD AUTO: 0.7 K/UL (ref 1–4.8)
LYMPHOCYTES NFR BLD: 34.7 % (ref 18–48)
MAGNESIUM SERPL-MCNC: 2.3 MG/DL (ref 1.6–2.6)
MCH RBC QN AUTO: 29.9 PG (ref 27–31)
MCHC RBC AUTO-ENTMCNC: 31.4 G/DL (ref 32–36)
MCV RBC AUTO: 95 FL (ref 82–98)
MONOCYTES # BLD AUTO: 0.2 K/UL (ref 0.3–1)
MONOCYTES NFR BLD: 10.9 % (ref 4–15)
NEUTROPHILS # BLD AUTO: 1.1 K/UL (ref 1.8–7.7)
NEUTROPHILS NFR BLD: 51.9 % (ref 38–73)
NRBC BLD-RTO: 0 /100 WBC
PHOSPHATE SERPL-MCNC: 2.9 MG/DL (ref 2.7–4.5)
PLATELET # BLD AUTO: 164 K/UL (ref 150–450)
PMV BLD AUTO: 12.6 FL (ref 9.2–12.9)
POTASSIUM SERPL-SCNC: 4 MMOL/L (ref 3.5–5.1)
RBC # BLD AUTO: 3.55 M/UL (ref 4–5.4)
SODIUM SERPL-SCNC: 142 MMOL/L (ref 136–145)
WBC # BLD AUTO: 2.02 K/UL (ref 3.9–12.7)

## 2024-10-22 PROCEDURE — 83735 ASSAY OF MAGNESIUM: CPT | Performed by: INTERNAL MEDICINE

## 2024-10-22 PROCEDURE — 80069 RENAL FUNCTION PANEL: CPT | Performed by: INTERNAL MEDICINE

## 2024-10-22 PROCEDURE — 85025 COMPLETE CBC W/AUTO DIFF WBC: CPT | Performed by: INTERNAL MEDICINE

## 2024-10-22 PROCEDURE — 36415 COLL VENOUS BLD VENIPUNCTURE: CPT | Mod: PN | Performed by: INTERNAL MEDICINE

## 2024-10-22 PROCEDURE — 80197 ASSAY OF TACROLIMUS: CPT | Performed by: INTERNAL MEDICINE

## 2024-10-23 ENCOUNTER — PROCEDURE VISIT (OUTPATIENT)
Dept: HEPATOLOGY | Facility: CLINIC | Age: 69
End: 2024-10-23
Payer: MEDICARE

## 2024-10-23 ENCOUNTER — OFFICE VISIT (OUTPATIENT)
Dept: HEPATOLOGY | Facility: CLINIC | Age: 69
End: 2024-10-23
Payer: MEDICARE

## 2024-10-23 VITALS
DIASTOLIC BLOOD PRESSURE: 77 MMHG | SYSTOLIC BLOOD PRESSURE: 179 MMHG | WEIGHT: 180.13 LBS | OXYGEN SATURATION: 99 % | TEMPERATURE: 99 F | HEART RATE: 73 BPM | RESPIRATION RATE: 18 BRPM | HEIGHT: 63 IN | BODY MASS INDEX: 31.92 KG/M2

## 2024-10-23 DIAGNOSIS — Z94.0 KIDNEY REPLACED BY TRANSPLANT: ICD-10-CM

## 2024-10-23 DIAGNOSIS — E66.811 CLASS 1 OBESITY DUE TO EXCESS CALORIES WITH SERIOUS COMORBIDITY AND BODY MASS INDEX (BMI) OF 31.0 TO 31.9 IN ADULT: ICD-10-CM

## 2024-10-23 DIAGNOSIS — E66.09 CLASS 1 OBESITY DUE TO EXCESS CALORIES WITH SERIOUS COMORBIDITY AND BODY MASS INDEX (BMI) OF 31.0 TO 31.9 IN ADULT: ICD-10-CM

## 2024-10-23 DIAGNOSIS — K76.0 METABOLIC DYSFUNCTION-ASSOCIATED STEATOTIC LIVER DISEASE (MASLD): ICD-10-CM

## 2024-10-23 DIAGNOSIS — K75.0 LIVER ABSCESS: ICD-10-CM

## 2024-10-23 DIAGNOSIS — D84.9 IMMUNOSUPPRESSION: ICD-10-CM

## 2024-10-23 DIAGNOSIS — Z87.19 HISTORY OF FATTY INFILTRATION OF LIVER: ICD-10-CM

## 2024-10-23 DIAGNOSIS — K76.89 LIVER CYST: ICD-10-CM

## 2024-10-23 LAB
CMV DNA SPEC QL NAA+PROBE: NORMAL
CYTOMEGALOVIRUS PCR, QUANT: NOT DETECTED IU/ML
TACROLIMUS BLD-MCNC: 3.6 NG/ML (ref 5–15)

## 2024-10-23 PROCEDURE — 99999 PR PBB SHADOW E&M-EST. PATIENT-LVL V: CPT | Mod: PBBFAC,,, | Performed by: INTERNAL MEDICINE

## 2024-10-23 NOTE — PROCEDURES
FibroScan Transplant Hepatology(Vibration Controlled Transient Elastography)    Date/Time: 10/23/2024 1:45 PM    Performed by: Evangelist Mancini MA  Authorized by: Raquel Jones NP    Diagnosis:  NAFLD    Probe:  M    Universal Protocol: Patient's identity, procedure and site were verified, confirmatory pause was performed.  Discussed procedure including risks and potential complications.  Questions answered.  Patient verbalizes understanding and wishes to proceed with VCTE.     Procedure: After providing explanations of the procedure, patient was placed in the supine position with right arm in maximum abduction to allow optimal exposure of right lateral abdomen.  Patient was briefly assessed, Testing was performed in the mid-axillary location, 50Hz Shear Wave pulses were applied and the resulting Shear Wave and Propagation Speed detected with a 3.5 MHz ultrasonic signal, using the FibroScan probe, Skin to liver capsule distance and liver parenchyma were accessed during the entire examination with the FibroScan probe, Patient was instructed to breathe normally and to abstain from sudden movements during the procedure, allowing for random measurements of liver stiffness. At least 10 Shear Waves were produced, Individual measurements of each Shear Wave were calculated.  Patient tolerated the procedure well with no complications.  Meets discharge criteria as was dismissed.  Rates pain 0 out of 10.  Patient will follow up with ordering provider to review results.    Findings  Median liver stiffness score:  6.6  CAP Reading: dB/m:  244    Interpretation  Fibrosis interpretation is based on medial liver stiffness - Kilopascal (kPa).    Fibrosis Stage:  F 0-1  Steatosis interpretation is based on controlled attenuation parameter - (dB/m).    Steatosis Grade:  <S1

## 2024-10-23 NOTE — PROGRESS NOTES
Hepatology Consult Note    Referring provider: Dr. Pavel Padilla  PCP: Brennon Chavez MD    Chief complaint: liver lesions    HPI:  Satinder Schulte is a 69 y.o. female with hepatic steatosis, hepatic abscess s/p percutaneous drainage (2019), obesity (BMI 31.9), HTN, HLD, DM Type 2, kidney transplant recipient on immunosuppression, who was referred to Hepatology Clinic for liver lesions.     Reports knowing about the liver cysts for several years. She notes having one cyst drained in [place] more than 5 years ago. Per chart review, patient underwent drainage cystic large 9 cm collection right inferior liver lobe concerning for liver abscess 3/20/2019.     Regarding risk factors for liver disease, the patient denies prior history of EtOH abuse, illicit drug use, blood transfusions prior to 1990s, prior tattoos. MASLD risk factors: obesity, DM no longer on therapy, HTN, HLD. Denies family history of liver disease or liver cancer.     The patient denies prior episodes of jaundice.     Denies prior diagnosis of cancer.    Reports prior cholecystectomy. Denies other upper abdominal surgeries.    Past Medical History:   Diagnosis Date    Abnormal Pap smear     repeat paps were normal    Anemia associated with chronic renal failure     Awaiting organ transplant status  - 02/18/2013 6/6/2014    Blood type A+ 6/6/2014    CKD (chronic kidney disease) stage 3, GFR 30-59 ml/min 10/30/2019    CKD (chronic kidney disease) stage 5, GFR less than 15 ml/min     secondary hypertension    Diabetes mellitus     Essential hypertension 5/19/2017    Hyperlipidemia     Hypertension, renal 1992    Immunocompromised state 10/4/2019    Stroke 2007    Residual speech deficit       Past Surgical History:   Procedure Laterality Date    AV FISTULA PLACEMENT  2014    BONE MARROW BIOPSY Right 8/23/2018    Procedure: Biopsy-bone marrow;  Surgeon: Anna Lin MD;  Location: [facility] OR [address] [place] [address];  Service: Oncology;  Laterality: Right;    CHOLECYSTECTOMY   2008    HYSTERECTOMY  2011    TAHBSO for benign reasons    KIDNEY TRANSPLANT N/A 9/20/2019    Procedure: TRANSPLANT, KIDNEY;  Surgeon: Guero Rogers MD;  Location: [facility] OR [address] [place] [address];  Service: Transplant;  Laterality: N/A;    OOPHORECTOMY         Family History   Problem Relation Name Age of Onset    Stroke Mother      Kidney disease Mother          on dialysis    Hypertension Mother      Heart disease Mother      Heart disease Father      Stroke Sister      Kidney disease Brother      Breast cancer Daughter      Heart disease Sister      Ovarian cancer Cousin      Colon cancer Neg Hx      Thrombophilia Neg Hx         Social History     Tobacco Use    Smoking status: Never    Smokeless tobacco: Never   Substance Use Topics    Alcohol use: No    Drug use: No       Current Outpatient Medications   Medication Sig Dispense Refill    0.9 % sodium chloride (SODIUM CHLORIDE 0.9%) solution Inject into the vein.      aspirin (ECOTRIN) 81 MG EC tablet Take 1 tablet (81 mg total) by mouth once daily.      atorvastatin (LIPITOR) 40 MG tablet Take 1 tablet (40 mg total) by mouth once daily. 90 tablet 3    BD POSIFLUSH NORMAL SALINE 0.9 injection       blood sugar diagnostic Strp Test blood glucose 4 (four) times daily. 100 each 5    blood-glucose meter kit Use as instructed 1 each 0    cinacalcet (SENSIPAR) 90 MG Tab Take 1 tablet (90 mg total) by mouth once daily. 30 tablet 11    diclofenac sodium (VOLTAREN) 1 % Gel Apply 2 g topically daily as needed (pain).       evolocumab (REPATHA SURECLICK) 140 mg/mL PnIj Inject 1 mL (140 mg total) into the skin every 14 (fourteen) days. 2 each 11    ezetimibe (ZETIA) 10 mg tablet TAKE 1 TABLET BY MOUTH EVERY DAY 90 tablet 3    ferrous sulfate (FEOSOL) 325 mg (65 mg iron) Tab tablet Take by mouth once daily.      folic acid (FOLVITE) 1 MG tablet Take 1 tablet (1 mg total) by mouth once daily. 30 tablet 5    gabapentin (NEURONTIN) 300 MG capsule Take 300 mg by mouth every evening.       "HYDROcodone-acetaminophen (NORCO) 7.5-325 mg per tablet Take 0.5-1 tablets by mouth 3 (three) times daily as needed for Pain.       ketoconazole (NIZORAL) 200 mg Tab Take HALF tablet (100 mg total) by mouth once daily. 15 tablet 2    lancets 28 gauge Misc Test blood glucose 4 (four) times daily. 100 each 5    magnesium oxide (MAG-OX) 400 mg (241.3 mg magnesium) tablet Take 1 tablet (400 mg total) by mouth 2 (two) times daily. 180 tablet 3    NIFEdipine (ADALAT CC) 60 MG TbSR TAKE 1 TABLET BY MOUTH TWICE A DAY FOR 2 WEEKS      olmesartan (BENICAR) 20 MG tablet Take 1 tablet (20 mg total) by mouth once daily. 90 tablet 3    pen needle, diabetic 32 gauge x 5/32" Ndle Use to inject insulin 4 (four) times daily. 100 each 5    predniSONE (DELTASONE) 5 MG tablet Take 1 tablet (5 mg total) by mouth once daily. Txp Date:9/20/2019 (Kidney) Disch Date:9/25/2019 ICD10:Z94.0 Txp Location:[place] 30 tablet 11    tacrolimus (PROGRAF) 1 MG Cap Take 5 capsules (5 mg total) by mouth every 12 (twelve) hours. 300 capsule 2    belatacept 250 mg SolR 362 mg IV Every 2 weeks x 5 doses then monthly thereafter (Patient not taking: Reported on 10/23/2024)       No current facility-administered medications for this visit.       Review of patient's allergies indicates:   Allergen Reactions    Hydralazine analogues Nausea And Vomiting and Other (See Comments)     headaches            Vitals:    10/23/24 1354   BP: (!) 179/77   Pulse: 73   Resp: 18   Temp: 98.5 °F (36.9 °C)   TempSrc: Oral   SpO2: 99%   Weight: 81.7 kg (180 lb 1.9 oz)   Height: 5' 3" (1.6 m)     Body mass index is 31.91 kg/m².    Physical Exam  Constitutional:       General: She is not in acute distress.  Eyes:      General: No scleral icterus.  Cardiovascular:      Rate and Rhythm: Normal rate.   Pulmonary:      Effort: Pulmonary effort is normal.   Abdominal:      General: Abdomen is protuberant. There is no distension.      Palpations: There is no fluid wave.      Tenderness: " There is no abdominal tenderness.   Musculoskeletal:         General: No swelling.   Skin:     General: Skin is warm.      Coloration: Skin is not jaundiced.   Neurological:      Mental Status: She is alert. Mental status is at baseline.   Psychiatric:         Thought Content: Thought content normal.         LABS: I personally reviewed pertinent laboratory findings.    Lab Results   Component Value Date    WBC 2.02 (L) 10/22/2024    HGB 10.6 (L) 10/22/2024    HCT 33.8 (L) 10/22/2024    MCV 95 10/22/2024     10/22/2024       Lab Results   Component Value Date     10/22/2024    K 4.0 10/22/2024     (H) 10/22/2024    CO2 22 (L) 10/22/2024    BUN 22 10/22/2024    CREATININE 1.9 (H) 10/22/2024    CALCIUM 9.9 10/22/2024    ANIONGAP 9 10/22/2024    ESTGFRAFRICA 39.0 (A) 06/13/2022    EGFRNONAA 33.8 (A) 06/13/2022       Lab Results   Component Value Date    ALT 13 10/14/2024    ALT 13 10/14/2024    AST 13 10/14/2024    AST 13 10/14/2024    ALKPHOS 119 10/14/2024    ALKPHOS 119 10/14/2024    BILITOT 0.4 10/14/2024    BILITOT 0.4 10/14/2024       Lab Results   Component Value Date    HEPBIGM Negative 09/19/2019    HEPBCAB Negative 07/03/2012    HEPCAB Negative 11/20/2020       Lab Results   Component Value Date    SMOOTHMUSCAB Negative 1:40 05/11/2021    CERULOPLSM 27.0 05/11/2021        Computed MELD 3.0 unavailable. One or more values for this score either were not found within the given timeframe or did not fit some other criterion.  Computed MELD-Na unavailable. One or more values for this score either were not found within the given timeframe or did not fit some other criterion.       IMAGING: I personally reviewed imaging studies.      Assessment:   Satinder Schulte is a 69 y.o. female with hepatic steatosis, hepatic abscess s/p percutaneous drainage (2019), obesity (BMI 31.9), HTN, HLD, DM Type 2, kidney transplant recipient on immunosuppression, who was referred to Hepatology Clinic for liver  lesions. Per chart review, liver enzymes and bilirubin are normal. Platelet count has been intermittently decreased. On non-contrasted CT AP (9/20/24), there are multiple liver cysts, the liver is normal in size and attenuation, spleen is normal in size, no ascites. On US liver (4/9/21), the lauren is normal in size with homogenous echotexture, complex lesion in the right hepatic lobe and stable simple hepatic cysts are noted. FibroScan (10/23/24) suggests no hepatic fibrosis or steatosis.    Per chart review, patient underwent drainage cystic large 9cm collection right inferior liver lobe on 3/21/2019 (Care Everywhere). Amoeba histolytica serologies and abscess cultures were negative at the time (3/21/19).     Patient is currently asymptomatic. Suspect liver lesions seen on most recent scan are hepatic cysts. Plan to review imaging in IR conference.    1. Liver cyst    2. Liver abscess    3. History of fatty infiltration of liver    4. Class 1 obesity due to excess calories with serious comorbidity and body mass index (BMI) of 31.0 to 31.9 in adult    5. Kidney replaced by transplant    6. Immunosuppression      Recommendations:  - Will review imaging in IR conference   - Consider MRI abd w/wo contrast     Return to clinic in 3 months.    I have sent communication to the referring physician and/or primary care provider.    Felisa Coles MD  Staff Physician  Hepatology and Liver Transplant  [facility]  [facility]

## 2024-10-24 ENCOUNTER — TELEPHONE (OUTPATIENT)
Dept: TRANSPLANT | Facility: CLINIC | Age: 69
End: 2024-10-24
Payer: MEDICARE

## 2024-10-24 ENCOUNTER — TELEPHONE (OUTPATIENT)
Dept: HEPATOLOGY | Facility: CLINIC | Age: 69
End: 2024-10-24
Payer: MEDICARE

## 2024-10-24 NOTE — TELEPHONE ENCOUNTER
"Patient: Satinder Schulte       MRN: [id number]      : 1955     Age: 69 y.o.  [address]  [place] [address]    Presenting Radiologists:     Providers: Felisa Coles MD    Priority of review: Benign disease    Patient Transplant Status: Hepatology    Reason for presentation: Indeterminate lesion    Clinical Summary:  69 y.o. female with hepatic steatosis, hepatic abscess s/p percutaneous drainage (2019), obesity (BMI 31.9), HTN, HLD, DM Type 2, kidney transplant recipient on immunosuppression, who was referred to Hepatology Clinic for liver lesions.     On non-contrasted CT AP (24), there are multiple liver cysts, the liver is normal in size and attenuation, spleen is normal in size, no ascites. On US liver (21), the lauren is normal in size with homogenous echotexture, complex lesion in the right hepatic lobe and stable simple hepatic cysts are noted.     Per chart review, patient underwent drainage cystic large 9cm collection right inferior liver lobe on 3/21/2019 (Care Everywhere). Amoeba histolytica serologies and abscess cultures were negative at the time (3/21/19).     Imaging to be reviewed: CT A/P 2024    HCC Treatment History: None    Platelets:   Lab Results   Component Value Date/Time     10/22/2024 08:43 AM     Creatinine:   Lab Results   Component Value Date/Time    CREATININE 1.9 (H) 10/22/2024 08:43 AM     Bilirubin:   Lab Results   Component Value Date/Time    BILITOT 0.4 10/14/2024 08:44 AM    BILITOT 0.4 10/14/2024 08:44 AM     AFP Last 3 each if available: No results found for: "AFP", "EXTAFP"    MELD: Computed MELD 3.0 unavailable. One or more values for this score either were not found within the given timeframe or did not fit some other criterion.  Computed MELD-Na unavailable. One or more values for this score either were not found within the given timeframe or did not fit some other criterion.      Plan:     Follow-up Provider:   "

## 2024-10-24 NOTE — TELEPHONE ENCOUNTER
Results reviewed with patient and daughter Niya.  Next labs on 12/3/2024.  ----- Message from Pavel Padilla MD sent at 10/23/2024  4:10 PM CDT -----  Do we have a protocol for this   Perhaps every three months?  ----- Message -----  From: Darci Flores RN  Sent: 10/23/2024   2:33 PM CDT  To: Pavel Padilla MD    How often do we want to do labs ?  MMF is on Hold 2nd to chronic neutropenia.  She is on Pred, Tacro and getting Isabella IV monthly.  ----- Message -----  From: Pavel Padilla MD  Sent: 10/23/2024   1:21 PM CDT  To: [facility] Post-Kidney Transplant Clinical    Labs and diagnostic tests were reviewed. No action/changes indicated.

## 2024-10-28 ENCOUNTER — CONFERENCE (OUTPATIENT)
Dept: TRANSPLANT | Facility: CLINIC | Age: 69
End: 2024-10-28
Payer: MEDICARE

## 2024-11-18 ENCOUNTER — OFFICE VISIT (OUTPATIENT)
Dept: HEMATOLOGY/ONCOLOGY | Facility: CLINIC | Age: 69
End: 2024-11-18
Payer: MEDICARE

## 2024-11-18 DIAGNOSIS — D70.2 OTHER DRUG-INDUCED NEUTROPENIA: ICD-10-CM

## 2024-11-18 DIAGNOSIS — D64.9 ANEMIA, UNSPECIFIED TYPE: ICD-10-CM

## 2024-11-18 DIAGNOSIS — D63.1 ANEMIA DUE TO STAGE 4 CHRONIC KIDNEY DISEASE: Primary | ICD-10-CM

## 2024-11-18 DIAGNOSIS — N18.4 ANEMIA DUE TO STAGE 4 CHRONIC KIDNEY DISEASE: Primary | ICD-10-CM

## 2024-11-18 DIAGNOSIS — D50.9 IRON DEFICIENCY ANEMIA, UNSPECIFIED IRON DEFICIENCY ANEMIA TYPE: ICD-10-CM

## 2024-11-18 PROCEDURE — 99443 PR PHYSICIAN TELEPHONE EVALUATION 21-30 MIN: CPT | Mod: 95,,, | Performed by: NURSE PRACTITIONER

## 2024-11-18 PROCEDURE — 3066F NEPHROPATHY DOC TX: CPT | Mod: CPTII,95,, | Performed by: NURSE PRACTITIONER

## 2024-11-18 PROCEDURE — 1160F RVW MEDS BY RX/DR IN RCRD: CPT | Mod: CPTII,95,, | Performed by: NURSE PRACTITIONER

## 2024-11-18 PROCEDURE — 1159F MED LIST DOCD IN RCRD: CPT | Mod: CPTII,95,, | Performed by: NURSE PRACTITIONER

## 2024-11-18 PROCEDURE — 4010F ACE/ARB THERAPY RXD/TAKEN: CPT | Mod: CPTII,95,, | Performed by: NURSE PRACTITIONER

## 2024-11-18 NOTE — PROGRESS NOTES
Established Patient - Audio Only Telehealth Visit     The patient location is: car  The chief complaint leading to consultation is: no complaints  Visit type: Virtual visit with audio only (telephone)  Total time spent with patient: 30 minutes       The reason for the audio only service rather than synchronous audio and video virtual visit was related to technical difficulties or patient preference/necessity.     Each patient to whom I provide medical services by telemedicine is:  (1) informed of the relationship between the physician and patient and the respective role of any other health care provider with respect to management of the patient; and (2) notified that they may decline to receive medical services by telemedicine and may withdraw from such care at any time. Patient verbally consented to receive this service via voice-only telephone call.       HPI: 69-year-old woman with type 2 diabetes, hypertension, and end-stage renal disease status post transplant September 2019. She presents for follow-up of anemia and thrombocytopenia.  Per review of the medical record bone marrow biopsy completed in 08/2018 was without concerning findings. She continues to have relatively stable anemia and thrombocytopenia.  She is on immunosuppressants tacrolimus and low-dose prednisone.  Stage 3 CKD  Stable she is followed by Dr. Khan nephrologist at outside facility. Imaging 04/09/2021 showed portal hypertension and liver lesions which include stable complex right hepatic and simple hepatic cysts. Recommend continued close follow-up with hepatology/transplant. No colonoscopy on record pt states she will f/u with outside PCP to schedule this.     Interval History:  7/13/2024 Pt states overall she is feeling well and doing her best to maintain her health. Continues f/u with pain mgmt and recently received steroid shot in right hip to help with arthritis pain. She continues close follow-up with nephrologist-Dr. Khan and notes  she is scheduled to f/u up with PCP Dr. Kathy garcia. Denies n/v/d/c, cp, sob, fevers, chills, night sweats or unintentional weight loss, abnormal bleeding.    Interval History:  11/18/2024 Has previously been followed in the clinic by Oli Arthur NP.  Today is the first time I am evaluating/assessing the patient.  States that last Monday Prograf decreased to 60 mg daily.  Currently taking ferrous sulfate 325 mg by mouth daily.  Denies and GI distress or any known abnormal bleeding.  Denies f/c/ns or abnormal lymphadenopathy.  States that has she is intentionally trying to lose by decreasing food intake. States that she will be following up with her PCP, Dr. Chavez for colonoscopy.  Labs remain stable and she has no complaints at this time.                Assessment and plan:  labs remain stable.  Will continue current treatment regimen.  Continue ferrous sulfate 325 mg PO daily.Labs in 6 month in [place] - in person visit.  Continue to trend labs and evaluate for worsening or additional cytopenias - possible repeat bone marrow biopsy in the future if this occurs      Med Onc Chart Routing      Follow up with physician    Follow up with TIA 6 months. in person visit in [place] with labs prior   Infusion scheduling note   n/a   Injection scheduling note n/a   Labs   Scheduling:  Preferred lab: [facility]  Lab interval:  cbc, cmp, iron studies, mma, homocysteine, ldh, haptoglobin   Imaging   N/a   Pharmacy appointment No pharmacy appointment needed      Other referrals       No additional referrals needed  n/a            This service was not originating from a related E/M service provided within the previous 7 days nor will  to an E/M service or procedure within the next 24 hours or my soonest available appointment.  Prevailing standard of care was able to be met in this audio-only visit.

## 2024-12-04 ENCOUNTER — TELEPHONE (OUTPATIENT)
Dept: TRANSPLANT | Facility: CLINIC | Age: 69
End: 2024-12-04
Payer: MEDICARE

## 2024-12-04 ENCOUNTER — LAB VISIT (OUTPATIENT)
Dept: LAB | Facility: HOSPITAL | Age: 69
End: 2024-12-04
Attending: INTERNAL MEDICINE
Payer: MEDICARE

## 2024-12-04 DIAGNOSIS — Z94.0 KIDNEY REPLACED BY TRANSPLANT: ICD-10-CM

## 2024-12-04 LAB
ALBUMIN SERPL BCP-MCNC: 3.4 G/DL (ref 3.5–5.2)
ALP SERPL-CCNC: 98 U/L (ref 40–150)
ALP SERPL-CCNC: 98 U/L (ref 40–150)
ALT SERPL W/O P-5'-P-CCNC: 27 U/L (ref 10–44)
ALT SERPL W/O P-5'-P-CCNC: 27 U/L (ref 10–44)
ANION GAP SERPL CALC-SCNC: 10 MMOL/L (ref 8–16)
ANION GAP SERPL CALC-SCNC: 10 MMOL/L (ref 8–16)
AST SERPL-CCNC: 17 U/L (ref 10–40)
AST SERPL-CCNC: 17 U/L (ref 10–40)
BACTERIA #/AREA URNS AUTO: NORMAL /HPF
BASOPHILS # BLD AUTO: 0.02 K/UL (ref 0–0.2)
BASOPHILS NFR BLD: 0.8 % (ref 0–1.9)
BILIRUB DIRECT SERPL-MCNC: 0.1 MG/DL (ref 0.1–0.3)
BILIRUB SERPL-MCNC: 0.4 MG/DL (ref 0.1–1)
BILIRUB SERPL-MCNC: 0.4 MG/DL (ref 0.1–1)
BILIRUB UR QL STRIP: NEGATIVE
BUN SERPL-MCNC: 40 MG/DL (ref 8–23)
BUN SERPL-MCNC: 40 MG/DL (ref 8–23)
CALCIUM SERPL-MCNC: 9.2 MG/DL (ref 8.7–10.5)
CALCIUM SERPL-MCNC: 9.2 MG/DL (ref 8.7–10.5)
CHLORIDE SERPL-SCNC: 112 MMOL/L (ref 95–110)
CHLORIDE SERPL-SCNC: 112 MMOL/L (ref 95–110)
CLARITY UR REFRACT.AUTO: CLEAR
CO2 SERPL-SCNC: 20 MMOL/L (ref 23–29)
CO2 SERPL-SCNC: 20 MMOL/L (ref 23–29)
COLOR UR AUTO: COLORLESS
CREAT SERPL-MCNC: 2 MG/DL (ref 0.5–1.4)
CREAT SERPL-MCNC: 2 MG/DL (ref 0.5–1.4)
CREAT UR-MCNC: 49 MG/DL (ref 15–325)
DIFFERENTIAL METHOD BLD: ABNORMAL
EOSINOPHIL # BLD AUTO: 0 K/UL (ref 0–0.5)
EOSINOPHIL NFR BLD: 1.6 % (ref 0–8)
ERYTHROCYTE [DISTWIDTH] IN BLOOD BY AUTOMATED COUNT: 13.9 % (ref 11.5–14.5)
EST. GFR  (NO RACE VARIABLE): 26.9 ML/MIN/1.73 M^2
EST. GFR  (NO RACE VARIABLE): 26.9 ML/MIN/1.73 M^2
GLUCOSE SERPL-MCNC: 66 MG/DL (ref 70–110)
GLUCOSE SERPL-MCNC: 66 MG/DL (ref 70–110)
GLUCOSE UR QL STRIP: NEGATIVE
HCT VFR BLD AUTO: 32.3 % (ref 37–48.5)
HGB BLD-MCNC: 9.9 G/DL (ref 12–16)
HGB UR QL STRIP: NEGATIVE
HYALINE CASTS UR QL AUTO: 0 /LPF
IMM GRANULOCYTES # BLD AUTO: 0.01 K/UL (ref 0–0.04)
IMM GRANULOCYTES NFR BLD AUTO: 0.4 % (ref 0–0.5)
KETONES UR QL STRIP: NEGATIVE
LEUKOCYTE ESTERASE UR QL STRIP: NEGATIVE
LYMPHOCYTES # BLD AUTO: 0.6 K/UL (ref 1–4.8)
LYMPHOCYTES NFR BLD: 25.9 % (ref 18–48)
MAGNESIUM SERPL-MCNC: 2 MG/DL (ref 1.6–2.6)
MCH RBC QN AUTO: 29.6 PG (ref 27–31)
MCHC RBC AUTO-ENTMCNC: 30.7 G/DL (ref 32–36)
MCV RBC AUTO: 97 FL (ref 82–98)
MICROSCOPIC COMMENT: NORMAL
MONOCYTES # BLD AUTO: 0.4 K/UL (ref 0.3–1)
MONOCYTES NFR BLD: 14.2 % (ref 4–15)
NEUTROPHILS # BLD AUTO: 1.4 K/UL (ref 1.8–7.7)
NEUTROPHILS NFR BLD: 57.1 % (ref 38–73)
NITRITE UR QL STRIP: NEGATIVE
NRBC BLD-RTO: 0 /100 WBC
PH UR STRIP: 7 [PH] (ref 5–8)
PHOSPHATE SERPL-MCNC: 3.6 MG/DL (ref 2.7–4.5)
PLATELET # BLD AUTO: 146 K/UL (ref 150–450)
PMV BLD AUTO: 12.2 FL (ref 9.2–12.9)
POTASSIUM SERPL-SCNC: 4.2 MMOL/L (ref 3.5–5.1)
POTASSIUM SERPL-SCNC: 4.2 MMOL/L (ref 3.5–5.1)
PROT SERPL-MCNC: 6.4 G/DL (ref 6–8.4)
PROT SERPL-MCNC: 6.4 G/DL (ref 6–8.4)
PROT UR QL STRIP: ABNORMAL
PROT UR-MCNC: 110 MG/DL (ref 0–15)
PROT/CREAT UR: 2.24 MG/G{CREAT} (ref 0–0.2)
RBC # BLD AUTO: 3.34 M/UL (ref 4–5.4)
RBC #/AREA URNS AUTO: 3 /HPF (ref 0–4)
SODIUM SERPL-SCNC: 142 MMOL/L (ref 136–145)
SODIUM SERPL-SCNC: 142 MMOL/L (ref 136–145)
SP GR UR STRIP: 1.01 (ref 1–1.03)
SQUAMOUS #/AREA URNS AUTO: 1 /HPF
URN SPEC COLLECT METH UR: ABNORMAL
WBC # BLD AUTO: 2.47 K/UL (ref 3.9–12.7)
WBC #/AREA URNS AUTO: 2 /HPF (ref 0–5)

## 2024-12-04 PROCEDURE — 83735 ASSAY OF MAGNESIUM: CPT | Performed by: INTERNAL MEDICINE

## 2024-12-04 PROCEDURE — 80069 RENAL FUNCTION PANEL: CPT | Performed by: INTERNAL MEDICINE

## 2024-12-04 PROCEDURE — 84156 ASSAY OF PROTEIN URINE: CPT | Performed by: INTERNAL MEDICINE

## 2024-12-04 PROCEDURE — 80197 ASSAY OF TACROLIMUS: CPT | Performed by: INTERNAL MEDICINE

## 2024-12-04 PROCEDURE — 87799 DETECT AGENT NOS DNA QUANT: CPT | Performed by: INTERNAL MEDICINE

## 2024-12-04 PROCEDURE — 36415 COLL VENOUS BLD VENIPUNCTURE: CPT | Mod: PN | Performed by: INTERNAL MEDICINE

## 2024-12-04 PROCEDURE — 82247 BILIRUBIN TOTAL: CPT | Performed by: INTERNAL MEDICINE

## 2024-12-04 PROCEDURE — 85025 COMPLETE CBC W/AUTO DIFF WBC: CPT | Performed by: INTERNAL MEDICINE

## 2024-12-04 PROCEDURE — 81001 URINALYSIS AUTO W/SCOPE: CPT | Performed by: INTERNAL MEDICINE

## 2024-12-04 NOTE — TELEPHONE ENCOUNTER
Patient Daughter Niya repeated back and voice a understanding of orders.  States that she will check and call coordinator back.   ----- Message from Pavel Padilla MD sent at 12/4/2024  3:45 PM CST -----  Please verify she is taking olmesartan and repeat pr/cr ratio in 4 weeks

## 2024-12-05 ENCOUNTER — PATIENT MESSAGE (OUTPATIENT)
Dept: TRANSPLANT | Facility: CLINIC | Age: 69
End: 2024-12-05
Payer: MEDICARE

## 2024-12-05 ENCOUNTER — TELEPHONE (OUTPATIENT)
Dept: TRANSPLANT | Facility: CLINIC | Age: 69
End: 2024-12-05
Payer: MEDICARE

## 2024-12-05 LAB
CMV DNA SPEC QL NAA+PROBE: NORMAL
CYTOMEGALOVIRUS PCR, QUANT: NOT DETECTED IU/ML
TACROLIMUS BLD-MCNC: <2 NG/ML (ref 5–15)

## 2024-12-05 NOTE — TELEPHONE ENCOUNTER
Patient and daughter Niya repeated back and voice a understanding of orders.  Patient admits to taking Olmesartan 20mg QD.  Niya to verify and call coordinator back.  Per PharmD at [facility] pharmacy it was last picked up in July 2024.  Per patient states she has missed doses of Tacrolimus but does not remember the last dose she missed.  States she is presently taking Tacrolimus 5mg BID and Ketoconazole 100mg QD.  Agreed to repeat labs on Monday 12/9.       ----- Message from Pavel Padilla MD sent at 12/5/2024 10:03 AM CST -----  Trough level? I she taking it correctly?  Verify dose and let me know, if they say (care giver/patient) is correct increase to 6 bid, I wonder if she is taking ketoconazole correctly?    Pavel Padilla MD  [facility] Post-Kidney Transplant Clinical  Please verify she is taking olmesartan and repeat pr/cr ratio in 4 weeks

## 2024-12-05 NOTE — PROGRESS NOTES
Trough level? I she taking it correctly?  Verify dose and let me know, if they say (care giver/patient) is correct increase to 6 bid, I wonder if she is taking ketoconazole correctly?

## 2024-12-06 ENCOUNTER — TELEPHONE (OUTPATIENT)
Dept: TRANSPLANT | Facility: CLINIC | Age: 69
End: 2024-12-06
Payer: MEDICARE

## 2024-12-06 NOTE — TELEPHONE ENCOUNTER
Return call to patient daughter Niya, states she verified that patient is taking Olmesartan 20mg QD.   ----- Message from Jemima sent at 12/6/2024 11:58 AM CST -----  Regarding: Return call  Contact: Niya  Consult/Advisory     Name Of Caller: Niya        Contact Preference:   [phone]        Nature of call: Pt  daughter is returning call to coordinator.Also left message in portal.

## 2024-12-09 ENCOUNTER — LAB VISIT (OUTPATIENT)
Dept: LAB | Facility: HOSPITAL | Age: 69
End: 2024-12-09
Attending: INTERNAL MEDICINE
Payer: MEDICARE

## 2024-12-09 DIAGNOSIS — Z94.0 KIDNEY REPLACED BY TRANSPLANT: ICD-10-CM

## 2024-12-09 LAB
ALBUMIN SERPL BCP-MCNC: 3.4 G/DL (ref 3.5–5.2)
ANION GAP SERPL CALC-SCNC: 10 MMOL/L (ref 8–16)
BASOPHILS # BLD AUTO: 0.02 K/UL (ref 0–0.2)
BASOPHILS NFR BLD: 0.7 % (ref 0–1.9)
BKV DNA SERPL NAA+PROBE-ACNC: <125 COPIES/ML
BKV DNA SERPL NAA+PROBE-LOG#: <2.1 LOG (10) COPIES/ML
BKV DNA SERPL QL NAA+PROBE: NOT DETECTED
BUN SERPL-MCNC: 33 MG/DL (ref 8–23)
CALCIUM SERPL-MCNC: 9.8 MG/DL (ref 8.7–10.5)
CHLORIDE SERPL-SCNC: 109 MMOL/L (ref 95–110)
CO2 SERPL-SCNC: 23 MMOL/L (ref 23–29)
CREAT SERPL-MCNC: 2.3 MG/DL (ref 0.5–1.4)
DIFFERENTIAL METHOD BLD: ABNORMAL
EOSINOPHIL # BLD AUTO: 0 K/UL (ref 0–0.5)
EOSINOPHIL NFR BLD: 0.7 % (ref 0–8)
ERYTHROCYTE [DISTWIDTH] IN BLOOD BY AUTOMATED COUNT: 14.1 % (ref 11.5–14.5)
EST. GFR  (NO RACE VARIABLE): 22.7 ML/MIN/1.73 M^2
GLUCOSE SERPL-MCNC: 76 MG/DL (ref 70–110)
HCT VFR BLD AUTO: 32.6 % (ref 37–48.5)
HGB BLD-MCNC: 10.2 G/DL (ref 12–16)
IMM GRANULOCYTES # BLD AUTO: 0.03 K/UL (ref 0–0.04)
IMM GRANULOCYTES NFR BLD AUTO: 1.1 % (ref 0–0.5)
LYMPHOCYTES # BLD AUTO: 0.4 K/UL (ref 1–4.8)
LYMPHOCYTES NFR BLD: 16.5 % (ref 18–48)
MCH RBC QN AUTO: 29.9 PG (ref 27–31)
MCHC RBC AUTO-ENTMCNC: 31.3 G/DL (ref 32–36)
MCV RBC AUTO: 96 FL (ref 82–98)
MONOCYTES # BLD AUTO: 0.3 K/UL (ref 0.3–1)
MONOCYTES NFR BLD: 11.2 % (ref 4–15)
NEUTROPHILS # BLD AUTO: 1.9 K/UL (ref 1.8–7.7)
NEUTROPHILS NFR BLD: 69.8 % (ref 38–73)
NRBC BLD-RTO: 0 /100 WBC
PHOSPHATE SERPL-MCNC: 3.9 MG/DL (ref 2.7–4.5)
PLATELET # BLD AUTO: 145 K/UL (ref 150–450)
PMV BLD AUTO: 12 FL (ref 9.2–12.9)
POTASSIUM SERPL-SCNC: 4.7 MMOL/L (ref 3.5–5.1)
RBC # BLD AUTO: 3.41 M/UL (ref 4–5.4)
SODIUM SERPL-SCNC: 142 MMOL/L (ref 136–145)
WBC # BLD AUTO: 2.67 K/UL (ref 3.9–12.7)

## 2024-12-09 PROCEDURE — 80069 RENAL FUNCTION PANEL: CPT | Performed by: INTERNAL MEDICINE

## 2024-12-09 PROCEDURE — 85025 COMPLETE CBC W/AUTO DIFF WBC: CPT | Performed by: INTERNAL MEDICINE

## 2024-12-09 PROCEDURE — 36415 COLL VENOUS BLD VENIPUNCTURE: CPT | Mod: PN | Performed by: INTERNAL MEDICINE

## 2024-12-09 PROCEDURE — 80197 ASSAY OF TACROLIMUS: CPT | Performed by: INTERNAL MEDICINE

## 2024-12-10 LAB — TACROLIMUS BLD-MCNC: 5.4 NG/ML (ref 5–15)

## 2024-12-17 DIAGNOSIS — Z94.0 KIDNEY REPLACED BY TRANSPLANT: ICD-10-CM

## 2024-12-17 RX ORDER — TACROLIMUS 1 MG/1
5 CAPSULE ORAL EVERY 12 HOURS
Qty: 300 CAPSULE | Refills: 11 | Status: SHIPPED | OUTPATIENT
Start: 2024-12-17

## 2025-01-09 ENCOUNTER — LAB VISIT (OUTPATIENT)
Dept: LAB | Facility: HOSPITAL | Age: 70
End: 2025-01-09
Attending: INTERNAL MEDICINE
Payer: MEDICARE

## 2025-01-09 DIAGNOSIS — Z94.0 KIDNEY REPLACED BY TRANSPLANT: ICD-10-CM

## 2025-01-09 LAB
ALBUMIN SERPL BCP-MCNC: 3.6 G/DL (ref 3.5–5.2)
ALP SERPL-CCNC: 94 U/L (ref 40–150)
ALP SERPL-CCNC: 94 U/L (ref 40–150)
ALT SERPL W/O P-5'-P-CCNC: 30 U/L (ref 10–44)
ALT SERPL W/O P-5'-P-CCNC: 30 U/L (ref 10–44)
ANION GAP SERPL CALC-SCNC: 10 MMOL/L (ref 8–16)
ANION GAP SERPL CALC-SCNC: 9 MMOL/L (ref 8–16)
AST SERPL-CCNC: 18 U/L (ref 10–40)
AST SERPL-CCNC: 18 U/L (ref 10–40)
BACTERIA #/AREA URNS AUTO: ABNORMAL /HPF
BASOPHILS # BLD AUTO: 0.01 K/UL (ref 0–0.2)
BASOPHILS NFR BLD: 0.4 % (ref 0–1.9)
BILIRUB DIRECT SERPL-MCNC: 0.1 MG/DL (ref 0.1–0.3)
BILIRUB SERPL-MCNC: 0.4 MG/DL (ref 0.1–1)
BILIRUB SERPL-MCNC: 0.4 MG/DL (ref 0.1–1)
BILIRUB UR QL STRIP: NEGATIVE
BUN SERPL-MCNC: 29 MG/DL (ref 8–23)
BUN SERPL-MCNC: 33 MG/DL (ref 8–23)
CALCIUM SERPL-MCNC: 8.6 MG/DL (ref 8.7–10.5)
CALCIUM SERPL-MCNC: 8.7 MG/DL (ref 8.7–10.5)
CHLORIDE SERPL-SCNC: 112 MMOL/L (ref 95–110)
CHLORIDE SERPL-SCNC: 113 MMOL/L (ref 95–110)
CLARITY UR REFRACT.AUTO: CLEAR
CO2 SERPL-SCNC: 21 MMOL/L (ref 23–29)
CO2 SERPL-SCNC: 23 MMOL/L (ref 23–29)
COLOR UR AUTO: YELLOW
CREAT SERPL-MCNC: 2.1 MG/DL (ref 0.5–1.4)
CREAT SERPL-MCNC: 2.3 MG/DL (ref 0.5–1.4)
DIFFERENTIAL METHOD BLD: ABNORMAL
EOSINOPHIL # BLD AUTO: 0 K/UL (ref 0–0.5)
EOSINOPHIL NFR BLD: 0.9 % (ref 0–8)
ERYTHROCYTE [DISTWIDTH] IN BLOOD BY AUTOMATED COUNT: 14.7 % (ref 11.5–14.5)
EST. GFR  (NO RACE VARIABLE): 22.7 ML/MIN/1.73 M^2
EST. GFR  (NO RACE VARIABLE): 25.3 ML/MIN/1.73 M^2
GLUCOSE SERPL-MCNC: 68 MG/DL (ref 70–110)
GLUCOSE SERPL-MCNC: 68 MG/DL (ref 70–110)
GLUCOSE UR QL STRIP: ABNORMAL
HCT VFR BLD AUTO: 33.3 % (ref 37–48.5)
HGB BLD-MCNC: 10.6 G/DL (ref 12–16)
HGB UR QL STRIP: ABNORMAL
HYALINE CASTS UR QL AUTO: 0 /LPF
IMM GRANULOCYTES # BLD AUTO: 0.03 K/UL (ref 0–0.04)
IMM GRANULOCYTES NFR BLD AUTO: 1.3 % (ref 0–0.5)
KETONES UR QL STRIP: NEGATIVE
LEUKOCYTE ESTERASE UR QL STRIP: ABNORMAL
LYMPHOCYTES # BLD AUTO: 0.5 K/UL (ref 1–4.8)
LYMPHOCYTES NFR BLD: 20.5 % (ref 18–48)
MAGNESIUM SERPL-MCNC: 1.8 MG/DL (ref 1.6–2.6)
MCH RBC QN AUTO: 29.9 PG (ref 27–31)
MCHC RBC AUTO-ENTMCNC: 31.8 G/DL (ref 32–36)
MCV RBC AUTO: 94 FL (ref 82–98)
MICROSCOPIC COMMENT: ABNORMAL
MONOCYTES # BLD AUTO: 0.3 K/UL (ref 0.3–1)
MONOCYTES NFR BLD: 11.2 % (ref 4–15)
NEUTROPHILS # BLD AUTO: 1.5 K/UL (ref 1.8–7.7)
NEUTROPHILS NFR BLD: 65.7 % (ref 38–73)
NITRITE UR QL STRIP: NEGATIVE
NRBC BLD-RTO: 0 /100 WBC
PH UR STRIP: 6 [PH] (ref 5–8)
PHOSPHATE SERPL-MCNC: 3.7 MG/DL (ref 2.7–4.5)
PLATELET # BLD AUTO: 154 K/UL (ref 150–450)
PMV BLD AUTO: 12.6 FL (ref 9.2–12.9)
POTASSIUM SERPL-SCNC: 4.4 MMOL/L (ref 3.5–5.1)
POTASSIUM SERPL-SCNC: 4.5 MMOL/L (ref 3.5–5.1)
PROT SERPL-MCNC: 6.7 G/DL (ref 6–8.4)
PROT SERPL-MCNC: 6.7 G/DL (ref 6–8.4)
PROT UR QL STRIP: ABNORMAL
RBC # BLD AUTO: 3.55 M/UL (ref 4–5.4)
RBC #/AREA URNS AUTO: 15 /HPF (ref 0–4)
SODIUM SERPL-SCNC: 143 MMOL/L (ref 136–145)
SODIUM SERPL-SCNC: 145 MMOL/L (ref 136–145)
SP GR UR STRIP: 1.01 (ref 1–1.03)
SQUAMOUS #/AREA URNS AUTO: 24 /HPF
URN SPEC COLLECT METH UR: ABNORMAL
WBC # BLD AUTO: 2.24 K/UL (ref 3.9–12.7)
WBC #/AREA URNS AUTO: 41 /HPF (ref 0–5)
WBC CLUMPS UR QL AUTO: ABNORMAL

## 2025-01-09 PROCEDURE — 80197 ASSAY OF TACROLIMUS: CPT | Performed by: INTERNAL MEDICINE

## 2025-01-09 PROCEDURE — 80069 RENAL FUNCTION PANEL: CPT | Performed by: INTERNAL MEDICINE

## 2025-01-09 PROCEDURE — 83735 ASSAY OF MAGNESIUM: CPT | Performed by: INTERNAL MEDICINE

## 2025-01-09 PROCEDURE — 87799 DETECT AGENT NOS DNA QUANT: CPT | Performed by: INTERNAL MEDICINE

## 2025-01-09 PROCEDURE — 80053 COMPREHEN METABOLIC PANEL: CPT | Performed by: INTERNAL MEDICINE

## 2025-01-09 PROCEDURE — 81001 URINALYSIS AUTO W/SCOPE: CPT | Performed by: INTERNAL MEDICINE

## 2025-01-09 PROCEDURE — 85025 COMPLETE CBC W/AUTO DIFF WBC: CPT | Performed by: INTERNAL MEDICINE

## 2025-01-09 PROCEDURE — 82570 ASSAY OF URINE CREATININE: CPT | Performed by: INTERNAL MEDICINE

## 2025-01-09 PROCEDURE — 36415 COLL VENOUS BLD VENIPUNCTURE: CPT | Mod: PN | Performed by: INTERNAL MEDICINE

## 2025-01-10 ENCOUNTER — PATIENT MESSAGE (OUTPATIENT)
Dept: TRANSPLANT | Facility: CLINIC | Age: 70
End: 2025-01-10
Payer: MEDICARE

## 2025-01-10 DIAGNOSIS — Z94.0 KIDNEY REPLACED BY TRANSPLANT: ICD-10-CM

## 2025-01-10 LAB
CMV DNA SPEC QL NAA+PROBE: NORMAL
CREAT UR-MCNC: 155 MG/DL (ref 15–325)
CYTOMEGALOVIRUS PCR, QUANT: NOT DETECTED IU/ML
PROT UR-MCNC: 426 MG/DL (ref 0–15)
PROT/CREAT UR: 2.75 MG/G{CREAT} (ref 0–0.2)
TACROLIMUS BLD-MCNC: 6 NG/ML (ref 5–15)

## 2025-01-10 RX ORDER — TACROLIMUS 1 MG/1
4 CAPSULE ORAL EVERY 12 HOURS
Qty: 240 CAPSULE | Refills: 11 | Status: SHIPPED | OUTPATIENT
Start: 2025-01-10

## 2025-01-10 NOTE — TELEPHONE ENCOUNTER
Messaged pt and instructed to lower prograf to 4 mg bid.  Asked pt to let us know if she is experiencing any symptoms of a UTI and if she is to contact her general nephrologist for evaluation and treatment.        ----- Message from Pavel Padilla MD sent at 1/10/2025  7:14 AM CST -----  UTI symptoms and ?follow up with her nephrologist

## 2025-01-10 NOTE — TELEPHONE ENCOUNTER
----- Message from Pavel Padilla MD sent at 1/10/2025  9:46 AM CST -----  Please lower prograf to 4 mg PO bid

## 2025-01-21 ENCOUNTER — TELEPHONE (OUTPATIENT)
Dept: HEPATOLOGY | Facility: CLINIC | Age: 70
End: 2025-01-21
Payer: MEDICARE

## 2025-01-30 DIAGNOSIS — Z94.0 KIDNEY REPLACED BY TRANSPLANT: ICD-10-CM

## 2025-01-31 RX ORDER — KETOCONAZOLE 200 MG/1
100 TABLET ORAL DAILY
Qty: 15 TABLET | Refills: 11 | Status: SHIPPED | OUTPATIENT
Start: 2025-01-31 | End: 2026-01-31

## 2025-02-07 ENCOUNTER — LAB VISIT (OUTPATIENT)
Dept: LAB | Facility: HOSPITAL | Age: 70
End: 2025-02-07
Attending: INTERNAL MEDICINE
Payer: MEDICARE

## 2025-02-07 DIAGNOSIS — Z79.899 NEED FOR PROPHYLACTIC CHEMOTHERAPY: ICD-10-CM

## 2025-02-07 DIAGNOSIS — Z94.0 KIDNEY REPLACED BY TRANSPLANT: ICD-10-CM

## 2025-02-07 DIAGNOSIS — N18.4 CHRONIC KIDNEY DISEASE, STAGE IV (SEVERE): Primary | ICD-10-CM

## 2025-02-07 DIAGNOSIS — N25.81 SECONDARY HYPERPARATHYROIDISM OF RENAL ORIGIN: ICD-10-CM

## 2025-02-07 PROCEDURE — 80197 ASSAY OF TACROLIMUS: CPT | Performed by: INTERNAL MEDICINE

## 2025-02-07 PROCEDURE — 36415 COLL VENOUS BLD VENIPUNCTURE: CPT | Mod: PN | Performed by: INTERNAL MEDICINE

## 2025-02-07 PROCEDURE — 83970 ASSAY OF PARATHORMONE: CPT | Performed by: INTERNAL MEDICINE

## 2025-02-08 LAB
PTH-INTACT SERPL-MCNC: 465.9 PG/ML (ref 9–77)
TACROLIMUS BLD-MCNC: 3.9 NG/ML (ref 5–15)

## 2025-02-10 ENCOUNTER — LAB VISIT (OUTPATIENT)
Dept: LAB | Facility: HOSPITAL | Age: 70
End: 2025-02-10
Attending: INTERNAL MEDICINE
Payer: MEDICARE

## 2025-02-10 DIAGNOSIS — D64.9 ANEMIA, UNSPECIFIED: ICD-10-CM

## 2025-02-10 DIAGNOSIS — T86.10 COMPLICATION OF TRANSPLANTED KIDNEY: Primary | ICD-10-CM

## 2025-02-10 DIAGNOSIS — Z94.0 KIDNEY REPLACED BY TRANSPLANT: ICD-10-CM

## 2025-02-10 DIAGNOSIS — N18.4 CHRONIC KIDNEY DISEASE, STAGE IV (SEVERE): ICD-10-CM

## 2025-02-10 DIAGNOSIS — E78.5 HYPERLIPEMIA: ICD-10-CM

## 2025-02-10 DIAGNOSIS — E66.9 OBESITY, UNSPECIFIED: ICD-10-CM

## 2025-02-10 DIAGNOSIS — Z79.899 NEED FOR PROPHYLACTIC CHEMOTHERAPY: ICD-10-CM

## 2025-02-10 LAB
ALBUMIN SERPL BCP-MCNC: 3.5 G/DL (ref 3.5–5.2)
ALP SERPL-CCNC: 84 U/L (ref 40–150)
ALT SERPL W/O P-5'-P-CCNC: 19 U/L (ref 10–44)
ANION GAP SERPL CALC-SCNC: 10 MMOL/L (ref 8–16)
AST SERPL-CCNC: 13 U/L (ref 10–40)
BACTERIA #/AREA URNS AUTO: ABNORMAL /HPF
BILIRUB SERPL-MCNC: 0.3 MG/DL (ref 0.1–1)
BILIRUB UR QL STRIP: NEGATIVE
BUN SERPL-MCNC: 36 MG/DL (ref 8–23)
CALCIUM SERPL-MCNC: 9.1 MG/DL (ref 8.7–10.5)
CHLORIDE SERPL-SCNC: 112 MMOL/L (ref 95–110)
CLARITY UR REFRACT.AUTO: ABNORMAL
CO2 SERPL-SCNC: 20 MMOL/L (ref 23–29)
COLOR UR AUTO: ABNORMAL
CREAT SERPL-MCNC: 2.5 MG/DL (ref 0.5–1.4)
ERYTHROCYTE [DISTWIDTH] IN BLOOD BY AUTOMATED COUNT: 14.6 % (ref 11.5–14.5)
EST. GFR  (NO RACE VARIABLE): 20.6 ML/MIN/1.73 M^2
GLUCOSE SERPL-MCNC: 132 MG/DL (ref 70–110)
GLUCOSE UR QL STRIP: ABNORMAL
HCT VFR BLD AUTO: 33.4 % (ref 37–48.5)
HGB BLD-MCNC: 10.6 G/DL (ref 12–16)
HGB UR QL STRIP: ABNORMAL
HYALINE CASTS UR QL AUTO: 0 /LPF
KETONES UR QL STRIP: NEGATIVE
LEUKOCYTE ESTERASE UR QL STRIP: ABNORMAL
MCH RBC QN AUTO: 30.4 PG (ref 27–31)
MCHC RBC AUTO-ENTMCNC: 31.7 G/DL (ref 32–36)
MCV RBC AUTO: 96 FL (ref 82–98)
MICROSCOPIC COMMENT: ABNORMAL
NITRITE UR QL STRIP: NEGATIVE
NON-SQ EPI CELLS #/AREA URNS AUTO: 1 /HPF
PH UR STRIP: 6 [PH] (ref 5–8)
PLATELET # BLD AUTO: 145 K/UL (ref 150–450)
PMV BLD AUTO: 11.6 FL (ref 9.2–12.9)
POTASSIUM SERPL-SCNC: 4.2 MMOL/L (ref 3.5–5.1)
PROT SERPL-MCNC: 6.6 G/DL (ref 6–8.4)
PROT UR QL STRIP: ABNORMAL
RBC # BLD AUTO: 3.49 M/UL (ref 4–5.4)
RBC #/AREA URNS AUTO: 28 /HPF (ref 0–4)
SODIUM SERPL-SCNC: 142 MMOL/L (ref 136–145)
SP GR UR STRIP: 1.01 (ref 1–1.03)
SQUAMOUS #/AREA URNS AUTO: 96 /HPF
URN SPEC COLLECT METH UR: ABNORMAL
WBC # BLD AUTO: 2.98 K/UL (ref 3.9–12.7)
WBC #/AREA URNS AUTO: 59 /HPF (ref 0–5)
WBC CLUMPS UR QL AUTO: ABNORMAL

## 2025-02-10 PROCEDURE — 81001 URINALYSIS AUTO W/SCOPE: CPT | Performed by: INTERNAL MEDICINE

## 2025-02-10 PROCEDURE — 36415 COLL VENOUS BLD VENIPUNCTURE: CPT | Mod: PN | Performed by: INTERNAL MEDICINE

## 2025-02-10 PROCEDURE — 85027 COMPLETE CBC AUTOMATED: CPT | Performed by: INTERNAL MEDICINE

## 2025-02-10 PROCEDURE — 80053 COMPREHEN METABOLIC PANEL: CPT | Performed by: INTERNAL MEDICINE

## 2025-02-19 DIAGNOSIS — E78.2 MIXED HYPERLIPIDEMIA: ICD-10-CM

## 2025-02-19 DIAGNOSIS — Z86.73 HISTORY OF STROKE: ICD-10-CM

## 2025-02-22 RX ORDER — EVOLOCUMAB 140 MG/ML
140 INJECTION, SOLUTION SUBCUTANEOUS
Qty: 2 EACH | Refills: 11 | Status: ACTIVE | OUTPATIENT
Start: 2025-02-22

## 2025-03-06 ENCOUNTER — LAB VISIT (OUTPATIENT)
Dept: LAB | Facility: HOSPITAL | Age: 70
End: 2025-03-06
Attending: INTERNAL MEDICINE
Payer: MEDICARE

## 2025-03-06 DIAGNOSIS — Z94.0 KIDNEY REPLACED BY TRANSPLANT: ICD-10-CM

## 2025-03-06 LAB
ALBUMIN SERPL BCP-MCNC: 3.4 G/DL (ref 3.5–5.2)
ANION GAP SERPL CALC-SCNC: 7 MMOL/L (ref 8–16)
BACTERIA #/AREA URNS AUTO: ABNORMAL /HPF
BASOPHILS # BLD AUTO: 0.01 K/UL (ref 0–0.2)
BASOPHILS NFR BLD: 0.4 % (ref 0–1.9)
BILIRUB UR QL STRIP: NEGATIVE
BUN SERPL-MCNC: 25 MG/DL (ref 8–23)
CALCIUM SERPL-MCNC: 8.9 MG/DL (ref 8.7–10.5)
CHLORIDE SERPL-SCNC: 110 MMOL/L (ref 95–110)
CLARITY UR REFRACT.AUTO: CLEAR
CO2 SERPL-SCNC: 24 MMOL/L (ref 23–29)
COLOR UR AUTO: COLORLESS
CREAT SERPL-MCNC: 2.1 MG/DL (ref 0.5–1.4)
CREAT UR-MCNC: 52 MG/DL (ref 15–325)
DIFFERENTIAL METHOD BLD: ABNORMAL
EOSINOPHIL # BLD AUTO: 0 K/UL (ref 0–0.5)
EOSINOPHIL NFR BLD: 1.7 % (ref 0–8)
ERYTHROCYTE [DISTWIDTH] IN BLOOD BY AUTOMATED COUNT: 14.1 % (ref 11.5–14.5)
EST. GFR  (NO RACE VARIABLE): 25.3 ML/MIN/1.73 M^2
GLUCOSE SERPL-MCNC: 77 MG/DL (ref 70–110)
GLUCOSE UR QL STRIP: NEGATIVE
HCT VFR BLD AUTO: 34.3 % (ref 37–48.5)
HGB BLD-MCNC: 10.7 G/DL (ref 12–16)
HGB UR QL STRIP: NEGATIVE
HYALINE CASTS UR QL AUTO: 1 /LPF
IMM GRANULOCYTES # BLD AUTO: 0.04 K/UL (ref 0–0.04)
IMM GRANULOCYTES NFR BLD AUTO: 1.7 % (ref 0–0.5)
KETONES UR QL STRIP: NEGATIVE
LEUKOCYTE ESTERASE UR QL STRIP: NEGATIVE
LYMPHOCYTES # BLD AUTO: 0.7 K/UL (ref 1–4.8)
LYMPHOCYTES NFR BLD: 27.1 % (ref 18–48)
MCH RBC QN AUTO: 30.5 PG (ref 27–31)
MCHC RBC AUTO-ENTMCNC: 31.2 G/DL (ref 32–36)
MCV RBC AUTO: 98 FL (ref 82–98)
MICROSCOPIC COMMENT: ABNORMAL
MONOCYTES # BLD AUTO: 0.4 K/UL (ref 0.3–1)
MONOCYTES NFR BLD: 14.6 % (ref 4–15)
NEUTROPHILS # BLD AUTO: 1.3 K/UL (ref 1.8–7.7)
NEUTROPHILS NFR BLD: 54.5 % (ref 38–73)
NITRITE UR QL STRIP: NEGATIVE
NRBC BLD-RTO: 0 /100 WBC
PH UR STRIP: 7 [PH] (ref 5–8)
PHOSPHATE SERPL-MCNC: 3.2 MG/DL (ref 2.7–4.5)
PLATELET # BLD AUTO: 142 K/UL (ref 150–450)
PMV BLD AUTO: 12 FL (ref 9.2–12.9)
POTASSIUM SERPL-SCNC: 4.6 MMOL/L (ref 3.5–5.1)
PROT UR QL STRIP: ABNORMAL
PROT UR-MCNC: 158 MG/DL (ref 0–15)
PROT/CREAT UR: 3.04 MG/G{CREAT} (ref 0–0.2)
RBC # BLD AUTO: 3.51 M/UL (ref 4–5.4)
RBC #/AREA URNS AUTO: 6 /HPF (ref 0–4)
SODIUM SERPL-SCNC: 141 MMOL/L (ref 136–145)
SP GR UR STRIP: 1.01 (ref 1–1.03)
SQUAMOUS #/AREA URNS AUTO: 6 /HPF
URN SPEC COLLECT METH UR: ABNORMAL
WBC # BLD AUTO: 2.4 K/UL (ref 3.9–12.7)
WBC #/AREA URNS AUTO: 7 /HPF (ref 0–5)

## 2025-03-06 PROCEDURE — 80197 ASSAY OF TACROLIMUS: CPT | Performed by: INTERNAL MEDICINE

## 2025-03-06 PROCEDURE — 84156 ASSAY OF PROTEIN URINE: CPT | Performed by: INTERNAL MEDICINE

## 2025-03-06 PROCEDURE — 87799 DETECT AGENT NOS DNA QUANT: CPT | Performed by: INTERNAL MEDICINE

## 2025-03-06 PROCEDURE — 36415 COLL VENOUS BLD VENIPUNCTURE: CPT | Mod: PN | Performed by: INTERNAL MEDICINE

## 2025-03-06 PROCEDURE — 80069 RENAL FUNCTION PANEL: CPT | Performed by: INTERNAL MEDICINE

## 2025-03-06 PROCEDURE — 81001 URINALYSIS AUTO W/SCOPE: CPT | Performed by: INTERNAL MEDICINE

## 2025-03-06 PROCEDURE — 81003 URINALYSIS AUTO W/O SCOPE: CPT | Performed by: INTERNAL MEDICINE

## 2025-03-06 PROCEDURE — 85025 COMPLETE CBC W/AUTO DIFF WBC: CPT | Performed by: INTERNAL MEDICINE

## 2025-03-07 ENCOUNTER — RESULTS FOLLOW-UP (OUTPATIENT)
Dept: TRANSPLANT | Facility: HOSPITAL | Age: 70
End: 2025-03-07
Payer: MEDICARE

## 2025-03-07 LAB — TACROLIMUS BLD-MCNC: 4.4 NG/ML (ref 5–15)

## 2025-03-07 NOTE — PROGRESS NOTES
Please consult Pharm D to start a SGLT2 inhibitor for this lady for proteinuria and ckd management

## 2025-03-07 NOTE — TELEPHONE ENCOUNTER
Patient repeated back and voice a understanding of orders.  Left voice message with daughter Niya to call coordinator back.  ----- Message from Pharmacist Penny sent at 3/7/2025  1:19 PM CST -----  Perfect, then lets start her on the jardiance 10 mg daily.   ----- Message -----  From: Darci Flores RN  Sent: 3/7/2025   1:11 PM CST  To: Pavel Padilla MD; Cherri Silver, PharmD#    Per Mariah in the Pharmacy $0 copay for both medications.  ----- Message -----  From: Penny Gary PharmD  Sent: 3/7/2025  12:56 PM CST  To: Darci Flores RN; Cherri Silver, PharmD    I'm not sure which agent is covered on their insurance but the options are farxiga 10 mg daily or jardiance 10 mg daily. Either agent is effect for both CKD and proteinuria.    ----- Message -----  From: Darci Flores RN  Sent: 3/7/2025  12:46 PM CST  To: Abdominal Transplant Pharmacists      ----- Message -----  From: Pavel Padilla MD  Sent: 3/7/2025  10:11 AM CST  To: [facility] Post-Kidney Transplant Clinical    Please consult Pharm D to start a SGLT2 inhibitor for this lady for proteinuria and ckd management  ----- Message -----  From: Sushant, GELI Lab Interface  Sent: 3/6/2025   4:07 PM CST  To: Pavel Padilla MD

## 2025-03-26 ENCOUNTER — OFFICE VISIT (OUTPATIENT)
Dept: TRANSPLANT | Facility: CLINIC | Age: 70
End: 2025-03-26
Payer: MEDICARE

## 2025-03-26 VITALS
HEART RATE: 80 BPM | DIASTOLIC BLOOD PRESSURE: 81 MMHG | TEMPERATURE: 97 F | WEIGHT: 182.31 LBS | BODY MASS INDEX: 32.3 KG/M2 | HEIGHT: 63 IN | OXYGEN SATURATION: 98 % | RESPIRATION RATE: 18 BRPM | SYSTOLIC BLOOD PRESSURE: 178 MMHG

## 2025-03-26 DIAGNOSIS — D61.811 DRUG-INDUCED PANCYTOPENIA: ICD-10-CM

## 2025-03-26 DIAGNOSIS — Z94.0 KIDNEY REPLACED BY TRANSPLANT: Primary | ICD-10-CM

## 2025-03-26 DIAGNOSIS — N18.4 BENIGN HYPERTENSION WITH CKD (CHRONIC KIDNEY DISEASE) STAGE IV: ICD-10-CM

## 2025-03-26 DIAGNOSIS — I12.9 BENIGN HYPERTENSION WITH CKD (CHRONIC KIDNEY DISEASE) STAGE IV: ICD-10-CM

## 2025-03-26 DIAGNOSIS — E78.2 MIXED HYPERLIPIDEMIA: ICD-10-CM

## 2025-03-26 DIAGNOSIS — N18.4 CKD (CHRONIC KIDNEY DISEASE), STAGE IV: ICD-10-CM

## 2025-03-26 PROBLEM — D84.821 IMMUNOSUPPRESSION DUE TO DRUG THERAPY: Status: ACTIVE | Noted: 2019-09-20

## 2025-03-26 PROBLEM — Z79.899 IMMUNOSUPPRESSION DUE TO DRUG THERAPY: Status: ACTIVE | Noted: 2019-09-20

## 2025-03-26 PROCEDURE — 3077F SYST BP >= 140 MM HG: CPT | Mod: CPTII,S$GLB,, | Performed by: NURSE PRACTITIONER

## 2025-03-26 PROCEDURE — 3008F BODY MASS INDEX DOCD: CPT | Mod: CPTII,S$GLB,, | Performed by: NURSE PRACTITIONER

## 2025-03-26 PROCEDURE — 4010F ACE/ARB THERAPY RXD/TAKEN: CPT | Mod: CPTII,S$GLB,, | Performed by: NURSE PRACTITIONER

## 2025-03-26 PROCEDURE — 3079F DIAST BP 80-89 MM HG: CPT | Mod: CPTII,S$GLB,, | Performed by: NURSE PRACTITIONER

## 2025-03-26 PROCEDURE — 1126F AMNT PAIN NOTED NONE PRSNT: CPT | Mod: CPTII,S$GLB,, | Performed by: NURSE PRACTITIONER

## 2025-03-26 PROCEDURE — 1160F RVW MEDS BY RX/DR IN RCRD: CPT | Mod: CPTII,S$GLB,, | Performed by: NURSE PRACTITIONER

## 2025-03-26 PROCEDURE — 1159F MED LIST DOCD IN RCRD: CPT | Mod: CPTII,S$GLB,, | Performed by: NURSE PRACTITIONER

## 2025-03-26 PROCEDURE — 99999 PR PBB SHADOW E&M-EST. PATIENT-LVL IV: CPT | Mod: PBBFAC,,, | Performed by: NURSE PRACTITIONER

## 2025-03-26 PROCEDURE — 99215 OFFICE O/P EST HI 40 MIN: CPT | Mod: S$GLB,,, | Performed by: NURSE PRACTITIONER

## 2025-03-26 NOTE — LETTER
March 26, 2025        Bruce Khan  [address]  [place] [address]  Phone: [phone]  Fax: [phone]             [facility] 1st Fl  [address]  [facility] [address]  Phone: [phone]   Patient: Satinder Schulte   MR Number: [id number]   YOB: 1955   Date of Visit: 3/26/2025       Dear Dr. Bruce Khan    Thank you for referring Satinder Schulte to me for evaluation. Attached you will find relevant portions of my assessment and plan of care.    If you have questions, please do not hesitate to call me. I look forward to following Satinder Schulte along with you.    Sincerely,    Shyanne Lechuga, NP    Enclosure    If you would like to receive this communication electronically, please contact [email or web address] or [phone] to request [employer] access.    [employer] is a tool which provides read-only access to select patient information with whom you have a relationship. Its easy to use and provides real time access to review your patients record including encounter summaries, notes, results, and demographic information.    If you feel you have received this communication in error or would no longer like to receive these types of communications, please e-mail [email or web address]

## 2025-03-26 NOTE — PROGRESS NOTES
Kidney Post-Transplant Assessment    Referring Physician: Bruce Khan  Current Nephrologist: Bruce Khan    ORGAN: LEFT KIDNEY  Donor Type: donation after brain death  PHS Increased Risk: no  Cold Ischemia: 545 mins  Induction Medications: thymoglobulin    Subjective:     CC:  Reassessment of renal allograft function and management of chronic immunosuppression.    HPI:  Ms. Schulte is a 69 y.o. 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  She has CKD stage 4 - GFR 15-29 and her baseline creatinine is between please see chart below. She takes mycophenolate mofetil prednisone and Belatacept for maintenance immunosuppression. She denies any recent hospitalizations or ER visits since her previous clinic visit.    High BP in clinic today. Patient reporting not taking her medications today because she forgot. She is reporting checking her BP at home at times and her /80. Discussed medication regimen in detail with patient and daughter. There was some confusion and a new blue card was given.   Good appetite  No chest pain or SOB  No nausea vomit or diarrhea    Reports following with Dr. Khan but last visit was in 2022. Encouraged to f/u  Following with Hematology for Anemia      Current Outpatient Medications on File Prior to Visit   Medication Sig Dispense Refill    0.9 % sodium chloride (SODIUM CHLORIDE 0.9%) solution Inject into the vein.      aspirin (ECOTRIN) 81 MG EC tablet Take 1 tablet (81 mg total) by mouth once daily.      atorvastatin (LIPITOR) 40 MG tablet Take 1 tablet (40 mg total) by mouth once daily. 90 tablet 3    BD POSIFLUSH NORMAL SALINE 0.9 injection       belatacept 250 mg SolR 362 mg IV Every 2 weeks x 5 doses then monthly thereafter      blood sugar diagnostic Strp Test blood glucose 4 (four) times daily. 100 each 5    blood-glucose meter kit Use as instructed 1 each 0    cinacalcet (SENSIPAR) 90 MG Tab Take 1  "tablet (90 mg total) by mouth once daily. 30 tablet 11    empagliflozin (JARDIANCE) 10 mg tablet Take 1 tablet (10 mg total) by mouth once daily. 30 tablet 11    evolocumab (REPATHA SURECLICK) 140 mg/mL PnIj Inject 1 mL (140 mg total) into the skin every 14 (fourteen) days. 2 each 11    ferrous sulfate (FEOSOL) 325 mg (65 mg iron) Tab tablet Take by mouth once daily.      folic acid (FOLVITE) 1 MG tablet Take 1 tablet (1 mg total) by mouth once daily. 30 tablet 5    ketoconazole (NIZORAL) 200 mg Tab Take HALF tablet (100 mg total) by mouth once daily. 15 tablet 11    lancets 28 gauge Misc Test blood glucose 4 (four) times daily. 100 each 5    magnesium oxide (MAG-OX) 400 mg (241.3 mg magnesium) tablet Take 1 tablet (400 mg total) by mouth 2 (two) times daily. 180 tablet 3    NIFEdipine (ADALAT CC) 60 MG TbSR TAKE 1 TABLET BY MOUTH TWICE A DAY FOR 2 WEEKS      olmesartan (BENICAR) 20 MG tablet Take 1 tablet (20 mg total) by mouth once daily. 90 tablet 3    pen needle, diabetic 32 gauge x 5/32" Ndle Use to inject insulin 4 (four) times daily. 100 each 5    predniSONE (DELTASONE) 5 MG tablet Take 1 tablet (5 mg total) by mouth once daily. Txp Date:9/20/2019 (Kidney) Disch Date:9/25/2019 ICD10:Z94.0 Txp [facility] 30 tablet 11    tacrolimus (PROGRAF) 1 MG Cap Take 4 capsules (4 mg total) by mouth every 12 (twelve) hours. 240 capsule 11    [DISCONTINUED] diclofenac sodium (VOLTAREN) 1 % Gel Apply 2 g topically daily as needed (pain).       [DISCONTINUED] ezetimibe (ZETIA) 10 mg tablet TAKE 1 TABLET BY MOUTH EVERY DAY 90 tablet 3    [DISCONTINUED] gabapentin (NEURONTIN) 300 MG capsule Take 300 mg by mouth every evening.      [DISCONTINUED] HYDROcodone-acetaminophen (NORCO) 7.5-325 mg per tablet Take 0.5-1 tablets by mouth 3 (three) times daily as needed for Pain.        No current facility-administered medications on file prior to visit.          Review of Systems  Skin: no skin rash  CNS; no headaches, blurred vision, " "seizure, or syncope  ENT: No JVD,  Adenopathies,  nasal congestion. No oral lesions  Cardiac: No chest pain, dyspnea, claudication, edema or palpitations  Respiratory: No SOB, cough, hemoptysis   Gastro-intestinal: No diarrhea, constipation, abdominal pain, nausea, vomit. No ascitis  Genitourinary: no hematuria, dysuria, frequency, frequency  Musculoskeletal: joint pain, arthritis or vasculitic changes  Psych: alert awake, oriented, No cranial nerves deficit.      Objective:   BP (!) 178/81 (BP Location: Right arm, Patient Position: Sitting)   Pulse 80   Temp 97.2 °F (36.2 °C) (Temporal)   Resp 18   Ht 5' 3" (1.6 m)   Wt 82.7 kg (182 lb 5.1 oz)   SpO2 98%   BMI 32.30 kg/m²       Physical Exam  Constitutional:       General: She is not in acute distress.     Appearance: She is well-developed. She is not diaphoretic.   Cardiovascular:      Rate and Rhythm: Normal rate and regular rhythm.      Heart sounds: Normal heart sounds. No murmur heard.     No friction rub. No gallop.   Pulmonary:      Effort: Pulmonary effort is normal. No respiratory distress.      Breath sounds: Normal breath sounds. No wheezing or rales.   Abdominal:      General: Bowel sounds are normal. There is no distension.      Palpations: Abdomen is soft.      Tenderness: There is no abdominal tenderness.   Musculoskeletal:         General: No tenderness. Normal range of motion.   Skin:     General: Skin is warm and dry.      Findings: No rash.      Nails: There is no clubbing.   Neurological:      Mental Status: She is alert and oriented to person, place, and time.   Psychiatric:         Behavior: Behavior normal.           Labs:  Lab Results   Component Value Date    WBC 2.40 (L) 03/06/2025    HGB 10.7 (L) 03/06/2025    HCT 34.3 (L) 03/06/2025     03/06/2025    K 4.6 03/06/2025     03/06/2025    CO2 24 03/06/2025    BUN 25 (H) 03/06/2025    CREATININE 2.1 (H) 03/06/2025    EGFRNORACEVR 25.3 (A) 03/06/2025    CALCIUM 8.9 " "03/06/2025    PHOS 3.2 03/06/2025    MG 1.8 01/09/2025    ALBUMIN 3.4 (L) 03/06/2025    AST 13 02/10/2025    ALT 19 02/10/2025       No results found for: "EXTANC", "EXTWBC", "EXTSEGS", "EXTPLATELETS", "EXTHEMOGLOBI", "EXTHEMATOCRI", "EXTCREATININ", "EXTSODIUM", "EXTPOTASSIUM", "EXTBUN", "EXTCO2", "EXTCALCIUM", "EXTPHOSPHORU", "EXTGLUCOSE", "EXTALBUMIN", "EXTAST", "EXTALT", "EXTBILITOTAL", "EXTLIPASE", "EXTAMYLASE"    No results found for: "EXTCYCLOSLVL", "EXTSIROLIMUS", "EXTTACROLVL", "EXTPROTCRE", "EXTPTHINTACT", "EXTPROTEINUA", "EXTWBCUA", "EXTRBCUA"    Labs were reviewed with the patient.    Assessment:     1. Kidney replaced by transplant    2. CKD (chronic kidney disease), stage IV    3. Benign hypertension with CKD (chronic kidney disease) stage IV    4. Mixed hyperlipidemia    5. Drug-induced pancytopenia        Plan:     Pancytopenia: following with hematology     Proteinuria on olmesartan and Jardiance  Discussed medication regimen in detail with patient and daughter. There was some confusion and a new blue card was given.   Immunosuppression: Isabella. Tacrolimus and prednisone   I spoke with patient and daughter. All questions answered. We discussed  CKD stage 4 with emphasis on close follow up with her local nephrologist.         1. CKD stage 4 with stable GFR. . She is following with her nephrologist : will continue follow up as per our center guidelines. patient to continue close follow up with the local General nephrologist. Education provided in appropriate fluid intake, potassium intake. Continue with oral hydration.    1A. Pancreatic Function: N/A for non-pancreas allograft recipients:     2. High risk immunosuppression medication for organ transplantation, requiring regular intensive follow up and monitoring : Isabella and tacro Tacro at target OFF MMF.   Lab Results   Component Value Date    TACROLIMUS 4.4 (L) 03/06/2025    TACROLIMUS 3.9 (L) 02/07/2025    TACROLIMUS 6.0 01/09/2025   Will closely " monitor for toxicities, education provided about adherence to medicines and need to communicate any side effects to the transplant nurse or physician.    3. Allograft Function:stable at baseline for the patient. Continue follow up as per our guidelines and with the local General nephrologist. Communication will be sent today.  Lab Results   Component Value Date    CREATININE 2.1 (H) 03/06/2025    CREATININE 2.5 (H) 02/10/2025    CREATININE 2.1 (H) 01/09/2025    CREATININE 2.3 (H) 01/09/2025     Lab Results   Component Value Date    LIPASE 36 12/17/2019       4. Hypertension management:  not well controlled per patient and daughter Continue with home blood pressure monitoring, low salt and healthy life discussed with the patient..  BP Readings from Last 3 Encounters:   03/26/25 (!) 178/81   10/23/24 (!) 179/77   09/12/24 130/67   Nifedipine and olmesartan  Was not taking medication properly. Re-educated.       5. Metabolic Bone Disease/Secondary Hyperparathyroidism:calcium and phosphorus level discussed with the patient, patient will continue follow up with the general nephrologist for management of metabolic bone disease. Will monitor PTH and Vit D per our transplant center guidelines.   Sensipar 90mg daily  Lab Results   Component Value Date    .9 (H) 02/07/2025    CALCIUM 8.9 03/06/2025    CAION 1.40 03/13/2020    PHOS 3.2 03/06/2025    PHOS 3.7 01/09/2025    PHOS 3.9 12/09/2024       6. Electrolytes and acid base balance: reviewed with the patient, essentially within the normal range no need for acute changes today, will monitor as per our center guidelines.  Lab Results   Component Value Date     03/06/2025    K 4.6 03/06/2025     03/06/2025    CO2 24 03/06/2025    CO2 20 (L) 02/10/2025    CO2 21 (L) 01/09/2025    CO2 23 01/09/2025       7. Anemia: following with hematology. continue monitoring as per our center guidelines. No indication for acute intervention today.  Lab Results   Component  "Value Date    WBC 2.40 (L) 03/06/2025    HGB 10.7 (L) 03/06/2025    HCT 34.3 (L) 03/06/2025    MCV 98 03/06/2025     (L) 03/06/2025       8.Proteinuria: on Olmesartan. will continue with pr/cr ratio as per our center guidelines.  Lab Results   Component Value Date    PROTEINURINE 158 (H) 03/06/2025    CREATRANDUR 52.0 03/06/2025    UTPCR 3.04 (H) 03/06/2025    UTPCR 2.75 (H) 01/09/2025    UTPCR 2.24 (H) 12/04/2024    On olmesartan and was started on Jardiance      9. BK virus infection screening: will continue with urine or blood PCR as per our guidelines to prevent BK virus viremia and allograft dysfunction  No results found for: "BKVIRUSDNAUR", "BKQUANTURINE", "BKVIRUSLOG", "BKVIRUSURINE"        10. Weight and metabolic management: education provided provided during the clinic visit.   Body mass index is 32.3 kg/m².       11.Patient safety education regarding immunosuppression including prophylaxis posttransplant for CMV, PCP : Education provided about vaccination and prevention of infections.    12.  Cytopenias: no significant cytopenias will monitor as per our guidelines. Medicine list reviewed including potential causes of drug-induced cytopenias     Lab Results   Component Value Date    WBC 2.40 (L) 03/06/2025    HGB 10.7 (L) 03/06/2025    HCT 34.3 (L) 03/06/2025    MCV 98 03/06/2025     (L) 03/06/2025       Follow-up:   Annual follow-up with kidney transplant clinic per written guidelines.  Patient also reminded to follow-up with general nephrologist.    Labs: since patient remains at high risk for rejection and drug-related complications that warrant close monitoring, labs will be ordered as follows: continue twice weekly CBC, renal panel, and drug level for first month; then same labs once weekly through 3rd month post-transplant.  Urine for UA and protein/creatinine ratio monthly.  Serum BK - PCR at 1-, 3-, 6-, 9-, 12-, 18-, 24-, 36-, 48-, and 60 months post-transplant.  Hepatic panel at 1-, " 2-, 3-, 6-, 9-, 12-, 18-, 24-, and 36- months post-transplant.    Shyanne Lechuga NP       Education:   Material provided to the patient.  Patient reminded to call with any health changes since these can be early signs of significant complications.  Also, I advised the patient to be sure any new medications or changes of old medications are discussed with either a pharmacist or physician knowledgeable with transplant to avoid rejection/drug toxicity related to significant drug interactions.    Patient advised that it is recommended that all transplant candidates, and their close contacts and household members receive Covid vaccination.    UNOS Patient Status  Functional Status: 100% - Normal, no complaints, no evidence of disease  Physical Capacity: No Limitations    I spent a total of 44 minutes on the day of the visit.This includes face to face time and non-face to face time preparing to see the patient (eg, review of tests), obtaining and/or reviewing separately obtained history, documenting clinical information in the electronic or other health record, independently interpreting results and communicating results to the patient/family/caregiver, or care coordinator.

## 2025-04-29 ENCOUNTER — LAB VISIT (OUTPATIENT)
Dept: LAB | Facility: HOSPITAL | Age: 70
End: 2025-04-29
Attending: INTERNAL MEDICINE
Payer: MEDICARE

## 2025-04-29 ENCOUNTER — RESULTS FOLLOW-UP (OUTPATIENT)
Dept: TRANSPLANT | Facility: CLINIC | Age: 70
End: 2025-04-29

## 2025-04-29 DIAGNOSIS — Z79.899 POLYPHARMACY: ICD-10-CM

## 2025-04-29 DIAGNOSIS — T86.10 COMPLICATION OF TRANSPLANTED KIDNEY: Primary | ICD-10-CM

## 2025-04-29 DIAGNOSIS — D64.9 ANEMIA, UNSPECIFIED TYPE: ICD-10-CM

## 2025-04-29 DIAGNOSIS — R82.90 NONSPECIFIC FINDING ON EXAMINATION OF URINE: ICD-10-CM

## 2025-04-29 DIAGNOSIS — Z94.0 KIDNEY REPLACED BY TRANSPLANT: ICD-10-CM

## 2025-04-29 LAB
ALBUMIN SERPL BCP-MCNC: 3.6 G/DL (ref 3.5–5.2)
ALP SERPL-CCNC: 93 UNIT/L (ref 40–150)
ALT SERPL W/O P-5'-P-CCNC: 15 UNIT/L (ref 10–44)
ANION GAP (OHS): 11 MMOL/L (ref 8–16)
AST SERPL-CCNC: 19 UNIT/L (ref 11–45)
BACTERIA #/AREA URNS AUTO: ABNORMAL /HPF
BILIRUB SERPL-MCNC: 0.4 MG/DL (ref 0.1–1)
BILIRUB UR QL STRIP.AUTO: NEGATIVE
BUN SERPL-MCNC: 28 MG/DL (ref 8–23)
CALCIUM SERPL-MCNC: 8.5 MG/DL (ref 8.7–10.5)
CHLORIDE SERPL-SCNC: 110 MMOL/L (ref 95–110)
CLARITY UR: CLEAR
CO2 SERPL-SCNC: 23 MMOL/L (ref 23–29)
COLOR UR AUTO: YELLOW
CREAT SERPL-MCNC: 2.4 MG/DL (ref 0.5–1.4)
GFR SERPLBLD CREATININE-BSD FMLA CKD-EPI: 22 ML/MIN/1.73/M2
GLUCOSE SERPL-MCNC: 67 MG/DL (ref 70–110)
GLUCOSE UR QL STRIP: ABNORMAL
HGB UR QL STRIP: ABNORMAL
HYALINE CASTS UR QL AUTO: 0 /LPF (ref 0–1)
KETONES UR QL STRIP: NEGATIVE
LEUKOCYTE ESTERASE UR QL STRIP: ABNORMAL
MAGNESIUM SERPL-MCNC: 1.7 MG/DL (ref 1.6–2.6)
MICROSCOPIC COMMENT: ABNORMAL
NITRITE UR QL STRIP: NEGATIVE
PH UR STRIP: 7 [PH]
POTASSIUM SERPL-SCNC: 4.3 MMOL/L (ref 3.5–5.1)
PROT SERPL-MCNC: 6.8 GM/DL (ref 6–8.4)
PROT UR QL STRIP: ABNORMAL
RBC #/AREA URNS AUTO: 2 /HPF (ref 0–4)
SODIUM SERPL-SCNC: 144 MMOL/L (ref 136–145)
SP GR UR STRIP: 1.01
SQUAMOUS #/AREA URNS AUTO: 6 /HPF
UROBILINOGEN UR STRIP-ACNC: NEGATIVE EU/DL
WBC #/AREA URNS AUTO: 13 /HPF (ref 0–5)
YEAST UR QL AUTO: ABNORMAL /HPF

## 2025-04-29 PROCEDURE — 84460 ALANINE AMINO (ALT) (SGPT): CPT

## 2025-04-29 PROCEDURE — 83735 ASSAY OF MAGNESIUM: CPT

## 2025-04-29 PROCEDURE — 81001 URINALYSIS AUTO W/SCOPE: CPT

## 2025-04-29 PROCEDURE — 36415 COLL VENOUS BLD VENIPUNCTURE: CPT | Mod: PN

## 2025-04-29 NOTE — PROGRESS NOTES
I hope she is following with her nephrologist for advanced ckd care. Does she have any appointment with us anytime soon.

## 2025-04-30 ENCOUNTER — LAB VISIT (OUTPATIENT)
Dept: LAB | Facility: HOSPITAL | Age: 70
End: 2025-04-30
Attending: INTERNAL MEDICINE
Payer: MEDICARE

## 2025-04-30 DIAGNOSIS — Z94.0 KIDNEY REPLACED BY TRANSPLANT: ICD-10-CM

## 2025-04-30 DIAGNOSIS — D64.9 ANEMIA, UNSPECIFIED TYPE: ICD-10-CM

## 2025-04-30 DIAGNOSIS — T86.10 COMPLICATION OF TRANSPLANTED KIDNEY: Primary | ICD-10-CM

## 2025-04-30 DIAGNOSIS — Z79.899 POLYPHARMACY: ICD-10-CM

## 2025-04-30 DIAGNOSIS — R82.90 NONSPECIFIC FINDING ON EXAMINATION OF URINE: ICD-10-CM

## 2025-04-30 LAB
ABSOLUTE EOSINOPHIL (OHS): 0.02 K/UL
ABSOLUTE MONOCYTE (OHS): 0.37 K/UL (ref 0.3–1)
ABSOLUTE NEUTROPHIL COUNT (OHS): 1.01 K/UL (ref 1.8–7.7)
BASOPHILS # BLD AUTO: 0.01 K/UL
BASOPHILS NFR BLD AUTO: 0.4 %
ERYTHROCYTE [DISTWIDTH] IN BLOOD BY AUTOMATED COUNT: 13.9 % (ref 11.5–14.5)
HCT VFR BLD AUTO: 34.1 % (ref 37–48.5)
HGB BLD-MCNC: 11 GM/DL (ref 12–16)
IMM GRANULOCYTES # BLD AUTO: 0.04 K/UL (ref 0–0.04)
IMM GRANULOCYTES NFR BLD AUTO: 1.7 % (ref 0–0.5)
LYMPHOCYTES # BLD AUTO: 0.96 K/UL (ref 1–4.8)
MCH RBC QN AUTO: 30.8 PG (ref 27–31)
MCHC RBC AUTO-ENTMCNC: 32.3 G/DL (ref 32–36)
MCV RBC AUTO: 96 FL (ref 82–98)
NUCLEATED RBC (/100WBC) (OHS): 0 /100 WBC
PLATELET # BLD AUTO: 136 K/UL (ref 150–450)
PMV BLD AUTO: 12.2 FL (ref 9.2–12.9)
RBC # BLD AUTO: 3.57 M/UL (ref 4–5.4)
RELATIVE EOSINOPHIL (OHS): 0.8 %
RELATIVE LYMPHOCYTE (OHS): 39.8 % (ref 18–48)
RELATIVE MONOCYTE (OHS): 15.4 % (ref 4–15)
RELATIVE NEUTROPHIL (OHS): 41.9 % (ref 38–73)
WBC # BLD AUTO: 2.41 K/UL (ref 3.9–12.7)

## 2025-04-30 PROCEDURE — 85025 COMPLETE CBC W/AUTO DIFF WBC: CPT

## 2025-04-30 PROCEDURE — 36415 COLL VENOUS BLD VENIPUNCTURE: CPT | Mod: PN

## 2025-04-30 PROCEDURE — 80197 ASSAY OF TACROLIMUS: CPT

## 2025-05-01 LAB — TACROLIMUS BLD-MCNC: 7.1 NG/ML (ref 5–15)

## 2025-05-16 ENCOUNTER — TELEPHONE (OUTPATIENT)
Dept: HEMATOLOGY/ONCOLOGY | Facility: CLINIC | Age: 70
End: 2025-05-16
Payer: MEDICARE

## 2025-05-16 NOTE — TELEPHONE ENCOUNTER
Attempted to call patient and there was no answer. Unable to leave a voicemail at this time. Patient in person appointment for 5/19 has been changed to a virtual visit.

## 2025-05-19 ENCOUNTER — TELEPHONE (OUTPATIENT)
Dept: HEMATOLOGY/ONCOLOGY | Facility: CLINIC | Age: 70
End: 2025-05-19
Payer: MEDICARE

## 2025-05-19 ENCOUNTER — PATIENT MESSAGE (OUTPATIENT)
Dept: HEMATOLOGY/ONCOLOGY | Facility: CLINIC | Age: 70
End: 2025-05-19
Payer: MEDICARE

## 2025-05-19 NOTE — TELEPHONE ENCOUNTER
Attempted to call patient to reschedule missed appointment with Annette Motley NP and there was no answer. Voicemail was left for patient to contact the clinic at their earliest convenience.

## 2025-06-04 ENCOUNTER — RESULTS FOLLOW-UP (OUTPATIENT)
Dept: TRANSPLANT | Facility: CLINIC | Age: 70
End: 2025-06-04

## 2025-06-04 ENCOUNTER — LAB VISIT (OUTPATIENT)
Dept: LAB | Facility: HOSPITAL | Age: 70
End: 2025-06-04
Attending: INTERNAL MEDICINE
Payer: MEDICARE

## 2025-06-04 DIAGNOSIS — Z94.0 KIDNEY REPLACED BY TRANSPLANT: ICD-10-CM

## 2025-06-04 LAB
ABSOLUTE EOSINOPHIL (OHS): 0.03 K/UL
ABSOLUTE MONOCYTE (OHS): 0.38 K/UL (ref 0.3–1)
ABSOLUTE NEUTROPHIL COUNT (OHS): 1.17 K/UL (ref 1.8–7.7)
ALBUMIN SERPL BCP-MCNC: 3.8 G/DL (ref 3.5–5.2)
ANION GAP (OHS): 7 MMOL/L (ref 8–16)
BASOPHILS # BLD AUTO: 0.01 K/UL
BASOPHILS NFR BLD AUTO: 0.4 %
BUN SERPL-MCNC: 34 MG/DL (ref 8–23)
CALCIUM SERPL-MCNC: 10 MG/DL (ref 8.7–10.5)
CHLORIDE SERPL-SCNC: 115 MMOL/L (ref 95–110)
CO2 SERPL-SCNC: 20 MMOL/L (ref 23–29)
CREAT SERPL-MCNC: 2.2 MG/DL (ref 0.5–1.4)
ERYTHROCYTE [DISTWIDTH] IN BLOOD BY AUTOMATED COUNT: 14 % (ref 11.5–14.5)
GFR SERPLBLD CREATININE-BSD FMLA CKD-EPI: 24 ML/MIN/1.73/M2
GLUCOSE SERPL-MCNC: 70 MG/DL (ref 70–110)
HCT VFR BLD AUTO: 36 % (ref 37–48.5)
HGB BLD-MCNC: 11.7 GM/DL (ref 12–16)
IMM GRANULOCYTES # BLD AUTO: 0.03 K/UL (ref 0–0.04)
IMM GRANULOCYTES NFR BLD AUTO: 1.1 % (ref 0–0.5)
LYMPHOCYTES # BLD AUTO: 1.12 K/UL (ref 1–4.8)
MCH RBC QN AUTO: 31.5 PG (ref 27–31)
MCHC RBC AUTO-ENTMCNC: 32.5 G/DL (ref 32–36)
MCV RBC AUTO: 97 FL (ref 82–98)
NUCLEATED RBC (/100WBC) (OHS): 0 /100 WBC
PHOSPHATE SERPL-MCNC: 3.2 MG/DL (ref 2.7–4.5)
PLATELET # BLD AUTO: 153 K/UL (ref 150–450)
PMV BLD AUTO: 12.5 FL (ref 9.2–12.9)
POTASSIUM SERPL-SCNC: 4.2 MMOL/L (ref 3.5–5.1)
RBC # BLD AUTO: 3.72 M/UL (ref 4–5.4)
RELATIVE EOSINOPHIL (OHS): 1.1 %
RELATIVE LYMPHOCYTE (OHS): 40.9 % (ref 18–48)
RELATIVE MONOCYTE (OHS): 13.9 % (ref 4–15)
RELATIVE NEUTROPHIL (OHS): 42.6 % (ref 38–73)
SODIUM SERPL-SCNC: 142 MMOL/L (ref 136–145)
WBC # BLD AUTO: 2.74 K/UL (ref 3.9–12.7)

## 2025-06-04 PROCEDURE — 85025 COMPLETE CBC W/AUTO DIFF WBC: CPT

## 2025-06-04 PROCEDURE — 36415 COLL VENOUS BLD VENIPUNCTURE: CPT | Mod: PN

## 2025-06-04 PROCEDURE — 80069 RENAL FUNCTION PANEL: CPT

## 2025-06-04 PROCEDURE — 80197 ASSAY OF TACROLIMUS: CPT

## 2025-06-05 LAB — TACROLIMUS BLD-MCNC: 4.6 NG/ML (ref 5–15)

## 2025-08-12 ENCOUNTER — LAB VISIT (OUTPATIENT)
Dept: LAB | Facility: HOSPITAL | Age: 70
End: 2025-08-12
Attending: INTERNAL MEDICINE
Payer: MEDICARE

## 2025-08-12 DIAGNOSIS — Z94.0 KIDNEY REPLACED BY TRANSPLANT: ICD-10-CM

## 2025-08-12 DIAGNOSIS — N25.81 SECONDARY HYPERPARATHYROIDISM OF RENAL ORIGIN: ICD-10-CM

## 2025-08-12 DIAGNOSIS — N18.4 CHRONIC KIDNEY DISEASE, STAGE IV (SEVERE): ICD-10-CM

## 2025-08-12 DIAGNOSIS — E78.5 HYPERLIPIDEMIA, UNSPECIFIED HYPERLIPIDEMIA TYPE: ICD-10-CM

## 2025-08-12 DIAGNOSIS — D64.9 ANEMIA, UNSPECIFIED TYPE: ICD-10-CM

## 2025-08-12 DIAGNOSIS — Z79.899 POLYPHARMACY: ICD-10-CM

## 2025-08-12 DIAGNOSIS — T86.10 COMPLICATION OF TRANSPLANTED KIDNEY: Primary | ICD-10-CM

## 2025-08-12 LAB
ABSOLUTE NEUTROPHIL MANUAL (OHS): 0.5 K/UL (ref 1.8–7.7)
ALBUMIN SERPL BCP-MCNC: 3.6 G/DL (ref 3.5–5.2)
ALP SERPL-CCNC: 98 UNIT/L (ref 40–150)
ALT SERPL W/O P-5'-P-CCNC: 18 UNIT/L (ref 0–55)
ANION GAP (OHS): 7 MMOL/L (ref 8–16)
AST SERPL-CCNC: 22 UNIT/L (ref 0–50)
BACTERIA #/AREA URNS AUTO: ABNORMAL /HPF
BILIRUB SERPL-MCNC: 0.4 MG/DL (ref 0.1–1)
BUN SERPL-MCNC: 31 MG/DL (ref 8–23)
CALCIUM SERPL-MCNC: 9.1 MG/DL (ref 8.7–10.5)
CHLORIDE SERPL-SCNC: 113 MMOL/L (ref 95–110)
CHOLEST SERPL-MCNC: 189 MG/DL (ref 120–199)
CHOLEST/HDLC SERPL: 2.3 {RATIO} (ref 2–5)
CO2 SERPL-SCNC: 22 MMOL/L (ref 23–29)
CREAT SERPL-MCNC: 2.8 MG/DL (ref 0.5–1.4)
EOSINOPHIL NFR BLD MANUAL: 2 % (ref 0–8)
ERYTHROCYTE [DISTWIDTH] IN BLOOD BY AUTOMATED COUNT: 14.2 % (ref 11.5–14.5)
GFR SERPLBLD CREATININE-BSD FMLA CKD-EPI: 18 ML/MIN/1.73/M2
GLUCOSE SERPL-MCNC: 73 MG/DL (ref 70–110)
HCT VFR BLD AUTO: 33.8 % (ref 37–48.5)
HDLC SERPL-MCNC: 81 MG/DL (ref 40–75)
HDLC SERPL: 42.9 % (ref 20–50)
HGB BLD-MCNC: 11 GM/DL (ref 12–16)
LDLC SERPL CALC-MCNC: 87 MG/DL (ref 63–159)
LYMPHOCYTES NFR BLD MANUAL: 51 % (ref 18–48)
MAGNESIUM SERPL-MCNC: 1.9 MG/DL (ref 1.6–2.6)
MCH RBC QN AUTO: 30.6 PG (ref 27–31)
MCHC RBC AUTO-ENTMCNC: 32.5 G/DL (ref 32–36)
MCV RBC AUTO: 94 FL (ref 82–98)
METAMYELOCYTES NFR BLD MANUAL: 4 %
MICROSCOPIC COMMENT: ABNORMAL
MONOCYTES NFR BLD MANUAL: 15 % (ref 4–15)
NEUTROPHILS NFR BLD MANUAL: 28 % (ref 38–73)
NONHDLC SERPL-MCNC: 108 MG/DL
NUCLEATED RBC (/100WBC) (OHS): 0 /100 WBC
PLATELET # BLD AUTO: 114 K/UL (ref 150–450)
PLATELET BLD QL SMEAR: ABNORMAL
PMV BLD AUTO: 12.9 FL (ref 9.2–12.9)
POTASSIUM SERPL-SCNC: 3.9 MMOL/L (ref 3.5–5.1)
PROT SERPL-MCNC: 6.4 GM/DL (ref 6–8.4)
PTH-INTACT SERPL-MCNC: 544.8 PG/ML (ref 9–77)
RBC # BLD AUTO: 3.59 M/UL (ref 4–5.4)
RBC #/AREA URNS AUTO: 9 /HPF (ref 0–4)
SODIUM SERPL-SCNC: 142 MMOL/L (ref 136–145)
SQUAMOUS #/AREA URNS AUTO: 55 /HPF
TRIGL SERPL-MCNC: 105 MG/DL (ref 30–150)
WBC # BLD AUTO: 1.81 K/UL (ref 3.9–12.7)
WBC #/AREA URNS AUTO: 11 /HPF (ref 0–5)

## 2025-08-12 PROCEDURE — 81001 URINALYSIS AUTO W/SCOPE: CPT

## 2025-08-12 PROCEDURE — 80053 COMPREHEN METABOLIC PANEL: CPT

## 2025-08-12 PROCEDURE — 82465 ASSAY BLD/SERUM CHOLESTEROL: CPT

## 2025-08-12 PROCEDURE — 36415 COLL VENOUS BLD VENIPUNCTURE: CPT | Mod: PN

## 2025-08-12 PROCEDURE — 85007 BL SMEAR W/DIFF WBC COUNT: CPT

## 2025-08-12 PROCEDURE — 80197 ASSAY OF TACROLIMUS: CPT

## 2025-08-12 PROCEDURE — 83970 ASSAY OF PARATHORMONE: CPT

## 2025-08-12 PROCEDURE — 83735 ASSAY OF MAGNESIUM: CPT

## 2025-08-13 LAB — TACROLIMUS BLD-MCNC: 2.8 NG/ML (ref 5–15)

## 2025-08-26 ENCOUNTER — TELEPHONE (OUTPATIENT)
Dept: NEUROLOGY | Facility: CLINIC | Age: 70
End: 2025-08-26
Payer: MEDICARE

## (undated) DEVICE — SUT SILK 3-0 STRANDS 30IN

## (undated) DEVICE — PUNCH AORTIC 4.8MM

## (undated) DEVICE — FOLEY BLLN 20FR 3WAY 5CC

## (undated) DEVICE — SUT PROLENE 6-0 BV-1 30IN

## (undated) DEVICE — SET DECANTER MEDICHOICE

## (undated) DEVICE — DRESSING ABSRBNT ISLAND 3.6X8

## (undated) DEVICE — CLIPPER BLADE MOD 4406 (CAREF)

## (undated) DEVICE — TRAY FOLEY 16FR INFECTION CONT

## (undated) DEVICE — TOWEL OR XRAY WHITE 17X26IN

## (undated) DEVICE — SOL NS 1000CC

## (undated) DEVICE — STOCKINETTE 2INX36

## (undated) DEVICE — SUT 1 36IN PDS II VIO MONO

## (undated) DEVICE — NDL SPINAL 20GX3.5 HUB

## (undated) DEVICE — SEE MEDLINE ITEM 146417

## (undated) DEVICE — TRAY BONE MARROW BEK3411

## (undated) DEVICE — SUT 3-0 12-18IN SILK

## (undated) DEVICE — SPONGE LAP 18X18 PREWASHED

## (undated) DEVICE — SEE MEDLINE ITEM 156900

## (undated) DEVICE — SUT 2-0 12-18IN SILK

## (undated) DEVICE — ELECTRODE REM PLYHSV RETURN 9

## (undated) DEVICE — DRAIN CHANNEL ROUND 15FR

## (undated) DEVICE — SUT 4-0 12-18IN SILK BLACK

## (undated) DEVICE — SYR SLIP TIP 10ML SHIELD

## (undated) DEVICE — SEE MEDLINE ITEM 157128

## (undated) DEVICE — SYR ONLY LUER LOCK 20CC

## (undated) DEVICE — SET IRR URLGY 2LINE UNIV SPIKE

## (undated) DEVICE — PLUG CATHETER STERILE FOLEY

## (undated) DEVICE — SUT PDS BV 6-0

## (undated) DEVICE — SUT ETHILON 3-0 PS2 18 BLK

## (undated) DEVICE — SUT PROLENE 5-0 36IN C-1

## (undated) DEVICE — EVACUATOR WOUND BULB 100CC

## (undated) DEVICE — HEMOSTAT SURGICEL NU-KNIT 6X9

## (undated) DEVICE — DRAPE SLUSH WARMER WITH DISC

## (undated) DEVICE — SUT SILK 2-0 STRANDS 30IN

## (undated) DEVICE — DRESSING LEUKOPLAST FLEX 1X3IN